# Patient Record
Sex: MALE | Race: WHITE
[De-identification: names, ages, dates, MRNs, and addresses within clinical notes are randomized per-mention and may not be internally consistent; named-entity substitution may affect disease eponyms.]

---

## 2017-01-18 LAB
INR PPP: 2.5 (ref 0.8–1.4)
PROTHROMBIN TIME: 27 SEC (ref 12.2–14.7)

## 2017-02-10 LAB
INR PPP: 1.8 (ref 0.8–1.4)
PROTHROMBIN TIME: 20.2 SEC (ref 12.2–14.7)

## 2017-04-05 ENCOUNTER — HOSPITAL ENCOUNTER (OUTPATIENT)
Dept: HOSPITAL 75 - LAB | Age: 47
LOS: 13 days | Discharge: HOME | End: 2017-04-18
Attending: INTERNAL MEDICINE
Payer: COMMERCIAL

## 2017-04-05 DIAGNOSIS — I82.403: Primary | ICD-10-CM

## 2017-04-05 DIAGNOSIS — Z79.01: ICD-10-CM

## 2017-04-05 LAB
INR PPP: 2.2 (ref 0.8–1.4)
PROTHROMBIN TIME: 23.9 SEC (ref 12.2–14.7)

## 2017-04-05 PROCEDURE — 36415 COLL VENOUS BLD VENIPUNCTURE: CPT

## 2017-04-05 PROCEDURE — 85610 PROTHROMBIN TIME: CPT

## 2017-05-10 LAB
INR PPP: 1.9 (ref 0.8–1.4)
PROTHROMBIN TIME: 21.3 SEC (ref 12.2–14.7)

## 2017-05-17 ENCOUNTER — HOSPITAL ENCOUNTER (EMERGENCY)
Dept: HOSPITAL 75 - ER | Age: 47
Discharge: HOME | End: 2017-05-17
Payer: COMMERCIAL

## 2017-05-17 VITALS — BODY MASS INDEX: 33.66 KG/M2 | WEIGHT: 190 LBS | HEIGHT: 63 IN

## 2017-05-17 VITALS — DIASTOLIC BLOOD PRESSURE: 82 MMHG | SYSTOLIC BLOOD PRESSURE: 135 MMHG

## 2017-05-17 DIAGNOSIS — Z79.01: ICD-10-CM

## 2017-05-17 DIAGNOSIS — F17.210: ICD-10-CM

## 2017-05-17 DIAGNOSIS — Z95.5: ICD-10-CM

## 2017-05-17 DIAGNOSIS — R10.10: Primary | ICD-10-CM

## 2017-05-17 DIAGNOSIS — Z79.82: ICD-10-CM

## 2017-05-17 DIAGNOSIS — Z79.899: ICD-10-CM

## 2017-05-17 LAB
ALBUMIN SERPL-MCNC: 3.9 G/DL (ref 3.2–4.5)
ALT SERPL-CCNC: 18 U/L (ref 0–55)
ANION GAP SERPL CALC-SCNC: 8 MMOL/L (ref 5–14)
AST SERPL-CCNC: 24 U/L (ref 5–34)
BASOPHILS # BLD AUTO: 0.1 10^3/UL (ref 0–0.1)
BASOPHILS NFR BLD AUTO: 1 % (ref 0–10)
BILIRUB SERPL-MCNC: 0.3 MG/DL (ref 0.1–1)
BUN SERPL-MCNC: 8 MG/DL (ref 7–18)
BUN/CREAT SERPL: 11
CALCIUM SERPL-MCNC: 8.9 MG/DL (ref 8.5–10.1)
CHLORIDE SERPL-SCNC: 105 MMOL/L (ref 98–107)
CO2 SERPL-SCNC: 27 MMOL/L (ref 21–32)
CREAT SERPL-MCNC: 0.76 MG/DL (ref 0.6–1.3)
EOSINOPHIL # BLD AUTO: 0.4 10^3/UL (ref 0–0.3)
EOSINOPHIL NFR BLD AUTO: 4 % (ref 0–10)
ERYTHROCYTE [DISTWIDTH] IN BLOOD BY AUTOMATED COUNT: 13.7 % (ref 10–14.5)
GFR SERPLBLD BASED ON 1.73 SQ M-ARVRAT: > 60 ML/MIN
GLUCOSE SERPL-MCNC: 91 MG/DL (ref 70–105)
LIPASE SERPL-CCNC: 14 U/L (ref 8–78)
LYMPHOCYTES # BLD AUTO: 3.5 X 10^3 (ref 1–4)
LYMPHOCYTES NFR BLD AUTO: 40 % (ref 12–44)
MCH RBC QN AUTO: 29 PG (ref 25–34)
MCHC RBC AUTO-ENTMCNC: 34 G/DL (ref 32–36)
MCV RBC AUTO: 86 FL (ref 80–99)
MONOCYTES # BLD AUTO: 1 X 10^3 (ref 0–1)
MONOCYTES NFR BLD AUTO: 11 % (ref 0–12)
NEUTROPHILS # BLD AUTO: 3.9 X 10^3 (ref 1.8–7.8)
NEUTROPHILS NFR BLD AUTO: 44 % (ref 42–75)
PLATELET # BLD: 285 10^3/UL (ref 130–400)
PMV BLD AUTO: 9.7 FL (ref 7.4–10.4)
POTASSIUM SERPL-SCNC: 4.5 MMOL/L (ref 3.6–5)
PROT SERPL-MCNC: 6.4 G/DL (ref 6.4–8.2)
RBC # BLD AUTO: 4.97 10^6/UL (ref 4.35–5.85)
SODIUM SERPL-SCNC: 140 MMOL/L (ref 135–145)
WBC # BLD AUTO: 8.9 10^3/UL (ref 4.3–11)

## 2017-05-17 PROCEDURE — 36415 COLL VENOUS BLD VENIPUNCTURE: CPT

## 2017-05-17 PROCEDURE — 83690 ASSAY OF LIPASE: CPT

## 2017-05-17 PROCEDURE — 96360 HYDRATION IV INFUSION INIT: CPT

## 2017-05-17 PROCEDURE — 80053 COMPREHEN METABOLIC PANEL: CPT

## 2017-05-17 PROCEDURE — 85025 COMPLETE CBC W/AUTO DIFF WBC: CPT

## 2017-05-17 PROCEDURE — 74177 CT ABD & PELVIS W/CONTRAST: CPT

## 2017-05-17 NOTE — ED ABDOMINAL PAIN
General


Chief Complaint:  Abdominal/GI Problems


Stated Complaint:  LOWER CHEST PAIN


Nursing Triage Note:  


AMB TO ED REPORTS HAS  HAD EPIGASTRIC PAIN FOR 1 WEEK. WORSE WHEN MOVING.


Sepsis Screen:  No Definite Risk


Source of Information:  Patient, Spouse


Exam Limitations:  No Limitations





History of Present Illness


Time Seen By Provider:  17:55


Initial Comments


47 yo male patient presents to the ED with c/o epigastric and RUQ pain for 1 

wk.  pain worse with spicy foods and movement.  denies N/V/D.  NPO since 0800.


Timing/Duration:  1 Week


Severity/Quality:  Aching, Cramping


Location:  Epigastric


Radiation:  RUQ


Activities at Onset:  None


Modifying Factors:  Worsens With Eating, Worsens With Movement





Allergies and Home Medications


Allergies


Coded Allergies:  


     Penicillins (Unverified  Allergy, Mild, 6/4/09)





Home Medications


Aspirin 81 Mg Tabec, 81 MG PO DAILY, (Reported)


Atorvastatin 20 Mg Tablet, 20 MG PO DAILY, (Reported)


Hydrocodone/Acetaminophen 1 Each Tablet, #90 (Reported)


Lisinopril 2.5 Mg Tablet, 2.5 MG PO DAILY, (Reported)


Metoprolol Tartrate 25 Mg Tablet, 25 MG PO HS, (Reported)


Omega-3/Dha/Epa/Fish Oil 1 Each Capsule, 1,000 MG PO DAILY, (Reported)


Omega-3/Dha/Epa/Fish Oil 1 Each Capsule, 2,000 MG PO HS, (Reported)


   TAKES 2 (1,000 MG) CAPSULES 


Omeprazole 20 Mg Capsule.dr, 20 MG PO DAILY, (Reported)


Ondansetron 8 Mg Tab.rapdis, 8 MG PO Q6H PRN for NAUSEA/VOMITING-1ST LINE, #10 

Ref 0


   Prescribed by: DAMARIS ANG on 5/17/17 1900


Warfarin Sod 5 Mg Tab, 7.5 MG PO Mo@1900, (Reported)


   TAKES 1 & 1/2 OF A (5 MG) TABLET 


Warfarin Sod 5 Mg Tab, 5 MG PO SuTuWeThFrSa@1900, (Reported)





Review of Systems


Constitutional:  No chills, No fever, No malaise


Respiratory:  Denies Cough, Denies Shortness of Air, Denies Wheezing


Cardiovascular:  Denies Chest Pain, Denies Lightheadedness, Denies Syncope


Gastrointestinal:  See HPI, Denies Abdomen Distended, Abdominal Pain, Denies 

Blood Streaked Stools, Denies Constipated, Denies Diarrhea, Denies Nausea, 

Denies Poor Appetite, Denies Poor Fluid Intake, Denies Rectal Bleeding, Denies 

Vomiting


Genitourinary:  Denies Burning, Denies Frequency, Denies Flank Pain, Denies 

Hematuria, Denies Pain


Musculoskeletal:  no symptoms reported


Skin:  no symptoms reported


Psychiatric/Neurological:  No Symptoms Reported





All Other Systems Reviewed


Negative Unless Noted:  Yes (Negative excepted noted.)





Past Medical-Social-Family Hx


Patient Social History


Alcohol Use:  Occasionally Uses


Recreational Drug Use:  No


Smoking Status:  Current Everyday Smoker


Recent Foreign Travel:  No


Contact w/Someone Who Travel:  No


Recent Infectious Disease Expo:  No





Immunizations Up To Date


Date of Pneumonia Vaccine:  Dec 3, 2012


Date of Influenza Vaccine:  Oct 14, 2016





Seasonal Allergies


Seasonal Allergies:  No





Surgeries


HX Surgeries:  Yes (STENTS)





Respiratory


Hx Respiratory Disorders:  No





Cardiovascular


Hx Cardiac Disorders:  Yes (STENT)


Cardiac Disorders:  Heart Attack





Neurological


Hx Neurological Disorders:  No





Reproductive System


Hx Reproductive Disorders:  No





Genitourinary


Hx Genitourinary Disorders:  No





Gastrointestinal


Hx Gastrointestinal Disorders:  Yes


Gastrointestinal Disorders:  Gastroesophageal Reflux





Musculoskeletal


Hx Musculoskeletal Disorders:  Yes


Musculoskeletal Disorders:  Chronic Back Pain





Endocrine


Hx Endocrine Disorders:  No





HEENT


HX ENT Disorders:  No





Cancer


Hx Cancer:  No





Psychosocial


Hx Psychiatric Problems:  No





Integumentary


HX Skin/Integumentary Disorder:  No





Blood Transfusions


Hx Blood Disorders:  Yes (PROTEIN S DEF)





Reviewed Nursing Assessment


Reviewed/Agree w Nursing PMH:  Yes





Family Medical History


Significant Family History:  Heart Disease, Vascular Disease





Physical Exam


Vital Signs





 VS - Last 72 Hours, by Label








 5/17/17 5/17/17





 17:22 19:19


 


Temp 98.7 97.7


 


Pulse 82 75


 


Resp 18 18


 


B/P (MAP) 147/82 


 


Pulse Ox 95 95


 


O2 Delivery Room Air 





Capillary Refill : Less Than 3 Seconds


General Appearance:  WD/WN, no apparent distress


HEENT:  PERRL/EOMI, pharynx normal


Neck:  supple, normal inspection


Respiratory:  lungs clear, normal breath sounds, no respiratory distress


Cardiovascular:  normal peripheral pulses, regular rate, rhythm, no edema, no 

murmur


Peripheral Pulses:  2+ Dorsalis Pedis (R), 2+ Left Dors-Pedis (L)


Gastrointestinal:  normal bowel sounds, soft, no organomegaly, No distended, 

guarding (epigastric and RUQ), No rebound, tenderness (RUQ and epigastric), 

other ((+) garza's sign)


Back:  normal inspection


Neurologic/Psychiatric:  alert, normal mood/affect, oriented x 3


Skin:  normal color, warm/dry





Progress/Results/Core Measures


Results/Orders


Lab Results





Laboratory Tests








Test


  5/17/17


11:03 Range/Units


 


 


White Blood Count


  8.9 


  4.3-11.0


10^3/uL


 


Red Blood Count


  4.97 


  4.35-5.85


10^6/uL


 


Hemoglobin 14.6  13.3-17.7  G/DL


 


Hematocrit 43  40-54  %


 


Mean Corpuscular Volume 86  80-99  FL


 


Mean Corpuscular Hemoglobin 29  25-34  PG


 


Mean Corpuscular Hemoglobin


Concent 34 


  32-36  G/DL


 


 


Red Cell Distribution Width 13.7  10.0-14.5  %


 


Platelet Count


  285 


  130-400


10^3/uL


 


Mean Platelet Volume 9.7  7.4-10.4  FL


 


Neutrophils (%) (Auto) 44  42-75  %


 


Lymphocytes (%) (Auto) 40  12-44  %


 


Monocytes (%) (Auto) 11  0-12  %


 


Eosinophils (%) (Auto) 4  0-10  %


 


Basophils (%) (Auto) 1  0-10  %


 


Neutrophils # (Auto) 3.9  1.8-7.8  X 10^3


 


Lymphocytes # (Auto) 3.5  1.0-4.0  X 10^3


 


Monocytes # (Auto) 1.0  0.0-1.0  X 10^3


 


Eosinophils # (Auto)


  0.4 H


  0.0-0.3


10^3/uL


 


Basophils # (Auto)


  0.1 


  0.0-0.1


10^3/uL


 


Sodium Level 140  135-145  MMOL/L


 


Potassium Level 4.5  3.6-5.0  MMOL/L


 


Chloride Level 105    MMOL/L


 


Carbon Dioxide Level 27  21-32  MMOL/L


 


Anion Gap 8  5-14  MMOL/L


 


Blood Urea Nitrogen 8  7-18  MG/DL


 


Creatinine


  0.76 


  0.60-1.30


MG/DL


 


Estimat Glomerular Filtration


Rate > 60 


   


 


 


BUN/Creatinine Ratio 11   


 


Glucose Level 91    MG/DL


 


Calcium Level 8.9  8.5-10.1  MG/DL


 


Total Bilirubin 0.3  0.1-1.0  MG/DL


 


Aspartate Amino Transf


(AST/SGOT) 24 


  5-34  U/L


 


 


Alanine Aminotransferase


(ALT/SGPT) 18 


  0-55  U/L


 


 


Alkaline Phosphatase 61    U/L


 


Total Protein 6.4  6.4-8.2  G/DL


 


Albumin 3.9  3.2-4.5  G/DL


 


Lipase 14  8-78  U/L








My Orders





Orders - DAMARIS ANG


Cbc With Automated Diff (5/17/17 17:53)


Comprehensive Metabolic Panel (5/17/17 17:53)


Lipase (5/17/17 17:53)


Saline Lock/Iv-Start (5/17/17 17:53)


Ns Iv 1000 Ml (Sodium Chloride 0.9%) (5/17/17 17:53)


Ct Abdomen/Pelvis W (5/17/17 17:53)


Iohexol Injection (Omnipaque 350 Mg/Ml 1 (5/17/17 18:00)


Ns (Ivpb) (Sodium Chloride 0.9% Ivpb Bag (5/17/17 18:00)





Medications Given in ED





Current Medications








 Medications  Dose


 Ordered  Sig/Joey


 Route  Start Time


 Stop Time Status Last Admin


Dose Admin


 


 Iohexol  100 ml  ONCE  ONCE


 IV  5/17/17 18:00


 5/17/17 18:01 DC 5/17/17 18:29


100 ML


 


 Sodium Chloride  100 ml  ONCE  ONCE


 IV  5/17/17 18:00


 5/17/17 18:01 DC 5/17/17 18:29


80 ML


 


 Sodium Chloride  1,000 ml @ 


 0 mls/hr  Q0M ONCE


 IV  5/17/17 17:53


 5/17/17 17:54 DC 5/17/17 18:10


1,000 MLS/HR








Vital Signs/I&O





Vital Sign - Last 12Hours








 5/17/17 5/17/17





 17:22 19:19


 


Temp 98.7 97.7


 


Pulse 82 75


 


Resp 18 18


 


B/P (MAP) 147/82 


 


Pulse Ox 95 95


 


O2 Delivery Room Air 














Blood Pressure Mean:  103











Diagnostic Imaging





   Diagonstic Imaging:  CT


   Plain Films/CT/US/NM/MRI:  abdomen, pelvis


Comments


Findings: Visualized lung bases: Unremarkable. Liver: Unremarkable. Gallbladder

: Unremarkable. Pancreas: Unremarkable. Spleen: Unremarkable. Adrenal glands: 

Unremarkable. Kidneys/ ureters: Unremarkable. Aorta: Unremarkable. 

Intraabdominal/ retroperitoneal contents: Unremarkable. Intestines: 

Unremarkable. Appendix: Unremarkable. Bladder: Unremarkable. Pelvic organs: 

Unremarkable. Extra abdominal/ pelvis regions: Unremarkable. Abdominal wall: 

Unremarkable. Bones: Unremarkable. Impression: Unremarkable CT scan of the 

abdomen and pelvis. Dictated on workstation # [id number]


   Reviewed:  Reviewed by Me (radiology report reviewed by me. )





Departure


Communication


Progress Notes


Laboratory and diagnostic findings discussed with the patient.  Patient reports 

improvement in symptoms with medications and IV fluids given.  Plan for 

discharge to home with follow-up as an outpatient with Dr. Champagne.





Impression


Impression:  


 Primary Impression:  


 Upper abdominal pain


Disposition:  01 HOME, SELF-CARE


Condition:  Improved





Departure-Patient Inst.


Decision time for Depature:  18:58


Referrals:  


RAIZA CHAMPAGNE MD (PCP/Family)


Primary Care Physician


Patient Instructions:  Acute Abdomen (Belly Pain), Adult (DC)





Add. Discharge Instructions:  


All discharge instructions reviewed with patient and/or family. Voiced 

understanding.  Tylenol extra strength over-the-counter as directed for pain.  

Ibuprofen 800 mg by mouth every 8 hours as needed for pain.  Drink plenty of 

fluids.  Strict low-fat diet.  Follow-up with Dr. Champagne as an outpatient in 

the next 1-2 days for recheck and possible need for outpatient hepatobiliary 

scan.  Call for appointment time.  Return to the emergency department for 

worsened pain, fever, vomiting, vomiting blood, rectal bleeding, black stools, 

chest pain, shortness of air, or any other concerns.


Scripts


Ondansetron (Ondansetron Odt) 8 Mg Tab.rapdis


8 MG PO Q6H Y for NAUSEA/VOMITING-1ST LINE, #10 TAB 0 Refills


   Prov: DAMARIS ANG         5/17/17


Work/School Note:  Work Release Form   Date Seen in the Emergency Department:  

May 17, 2017


   Return to Work:  May 18, 2017


   Restrictions:  No Restrictions











DAMARIS ANG May 17, 2017 18:03

## 2017-05-17 NOTE — DIAGNOSTIC IMAGING REPORT
CLINICAL INDICATION: Patient with epigastric pain x1.5 weeks.

Last week patient was short of air and had nausea, vomiting, and

diarrhea.



Exam: CT exam of the abdomen and pelvis is performed with 100 cc

of Omnipaque 350 IV contrast. Coronal reformatted images were

created.



Comparisons: None.



Findings:



Visualized lung bases: Unremarkable.



Liver: Unremarkable.

Gallbladder: Unremarkable.

Pancreas: Unremarkable.

Spleen: Unremarkable.



Adrenal glands: Unremarkable.

Kidneys/ ureters: Unremarkable.



Aorta: Unremarkable. 



Intraabdominal/ retroperitoneal contents: Unremarkable.

Intestines: Unremarkable.

Appendix: Unremarkable.



Bladder: Unremarkable.

Pelvic organs: Unremarkable.

Extra abdominal/ pelvis regions: Unremarkable.



Abdominal wall: Unremarkable.



Bones: Unremarkable.



Impression: 

Unremarkable CT scan of the abdomen and pelvis.



Dictated by: 



  Dictated on workstation # [id number]

## 2017-06-09 LAB
INR PPP: 2.1 (ref 0.8–1.4)
PROTHROMBIN TIME: 23.4 SEC (ref 12.2–14.7)

## 2017-06-28 ENCOUNTER — HOSPITAL ENCOUNTER (OUTPATIENT)
Dept: HOSPITAL 75 - LAB | Age: 47
LOS: 41 days | Discharge: HOME | End: 2017-08-08
Attending: INTERNAL MEDICINE
Payer: COMMERCIAL

## 2017-06-28 DIAGNOSIS — Z79.01: ICD-10-CM

## 2017-06-28 DIAGNOSIS — I82.403: Primary | ICD-10-CM

## 2017-06-28 LAB
INR PPP: 2 (ref 0.8–1.4)
PROTHROMBIN TIME: 22.4 SEC (ref 12.2–14.7)

## 2017-06-28 PROCEDURE — 85610 PROTHROMBIN TIME: CPT

## 2017-06-28 PROCEDURE — 36415 COLL VENOUS BLD VENIPUNCTURE: CPT

## 2017-08-15 ENCOUNTER — HOSPITAL ENCOUNTER (OUTPATIENT)
Dept: HOSPITAL 75 - LAB | Age: 47
End: 2017-08-15
Attending: INTERNAL MEDICINE
Payer: COMMERCIAL

## 2017-08-15 DIAGNOSIS — Z51.81: ICD-10-CM

## 2017-08-15 DIAGNOSIS — I26.99: Primary | ICD-10-CM

## 2017-08-15 LAB
INR PPP: 2.3 (ref 0.8–1.4)
PROTHROMBIN TIME: 25 SEC (ref 12.2–14.7)

## 2017-08-15 PROCEDURE — 36415 COLL VENOUS BLD VENIPUNCTURE: CPT

## 2017-08-15 PROCEDURE — 85610 PROTHROMBIN TIME: CPT

## 2017-08-19 ENCOUNTER — HOSPITAL ENCOUNTER (EMERGENCY)
Dept: HOSPITAL 75 - ER | Age: 47
Discharge: HOME | End: 2017-08-19
Payer: COMMERCIAL

## 2017-08-19 VITALS — BODY MASS INDEX: 31.89 KG/M2 | HEIGHT: 63 IN | WEIGHT: 180 LBS

## 2017-08-19 VITALS — DIASTOLIC BLOOD PRESSURE: 68 MMHG | SYSTOLIC BLOOD PRESSURE: 114 MMHG

## 2017-08-19 DIAGNOSIS — F17.200: ICD-10-CM

## 2017-08-19 DIAGNOSIS — Z82.49: ICD-10-CM

## 2017-08-19 DIAGNOSIS — Z79.82: ICD-10-CM

## 2017-08-19 DIAGNOSIS — K21.9: ICD-10-CM

## 2017-08-19 DIAGNOSIS — Z95.5: ICD-10-CM

## 2017-08-19 DIAGNOSIS — Z79.01: ICD-10-CM

## 2017-08-19 DIAGNOSIS — L50.9: Primary | ICD-10-CM

## 2017-08-19 DIAGNOSIS — I25.2: ICD-10-CM

## 2017-08-19 PROCEDURE — 99283 EMERGENCY DEPT VISIT LOW MDM: CPT

## 2017-08-19 NOTE — ED INTEGUMENTARY GENERAL
General


Chief Complaint:  Allergic Reaction


Stated Complaint:  SWELLING IN R ARM AFTER FLU SHOT


Nursing Triage Note:  


PT CO OF R DELTOID SWELLING FROM FLU/PNEM SHOT


Source:  patient, spouse


Exam Limitations:  no limitations





History of Present Illness


Time seen by provider:  16:13


Initial Comments


46-year-old male patient presents to the emergency department complaints of 

right upper arm pain after receiving a pneumonia and influenza shot yesterday.  

States he had the tetanus shot in the left arm and denies any symptoms.  Now 

has swelling, redness, pain, and warmth of the right proximal arm.


Timing/Duration:  yesterday, getting worse


Location:  extremities (rt shoulder)


Possible Cause:  other (influenza and pneumonia injections)


Modifying Factors:  worse with other (worse with palpation and scratching)





Allergies and Home Medications


Allergies


Coded Allergies:  


     Penicillins (Unverified  Allergy, Mild, 6/4/09)





Home Medications


Aspirin 81 Mg Tabec, 81 MG PO DAILY, (Reported)


Atorvastatin 20 Mg Tablet, 20 MG PO DAILY, (Reported)


Clindamycin HCl 300 Mg Capsule, 300 MG PO QID, #40 Ref 0


   Prescribed by: DAMARIS ANG on 8/19/17 1726


Famotidine 20 Mg Tablet, 20 MG PO BID, #20 Ref 0


   Prescribed by: DAMARIS ANG on 8/19/17 1726


Hydrocodone/Acetaminophen 1 Each Tablet, #90 (Reported)


Lisinopril 2.5 Mg Tablet, 2.5 MG PO DAILY, (Reported)


Metoprolol Tartrate 25 Mg Tablet, 25 MG PO HS, (Reported)


Omega-3/Dha/Epa/Fish Oil 1 Each Capsule, 1,000 MG PO DAILY, (Reported)


Omega-3/Dha/Epa/Fish Oil 1 Each Capsule, 2,000 MG PO HS, (Reported)


   TAKES 2 (1,000 MG) CAPSULES 


Omeprazole 20 Mg Capsule.dr, 20 MG PO DAILY, (Reported)


Prednisone 20 Mg Tab, 40 MG PO DAILY, #10 Ref 0


   Prescribed by: DAMARIS ANG on 8/19/17 1726


Warfarin Sod 5 Mg Tab, 7.5 MG PO Mo@1900, (Reported)


   TAKES 1 & 1/2 OF A (5 MG) TABLET 


Warfarin Sod 5 Mg Tab, 5 MG PO SuTuWeThFrSa@1900, (Reported)





Constitutional:  No dizziness, No fever, No malaise, No weakness


EENTM:  No mouth swelling, No nose congestion, No throat swelling


Respiratory:  No dyspnea on exertion, No short of breath, No stridor, No 

wheezing


Cardiovascular:  no symptoms reported


Gastrointestinal:  No abdominal pain, No diarrhea, nausea, No vomiting


Musculoskeletal:  see HPI


Skin:  see HPI


Psychiatric/Neurological:  No Symptoms Reported


All Other Systems Reviewed


Negative Unless Noted:  Yes (Negative excepted noted.)





Past Medical-Social-Family Hx


Patient Social History


Alcohol Use:  Denies Use


Recreational Drug Use:  No


Smoking Status:  Current Everyday Smoker


Recent Foreign Travel:  No


Contact w/Someone Who Travel:  No


Recent Infectious Disease Expo:  No


Recent Hopitalizations:  No





Immunizations Up To Date


Date of Pneumonia Vaccine:  Dec 3, 2012


Date of Influenza Vaccine:  Oct 14, 2016





Seasonal Allergies


Seasonal Allergies:  No





Surgeries


HX Surgeries:  Yes (STENTS)





Respiratory


Hx Respiratory Disorders:  No





Cardiovascular


Hx Cardiac Disorders:  Yes (STENT)


Cardiac Disorders:  Heart Attack





Neurological


Hx Neurological Disorders:  No





Reproductive System


Hx Reproductive Disorders:  No





Genitourinary


Hx Genitourinary Disorders:  No





Gastrointestinal


Hx Gastrointestinal Disorders:  Yes


Gastrointestinal Disorders:  Gastroesophageal Reflux





Musculoskeletal


Hx Musculoskeletal Disorders:  Yes


Musculoskeletal Disorders:  Chronic Back Pain





Endocrine


Hx Endocrine Disorders:  No





HEENT


HX ENT Disorders:  No





Cancer


Hx Cancer:  No





Psychosocial


Hx Psychiatric Problems:  No





Integumentary


HX Skin/Integumentary Disorder:  No





Blood Transfusions


Hx Blood Disorders:  Yes (PROTEIN S DEF)





Reviewed Nursing Assessment


Reviewed/Agree w Nursing PMH:  Yes





Family Medical History


Significant Family History:  Heart Disease, Vascular Disease





Physical Exam


Vital Signs





Vital Sign - Last 12Hours








 8/19/17





 15:15


 


Temp 98.2


 


Pulse 78


 


Resp 18


 


B/P (MAP) 114/68


 


Pulse Ox 95





Capillary Refill : Less Than 3 Seconds


General Appearance:  WD/WN, no apparent distress


HEENT:  PERRL/EOMI, pharynx normal, No other (normocephalic, atraumatic.  no 

evidence of swelling to the face or lips.)


Neck:  supple, normal inspection


Cardiovascular:  regular rate, rhythm, no murmur


Respiratory:  lungs clear, normal breath sounds, no respiratory distress


Extremities:  normal capillary refill, other (erythema, swelling and tenderness 

of the right deltoid consistent with urticaria.  central puncture consistent 

with h/o injections. no drainage.)


Neurologic/Psychiatric:  no motor/sensory deficits, alert, normal mood/affect, 

oriented x 3


Skin:  normal color, warm/dry, other (erythema, swelling and tenderness of the 

right deltoid consistent with urticaria.  central puncture consistent with h/o 

injections. no drainage.)


Skin Problem Location:  upper extremities (right deltoid)


Skin Problem Character:  other (erythema, swelling and tenderness of the right 

deltoid consistent with urticaria.  central puncture consistent with h/o 

injections. no drainage.)





Progress/Results/Core Measures


Results/Orders


My Orders





Orders - DAMARIS ANG


Hydrocodone/Apap 7.5/325 Tab (Lortab 7. (8/19/17 16:21)


Famotidine Tablet (Pepcid Tablet) (8/19/17 16:30)


Diphenhydramine Tablet (Benadryl Tablet) (8/19/17 16:30)


Prednisone Tablet (Deltasone Tablet) (8/19/17 16:30)


Ondansetron  Oral Dissolve Tab (Zofran (8/19/17 16:30)





Medications Given in ED





Vital Signs/I&O





Vital Sign - Last 12Hours








 8/19/17 8/19/17





 15:15 17:28


 


Temp 98.2 98.2


 


Pulse 78 78


 


Resp 18 18


 


B/P (MAP) 114/68 


 


Pulse Ox 95 95














Blood Pressure Mean:  83











Departure


Communication


Progress Notes


Patient seen and evaluated.  Patient was given Zofran, Pepcid, Benadryl, and 

prednisone with improvement in symptoms.  Patient was given a prescription for 

prophylactic clindamycin due to the warmth, pain, and erythema associated with 

injection site.  patient to f/u with pcp if needed.





Impression


Impression:  


 Primary Impression:  


 Urticaria at injection site


Disposition:  01 HOME, SELF-CARE


Condition:  Improved





Departure-Patient Inst.


Decision time for Depature:  17:07


Referrals:  


RAIZA CHAMPAGNE MD (PCP/Family)


Primary Care Physician


Patient Instructions:  Drug Allergy





Add. Discharge Instructions:  


All discharge instructions reviewed with patient and/or family. Voiced 

understanding.  Medications as instructed.  Shower with antibacterial soap.  

Elevate the right arm on pillows as much as possible above the level of the 

heart for the next 2 days.  Ice pack or heating pads as needed for pain and 

swelling.  Follow-up with Dr. Champagne in the next 2-3 days for recheck, call 

first thing Monday morning for appointment time.  Return to the emergency 

department for worsened pain, swelling, redness, fever, or any other concerns.


Scripts


Famotidine (Pepcid) 20 Mg Tablet


20 MG PO BID, #20 TAB 0 Refills


   Prov: DAMARIS ANG         8/19/17 


Prednisone (Prednisone) 20 Mg Tab


40 MG PO DAILY, #10 TAB 0 Refills


   Prov: DAMARIS ANG         8/19/17 


Clindamycin HCl (Cleocin HCl) 300 Mg Capsule


300 MG PO QID, #40 CAP 0 Refills


   Prov: DAMARIS ANG         8/19/17


Work/School Note:  Work Release Form   Date Seen in the Emergency Department:  

Aug 19, 2017


   Return to Work:  Aug 21, 2017





Images


Extremities-Upper











1 - Other-See Progress Note














DAMARIS ANG Aug 19, 2017 4:13 pm

## 2017-09-14 ENCOUNTER — HOSPITAL ENCOUNTER (OUTPATIENT)
Dept: HOSPITAL 75 - LAB | Age: 47
LOS: 16 days | Discharge: HOME | End: 2017-09-30
Attending: INTERNAL MEDICINE
Payer: COMMERCIAL

## 2017-09-14 DIAGNOSIS — I26.99: ICD-10-CM

## 2017-09-14 DIAGNOSIS — Z51.81: Primary | ICD-10-CM

## 2017-09-14 DIAGNOSIS — Z79.899: ICD-10-CM

## 2017-09-14 LAB
INR PPP: 1.7 (ref 0.8–1.4)
PROTHROMBIN TIME: 20.4 SEC (ref 12.2–14.7)

## 2017-09-14 PROCEDURE — 85610 PROTHROMBIN TIME: CPT

## 2017-09-14 PROCEDURE — 36415 COLL VENOUS BLD VENIPUNCTURE: CPT

## 2017-10-01 ENCOUNTER — HOSPITAL ENCOUNTER (OUTPATIENT)
Dept: HOSPITAL 75 - LAB | Age: 47
LOS: 90 days | Discharge: HOME | End: 2017-12-30
Attending: INTERNAL MEDICINE
Payer: COMMERCIAL

## 2017-10-01 DIAGNOSIS — I26.99: Primary | ICD-10-CM

## 2017-10-01 DIAGNOSIS — Z79.01: ICD-10-CM

## 2017-10-01 PROCEDURE — 36415 COLL VENOUS BLD VENIPUNCTURE: CPT

## 2017-10-01 PROCEDURE — 85610 PROTHROMBIN TIME: CPT

## 2017-11-08 LAB
INR PPP: 2.7 (ref 0.8–1.4)
PROTHROMBIN TIME: 28.3 SEC (ref 12.2–14.7)

## 2017-11-16 ENCOUNTER — HOSPITAL ENCOUNTER (INPATIENT)
Dept: HOSPITAL 75 - ER | Age: 47
LOS: 4 days | Discharge: HOME | DRG: 247 | End: 2017-11-20
Attending: INTERNAL MEDICINE | Admitting: INTERNAL MEDICINE
Payer: COMMERCIAL

## 2017-11-16 VITALS — DIASTOLIC BLOOD PRESSURE: 91 MMHG | SYSTOLIC BLOOD PRESSURE: 154 MMHG

## 2017-11-16 VITALS — DIASTOLIC BLOOD PRESSURE: 91 MMHG | SYSTOLIC BLOOD PRESSURE: 152 MMHG

## 2017-11-16 VITALS — DIASTOLIC BLOOD PRESSURE: 97 MMHG | SYSTOLIC BLOOD PRESSURE: 151 MMHG

## 2017-11-16 VITALS — DIASTOLIC BLOOD PRESSURE: 97 MMHG | SYSTOLIC BLOOD PRESSURE: 154 MMHG

## 2017-11-16 VITALS — SYSTOLIC BLOOD PRESSURE: 154 MMHG | DIASTOLIC BLOOD PRESSURE: 93 MMHG

## 2017-11-16 VITALS — BODY MASS INDEX: 33.86 KG/M2 | HEIGHT: 62 IN | WEIGHT: 184 LBS

## 2017-11-16 VITALS — SYSTOLIC BLOOD PRESSURE: 160 MMHG | DIASTOLIC BLOOD PRESSURE: 96 MMHG

## 2017-11-16 VITALS — SYSTOLIC BLOOD PRESSURE: 148 MMHG | DIASTOLIC BLOOD PRESSURE: 84 MMHG

## 2017-11-16 VITALS — SYSTOLIC BLOOD PRESSURE: 165 MMHG | DIASTOLIC BLOOD PRESSURE: 93 MMHG

## 2017-11-16 VITALS — SYSTOLIC BLOOD PRESSURE: 155 MMHG | DIASTOLIC BLOOD PRESSURE: 106 MMHG

## 2017-11-16 VITALS — DIASTOLIC BLOOD PRESSURE: 89 MMHG | SYSTOLIC BLOOD PRESSURE: 151 MMHG

## 2017-11-16 DIAGNOSIS — Z95.5: ICD-10-CM

## 2017-11-16 DIAGNOSIS — F17.210: ICD-10-CM

## 2017-11-16 DIAGNOSIS — D68.59: ICD-10-CM

## 2017-11-16 DIAGNOSIS — Z82.49: ICD-10-CM

## 2017-11-16 DIAGNOSIS — E78.1: ICD-10-CM

## 2017-11-16 DIAGNOSIS — Z79.01: ICD-10-CM

## 2017-11-16 DIAGNOSIS — I10: ICD-10-CM

## 2017-11-16 DIAGNOSIS — I21.4: Primary | ICD-10-CM

## 2017-11-16 DIAGNOSIS — K21.9: ICD-10-CM

## 2017-11-16 DIAGNOSIS — I25.10: ICD-10-CM

## 2017-11-16 DIAGNOSIS — E78.5: ICD-10-CM

## 2017-11-16 DIAGNOSIS — I50.20: ICD-10-CM

## 2017-11-16 DIAGNOSIS — R06.2: ICD-10-CM

## 2017-11-16 DIAGNOSIS — I25.82: ICD-10-CM

## 2017-11-16 LAB
ALBUMIN SERPL-MCNC: 4.3 GM/DL (ref 3.2–4.5)
ALT SERPL-CCNC: 17 U/L (ref 0–55)
ANION GAP SERPL CALC-SCNC: 11 MMOL/L (ref 5–14)
APTT BLD: 28 SEC (ref 24–35)
AST SERPL-CCNC: 17 U/L (ref 5–34)
BASOPHILS # BLD AUTO: 0.1 10^3/UL (ref 0–0.1)
BASOPHILS NFR BLD AUTO: 1 % (ref 0–10)
BILIRUB SERPL-MCNC: 0.4 MG/DL (ref 0.1–1)
BUN SERPL-MCNC: 9 MG/DL (ref 7–18)
BUN/CREAT SERPL: 11
CALCIUM SERPL-MCNC: 9.4 MG/DL (ref 8.5–10.1)
CHLORIDE SERPL-SCNC: 102 MMOL/L (ref 98–107)
CO2 SERPL-SCNC: 27 MMOL/L (ref 21–32)
CREAT SERPL-MCNC: 0.82 MG/DL (ref 0.6–1.3)
EOSINOPHIL # BLD AUTO: 0.4 10^3/UL (ref 0–0.3)
EOSINOPHIL NFR BLD AUTO: 3 % (ref 0–10)
ERYTHROCYTE [DISTWIDTH] IN BLOOD BY AUTOMATED COUNT: 13.7 % (ref 10–14.5)
GFR SERPLBLD BASED ON 1.73 SQ M-ARVRAT: > 60 ML/MIN
GLUCOSE SERPL-MCNC: 102 MG/DL (ref 70–105)
INR PPP: 1.2 (ref 0.8–1.4)
LYMPHOCYTES # BLD AUTO: 5.2 X 10^3 (ref 1–4)
LYMPHOCYTES NFR BLD AUTO: 39 % (ref 12–44)
MAGNESIUM SERPL-MCNC: 2.1 MG/DL (ref 1.8–2.4)
MCH RBC QN AUTO: 30 PG (ref 25–34)
MCHC RBC AUTO-ENTMCNC: 35 G/DL (ref 32–36)
MCV RBC AUTO: 86 FL (ref 80–99)
MONOCYTES # BLD AUTO: 1.5 X 10^3 (ref 0–1)
MONOCYTES NFR BLD AUTO: 11 % (ref 0–12)
MYOGLOBIN SERPL-MCNC: 31.5 NG/ML (ref 10–92)
NEUTROPHILS # BLD AUTO: 6.1 X 10^3 (ref 1.8–7.8)
NEUTROPHILS NFR BLD AUTO: 46 % (ref 42–75)
PLATELET # BLD: 336 10^3/UL (ref 130–400)
PMV BLD AUTO: 9 FL (ref 7.4–10.4)
POTASSIUM SERPL-SCNC: 3.5 MMOL/L (ref 3.6–5)
PROT SERPL-MCNC: 7.6 GM/DL (ref 6.4–8.2)
PROTHROMBIN TIME: 15.7 SEC (ref 12.2–14.7)
RBC # BLD AUTO: 5.18 10^6/UL (ref 4.35–5.85)
SODIUM SERPL-SCNC: 140 MMOL/L (ref 135–145)
WBC # BLD AUTO: 13.4 10^3/UL (ref 4.3–11)

## 2017-11-16 PROCEDURE — 93306 TTE W/DOPPLER COMPLETE: CPT

## 2017-11-16 PROCEDURE — 93458 L HRT ARTERY/VENTRICLE ANGIO: CPT

## 2017-11-16 PROCEDURE — 80048 BASIC METABOLIC PNL TOTAL CA: CPT

## 2017-11-16 PROCEDURE — 80061 LIPID PANEL: CPT

## 2017-11-16 PROCEDURE — 96374 THER/PROPH/DIAG INJ IV PUSH: CPT

## 2017-11-16 PROCEDURE — 85347 COAGULATION TIME ACTIVATED: CPT

## 2017-11-16 PROCEDURE — 83735 ASSAY OF MAGNESIUM: CPT

## 2017-11-16 PROCEDURE — 84484 ASSAY OF TROPONIN QUANT: CPT

## 2017-11-16 PROCEDURE — 85025 COMPLETE CBC W/AUTO DIFF WBC: CPT

## 2017-11-16 PROCEDURE — 96375 TX/PRO/DX INJ NEW DRUG ADDON: CPT

## 2017-11-16 PROCEDURE — 93005 ELECTROCARDIOGRAM TRACING: CPT

## 2017-11-16 PROCEDURE — 80053 COMPREHEN METABOLIC PANEL: CPT

## 2017-11-16 PROCEDURE — 85027 COMPLETE CBC AUTOMATED: CPT

## 2017-11-16 PROCEDURE — 94760 N-INVAS EAR/PLS OXIMETRY 1: CPT

## 2017-11-16 PROCEDURE — 71010: CPT

## 2017-11-16 PROCEDURE — 85379 FIBRIN DEGRADATION QUANT: CPT

## 2017-11-16 PROCEDURE — 83874 ASSAY OF MYOGLOBIN: CPT

## 2017-11-16 PROCEDURE — 85610 PROTHROMBIN TIME: CPT

## 2017-11-16 PROCEDURE — 36415 COLL VENOUS BLD VENIPUNCTURE: CPT

## 2017-11-16 PROCEDURE — 85730 THROMBOPLASTIN TIME PARTIAL: CPT

## 2017-11-16 PROCEDURE — 94640 AIRWAY INHALATION TREATMENT: CPT

## 2017-11-16 PROCEDURE — 93041 RHYTHM ECG TRACING: CPT

## 2017-11-16 PROCEDURE — 85007 BL SMEAR W/DIFF WBC COUNT: CPT

## 2017-11-16 PROCEDURE — 92928 PRQ TCAT PLMT NTRAC ST 1 LES: CPT

## 2017-11-16 RX ADMIN — MORPHINE SULFATE PRN MG: 4 INJECTION, SOLUTION INTRAMUSCULAR; INTRAVENOUS at 21:03

## 2017-11-16 RX ADMIN — MORPHINE SULFATE PRN MG: 4 INJECTION, SOLUTION INTRAMUSCULAR; INTRAVENOUS at 18:18

## 2017-11-16 RX ADMIN — SODIUM CHLORIDE SCH MLS/HR: 900 INJECTION, SOLUTION INTRAVENOUS at 18:11

## 2017-11-16 RX ADMIN — NITROGLYCERIN PRN MG: 0.4 TABLET SUBLINGUAL at 15:29

## 2017-11-16 RX ADMIN — NITROGLYCERIN PRN MG: 0.4 TABLET SUBLINGUAL at 15:34

## 2017-11-16 RX ADMIN — NITROGLYCERIN PRN MG: 0.4 TABLET SUBLINGUAL at 15:23

## 2017-11-16 NOTE — ED CHEST PAIN
General


Chief Complaint:  Chest Pain


Stated Complaint:  CHEST PAIN


Nursing Triage Note:  


patient reports CP with SOA, n/v 1 hour pta


Nursing Sepsis Screen:  No Definite Risk


Exam Limitations:  no limitations





History of Present Illness


Time seen by provider:  15:00


Initial Comments


Here with report of onset of chest pain approximately one hour prior to arrival 

that is associated with sweating, shortness of breath and nausea.  Vomiting 

after arrival to the ER.  Pain is left-sided and not radiating than stated as a 

10 out of 10 tightness.  States it does not feel like his previous heart 

attack.  Patient is on Coumadin.


Timing/Duration:  1 hour


Severity/Quality:  moderate, severe


Location:  central


Radiation:  no radiation


Activities at Onset:  emotional stress (daughter's wedding)


Prior CP/Workup:  cardiac cath, heart attack


ASA po PTA:  No


NTG SL PTA:  No


Associated Symptoms:  No back pain, diaphoresis, No fever/chills, nausea/

vomiting, shortness of breath, No weakness





Allergies and Home Medications


Allergies


Coded Allergies:  


     Penicillins (Unverified  Allergy, Mild, 09)





Home Medications


Aspirin 81 Mg Tabec, 81 MG PO DAILY, (Reported)


Atorvastatin 20 Mg Tablet, 20 MG PO DAILY, (Reported)


Clindamycin HCl 300 Mg Capsule, 300 MG PO QID, #40 Ref 0


   Prescribed by: DAMARIS ANG on 17


Famotidine 20 Mg Tablet, 20 MG PO BID, #20 Ref 0


   Prescribed by: DAMARIS ANG on 17


Hydrocodone/Acetaminophen 1 Each Tablet, #90 (Reported)


Lisinopril 2.5 Mg Tablet, 2.5 MG PO DAILY, (Reported)


Metoprolol Tartrate 25 Mg Tablet, 25 MG PO HS, (Reported)


Omega-3/Dha/Epa/Fish Oil 1 Each Capsule, 1,000 MG PO DAILY, (Reported)


Omega-3/Dha/Epa/Fish Oil 1 Each Capsule, 2,000 MG PO HS, (Reported)


   TAKES 2 (1,000 MG) CAPSULES 


Omeprazole 20 Mg Capsule.dr, 20 MG PO DAILY, (Reported)


Prednisone 20 Mg Tab, 40 MG PO DAILY, #10 Ref 0


   Prescribed by: DAMARIS ANG on 17


Warfarin Sod 5 Mg Tab, 7.5 MG PO Mo@1900, (Reported)


   TAKES 1 & 1/2 OF A (5 MG) TABLET 


Warfarin Sod 5 Mg Tab, 5 MG PO Fady@1900, (Reported)





Review of Systems


Constitutional:  see HPI, No chills, diaphoresis, No fever


EENTM:  No Symptoms Reported


Respiratory:  See HPI


Cardiovascular:  See HPI, Chest Pain, Denies Edema


Gastrointestinal:  See HPI, Nausea, Vomiting


Genitourinary:  No Symptoms Reported


Musculoskeletal:  no symptoms reported





All Other Systems Reviewed


Negative Unless Noted:  Yes





Past Medical-Social-Family Hx


Patient Social History


Alcohol Use:  Denies Use


Recreational Drug Use:  No


Smoking Status:  Current Everyday Smoker


Recent Foreign Travel:  No


Contact w/Someone Who Travel:  No


Recent Infectious Disease Expo:  No


Recent Hopitalizations:  No





Immunizations Up To Date


Date of Pneumonia Vaccine:  Dec 3, 2012


Date of Influenza Vaccine:  Oct 14, 2016





Seasonal Allergies


Seasonal Allergies:  No





Surgeries


History of Surgeries:  Yes (STENTS)





Respiratory


History of Respiratory Disorde:  No





Cardiovascular


History of Cardiac Disorders:  Yes (STENT)


Cardiac Disorders:  Heart Attack





Neurological


History of Neurological Disord:  No





Reproductive System


Hx Reproductive Disorders:  No





Gastrointestinal


History of Gastrointestinal Di:  Yes


Gastrointestinal Disorders:  Gastroesophageal Reflux





Musculoskeletal


History of Musculoskeletal Dis:  Yes


Musculoskeletal Disorders:  Chronic Back Pain





Endocrine


History of Endocrine Disorders:  No





Cancer


History of Cancer:  No





Psychosocial


History of Psychiatric Problem:  No





Integumentary


History of Skin or Integumenta:  No





Blood Transfusions


History of Blood Disorders:  Yes (PROTEIN S DEF)





Reviewed Nursing Assessment


Reviewed/Agree w Nursing PMH:  Yes





Family Medical History


Significant Family History:  Heart Disease, Vascular Disease





Physical Exam


Vital Signs





Vital Sign - Last 12Hours








 17





 15:15


 


Temp 96.8


 


Pulse 68


 


Resp 15


 


B/P (MAP) 137/98


 


Pulse Ox 98


 


O2 Delivery Room Air


 


O2 Flow Rate 2.00





Capillary Refill : Less Than 3 Seconds


General Appearance:  No Apparent Distress, WD/WN


HEENT:  PERRL/EOMI, Pharynx Normal


Neck:  Non Tender, Supple


Respiratory:  Lungs Clear, Normal Breath Sounds


Cardiovascular:  Regular Rate, Rhythm, No Murmur


Gastrointestinal:  Non Tender, Soft


Extremity:  Normal Range of Motion, Non Tender


Neurologic/Psychiatric:  Alert, Oriented x3


Skin:  Cool, Diaphoresis





Progress/Results/Core Measures


Results/Orders


Lab Results





Laboratory Tests








Test


  17


15:15 Range/Units


 


 


White Blood Count


  13.4 H


  4.3-11.0


10^3/uL


 


Red Blood Count


  5.18 


  4.35-5.85


10^6/uL


 


Hemoglobin 15.5  13.3-17.7  G/DL


 


Hematocrit 44  40-54  %


 


Mean Corpuscular Volume 86  80-99  FL


 


Mean Corpuscular Hemoglobin 30  25-34  PG


 


Mean Corpuscular Hemoglobin


Concent 35 


  32-36  G/DL


 


 


Red Cell Distribution Width 13.7  10.0-14.5  %


 


Platelet Count


  336 


  130-400


10^3/uL


 


Mean Platelet Volume 9.0  7.4-10.4  FL


 


Neutrophils (%) (Auto) 46  42-75  %


 


Lymphocytes (%) (Auto) 39  12-44  %


 


Monocytes (%) (Auto) 11  0-12  %


 


Eosinophils (%) (Auto) 3  0-10  %


 


Basophils (%) (Auto) 1  0-10  %


 


Neutrophils # (Auto) 6.1  1.8-7.8  X 10^3


 


Lymphocytes # (Auto) 5.2 H 1.0-4.0  X 10^3


 


Monocytes # (Auto) 1.5 H 0.0-1.0  X 10^3


 


Eosinophils # (Auto)


  0.4 H


  0.0-0.3


10^3/uL


 


Basophils # (Auto)


  0.1 


  0.0-0.1


10^3/uL


 


Prothrombin Time 15.7 H 12.2-14.7  SEC


 


INR Comment 1.2  0.8-1.4  


 


Activated Partial


Thromboplast Time 28 


  24-35  SEC


 


 


D-Dimer


  0.28 


  0.00-0.49


UG/ML


 


Sodium Level 140  135-145  MMOL/L


 


Potassium Level 3.5 L 3.6-5.0  MMOL/L


 


Chloride Level 102    MMOL/L


 


Carbon Dioxide Level 27  21-32  MMOL/L


 


Anion Gap 11  5-14  MMOL/L


 


Blood Urea Nitrogen 9  7-18  MG/DL


 


Creatinine


  0.82 


  0.60-1.30


MG/DL


 


Estimat Glomerular Filtration


Rate > 60 


   


 


 


BUN/Creatinine Ratio 11   


 


Glucose Level 102    MG/DL


 


Calcium Level 9.4  8.5-10.1  MG/DL


 


Magnesium Level 2.1  1.8-2.4  MG/DL


 


Total Bilirubin 0.4  0.1-1.0  MG/DL


 


Aspartate Amino Transf


(AST/SGOT) 17 


  5-34  U/L


 


 


Alanine Aminotransferase


(ALT/SGPT) 17 


  0-55  U/L


 


 


Alkaline Phosphatase 78    U/L


 


Myoglobin


  31.5 


  10.0-92.0


NG/ML


 


Troponin I < 0.30  <0.30  NG/ML


 


Total Protein 7.6  6.4-8.2  GM/DL


 


Albumin 4.3  3.2-4.5  GM/DL








My Orders





Orders - BRODY CONTRERAS MD


Ekg Tracing (17 15:07)


Cbc With Automated Diff (17 15:08)


Magnesium (17 15:08)


Chest 1 View, Ap/Pa Only (17 15:08)


Cardiac Profile 1 (17 15:08)


Comprehensive Metabolic Panel (17 15:08)


Myoglobin Serum (17 15:08)


Protime With Inr (17 15:08)


Partial Thromboplastin Time (17 15:08)


O2 (17 15:08)


Monitor-Rhythm Ecg Trace Only (17 15:08)


Lipid Panel (17 06:00)


Aspirin Chewable Tablet (Baby Aspirin Ch (17 15:15)


Nitroglycerin 0.4 Mg Btl 25's (Nitrostat (17 15:15)


Saline Lock/Iv-Start (17 15:08)


Ondansetron Injection (Zofran Injectio (17 15:15)


Ondansetron Injection (Zofran Injectio (17 15:01)


Ondansetron Injection (Zofran Injectio (17 15:30)


Fibrin Degradation Products (17 15:15)


Promethazine Injection (Phenergan Injec (17 16:13)


Morphine  Injection (Morphine  Injection (17 16:29)


Clopidogrel Tablet (Plavix Tablet) (17 16:45)





Medications Given in ED





Current Medications








 Medications  Dose


 Ordered  Sig/Joey


 Route  Start Time


 Stop Time Status Last Admin


Dose Admin


 


 Aspirin  324 mg  ONCE  ONCE


 PO  17 15:15


 17 15:16 DC 17 16:05


324 MG


 


 Nitroglycerin  0.4 mg  UD  PRN


 SL  17 15:15


 17 15:35 DC 17 15:34


0.4 MG


 


 Ondansetron HCl  4 mg  ONCE  ONCE


 IVP  17 15:15


 17 15:16 DC 17 15:16


4 MG


 


 Ondansetron HCl  4 mg  ONCE  ONCE


 IVP  17 15:30


 17 15:31 DC 17 15:34


4 MG








Vital Signs/I&O





Vital Sign - Last 12Hours








 17





 15:15 15:15 15:15 16:05


 


Temp  96.8  


 


Pulse  68  56


 


Resp  15  24


 


B/P (MAP)  137/98  148/84


 


Pulse Ox  98 98 100


 


O2 Delivery Room Air Room Air Nasal Cannula Nasal Cannula


 


O2 Flow Rate   2.00 2.00














Blood Pressure Mean:  111








Progress Note :  


Progress Note


Seen and evaluated.  IV, labs, EKG and chest x-ray ordered.   mg by 

mouth.  Zofran 4 mg IV, nitroglycerin sublingual ordered.  Repeat Zofran 4 mg 

IV for persistent vomiting.  Monitor patient.  1630: Patient with persistent 

vomiting.  Phenergan 25 mg IV has been initiated.  Morphine 4 mg IV for 

persistent chest pain.  Patient is under the care of Dr. Miguel.  I did talk 

with him and he is going out of town so he will go to the on-call cardiologist.

  I did discuss the case with Dr. Slater who accepts for admission, observation 

status.  I did speak with Dr. Bacon.  We will initiate Plavix 300 mg by mouth 

per his request and keep nothing by mouth with echocardiogram in the morning.  

This was ordered.  Admit, observation status.  Patient and family agree with 

plan.





ECG


Initial ECG Impression Date:  2017


Initial ECG Impression Time:  15:07


Initial ECG Rate:  77


Initial ECG Rhythm:  Normal Sinus


Comment


Sinus rhythm with normal axis.  No evidence of ST elevation MI.  Change from 

previous in which incomplete right bundle-branch block was noted from 2013.   interpreted by me.





Diagnostic Imaging





   Diagonstic Imaging:  Xray


   Plain Films/CT/US/NM/MRI:  chest


Comments


 VIA [facility].


 [place], [place]





NAME:   NIKO MARTE


[facility] REC#:   [id number]


ACCOUNT#:   [id number]


PT STATUS:   REG ER


:   1970


PHYSICIAN:   BRODY CONTRERAS MD


ADMIT DATE:   17/ER


 ***Draft***


Date of Exam:17





CHEST 1 VIEW, AP/PA ONLY








INDICATION: Chest pain.





FINDINGS:  


The heart and lungs appeared normal. 





IMPRESSION:





Negative.





  Dictated on workstation # [id number]








Dict:   17 1625


Trans:   17 1630


[facility] [id number]





Interpreted by:     YAAOKV CISSE


Electronically signed by:





Departure


Communication (Admissions)


Time/Spoke to Admitting Phy:  16:31


Time/Spoke to Consulting Phy:  16:33





Impression


Impression:  


 Primary Impression:  


 Chest pain


 Qualified Codes:  R07.9 - Chest pain, unspecified


Disposition:  09 ADMITTED AS INPATIENT


Condition:  Stable





Admissions


Decision to Admit Reason:  Admit from ER (General)


Decision to Admit/Date:  2017


Time/Decision to Admit Time:  16:31





Departure-Patient Inst.


Referrals:  


RAIZA FARRELL MD (PCP/Family)


Primary Care Physician











BRODY CONTRERAS MD 2017 15:41

## 2017-11-16 NOTE — DIAGNOSTIC IMAGING REPORT
INDICATION: Chest pain.



FINDINGS:  

The heart and lungs appeared normal. 



IMPRESSION:



Negative.



Dictated by: 



  Dictated on workstation # [id number]

## 2017-11-17 VITALS — DIASTOLIC BLOOD PRESSURE: 86 MMHG | SYSTOLIC BLOOD PRESSURE: 165 MMHG

## 2017-11-17 VITALS — SYSTOLIC BLOOD PRESSURE: 141 MMHG | DIASTOLIC BLOOD PRESSURE: 70 MMHG

## 2017-11-17 VITALS — DIASTOLIC BLOOD PRESSURE: 64 MMHG | SYSTOLIC BLOOD PRESSURE: 111 MMHG

## 2017-11-17 VITALS — DIASTOLIC BLOOD PRESSURE: 74 MMHG | SYSTOLIC BLOOD PRESSURE: 149 MMHG

## 2017-11-17 VITALS — DIASTOLIC BLOOD PRESSURE: 75 MMHG | SYSTOLIC BLOOD PRESSURE: 131 MMHG

## 2017-11-17 VITALS — DIASTOLIC BLOOD PRESSURE: 74 MMHG | SYSTOLIC BLOOD PRESSURE: 137 MMHG

## 2017-11-17 VITALS — DIASTOLIC BLOOD PRESSURE: 78 MMHG | SYSTOLIC BLOOD PRESSURE: 138 MMHG

## 2017-11-17 VITALS — SYSTOLIC BLOOD PRESSURE: 149 MMHG | DIASTOLIC BLOOD PRESSURE: 101 MMHG

## 2017-11-17 VITALS — SYSTOLIC BLOOD PRESSURE: 124 MMHG | DIASTOLIC BLOOD PRESSURE: 74 MMHG

## 2017-11-17 VITALS — DIASTOLIC BLOOD PRESSURE: 81 MMHG | SYSTOLIC BLOOD PRESSURE: 130 MMHG

## 2017-11-17 VITALS — DIASTOLIC BLOOD PRESSURE: 83 MMHG | SYSTOLIC BLOOD PRESSURE: 140 MMHG

## 2017-11-17 VITALS — SYSTOLIC BLOOD PRESSURE: 120 MMHG | DIASTOLIC BLOOD PRESSURE: 70 MMHG

## 2017-11-17 VITALS — SYSTOLIC BLOOD PRESSURE: 138 MMHG | DIASTOLIC BLOOD PRESSURE: 68 MMHG

## 2017-11-17 VITALS — SYSTOLIC BLOOD PRESSURE: 131 MMHG | DIASTOLIC BLOOD PRESSURE: 72 MMHG

## 2017-11-17 VITALS — SYSTOLIC BLOOD PRESSURE: 137 MMHG | DIASTOLIC BLOOD PRESSURE: 66 MMHG

## 2017-11-17 LAB
ALBUMIN SERPL-MCNC: 3.7 GM/DL (ref 3.2–4.5)
ALT SERPL-CCNC: 26 U/L (ref 0–55)
ANION GAP SERPL CALC-SCNC: 9 MMOL/L (ref 5–14)
AST SERPL-CCNC: 95 U/L (ref 5–34)
BASOPHILS # BLD AUTO: 0 10^3/UL (ref 0–0.1)
BASOPHILS NFR BLD AUTO: 0 % (ref 0–10)
BILIRUB SERPL-MCNC: 0.7 MG/DL (ref 0.1–1)
BUN SERPL-MCNC: 8 MG/DL (ref 7–18)
BUN/CREAT SERPL: 12
CALCIUM SERPL-MCNC: 8.7 MG/DL (ref 8.5–10.1)
CHLORIDE SERPL-SCNC: 103 MMOL/L (ref 98–107)
CHOLEST SERPL-MCNC: 192 MG/DL (ref ?–200)
CO2 SERPL-SCNC: 25 MMOL/L (ref 21–32)
CREAT SERPL-MCNC: 0.67 MG/DL (ref 0.6–1.3)
EOSINOPHIL # BLD AUTO: 0 10^3/UL (ref 0–0.3)
EOSINOPHIL NFR BLD AUTO: 0 % (ref 0–10)
ERYTHROCYTE [DISTWIDTH] IN BLOOD BY AUTOMATED COUNT: 13.8 % (ref 10–14.5)
GFR SERPLBLD BASED ON 1.73 SQ M-ARVRAT: > 60 ML/MIN
GLUCOSE SERPL-MCNC: 123 MG/DL (ref 70–105)
LDLC SERPL DIRECT ASSAY-MCNC: 143 MG/DL (ref 1–129)
LYMPHOCYTES # BLD AUTO: 2.1 X 10^3 (ref 1–4)
LYMPHOCYTES NFR BLD AUTO: 14 % (ref 12–44)
MCH RBC QN AUTO: 29 PG (ref 25–34)
MCHC RBC AUTO-ENTMCNC: 33 G/DL (ref 32–36)
MCV RBC AUTO: 87 FL (ref 80–99)
MONOCYTES # BLD AUTO: 1.2 X 10^3 (ref 0–1)
MONOCYTES NFR BLD AUTO: 8 % (ref 0–12)
NEUTROPHILS # BLD AUTO: 11.7 X 10^3 (ref 1.8–7.8)
NEUTROPHILS NFR BLD AUTO: 77 % (ref 42–75)
NEUTS BAND NFR BLD MANUAL: 73 %
NEUTS BAND NFR BLD: 0 %
PLATELET # BLD: 278 10^3/UL (ref 130–400)
PMV BLD AUTO: 9.5 FL (ref 7.4–10.4)
POTASSIUM SERPL-SCNC: 3.8 MMOL/L (ref 3.6–5)
PROT SERPL-MCNC: 6.3 GM/DL (ref 6.4–8.2)
RBC # BLD AUTO: 4.61 10^6/UL (ref 4.35–5.85)
SODIUM SERPL-SCNC: 137 MMOL/L (ref 135–145)
TRIGL SERPL-MCNC: 185 MG/DL (ref ?–150)
VARIANT LYMPHS NFR BLD MANUAL: 13 %
VLDLC SERPL CALC-MCNC: 37 MG/DL (ref 5–40)
WBC # BLD AUTO: 15.1 10^3/UL (ref 4.3–11)

## 2017-11-17 PROCEDURE — B2111ZZ FLUOROSCOPY OF MULTIPLE CORONARY ARTERIES USING LOW OSMOLAR CONTRAST: ICD-10-PCS | Performed by: INTERNAL MEDICINE

## 2017-11-17 PROCEDURE — 027034Z DILATION OF CORONARY ARTERY, ONE ARTERY WITH DRUG-ELUTING INTRALUMINAL DEVICE, PERCUTANEOUS APPROACH: ICD-10-PCS | Performed by: INTERNAL MEDICINE

## 2017-11-17 PROCEDURE — B2151ZZ FLUOROSCOPY OF LEFT HEART USING LOW OSMOLAR CONTRAST: ICD-10-PCS | Performed by: INTERNAL MEDICINE

## 2017-11-17 PROCEDURE — 4A023N7 MEASUREMENT OF CARDIAC SAMPLING AND PRESSURE, LEFT HEART, PERCUTANEOUS APPROACH: ICD-10-PCS | Performed by: INTERNAL MEDICINE

## 2017-11-17 RX ADMIN — GABAPENTIN SCH MG: 600 TABLET, FILM COATED ORAL at 17:35

## 2017-11-17 RX ADMIN — OMEGA-3 FATTY ACIDS CAP 1000 MG SCH MG: 1000 CAP at 18:59

## 2017-11-17 RX ADMIN — GEMFIBROZIL SCH MG: 600 TABLET ORAL at 22:05

## 2017-11-17 RX ADMIN — SODIUM CHLORIDE SCH MLS/HR: 900 INJECTION, SOLUTION INTRAVENOUS at 09:53

## 2017-11-17 RX ADMIN — MORPHINE SULFATE PRN MG: 4 INJECTION, SOLUTION INTRAMUSCULAR; INTRAVENOUS at 10:32

## 2017-11-17 RX ADMIN — MORPHINE SULFATE PRN MG: 4 INJECTION, SOLUTION INTRAMUSCULAR; INTRAVENOUS at 09:13

## 2017-11-17 RX ADMIN — MORPHINE SULFATE PRN MG: 4 INJECTION, SOLUTION INTRAMUSCULAR; INTRAVENOUS at 07:19

## 2017-11-17 RX ADMIN — NITROGLYCERIN PRN MG: 0.4 TABLET SUBLINGUAL at 06:23

## 2017-11-17 RX ADMIN — CARISOPRODOL SCH MG: 350 TABLET ORAL at 22:08

## 2017-11-17 RX ADMIN — ATORVASTATIN CALCIUM SCH MG: 40 TABLET, FILM COATED ORAL at 22:06

## 2017-11-17 RX ADMIN — SODIUM CHLORIDE SCH MLS/HR: 900 INJECTION, SOLUTION INTRAVENOUS at 01:34

## 2017-11-17 RX ADMIN — GABAPENTIN SCH MG: 600 TABLET, FILM COATED ORAL at 22:06

## 2017-11-17 RX ADMIN — OMEGA-3 FATTY ACIDS CAP 1000 MG SCH MG: 1000 CAP at 17:35

## 2017-11-17 RX ADMIN — NITROGLYCERIN PRN MG: 0.4 TABLET SUBLINGUAL at 05:58

## 2017-11-17 RX ADMIN — SODIUM CHLORIDE SCH MLS/HR: 900 INJECTION, SOLUTION INTRAVENOUS at 17:36

## 2017-11-17 RX ADMIN — WARFARIN SODIUM SCH MG: 5 TABLET ORAL at 12:51

## 2017-11-17 RX ADMIN — NITROGLYCERIN PRN MG: 0.4 TABLET SUBLINGUAL at 06:43

## 2017-11-17 NOTE — CARDIOLOGY POST PROCEDURE NOTE
Post-Procedure Note


Physician (s)/Assistant (s)


Physician


LORENZO METZ MD





Pre-Procedure Diagnosis


Pre-Procedure Diagnosis:  NSTEMI





Post-Procedure Note


Procedure Start Date:  Nov 17, 2017


Procedure Start Time:  13:00


Name of Procedure:  


Coronary angiogram, LHC, PCI to OM, Attempted PCI to RCA.


Findings/Procedure Note


Occluded OM artery just at the distal edge of previous stent. Significant clot 

burden - treated successfully with TESFAYE x1. Resolute Integrity 2.5x26mm.


Unsuccessful PCI to  of RCA since could not cross the lesion with wire.


EF 20%.


LVEDP 14mmhg


Anesthesia Type:  Conscious Sedation


Estimated blood loss (mL):  40


Contrast Amount:  240





Post-Procedure Diagnosis


Post-operative diagnosis:  


Successful PCI to OM with LORENZO ROLON MD Nov 17, 2017 3:23 pm

## 2017-11-17 NOTE — CONSULTATION-CARDIOLOGY
HPI-Cardiology


Cardiology Consultation:


Date of Consultation


11/17/17


Date of Admission





Attending Physician


Angelica Slater DO


Admitting Physician


Mahesh Champagne MD


Consulting Physician


LORENZO BACON MD





HPI:


Time Seen by Provider:  09:30


Chief Complaint:


Chest pain


This is a 46-year-old male with history of previous CAD.  History is not very 

clear.  Apparently he also has protein S deficiency and is on Coumadin with 

subtherapeutic INR.  He presented to the ER yesterday with complain of one hour 

of chest pain associated with nausea and vomiting.  No radiation.  No 

exacerbating or relieving factors.  He described the pain as tightness.  10 out 

of 10 intensity.  Significantly improved with medications in the ER.





Review of Systems-Cardiology


Review of Systems


Constitutional:  No As described under HPI, No no symptoms reported, No chills, 

No fever, No lightheadedness, No malaise, No tiredness, No weight loss, No 

weight gain, No other


Eyes:  No As described under HPI, No no symptoms reported, No blindness, No 

blurred vision, No contact lenses, No drainage, No decreased acuity, No foreign 

body sensation, No glasses, No inflammation, No pain, No photophobia, No 

previous injury, No shadows, No tunnel vision, No other, No vision change


Ears/Nose/Throat:  No As described under HPI, No no symptoms reported, No 

chronic hearing loss, No epistaxis, No ear discharge, No ear pain, No loose 

teeth, No mouth pain, No mouth swelling, No nasal drainage, No nose pain, No 

recent hearing loss, No throat pain, No throat swelling, No ulcerations, No 

other


Respiratory:  No no symptoms reported, No As described under HPI, No cough, No 

orthopnea, No shortness of breath, No SOB with excertion, No SOB at rest, No 

stridor, No wheezing, No other


Cardiovascular:  chest pain


Gastrointestinal:  No no symptoms reported, No As described under HPI, No 

abdomen distended, No abdominal pain, No blood streaked bowels, No constipation

, No diarrhea, No difficulty swallowing, No nausea, No poor appetite, No poor 

fluid intake, No rectal bleeding, No vomiting, No other, No nausea/vomiting/

diarrhea, No stool coloration changes


Genitourinary:  No no symptoms reported, No As described under HPI, No burning, 

No dysuria, No discharge, No frequency, No flank pain, No hematuria, No 

incontinence, No pain, No urgency, No other, No urine frequency changes, No 

urine coloration changes


Musculoskeletal:  No no symptoms reported, No As describe under HPI, No back 

pain, No gout, No joint pain, No joint swelling, No muscle pain, No muscle 

stiffness, No neck pain, No other


Skin:  No no symptoms reported, No As described under HPI, No change in color, 

No change in hair/nails, No dryness, No lesions, No lumps, No rash, No other, 

No skin related problems, No ulcerations, No rash on exposed areas, No 

ulcerations on exposed areas


Psychiatric/Neurological:  No no symptoms reported, No As described under HPI, 

No anxiety, No depression, No emotional problems, No headache, No numbness, No 

pre-existing deficit, No seizure, No tingling, No tremors, No weakness, No other

, No focal weakness, No syncope


Hematologic:  No no symptoms reported, No As described under HPI, No anemia, No 

blood clots, No easy bleeding, No easy bruising, No swollen glands, No other, 

No bleeding abnormalities





All Other Systems Reviewed


Negative Unless Noted:  Yes





PMH-Social-Family Hx


Patient Social History


Alcohol Use:  Denies Use


Recreational Drug Use:  No


Smoking Status:  Current Everyday Smoker


Recent Foreign Travel:  No


Recent Infectious Disease Expo:  No


Hospitalization with Isolation:  Denies


Physical Abuse Screen:  No


Sexual Abuse:  No





Immunizations Up To Date


Date of Pneumonia Vaccine:  Dec 3, 2012


Date of Influenza Vaccine:  Oct 14, 2017





Past Medical History


PMH


As described under Assessment.





Allergies and Home Medications


Allergies


Coded Allergies:  


     Penicillins (Unverified  Allergy, Mild, 6/4/09)





Home Medications


Aspirin 81 Mg Tablet.dr, 81 MG PO DAILY, (Reported)


Atorvastatin Calcium 40 Mg Tablet, 40 MG PO DAILY, (Reported)


Carisoprodol 350 Mg Tablet, 350 MG PO HS, (Reported)


Gabapentin 600 Mg Tablet, 600 MG PO TID, (Reported)


Gemfibrozil 600 Mg Tablet, 600 MG PO BID, (Reported)


Hydrocodone/Acetaminophen 1 Each Tablet, 1 TAB PO TID PRN for PAIN-MODERATE, (

Reported)


Lisinopril 2.5 Mg Tablet, 2.5 MG PO DAILY, (Reported)


Metoprolol Succinate 25 Mg Tab.er.24h, 25 MG PO DAILY, (Reported)


Omega 3 Polyunsat Fatty Acids 1,000 Mg Cap, 1,000 MG PO TID, (Reported)


Tizanidine HCl 4 Mg Tablet, 8 MG PO TID PRN for MUSCLE SPASMS, (Reported)


   TAKES 2 (4MG) TABLETS 


Warfarin Sodium 5 Mg Tablet, 7.5 MG PO MoWe, (Reported)


   TAKES 1 & 1/2 (5MG) TABLETS 


Warfarin Sodium 5 Mg Tablet, 5 MG PO SuTuThFrSa, (Reported)





Physical Exam-Cardiology


Physical Exam


Vital Signs/I&O





Vital Sign - Last 12Hours








 11/17/17 11/17/17 11/17/17 11/17/17





 04:00 06:04 07:00 07:23


 


Temp 98.7 98.5  99.3


 


Pulse 66 56 49 50


 


Resp 18 16  


 


B/P (MAP) 120/70 130/81  124/74


 


Pulse Ox 96 99  100


 


O2 Delivery Nasal Cannula Nasal Cannula  Nasal Cannula


 


O2 Flow Rate 2.00 2.00  2.00


 


    





 11/17/17 11/17/17  





 08:00 12:00  


 


Temp 98.4 99.5  


 


Pulse 51 56  


 


Resp 24 24  


 


B/P (MAP) 138/78 165/86  


 


Pulse Ox 100 100  


 


O2 Delivery Nasal Cannula Nasal Cannula  


 


O2 Flow Rate 2.00 2.00  





Capillary Refill : Less Than 3 Seconds


Constitutional:  No appears stated age, No AAO x 3, No apparent distress, No 

PERRL, No well-developed, No well-nourished, No other


HEENT:  No PERRL, No normal ENT inspection, No TMs normal, No pharynx normal, 

No scleral icterus (R), No scleral icterus (L), No pale conjunctivae (R), No 

pale conjunctivae (L), No photophobia, No TM abnormal (R), No TM abnormal (L), 

No pharyngeal erythema, No tonsillar exudate, No other, No discharge, No EOMI, 

No hearing is well preserved, No hard of hearing, No oral hygience is good, No 

ulceration, No xanthelasmas are seen


Neck:  No non-tender, No full range of motion, No supple, No normal inspection, 

No carotid bruit, No limited range of motion, No lymphadenopathy (R), No 

lymphadenopathy (L), No tender lateral, No tender midline, No thyromegaly, No 

other, No carotid pulses are 2 + bilaterally, No with good upstrokes


Respiratory:  No accessory muscle use, No respiratory distress, No chest tender

, No chest expansion is symmetric, No chest is bilaterally symmetric, No lungs 

clear to percussion, No lungs clear to auscultation, No crackles, No rhonchi, 

No rales, No stridor, No wheezing, No pleural rub, No other


Cardiovascular:  No regular rate-rhythm, No irregularly irregular, No extra 

beats, No parasternal heave is noted, No JVD, No edema, No bradycardia, No 

tachycardia, No point of maximal impulse, No cardiac thrills are palpable, No 

S1 and S2, No gallop/S3, No gallop/S4, No diastolic murmur, No systolic murmur, 

No friction rub, No click, No other


Gastrointestinal:  No tender, No soft, No round, No distended, No pulsatile mass

, No organomegaly, No guarding, No rebound, No tenderness, No hernia, No mass, 

No audible bowel sounds, No abnormal bowel sounds, No abdominal bruits, No 

spleenomegaly, No other


Rectal:  deferred


Extremities:  No normal range of motion, No non-tender, No normal inspection, 

No pedal edema, No calf tenderness, No normal capillary refill, No pelvis stable

, No calf tenderness, No inflammation, No pedal edema, No slow capillary refill

, No swelling, No other, No abrasion, No clubbing, No cyanosis, No ecchymosis, 

No laceration, No no lower extremity edema bilateral, No significant edema, No 

tenderness, No wound


Neurologic/Psychiatric:  No CNs II-XII nml as tested, No no motor/sensory 

deficits, No alert, No normal mood/affect, No oriented x 3, No abnormal 

cerebellar tests, No abnormal CNs II-XII, No abnormal gait, No aphasia, No EOM 

palsy, No facial droop, No motor weakness, No sensory deficit, No depressed 

affect, No disoriented x 3, No other, No grossly intact, No power is 5/5 both 

on sides


Skin:  No normal color, No warm/dry, No cyanosis, No cool, No diaphoresis, No 

damp, No ecchymosis, No jaundice, No mottled, No pallor, No rash, No tattoos/

piercings, No ulcerations, No rash on exposed areas, No ulcerations on exposed 

areas, No other





Data Review


Labs


Laboratory Tests


11/16/17 15:15: 


White Blood Count 13.4H, Red Blood Count 5.18, Hemoglobin 15.5, Hematocrit 44, 

Mean Corpuscular Volume 86, Mean Corpuscular Hemoglobin 30, Mean Corpuscular 

Hemoglobin Concent 35, Red Cell Distribution Width 13.7, Platelet Count 336, 

Mean Platelet Volume 9.0, Neutrophils (%) (Auto) 46, Lymphocytes (%) (Auto) 39, 

Monocytes (%) (Auto) 11, Eosinophils (%) (Auto) 3, Basophils (%) (Auto) 1, 

Neutrophils # (Auto) 6.1, Lymphocytes # (Auto) 5.2H, Monocytes # (Auto) 1.5H, 

Eosinophils # (Auto) 0.4H, Basophils # (Auto) 0.1, Prothrombin Time 15.7H, INR 

Comment 1.2, Activated Partial Thromboplast Time 28, D-Dimer 0.28, Sodium Level 

140, Potassium Level 3.5L, Chloride Level 102, Carbon Dioxide Level 27, Anion 

Gap 11, Blood Urea Nitrogen 9, Creatinine 0.82, Estimat Glomerular Filtration 

Rate > 60, BUN/Creatinine Ratio 11, Glucose Level 102, Calcium Level 9.4, 

Magnesium Level 2.1, Total Bilirubin 0.4, Aspartate Amino Transf (AST/SGOT) 17, 

Alanine Aminotransferase (ALT/SGPT) 17, Alkaline Phosphatase 78, Myoglobin 31.5

, Troponin I < 0.30, Total Protein 7.6, Albumin 4.3


11/16/17 21:45: Troponin I 0.57*H


11/17/17 05:24: 


White Blood Count 15.1H, Red Blood Count 4.61, Hemoglobin 13.3, Hematocrit 40, 

Mean Corpuscular Volume 87, Mean Corpuscular Hemoglobin 29, Mean Corpuscular 

Hemoglobin Concent 33, Red Cell Distribution Width 13.8, Platelet Count 278, 

Mean Platelet Volume 9.5, Neutrophils (%) (Auto) 77H, Lymphocytes (%) (Auto) 14

, Monocytes (%) (Auto) 8, Eosinophils (%) (Auto) 0, Basophils (%) (Auto) 0, 

Neutrophils # (Auto) 11.7H, Lymphocytes # (Auto) 2.1, Monocytes # (Auto) 1.2H, 

Eosinophils # (Auto) 0.0, Basophils # (Auto) 0.0, Sodium Level 137, Potassium 

Level 3.8, Chloride Level 103, Carbon Dioxide Level 25, Anion Gap 9, Blood Urea 

Nitrogen 8, Creatinine 0.67, Estimat Glomerular Filtration Rate > 60, BUN/

Creatinine Ratio 12, Glucose Level 123H, Calcium Level 8.7, Total Bilirubin 0.7

, Aspartate Amino Transf (AST/SGOT) 95H, Alanine Aminotransferase (ALT/SGPT) 26

, Alkaline Phosphatase 64, Total Protein 6.3L, Albumin 3.7, Neutrophils % (

Manual) 73, Lymphocytes % (Manual) 13, Monocytes % (Manual) 14, Band 

Neutrophils 0, Blood Morphology Comment NORMAL, Triglycerides Level 185H, 

Cholesterol Level 192, LDL Cholesterol Direct 143H, VLDL Cholesterol 37, HDL 

Cholesterol 34L








A/P-Cardiology


Assessment/Admission Diagnosis


Non-STEMI





Plan


Non-STEMI: Aspirin, Plavix.  Lovenox given last night.  Urgent coronary 

angiography today.  Risks and complication discussed at length with the patient 

including bleeding, vascular damage, MI, stroke, even death.  Once the patient 

and family accepted all the risks and complication, informed consent was taken 

and documented.


Hyperlipidemia: Will require statin therapy.








Thank you for your consultation. Please call me if you have any questions.








INDERJIT Bacon MD, FACP, FACC, FSCAI, FHRS, CCDS


Interventional Cardiology


Cardiac Electrophysiology


Vascular Medicine and Endovascular Interventions





Clinical Quality Measures


AMI/AHF:


ASA po Prior to arrival:  No





DVT/VTE Risk/Contraindication:


Risk Factor Score Per Nursing:  3


RFS Level Per Nursing on Admit:  3=High











LORENZO BACON MD Nov 17, 2017 10:37 am

## 2017-11-17 NOTE — CARDIAC CATHETERIZATION
DATE OF SERVICE:  11/17/2017



TITLE REPORT:  Coronary Angiography, Left Heart Catheterization, And PCI Report.



INDICATION:

Non-ST elevation MI.



PREOPERATIVE DIAGNOSIS:

Non-ST elevation myocardial infarction.



POSTOPERATIVE DIAGNOSES:

1.  Non-ST elevation myocardial infarction.

2.  Total occlusion of obtuse marginal artery, treated successfully with one

drug-eluting stent.

3.  Unsuccessful percutaneous coronary intervention to a chronic total occlusion

of the right coronary artery.



HISTORY:

The patient is a 46-year-old gentleman, who is an active smoker.  He has

previous history of coronary artery disease with Promus stent placed in the OM

artery previously.  He presented with prolonged episode of chest pain with

positive cardiac enzymes.  His working diagnosis was non-ST elevation MI.  He

was scheduled for angiography urgently.  Smoking cessation was strongly

recommended.



PROCEDURES PERFORMED:

1.  Coronary angiography.

2.  Left heart catheterization.

3.  Successful percutaneous coronary intervention to the obtuse marginal artery

with a drug-eluting stent.

4.  Unsuccessful percutaneous coronary intervention of the proximal chronic

total occlusion of right coronary artery, which was a chronic total occlusion.



COMPLICATIONS:

None.



SPECIMEN:

None.



ANTICOAGULATION:

IV heparin, Integrilin bolus.



ANESTHESIA:

Conscious sedation.



ESTIMATED BLOOD LOSS:

40 cc.



CONTRAST:

240 mL of omnipaque.



FLUOROSCOPY DOSE:

1808.



FLUOROSCOPY TIME:

26.4 minutes.



DESCRIPTION OF PROCEDURE:

The patient was brought to the cath lab after informed consent was taken.  All

the risks and complication were explained in detail.  He was draped and prepped

in the usual sterile fashion.  Access was gained in the right radial artery with

a 6-Icelandic sheath.  Coronary angiography of the left system and left heart

catheterization was performed with a Alfredo catheter.  Right coronary artery was

engaged with a JR4 catheter.



FINDINGS:

1.  Left heart catheterization, aortic pressure 110/59 mmHg.  LV pressure is

95/44 mmHg.  LVEDP was 14 mmHg.  There was no gradient across the aortic valve. 

Severe LV systolic dysfunction with an EF of 20%.  Severe global hypokinesis.

2.  Left main:  Mild mid disease 10 to 20%.

3.  LAD:  The LAD is a transapical vessel with no disease.  It supplies

collateral to the RCA.

4.  Left circumflex artery:  Occluded obtuse marginal artery at the distal

portion of a previous stent.

5.  RCA totally occluded at the proximal segment.  No distal filling noted. 

Supplied by collaterals from the LAD.



RECOMMENDATIONS:

1.  PCI to the obtuse marginal artery is recommended.

2.  PCI to the RCA  is recommended.



PERCUTANEOUS CORONARY INTERVENTION DETAILS:

We took a JL3.5 guide catheter.  Whisper extra support guidewire and IV heparin

for anticoagulation.  A double bolus of Integrilin was given during the

procedure.  Act was over 200 seconds.  The lesion was crossed with a Whisper

wire and a 2.0 x 15 balloon.  Balloon inflation was performed with 8 atmospheres

for 30 seconds and another one with 6 atmospheres for 11 seconds, a faint

feeling was noted; therefore we took another enlarged 2.5 x 20 balloon and did

more aggressive ballooning in the obtuse marginal artery starting from the

distal edge of the previously placed stent.  These inflations were done from 6

and 9 atmospheres for around a minute.  Significantly improved blood flow was

noted.  Significant clot burden was noted.  Therefore, double bolus of

Integrilin was given at this point in time.  We then took a Resolute Integrity

2.5 x 26 mm drug-eluting stent and did an overlap with the previous Promus 2.5 x

12 stent and covered the lesion in the mid portion of the OM.  The stent was

deployed at atmospheres for 60 seconds.  We then used the same balloon to

post-dilate the overlap area at 14 atmospheres for 31 seconds.  The stent

balloon was taken out and post-angiogram revealed excellent results with no

residual stenosis and MARIA LUISA 3 flow.



We then took a JR4 guide catheter and engaged the RCA.  We then took a Whisper

extra support wire and a 2.0 x 15 balloon and tried to cross it.  However, we

were not able to cross.  There is a small branch at the level of the occlusion

and we kept on going into that particular branch.  There is also a lack of

support with this particular guide catheter.  We therefore took the balloon and

the wire out of the system.  Post-angiogram did not show any significant

vascular complication.



IMPRESSION AND CONCLUSION:

1.  Non-ST elevation myocardial infarction, successful percutaneous coronary

intervention with drug-eluting stent, Resolute Integrity 2.5 x 26 mm in the

obtuse marginal artery overlapping with the previously placed stent.

2.  Unsuccessful percutaneous coronary intervention of chronic total occlusion

of the right coronary artery due to the fact that we could not cross the lesion

with wire and a balloon.

3.  Severe left ventricular systolic dysfunction with an ejection fraction of

20%.

4.  LifeVest will be recommended for primary prevention of sudden cardiac death.

5.  The patient will continue dual antiplatelet therapy for at least a year.

6.  The patient will also continue statin, beta blocker and ACE inhibitor for

life.

7.  Smoking cessation was strongly recommended.





Job ID: [id number]

DocumentID: [id number]

Dictated Date:  11/17/2017 15:38:08

Transcription Date: 11/17/2017 18:05:14

Dictated By: JOSE METZ MD

## 2017-11-17 NOTE — CARDIAC PROCEDURE NOTE-CS/ASA
Pre-Procedure Note


Pre-Op Procedure Note


H&P Reviewed


The H&P was reviewed, patient examined and no changes noted.


Date H&P Reviewed:  Nov 17, 2017


Time H&P Reviewed:  13:00





Conscious Sedation Pre-Proced


Time Reviewed:  13:00


ASA Class:  3











Airway Mallampati Classification: ([place] appropriate class) I.  II.  III,  IV


 


Lungs 


 


Heart 


 


 ASA score


 


 ASA 1: a normal healthy patient


 


 ASA 2:  a patient with a mild systemic disease (mid diabetes, controlled 

hypertension, obesity 


 


 ASA 3:  a patient with a severe systemic disease that limits activity  (angina

, COPD, prior Myocardial infarction)


 


 ASA 4:  a patient with an incapacitating disease that is a constant threat to 

life (CHF, renal failure)


 


 ASA 5:  a moribund patient not expected to survive 24 hrs.  (ruptured aneurysm)


 


 ASA 6:  a declared brain dead patient whose organs are being harvested.


 


 For emergent operations, add the letter E after the classification








Grade 1


Sedation Plan:  Analgesia, Amnesia, Plan communicated to team members, 

Discussed options with patient/fam, Discussed risks with patient/fam


Note


The patient is an appropriate candidate to undergo the planned procedure, 

sedation, and anesthesia.





The patient immediately re-assessed prior to indication.











LORENZO METZ MD Nov 17, 2017 3:20 pm

## 2017-11-17 NOTE — SHORT STAY SUMMARY-HOSPITALIST
HPI


History of Present Illness:


HPI/Chief Complaint


CC: Chest pain





HPI: This is a 46 yoWM pt who presented to the ER with SOB and CP. Pt has a 

previous hx of heart attack requiring stent placement in the past at 43yo.





RN Review:


Heart cath scheduled at 1230





Patient Interview:


Pt confirms having the procedure scheduled around noon


Pt confirms PCP at [facility] in [place]


Pt confirms previous heart attack and stent placement.


Physical exam stable.


Pt confirms having chest pain still


Pt confirms smoking and denies ETOH usage


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





Scribed by Shania Luna under the direct supervision of Dr. Slater.


Source:  patient, family


Exam Limitations:  no limitations


Date Seen


11/17/17


Time Seen by Provider:  09:30


Attending Physician


Angelica Slater DO


PCP


Mahesh Champagne MD


Referring Physician





Date of Admission


Nov 16, 2017 at 16:38





Home Medications & Allergies


Home Medications


Reviewed patient Home Medication Reconciliation Form





Allergies





Allergies


Coded Allergies


  Penicillins (Unverified Allergy, Mild, 6/4/09)








Past Medical-Social-Family Hx


Patient Social History


Marrital Status:  


Employed/Student:  employed


Alcohol Use:  Denies Use


Recreational Drug Use:  No


Smoking Status:  Current Everyday Smoker


Physical Abuse Screen:  No


Sexual Abuse:  No


Recent Foreign Travel:  No


Contact w/other who traveled:  No


Recent Hopitalizations:  No


Recent Infectious Disease Expo:  No





Immunizations Up To Date


Date of Pneumonia Vaccine:  Dec 3, 2012


Date of Influenza Vaccine:  Oct 14, 2017





Seasonal Allergies


Seasonal Allergies:  No





Surgeries


Yes (STENTS)





Respiratory


Yes


Pulmonary Embolism





Cardiovascular


Yes (STENT)


Heart Attack, High Cholesterol, Hypertension





Neurological


No





Reproductive System


Hx Reproductive Disorders:  No





Gastrointestinal


Yes


Gastroesophageal Reflux





Musculoskeletal


Yes


Chronic Back Pain





Endocrine


History of Endocrine Disorders:  No





Cancer


No





Psychosocial


History of Psychiatric Problem:  No





Integumentary


History of Skin or Integumenta:  No





Blood Transfusions


History of Blood Disorders:  Yes (PROTEIN S DEF)





Reviewed Nursing Assessment


Reviewed/Agree w Nursing PMH:  Yes





Family Medical History


Significant Family History:  Heart Disease, Vascular Disease





Review of Systems


Constitutional:  see HPI


EENTM:  no symptoms reported


Respiratory:  no symptoms reported


Cardiovascular:  chest pain


Gastrointestinal:  no symptoms reported


Genitourinary:  no symptoms reported


Musculoskeletal:  no symptoms reported


Skin:  no symptoms reported


Psychiatric/Neurological:  No Symptoms Reported


All Other Systems Reviewed


Negative Unless Noted:  Yes





Physical Exam


Physical Exam


Vital Signs





Vital Sign - Last 12Hours








 11/16/17





 15:15


 


Temp 96.8


 


Pulse 68


 


Resp 15


 


B/P (MAP) 137/98


 


Pulse Ox 98


 


O2 Delivery Room Air


 


O2 Flow Rate 2.00





Capillary Refill : Less Than 3 Seconds


General Appearance:  No Apparent Distress, WD/WN, Chronically ill


Eyes:  Bilateral Eye Normal Inspection, Bilateral Eye PERRL


HEENT:  PERRL/EOMI, Normal ENT Inspection, Pharynx Normal


Neck:  Full Range of Motion, Normal Inspection, Non Tender, Supple, Carotid 

Bruit


Respiratory:  Chest Non Tender, Lungs Clear, Normal Breath Sounds, No Accessory 

Muscle Use, No Respiratory Distress


Cardiovascular:  Regular Rate, Rhythm, No Edema, No Gallop, No JVD, No Murmur, 

Normal Peripheral Pulses


Gastrointestinal:  Normal Bowel Sounds, No Organomegaly, No Pulsatile Mass, Non 

Tender, Soft


Back:  Normal Inspection, No CVA Tenderness, No Vertebral Tenderness


Extremity:  Normal Capillary Refill, Normal Inspection, Normal Range of Motion, 

Non Tender, No Calf Tenderness, No Pedal Edema


Neurologic/Psychiatric:  Alert, Oriented x3, No Motor/Sensory Deficits, Normal 

Mood/Affect


Skin:  Normal Color, Warm/Dry


Lymphatic:  No Adenopathy





Results


Results/Procedures


Lab


Laboratory Tests


11/16/17 15:15








11/17/17 05:24














Short Stay Diagnosis


Discharge Diagnosis-Short Stay


Admission Diagnosis


Chest pain in known CAD pt with previous MI and stent placement at 43yo


Smoker


Final Discharge Diagnosis


Chest pain in known CAD pt with previous MI and stent placement at 43yo


Smoker





Conclusion


Plan


Plan:


Cardiac cath 1230


Monitor pt


Dispo per Cardiology





Clinical Quality Measures


AMI/AHF:


ASA po Prior to arrival:  No





DVT/VTE Risk/Contraindication:


Risk Factor Score Per Nursing:  3


RFS Level Per Nursing on Admit:  3=High











ANGELICA SLATER DO Nov 17, 2017 11:17

## 2017-11-18 VITALS — DIASTOLIC BLOOD PRESSURE: 60 MMHG | SYSTOLIC BLOOD PRESSURE: 86 MMHG

## 2017-11-18 VITALS — SYSTOLIC BLOOD PRESSURE: 126 MMHG | DIASTOLIC BLOOD PRESSURE: 68 MMHG

## 2017-11-18 VITALS — SYSTOLIC BLOOD PRESSURE: 102 MMHG | DIASTOLIC BLOOD PRESSURE: 59 MMHG

## 2017-11-18 VITALS — DIASTOLIC BLOOD PRESSURE: 68 MMHG | SYSTOLIC BLOOD PRESSURE: 81 MMHG

## 2017-11-18 VITALS — DIASTOLIC BLOOD PRESSURE: 61 MMHG | SYSTOLIC BLOOD PRESSURE: 111 MMHG

## 2017-11-18 VITALS — SYSTOLIC BLOOD PRESSURE: 109 MMHG | DIASTOLIC BLOOD PRESSURE: 65 MMHG

## 2017-11-18 LAB
ANION GAP SERPL CALC-SCNC: 8 MMOL/L (ref 5–14)
BUN SERPL-MCNC: 9 MG/DL (ref 7–18)
BUN/CREAT SERPL: 12
CALCIUM SERPL-MCNC: 8.8 MG/DL (ref 8.5–10.1)
CHLORIDE SERPL-SCNC: 104 MMOL/L (ref 98–107)
CO2 SERPL-SCNC: 27 MMOL/L (ref 21–32)
CREAT SERPL-MCNC: 0.74 MG/DL (ref 0.6–1.3)
ERYTHROCYTE [DISTWIDTH] IN BLOOD BY AUTOMATED COUNT: 14 % (ref 10–14.5)
GFR SERPLBLD BASED ON 1.73 SQ M-ARVRAT: > 60 ML/MIN
GLUCOSE SERPL-MCNC: 101 MG/DL (ref 70–105)
MCH RBC QN AUTO: 29 PG (ref 25–34)
MCHC RBC AUTO-ENTMCNC: 33 G/DL (ref 32–36)
MCV RBC AUTO: 88 FL (ref 80–99)
PLATELET # BLD: 252 10^3/UL (ref 130–400)
PMV BLD AUTO: 9.6 FL (ref 7.4–10.4)
POTASSIUM SERPL-SCNC: 4 MMOL/L (ref 3.6–5)
RBC # BLD AUTO: 4.39 10^6/UL (ref 4.35–5.85)
SODIUM SERPL-SCNC: 139 MMOL/L (ref 135–145)
WBC # BLD AUTO: 11.1 10^3/UL (ref 4.3–11)

## 2017-11-18 RX ADMIN — GEMFIBROZIL SCH MG: 600 TABLET ORAL at 11:22

## 2017-11-18 RX ADMIN — OMEGA-3 FATTY ACIDS CAP 1000 MG SCH MG: 1000 CAP at 06:48

## 2017-11-18 RX ADMIN — ATORVASTATIN CALCIUM SCH MG: 40 TABLET, FILM COATED ORAL at 20:16

## 2017-11-18 RX ADMIN — CARISOPRODOL SCH MG: 350 TABLET ORAL at 20:17

## 2017-11-18 RX ADMIN — GABAPENTIN SCH MG: 600 TABLET, FILM COATED ORAL at 14:36

## 2017-11-18 RX ADMIN — GABAPENTIN SCH MG: 600 TABLET, FILM COATED ORAL at 20:17

## 2017-11-18 RX ADMIN — GEMFIBROZIL SCH MG: 600 TABLET ORAL at 20:17

## 2017-11-18 RX ADMIN — OMEGA-3 FATTY ACIDS CAP 1000 MG SCH MG: 1000 CAP at 17:46

## 2017-11-18 RX ADMIN — GABAPENTIN SCH MG: 600 TABLET, FILM COATED ORAL at 08:04

## 2017-11-18 RX ADMIN — ASPIRIN SCH MG: 81 TABLET ORAL at 08:04

## 2017-11-18 RX ADMIN — OMEGA-3 FATTY ACIDS CAP 1000 MG SCH MG: 1000 CAP at 14:36

## 2017-11-18 RX ADMIN — HYDROCODONE BITARTRATE AND ACETAMINOPHEN PRN EA: 7.5; 325 TABLET ORAL at 18:00

## 2017-11-18 RX ADMIN — LISINOPRIL SCH MG: 5 TABLET ORAL at 08:03

## 2017-11-18 RX ADMIN — SODIUM CHLORIDE SCH MLS/HR: 900 INJECTION, SOLUTION INTRAVENOUS at 11:23

## 2017-11-18 RX ADMIN — SODIUM CHLORIDE SCH MLS/HR: 900 INJECTION, SOLUTION INTRAVENOUS at 01:56

## 2017-11-18 RX ADMIN — WARFARIN SODIUM SCH MG: 5 TABLET ORAL at 14:36

## 2017-11-18 RX ADMIN — SODIUM CHLORIDE SCH MLS/HR: 900 INJECTION, SOLUTION INTRAVENOUS at 21:16

## 2017-11-18 RX ADMIN — CLOPIDOGREL BISULFATE SCH MG: 75 TABLET, FILM COATED ORAL at 08:04

## 2017-11-18 NOTE — PROGRESS NOTE-HOSPITALIST
Progress Note


HPI/CC on Admission


CC: Chest pain





HPI: This is a 46 yoWM pt who presented to the ER with SOB and CP. Pt has a 

previous hx of heart attack requiring stent placement in the past at 43yo.





RN Review:


Heart cath scheduled at 1230





Patient Interview:


Pt confirms having the procedure scheduled around noon


Pt confirms PCP at [facility] in [place]


Pt confirms previous heart attack and stent placement.


Physical exam stable.


Pt confirms having chest pain still


Pt confirms smoking and denies ETOH usage


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





Scribed by Shania Luna under the direct supervision of Dr. Slater.





Progress Notes/Assess & Plan


Date Seen


11/18/17


Time Seen by Provider:  11:00


Admission Dx/Process


Chest pain in known CAD pt with previous MI and stent placement at 43yo


Smoker


Diagonsis/Assessment & Plan


Pt doing well.


Reviewed cath report awaiting Life Vest.


BM+


Eating and drinking well


Checked meds and labs





AFVSS, Pleasant, O x 3


RRR, CTAB diminished in bases


No edema





Assessment:


Chest pain in known CAD pt with previous MI and stent placement at 43yo now 

with NSTEMI s/p cath but could not cross lesion in RCA so needs referral to 

[facility] after DC but significantly depressed systolic function at 20% needs Life 

Vest prior to DC


Smoker








Plan:


Life Vest 


Monitor pt closely for cardiac arrhythmia 


Dispo per Cardiology after Life Vest











FELISHA SLATER DO Nov 18, 2017 11:52

## 2017-11-18 NOTE — CARDIOLOGY PROGRESS NOTE
Cardiology SOAP Progress Note


Subjective:


no further chest pain





Objective:


I&O/Vital Signs





Vital Sign - Last 12Hours








 11/18/17 11/18/17 11/18/17 11/18/17





 01:00 01:27 04:08 07:00


 


Temp  96.9  


 


Pulse 68 64 66 73


 


Resp  18 20 


 


B/P (MAP)  111/61 109/65 


 


Pulse Ox  94 93 


 


O2 Delivery  Nasal Cannula Nasal Cannula 


 


O2 Flow Rate  2.00 2.00 


 


    





 11/18/17 11/18/17 11/18/17 





 07:30 08:00 09:00 


 


Temp  99.7  


 


Pulse  67  


 


Resp  20  


 


B/P (MAP)  126/68  


 


Pulse Ox 97 96 95 


 


O2 Delivery Nasal Cannula Room Air Room Air 


 


O2 Flow Rate 2.00   








Weight (Pounds):  184


Weight (Ounces):  0.0


Weight (Calculated Kilograms):  83.050022


Constitutional:  No appears stated age, No AAO x 3, No apparent distress, No 

PERRL, No well-developed, No well-nourished, No other


Respiratory:  No accessory muscle use, No respiratory distress, No chest tender

, No chest expansion is symmetric, No chest is bilaterally symmetric, No lungs 

clear to percussion, No lungs clear to auscultation, No crackles, No rhonchi, 

No rales, No stridor, No wheezing, No pleural rub, No other


Cardiovascular:  No regular rate-rhythm, No irregularly irregular, No extra 

beats, No parasternal heave is noted, No JVD, No edema, No bradycardia, No 

tachycardia, No point of maximal impulse, No cardiac thrills are palpable, No 

S1 and S2, No gallop/S3, No gallop/S4, No diastolic murmur, No systolic murmur, 

No friction rub, No click, No other


Gastrointestional:  No tender, No soft, No round, No distended, No pulsatile 

mass, No organomegaly, No guarding, No rebound, No tenderness, No hernia, No 

mass, No audible bowel sounds, No abnormal bowel sounds, No abdominal bruits, 

No spleenomegaly, No other


Extremities:  No normal range of motion, No non-tender, No normal inspection, 

No pedal edema, No calf tenderness, No normal capillary refill, No pelvis stable

, No calf tenderness, No inflammation, No pedal edema, No slow capillary refill

, No swelling, No other, No abrasion, No clubbing, No cyanosis, No ecchymosis, 

No laceration, No no lower extremity edema bilateral, No significant edema, No 

tenderness, No wound


Neurologic/Psychiatric:  No CNs II-XII nml as tested, No no motor/sensory 

deficits, No alert, No normal mood/affect, No oriented x 3, No abnormal 

cerebellar tests, No abnormal CNs II-XII, No abnormal gait, No aphasia, No EOM 

palsy, No facial droop, No motor weakness, No sensory deficit, No depressed 

affect, No disoriented x 3, No other, No grossly intact, No power is 5/5 both 

on sides


Skin:  No normal color, No warm/dry, No cyanosis, No cool, No diaphoresis, No 

damp, No ecchymosis, No jaundice, No mottled, No pallor, No rash, No tattoos/

piercings, No ulcerations, No rash on exposed areas, No ulcerations on exposed 

areas, No other





Results/Procedures:


Labs


Laboratory Tests


11/18/17 03:08: 


White Blood Count 11.1H, Red Blood Count 4.39, Hemoglobin 12.8L, Hematocrit 39L

, Mean Corpuscular Volume 88, Mean Corpuscular Hemoglobin 29, Mean Corpuscular 

Hemoglobin Concent 33, Red Cell Distribution Width 14.0, Platelet Count 252, 

Mean Platelet Volume 9.6, Sodium Level 139, Potassium Level 4.0, Chloride Level 

104, Carbon Dioxide Level 27, Anion Gap 8, Blood Urea Nitrogen 9, Creatinine 

0.74, Estimat Glomerular Filtration Rate > 60, BUN/Creatinine Ratio 12, Glucose 

Level 101, Calcium Level 8.8








A/P:


Assessment/Dx:


Non-STEMI


Plan:


Non-STEMI: coronary angiography showed occluded distal portion of a previous OM 

artery stent.  This was successfully treated with another drug-eluting stent 

with good flow distally.  Patient to continue dual antiplatelet therapy for at 

least a year.  We'll also requires beta blocker, ACE inhibitor and statin 

therapy.


Hypertriglyceridemia: Previous severe hypertriglyceridemia and is on 

gemfibrozil.


Hyperlipidemia: significantly elevated LDL as well.  I have given moderate dose 

statin therapy due to the concern off drug drug interaction with gemfibrozil. 


Active smoking: Strongly recommended to quit.


LVEF is 30-35 percent.  We are waiting for a LifeVest approval.  Patient will 

be discharged once life vest is placed.  Patient will follow-up with Dr. Miguel.


the patient also has occluded RCA and I have spoken to  at  for 

possible intervention to  RCA.








Thank you for your consultation. Please call me if you have any questions.








INDERJIT Bacon MD, FACP, FACC, FSCAI, FHRS, CCDS


Interventional Cardiology


Cardiac Electrophysiology


Vascular Medicine and Endovascular Interventions





Clinical Quality Measures


AMI/AHF:


ASA po Prior to arrival:  LORENZO Lawler MD Nov 18, 2017 12:22 pm

## 2017-11-19 VITALS — SYSTOLIC BLOOD PRESSURE: 98 MMHG | DIASTOLIC BLOOD PRESSURE: 61 MMHG

## 2017-11-19 VITALS — SYSTOLIC BLOOD PRESSURE: 88 MMHG | DIASTOLIC BLOOD PRESSURE: 54 MMHG

## 2017-11-19 VITALS — SYSTOLIC BLOOD PRESSURE: 99 MMHG | DIASTOLIC BLOOD PRESSURE: 61 MMHG

## 2017-11-19 VITALS — SYSTOLIC BLOOD PRESSURE: 105 MMHG | DIASTOLIC BLOOD PRESSURE: 63 MMHG

## 2017-11-19 VITALS — SYSTOLIC BLOOD PRESSURE: 97 MMHG | DIASTOLIC BLOOD PRESSURE: 64 MMHG

## 2017-11-19 VITALS — DIASTOLIC BLOOD PRESSURE: 67 MMHG | SYSTOLIC BLOOD PRESSURE: 98 MMHG

## 2017-11-19 VITALS — DIASTOLIC BLOOD PRESSURE: 71 MMHG | SYSTOLIC BLOOD PRESSURE: 114 MMHG

## 2017-11-19 VITALS — SYSTOLIC BLOOD PRESSURE: 103 MMHG | DIASTOLIC BLOOD PRESSURE: 68 MMHG

## 2017-11-19 VITALS — DIASTOLIC BLOOD PRESSURE: 63 MMHG | SYSTOLIC BLOOD PRESSURE: 90 MMHG

## 2017-11-19 VITALS — DIASTOLIC BLOOD PRESSURE: 60 MMHG | SYSTOLIC BLOOD PRESSURE: 106 MMHG

## 2017-11-19 LAB
INR PPP: 1.1 (ref 0.8–1.4)
PROTHROMBIN TIME: 14.2 SEC (ref 12.2–14.7)

## 2017-11-19 RX ADMIN — GEMFIBROZIL SCH MG: 600 TABLET ORAL at 09:21

## 2017-11-19 RX ADMIN — OMEGA-3 FATTY ACIDS CAP 1000 MG SCH MG: 1000 CAP at 07:40

## 2017-11-19 RX ADMIN — CARISOPRODOL SCH MG: 350 TABLET ORAL at 20:55

## 2017-11-19 RX ADMIN — GABAPENTIN SCH MG: 600 TABLET, FILM COATED ORAL at 09:21

## 2017-11-19 RX ADMIN — WARFARIN SODIUM SCH MG: 5 TABLET ORAL at 12:05

## 2017-11-19 RX ADMIN — ALBUTEROL SULFATE SCH MG: 2.5 SOLUTION RESPIRATORY (INHALATION) at 13:14

## 2017-11-19 RX ADMIN — HYDROCODONE BITARTRATE AND ACETAMINOPHEN PRN EA: 7.5; 325 TABLET ORAL at 12:05

## 2017-11-19 RX ADMIN — GEMFIBROZIL SCH MG: 600 TABLET ORAL at 20:55

## 2017-11-19 RX ADMIN — SODIUM CHLORIDE SCH MLS/HR: 900 INJECTION, SOLUTION INTRAVENOUS at 07:17

## 2017-11-19 RX ADMIN — GABAPENTIN SCH MG: 600 TABLET, FILM COATED ORAL at 20:55

## 2017-11-19 RX ADMIN — OMEGA-3 FATTY ACIDS CAP 1000 MG SCH MG: 1000 CAP at 17:22

## 2017-11-19 RX ADMIN — ALBUTEROL SULFATE SCH MG: 2.5 SOLUTION RESPIRATORY (INHALATION) at 18:16

## 2017-11-19 RX ADMIN — ASPIRIN SCH MG: 81 TABLET ORAL at 09:21

## 2017-11-19 RX ADMIN — LISINOPRIL SCH MG: 5 TABLET ORAL at 09:21

## 2017-11-19 RX ADMIN — HYDROCODONE BITARTRATE AND ACETAMINOPHEN PRN EA: 7.5; 325 TABLET ORAL at 20:55

## 2017-11-19 RX ADMIN — CLOPIDOGREL BISULFATE SCH MG: 75 TABLET, FILM COATED ORAL at 09:21

## 2017-11-19 RX ADMIN — SODIUM CHLORIDE SCH MLS/HR: 900 INJECTION, SOLUTION INTRAVENOUS at 17:17

## 2017-11-19 RX ADMIN — GABAPENTIN SCH MG: 600 TABLET, FILM COATED ORAL at 12:05

## 2017-11-19 RX ADMIN — HYDROCODONE BITARTRATE AND ACETAMINOPHEN PRN EA: 7.5; 325 TABLET ORAL at 03:21

## 2017-11-19 RX ADMIN — OMEGA-3 FATTY ACIDS CAP 1000 MG SCH MG: 1000 CAP at 12:05

## 2017-11-19 RX ADMIN — ATORVASTATIN CALCIUM SCH MG: 40 TABLET, FILM COATED ORAL at 20:55

## 2017-11-19 NOTE — PROGRESS NOTE-HOSPITALIST
Progress Note


HPI/CC on Admission


CC: Chest pain





HPI: This is a 46 yoWM pt who presented to the ER with SOB and CP. Pt has a 

previous hx of heart attack requiring stent placement in the past at 41yo.





RN Review:


Heart cath scheduled at 1230





Patient Interview:


Pt confirms having the procedure scheduled around noon


Pt confirms PCP at [facility] in [place]


Pt confirms previous heart attack and stent placement.


Physical exam stable.


Pt confirms having chest pain still


Pt confirms smoking and denies ETOH usage


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





Scribed by Shania Luna under the direct supervision of Dr. Slater.





Progress Notes/Assess & Plan


Date Seen


11/19/17


Time Seen by Provider:  12:00


Admission Dx/Process


Chest pain in known CAD pt with previous MI and stent placement at 41yo


Smoker


Diagonsis/Assessment & Plan


Pt doing well.


Awaiting Life Vest


BM+


Eating and drinking well


Checked meds and labs


Wheezing noted on exam so ordered Nebs





AFVSS, Pleasant, O x 3


RRR, mild wheezing noted


No edema





Assessment:


Chest pain in known CAD pt with previous MI and stent placement at 41yo now 

with NSTEMI s/p cath but could not cross lesion in RCA so needs referral to 

[facility] after DC but significantly depressed systolic function at 20% needs Life 

Vest prior to DC


Smoker


Wheezing on exam








Plan:


Life Vest 


Monitor pt closely for cardiac arrhythmia 


Dispo per Cardiology after Life Vest


FELISHA Katz DO Nov 19, 2017 13:09

## 2017-11-19 NOTE — CARDIOLOGY PROGRESS NOTE
Cardiology SOAP Progress Note


Subjective:


No further chest pain.





Objective:


I&O/Vital Signs





Vital Sign - Last 12Hours








 11/19/17 11/19/17 11/19/17 11/19/17





 00:00 01:00 04:00 07:00


 


Temp 98.7  98.8 


 


Pulse 72 71 69 


 


B/P (MAP) 90/63  99/61 105/63


 


Pulse Ox 94  95 95


 


O2 Delivery Nasal Cannula  Nasal Cannula Nasal Cannula


 


O2 Flow Rate 2.00  2.00 2.00


 


    





 11/19/17 11/19/17 11/19/17 11/19/17





 07:00 08:36 09:00 10:00


 


Temp  99.8  


 


Pulse 63 69  


 


Resp  20  


 


B/P (MAP)  106/60 103/68 114/71


 


Pulse Ox  96 96 97


 


O2 Delivery  Nasal Cannula Nasal Cannula Nasal Cannula


 


O2 Flow Rate  2.00 2.00 2.00


 


    





 11/19/17   





 11:00   


 


B/P (MAP) 88/54   


 


Pulse Ox 97   


 


O2 Delivery Nasal Cannula   


 


O2 Flow Rate 2.00   








Weight (Pounds):  184


Weight (Ounces):  0.0


Weight (Calculated Kilograms):  83.299236


Constitutional:  No appears stated age, No AAO x 3, No apparent distress, No 

PERRL, No well-developed, No well-nourished, No other


Respiratory:  No accessory muscle use, No respiratory distress, No chest tender

, No chest expansion is symmetric, No chest is bilaterally symmetric, No lungs 

clear to percussion, No lungs clear to auscultation, No crackles, No rhonchi, 

No rales, No stridor, No wheezing, No pleural rub, No other


Cardiovascular:  No regular rate-rhythm, No irregularly irregular, No extra 

beats, No parasternal heave is noted, No JVD, No edema, No bradycardia, No 

tachycardia, No point of maximal impulse, No cardiac thrills are palpable, No 

S1 and S2, No gallop/S3, No gallop/S4, No diastolic murmur, No systolic murmur, 

No friction rub, No click, No other


Gastrointestional:  No tender, No soft, No round, No distended, No pulsatile 

mass, No organomegaly, No guarding, No rebound, No tenderness, No hernia, No 

mass, No audible bowel sounds, No abnormal bowel sounds, No abdominal bruits, 

No spleenomegaly, No other


Extremities:  No normal range of motion, No non-tender, No normal inspection, 

No pedal edema, No calf tenderness, No normal capillary refill, No pelvis stable

, No calf tenderness, No inflammation, No pedal edema, No slow capillary refill

, No swelling, No other, No abrasion, No clubbing, No cyanosis, No ecchymosis, 

No laceration, No no lower extremity edema bilateral, No significant edema, No 

tenderness, No wound


Neurologic/Psychiatric:  No CNs II-XII nml as tested, No no motor/sensory 

deficits, No alert, No normal mood/affect, No oriented x 3, No abnormal 

cerebellar tests, No abnormal CNs II-XII, No abnormal gait, No aphasia, No EOM 

palsy, No facial droop, No motor weakness, No sensory deficit, No depressed 

affect, No disoriented x 3, No other, No grossly intact, No power is 5/5 both 

on sides


Skin:  No normal color, No warm/dry, No cyanosis, No cool, No diaphoresis, No 

damp, No ecchymosis, No jaundice, No mottled, No pallor, No rash, No tattoos/

piercings, No ulcerations, No rash on exposed areas, No ulcerations on exposed 

areas, No other





Results/Procedures:


Labs


Laboratory Tests


11/19/17 10:30: 


Prothrombin Time 14.2, INR Comment 1.1








A/P:


Assessment/Dx:


Non-STEMI


Plan:


Non-STEMI: coronary angiography showed occluded distal portion of a previous OM 

artery stent.  This was successfully treated with another drug-eluting stent 

with good flow distally.  Patient to continue dual antiplatelet therapy for at 

least a year.  We'll also requires beta blocker, ACE inhibitor and statin 

therapy.


Hypertriglyceridemia: Previous severe hypertriglyceridemia and is on 

gemfibrozil.


Hyperlipidemia: significantly elevated LDL as well.  I have given moderate dose 

statin therapy due to the concern off drug drug interaction with gemfibrozil. 


Active smoking: Strongly recommended to quit.


LVEF is 30-35 percent.  We are waiting for a LifeVest approval.  Patient will 

be discharged once life vest is placed.  Patient will follow-up with Dr. Miguel.


the patient also has occluded RCA and I have spoken to  at  for 

possible intervention to  RCA.








Thank you for your consultation. Please call me if you have any questions.








INDERJIT Bacon MD, FACP, FACC, FSCAI, FHRS, CCDS


Interventional Cardiology


Cardiac Electrophysiology


Vascular Medicine and Endovascular Interventions





Clinical Quality Measures


AMI/AHF:


ASA po Prior to arrival:  LORENZO Lawler MD Nov 19, 2017 11:37 am

## 2017-11-20 VITALS — DIASTOLIC BLOOD PRESSURE: 63 MMHG | SYSTOLIC BLOOD PRESSURE: 101 MMHG

## 2017-11-20 VITALS — SYSTOLIC BLOOD PRESSURE: 103 MMHG | DIASTOLIC BLOOD PRESSURE: 63 MMHG

## 2017-11-20 VITALS — SYSTOLIC BLOOD PRESSURE: 102 MMHG | DIASTOLIC BLOOD PRESSURE: 64 MMHG

## 2017-11-20 LAB
INR PPP: 1.2 (ref 0.8–1.4)
PROTHROMBIN TIME: 15.5 SEC (ref 12.2–14.7)

## 2017-11-20 RX ADMIN — GABAPENTIN SCH MG: 600 TABLET, FILM COATED ORAL at 08:50

## 2017-11-20 RX ADMIN — ASPIRIN SCH MG: 81 TABLET ORAL at 08:50

## 2017-11-20 RX ADMIN — OMEGA-3 FATTY ACIDS CAP 1000 MG SCH MG: 1000 CAP at 12:08

## 2017-11-20 RX ADMIN — GABAPENTIN SCH MG: 600 TABLET, FILM COATED ORAL at 12:11

## 2017-11-20 RX ADMIN — HYDROCODONE BITARTRATE AND ACETAMINOPHEN PRN EA: 7.5; 325 TABLET ORAL at 06:35

## 2017-11-20 RX ADMIN — LISINOPRIL SCH MG: 5 TABLET ORAL at 08:50

## 2017-11-20 RX ADMIN — OMEGA-3 FATTY ACIDS CAP 1000 MG SCH MG: 1000 CAP at 06:35

## 2017-11-20 RX ADMIN — GEMFIBROZIL SCH MG: 600 TABLET ORAL at 08:50

## 2017-11-20 RX ADMIN — SODIUM CHLORIDE SCH MLS/HR: 900 INJECTION, SOLUTION INTRAVENOUS at 03:25

## 2017-11-20 RX ADMIN — CLOPIDOGREL BISULFATE SCH MG: 75 TABLET, FILM COATED ORAL at 08:50

## 2017-11-20 NOTE — CARDIOLOGY PROGRESS NOTE
Cardiology SOAP Progress Note


Subjective:


No cardiac complaints





Objective:


I&O/Vital Signs





Vital Sign - Last 12Hours








 11/20/17 11/20/17 11/20/17 11/20/17





 01:00 06:36 07:00 08:54


 


Temp  97.0  97.5


 


Pulse 79 75 71 83


 


Resp  18  16


 


B/P (MAP)  103/63  102/64


 


Pulse Ox  96  97


 


O2 Delivery  Room Air  Room Air


 


    





 11/20/17   





 10:05   


 


O2 Delivery Room Air   








Weight (Pounds):  184


Weight (Ounces):  0.0


Weight (Calculated Kilograms):  83.089886


Constitutional:  No appears stated age, No AAO x 3, No apparent distress, No 

PERRL, No well-developed, No well-nourished, No other


Respiratory:  No accessory muscle use, No respiratory distress, No chest tender

, No chest expansion is symmetric, No chest is bilaterally symmetric, No lungs 

clear to percussion, No lungs clear to auscultation, No crackles, No rhonchi, 

No rales, No stridor, No wheezing, No pleural rub, No other


Cardiovascular:  No regular rate-rhythm, No irregularly irregular, No extra 

beats, No parasternal heave is noted, No JVD, No edema, No bradycardia, No 

tachycardia, No point of maximal impulse, No cardiac thrills are palpable, No 

S1 and S2, No gallop/S3, No gallop/S4, No diastolic murmur, No systolic murmur, 

No friction rub, No click, No other


Gastrointestional:  No tender, No soft, No round, No distended, No pulsatile 

mass, No organomegaly, No guarding, No rebound, No tenderness, No hernia, No 

mass, No audible bowel sounds, No abnormal bowel sounds, No abdominal bruits, 

No spleenomegaly, No other


Extremities:  No normal range of motion, No non-tender, No normal inspection, 

No pedal edema, No calf tenderness, No normal capillary refill, No pelvis stable

, No calf tenderness, No inflammation, No pedal edema, No slow capillary refill

, No swelling, No other, No abrasion, No clubbing, No cyanosis, No ecchymosis, 

No laceration, No no lower extremity edema bilateral, No significant edema, No 

tenderness, No wound


Neurologic/Psychiatric:  No CNs II-XII nml as tested, No no motor/sensory 

deficits, No alert, No normal mood/affect, No oriented x 3, No abnormal 

cerebellar tests, No abnormal CNs II-XII, No abnormal gait, No aphasia, No EOM 

palsy, No facial droop, No motor weakness, No sensory deficit, No depressed 

affect, No disoriented x 3, No other, No grossly intact, No power is 5/5 both 

on sides


Skin:  No normal color, No warm/dry, No cyanosis, No cool, No diaphoresis, No 

damp, No ecchymosis, No jaundice, No mottled, No pallor, No rash, No tattoos/

piercings, No ulcerations, No rash on exposed areas, No ulcerations on exposed 

areas, No other





Results/Procedures:


Labs


Laboratory Tests


11/19/17 10:30: 


Prothrombin Time 14.2, INR Comment 1.1


11/20/17 04:45: 


Prothrombin Time 15.5H, INR Comment 1.2








A/P:


Assessment/Dx:


Non-STEMI


Plan:


Non-STEMI: coronary angiography showed occluded distal portion of a previous OM 

artery stent.  This was successfully treated with another drug-eluting stent 

with good flow distally.  Patient to continue dual antiplatelet therapy for at 

least a year.  Continue beta blocker, ACE inhibitor and statin therapy.


Hypertriglyceridemia: Previous severe hypertriglyceridemia and is on 

gemfibrozil.


Hyperlipidemia: significantly elevated LDL as well.  I have given moderate dose 

statin therapy due to the concern off drug drug interaction with gemfibrozil. 


Active smoking: Strongly recommended to quit.


LVEF is 30-35 percent.  We are waiting for a LifeVest approval.  Patient will 

be discharged once life vest is placed, hopefully today.





Patient will follow-up with Dr. Miguel.


the patient also has occluded RCA and I have spoken to  at  for 

possible intervention to  RCA.








Thank you for your consultation. Please call me if you have any questions.








INDERJIT Bacon MD, FACP, FACC, FSCAI, FHRS, CCDS


Interventional Cardiology


Cardiac Electrophysiology


Vascular Medicine and Endovascular Interventions





Clinical Quality Measures


AMI/AHF:


ASA po Prior to arrival:  LORENZO Lawler MD Nov 20, 2017 10:26

## 2017-11-20 NOTE — DISCHARGE SUMMARY-HOSPITALIST
Diagnosis/Chief Complaint


Date of Admission


Nov 16, 2017 at 4:38 pm


Date of Discharge





Discharge Date:  Nov 20, 2017


Admission Diagnosis


Chest pain in known CAD pt with previous MI and stent placement at 43yo


Smoker





Discharge Diagnosis


NSTEMI








Discharge Summary


Procedures


Cardiac Cath


Consultations


Dr Bacon- Cardiology


Discharge Physical Examination


Allergies:  


Coded Allergies:  


     Penicillins (Unverified  Allergy, Mild, 6/4/09)


Vitals & I&Os





Vital Signs








  Date Time  Temp Pulse Resp B/P (MAP) Pulse Ox O2 Delivery O2 Flow Rate FiO2


 


11/20/17 07:00  71      


 


11/20/17 06:36 97.0  18 103/63 96 Room Air  


 


11/19/17 21:00       2.00 











Hospital Course


Pt is a 46yoCM with PMH of CAD, MI at 43yo, and CHF who presented to the ER 

with SOB and chest pain. He was found to have an NSTEMI and was taken to cath 

lab which showed occlusion of his previously placed stent. This was treated 

with a TESFAYE. He was started on DAPT to be continued for the next year. He was 

found to have an EF of 30% and was fitted for a LifeVest prior to discharge.


Labs (last 24 hrs)


Laboratory Tests


11/19/17 10:30: 


Prothrombin Time 14.2, INR Comment 1.1


11/20/17 04:45: 


Prothrombin Time 15.5H, INR Comment 1.2





Pending Labs


Laboratory Tests


11/20/17 04:45: 


Prothrombin Time 15.5, INR Comment 1.2





Radiology Reviewed


Echo shows EF of 30% with akinesis of the inferior and lateral myocardium





Discussion & Recommendations


Greater than 30m of time spent coordinating discharge.





Discharge


Home Medications:





Active Scripts


Active


Clopidogrel (Clopidogrel Bisulfate) 75 Mg Tablet 75 Mg PO DAILY


Reported


Tizanidine HCl 4 Mg Tablet 8 Mg PO TID PRN


     TAKES 2 (4MG) TABLETS


Fish Oil 1,000 mg Capsule (Omega 3 Polyunsat Fatty Acids) 1,000 Mg Cap 1,000 Mg 

PO TID


Warfarin Sodium 5 Mg Tablet 5 Mg PO SUTUTHFRSA


Warfarin Sodium 5 Mg Tablet 7.5 Mg PO MOWE


     TAKES 1 & 1/2 (5MG) TABLETS


Metoprolol Succinate 25 Mg Tab.er.24h 25 Mg PO DAILY


Atorvastatin Calcium 40 Mg Tablet 40 Mg PO DAILY


Lisinopril 2.5 Mg Tablet 2.5 Mg PO DAILY


Gemfibrozil 600 Mg Tablet 600 Mg PO BID


Hydrocodon-Acetaminoph 7.5-325 (Hydrocodone/Acetaminophen) 1 Each Tablet 1 Tab 

PO TID PRN


Gabapentin 600 Mg Tablet 600 Mg PO TID


Carisoprodol 350 Mg Tablet 350 Mg PO HS


Aspirin EC (Aspirin) 81 Mg Tablet.dr 81 Mg PO DAILY





Instructions to patient/family


Please see electronic discharge instructions given to patient.





Clinical Quality Measures


AMI/AHF:


Ejection Fraction:  <40 (ACE/ARB Indicated)


D/C Medications Addressed:  Ace inhibitors, Beta blocker


D/C Inst. for HF given:  Yes


ASA po Prior to arrival:  No


Previously on statin, BB, ASA, and lisinopril. Plavix added.





DVT/VTE Risk/Contraindication:


Risk Factor Score Per Nursing:  3


RFS Level Per Nursing on Admit:  3=High





Copy


Copies To 1:   RAIZA FARRELL MD,RONAN VENTURA MD Nov 20, 2017 8:06 am

## 2017-12-08 ENCOUNTER — HOSPITAL ENCOUNTER (EMERGENCY)
Dept: HOSPITAL 75 - ER | Age: 47
Discharge: HOME | End: 2017-12-08
Payer: COMMERCIAL

## 2017-12-08 VITALS — WEIGHT: 193.5 LBS | BODY MASS INDEX: 34.29 KG/M2 | HEIGHT: 63 IN

## 2017-12-08 VITALS — SYSTOLIC BLOOD PRESSURE: 120 MMHG | DIASTOLIC BLOOD PRESSURE: 81 MMHG

## 2017-12-08 DIAGNOSIS — I10: ICD-10-CM

## 2017-12-08 DIAGNOSIS — S20.212A: Primary | ICD-10-CM

## 2017-12-08 DIAGNOSIS — Z82.49: ICD-10-CM

## 2017-12-08 DIAGNOSIS — I25.2: ICD-10-CM

## 2017-12-08 DIAGNOSIS — E78.00: ICD-10-CM

## 2017-12-08 DIAGNOSIS — Z79.01: ICD-10-CM

## 2017-12-08 DIAGNOSIS — Z95.5: ICD-10-CM

## 2017-12-08 DIAGNOSIS — Z79.82: ICD-10-CM

## 2017-12-08 DIAGNOSIS — K21.9: ICD-10-CM
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
## 2017-12-08 PROCEDURE — 99281 EMR DPT VST MAYX REQ PHY/QHP: CPT

## 2017-12-08 NOTE — ED GENERAL
General


Chief Complaint:  General Problems/Pain


Stated Complaint:  LUMP ON CHEST


Nursing Triage Note:  


PATIENT WAS IN CARDIAC REHAB THIS MORNING AND WHEN THEY WERE REMOVING 

ELECTRODES 


HE NOTICED A PAINFUL LUMP ON THE LEFT SIDE OF HIS ANTERIOR CHEST. IT DOES NOT 


HURT UNLESS PALPATED. NO DECREASE IN ROM. NO OTHER SYMPTOMS.


Nursing Sepsis Screen:  No Definite Risk


Source of Information:  Patient, Spouse


Exam Limitations:  No Limitations





History of Present Illness


Time Seen by Provider:  11:35


Initial Comments


47-year-old male patient presents to the emergency department with complaints 

of a painful lump of the left chest/breast when cardiac rehabilitation removed 

to the electrode patches today.  Patient denies any known injury.  Denies pain 

unless areas palpated.  Patient is currently on blood thinners.  Patient does 

have 3 small dogs at home which his wife reports "jump on his chest and belly 

frequently."  Denies chest pain, SOA, dizziness, fever, chills, or cough.


Timing/Duration:  1-3 Hours


Modifying Factors:  worse with Other (worse with palpation)





Allergies and Home Medications


Allergies


Coded Allergies:  


     Penicillins (Unverified  Allergy, Mild, 6/4/09)





Home Medications


Aspirin 81 Mg Tablet.dr, 81 MG PO DAILY, (Reported)


Atorvastatin Calcium 40 Mg Tablet, 40 MG PO DAILY, (Reported)


Carisoprodol 350 Mg Tablet, 350 MG PO HS, (Reported)


Clopidogrel Bisulfate 75 Mg Tablet, 75 MG PO DAILY, #30


   Prescribed by: RONAN MILLS on 11/20/17 0752


Gabapentin 600 Mg Tablet, 600 MG PO TID, (Reported)


Gemfibrozil 600 Mg Tablet, 600 MG PO BID, (Reported)


Hydrocodone/Acetaminophen 1 Each Tablet, 1 TAB PO TID PRN for PAIN-MODERATE, (

Reported)


Lisinopril 2.5 Mg Tablet, 2.5 MG PO DAILY, (Reported)


Metoprolol Succinate 25 Mg Tab.er.24h, 25 MG PO DAILY, (Reported)


Omega 3 Polyunsat Fatty Acids 1,000 Mg Cap, 1,000 MG PO TID, (Reported)


Tizanidine HCl 4 Mg Tablet, 8 MG PO TID PRN for MUSCLE SPASMS, (Reported)


   TAKES 2 (4MG) TABLETS 


Warfarin Sodium 5 Mg Tablet, 7.5 MG PO MoWe, (Reported)


   TAKES 1 & 1/2 (5MG) TABLETS 


Warfarin Sodium 5 Mg Tablet, 5 MG PO SuTuThFrSa, (Reported)





Constitutional:  No chills, No diaphoresis, No dizziness, No fever, No malaise


Respiratory:  No cough, No dyspnea on exertion, No short of breath


Cardiovascular:  No chest pain, No palpitations, No syncope


Gastrointestinal:  no symptoms reported


Musculoskeletal:  see HPI


Skin:  no symptoms reported


Psychiatric/Neurological:  No Symptoms Reported


Hematologic/Lymphatic:  Denies Anemia, Easy Bruising ((since starting blood 

thinners))


All Other Systems Reviewed


Negative Unless Noted:  Yes (Negative excepted noted.)





Past Medical-Social-Family Hx


Patient Social History


Recent Foreign Travel:  No


Contact w/Someone Who Travel:  No


Recent Infectious Disease Expo:  No


Recent Hopitalizations:  No





Immunizations Up To Date


Date of Pneumonia Vaccine:  Dec 3, 2012


Date of Influenza Vaccine:  Oct 14, 2017





Seasonal Allergies


Seasonal Allergies:  No





Surgeries


History of Surgeries:  Yes (STENTS)





Respiratory


History of Respiratory Disorde:  Yes





Cardiovascular


History of Cardiac Disorders:  Yes (STENT)


Cardiac Disorders:  Heart Attack, High Cholesterol, Hypertension





Neurological


History of Neurological Disord:  No





Reproductive System


Hx Reproductive Disorders:  No





Gastrointestinal


History of Gastrointestinal Di:  Yes


Gastrointestinal Disorders:  Gastroesophageal Reflux





Musculoskeletal


History of Musculoskeletal Dis:  Yes


Musculoskeletal Disorders:  Chronic Back Pain





Endocrine


History of Endocrine Disorders:  No





Cancer


History of Cancer:  No





Psychosocial


History of Psychiatric Problem:  No





Integumentary


History of Skin or Integumenta:  No





Blood Transfusions


History of Blood Disorders:  Yes (PROTEIN S DEF)





Reviewed Nursing Assessment


Reviewed/Agree w Nursing PMH:  Yes





Family Medical History


Significant Family History:  Heart Disease, Vascular Disease





Physical Exam


Vital Signs





Vital Sign - Last 12Hours








 12/8/17





 11:35


 


Temp 98.9


 


Pulse 69


 


Resp 20


 


B/P (MAP) 120/81 (94)


 


Pulse Ox 95


 


O2 Delivery Room Air





Capillary Refill : Less Than 3 Seconds


General Appearance:  No Apparent Distress, WD/WN


Neck:  Normal Inspection, Supple


Respiratory:  Lungs Clear, Normal Breath Sounds, No Accessory Muscle Use, No 

Respiratory Distress, Other (left chest wall shows 2 areas of ecchymosis, 

nodules, and soft tissue tenderness consistent with small hematoma's/contusions.

)


Cardiovascular:  Regular Rate, Rhythm, No Edema, No Murmur, Normal Peripheral 

Pulses


Gastrointestinal:  Non Tender, Soft, No Distended


Extremity:  Normal Capillary Refill, No Pedal Edema


Neurologic/Psychiatric:  Alert, Oriented x3, Normal Mood/Affect


Skin:  Normal Color, Warm/Dry, Ecchymosis (left chest wall shows 2 areas of 

ecchymosis, nodules, and soft tissue tenderness consistent with small hematoma's

/contusions.)





Progress/Results/Core Measures


Suspected Sepsis


Recent Fever Within 48 Hours:  No


Infection Criteria Present:  None


New/Unexplained  Altered Menta:  No


Sepsis Screen:  No Definite Risk


Sepsis Diagnosis:  


SIRS


Temperature:98.9 


Pulse: 69 


Respiratory Rate: 20


 


Blood Pressure 120 /81 


Mean: 94





Results/Orders


Vital Signs/I&O





Vital Sign - Last 12Hours








 12/8/17 12/8/17





 11:35 11:52


 


Temp 98.9 98.9


 


Pulse 69 69


 


Resp 20 20


 


B/P (MAP) 120/81 (94) 


 


Pulse Ox 95 95


 


O2 Delivery Room Air 





Capillary Refill : Less Than 3 Seconds








Blood Pressure Mean:  94











Departure


Communication (Admissions)


Progress Notes


patient seen and evaluated.  Findings on exam consistent with probable history 

of one of the patient's dogs jumping on his chest.  Wife says that patient lays 

down in the dogs do pounce on his chest and abdomen frequently.  Patient he is 

ice packs as needed for pain and bruising.  Patient to follow-up with his 

primary care provider for recheck if needed.





Impression


Impression:  


 Primary Impression:  


 Contusion of left chest wall


 Qualified Codes:  S20.212A - Contusion of left front wall of thorax, initial 

encounter


Disposition:  01 HOME, SELF-CARE


Condition:  Improved





Departure-Patient Inst.


Decision time for Depature:  11:47


Referrals:  


RAIZA FARRELL MD (PCP/Family)


Primary Care Physician


Patient Instructions:  Contusion (DC)





Add. Discharge Instructions:  


All discharge instructions reviewed with patient and/or family. Voiced 

understanding.  Continue usual home medications.  Ice pack for 20 minute 

intervals as needed for the next 2-3 days.  Then using a heating pad or pack if 

needed.  Follow-up with your primary care provider as an outpatient for recheck 

if needed.  Return in the emergency department for worsened symptoms or any 

other concerns.





Images


Torso/Trunk











1 - Contusion (hematoma), Ecchymosis, Tenderness


2 - Contusion (hematoma), Ecchymosis, Tenderness














DAMARIS ANG Dec 8, 2017 11:48

## 2017-12-08 NOTE — XMS REPORT
Encounter Summary

 Created on: 2017



Francisco Huber

External Reference #: [id number]

: 1970

Sex: Male



Demographics







 Address  [address]

[place]  [address]

 

 Home Phone  [phone]

 

 Preferred Language  Unknown

 

 Marital Status  Unknown

 

 Voodoo Affiliation  Unknown

 

 Race  Unknown

 

 Ethnic Group  Unknown





Author







 Author  [facility]

 

 Organization  [facility]

 

 Address  Unknown

 

 Phone  Unavailable







Care Team Providers







 Care Team Member Name  Role  Phone

 

  PCP  Unavailable







Reason for Visit

* 





 



  Reason   Comments

 

 



  Referral    - Dr. Delcid









Encounter Details







    



  Date   Type   Department   Care Team   Description

 

    



  2017   Telephone   [facility]   Shanell Hendrickson RN   
Referral ( - 



    [address])



    [facility] [address]  



    [place] [address]  



    [phone]  







Social History







    



  Tobacco Use   Types   Packs/Day   Years Used   Date

 

    



  Never Assessed    









 



  Sex Assigned at Birth   Date Recorded

 

 



  Not on file 



as of this encounter



Miscellaneous Notes

* Telephone Encounter - Shanell Hendrickson RN - 2017  6:51 PM CST



Dr. Delcid reviewed cath images this afternoon. He is agreeable to bringing pt 
in for high risk PCI (retrograde approach - CIRC/RCA). Dr. Delcid will need a 
couple hours blocked for the case. Dr. Delcid inquiring if pt has had a recent 
nuclear thallium or viability study. LM for Ariana to please call and let us know. 

* Telephone Encounter - Shanell Hendrickson RN - 2017 11:32 AM CST



CD not yet received. Connected with Ariana at Dr. Bacon's office over the phone 
this morning to check in. She states she will call the hospital to ensure they 
have been mailed. If they haven't, asked that they please be sent via [employer] 
overnight. She will keep us posted.



Ariana phone: [phone], fax: [phone]

* Telephone Encounter - Shanell Hendrickson RN - 2017  3:41 PM CST



Per Duane, no precert is needed for the cath procedure. Will await the cath 
images on CD. Called Ariana to update her that we will plan to schedule OV and 
cath on the same day once Dr. Delcid has reviewed the images. 

* Telephone Encounter - Shanell Hendrickson RN - 2017 12:14 PM CST



Received a referral for possible  procedure with Dr. Delcid from Dr. Bacon 
in [place], [place]. Checking with Duane about insurance precert then will 
schedule the patient accordingly. Prefer to schedule H&P and cath on the same 
day due to the patient drive and Dr. Delcid's limited clinic availability and to 
expedite getting pt to procedure. Will await the feedback. 



Paper records received from ARMIDA Lane. She is sending the cath images on CD via 
[employer]. We will need Dr. Delcid to review the images before patient is scheduled 
- to assess whether pt is a candidate for high risk PCI. 

in this encounter



Plan of Treatment





Not on fileas of this encounter



Visit Diagnoses

Not on filein this encounter

## 2017-12-08 NOTE — XMS REPORT
Continuity of Care Document

 Created on: 2017



NIKO MARTE

External Reference #: [id number]

: 1970

Sex: Male



Demographics







 Address  [address] [place]  [address]

 

 Home Phone  [phone]

 

 Preferred Language  Unknown

 

 Marital Status  Unknown

 

 Latter-day Affiliation  Unknown

 

 Race  Unknown

 

 Ethnic Group  Unknown





Author







 Author  [employer]

 

 Organization  [facility]

 

 Address  Unknown

 

 Phone  Unavailable







Care Team Providers







 Care Team Member Name  Role  Phone

 

 [employer]  Unavailable  Unavailable



                                    



Problems

                                                                



Medications

                                                                



Allergies, Adverse Reactions, Alerts

                                                        



Immunizations

                                                                



Results

                                                                



Vital Signs

                                                                                



Encounters

                                                        



Procedures

                                                                



Plan of Care

                                                                



Social History

                                                                        



Assessment and Plan

                                                                



Family History

                    





 Value                          Date                          Source           
         



                                                        



Advance Directives

                    





 Order Name                          Results                          Value    
                      Date                          Source

## 2017-12-08 NOTE — XMS REPORT
Clinical Summary

 Created on: 2017



Cecile Francisco MORALES

External Reference #: [id number]

: 1970

Sex: Male



Demographics







 Address  [address] [place]  [address]

 

 Home Phone  [phone]

 

 Preferred Language  Unknown

 

 Marital Status  Unknown

 

 Buddhist Affiliation  Unknown

 

 Race  Unknown

 

 Ethnic Group  Unknown





Author







 Author  [facility] [place]

 

 Organization  [facility] [place]

 

 Address  Unknown

 

 Phone  Unavailable







Care Team Providers







 Care Team Member Name  Role  Phone

 

  PCP  Unavailable







Source Comments

Some departments are not documenting in the electronic medical record.  If you 
do not see the information that you expected, contact Release of Information in 
the Health Information Management department at [phone] for further 
assistance in locating additional records.[facility]



Allergies

Not on File



Current Medications

Not on file



Active Problems





Not on file



Encounters







    



  Date   Type   Specialty   Care Team   Description

 

    



  2017   Telephone   Cardiology   Shanell Hendrickson RN   Referral ( - Dr. Delcid)



from Last 3 Months



Social History







    



  Tobacco Use   Types   Packs/Day   Years Used   Date

 

    



  Never Assessed    









 



  Sex Assigned at Birth   Date Recorded

 

 



  Not on file 







Last Filed Vital Signs

Not on file



Plan of Treatment







   



  Health Maintenance   Due Date   Last Done   Comments

 

   



  PHYSICAL (COMPREHENSIVE)   1979  



  EXAM   

 

   



  PERTUSSIS VACCINE   1983  

 

   



  TETANUS VACCINE   1989  

 

   



  INFLUENZA VACCINE   2017  







Results

Not on filefrom Last 3 Months

## 2017-12-15 ENCOUNTER — HOSPITAL ENCOUNTER (OUTPATIENT)
Dept: HOSPITAL 75 - LAB | Age: 47
End: 2017-12-15
Attending: INTERNAL MEDICINE
Payer: COMMERCIAL

## 2017-12-15 DIAGNOSIS — I25.119: ICD-10-CM

## 2017-12-15 DIAGNOSIS — Z01.812: Primary | ICD-10-CM

## 2017-12-15 LAB
ANION GAP SERPL CALC-SCNC: 10 MMOL/L (ref 5–14)
BUN SERPL-MCNC: 16 MG/DL (ref 7–18)
BUN/CREAT SERPL: 22
CALCIUM SERPL-MCNC: 10 MG/DL (ref 8.5–10.1)
CHLORIDE SERPL-SCNC: 105 MMOL/L (ref 98–107)
CO2 SERPL-SCNC: 27 MMOL/L (ref 21–32)
CREAT SERPL-MCNC: 0.74 MG/DL (ref 0.6–1.3)
ERYTHROCYTE [DISTWIDTH] IN BLOOD BY AUTOMATED COUNT: 13.9 % (ref 10–14.5)
GFR SERPLBLD BASED ON 1.73 SQ M-ARVRAT: > 60 ML/MIN
GLUCOSE SERPL-MCNC: 100 MG/DL (ref 70–105)
MCH RBC QN AUTO: 29 PG (ref 25–34)
MCHC RBC AUTO-ENTMCNC: 33 G/DL (ref 32–36)
MCV RBC AUTO: 87 FL (ref 80–99)
PLATELET # BLD: 291 10^3/UL (ref 130–400)
PMV BLD AUTO: 9 FL (ref 7.4–10.4)
POTASSIUM SERPL-SCNC: 4.5 MMOL/L (ref 3.6–5)
RBC # BLD AUTO: 4.8 10^6/UL (ref 4.35–5.85)
SODIUM SERPL-SCNC: 142 MMOL/L (ref 135–145)
WBC # BLD AUTO: 7.4 10^3/UL (ref 4.3–11)

## 2017-12-15 PROCEDURE — 80048 BASIC METABOLIC PNL TOTAL CA: CPT

## 2017-12-15 PROCEDURE — 36415 COLL VENOUS BLD VENIPUNCTURE: CPT

## 2017-12-15 PROCEDURE — 85027 COMPLETE CBC AUTOMATED: CPT

## 2018-01-05 LAB
INR PPP: 2.4 (ref 0.8–1.4)
PROTHROMBIN TIME: 26.4 SEC (ref 12.2–14.7)

## 2018-01-10 ENCOUNTER — HOSPITAL ENCOUNTER (OUTPATIENT)
Dept: HOSPITAL 75 - CR | Age: 48
LOS: 57 days | Discharge: HOME | End: 2018-03-08
Attending: INTERNAL MEDICINE
Payer: COMMERCIAL

## 2018-01-10 DIAGNOSIS — I50.9: Primary | ICD-10-CM

## 2018-01-10 PROCEDURE — 93798 PHYS/QHP OP CAR RHAB W/ECG: CPT

## 2018-01-11 ENCOUNTER — HOSPITAL ENCOUNTER (OUTPATIENT)
Dept: HOSPITAL 75 - CARD | Age: 48
End: 2018-01-11
Attending: INTERNAL MEDICINE
Payer: COMMERCIAL

## 2018-01-11 DIAGNOSIS — I25.10: Primary | ICD-10-CM

## 2018-01-11 DIAGNOSIS — R06.00: ICD-10-CM

## 2018-01-11 DIAGNOSIS — I10: ICD-10-CM

## 2018-01-11 DIAGNOSIS — R07.89: ICD-10-CM

## 2018-01-11 PROCEDURE — 93306 TTE W/DOPPLER COMPLETE: CPT

## 2018-01-21 ENCOUNTER — HOSPITAL ENCOUNTER (EMERGENCY)
Dept: HOSPITAL 75 - ER | Age: 48
Discharge: HOME | End: 2018-01-21
Payer: COMMERCIAL

## 2018-01-21 VITALS — HEIGHT: 63 IN | WEIGHT: 200 LBS | BODY MASS INDEX: 35.44 KG/M2

## 2018-01-21 VITALS — DIASTOLIC BLOOD PRESSURE: 87 MMHG | SYSTOLIC BLOOD PRESSURE: 166 MMHG

## 2018-01-21 DIAGNOSIS — I10: ICD-10-CM

## 2018-01-21 DIAGNOSIS — I25.2: ICD-10-CM

## 2018-01-21 DIAGNOSIS — M10.9: Primary | ICD-10-CM

## 2018-01-21 DIAGNOSIS — Z82.49: ICD-10-CM

## 2018-01-21 DIAGNOSIS — I20.9: ICD-10-CM

## 2018-01-21 DIAGNOSIS — E78.00: ICD-10-CM

## 2018-01-21 DIAGNOSIS — Z86.718: ICD-10-CM

## 2018-01-21 DIAGNOSIS — Z79.01: ICD-10-CM

## 2018-01-21 DIAGNOSIS — K21.9: ICD-10-CM

## 2018-01-21 DIAGNOSIS — Z79.02: ICD-10-CM

## 2018-01-21 DIAGNOSIS — Z79.82: ICD-10-CM

## 2018-01-21 DIAGNOSIS — Z95.5: ICD-10-CM

## 2018-01-21 LAB
BASOPHILS # BLD AUTO: 0 10^3/UL (ref 0–0.1)
BASOPHILS NFR BLD AUTO: 0 % (ref 0–10)
BASOPHILS NFR BLD MANUAL: 0 %
BUN/CREAT SERPL: 11
CALCIUM SERPL-MCNC: 9.6 MG/DL (ref 8.5–10.1)
CHLORIDE SERPL-SCNC: 99 MMOL/L (ref 98–107)
CO2 SERPL-SCNC: 28 MMOL/L (ref 21–32)
CREAT SERPL-MCNC: 0.89 MG/DL (ref 0.6–1.3)
EOSINOPHIL # BLD AUTO: 0.2 10^3/UL (ref 0–0.3)
EOSINOPHIL NFR BLD AUTO: 1 % (ref 0–10)
EOSINOPHIL NFR BLD MANUAL: 1 %
ERYTHROCYTE [DISTWIDTH] IN BLOOD BY AUTOMATED COUNT: 14.5 % (ref 10–14.5)
GFR SERPLBLD BASED ON 1.73 SQ M-ARVRAT: > 60 ML/MIN
GLUCOSE SERPL-MCNC: 150 MG/DL (ref 70–105)
HCT VFR BLD CALC: 40 % (ref 40–54)
HGB BLD-MCNC: 13.5 G/DL (ref 13.3–17.7)
INR PPP: 3.2 (ref 0.8–1.4)
LYMPHOCYTES # BLD AUTO: 1.4 X 10^3 (ref 1–4)
LYMPHOCYTES NFR BLD AUTO: 8 % (ref 12–44)
MANUAL DIFFERENTIAL PERFORMED BLD QL: YES
MCH RBC QN AUTO: 30 PG (ref 25–34)
MCHC RBC AUTO-ENTMCNC: 34 G/DL (ref 32–36)
MCV RBC AUTO: 88 FL (ref 80–99)
MONOCYTES # BLD AUTO: 1.1 X 10^3 (ref 0–1)
MONOCYTES NFR BLD AUTO: 6 % (ref 0–12)
MONOCYTES NFR BLD: 3 %
NEUTROPHILS # BLD AUTO: 15.7 X 10^3 (ref 1.8–7.8)
NEUTROPHILS NFR BLD AUTO: 86 % (ref 42–75)
NEUTS BAND NFR BLD MANUAL: 88 %
NEUTS BAND NFR BLD: 4 %
PLATELET # BLD: 357 10^3/UL (ref 130–400)
PMV BLD AUTO: 8.8 FL (ref 7.4–10.4)
POTASSIUM SERPL-SCNC: 3.6 MMOL/L (ref 3.6–5)
PROTHROMBIN TIME: 32.8 SEC (ref 12.2–14.7)
RBC # BLD AUTO: 4.57 10^6/UL (ref 4.35–5.85)
RBC MORPH BLD: NORMAL
SODIUM SERPL-SCNC: 142 MMOL/L (ref 135–145)
URATE SERPL-MCNC: 5.3 MG/DL (ref 2.6–7.2)
VARIANT LYMPHS NFR BLD MANUAL: 4 %
WBC # BLD AUTO: 18.3 10^3/UL (ref 4.3–11)

## 2018-01-21 PROCEDURE — 86141 C-REACTIVE PROTEIN HS: CPT

## 2018-01-21 PROCEDURE — 85652 RBC SED RATE AUTOMATED: CPT

## 2018-01-21 PROCEDURE — 36415 COLL VENOUS BLD VENIPUNCTURE: CPT

## 2018-01-21 PROCEDURE — 96375 TX/PRO/DX INJ NEW DRUG ADDON: CPT

## 2018-01-21 PROCEDURE — 84550 ASSAY OF BLOOD/URIC ACID: CPT

## 2018-01-21 PROCEDURE — 84484 ASSAY OF TROPONIN QUANT: CPT

## 2018-01-21 PROCEDURE — 85007 BL SMEAR W/DIFF WBC COUNT: CPT

## 2018-01-21 PROCEDURE — 73610 X-RAY EXAM OF ANKLE: CPT

## 2018-01-21 PROCEDURE — 85027 COMPLETE CBC AUTOMATED: CPT

## 2018-01-21 PROCEDURE — 96374 THER/PROPH/DIAG INJ IV PUSH: CPT

## 2018-01-21 PROCEDURE — 85610 PROTHROMBIN TIME: CPT

## 2018-01-21 PROCEDURE — 80048 BASIC METABOLIC PNL TOTAL CA: CPT

## 2018-01-21 PROCEDURE — 93005 ELECTROCARDIOGRAM TRACING: CPT

## 2018-01-21 NOTE — XMS REPORT
Encounter Summary

 Created on: 2018



Francisco Huber

External Reference #: [id number]

: 1970

Sex: Male



Demographics







 Address  [address] [place]  [address]

 

 Home Phone  [phone]

 

 Preferred Language  English

 

 Marital Status  Unknown

 

 Mosque Affiliation  NON

 

 Race  White

 

 Ethnic Group  Not  or 





Author







 Author  [facility]

 

 Organization  [facility]

 

 Address  Unknown

 

 Phone  Unavailable







Support







 Name  Relationship  Address  Phone

 

 , Steffanie Huber  ECON  Unknown  [phone]







Care Team Providers







 Care Team Member Name  Role  Phone

 

  PCP  Unavailable







Reason for Visit

* Auth/Cert (Routine)





     



  Status   Reason   Specialty   Diagnoses /   Referred By   Referred To



     Procedures   Contact   Contact

 

     











Encounter Details







    



  Date   Type   Department   Care Team   Description

 

    



  2017   [facility]   Tomasz Delcid MD 



   Encounter   [address]   [address] 



    [place] [address]   [address] 



    [phone]   [place], [place] [address] [phone] (Fax) 







Social History







    



  Tobacco Use   Types   Packs/Day   Years Used   Date

 

    



  Current Every Day Smoker    









   



  Alcohol Use   Drinks/Week   oz/Week   Comments

 

   



  No   









 



  Sex Assigned at Birth   Date Recorded

 

 



  Not on file 



as of this encounter



Medications at Time of Discharge







     



  Medication   Sig.   Disp.   Refills   Start Date   End Date

 

     



  atorvastatin (LIPITOR) 40   Take 40 mg by mouth    



  mg tablet   daily.    

 

     



  carisoprodol(+) (SOMA)   Take 350 mg by mouth at    



  350 mg tablet   bedtime daily.    

 

     



  clopiDOGrel (PLAVIX) 75   Take 75 mg by mouth    



  mg tablet   daily.    

 

     



  gabapentin (NEURONTIN)   Take 600 mg by mouth four    



  600 mg tablet   times daily.    

 

     



  gemfibrozil (LOPID) 600   Take 600 mg by mouth    



  mg tablet   twice daily.    

 

     



  HYDROcodone/acetaminophen   Take 1 tablet by mouth    



  (NORCO) 7.5/325 mg tablet   every 6 hours as needed    



   for Pain    

 

     



  metoprolol XL (TOPROL XL)   Take 25 mg by mouth    



  25 mg extended release   daily.    



  tablet     

 

     



  nitroglycerin (NITROSTAT)   Place 0.4 mg under tongue    



  0.4 mg tablet   every 5 minutes as needed    



   for Chest Pain. Max of 3    



   tablets, call 911.    

 

     



  Omega-3 Acid Ethyl Esters   Take 1 g by mouth three    



  1 gram cap   times daily.    

 

     



  omeprazole DR(+)   Take 20 mg by mouth daily    



  (PRILOSEC) 20 mg capsule   before breakfast.    

 

     



  sacubitril/valsartan   Take 1 tablet by mouth    



  (ENTRESTO) 24/26 mg   twice daily.    



  tablet     

 

     



  tiZANidine (ZANAFLEX) 4   Take 8 mg by mouth every    



  mg tabletIndications:   8 hours as needed.    



  MUSCLE SPASM   Indications: MUSCLE SPASM    

 

     



  warfarin (COUMADIN) 5 mg   Take 5 mg by mouth as    



  tabletIndications:   directed. Take 7.5 mg by    



  THROMBOTIC DISORDER   mouth on Mon and Wed.    



   Take 5 mg by mouth on    



   , Tues, Thurs, Fri,    



   and Sat. Take at bedtime.    



   Indications: THROMBOTIC    



   DISORDER    

 

     



  aspirin EC 81 mg   Take 1 tablet by mouth   90 tablet   3   2017



  tabletIndications:   daily for 7 days. Take    



  Coronary artery disease   with food.    



  involving native coronary     



  artery of native heart     



  with angina pectoris     



  (HCC), Ischemic     



  cardiomyopathy, NSTEMI     



  (non-ST elevated     



  myocardial infarction)     



  (HCC), Tobacco abuse     

 

     



  aspirin EC 81 mg tablet   Take 81 mg by mouth      2017



   daily. Take with food.    



as of this encounter



Plan of Treatment





Not on fileas of this encounter



Visit Diagnoses

Not on filein this encounter

## 2018-01-21 NOTE — XMS REPORT
Encounter Summary

 Created on: 2018



Francisco Huber

External Reference #: [id number]

: 1970

Sex: Male



Demographics







 Address  [address] [place]  [address]

 

 Home Phone  [phone]

 

 Preferred Language  English

 

 Marital Status  Unknown

 

 Mandaen Affiliation  NON

 

 Race  White

 

 Ethnic Group  Not  or 





Author







 Author  [facility]

 

 Organization  [facility]

 

 Address  Unknown

 

 Phone  Unavailable







Support







 Name  Relationship  Address  Phone

 

 , Steffanie Huber  ECON  Unknown  [phone]







Care Team Providers







 Care Team Member Name  Role  Phone

 

  PCP  Unavailable







Reason for Visit

* 





 



  Reason   Comments

 

 



  Referral    - Dr. Delcid









Encounter Details







    



  Date   Type   Department   Care Team   Description

 

    



  2017   Telephone   [facility]   Shanell Hendrickson RN   
Referral ( - 



    [address])



    [address]  



    [place] [address]  



    [phone]  







Social History







    



  Tobacco Use   Types   Packs/Day   Years Used   Date

 

    



  Never Assessed    









 



  Sex Assigned at Birth   Date Recorded

 

 



  Not on file 



as of this encounter



Miscellaneous Notes

* Telephone Encounter - Kate Higginbotham RN - 2017  3:26 PM CST



Rec'd vm from Dr. Miguel's nurse, Mary re: referral for pt  PCI. Dr. Delcid 
also advised he spoke with Dr. Miguel and wants to proceed with OV/PCI same day. 
Contacted Dr. Miguel's nurse back with this information. Nurse gave a better 
contact number for pt of [phone]. Msg sent to Dr. Delcid's admin to work in 
pt for OV before PCI. Will contact pt to schedule OV and PCI. 

* Telephone Encounter - Shanell Hendrickson RN - 2017  2:57 PM CST



Voicemail received from Ariana - pt's last NUC was 2016. Dr. Delcid is okay with 
this and okay to proceed with cath scheduling as long as NUC shows viability. 
LM for Ariana to please call back. Dr. Delcid also sent a message to Dr. Bacon 
regarding this information. Need NUC results faxed to us. They were not 
included in the original records fax we received. 

* Telephone Encounter - Shanell Hendrickson RN - 2017  6:51 PM CST



Dr. Delcid reviewed cath images this afternoon. He is agreeable to bringing pt 
in for high risk PCI (retrograde approach - CIRC/RCA). Dr. Delcid will need a 
couple hours blocked for the case. Dr. Delcid inquiring if pt has had a recent 
nuclear thallium or viability study. LM for Ariana to please call and let us know. 

* Telephone Encounter - Shanell Hendrickson RN - 2017 11:32 AM CST



CD not yet received. Connected with Ariana at Dr. Bacon's office over the phone 
this morning to check in. She states she will call the hospital to ensure they 
have been mailed. If they haven't, asked that they please be sent via [employer] 
overnight. She will keep us posted.



Ariana phone: [phone], fax: [phone]

* Telephone Encounter - Shanell Hendrickson RN - 2017  3:41 PM CST



Per Duane, no precert is needed for the cath procedure. Will await the cath 
images on CD. Called Ariana to update her that we will plan to schedule OV and 
cath on the same day once Dr. Delcid has reviewed the images. 

* Telephone Encounter - Shanell Hendrickson RN - 2017 12:14 PM CST



Received a referral for possible  procedure with Dr. Delcid from Dr. Bacon 
in [place], [place]. Checking with Duane about insurance precert then will 
schedule the patient accordingly. Prefer to schedule H&P and cath on the same 
day due to the patient drive and Dr. Delcid's limited clinic availability and to 
expedite getting pt to procedure. Will await the feedback. 



Paper records received from ARMIDA Lane. She is sending the cath images on CD via 
[employer]. We will need Dr. Delcid to review the images before patient is scheduled 
- to assess whether pt is a candidate for high risk PCI. 

in this encounter



Plan of Treatment





Not on fileas of this encounter



Visit Diagnoses

Not on filein this encounter

## 2018-01-21 NOTE — XMS REPORT
Encounter Summary

 Created on: 2018



Francisco Huber

External Reference #: [id number]

: 1970

Sex: Male



Demographics







 Address  [address] [place]  [address]

 

 Home Phone  [phone]

 

 Preferred Language  English

 

 Marital Status  Unknown

 

 Sikh Affiliation  NON

 

 Race  White

 

 Ethnic Group  Not  or 





Author







 Author  [facility]

 

 Organization  [facility]

 

 Address  Unknown

 

 Phone  Unavailable







Support







 Name  Relationship  Address  Phone

 

 , Steffanie Huber  ECON  Unknown  [phone]







Care Team Providers







 Care Team Member Name  Role  Phone

 

  PCP  Unavailable







Reason for Visit

* 





 



  Reason   Comments

 

 



  Cardiac Eval 





* Auth/Cert (Routine)





     



  Status   Reason   Specialty   Diagnoses /   Referred By   Referred To



     Procedures   Contact   Contact

 

     











Encounter Details







    



  Date   Type   Department   Care Team   Description

 

    



  2017   Office Visit   [facility]   Tomasz Delcid MD   
Cardiac Eval



    [address]   [address] 



    [address]   [address] 



    [place] [address]   [place] [address] 



    [phone] (Fax) 







Social History







    



  Tobacco Use   Types   Packs/Day   Years Used   Date

 

    



  Current Every Day Smoker    









   



  Alcohol Use   Drinks/Week   oz/Week   Comments

 

   



  No   









 



  Sex Assigned at Birth   Date Recorded

 

 



  Not on file 



as of this encounter



Last Filed Vital Signs







  



  Vital Sign   Reading   Time Taken

 

  



  Blood Pressure   116/68   2017  7:18 AM CST

 

  



  Pulse   79   2017  7:18 AM CST

 

  



  Temperature   -   -

 

  



  Respiratory Rate   -   -

 

  



  Oxygen Saturation   -   -

 

  



  Inhaled Oxygen   -   -



  Concentration  

 

  



  Weight   87.1 kg (192 lb)   2017  7:18 AM CST

 

  



  Height   162.6 cm (5' 4")   2017  7:18 AM CST

 

  



  Body Mass Index   32.96   2017  7:18 AM CST



in this encounter



Progress Notes

* Tomasz Delcid MD - 2017  7:15 AM CST



Formatting of this note may be different from the original.

Date of Service: 2017



Francisco Huber is a 47 y.o. male.   



HPI

 

Mr. Huber is a 47-year-old male with a history of coronary artery disease who 
had a non-ST elevation myocardial infarction back in November at that time he 
successfully underwent stenting with a drug-eluting stent to the obtuse 
marginal.  They utilized a 2.5 x 26 mm resolute integrity stent.  In addition, 
he has a chronic total occlusion of the right coronary artery which is occluded 
proximally.  He has good left-to-right collateralization to the posterior 
descending and posterior lateral branches.  Of concern, his ejection fraction 
is severely impaired estimated at around 30% and he currently has a LifeVest.  
He has a previous myocardial perfusion study suggesting viability in the 
inferior wall and given his age and LV impairment, it was discussed and elected 
to go ahead and proceed with percutaneous revascularization to give him every 
opportunity to recover.  Currently, he has been noting intermittent chest 
discomfort which occurs with activity and at rest.  He is on aspirin, Plavix 
and warfarin and is tolerated this without any active bleeding issues.  He is 
taking warfarin due to a history of a pulmonary embolus per his report with the 
addition of aspirin and Plavix given his recent coronary event.



 



Vitals: 

 17 0718 

BP: 116/68 

Pulse: 79 

Weight: 87.1 kg (192 lb) 

Height: 1.626 m (5' 4") 



Body mass index is 32.96 kg/(m^2). 



Past Medical History

Patient Active Problem List 

 Diagnosis Date Noted 

 Coronary artery disease involving native coronary artery of native heart 
with angina pectoris ([facility]) 2017 - NSTEMI at [place], [place]. Successful PCI 
with a Resolute Integrity 2.5 x 26 mm stent to the OM with Dr. Miguel. 
Unsuccessful PCI to the RCA . Pt referred to Dr. Delcid for possible  
procedure. 



16 - Nuclear stress test ([facility]): No ischemia or 
arrhythmia on EKG. Diaphragmatic attenuation with reversible ischemia involving 
the mid to apical inferior wall and inferolateral wall. Normal left ventricular 
size with mild hypokinesis at the lateral wall. EF 50%. 



Previous history of Promus stent placement to  - date unknown. 

 

 NSTEMI (non-ST elevated myocardial infarction) ([facility]) 2017 at [place], [place]. 

 

 Tobacco abuse 2017 

 Ischemic cardiomyopathy 2017 - Echo ([facility]): EF 30-35%. Left ventricular cavity 
size increased. Wall thickness normal. Regional wall motion abnormalities. 
Akinesis of the inferior myocardium. Akinesis of the lateral myocardium. 



17 - NSTEMI - EF 20%, severe left ventricular systolic function (per cath 
report). Life Vest applied for primary prevention of sudden cardiac death. 

 

 Mild mitral regurgitation 2017 

 Mild tricuspid regurgitation 2017 

 Protein S deficiency (HCC) 2017 

  On warfarin. 

 



Review of Systems 

Constitution: Negative. 

HENT: Negative.  

Eyes: Negative.  

Cardiovascular: Positive for chest pain. 

Respiratory: Negative.  

Endocrine: Negative.  

Hematologic/Lymphatic: Negative.  

Skin: Negative.  

Musculoskeletal: Negative.  

Gastrointestinal: Negative.  

Genitourinary: Negative.  

Neurological: Negative.  

Psychiatric/Behavioral: Negative.  

Allergic/Immunologic: Negative.  



Physical Exam

Physical Exam 

General Appearance: alert and oriented, no acute distress

Skin: warm, moist, no ulcers

Head: normocephalic, symmetric

Eyes: EOMI, PERRL, sclera are clear and without icterus

ENT: unremarkable, nares patent

Neck Veins: neck veins are flat, neck veins are not distended

Carotid Arteries: normal carotid upstroke bilaterally, no bruits

Chest Inspection: chest is normal in appearance

Auscultation/Percussion: lungs clear to auscultation, no rales, rhonchi, 
wheezes or friction rub appreciated

Cardiac Rhythm: regular rhythm and normal rate

Cardiac Auscultation: Normal S1 & S2, no S3 or S4, no rub - normal pmi

Murmurs: no cardiac murmurs 

Extremities: no lower extremity edema bilaterally; 2+ symmetric distal pulses

Muskuloskeletal: no obvious deformity

Abdominal Exam: soft, non-tender, no masses, bowel sounds normal

Neurologic Exam: neurological assessment grossly intact

Mood and Affect: Appropriate



Cardiovascular Studies

ECG - NSR, PVC - no Q waves



Problems Addressed Today

Encounter Diagnoses 

Name Primary? 

 Coronary artery disease involving native coronary artery of native heart 
with angina pectoris (HCC) Yes 

 Ischemic cardiomyopathy  

 Mild mitral regurgitation  



Assessment and Plan

 

1. Chronic total occlusion of the proximal right coronary artery with prominent 
left to right collateralization to the posterior descending and posterior 
lateral branches -he is referred for complex percutaneous intervention.  I 
discussed the risks and benefits and will plan to go ahead and proceed.  We 
will obtain bilateral groin access and will likely require a retrograde 
approach given the anatomy noted on his angiograms.  We will plan to obtain an 
INR this morning to ensure that his INR is less than 1.9.  We will plan to keep 
you informed this results of his upcoming procedure. 



 Thank you for allowing us to participate in his care.

 



Current Medications (including today's revisions)

 aspirin EC 81 mg tablet Take 81 mg by mouth daily. Take with food. 

 atorvastatin (LIPITOR) 40 mg tablet Take 40 mg by mouth daily. 

 carisoprodol(+) (SOMA) 350 mg tablet Take 350 mg by mouth at bedtime daily. 

 clopiDOGrel (PLAVIX) 75 mg tablet Take 75 mg by mouth daily. 

 gabapentin (NEURONTIN) 600 mg tablet Take 600 mg by mouth four times daily. 

 gemfibrozil (LOPID) 600 mg tablet Take 600 mg by mouth twice daily. 

 metoprolol XL (TOPROL XL) 25 mg extended release tablet Take 25 mg by mouth 
daily. 

 nitroglycerin (NITROSTAT) 0.4 mg tablet Place 0.4 mg under tongue every 5 
minutes as needed for Chest Pain. Max of 3 tablets, call 911. 

 Omega-3 Acid Ethyl Esters 1 gram cap Take 1 g by mouth three times daily. 

 omeprazole DR(+) (PRILOSEC) 20 mg capsule Take 20 mg by mouth daily before 
breakfast. 

 tiZANidine (ZANAFLEX) 4 mg tablet Take 8 mg by mouth every 8 hours as 
needed. Indications: MUSCLE SPASM 

 warfarin (COUMADIN) 5 mg tablet Take 5 mg by mouth as directed. Take 7.5 mg 
by mouth on Mon and Wed. Take 5 mg by mouth on , Tues, Thurs, Fri, and 
Sat. Take at bedtime.  Indications: THROMBOTIC DISORDER 



 

in this encounter



Miscellaneous Notes

* Addendum Note - Steffanie Lin - 2017 11:56 AM CST



 Addended by: STEFFANIE LIN on: 2017 11:56 AM



  Modules accepted: Orders



  

in this encounter



Plan of Treatment







   



  Name   Priority   Associated Diagnoses   Order Schedule

 

   



  ECG 12-LEAD   Routine   Coronary artery disease   Ordered: 2017



    involving native coronary 



    artery of native heart 



    with angina pectoris 



    (HCC) 



    Ischemic cardiomyopathy 



as of this encounter



Visit Diagnoses











  Diagnosis

 





  Coronary artery disease involving native coronary artery of native heart with 
angina pectoris (HCC)



  - Primary

 





  Ischemic cardiomyopathy

 





  Other specified forms of chronic ischemic heart disease

 





  Mild mitral regurgitation

 





  Mitral valve disorders



in this encounter

## 2018-01-21 NOTE — XMS REPORT
Encounter Summary

 Created on: 2018



Francisco Huber

External Reference #: [id number]

: 1970

Sex: Male



Demographics







 Address  [address] [place]  [address]

 

 Home Phone  [phone]

 

 Preferred Language  English

 

 Marital Status  Unknown

 

 Roman Catholic Affiliation  NON

 

 Race  White

 

 Ethnic Group  Not  or 





Author







 Author  [facility]

 

 Organization  [facility]

 

 Address  Unknown

 

 Phone  Unavailable







Support







 Name  Relationship  Address  Phone

 

 , Steffanie Huber  ECON  Unknown  [phone]







Care Team Providers







 Care Team Member Name  Role  Phone

 

  PCP  Unavailable







Reason for Visit

* 





 



  Reason   Comments

 

 



  Precertification   Approval for L[place]ORS through [employer] [place]









Encounter Details







    



  Date   Type   Department   Care Team   Description

 

    



  2017   Documentation   [facility]   Gooch, Duane, RN   
Precertification



    [address]    (Approval for LVCORS



    [address]    through [employer] [place])



    [place] [address]  



    [phone]  







Social History







    



  Tobacco Use   Types   Packs/Day   Years Used   Date

 

    



  Current Every Day Smoker    









   



  Alcohol Use   Drinks/Week   oz/Week   Comments

 

   



  No   









 



  Sex Assigned at Birth   Date Recorded

 

 



  Not on file 



as of this encounter



Progress Notes

* Gooch, Duane, RN - 2017  1:57 PM CST



[place]ure with [employer] [place] for [employer], [phone], confirmed benefits and 
eligibility:  Current and active since 2016, $2750 deductible with required 
co-insurance of 25% to max OOP $6850, then plan will pay 100% of allowable 
charges.  No pre-certification is required for LVCORS with [place] of  [id number] 
[id number].  Reference #[id number]



 





in this encounter



Plan of Treatment





Not on fileas of this encounter



Visit Diagnoses

Not on filein this encounter

## 2018-01-21 NOTE — XMS REPORT
Encounter Summary

 Created on: 2018



Francisco Huber

External Reference #: [id number]

: 1970

Sex: Male



Demographics







 Address  [address] [place]  [address]

 

 Home Phone  [phone]

 

 Preferred Language  English

 

 Marital Status  Unknown

 

 Taoism Affiliation  NON

 

 Race  White

 

 Ethnic Group  Not  or 





Author







 Author  [facility]

 

 Organization  [facility]

 

 Address  Unknown

 

 Phone  Unavailable







Support







 Name  Relationship  Address  Phone

 

 , Steffanie Huber  ECON  Unknown  [phone]







Care Team Providers







 Care Team Member Name  Role  Phone

 

  PCP  Unavailable







Reason for Visit

* 





 



  Reason   Comments

 

 



  Appointment   cath & OV same day









Encounter Details







    



  Date   Type   Department   Care Team   Description

 

    



  2017   Telephone   [facility]   Shanell Hendrickson RN   
Appointment (cath & OV



    [address]    same day)



    [address]  



    [place] [address]  



    [phone]  







Social History







    



  Tobacco Use   Types   Packs/Day   Years Used   Date

 

    



  Current Every Day Smoker    









   



  Alcohol Use   Drinks/Week   oz/Week   Comments

 

   



  No   









 



  Sex Assigned at Birth   Date Recorded

 

 



  Not on file 



as of this encounter



Miscellaneous Notes

* Telephone Encounter - Shanell Hendrickson RN - 2017  1:35 PM CST



 cath date confirmed for this  - confirmed with Dr. Delcid and 
with spouse. 

* Telephone Encounter - Shanell Hendrickson RN - 2017  2:37 PM CST



Connected with patient's spouse, Steffanie, over the phone this afternoon. Let 
her know I am checking with Dr. Delcid about a possible cath and OV same day 
either Monday,  or . She states either date works for them. Home 
number listed on pt's chart is spouse's cell number. She can be reached there 
anytime. 

* Telephone Encounter - Shanell Hendrickson RN - 2017  2:36 PM CST



----- Message from Bessy Bell sent at 2017  1:23 PM CST -----

Regarding: apt request

Call pt wife back at [phone] Steffanie if it's before 3

in this encounter



Plan of Treatment





Not on fileas of this encounter



Visit Diagnoses

Not on filein this encounter

## 2018-01-21 NOTE — ED LOWER EXTREMITY
General


Chief Complaint:  Lower Extremity


Stated Complaint:  L LEG PAIN/SWELLING/HX BLOOD CLOTS


Source:  patient


Exam Limitations:  no limitations





History of Present Illness


Date Seen by Provider:  Jan 21, 2018


Time Seen by Provider:  12:30


Initial Comments


To ER with sudden onset left foot and ankle pain. This began about one hour ago 

at the dinner table. He has a history of gout. No injury to the ankle. Unable 

to bear weight due to the pain. 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 

He is on warfarin and Plavix. Additionally, he had some chest pain at about 10 

AM this morning for which he took a nitroglycerin sublingual and resolved his 

pain. Denies chest pain or shortness of breath at this time. He did have 

myocardial infarction and stenting a few months ago.


Onset:  this evening


Severity:  moderate


Pain/Injury Location:  left foot, left ankle


Modifying Factors:  Worse With Movement





Allergies and Home Medications


Allergies


Coded Allergies:  


     Penicillins (Unverified  Allergy, Mild, 6/4/09)





Home Medications


Aspirin 81 Mg Tablet.dr, 81 MG PO DAILY, (Reported)


Atorvastatin Calcium 40 Mg Tablet, 40 MG PO DAILY, (Reported)


Carisoprodol 350 Mg Tablet, 350 MG PO HS, (Reported)


Clopidogrel Bisulfate 75 Mg Tablet, 75 MG PO DAILY, #30


   Prescribed by: RONAN MILLS on 11/20/17 0752


Gabapentin 600 Mg Tablet, 600 MG PO TID, (Reported)


Gemfibrozil 600 Mg Tablet, 600 MG PO BID, (Reported)


Hydrocodone/Acetaminophen 1 Each Tablet, 1 TAB PO TID PRN for PAIN-MODERATE, (

Reported)


Lisinopril 2.5 Mg Tablet, 2.5 MG PO DAILY, (Reported)


Metoprolol Succinate 25 Mg Tab.er.24h, 25 MG PO DAILY, (Reported)


Omega 3 Polyunsat Fatty Acids 1,000 Mg Cap, 1,000 MG PO TID, (Reported)


Tizanidine HCl 4 Mg Tablet, 8 MG PO TID PRN for MUSCLE SPASMS, (Reported)


   TAKES 2 (4MG) TABLETS 


Warfarin Sodium 5 Mg Tablet, 7.5 MG PO MoWe, (Reported)


   TAKES 1 & 1/2 (5MG) TABLETS 


Warfarin Sodium 5 Mg Tablet, 5 MG PO SuTuThFrSa, (Reported)





Constitutional:  see HPI


EENTM:  see HPI


Respiratory:  no symptoms reported


Cardiovascular:  no symptoms reported


Genitourinary:  no symptoms reported


Musculoskeletal:  see HPI


Skin:  no symptoms reported


Psychiatric/Neurological:  No Symptoms Reported





Past Medical-Social-Family Hx


Patient Social History


Recent Foreign Travel:  No


Contact w/Someone Who Travel:  No


Recent Hopitalizations:  No





Immunizations Up To Date


Date of Pneumonia Vaccine:  Dec 3, 2012


Date of Influenza Vaccine:  Oct 14, 2017





Seasonal Allergies


Seasonal Allergies:  No





Surgeries


History of Surgeries:  Yes (STENTS)





Respiratory


History of Respiratory Disorde:  Yes





Cardiovascular


History of Cardiac Disorders:  Yes (STENT)


Cardiac Disorders:  Heart Attack, High Cholesterol, Hypertension





Neurological


History of Neurological Disord:  No





Reproductive System


Hx Reproductive Disorders:  No





Gastrointestinal


History of Gastrointestinal Di:  Yes


Gastrointestinal Disorders:  Gastroesophageal Reflux





Musculoskeletal


History of Musculoskeletal Dis:  Yes


Musculoskeletal Disorders:  Chronic Back Pain





Endocrine


History of Endocrine Disorders:  No





Cancer


History of Cancer:  No





Psychosocial


History of Psychiatric Problem:  No





Integumentary


History of Skin or Integumenta:  No





Blood Transfusions


History of Blood Disorders:  Yes (PROTEIN S DEF)





Family Medical History


Significant Family History:  Heart Disease, Vascular Disease





Physical Exam


Vital Signs





Vital Sign - Last 12Hours








 1/21/18





 12:10


 


Temp 99.5


 


Pulse 60


 


Resp 18


 


B/P (MAP) 138/85 (102)


 


O2 Delivery Room Air





Capillary Refill :


General Appearance:  WD/WN, no apparent distress, moderate distress (jumps with 

even light touch of the skin to any part of his body)


HEENT:  PERRL/EOMI, normal ENT inspection


Neck:  non-tender, full range of motion


Respiratory:  normal breath sounds, no respiratory distress, no accessory 

muscle use


Gastrointestinal:  normal bowel sounds, non tender, soft


Hips:  bilateral hip non-tender, bilateral hip normal inspection, bilateral hip 

normal range of motion


Legs:  bilateral leg non-tender, bilateral leg normal inspection, bilateral leg 

normal range of motion


Knees:  bilateral knee non-tender, bilateral knee normal inspection, bilateral 

knee normal range of motion


Ankles:  left ankle pain, left ankle soft tissue tenderness, left ankle other (

erythema over the medial and lateral malleolus without lymphangitis)


Feet:  bilateral foot non-tender, bilateral foot normal inspection, bilateral 

foot normal range of motion


Neurologic/Psychiatric:  alert, normal mood/affect, oriented x 3


Skin:  normal color, warm/dry





Progress/Results/Core Measures


Results/Orders


Lab Results





Laboratory Tests








Test


  1/21/18


12:45 Range/Units


 


 


White Blood Count


  18.3 H


  4.3-11.0


10^3/uL


 


Red Blood Count


  4.57 


  4.35-5.85


10^6/uL


 


Hemoglobin 13.5  13.3-17.7  G/DL


 


Hematocrit 40  40-54  %


 


Mean Corpuscular Volume 88  80-99  FL


 


Mean Corpuscular Hemoglobin 30  25-34  PG


 


Mean Corpuscular Hemoglobin


Concent 34 


  32-36  G/DL


 


 


Red Cell Distribution Width 14.5  10.0-14.5  %


 


Platelet Count


  357 


  130-400


10^3/uL


 


Mean Platelet Volume 8.8  7.4-10.4  FL


 


Neutrophils (%) (Auto) 86 H 42-75  %


 


Lymphocytes (%) (Auto) 8 L 12-44  %


 


Monocytes (%) (Auto) 6  0-12  %


 


Eosinophils (%) (Auto) 1  0-10  %


 


Basophils (%) (Auto) 0  0-10  %


 


Neutrophils # (Auto) 15.7 H 1.8-7.8  X 10^3


 


Lymphocytes # (Auto) 1.4  1.0-4.0  X 10^3


 


Monocytes # (Auto) 1.1 H 0.0-1.0  X 10^3


 


Eosinophils # (Auto)


  0.2 


  0.0-0.3


10^3/uL


 


Basophils # (Auto)


  0.0 


  0.0-0.1


10^3/uL


 


Neutrophils % (Manual) 88   %


 


Lymphocytes % (Manual) 4   %


 


Monocytes % (Manual) 3   %


 


Eosinophils % (Manual) 1   %


 


Basophils % (Manual) 0   %


 


Band Neutrophils 4   %


 


Blood Morphology Comment NORMAL   


 


Erythrocyte Sedimentation Rate 13  0-15  MM/HR


 


Prothrombin Time 32.8 H 12.2-14.7  SEC


 


INR Comment 3.2 H 0.8-1.4  


 


Sodium Level 142  135-145  MMOL/L


 


Potassium Level 3.6  3.6-5.0  MMOL/L


 


Chloride Level 99    MMOL/L


 


Carbon Dioxide Level 28  21-32  MMOL/L


 


Anion Gap 15 H 5-14  MMOL/L


 


Blood Urea Nitrogen 10  7-18  MG/DL


 


Creatinine


  0.89 


  0.60-1.30


MG/DL


 


Estimat Glomerular Filtration


Rate > 60 


   


 


 


BUN/Creatinine Ratio 11   


 


Glucose Level 150 H   MG/DL


 


Uric Acid 5.3  2.6-7.2  MG/DL


 


Calcium Level 9.6  8.5-10.1  MG/DL


 


Troponin I < 0.30  <0.30  NG/ML


 


C-Reactive Protein High


Sensitivity 0.80 H


  0.00-0.50


MG/DL








My Orders





Orders - RADHA ARELLANO


Cbc With Automated Diff (1/21/18 12:26)


Basic Metabolic Panel (1/21/18 12:26)


Protime With Inr (1/21/18 12:26)


Saline Lock/Iv-Start (1/21/18 12:26)


Ketorolac Injection (Toradol Injection) (1/21/18 12:30)


Fentanyl  Injection (Sublimaze Injection (1/21/18 12:30)


Ekg Tracing (1/21/18 12:26)


Uric Acid (1/21/18 12:26)


Troponin I (1/21/18 12:26)


Manual Differential (1/21/18 12:45)


Erythrocyte Sedimentation Rate (1/21/18 13:02)


Hs C Reactive Protein (1/21/18 13:02)


Ankle, Left, 3 Views (1/21/18 13:02)


Colchicine  Tablet (Colcrys Tablet) (1/21/18 13:45)





Medications Given in ED





Current Medications








 Medications  Dose


 Ordered  Sig/Joey


 Route  Start Time


 Stop Time Status Last Admin


Dose Admin


 


 Colchicine  1.2 mg  ONCE  ONCE


 PO  1/21/18 13:45


 1/21/18 13:46 DC 1/21/18 14:22


1.2 MG


 


 Fentanyl Citrate  50 mcg  ONCE  ONCE


 IVP  1/21/18 12:30


 1/21/18 12:31 DC 1/21/18 12:39


50 MCG


 


 Ketorolac


 Tromethamine  30 mg  ONCE  ONCE


 IVP  1/21/18 12:30


 1/21/18 12:31 DC 1/21/18 12:37


30 MG








Vital Signs/I&O





Vital Sign - Last 12Hours








 1/21/18





 12:10


 


Temp 99.5


 


Pulse 60


 


Resp 18


 


B/P (MAP) 138/85 (102)


 


O2 Delivery Room Air











Departure


Impression


Impression:  


 Primary Impression:  


 Gouty arthropathy


 Additional Impression:  


 angina resolved


Disposition:  01 HOME, SELF-CARE


Condition:  Stable





Departure-Patient Inst.


Decision time for Depature:  14:13


Referrals:  


RAIZA FARRELL MD (PCP/Family)


Primary Care Physician


Patient Instructions:  Gout, Lifestyle Changes to Manage Gout





Add. Discharge Instructions:  


1. Return to ER for any concerns


2. Follow-up with your doctor next week. Please follow-up with her cardiologist 

within 48 hours. Call tomorrow to make an appointment to be seen


3. All discharge instructions reviewed with patient and/or family. Voiced 

understanding.


Scripts


Prednisone (Prednisone) 20 Mg Tab


40 MG PO DAILY for 2 Days, TAB


   Prov: RADHA ARELLANO         1/21/18


Work/School Note:  Work Release Form   Date Seen in the Emergency Department:  

Jan 21, 2018


   Return to Work:  Jan 25, 2018











RADHA ARELLANO Jan 21, 2018 12:31

## 2018-01-21 NOTE — XMS REPORT
Encounter Summary

 Created on: 2018



Niko Huber

External Reference #: [id number]

: 1970

Sex: Male



Demographics







 Address  [address] [place]  [address]

 

 Home Phone  [phone]

 

 Preferred Language  English

 

 Marital Status  Unknown

 

 Nondenominational Affiliation  NON

 

 Race  White

 

 Ethnic Group  Not  or 





Author







 Author  [facility]

 

 Organization  [facility]

 

 Address  Unknown

 

 Phone  Unavailable







Support







 Name  Relationship  Address  Phone

 

 , Steffanie Huber  ECON  Unknown  [phone]







Care Team Providers







 Care Team Member Name  Role  Phone

 

  PCP  Unavailable







Reason for Visit

* Auth/Cert (Routine)





     



  Status   Reason   Specialty   Diagnoses /   Referred By   Referred To



     Procedures   Contact   Contact

 

     











Encounter Details







    



  Date   Type   Department   Care Team   Description

 

    



  2017   Surgery   Cardiac Catheterization   Cande Delcid MD   
Percutaneous Coronary



    Laboratory   [address]   Intervention of Chronic



    [address] Occlusion Right



    [place] [address]   [place] [address]   Coronary Artery



    [phone]   (Retrograde Approach)



     [phone] (Fax) 







Social History







    



  Tobacco Use   Types   Packs/Day   Years Used   Date

 

    



  Current Every Day Smoker    









   



  Alcohol Use   Drinks/Week   oz/Week   Comments

 

   



  No   









 



  Sex Assigned at Birth   Date Recorded

 

 



  Not on file 



as of this encounter



Last Filed Vital Signs







  



  Vital Sign   Reading   Time Taken

 

  



  Blood Pressure   99/58   2017  7:35 AM CST

 

  



  Pulse   76   2017  7:35 AM CST

 

  



  Temperature   37.1   C (98.7   F)   2017  6:15 AM CST

 

  



  Respiratory Rate   -   -

 

  



  Oxygen Saturation   97%   2017  7:35 AM CST

 

  



  Inhaled Oxygen   -   -



  Concentration  

 

  



  Weight   87.5 kg (192 lb 14.4 oz)   2017  8:13 AM CST

 

  



  Height   162.6 cm (5' 4.02")   2017  8:13 AM CST

 

  



  Body Mass Index   33.1   2017  8:13 AM CST



in this encounter



Discharge Summaries

* Kimberlyn Tiwari PA-C - 2017  8:00 AM CST



Formatting of this note may be different from the original.



Physician Discharge Summary



Name: Niko Huber

Medical Record Number: [id number]        Account Number:  [id number]

YOB: 1970                         Age:  47 years 

Admit date:  2017                     Discharge date:  2017



Attending Physician:  Dr. Delcid               Service: Cardiology-Interventional



Physician Summary completed by: Kimberlyn Tiwari PA-C



Reason for hospitalization: Coronary artery disease



Significant PMH: 

Past Medical History: 

Diagnosis Date 

 Coronary artery disease involving native coronary artery of native heart 
with angina pectoris ([facility]) 2017 

 Coronary artery disease involving native coronary artery of native heart 
with angina pectoris ([facility]) 2017 - NSTEMI at [facility] in [place], [place]. Successful PCI 
with a Resolute Integrity 2.5 x 26 mm stent to the OM with Dr. Miguel. 
Unsuccessful PCI to the RCA . Pt referred to Dr. Delcid for possible  
procedure.   16 - Nuclear stress test ([facility]): No 
ischemia or arrhythmia on EKG. Diaphragmatic attenuation with reversible 
ischemia involving the mid to apic 

 Ischemic cardiomyopathy  

 Mild mitral regurgitation 2017 

 Mild tricuspid regurgitation 2017 

 NSTEMI (non-ST elevated myocardial infarction) ([facility]) 2017 

 Protein S deficiency ([facility]) 2017 

 Tobacco abuse 2017 

  

Allergies: Pcn [penicillins]



Physical Exam notable for:  

b/l Groin no hematoma, no bruit, soft, non-tender.

CV: RRR

Lungs: CTA B

Ext: no edema, + 2 b/l pedal pulses. 

ABD: Soft, non tender, + BS X 4 quads. 



Lab/Radiology studies notable for: 

Hematology:  

Lab Results 

Component Value Date 

 HGB 12.3 2017 

 HCT 36.3 2017 

 PLTCT 279 2017 

 WBC 9.6 2017 

 MCV 85.8 2017 

 MCHC 34.0 2017 

 MPV 7.5 2017 

 RDW 13.9 2017 

, General Chemistry:  

Lab Results 

Component Value Date 

  2017 

 K 3.9 2017 

  2017 

 GAP 8 2017 

 BUN 12 2017 

 CR 0.72 2017 

  2017 

 CA 9.2 2017 



Brief Hospital Course:  Mr. Huber is a 47 y.o male with a history of NSTMI in 
November. He underwent PCI of OM at [facility] in [place], [place]. He was also 
found to have  of RCA. PCI was attempted which was unsuccessful. He was 
referred to Dr. Delcid for Stage PCI. Echo done at OSH on 17 revealed LVEF 
30-35%. He was discharged home with LifeVest. He also has a history of mild MR 
and TR and protein S deficiency treated with warfarin. 



Patient was taken to the cardiac catheterization lab on 17 where 
successful revascularization of the proximal RCA was done, extending into the 
right PLV with 3 drug-eluting stents in overlapping fashion with excellent 
angiographic results. Patient tolerated the procedure well. He had vasovagal 
response to sheath pull with ~ 6 second pause. He required atropine and 
recovered promptly. No recurrent pause since then or overnight. Dr. Delcid 
recommends to continue ASA 81 mg daily for one week. He was instructed on the 
importance of Plavix 75 mg daily at least 6 months to 1 year w/o interruption 
to prevent stent thrombosis and possible myocardial infarction. PTA warfarin 
was resumed 17. No bridging is recommended per staff to minimize bleeding 
complications. PT/INR on Wednesday. PT/INR managed by Primary cardiologist. PT/
INR goal 2.0-3.0. Lipid profile as above.  Patient is currently tolerating 
Atorvastatin 40 mg po daily. Weight loss, Cardiac Healthy diet, and exercise 
when patient can tolerate.  Patient to continue current medical therapy with 
aggressive risk factors modification. Patient to weigh daily and report any wt. 
Gain of 2-3 # in 1-3 days.  BP usually marginal at home. He is asymptomatics. 
Maximize treatment for LVD with GDMT as pt. Can tolerate, renal function allows 
and BP permits. F/u w/  Primary cardiologist as already scheduled or sooner 
if needed. He will continue to wear LifeVest. Currently his primary 
cardiologist is planning to repeat Echo in January. EKG NSR 72 bpm, PVC's. Non 
specific ST-T changes unchanged from prior. 



Condition at Discharge: Stable



Discharge Diagnoses:  



Hospital Problems  

 

 Active Problems 

 * (Principal)Coronary artery disease involving native coronary artery of 
native heart with angina pectoris (HCC) 

 NSTEMI (non-ST elevated myocardial infarction) ([facility]) 

 Tobacco abuse 

 Ischemic cardiomyopathy 

 Protein S deficiency (HCC) 

 CAD (coronary artery disease) 

 



Surgical Procedures: 



Significant Diagnostic Studies and Procedures: 

1. Selective right and left coronary angiograms with dual arterial injections.

2. Bilateral groin accesses, both 7-French common femoral arterial.

3. Dual coronary injections.

4.  PCI of the proximal right RCA extending into the right PLV with 3 drug-
eluting stents in overlapping fashion with antegrade wire escalation technique.

5. Right common femoral angiogram, limited.



Consults:  None



Patient Disposition: Home   



Patient instructions/medications: 



Procedure Specific Activity 

*You may drive after 2 days.

*You may shower after discharge.

*NO tub baths, hot tubs, or swimming for 5 days.

*NO lifting greater than 15 pounds for 1 week.

*NO sexual or strenuous activity for 1 week. 



Report These Signs and Symptoms 

Please contact your doctor if you have any of the following symptoms: Chest pain
, shortness of breath, lightheadedness, dizziness, near fainting, palpitations, 
abd pain, back pain, or bleeding.

*Continue medicines as direct on your discharge medication list. Get an up to 
date list with every visit and take as instructed. Do NOT stop taking any 
medications without speaking with your doctor or nurse who knows you.



*Remember to weigh yourself first thing in the morning after using the restroom 
and write it down. Take this record to your doctor appointment.



*Chart symptoms such as fatigue, trouble breathing, or swelling. Call your 
doctor right away if these symptoms get worse.



*Remember, do not eat more than 2000 milligrams of sodium a day. Watch out for 
packaged, processed, canned and restaurant foods.



Call your doctor (your cardiologist, if you have one) if:

-you gain more than 2 pounds in 24 hours.

-you gain more than 5 pounds in 1 week.

-any of your symptoms get worse

Do NOT wait to let your doctor know about these changes. 



Questions About Your Stay 

For questions or concerns regarding your hospital stay:



- DURING BUSINESS HOURS (8:00 AM - 4:30 PM):  

Call [phone] and asked to be transferred to your discharge attending 
physician.



- AFTER BUSINESS HOURS (4:30 PM - 8:00 AM, on weekends, or holidays):

Call [phone] and ask the  to page the on-call doctor for the 
discharge attending physician. 

Discharging attending physician: CANDE DELCID [[id number]]  



Cardiac Diet 

Limiting unhealthy fats and cholesterol is the most important step you can take 
in reducing your risk for cardiovascular disease.  Unhealthy fats include 
saturated and trans fats.  Monitor your sodium and cholesterol intake.  
Restrict your sodium to 2g (grams) or 2000mg (milligrams) daily, and your 
cholesterol to 200mg daily.



If you have questions regarding your diet at home, you may contact a dietitian 
at [phone].

 



Incision Care 

*Call if there is an increase in pain, swelling, or redness.

*DO NOT soak incision in water.

*NO tub baths, hot tubs, or swimming.

*You may shower after discharge. 



Return Appointment 

F/u with your primary cardiologist as already scheduled with echo in January. 

[facility] Provider NORTH MIGUEL [[id number]]  



Heart Failure Information 

You are at risk for readmission to the hospital because of fluid overload. 
There are steps you can take to lower your risk:

*Continue your current heart medications. Changes made have been made during 
your hospital stay.

*Remember to weigh yourself every morning, first thing after you urinate, and 
write down your weigh. Take this record to your doctor's appointments.

*Chart your symptoms - fatigue, shortness of breath, swelling, etc. Immediately 
report any worsening.

*Remember to consume no more than 2,000mg (milligrams) of sodium daily. Watch 
out for packaged, processed, canned, and restaurant foods especially.

*If any of your symptoms worsen, or if you gain more than 2 pounds in 24 hours, 
or 5 pounds in a week, call your cardiologist immediately. EARLY REPORTING OF 
THESE CHANGES IS VERY IMPORTANT. 



[facility] [place] Information 

[facility] [place] is a gathering place for individuals, families, and friends 
living with serious or chronic physical illness.  They offer education and 
support programs that help you live your life to the fullest.  Unless otherwise 
noted, programs are offered at NO CHARGE.  However, REGISTRATION IS REQUIRED 48 
hours in advance.



To arrange for a tour, register for a class, or ask a questions please call [phone]
[phone].  You can also visit [email or web address] for more information. 



[facility] Education about Stroke 

It is important for you to recognize the signs of stroke and call 159 
immediately.



F.A.S.T. is an easy way to remember the sudden signs of a stroke.



F - face drooping

A - arm weakness

S - speech difficulty

T - TIME TO CALL 911



If you or anyone you know shows any of these signs, even if the signs go away, 
call -1 IMMEDIATELY. Check the time so you will know when the first sign 
started. 



 

Current Discharge Medication List 

 

 CONTINUE these medications which have been CHANGED or REFILLED 

 Details 

aspirin EC 81 mg tablet Take 1 tablet by mouth daily for 7 days. Take with food.

Qty: 90 tablet, Refills: 3 

 PRESCRIPTION TYPE:  No Print

Associated Diagnoses: Coronary artery disease involving native coronary artery 
of native heart with angina pectoris (HCC); Ischemic cardiomyopathy; NSTEMI (non
-ST elevated myocardial infarction) ([facility]); Tobacco abuse 

 

 

 CONTINUE these medications which have NOT CHANGED 

 Details 

atorvastatin (LIPITOR) 40 mg tablet Take 40 mg by mouth daily. 

 PRESCRIPTION TYPE:  Historical Med 

 

carisoprodol(+) (SOMA) 350 mg tablet Take 350 mg by mouth at bedtime daily. 

 PRESCRIPTION TYPE:  Historical Med 

 

clopiDOGrel (PLAVIX) 75 mg tablet Take 75 mg by mouth daily. 

 PRESCRIPTION TYPE:  Historical Med 

 

gabapentin (NEURONTIN) 600 mg tablet Take 600 mg by mouth four times daily. 

 PRESCRIPTION TYPE:  Historical Med 

 

gemfibrozil (LOPID) 600 mg tablet Take 600 mg by mouth twice daily. 

 PRESCRIPTION TYPE:  Historical Med 

 

HYDROcodone/acetaminophen (NORCO) 7.5/325 mg tablet Take 1 tablet by mouth 
every 6 hours as needed for Pain 

 PRESCRIPTION TYPE:  Historical Med 

 

metoprolol XL (TOPROL XL) 25 mg extended release tablet Take 25 mg by mouth 
daily. 

 PRESCRIPTION TYPE:  Historical Med 

 

nitroglycerin (NITROSTAT) 0.4 mg tablet Place 0.4 mg under tongue every 5 
minutes as needed for Chest Pain. Max of 3 tablets, call 911. 

 PRESCRIPTION TYPE:  Historical Med 

 

Omega-3 Acid Ethyl Esters 1 gram cap Take 1 g by mouth three times daily. 

 PRESCRIPTION TYPE:  Historical Med 

 

omeprazole DR(+) (PRILOSEC) 20 mg capsule Take 20 mg by mouth daily before 
breakfast. 

 PRESCRIPTION TYPE:  Historical Med 

 

sacubitril/valsartan (ENTRESTO) 24/26 mg tablet Take 1 tablet by mouth twice 
daily. 

 PRESCRIPTION TYPE:  Historical Med 

 

tiZANidine (ZANAFLEX) 4 mg tablet Take 8 mg by mouth every 8 hours as needed. 
Indications: MUSCLE SPASM 

 PRESCRIPTION TYPE:  Historical Med 

 

warfarin (COUMADIN) 5 mg tablet Take 5 mg by mouth as directed. Take 7.5 mg by 
mouth on Mon and Wed. Take 5 mg by mouth on , Tues, Thurs, Fri, and Sat. 
Take at bedtime.  Indications: THROMBOTIC DISORDER 

 PRESCRIPTION TYPE:  Historical Med 

 

 

 



Pending items needing follow up: as above 



Signed:

Kimberlyn Tiwari PA-C

2017  



cc:

Primary Care Physician:  Raiza Champagne   Verified

Referring physicians:   Dr. North Miguel/Dr. Neftali Bacon ([place], [place])

Additional provider(s): 

 

in this encounter



Discharge Instructions

* Patient Instructions - Mary Fong RN - 2017  8:32 AM CST





Manual Sheath Removal From A Large Vein Or Artery-DARVIN

When you go home:

 You may shower 24 hours after your procedure.

 Do not sit in water for one week. (No bath tub, swimming pool/hot tub, etc.)

 Keep the area clean and dry for one week (except for daily showers).

 Be sure your hands are clean when touching near the site.

 If a band-aid or dressing is still in place remove it before showering.

 Wash and dry thoroughly but gently.

 If needed, for your comfort, you may place a clean band-aid over the 
puncture site after you are clean and dry. It is best to leave it open to air 
as soon as it is comfortable to do so.

 Do not use ointments, creams, or powders on puncture site.

 Inspect site daily.

Activity: (Unless otherwise instructed or unable to perform)

 Avoid any exertion for one week. Exertion is lifting over 15 lbs or pushing, 
pulling or straining.

 Avoid excessive bending, stooping, or stair climbing for 2 days. It is ok to 
go up stairs or bend over but take it slowly and keep it to a minimum.

 You may be up and about while relaxing at home as you recover.

 You may resume sexual activity in one week.

 You may begin driving 2 days after your procedure if you are otherwise able 
to drive.

---It is common to have mild soreness and/or a small, soft bruise around the 
site that can take up to two weeks to go away. A small (dime to quarter sized) 
lump is also normal. A small amount of blood (not more than a teaspoon) from 
the site is also common.

WHEN TO CALL THE DOCTOR: Complications are rare but can happen.

 If you have significant bleeding (more than a teaspoon) or a lump underneath 
the skin (bigger than a golf ball) at the site lie down, apply firm pressure at 
the site and call 911. Bleeding from a large vessel needs professional help.

 If you have signs of infection at the site such as: redness, warm to touch, 
drainage, increasing soreness, a fever (100 degrees or more) and/or chills.

 Soreness that continues more than a week or unusual pain at the puncture 
site.

 Numbness, tingling, weakness in the affected leg.

 If your leg becomes cold and pale.

 If you have changes of vision, slurred speech or one-sided weakness.

Who do I contact if I need to speak with someone?

During Business Hours:

 [facility] Office at the [facility]: [phone]
[phone] (Monday-Friday)

 [place]: [phone] (Monday-Friday)

 [place]: [phone] (Monday-Friday)

 [place]: [phone] (Tuesday and Thursday)

 [place]: [phone] (Monday-Friday)

 [place]: [phone] (Monday-Friday)

 [place]: [phone] (Monday-Thursday)

 [facility]: [phone] (Tuesday, Wednesday and Friday)

 [place]: [phone] (Tuesday, Wednesday and Friday)

 [facility]): [phone] (Monday, Wednesday and Friday)

Nights and Weekends

 [facility] Office at the [facility]: [phone]
[phone]

This education is meant to serve as a resource to you and your family. It is 
not meant to be all inclusive. The members of the [facility] at [facility], [phone], will be glad to 
answer any questions you may have about this booklet or your procedure.





in this encounter



Medications at Time of Discharge







     



  Medication   Sig.   Disp.   Refills   Start Date   End Date

 

     



  atorvastatin (LIPITOR) 40   Take 40 mg by mouth    



  mg tablet   daily.    

 

     



  carisoprodol(+) (SOMA)   Take 350 mg by mouth at    



  350 mg tablet   bedtime daily.    

 

     



  clopiDOGrel (PLAVIX) 75   Take 75 mg by mouth    



  mg tablet   daily.    

 

     



  gabapentin (NEURONTIN)   Take 600 mg by mouth four    



  600 mg tablet   times daily.    

 

     



  gemfibrozil (LOPID) 600   Take 600 mg by mouth    



  mg tablet   twice daily.    

 

     



  HYDROcodone/acetaminophen   Take 1 tablet by mouth    



  (NORCO) 7.5/325 mg tablet   every 6 hours as needed    



   for Pain    

 

     



  metoprolol XL (TOPROL XL)   Take 25 mg by mouth    



  25 mg extended release   daily.    



  tablet     

 

     



  nitroglycerin (NITROSTAT)   Place 0.4 mg under tongue    



  0.4 mg tablet   every 5 minutes as needed    



   for Chest Pain. Max of 3    



   tablets, call 911.    

 

     



  Omega-3 Acid Ethyl Esters   Take 1 g by mouth three    



  1 gram cap   times daily.    

 

     



  omeprazole DR(+)   Take 20 mg by mouth daily    



  (PRILOSEC) 20 mg capsule   before breakfast.    

 

     



  sacubitril/valsartan   Take 1 tablet by mouth    



  (ENTRESTO) 24/26 mg   twice daily.    



  tablet     

 

     



  tiZANidine (ZANAFLEX) 4   Take 8 mg by mouth every    



  mg tabletIndications:   8 hours as needed.    



  MUSCLE SPASM   Indications: MUSCLE SPASM    

 

     



  warfarin (COUMADIN) 5 mg   Take 5 mg by mouth as    



  tabletIndications:   directed. Take 7.5 mg by    



  THROMBOTIC DISORDER   mouth on Mon and Wed.    



   Take 5 mg by mouth on    



   , Tues, Thurs, Fri,    



   and Sat. Take at bedtime.    



   Indications: THROMBOTIC    



   DISORDER    

 

     



  aspirin EC 81 mg   Take 1 tablet by mouth   90 tablet   3   2017



  tabletIndications:   daily for 7 days. Take    



  Coronary artery disease   with food.    



  involving native coronary     



  artery of native heart     



  with angina pectoris     



  (HCC), Ischemic     



  cardiomyopathy, NSTEMI     



  (non-ST elevated     



  myocardial infarction)     



  (HCC), Tobacco abuse     



as of this encounter



Progress Notes

* Higinio Valenzuela RN - 2017  8:52 AM CST



Cardiac Rehab Call Back Note:  Spoke with patient.  He will start [facility] in 
[place] this Monday.



Are you tolerating activity?Yes

Is pain controlled?Yes

Is appetite normal?Yes

Are you having symptoms of heart discomfort?No

Are you having signs of infection at your incision sites or groin site?No

Do you want outpt cardiac rehab?Yes





* Lachelle Russ RN - 2017  8:52 AM CST



I have reviewed the notes, assessment, and/or procedures performed by Mary Fong RN and concur with her/his documentation unless otherwise noted.

* Mary Fong RN - 2017  8:51 AM CST



Patient discharged to home with all belongings.  Discharge instructions, med 
reconciliation and home wound care instructions given and explained to patient 
and family both verbally and written.  Accompanied by family.  No complaints of 
pain or discomfort.   Bilateral groins  remains clean, dry, and intact with no 
evidence of a hematoma after ambulation.  Patient escorted to [facility] via 
Transport.  Patient to follow up with [facility] ([facility]) or on-call 
physician with any additional questions or concerns.  All contact numbers 
provided.  Patient and family acceptant of DC instuctions and report 
understanding to all information.

* Higinio Valenzuela RN - 2017  6:46 AM CST



Formatting of this note may be different from the original.

CARDIOPULMONARY REHABILITATION

INPATIENT ASSESSMENT



Cardiac Rehabilitation Staff: Fermin Valenzuela RN Discharge Date: 



Demographics

Pre-admit Dx:   Date of Admission: 2017   

Room: [address] :  1970 

Insurance: Primary: [employer]  Secondary: . 

Address: [address] [place] [id number]   Patient Phone:  [phone] (home)  

Marital Status:   Occupation: Construction/Labor 

ED Contact: Steffanie Huber  ED Phone #: [phone] 

CTS: GONZÁLEZ  Cardiologist: Bhavin 



Cardiac Procedures and Events

 

  

PCI: 17

 

 

 

 

 



Risk Factors

 

BP: 97/54

Height: 162.6 cm (64.02")

Weight: 87.5 kg (192 lb 14.4 oz)

BMI (Calculated): 33.09 

 

Medical History

 has a past medical history of Coronary artery disease involving native 
coronary artery of native heart with angina pectoris (HCC) (2017); 
Coronary artery disease involving native coronary artery of native heart with 
angina pectoris (HCC) (2017); Ischemic cardiomyopathy; Mild mitral 
regurgitation (2017); Mild tricuspid regurgitation (2017); NSTEMI (
non-ST elevated myocardial infarction) ([facility]) (2017); Protein S deficiency 
([facility]) (2017); and Tobacco abuse (2017).



Labs

No results found for: CHOL, TRIG, HDL, LDL, HGBA1C, A1C, TNI



Heart Resource Manual Given: 17 



Teaching Completed: 17

 

Outpatient Cardiopulmonary Rehabilitation



OPCR: Yes



Referral Faxed to:   [place]   Date Faxed: 17 (Currently enrolled.  
Update faxed to [facility])



Location: [place], [place]



If [facility], Sent to Staff:   



 



Higinio Valenzuela RN

2017





* Higinio Valenzuela RN - 2017  6:44 AM CST



Introduced self to patient and gave copy of the Heart Resource Manual 
pertaining to coronary interventions.  He is currently enrolled in the program 
at [facility] in [place], [place] and will continue when cleared by 
cardiologist.  I have faxed an update to [facility].

* Mary Fong, ARMIDA - 2017  5:57 PM CST



Formatting of this note may be different from the original.



 17 1720 17 1723 17 1724 

Vital Signs 

Pulse 67 (!) 30 (!) 0 

PVC / Minute 0 /min. 0 /min. 0 /min. 

Respirations 10 PER MINUTE 13 PER MINUTE 15 PER MINUTE 

SpO2 Pulse 67 (!) 42 (!) 43 

SpO2 96 % 96 % 98 % 

BP 98/56 (!) 69/32 --  

Mean NBP (Calculated) 67 MM HG 38 MM HG --  

 

 17 1725 17 1727 

Vital Signs 

Pulse 52 59 

PVC / Minute 1 /min. 0 /min. 

Respirations 14 PER MINUTE 13 PER MINUTE 

SpO2 Pulse (!) 52 60 

SpO2 98 % 96 % 

BP (!) 82/36 (!) 83/50 

Mean NBP (Calculated) 44 MM HG 58 MM HG 



During sheath pull patient vasovagaled. Dr. Levine assessed patient. Orders 
given for 250 bolus of NS. 0.5 mg atropine given by Anatoly HUFFMAN. Patient 
stabilized and vital signs returned to normal. Will continue to monitor and 
keep NS running at 75mL/hr per Dr. Levine. 

* Abner Trevizo MD - 2017  5:53 PM CST



Mr. Huber was seen and examined in [facility] after the  PCI with a full metal 
jacket stenting to the RCA extending into the right PLV.  During sheath pull, 
he was noted to have significant sinus pauses lasting 6 seconds.  He recovered 
promptly.  Hemodynamic stability is noted.  There was a transient episode of 
hypotension with a mean arterial pressure of 60 mmHg.  We are presently 
infusing normal saline at 75 cc/h for the next 6 hours to a total of 
approximately 500 cc.  His blood pressure has already improved to a mean 
arterial pressure of 70 mmHg.  He remains asymptomatic at this juncture.  
Bilateral sheaths 7 French have been removed with excellent hemostasis.  This 
appears to be a significant vagal response associated with sheath pull.  I do 
not suspect any bleeding at this point in time.  Please call me if his clinical 
status changes.



Abner Trevizo M.D.

Fellow in Interventional Cardiology

Beeper # [id number]



* Lachelle Russ, RN - 2017  4:45 PM CST



I have reviewed the notes, assessment, and/or procedures performed by Mary Fong RN and concur with her/his documentation unless otherwise noted.

* Kimberlyn Tiwari PA-C - 2017  3:04 PM CST



S/p 3 TESFAYE to RCA. Recent NSTEMI in Nov s/p TESFAYE to OM. 

ASA 81 mg daily for 1 week. Plavix 75 mg daily for 1 year w/o interruption to 
prevent stent thrombosis and possible myocardial infarction. 

Resume warfarin tonight. No bridging is recommended per Dr. Delcid. 

He recently filled all his medications and does not wish refills at this time. 

He will continue to wear his LifeVest till his follow up appointment. Iam Miguel and Jordi are planning repeat Echo in January. 

DC home in am. 

F/u with Primary cardiologist as already scheduled or sooner if needed. 

Maximize treatment for LVD with GDMT as pt. Can tolerate, renal function allows 
and BP permits.  



Kimberlyn Tiwari PA-C (pgr [phone])





* Jo Rogers,  - 2017 11:12 AM CST



Formatting of this note may be different from the original.

RESPIRATORY THERAPY

ADULT PROTOCOL EVALUATION



RESPIRATORY PROTOCOL PLAN



Medications

 



Note: If indicated by protocol, medication orders will be placed by therapist.



Procedures

IPPB: Place a nursing order for "IS Q1h While Awake" for any of Lung Expansion 
indicators

Oxygen/Humidity: O2 to keep SpO2 > 95%

Monitoring: Pulse oximetry BID & PRN



 

_____________________________________________________________



PATIENT EVALUATION RESULTS



Chart Review

* Pulmonary Hx: Smoker in home OR smoking cessation > 8 weeks (former smoker)



* Surgical Hx: General surgery (cough & sigh not affected)



* Chest X-Ray: Clear OR not available



* PFT/Oxygenation: FEV1, PEFR < 70% OR Pa02 < 70 RA OR Sp02 <92% RA OR Fi02 > 
0.21 to keep Sp02 > 92% OR < 24 hours post-op (02 & oxim) OR chronic C02 
retention (C02) (will be < 24 hours post op)



Patient Assessment

* Respiratory Pattern: Regular pattern and rate OR good chest excursion with 
deep breathing



* Breath Sounds: Clear apically, but diminished in bases (LE) OR CHF related 
crackles (02) (oximetry)



* Cough / Sputum: Strong, effective cough OR nonproductive



* Mental Status: Alert, oriented, cooperative



* Activity Level: Ambulatory with assistance



Priority Index

Total Points: 6 Points

* Priority Index: 1



PRIORITY INDEX GUIDELINES*

Priority Points 

1 0-9 points 

2 9-18 points 

3 > 18 points 

+ Pulm Dx or Home Rx 

*Higher points indicate higher acuity.



Therapist: Jo Rogers, RT

Date: 2017



Key

AC=Airway clearance

AM=Aerosolized medication

BA=Helvetia aerosol

DB&C=Deep breathe & cough

FEV1=Forced expiratory volume in first second)

IC=Inspiratory capacity

LE=Lung expansion

MDI=Metered dose inhaler

Neb=Nebulizer

O2=Oxygen

Oxim=Oximetry

PEFR=Peak expiratory flow rate

RRT=Rapid Response Team





* Mary Fong RN - 2017  8:01 AM CST



Patient arrived on unit via ambulation accompanied by RN. Patient transferred 
to the bed without assistance.  Assessment completed, refer to flowsheet for 
details. Orders released, reviewed, and implemented as appropriate. Oriented to 
surroundings, call light within reach. Plan of care reviewed.  Will continue to 
monitor and assess.

in this encounter



H&P Notes

* Kimberlyn Tiwari PA-C - 2017  9:37 AM CST



Formatting of this note may be different from the original.

The original H and P below was performed by Dr. Delcid.



Kimberlyn Tiwari PA-C (pgr [phone])

Cande Delcid MD 

Cardiology 

 

Hide copied text

Hover for attribution information

Date of Service: 2017



Niko Huber is a 47 y.o. male.   



HPI

 

Mr. Huber is a 47-year-old male with a history of coronary artery disease who 
had a non-ST elevation myocardial infarction back in November at that time he 
successfully underwent stenting with a drug-eluting stent to the obtuse 
marginal.  They utilized a 2.5 x 26 mm resolute integrity stent.  In addition, 
he has a chronic total occlusion of the right coronary artery which is occluded 
proximally.  He has good left-to-right collateralization to the posterior 
descending and posterior lateral branches.  Of concern, his ejection fraction 
is severely impaired estimated at around 30% and he currently has a LifeVest.  
He has a previous myocardial perfusion study suggesting viability in the 
inferior wall and given his age and LV impairment, it was discussed and elected 
to go ahead and proceed with percutaneous revascularization to give him every 
opportunity to recover.  Currently, he has been noting intermittent chest 
discomfort which occurs with activity and at rest.  He is on aspirin, Plavix 
and warfarin and is tolerated this without any active bleeding issues.  He is 
taking warfarin due to a history of a pulmonary embolus per his report with the 
addition of aspirin and Plavix given his recent coronary event.



 



  

Vitals: 

 17 0718 

BP: 116/68 

Pulse: 79 

Weight: 87.1 kg (192 lb) 

Height: 1.626 m (5' 4") 



Body mass index is 32.96 kg/(m^2). 



Past Medical History

    

Patient Active Problem List 

 Diagnosis Date Noted 

 Coronary artery disease involving native coronary artery of native heart 
with angina pectoris (HCC) 2017 - NSTEMI at [facility] in [place], [place]. Successful 
PCI with a Resolute Integrity 2.5 x 26 mm stent to the OM with Dr. Miguel. 
Unsuccessful PCI to the RCA . Pt referred to Dr. Delcid for possible  
procedure. 



16 - Nuclear stress test ([facility]): No ischemia or 
arrhythmia on EKG. Diaphragmatic attenuation with reversible ischemia involving 
the mid to apical inferior wall and inferolateral wall. Normal left ventricular 
size with mild hypokinesis at the lateral wall. EF 50%. 



Previous history of Promus stent placement to OM - date unknown.  

 NSTEMI (non-ST elevated myocardial infarction) ([facility]) 2017 at [facility] in [place], [place].  

 Tobacco abuse 2017 

 Ischemic cardiomyopathy 2017 - Echo ([facility]): EF 30-35%. Left ventricular 
cavity size increased. Wall thickness normal. Regional wall motion 
abnormalities. Akinesis of the inferior myocardium. Akinesis of the lateral 
myocardium. 



17 - NSTEMI - EF 20%, severe left ventricular systolic function (per cath 
report). Life Vest applied for primary prevention of sudden cardiac death.  

 Mild mitral regurgitation 2017 

 Mild tricuspid regurgitation 2017 

 Protein S deficiency ([facility]) 2017 

  On warfarin.  







Review of Systems 

Constitution: Negative. 

HENT: Negative.  

Eyes: Negative.  

Cardiovascular: Positive for chest pain. 

Respiratory: Negative.  

Endocrine: Negative.  

Hematologic/Lymphatic: Negative.  

Skin: Negative.  

Musculoskeletal: Negative.  

Gastrointestinal: Negative.  

Genitourinary: Negative.  

Neurological: Negative.  

Psychiatric/Behavioral: Negative.  

Allergic/Immunologic: Negative.  



Social History 



Social History 

 Marital status:  

  Spouse name: N/A 

 Number of children: N/A 

 Years of education: N/A 



Social History Main Topics 

 Smoking status: Current Every Day Smoker 

 Smokeless tobacco: None 

 Alcohol use No 

 Drug use: No 

 Sexual activity: Not Asked 



Other Topics Concern 

 None 



Social History Narrative 



History reviewed. No pertinent surgical history.



Physical Exam

Physical Exam 

General Appearance: alert and oriented, no acute distress

Skin: warm, moist, no ulcers

Head: normocephalic, symmetric

Eyes: EOMI, PERRL, sclera are clear and without icterus

ENT: unremarkable, nares patent

Neck Veins: neck veins are flat, neck veins are not distended

Carotid Arteries: normal carotid upstroke bilaterally, no bruits

Chest Inspection: chest is normal in appearance

Auscultation/Percussion: lungs clear to auscultation, no rales, rhonchi, 
wheezes or friction rub appreciated

Cardiac Rhythm: regular rhythm and normal rate

Cardiac Auscultation: Normal S1 & S2, no S3 or S4, no rub - normal pmi

Murmurs: no cardiac murmurs 

Extremities: no lower extremity edema bilaterally; 2+ symmetric distal pulses

Muskuloskeletal: no obvious deformity

Abdominal Exam: soft, non-tender, no masses, bowel sounds normal

Neurologic Exam: neurological assessment grossly intact

Mood and Affect: Appropriate





Cardiovascular Studies

ECG - NSR, PVC - no Q waves



Problems Addressed Today

   

Encounter Diagnoses 

Name Primary? 

 Coronary artery disease involving native coronary artery of native heart 
with angina pectoris (HCC) Yes 

 Ischemic cardiomyopathy  

 Mild mitral regurgitation  





Assessment and Plan

 

1. Chronic total occlusion of the proximal right coronary artery with prominent 
left to right collateralization to the posterior descending and posterior 
lateral branches -he is referred for complex percutaneous intervention.  I 
discussed the risks and benefits and will plan to go ahead and proceed.  We 
will obtain bilateral groin access and will likely require a retrograde 
approach given the anatomy noted on his angiograms.  We will plan to obtain an 
INR this morning to ensure that his INR is less than 1.9.  We will plan to keep 
you informed this results of his upcoming procedure. 



 Thank you for allowing us to participate in his care.

 





Current Medications (including today's revisions)

 aspirin EC 81 mg tablet Take 81 mg by mouth daily. Take with food. 

 atorvastatin (LIPITOR) 40 mg tablet Take 40 mg by mouth daily. 

 carisoprodol(+) (SOMA) 350 mg tablet Take 350 mg by mouth at bedtime daily. 

 clopiDOGrel (PLAVIX) 75 mg tablet Take 75 mg by mouth daily. 

 gabapentin (NEURONTIN) 600 mg tablet Take 600 mg by mouth four times daily. 

 gemfibrozil (LOPID) 600 mg tablet Take 600 mg by mouth twice daily. 

 metoprolol XL (TOPROL XL) 25 mg extended release tablet Take 25 mg by mouth 
daily. 

 nitroglycerin (NITROSTAT) 0.4 mg tablet Place 0.4 mg under tongue every 5 
minutes as needed for Chest Pain. Max of 3 tablets, call 911. 

 Omega-3 Acid Ethyl Esters 1 gram cap Take 1 g by mouth three times daily. 

 omeprazole DR(+) (PRILOSEC) 20 mg capsule Take 20 mg by mouth daily before 
breakfast. 

 tiZANidine (ZANAFLEX) 4 mg tablet Take 8 mg by mouth every 8 hours as 
needed. Indications: MUSCLE SPASM 

 warfarin (COUMADIN) 5 mg tablet Take 5 mg by mouth as directed. Take 7.5 mg 
by mouth on Mon and Wed. Take 5 mg by mouth on , Tues, Thurs, Fri, and 
Sat. Take at bedtime.  Indications: THROMBOTIC DISORDER 



 

 





in this encounter



Procedure Notes

* Abner Trevizo MD - 2017  3:12 PM CST



Associated Order(s): CARDIAC CATH REPORT



[facility]



CARDIAC CATHETERIZATION REPORT

Page 3

NIKO VENTURA :  1970

KU#: [id number]



 

 MR #/Billing ID #:  [id number] / [id number]

DATE:  2017

CARDIOLOGIST:  Cande Delcid MD

DICTATING PROVIDER: Abner Trevizo MD

REFERRING PHYSICIAN:  RAIZA CHAMPAGNE



INTERVENTIONAL CARDIOLOGY FELLOW:  Abner Trevizo MD.



PROCEDURES PERFORMED:  

1. Selective right and left coronary angiograms with dual arterial injections.

2. Bilateral groin accesses, both 7-French common femoral arterial.

3. Dual coronary injections.

4. Chronic Total Occlusion () PCI of the proximal right RCA extending into 
the right PLV with 3 drug-eluting stents in overlapping fashion with antegrade 
wire escalation technique.

5. Right common femoral angiogram, limited.



INDICATION FOR THE PROCEDURE:  Niko Huber is a pleasant, 47-year-old 
gentleman with a history of recent non-ST-elevation MI, status post PCI to his 
left circumflex extending into his obtuse marginal with a Resolute Integrity 
stent from an outside institution.  There was an attempted  antegrade PCI on 
his RCA , which was unsuccessful. We would like to perform an invasive 
evaluation of his coronary anatomy with an intent to revascularize with the 
retrograde approach, given the fact that the antegrade cap was very ambiguous, 
based on outside hospital films.



CONSENT:  Risks, benefits, and alternatives of the procedure were explained to 
the patient by both Dr. Delcid and myself.  Risks of access site complications, 
bleeding, arterial dissection, death, contrast nephropathy, and radiation 
hazards were explained to the patient, who verbalized understanding of these 
conditions and consented to the procedure.  A written, informed consent has 
been placed in the chart as well.



DETAILS OF THE PROCEDURE:  The patient was brought in the cath lab in the 
fasting, nonsedated state.  After giving the patient a total of 225 mcg of 
fentanyl and 5 mg of Versed, we achieved moderate conscious sedation, which was 
monitored and maintained throughout the procedure for a total of 126 minutes.  
Respiratory, hemodynamic, and neurological parameters were monitored throughout 
the procedure by the operators and by the cath lab staff. 



The patient was prepped and draped in sterile fashion.  We exposed bilateral 
groins and prepped with 1% chlorhexidine and instilled a total of 20 mL of 1% 
lidocaine for good local anesthesia in both groins. 



We then gained access into the right common femoral artery using a 
micropuncture needle and modified Seldinger technique to insert a 7-French 10 
cm arterial sheath with good blood flow return.  Similar technique was adopted 
to gain access to the left common femoral artery with the same 7-French 10 cm 
arterial sheath.  We went in with the intent to perform a retrograde  PCI, 
and hence, we obtained dual coronary injections.



CORONARY ANGIOGRAM:  

1. The left main arises normally from the left coronary cusp and is free from 
angiographic evidence of disease.  It bifurcates into a left anterior 
descending artery, as well as a left circumflex artery.

2. The left anterior descending artery arises normally from the left main.  Its 
proximal, mid, and distal portions are free from disease, except for 30% 
stenosis in the mid segment.  The left main is also free from disease.  It 
gives rise to 1 diagonal branch, the LAD, and it is also free from disease.

3. The left circumflex artery arises normally from the left main.  Its proximal
, mid, and distal portions are free from disease.  It has a previously placed 
stent extending from the proximal circumflex, extending into the OM, is widely 
patent without any thrombosis or in-stent restenosis.

4. The right coronary artery arises normally from the right coronary cusp, and 
its proximal portion has 40% to 50% diffuse disease, followed by a long chronic 
total occlusion segment extending from the mid RCA to the PLV.  There was 
collateral flow from the LAD to the RPLV territory, and also from the 
circumflex artery as well.  We noted that on outside hospital films there was 
an abrupt cutoff of the proximal RCA with a very ambiguous cap; however, on our 
films, we noted an extra RV marginal branch which was filling in, though we 
could not localize the true nature of the proximal cap.  Hence, we decided to 
adopt a retrograde approach initially.



DETAILS OF THE PERCUTANEOUS CORONARY INTERVENTION to the RCA (Full metal Jacket
) (CHRONIC TOTAL OCCLUSION):  

1. We used an EBU 3.75, 7-French guide catheter to engage the left main, while 
we used an AL1, 7-French guide catheter to engage the RCA for dual arterial 
injections.

2. We then introduced an 0.014 x 300 cm Fielder FC wire with a 150 cm Corsair 
microcatheter and tried to get across a couple of septals.  We were able to 
track through the septals pretty well; however, the anatomy for reentry into 
the PLV/PDA was not favorable.  After multiple attempts through different 
septals, we were not able to gain access into the right PLV or PDA segments, 
even despite tracking through the septals pretty well through both the Fielder 
FC, as well as the Corsair; hence, we switched to an antegrade approach.

3. We then switched the AL1, 7-French guide for a Hockey-Stick 1, 6-French guide
, given the fact that an AL1 was a little too big to engage the RCA, given the 
size of the aortic root.  The Hockey-Stick 1 gave us moderate seating and 
support throughout the entire procedure.  We then introduced an 0.014 x 300 cm 
Fielder FC wire over a 150 cm Corsair into the right coronary artery to switch 
to an antegrade wire escalation approach.  We were able to make very little 
progress across the proximal cap, which was very ambiguous with a Fielder FC 
wire.  We then switched for an 0.009 tapering into an 0.014 x 300 cm Fielder XT 
wire and tried to make some progress across the mid RCA, and we tracked the 
Corsair across it.  Though we were able to get into the distal RCA, we were 
unsure whether we were in the true lumen or not.  We then switched out for a 
 200, 0.014 x 300 cm wire and got across the mid to distal RCA with 
moderate amount of difficulty.  We were unsure again whether we were at the 
true lumen or not.  Hence, we took a dual injection on the LAD, which 
demonstrated that our wire was indeed in the right PLV.  After this, we tracked 
the Corsair down into the right PLV and switched out the  200 wire for an 
0.014 x 300 cm Luge wire.  We then tracked the Corsair out and introduced a 2.0 
x 30 mm Emerge RX balloon and performed balloon angioplasty for a total of 6 
different inflations, starting from the right PLV, extending into the proximal 
or ostial segment of the RCA, all of which were 8 atmospheres, lasting 20-30 
seconds.  We got moderate amount of expansion with this.  We were then easily 
able to deliver a 2.25 x 38 mm Synergy drug-eluting stent  extending into the 
distal RPLV and the proximal edge of the stent into the distal portion of the 
right coronary artery.  The stent was deployed at 8 atmospheres for a total of 
26 seconds.  We then overlapped this proximally with a 2.25 x 38 mm Syndergy 
TESFAYE (10 carloz for 8 seconds).  We then deployed a 2.5 x 32 mm Synergy TESFAYE at 10 
atmospheres, lasting 25 seconds in overlapping fashion in the ostium to the 
midportion of the RCA.  We got excellent angiographic results.  After this, we 
noticed that the distal edge of the distal stent in the right PLV was oversized
, compared to the rest of the RCA.  This could have been an element of spasm.  
Hence, we gave the patient a total of 200 mcg of Cardene and 300 mcg of 
nitroglycerin.  Even after vasodilatation, we noticed that the distal edge was 
a little pinched.  Hence, we decided to dilate this using a 2.0 x 15 mm MINI 
TREK RX balloon for 10 atmospheres for 24 seconds.  Excellent angiographic 
results were achieved at the distal edge of the stent with restoration of MARIA LUISA-
3 flow to the PLV.  We then removed the stent and postdilated both the stents 
using a 2.75 x 20 mm TREK NC balloon for a total of 6 different inflations, 
lasting 16 atmospheres for at least 16 seconds.  Excellent stent expansion and 
apposition were achieved.  There was no evidence of edge dissection or distal 
embolization.  MARIA LUISA-3 flow was restored to the PDA and the PLV territories.  
Excellent flow was noted across the stent.  Good stent expansion and apposition 
were achieved.  Wire-out frames confirmed the above findings as well. 

The patient tolerated the procedure very well without any evidence of 
hemodynamic complications, and was transferred out of the cath lab in stable 
condition without any chest pain. 



Dr. Delcid, my attending, was scrubbed and performed key portions of the 
procedure and supervised the rest of it as well.



TOTAL CONTRAST USED:  340 mL.



TOTAL AIR KERMA:  3528 mGy. 



Right common femoral angiogram demonstrated a high bifurcation of the right side
, and hence, we opted for manual compression method.



LESION CHARACTERISTICS:  

1. RCA  ACC/AHA type C lesion.

2. MARIA LUISA 0 flow was noted preprocedure, and MARIA LUISA-3 flow was restored after the 
procedure.



IMPRESSION:  

1. Successful chronic total occlusion and revascularization of the proximal RCA 
with antegrade wire escalation technique, extending into the right PLV with 3 
Celestine (all Synergy - distal 2.25 x 38 mm, mid 2.25 x 38 mm and proximal 2.5 x 32 
mm) in overlapping fashion with excellent angiographic results.

2. Aspirin, Plavix, and Coumadin therapy, given the fact that the patient had a 
recent pulmonary embolism and needs Coumadin therapy.  Once the INR reaches 
therapeutic level, we recommend continuing Plavix and Coumadin x 12 months.



Cande Delcid MD



PCG/MedQ

DD:  2017 15:12:35  DT:  2017 16:09:41

Job #:  [id number]



cc: 

  - RAIZA CHAMPAGNE 



in this encounter



Plan of Treatment





Not on fileas of this encounter



Procedures







    



  Procedure Name   Priority   Date/Time   Associated Diagnosis   Comments

 

    



  TELEMETRY STRIPS-SCAN    2017    Results for this



    2:42 PM CST    procedure are in the



      results section.

 

    



  PROCEDURE RECORD-SCAN    2017    Results for this



    1:47 PM CST    procedure are in the



      results section.

 

    



  ECG-SCAN    2017    Results for this



    10:46 AM CST    procedure are in the



      results section.

 

    



  ECG-SCAN    2017    Results for this



    7:30 AM CST    procedure are in the



      results section.

 

    



  ECG-SCAN    2017    Results for this



    9:40 PM CST    procedure are in the



      results section.



in this encounter



Results

* TELEMETRY STRIPS-SCAN (2017  2:42 PM)





 Narrative

 

 



Ordered by an unspecified provider.





* PROCEDURE RECORD-SCAN (2017  1:47 PM)





 Narrative

 

 



Ordered by an unspecified provider.





* ECG-SCAN (2017 10:46 AM)





 Narrative

 

 



Ordered by an unspecified provider.





* CARDIAC CATH REPORT (2017 10:36 AM)





 



  Specimen   Performing Laboratory

 

 



   OTHER OUTSIDE LAB









 Procedure Note

 

 



Abner Trevizo MD - 2017  3:12 PM CST



[facility]



CARDIAC CATHETERIZATION REPORT

Page 3

NIKO VENTURA :  1970

#: [id number]







 

 MR #/Billing ID #:  [id number] / [id number]

DATE:  2017

CARDIOLOGIST:  Cande Delcid MD

DICTATING PROVIDER: Abner Trevizo MD

REFERRING PHYSICIAN:  RAIZA CHAMPAGNE



INTERVENTIONAL CARDIOLOGY FELLOW:  Abner Trevizo MD.



PROCEDURES PERFORMED:  

 1. Selective right and left coronary angiograms with dual arterial injections.

 2. Bilateral groin accesses, both 7-French common femoral arterial.

 3. Dual coronary injections.

 4. Chronic Total Occlusion () PCI of the proximal right RCA extending into 
the right PLV with 3 drug-eluting stents in overlapping fashion with antegrade 
wire escalation technique.

 5. Right common femoral angiogram, limited.



INDICATION FOR THE PROCEDURE:  Niko Huber is a pleasant, 47-year-old 
gentleman with a history of recent non-ST-elevation MI, status post PCI to his 
left circumflex extending into his obtuse marginal with a Resolute Integrity 
stent from an outside institution.  There was an attempted  antegrade PCI on 
his RCA , which was unsuccessful. We would like to perform an invasive 
evaluation of his coronary anatomy with an intent to revascularize with the 
retrograde approach, given the fact that the antegrade cap was very ambiguous, 
based on outside hospital films.



CONSENT:  Risks, benefits, and alternatives of the procedure were explained to 
the patient by both Dr. Delcid and myself.  Risks of access site complications, 
bleeding, arterial dissection, death, contrast nephropathy, and radiation 
hazards were explained to the patient, who verbalized understanding of these 
conditions and consented to the procedure.  A written, informed consent has 
been placed in the chart as well.



DETAILS OF THE PROCEDURE:  The patient was brought in the cath lab in the 
fasting, nonsedated state.  After giving the patient a total of 225 mcg of 
fentanyl and 5 mg of Versed, we achieved moderate conscious sedation, which was 
monitored and maintained throughout the procedure for a total of 126 minutes.  
Respiratory, hemodynamic, and neurological parameters were monitored throughout 
the procedure by the operators and by the cath lab staff. 



The patient was prepped and draped in sterile fashion.  We exposed bilateral 
groins and prepped with 1% chlorhexidine and instilled a total of 20 mL of 1% 
lidocaine for good local anesthesia in both groins. 



We then gained access into the right common femoral artery using a 
micropuncture needle and modified Seldinger technique to insert a 7-French 10 
cm arterial sheath with good blood flow return.  Similar technique was adopted 
to gain access to the left common femoral artery with the same 7-French 10 cm 
arterial sheath.  We went in with the intent to perform a retrograde  PCI, 
and hence, we obtained dual coronary injections.



CORONARY ANGIOGRAM:  

 1. The left main arises normally from the left coronary cusp and is free from 
angiographic evidence of disease.  It bifurcates into a left anterior 
descending artery, as well as a left circumflex artery.

 2. The left anterior descending artery arises normally from the left main.  
Its proximal, mid, and distal portions are free from disease, except for 30% 
stenosis in the mid segment.  The left main is also free from disease.  It 
gives rise to 1 diagonal branch, the LAD, and it is also free from disease.

 3. The left circumflex artery arises normally from the left main.  Its proximal
, mid, and distal portions are free from disease.  It has a previously placed 
stent extending from the proximal circumflex, extending into the OM, is widely 
patent without any thrombosis or in-stent restenosis.

 4. The right coronary artery arises normally from the right coronary cusp, and 
its proximal portion has 40% to 50% diffuse disease, followed by a long chronic 
total occlusion segment extending from the mid RCA to the PLV.  There was 
collateral flow from the LAD to the RPLV territory, and also from the 
circumflex artery as well.  We noted that on outside hospital films there was 
an abrupt cutoff of the proximal RCA with a very ambiguous cap; however, on our 
films, we noted an extra RV marginal branch which was filling in, though we 
could not localize the true nature of the proximal cap.  Hence, we decided to 
adopt a retrograde approach initially.



DETAILS OF THE PERCUTANEOUS CORONARY INTERVENTION to the RCA (Full metal Jacket
) (CHRONIC TOTAL OCCLUSION):  

 1. We used an EBU 3.75, 7-French guide catheter to engage the left main, while 
we used an AL1, 7-French guide catheter to engage the RCA for dual arterial 
injections.

 2. We then introduced an 0.014 x 300 cm Fielder FC wire with a 150 cm Corsair 
microcatheter and tried to get across a couple of septals.  We were able to 
track through the septals pretty well; however, the anatomy for reentry into 
the PLV/PDA was not favorable.  After multiple attempts through different 
septals, we were not able to gain access into the right PLV or PDA segments, 
even despite tracking through the septals pretty well through both the Fielder 
FC, as well as the Corsair; hence, we switched to an antegrade approach.

 3. We then switched the AL1, 7-French guide for a Hockey-Stick 1, 6-French 
guide, given the fact that an AL1 was a little too big to engage the RCA, given 
the size of the aortic root.  The Hockey-Stick 1 gave us moderate seating and 
support throughout the entire procedure.  We then introduced an 0.014 x 300 cm 
Fielder FC wire over a 150 cm Corsair into the right coronary artery to switch 
to an antegrade wire escalation approach.  We were able to make very little 
progress across the proximal cap, which was very ambiguous with a Fielder FC 
wire.  We then switched for an 0.009 tapering into an 0.014 x 300 cm Fielder XT 
wire and tried to make some progress across the mid RCA, and we tracked the 
Corsair across it.  Though we were able to get into the distal RCA, we were 
unsure whether we were in the true lumen or not.  We then switched out for a 
 200, 0.014 x 300 cm wire and got across the mid to distal RCA with 
moderate amount of difficulty.  We were unsure again whether we were at the 
true lumen or not.  Hence, we took a dual injection on the LAD, which 
demonstrated that our wire was indeed in the right PLV.  After this, we tracked 
the Corsair down into the right PLV and switched out the  200 wire for an 
0.014 x 300 cm Luge wire.  We then tracked the Corsair out and introduced a 2.0 
x 30 mm Emerge RX balloon and performed balloon angioplasty for a total of 6 
different inflations, starting from the right PLV, extending into the proximal 
or ostial segment of the RCA, all of which were 8 atmospheres, lasting 20-30 
seconds.  We got moderate amount of expansion with this.  We were then easily 
able to deliver a 2.25 x 38 mm Synergy drug-eluting stent  extending into the 
distal RPLV and the proximal edge of the stent into the distal portion of the 
right coronary artery.  The stent was deployed at 8 atmospheres for a total of 
26 seconds.  We then overlapped this proximally with a 2.25 x 38 mm Syndergy 
TESFAYE (10 carloz for 8 seconds).  We then deployed a 2.5 x 32 mm Synergy TESFAYE at 10 
atmospheres, lasting 25 seconds in overlapping fashion in the ostium to the 
midportion of the RCA.  We got excellent angiographic results.  After this, we 
noticed that the distal edge of the distal stent in the right PLV was oversized
, compared to the rest of the RCA.  This could have been an element of spasm.  
Hence, we gave the patient a total of 200 mcg of Cardene and 300 mcg of 
nitroglycerin.  Even after vasodilatation, we noticed that the distal edge was 
a little pinched.  Hence, we decided to dilate this using a 2.0 x 15 mm MINI 
TREK RX balloon for 10 atmospheres for 24 seconds.  Excellent angiographic 
results were achieved at the distal edge of the stent with restoration of MARIA LUISA-
3 flow to the PLV.  We then removed the stent and postdilated both the stents 
using a 2.75 x 20 mm TREK NC balloon for a total of 6 different inflations, 
lasting 16 atmospheres for at least 16 seconds.  Excellent stent expansion and 
apposition were achieved.  There was no evidence of edge dissection or distal 
embolization.  MARIA LUISA-3 flow was restored to the PDA and the PLV territories.  
Excellent flow was noted across the stent.  Good stent expansion and apposition 
were achieved.  Wire-out frames confirmed the above findings as well. 

The patient tolerated the procedure very well without any evidence of 
hemodynamic complications, and was transferred out of the cath lab in stable 
condition without any chest pain. 



Dr. Delcid, my attending, was scrubbed and performed key portions of the 
procedure and supervised the rest of it as well.



TOTAL CONTRAST USED:  340 mL.



TOTAL AIR KERMA:  3528 mGy. 



Right common femoral angiogram demonstrated a high bifurcation of the right side
, and hence, we opted for manual compression method.



LESION CHARACTERISTICS:  

 1. RCA  ACC/AHA type C lesion.

 2. MARIA LUISA 0 flow was noted preprocedure, and MARIA LUISA-3 flow was restored after the 
procedure.



IMPRESSION:  

 1. Successful chronic total occlusion and revascularization of the proximal 
RCA with antegrade wire escalation technique, extending into the right PLV with 
3 Celestine (all Synergy - distal 2.25 x 38 mm, mid 2.25 x 38 mm and proximal 2.5 x 
32 mm) in overlapping fashion with excellent angiographic results.

 2. Aspirin, Plavix, and Coumadin therapy, given the fact that the patient had 
a recent pulmonary embolism and needs Coumadin therapy.  Once the INR reaches 
therapeutic level, we recommend continuing Plavix and Coumadin x 12 months.



Cande Delcid MD





[facility]

DD:  2017 15:12:35  DT:  2017 16:09:41

Job #:  [id number]



cc: 

  - RAIZA CHAMPAGNE 







* ECG-SCAN (2017  7:30 AM)





 Narrative

 

 



Ordered by an unspecified provider.





* CBC (2017  3:45 AM)





  



  Component   Value   Ref Range

 

  



  White Blood Cells   9.6   4.5 - 11.0 K/UL

 

  



  RBC   4.23 (L)   4.4 - 5.5 M/UL

 

  



  Hemoglobin   12.3 (L)   13.5 - 16.5 GM/DL

 

  



  Hematocrit   36.3 (L)   40 - 50 %

 

  



  MCV   85.8   80 - 100 FL

 

  



  MCH   29.1   26 - 34 PG

 

  



  MCHC   34.0   32.0 - 36.0 G/DL

 

  



  RDW   13.9   11 - 15 %

 

  



  Platelet Count   279   150 - 400 K/UL

 

  



  MPV   7.5   7 - 11 FL









 



  Specimen   Performing Laboratory

 

 



  Blood   [facility]



   [address] [place] [address]





* BASIC METABOLIC PANEL (2017  3:45 AM)





  



  Component   Value   Ref Range

 

  



  Sodium   138   137 - 147 MMOL/L

 

  



  Potassium   3.9   3.5 - 5.1 MMOL/L

 

  



  Chloride   105   98 - 110 MMOL/L

 

  



  CO2   25   21 - 30 MMOL/L

 

  



  Anion Gap   8   3 - 12

 

  



  Glucose   107 (H)   70 - 100 MG/DL

 

  



  Blood Urea Nitrogen   12   7 - 25 MG/DL

 

  



  Creatinine   0.72   0.4 - 1.24 MG/DL

 

  



  Calcium   9.2   8.5 - 10.6 MG/DL

 

  



  eGFR Non African American   >60   >60 mL/min



   Comment: 



   The eGFR is not validated for use in drug dosing 



   adjustments.    Continue to use 



   estimated creatinine clearance per dosing 



   reference text.    Please contact the 



   Clinical Pharmacist for questions. 

 

  



  eGFR    >60   >60 mL/min



   Comment: 



   The eGFR is not validated for use in drug dosing 



   adjustments.    Continue to use 



   estimated creatinine clearance per dosing 



   reference text.    Please contact the 



   Clinical Pharmacist for questions. 









 



  Specimen   Performing Laboratory

 

 



  Blood    MAIN LAB



   [address] [place] [address]





* PROTIME INR (PT) (2017  3:45 AM)





  



  Component   Value   Ref Range

 

  



  INR   1.1   0.8 - 1.2









 



  Specimen   Performing Laboratory

 

 



  Blood    MAIN LAB



   [address] [place] [address]





* ECG-SCAN (2017  9:40 PM)





 Narrative

 

 



Ordered by an unspecified provider.





* POC ACTIVATED CLOTTING TIME (2017  4:49 PM)





  



  Component   Value   Ref Range

 

  



  Activated Clotting Time   162   s









 



  Specimen   Performing Laboratory

 

 



    MAIN LAB



   [address] [place] [address]





* POC ACTIVATED CLOTTING TIME (2017  4:18 PM)





  



  Component   Value   Ref Range

 

  



  Activated Clotting Time   186   s









 



  Specimen   Performing Laboratory

 

 



    MAIN LAB



   [address] [place] [address]





* POC ACTIVATED CLOTTING TIME (2017  4:00 PM)





  



  Component   Value   Ref Range

 

  



  Activated Clotting Time   183   s









 



  Specimen   Performing Laboratory

 

 



    MAIN LAB



   [address] [place] [address]





* POC ACTIVATED CLOTTING TIME (2017  2:55 PM)





  



  Component   Value   Ref Range

 

  



  Activated Clotting Time   400   s









 



  Specimen   Performing Laboratory

 

 



    MAIN LAB



   [address] [place] [address]





* POC ACTIVATED CLOTTING TIME (2017  2:34 PM)





  



  Component   Value   Ref Range

 

  



  Activated Clotting Time   251   s









 



  Specimen   Performing Laboratory

 

 



    MAIN LAB



   [address] [place] [address]





* POC ACTIVATED CLOTTING TIME (2017  2:08 PM)





  



  Component   Value   Ref Range

 

  



  Activated Clotting Time   349   s









 



  Specimen   Performing Laboratory

 

 



    MAIN LAB



   [address] [place] [address]





* POC ACTIVATED CLOTTING TIME (2017  1:35 PM)





  



  Component   Value   Ref Range

 

  



  Activated Clotting Time   337   s









 



  Specimen   Performing Laboratory

 

 



    MAIN LAB



   [address] [place] [address]





* POC ACTIVATED CLOTTING TIME (2017  1:05 PM)





  



  Component   Value   Ref Range

 

  



  Activated Clotting Time   370   s









 



  Specimen   Performing Laboratory

 

 



    MAIN LAB



   [address] [place], [place] [address] [address]





in this encounter



Visit Diagnoses

Not on filein this encounter



Admitting Diagnoses











  Diagnosis

 





  Chronic Total Occlusion

 





  CAD (coronary artery disease)



in this encounter



Administered Medications







     



  Medication Order   MAR Action   Action Date   Dose   Rate   Site

 

     



  aspirin chewable tablet 81 mg   Given   2017   81 mg  



  81 mg, Oral, DAILY, First dose on Mon    08:20 CST   



  17 at 1600, Until Discontinued     

 

  

 

     



  atorvastatin (LIPITOR) tablet 40 mg   Given   2017   40 mg  



  40 mg, Oral, DAILY, First dose on Mon    22:24 CST   



  17 at 1300, Until Discontinued,     



  Admission/Obs/Extended Recovery     









    



  Given   2017   40 mg  



   08:21 CST   









  

 

     



  carisoprodol(+) (SOMA) tablet 350 mg   Given   2017   350 mg  



  350 mg, Oral, AT BEDTIME DAILY, First    22:24 CST   



  dose on 17 at 2100, Until     



  Discontinued, Admission/Obs/Extended     



  Recovery     

 

  

 

     



  clopiDOGrel (PLAVIX) tablet 75 mg   Given   2017   75 mg  



  75 mg, Oral, DAILY, First dose on Tue    08:21 CST   



  17 at 0900, Until Discontinued,     



  Clopidogrel (PLAVIX) load given in cath     



  lab. This Medication can increase the     



  risk of bleeding and may need to be held     



  prior to surgery or invasive procedures.     



  Consult physician in advance.     

 

  

 

     



  gabapentin (NEURONTIN) capsule 600 mg   Given   2017   600 mg  



  600 mg, Oral, FOUR TIMES DAILY, First    15:47 CST   



  dose on 17 at 1300, Until     



  Discontinued, Admission/Obs/Extended     



  Recovery     









    



  Given   2017   600 mg  



   22:24 CST   

 

    



  Given   2017   600 mg  



   08:20 CST   









  

 

     



  HYDROcodone/acetaminophen (NORCO) 5/325   Given   2017   1 tablet  



  mg tablet 1 tablet    15:47 CST   



  1 tablet, Oral, EVERY  6 HOURS PRN,     



  Starting 17 at 1156, Until 17 at 1052, Pain PO, TOTAL     



  ACETAMINOPHEN DOSE NOT TO EXCEED 4GM     



  DAILY NOTE: This is a HIGH ALERT     



  Medication.     

 

  

 

     



  pantoprazole DR (PROTONIX) tablet 40 mg   Given   2017   40 mg  



  40 mg, Oral, DAILY, First dose on Mon    22:24 CST   



  17 at 2100, Until Discontinued, Do     



  not crush or chew tablet.     

 

  

 

     



  sacubitril/valsartan (ENTRESTO) 24/26 mg   Given   2017   1 tablet  



  tablet 1 tablet    08:21 CST   



  1 tablet, Oral, TWICE DAILY, First dose     



  on 17 at 0900, Until     



  Discontinued, Admission/Obs/Extended     



  Recovery     

 

  

 

     



  sodium chloride 0.9 %   infusion   Given - New   2017   1,000 mL   50 mL
/hr 



  1,000 mL, 1,000 mL, Intravenous, at 50   Bag   08:38 CST   



  mL/hr, CONTINUOUS, Starting 17     



  at 0815, Until 17 at 1458, If     



  EF is <40%, contact CCL Charge Nurse     



  [phone] before initiating fluid.     

 

  

 

     



  sodium chloride 0.9 %   infusion   Dose/Rate   2017    75 mL/hr 



  1,000 mL, Intravenous, at 75 mL/hr,   Change   15:41 CST   



  CONTINUOUS, Starting 17 at     



  1500, Until 17 at 0059, Once     



  patient out of bed, then saline lock and     



  DC IV fluid.     









    



  Bolus from Infusion   2017   250 mL   999 mL/hr 



   17:30 CST   

 

    



  Dose/Rate Verify   2017    75 mL/hr 



   17:45 CST   









  

 

     



  warfarin (COUMADIN) tablet 7.5 mg   Given   2017   7.5 mg  



  7.5 mg, Oral, TWO TIMES WEEKLY (Once per    22:25 CST   



  day on ), First dose on 17 at 2100, Until Discontinued,     



  NURSING: Provide patient with warfarin     



  education leaflet and video. Do not give     



  with cranberry juice. NOTE: This is a     



  HIGH ALERT Medication.     

 

  



in this encounter

## 2018-01-21 NOTE — DIAGNOSTIC IMAGING REPORT
INDICATION: Ankle pain and swelling.



3 views were obtained.



FINDINGS: There are old post traumatic changes to the medial

malleolus. Plafonds and talar dome are intact. There is no acute

fracture or dislocation.



IMPRESSION: Presumed old post traumatic changes in the medial

malleolus otherwise unremarkable.



Dictated by: 



  Dictated on workstation # [id number]

## 2018-01-21 NOTE — XMS REPORT
Encounter Summary

 Created on: 2018



Francisco Huber

External Reference #: [id number]

: 1970

Sex: Male



Demographics







 Address  [address] [place]  [address]

 

 Home Phone  [phone]

 

 Preferred Language  English

 

 Marital Status  Unknown

 

 Latter-day Affiliation  NON

 

 Race  White

 

 Ethnic Group  Not  or 





Author







 Author  [facility]

 

 Organization  [facility]

 

 Address  Unknown

 

 Phone  Unavailable







Support







 Name  Relationship  Address  Phone

 

 , Steffanie Huber  [employer]  Unknown  [phone]







Care Team Providers







 Care Team Member Name  Role  Phone

 

  PCP  Unavailable







Reason for Visit

* 





 



  Reason   Comments

 

 



  Records Request   cath report faxed to Dr. Bacon's office









Encounter Details







    



  Date   Type   Department   Care Team   Description

 

    



  2018   Telephone   [facility] Cardiology   Shanell Hendrickson RN   
Records Request (cath



    [address]    report faxed to Dr. Prather [id number]    Jordi's office)



    [place] [address] [phone]  







Social History







    



  Tobacco Use   Types   Packs/Day   Years Used   Date

 

    



  Current Every Day Smoker    









   



  Alcohol Use   Drinks/Week   oz/Week   Comments

 

   



  No   









 



  Sex Assigned at Birth   Date Recorded

 

 



  Not on file 



as of this encounter



Miscellaneous Notes

* Telephone Encounter - Shanell Hendrickson RN - 2018  5:16 PM CST



Voicemail received from Ariana with Dr. Bacon's office in [place]. She is 
requesting pt's cath report. Faxed to Dr. Bacon as requested. 

in this encounter



Plan of Treatment





Not on fileas of this encounter



Visit Diagnoses

Not on filein this encounter

## 2018-01-21 NOTE — XMS REPORT
Encounter Summary

 Created on: 2018



Francisco Huber

External Reference #: [id number]

: 1970

Sex: Male



Demographics







 Address  [address] [place]  [address]

 

 Home Phone  [phone]

 

 Preferred Language  English

 

 Marital Status  Unknown

 

 Jainism Affiliation  NON

 

 Race  White

 

 Ethnic Group  Not  or 





Author







 Author  [facility]

 

 Organization  [facility]

 

 Address  Unknown

 

 Phone  Unavailable







Support







 Name  Relationship  Address  Phone

 

 , Steffanie Huber  [employer]  Unknown  [phone]







Care Team Providers







 Care Team Member Name  Role  Phone

 

  PCP  Unavailable







Encounter Details







    



  Date   Type   Department   Care Team   Description

 

    



  2017   Procedure Pass   Cardiac Catheterization  



    Laboratory  



    [address] [place] [address]  



    [phone]  







Social History







    



  Tobacco Use   Types   Packs/Day   Years Used   Date

 

    



  Current Every Day Smoker    









   



  Alcohol Use   Drinks/Week   oz/Week   Comments

 

   



  No   









 



  Sex Assigned at Birth   Date Recorded

 

 



  Not on file 



as of this encounter



Plan of Treatment





Not on fileas of this encounter



Visit Diagnoses

Not on filein this encounter

## 2018-01-21 NOTE — XMS REPORT
Encounter Summary

 Created on: 2018



Francisco Huber

External Reference #: [id number]

: 1970

Sex: Male



Demographics







 Address  [address] [place]  [address]

 

 Home Phone  [phone]

 

 Preferred Language  English

 

 Marital Status  Unknown

 

 Oriental orthodox Affiliation  NON

 

 Race  White

 

 Ethnic Group  Not  or 





Author







 Author  [facility]

 

 Organization  [facility]

 

 Address  Unknown

 

 Phone  Unavailable







Support







 Name  Relationship  Address  Phone

 

 , Steffanie Huber  EC[employer]  Unknown  [phone]







Care Team Providers







 Care Team Member Name  Role  Phone

 

  PCP  Unavailable







Reason for Visit

* Auth/Cert (Routine)





     



  Status   Reason   Specialty   Diagnoses /   Referred By   Referred To



     Procedures   Contact   Contact

 

     











Encounter Details







    



  Date   Type   Department   Care Team   Description

 

    



  2017   [facility]   Yue Zhu APRN   Atherosclerotic 
heart



   Encounter   [address]   [address]   disease [place] [address]   [address]   coronary artery [place]



     [place] [address]   unspecified angina



     [phone]   pectoris (HCC)



     [phone] (Fax) 







Social History







    



  Tobacco Use   Types   Packs/Day   Years Used   Date

 

    



  Current Every Day Smoker    









   



  Alcohol Use   Drinks/Week   oz/Week   Comments

 

   



  No   









 



  Sex Assigned at Birth   Date Recorded

 

 



  Not on file 



as of this encounter



Medications at Time of Discharge







     



  Medication   Sig.   Disp.   Refills   Start Date   End Date

 

     



  atorvastatin (LIPITOR) 40   Take 40 mg by mouth    



  mg tablet   daily.    

 

     



  carisoprodol(+) (SOMA)   Take 350 mg by mouth at    



  350 mg tablet   bedtime daily.    

 

     



  clopiDOGrel (PLAVIX) 75   Take 75 mg by mouth    



  mg tablet   daily.    

 

     



  gabapentin (NEURONTIN)   Take 600 mg by mouth four    



  600 mg tablet   times daily.    

 

     



  gemfibrozil (LOPID) 600   Take 600 mg by mouth    



  mg tablet   twice daily.    

 

     



  HYDROcodone/acetaminophen   Take 1 tablet by mouth    



  (NORCO) 7.5/325 mg tablet   every 6 hours as needed    



   for Pain    

 

     



  metoprolol XL (TOPROL XL)   Take 25 mg by mouth    



  25 mg extended release   daily.    



  tablet     

 

     



  nitroglycerin (NITROSTAT)   Place 0.4 mg under tongue    



  0.4 mg tablet   every 5 minutes as needed    



   for Chest Pain. Max of 3    



   tablets, call 911.    

 

     



  Omega-3 Acid Ethyl Esters   Take 1 g by mouth three    



  1 gram cap   times daily.    

 

     



  omeprazole DR(+)   Take 20 mg by mouth daily    



  (PRILOSEC) 20 mg capsule   before breakfast.    

 

     



  sacubitril/valsartan   Take 1 tablet by mouth    



  (ENTRESTO) 24/26 mg   twice daily.    



  tablet     

 

     



  tiZANidine (ZANAFLEX) 4   Take 8 mg by mouth every    



  mg tabletIndications:   8 hours as needed.    



  MUSCLE SPASM   Indications: MUSCLE SPASM    

 

     



  warfarin (COUMADIN) 5 mg   Take 5 mg by mouth as    



  tabletIndications:   directed. Take 7.5 mg by    



  THROMBOTIC DISORDER   mouth on Mon and Wed.    



   Take 5 mg by mouth on    



   , Tues, Thurs, Fri,    



   and Sat. Take at bedtime.    



   Indications: THROMBOTIC    



   DISORDER    

 

     



  aspirin EC 81 mg   Take 1 tablet by mouth   90 tablet   3   2017



  tabletIndications:   daily for 7 days. Take    



  Coronary artery disease   with food.    



  involving native coronary     



  artery of native heart     



  with angina pectoris     



  (HCC), Ischemic     



  cardiomyopathy, NSTEMI     



  (non-ST elevated     



  myocardial infarction)     



  (HCC), Tobacco abuse     

 

     



  aspirin EC 81 mg tablet   Take 81 mg by mouth      2017



   daily. Take with food.    



as of this encounter



Plan of Treatment





Not on fileas of this encounter



Results

* PROTIME INR (PT) (2017  7:43 AM)





  



  Component   Value   Ref Range

 

  



  INR   1.0   0.8 - 1.2









 



  Specimen   Performing Laboratory

 

 



    MAIN LAB



   [address] [place] [address]





in this encounter



Visit Diagnoses

Not on filein this encounter



Admitting Diagnoses











  Diagnosis

 





  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 
angina pectoris (HCC)

 





  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 
angina pectoris



in this encounter

## 2018-01-21 NOTE — XMS REPORT
Encounter Summary

 Created on: 2018



Niko Huber

External Reference #: [id number]

: 1970

Sex: Male



Demographics







 Address  [address] [place]  [address]

 

 Home Phone  [phone]

 

 Preferred Language  English

 

 Marital Status  Unknown

 

 Gnosticism Affiliation  NON

 

 Race  White

 

 Ethnic Group  Not  or 





Author







 Author  [facility]

 

 Organization  [facility]

 

 Address  Unknown

 

 Phone  Unavailable







Support







 Name  Relationship  Address  Phone

 

 , Steffanie Huber  ECON  Unknown  [phone]







Care Team Providers







 Care Team Member Name  Role  Phone

 

  PCP  Unavailable







Reason for Visit

* Auth/Cert (Routine)





     



  Status   Reason   Specialty   Diagnoses /   Referred By   Referred To



     Procedures   Contact   Contact

 

     











Encounter Details







    



  Date   Type   Department   Care Team   Description

 

    



  2017   Hospital   Cardiac Catheterization   Cande Delcid MD   
Coronary artery disease



  -   Encounter   Laboratory   [address]   involving native coronary



  2017    [address]   [address]   artery of native heart



    [place] [address]   [place] [address]   with angina pectoris



    [phone]   ([facility])



     [phone] (Fax) 







Social History







    



  Tobacco Use   Types   Packs/Day   Years Used   Date

 

    



  Current Every Day Smoker    









   



  Alcohol Use   Drinks/Week   oz/Week   Comments

 

   



  No   









 



  Sex Assigned at Birth   Date Recorded

 

 



  Not on file 



as of this encounter



Last Filed Vital Signs







  



  Vital Sign   Reading   Time Taken

 

  



  Blood Pressure   99/58   2017  7:35 AM CST

 

  



  Pulse   76   2017  7:35 AM CST

 

  



  Temperature   37.1   C (98.7   F)   2017  6:15 AM CST

 

  



  Respiratory Rate   -   -

 

  



  Oxygen Saturation   97%   2017  7:35 AM CST

 

  



  Inhaled Oxygen   -   -



  Concentration  

 

  



  Weight   87.5 kg (192 lb 14.4 oz)   2017  8:13 AM CST

 

  



  Height   162.6 cm (5' 4.02")   2017  8:13 AM CST

 

  



  Body Mass Index   33.1   2017  8:13 AM CST



in this encounter



Discharge Summaries

* Kimberlyn Tiwari PA-C - 2017  8:00 AM CST



Formatting of this note may be different from the original.



Physician Discharge Summary



Name: Niko Huber

Medical Record Number: [id number]        Account Number:  [id number]

YOB: 1970                         Age:  47 years 

Admit date:  2017                     Discharge date:  2017



Attending Physician:  Dr. Delcid               Service: Cardiology-Interventional



Physician Summary completed by: Kimberlyn Tiwari PA-C



Reason for hospitalization: Coronary artery disease



Significant PMH: 

Past Medical History: 

Diagnosis Date 

 Coronary artery disease involving native coronary artery of native heart 
with angina pectoris ([facility]) 2017 

 Coronary artery disease involving native coronary artery of native heart 
with angina pectoris (HCC) 2017 - NSTEMI at [facility] in [place], [place]. Successful PCI 
with a Resolute Integrity 2.5 x 26 mm stent to the OM with Dr. Miguel. 
Unsuccessful PCI to the RCA . Pt referred to Dr. Delcid for possible  
procedure.   16 - Nuclear stress test ([facility]): No 
ischemia or arrhythmia on EKG. Diaphragmatic attenuation with reversible 
ischemia involving the mid to apic 

 Ischemic cardiomyopathy  

 Mild mitral regurgitation 2017 

 Mild tricuspid regurgitation 2017 

 NSTEMI (non-ST elevated myocardial infarction) ([facility]) 2017 

 Protein S deficiency ([facility]) 2017 

 Tobacco abuse 2017 

  

Allergies: Pcn [penicillins]



Physical Exam notable for:  

b/l Groin no hematoma, no bruit, soft, non-tender.

CV: RRR

Lungs: CTA B

Ext: no edema, + 2 b/l pedal pulses. 

ABD: Soft, non tender, + BS X 4 quads. 



Lab/Radiology studies notable for: 

Hematology:  

Lab Results 

Component Value Date 

 HGB 12.3 2017 

 HCT 36.3 2017 

 PLTCT 279 2017 

 WBC 9.6 2017 

 MCV 85.8 2017 

 MCHC 34.0 2017 

 MPV 7.5 2017 

 RDW 13.9 2017 

, General Chemistry:  

Lab Results 

Component Value Date 

  2017 

 K 3.9 2017 

  2017 

 GAP 8 2017 

 BUN 12 2017 

 CR 0.72 2017 

  2017 

 CA 9.2 2017 



Brief Hospital Course:  Mr. Huber is a 47 y.o male with a history of NSTMI in 
November. He underwent PCI of OM at [facility] in [place], [place]. He was also 
found to have  of RCA. PCI was attempted which was unsuccessful. He was 
referred to Dr. Delcid for Stage PCI. Echo done at OSH on 17 revealed LVEF 
30-35%. He was discharged home with LifeVest. He also has a history of mild MR 
and TR and protein S deficiency treated with warfarin. 



Patient was taken to the cardiac catheterization lab on 17 where 
successful revascularization of the proximal RCA was done, extending into the 
right PLV with 3 drug-eluting stents in overlapping fashion with excellent 
angiographic results. Patient tolerated the procedure well. He had vasovagal 
response to sheath pull with ~ 6 second pause. He required atropine and 
recovered promptly. No recurrent pause since then or overnight. Dr. Delcid 
recommends to continue ASA 81 mg daily for one week. He was instructed on the 
importance of Plavix 75 mg daily at least 6 months to 1 year w/o interruption 
to prevent stent thrombosis and possible myocardial infarction. PTA warfarin 
was resumed 17. No bridging is recommended per staff to minimize bleeding 
complications. PT/INR on Wednesday. PT/INR managed by Primary cardiologist. PT/
INR goal 2.0-3.0. Lipid profile as above.  Patient is currently tolerating 
Atorvastatin 40 mg po daily. Weight loss, Cardiac Healthy diet, and exercise 
when patient can tolerate.  Patient to continue current medical therapy with 
aggressive risk factors modification. Patient to weigh daily and report any wt. 
Gain of 2-3 # in 1-3 days.  BP usually marginal at home. He is asymptomatics. 
Maximize treatment for LVD with GDMT as pt. Can tolerate, renal function allows 
and BP permits. F/u w/  Primary cardiologist as already scheduled or sooner 
if needed. He will continue to wear LifeVest. Currently his primary 
cardiologist is planning to repeat Echo in January. EKG NSR 72 bpm, PVC's. Non 
specific ST-T changes unchanged from prior. 



Condition at Discharge: Stable



Discharge Diagnoses:  



Hospital Problems  

 

 Active Problems 

 * (Principal)Coronary artery disease involving native coronary artery of 
native heart with angina pectoris (HCC) 

 NSTEMI (non-ST elevated myocardial infarction) ([facility]) 

 Tobacco abuse 

 Ischemic cardiomyopathy 

 Protein S deficiency (HCC) 

 CAD (coronary artery disease) 

 



Surgical Procedures: 



Significant Diagnostic Studies and Procedures: 

1. Selective right and left coronary angiograms with dual arterial injections.

2. Bilateral groin accesses, both 7-French common femoral arterial.

3. Dual coronary injections.

4.  PCI of the proximal right RCA extending into the right PLV with 3 drug-
eluting stents in overlapping fashion with antegrade wire escalation technique.

5. Right common femoral angiogram, limited.



Consults:  None



Patient Disposition: Home   



Patient instructions/medications: 



Procedure Specific Activity 

*You may drive after 2 days.

*You may shower after discharge.

*NO tub baths, hot tubs, or swimming for 5 days.

*NO lifting greater than 15 pounds for 1 week.

*NO sexual or strenuous activity for 1 week. 



Report These Signs and Symptoms 

Please contact your doctor if you have any of the following symptoms: Chest pain
, shortness of breath, lightheadedness, dizziness, near fainting, palpitations, 
abd pain, back pain, or bleeding.

*Continue medicines as direct on your discharge medication list. Get an up to 
date list with every visit and take as instructed. Do NOT stop taking any 
medications without speaking with your doctor or nurse who knows you.



*Remember to weigh yourself first thing in the morning after using the restroom 
and write it down. Take this record to your doctor appointment.



*Chart symptoms such as fatigue, trouble breathing, or swelling. Call your 
doctor right away if these symptoms get worse.



*Remember, do not eat more than 2000 milligrams of sodium a day. Watch out for 
packaged, processed, canned and restaurant foods.



Call your doctor (your cardiologist, if you have one) if:

-you gain more than 2 pounds in 24 hours.

-you gain more than 5 pounds in 1 week.

-any of your symptoms get worse

Do NOT wait to let your doctor know about these changes. 



Questions About Your Stay 

For questions or concerns regarding your hospital stay:



- DURING BUSINESS HOURS (8:00 AM - 4:30 PM):  

Call [phone] and asked to be transferred to your discharge attending 
physician.



- AFTER BUSINESS HOURS (4:30 PM - 8:00 AM, on weekends, or holidays):

Call [phone] and ask the  to page the on-call doctor for the 
discharge attending physician. 

Discharging attending physician: CANDE DELCID [[id number]]  



Cardiac Diet 

Limiting unhealthy fats and cholesterol is the most important step you can take 
in reducing your risk for cardiovascular disease.  Unhealthy fats include 
saturated and trans fats.  Monitor your sodium and cholesterol intake.  
Restrict your sodium to 2g (grams) or 2000mg (milligrams) daily, and your 
cholesterol to 200mg daily.



If you have questions regarding your diet at home, you may contact a dietitian 
at [phone].

 



Incision Care 

*Call if there is an increase in pain, swelling, or redness.

*DO NOT soak incision in water.

*NO tub baths, hot tubs, or swimming.

*You may shower after discharge. 



Return Appointment 

F/u with your primary cardiologist as already scheduled with echo in January. 

[facility] Provider NORTH MIGUEL [[id number]]  



Heart Failure Information 

You are at risk for readmission to the hospital because of fluid overload. 
There are steps you can take to lower your risk:

*Continue your current heart medications. Changes made have been made during 
your hospital stay.

*Remember to weigh yourself every morning, first thing after you urinate, and 
write down your weigh. Take this record to your doctor's appointments.

*Chart your symptoms - fatigue, shortness of breath, swelling, etc. Immediately 
report any worsening.

*Remember to consume no more than 2,000mg (milligrams) of sodium daily. Watch 
out for packaged, processed, canned, and restaurant foods especially.

*If any of your symptoms worsen, or if you gain more than 2 pounds in 24 hours, 
or 5 pounds in a week, call your cardiologist immediately. EARLY REPORTING OF 
THESE CHANGES IS VERY IMPORTANT. 



[facility] Information 

[facility] is a gathering place for individuals, families, and friends 
living with serious or chronic physical illness.  They offer education and 
support programs that help you live your life to the fullest.  Unless otherwise 
noted, programs are offered at NO CHARGE.  However, REGISTRATION IS REQUIRED 48 
hours in advance.



To arrange for a tour, register for a class, or ask a questions please call [phone]
[phone].  You can also visit [email or web address] for more information. 



[facility] Education about Stroke 

It is important for you to recognize the signs of stroke and call 911 
immediately.



F.A.S.T. is an easy way to remember the sudden signs of a stroke.



F - face drooping

A - arm weakness

S - speech difficulty

T - TIME TO CALL 911



If you or anyone you know shows any of these signs, even if the signs go away, 
call  IMMEDIATELY. Check the time so you will know when the first sign 
started. 



 

Current Discharge Medication List 

 

 CONTINUE these medications which have been CHANGED or REFILLED 

 Details 

aspirin EC 81 mg tablet Take 1 tablet by mouth daily for 7 days. Take with food.

Qty: 90 tablet, Refills: 3 

 PRESCRIPTION TYPE:  No Print

Associated Diagnoses: Coronary artery disease involving native coronary artery 
of native heart with angina pectoris (HCC); Ischemic cardiomyopathy; NSTEMI (non
-ST elevated myocardial infarction) ([facility]); Tobacco abuse 

 

 

 CONTINUE these medications which have NOT CHANGED 

 Details 

atorvastatin (LIPITOR) 40 mg tablet Take 40 mg by mouth daily. 

 PRESCRIPTION TYPE:  Historical Med 

 

carisoprodol(+) (SOMA) 350 mg tablet Take 350 mg by mouth at bedtime daily. 

 PRESCRIPTION TYPE:  Historical Med 

 

clopiDOGrel (PLAVIX) 75 mg tablet Take 75 mg by mouth daily. 

 PRESCRIPTION TYPE:  Historical Med 

 

gabapentin (NEURONTIN) 600 mg tablet Take 600 mg by mouth four times daily. 

 PRESCRIPTION TYPE:  Historical Med 

 

gemfibrozil (LOPID) 600 mg tablet Take 600 mg by mouth twice daily. 

 PRESCRIPTION TYPE:  Historical Med 

 

HYDROcodone/acetaminophen (NORCO) 7.5/325 mg tablet Take 1 tablet by mouth 
every 6 hours as needed for Pain 

 PRESCRIPTION TYPE:  Historical Med 

 

metoprolol XL (TOPROL XL) 25 mg extended release tablet Take 25 mg by mouth 
daily. 

 PRESCRIPTION TYPE:  Historical Med 

 

nitroglycerin (NITROSTAT) 0.4 mg tablet Place 0.4 mg under tongue every 5 
minutes as needed for Chest Pain. Max of 3 tablets, call 911. 

 PRESCRIPTION TYPE:  Historical Med 

 

Omega-3 Acid Ethyl Esters 1 gram cap Take 1 g by mouth three times daily. 

 PRESCRIPTION TYPE:  Historical Med 

 

omeprazole DR(+) (PRILOSEC) 20 mg capsule Take 20 mg by mouth daily before 
breakfast. 

 PRESCRIPTION TYPE:  Historical Med 

 

sacubitril/valsartan (ENTRESTO) 24/26 mg tablet Take 1 tablet by mouth twice 
daily. 

 PRESCRIPTION TYPE:  Historical Med 

 

tiZANidine (ZANAFLEX) 4 mg tablet Take 8 mg by mouth every 8 hours as needed. 
Indications: MUSCLE SPASM 

 PRESCRIPTION TYPE:  Historical Med 

 

warfarin (COUMADIN) 5 mg tablet Take 5 mg by mouth as directed. Take 7.5 mg by 
mouth on Mon and Wed. Take 5 mg by mouth on , Tues, Thurs, Fri, and Sat. 
Take at bedtime.  Indications: THROMBOTIC DISORDER 

 PRESCRIPTION TYPE:  Historical Med 

 

 

 



Pending items needing follow up: as above 



Signed:

Kimberlyn Tiwari PA-C

2017  



cc:

Primary Care Physician:  Raiza Champagne   Verified

Referring physicians:   Dr. North Miguel/Dr. Neftali Bacon ([place], [place])

Additional provider(s): 

 

in this encounter



Discharge Instructions

* Patient Instructions - Mary Fong RN - 2017  8:32 AM CST





Manual Sheath Removal From A Large Vein Or Artery-DARVIN

When you go home:

 You may shower 24 hours after your procedure.

 Do not sit in water for one week. (No bath tub, swimming pool/hot tub, etc.)

 Keep the area clean and dry for one week (except for daily showers).

 Be sure your hands are clean when touching near the site.

 If a band-aid or dressing is still in place remove it before showering.

 Wash and dry thoroughly but gently.

 If needed, for your comfort, you may place a clean band-aid over the 
puncture site after you are clean and dry. It is best to leave it open to air 
as soon as it is comfortable to do so.

 Do not use ointments, creams, or powders on puncture site.

 Inspect site daily.

Activity: (Unless otherwise instructed or unable to perform)

 Avoid any exertion for one week. Exertion is lifting over 15 lbs or pushing, 
pulling or straining.

 Avoid excessive bending, stooping, or stair climbing for 2 days. It is ok to 
go up stairs or bend over but take it slowly and keep it to a minimum.

 You may be up and about while relaxing at home as you recover.

 You may resume sexual activity in one week.

 You may begin driving 2 days after your procedure if you are otherwise able 
to drive.

---It is common to have mild soreness and/or a small, soft bruise around the 
site that can take up to two weeks to go away. A small (dime to quarter sized) 
lump is also normal. A small amount of blood (not more than a teaspoon) from 
the site is also common.

WHEN TO CALL THE DOCTOR: Complications are rare but can happen.

 If you have significant bleeding (more than a teaspoon) or a lump underneath 
the skin (bigger than a golf ball) at the site lie down, apply firm pressure at 
the site and call 911. Bleeding from a large vessel needs professional help.

 If you have signs of infection at the site such as: redness, warm to touch, 
drainage, increasing soreness, a fever (100 degrees or more) and/or chills.

 Soreness that continues more than a week or unusual pain at the puncture 
site.

 Numbness, tingling, weakness in the affected leg.

 If your leg becomes cold and pale.

 If you have changes of vision, slurred speech or one-sided weakness.

Who do I contact if I need to speak with someone?

During Business Hours:

 [facility] Cardiology Office at the [facility]: [phone]
[phone] (Monday-Friday)

 [place]: [phone] (Monday-Friday)

 [place]: [phone] (Monday-Friday)

 [place]: [phone] (Tuesday and Thursday)

 [place]: [phone] (Monday-Friday)

 [place]: [phone] (Monday-Friday)

 [place]: [phone] (Monday-Thursday)

 [facility]: [phone] (Tuesday, Wednesday and Friday)

 [place]: [phone] (Tuesday, Wednesday and Friday)

 [facility]): [phone] (Monday, Wednesday and Friday)

Nights and Weekends

 [facility] Cardiology Office at the [facility]: [phone]
[phone]

This education is meant to serve as a resource to you and your family. It is 
not meant to be all inclusive. The members of the [facility] at [facility] Cardiology, [phone], will be glad to 
answer any questions you may have about this booklet or your procedure.





in this encounter



Medications at Time of Discharge







     



  Medication   Sig.   Disp.   Refills   Start Date   End Date

 

     



  atorvastatin (LIPITOR) 40   Take 40 mg by mouth    



  mg tablet   daily.    

 

     



  carisoprodol(+) (SOMA)   Take 350 mg by mouth at    



  350 mg tablet   bedtime daily.    

 

     



  clopiDOGrel (PLAVIX) 75   Take 75 mg by mouth    



  mg tablet   daily.    

 

     



  gabapentin (NEURONTIN)   Take 600 mg by mouth four    



  600 mg tablet   times daily.    

 

     



  gemfibrozil (LOPID) 600   Take 600 mg by mouth    



  mg tablet   twice daily.    

 

     



  HYDROcodone/acetaminophen   Take 1 tablet by mouth    



  (NORCO) 7.5/325 mg tablet   every 6 hours as needed    



   for Pain    

 

     



  metoprolol XL (TOPROL XL)   Take 25 mg by mouth    



  25 mg extended release   daily.    



  tablet     

 

     



  nitroglycerin (NITROSTAT)   Place 0.4 mg under tongue    



  0.4 mg tablet   every 5 minutes as needed    



   for Chest Pain. Max of 3    



   tablets, call 911.    

 

     



  Omega-3 Acid Ethyl Esters   Take 1 g by mouth three    



  1 gram cap   times daily.    

 

     



  omeprazole DR(+)   Take 20 mg by mouth daily    



  (PRILOSEC) 20 mg capsule   before breakfast.    

 

     



  sacubitril/valsartan   Take 1 tablet by mouth    



  (ENTRESTO) 24/26 mg   twice daily.    



  tablet     

 

     



  tiZANidine (ZANAFLEX) 4   Take 8 mg by mouth every    



  mg tabletIndications:   8 hours as needed.    



  MUSCLE SPASM   Indications: MUSCLE SPASM    

 

     



  warfarin (COUMADIN) 5 mg   Take 5 mg by mouth as    



  tabletIndications:   directed. Take 7.5 mg by    



  THROMBOTIC DISORDER   mouth on Mon and Wed.    



   Take 5 mg by mouth on    



   , Tues, Thurs, Fri,    



   and Sat. Take at bedtime.    



   Indications: THROMBOTIC    



   DISORDER    

 

     



  aspirin EC 81 mg   Take 1 tablet by mouth   90 tablet   3   2017



  tabletIndications:   daily for 7 days. Take    



  Coronary artery disease   with food.    



  involving native coronary     



  artery of native heart     



  with angina pectoris     



  (HCC), Ischemic     



  cardiomyopathy, NSTEMI     



  (non-ST elevated     



  myocardial infarction)     



  (HCC), Tobacco abuse     



as of this encounter



Progress Notes

* Higinio Valenzuela RN - 2017  8:52 AM CST



Cardiac Rehab Call Back Note:  Spoke with patient.  He will start [facility] in 
[place] this Monday.



Are you tolerating activity?Yes

Is pain controlled?Yes

Is appetite normal?Yes

Are you having symptoms of heart discomfort?No

Are you having signs of infection at your incision sites or groin site?No

Do you want outpt cardiac rehab?Yes





* Lachelle Russ RN - 2017  8:52 AM CST



I have reviewed the notes, assessment, and/or procedures performed by Mary Fong RN and concur with her/his documentation unless otherwise noted.

* Mary Fong RN - 2017  8:51 AM CST



Patient discharged to home with all belongings.  Discharge instructions, med 
reconciliation and home wound care instructions given and explained to patient 
and family both verbally and written.  Accompanied by family.  No complaints of 
pain or discomfort.   Bilateral groins  remains clean, dry, and intact with no 
evidence of a hematoma after ambulation.  Patient escorted to lobby via 
Transport.  Patient to follow up with [facility] Cardiology ([facility]) or on-call 
physician with any additional questions or concerns.  All contact numbers 
provided.  Patient and family acceptant of DC instuctions and report 
understanding to all information.

* Higinio Valenzuela RN - 2017  6:46 AM CST



Formatting of this note may be different from the original.

CARDIOPULMONARY REHABILITATION

INPATIENT ASSESSMENT



Cardiac Rehabilitation Staff: Fermin Valenzuela RN Discharge Date: 



Demographics

Pre-admit Dx:   Date of Admission: 2017   

Room: [address] :  1970 

Insurance: Primary: [facility]  Secondary: . 

Address: [address] [address]   Patient Phone:  [phone] (home)  

Marital Status:   Occupation: Construction/Labor 

ED Contact: Steffanie Huber  ED Phone #: [phone] 

CTS: NA  Cardiologist: Bhavin 



Cardiac Procedures and Events

 

  

PCI: 17

 

 

 

 

 



Risk Factors

 

BP: 97/54

Height: 162.6 cm (64.02")

Weight: 87.5 kg (192 lb 14.4 oz)

BMI (Calculated): 33.09 

 

Medical History

 has a past medical history of Coronary artery disease involving native 
coronary artery of native heart with angina pectoris (HCC) (2017); 
Coronary artery disease involving native coronary artery of native heart with 
angina pectoris (HCC) (2017); Ischemic cardiomyopathy; Mild mitral 
regurgitation (2017); Mild tricuspid regurgitation (2017); NSTEMI (
non-ST elevated myocardial infarction) ([facility]) (2017); Protein S deficiency 
([facility]) (2017); and Tobacco abuse (2017).



Labs

No results found for: CHOL, TRIG, HDL, LDL, HGBA1C, A1C, TNI



Heart Resource Manual Given: 17 



Teaching Completed: 17

 

Outpatient Cardiopulmonary Rehabilitation



OPCR: Yes



Referral Faxed to:   [place], [place]   Date Faxed: 17 (Currently enrolled.  
Update faxed to [facility])



Location: [place], [place]



If [facility], Sent to Staff:   



 



Higinio Valenzuela RN

2017





* Higinio Valenzuela RN - 2017  6:44 AM CST



Introduced self to patient and gave copy of the Heart Resource Manual 
pertaining to coronary interventions.  He is currently enrolled in the program 
at [facility] in [place], [place] and will continue when cleared by 
cardiologist.  I have faxed an update to [facility].

* Mary Fong, ARMIDA - 2017  5:57 PM CST



Formatting of this note may be different from the original.



 17 1720 17 1723 17 1724 

Vital Signs 

Pulse 67 (!) 30 (!) 0 

PVC / Minute 0 /min. 0 /min. 0 /min. 

Respirations 10 PER MINUTE 13 PER MINUTE 15 PER MINUTE 

SpO2 Pulse 67 (!) 42 (!) 43 

SpO2 96 % 96 % 98 % 

BP 98/56 (!) 69/32 --  

Mean NBP (Calculated) 67 MM HG 38 MM HG --  

 

 17 1725 17 1727 

Vital Signs 

Pulse 52 59 

PVC / Minute 1 /min. 0 /min. 

Respirations 14 PER MINUTE 13 PER MINUTE 

SpO2 Pulse (!) 52 60 

SpO2 98 % 96 % 

BP (!) 82/36 (!) 83/50 

Mean NBP (Calculated) 44 MM HG 58 MM HG 



During sheath pull patient vasovagaled. Dr. Levine assessed patient. Orders 
given for 250 bolus of NS. 0.5 mg atropine given by Anatoly HUFFMAN. Patient 
stabilized and vital signs returned to normal. Will continue to monitor and 
keep NS running at 75mL/hr per Dr. Levine. 

* Abner Trevizo MD - 2017  5:53 PM CST



Mr. Huber was seen and examined in [facility] after the  PCI with a full metal 
jacket stenting to the RCA extending into the right PLV.  During sheath pull, 
he was noted to have significant sinus pauses lasting 6 seconds.  He recovered 
promptly.  Hemodynamic stability is noted.  There was a transient episode of 
hypotension with a mean arterial pressure of 60 mmHg.  We are presently 
infusing normal saline at 75 cc/h for the next 6 hours to a total of 
approximately 500 cc.  His blood pressure has already improved to a mean 
arterial pressure of 70 mmHg.  He remains asymptomatic at this juncture.  
Bilateral sheaths 7 French have been removed with excellent hemostasis.  This 
appears to be a significant vagal response associated with sheath pull.  I do 
not suspect any bleeding at this point in time.  Please call me if his clinical 
status changes.



Abner Trevizo M.D.

Fellow in Interventional Cardiology

Beeper # [id number]



* Lachelle Russ RN - 2017  4:45 PM CST



I have reviewed the notes, assessment, and/or procedures performed by Mary Fong RN and concur with her/his documentation unless otherwise noted.

* Kimberlyn Tiwari PA-C - 2017  3:04 PM CST



S/p 3 TESFAYE to RCA. Recent NSTEMI in Nov s/p TESFAYE to OM. 

ASA 81 mg daily for 1 week. Plavix 75 mg daily for 1 year w/o interruption to 
prevent stent thrombosis and possible myocardial infarction. 

Resume warfarin tonight. No bridging is recommended per Dr. Delcid. 

He recently filled all his medications and does not wish refills at this time. 

He will continue to wear his LifeVest till his follow up appointment. Iam Miguel and Jordi are planning repeat Echo in January. 

DC home in am. 

F/u with Primary cardiologist as already scheduled or sooner if needed. 

Maximize treatment for LVD with GDMT as pt. Can tolerate, renal function allows 
and BP permits.  



Kimberlyn Tiwari PA-C (pgr [phone])





* Jo Rogers RT - 2017 11:12 AM CST



Formatting of this note may be different from the original.

RESPIRATORY THERAPY

ADULT PROTOCOL EVALUATION



RESPIRATORY PROTOCOL PLAN



Medications

 



Note: If indicated by protocol, medication orders will be placed by therapist.



Procedures

IPPB: Place a nursing order for "IS Q1h While Awake" for any of Lung Expansion 
indicators

Oxygen/Humidity: O2 to keep SpO2 > 95%

Monitoring: Pulse oximetry BID & PRN



 

_____________________________________________________________



PATIENT EVALUATION RESULTS



Chart Review

* Pulmonary Hx: Smoker in home OR smoking cessation > 8 weeks (former smoker)



* Surgical Hx: General surgery (cough & sigh not affected)



* Chest X-Ray: Clear OR not available



* PFT/Oxygenation: FEV1, PEFR < 70% OR Pa02 < 70 RA OR Sp02 <92% RA OR Fi02 > 
0.21 to keep Sp02 > 92% OR < 24 hours post-op (02 & oxim) OR chronic C02 
retention (C02) (will be < 24 hours post op)



Patient Assessment

* Respiratory Pattern: Regular pattern and rate OR good chest excursion with 
deep breathing



* Breath Sounds: Clear apically, but diminished in bases (LE) OR CHF related 
crackles (02) (oximetry)



* Cough / Sputum: Strong, effective cough OR nonproductive



* Mental Status: Alert, oriented, cooperative



* Activity Level: Ambulatory with assistance



Priority Index

Total Points: 6 Points

* Priority Index: 1



PRIORITY INDEX GUIDELINES*

Priority Points 

1 0-9 points 

2 9-18 points 

3 > 18 points 

+ Pulm Dx or Home Rx 

*Higher points indicate higher acuity.



Therapist: Jo Rogers, RT

Date: 2017



Key

AC=Airway clearance

AM=Aerosolized medication

BA=Kaufman aerosol

DB&C=Deep breathe & cough

FEV1=Forced expiratory volume in first second)

IC=Inspiratory capacity

LE=Lung expansion

MDI=Metered dose inhaler

Neb=Nebulizer

O2=Oxygen

Oxim=Oximetry

PEFR=Peak expiratory flow rate

RRT=Rapid Response Team





* Mary Fong RN - 2017  8:01 AM CST



Patient arrived on unit via ambulation accompanied by RN. Patient transferred 
to the bed without assistance.  Assessment completed, refer to flowsheet for 
details. Orders released, reviewed, and implemented as appropriate. Oriented to 
surroundings, call light within reach. Plan of care reviewed.  Will continue to 
monitor and assess.

in this encounter



H&P Notes

* Kimberlyn Tiwari PA-C - 2017  9:37 AM CST



Formatting of this note may be different from the original.

The original H and P below was performed by Dr. Delcid.



Kimberlyn Tiwari PA-C (pgr [phone])

Cande Delcid MD 

Cardiology 

 

Hide copied text

Hover for attribution information

Date of Service: 2017



Niko Huber is a 47 y.o. male.   



HPI

 

Mr. Huber is a 47-year-old male with a history of coronary artery disease who 
had a non-ST elevation myocardial infarction back in November at that time he 
successfully underwent stenting with a drug-eluting stent to the obtuse 
marginal.  They utilized a 2.5 x 26 mm resolute integrity stent.  In addition, 
he has a chronic total occlusion of the right coronary artery which is occluded 
proximally.  He has good left-to-right collateralization to the posterior 
descending and posterior lateral branches.  Of concern, his ejection fraction 
is severely impaired estimated at around 30% and he currently has a LifeVest.  
He has a previous myocardial perfusion study suggesting viability in the 
inferior wall and given his age and LV impairment, it was discussed and elected 
to go ahead and proceed with percutaneous revascularization to give him every 
opportunity to recover.  Currently, he has been noting intermittent chest 
discomfort which occurs with activity and at rest.  He is on aspirin, Plavix 
and warfarin and is tolerated this without any active bleeding issues.  He is 
taking warfarin due to a history of a pulmonary embolus per his report with the 
addition of aspirin and Plavix given his recent coronary event.



 



  

Vitals: 

 17 0718 

BP: 116/68 

Pulse: 79 

Weight: 87.1 kg (192 lb) 

Height: 1.626 m (5' 4") 



Body mass index is 32.96 kg/(m^2). 



Past Medical History

    

Patient Active Problem List 

 Diagnosis Date Noted 

 Coronary artery disease involving native coronary artery of native heart 
with angina pectoris (HCC) 2017 - NSTEMI at [address], [place]. Successful 
PCI with a Resolute Integrity 2.5 x 26 mm stent to the OM with Dr. Miguel. 
Unsuccessful PCI to the RCA . Pt referred to Dr. Delcid for possible  
procedure. 



16 - Nuclear stress test ([facility]): No ischemia or 
arrhythmia on EKG. Diaphragmatic attenuation with reversible ischemia involving 
the mid to apical inferior wall and inferolateral wall. Normal left ventricular 
size with mild hypokinesis at the lateral wall. EF 50%. 



Previous history of Promus stent placement to  - date unknown.  

 NSTEMI (non-ST elevated myocardial infarction) ([facility]) 2017 at [facility] in [place], [place].  

 Tobacco abuse 2017 

 Ischemic cardiomyopathy 2017 - Echo ([facility]): EF 30-35%. Left ventricular 
cavity size increased. Wall thickness normal. Regional wall motion 
abnormalities. Akinesis of the inferior myocardium. Akinesis of the lateral 
myocardium. 



17 - NSTEMI - EF 20%, severe left ventricular systolic function (per cath 
report). Life Vest applied for primary prevention of sudden cardiac death.  

 Mild mitral regurgitation 2017 

 Mild tricuspid regurgitation 2017 

 Protein S deficiency ([facility]) 2017 

  On warfarin.  







Review of Systems 

Constitution: Negative. 

HENT: Negative.  

Eyes: Negative.  

Cardiovascular: Positive for chest pain. 

Respiratory: Negative.  

Endocrine: Negative.  

Hematologic/Lymphatic: Negative.  

Skin: Negative.  

Musculoskeletal: Negative.  

Gastrointestinal: Negative.  

Genitourinary: Negative.  

Neurological: Negative.  

Psychiatric/Behavioral: Negative.  

Allergic/Immunologic: Negative.  



Social History 



Social History 

 Marital status:  

  Spouse name: N/A 

 Number of children: N/A 

 Years of education: N/A 



Social History Main Topics 

 Smoking status: Current Every Day Smoker 

 Smokeless tobacco: None 

 Alcohol use No 

 Drug use: No 

 Sexual activity: Not Asked 



Other Topics Concern 

 None 



Social History Narrative 



History reviewed. No pertinent surgical history.



Physical Exam

Physical Exam 

General Appearance: alert and oriented, no acute distress

Skin: warm, moist, no ulcers

Head: normocephalic, symmetric

Eyes: EOMI, PERRL, sclera are clear and without icterus

ENT: unremarkable, nares patent

Neck Veins: neck veins are flat, neck veins are not distended

Carotid Arteries: normal carotid upstroke bilaterally, no bruits

Chest Inspection: chest is normal in appearance

Auscultation/Percussion: lungs clear to auscultation, no rales, rhonchi, 
wheezes or friction rub appreciated

Cardiac Rhythm: regular rhythm and normal rate

Cardiac Auscultation: Normal S1 & S2, no S3 or S4, no rub - normal pmi

Murmurs: no cardiac murmurs 

Extremities: no lower extremity edema bilaterally; 2+ symmetric distal pulses

Muskuloskeletal: no obvious deformity

Abdominal Exam: soft, non-tender, no masses, bowel sounds normal

Neurologic Exam: neurological assessment grossly intact

Mood and Affect: Appropriate





Cardiovascular Studies

ECG - NSR, PVC - no Q waves



Problems Addressed Today

   

Encounter Diagnoses 

Name Primary? 

 Coronary artery disease involving native coronary artery of native heart 
with angina pectoris (HCC) Yes 

 Ischemic cardiomyopathy  

 Mild mitral regurgitation  





Assessment and Plan

 

1. Chronic total occlusion of the proximal right coronary artery with prominent 
left to right collateralization to the posterior descending and posterior 
lateral branches -he is referred for complex percutaneous intervention.  I 
discussed the risks and benefits and will plan to go ahead and proceed.  We 
will obtain bilateral groin access and will likely require a retrograde 
approach given the anatomy noted on his angiograms.  We will plan to obtain an 
INR this morning to ensure that his INR is less than 1.9.  We will plan to keep 
you informed this results of his upcoming procedure. 



 Thank you for allowing us to participate in his care.

 





Current Medications (including today's revisions)

 aspirin EC 81 mg tablet Take 81 mg by mouth daily. Take with food. 

 atorvastatin (LIPITOR) 40 mg tablet Take 40 mg by mouth daily. 

 carisoprodol(+) (SOMA) 350 mg tablet Take 350 mg by mouth at bedtime daily. 

 clopiDOGrel (PLAVIX) 75 mg tablet Take 75 mg by mouth daily. 

 gabapentin (NEURONTIN) 600 mg tablet Take 600 mg by mouth four times daily. 

 gemfibrozil (LOPID) 600 mg tablet Take 600 mg by mouth twice daily. 

 metoprolol XL (TOPROL XL) 25 mg extended release tablet Take 25 mg by mouth 
daily. 

 nitroglycerin (NITROSTAT) 0.4 mg tablet Place 0.4 mg under tongue every 5 
minutes as needed for Chest Pain. Max of 3 tablets, call 911. 

 Omega-3 Acid Ethyl Esters 1 gram cap Take 1 g by mouth three times daily. 

 omeprazole DR(+) (PRILOSEC) 20 mg capsule Take 20 mg by mouth daily before 
breakfast. 

 tiZANidine (ZANAFLEX) 4 mg tablet Take 8 mg by mouth every 8 hours as 
needed. Indications: MUSCLE SPASM 

 warfarin (COUMADIN) 5 mg tablet Take 5 mg by mouth as directed. Take 7.5 mg 
by mouth on Mon and Wed. Take 5 mg by mouth on , Tues, Thurs, Fri, and 
Sat. Take at bedtime.  Indications: THROMBOTIC DISORDER 



 

 





in this encounter



Procedure Notes

* Abner Trevizo MD - 2017  3:12 PM CST



Associated Order(s): CARDIAC CATH REPORT



[facility]



CARDIAC CATHETERIZATION REPORT

Page 3

NIKO VENTURA :  1970

KU#: [id number]



 

NEVAE[facility] MR #/Billing ID #:  [id number] / [id number]

DATE:  2017

CARDIOLOGIST:  Cande Delcid MD

DICTATING PROVIDER: Abner Trevizo MD

REFERRING PHYSICIAN:  RAIZA CHAMPAGNE



INTERVENTIONAL CARDIOLOGY FELLOW:  Abner Trevizo MD.



PROCEDURES PERFORMED:  

1. Selective right and left coronary angiograms with dual arterial injections.

2. Bilateral groin accesses, both 7-French common femoral arterial.

3. Dual coronary injections.

4. Chronic Total Occlusion () PCI of the proximal right RCA extending into 
the right PLV with 3 drug-eluting stents in overlapping fashion with antegrade 
wire escalation technique.

5. Right common femoral angiogram, limited.



INDICATION FOR THE PROCEDURE:  Niko Huber is a pleasant, 47-year-old 
gentleman with a history of recent non-ST-elevation MI, status post PCI to his 
left circumflex extending into his obtuse marginal with a Resolute Integrity 
stent from an outside institution.  There was an attempted  antegrade PCI on 
his RCA , which was unsuccessful. We would like to perform an invasive 
evaluation of his coronary anatomy with an intent to revascularize with the 
retrograde approach, given the fact that the antegrade cap was very ambiguous, 
based on outside hospital films.



CONSENT:  Risks, benefits, and alternatives of the procedure were explained to 
the patient by both Dr. Delcid and myself.  Risks of access site complications, 
bleeding, arterial dissection, death, contrast nephropathy, and radiation 
hazards were explained to the patient, who verbalized understanding of these 
conditions and consented to the procedure.  A written, informed consent has 
been placed in the chart as well.



DETAILS OF THE PROCEDURE:  The patient was brought in the cath lab in the 
fasting, nonsedated state.  After giving the patient a total of 225 mcg of 
fentanyl and 5 mg of Versed, we achieved moderate conscious sedation, which was 
monitored and maintained throughout the procedure for a total of 126 minutes.  
Respiratory, hemodynamic, and neurological parameters were monitored throughout 
the procedure by the operators and by the cath lab staff. 



The patient was prepped and draped in sterile fashion.  We exposed bilateral 
groins and prepped with 1% chlorhexidine and instilled a total of 20 mL of 1% 
lidocaine for good local anesthesia in both groins. 



We then gained access into the right common femoral artery using a 
micropuncture needle and modified Seldinger technique to insert a 7-French 10 
cm arterial sheath with good blood flow return.  Similar technique was adopted 
to gain access to the left common femoral artery with the same 7-French 10 cm 
arterial sheath.  We went in with the intent to perform a retrograde  PCI, 
and hence, we obtained dual coronary injections.



CORONARY ANGIOGRAM:  

1. The left main arises normally from the left coronary cusp and is free from 
angiographic evidence of disease.  It bifurcates into a left anterior 
descending artery, as well as a left circumflex artery.

2. The left anterior descending artery arises normally from the left main.  Its 
proximal, mid, and distal portions are free from disease, except for 30% 
stenosis in the mid segment.  The left main is also free from disease.  It 
gives rise to 1 diagonal branch, the LAD, and it is also free from disease.

3. The left circumflex artery arises normally from the left main.  Its proximal
, mid, and distal portions are free from disease.  It has a previously placed 
stent extending from the proximal circumflex, extending into the OM, is widely 
patent without any thrombosis or in-stent restenosis.

4. The right coronary artery arises normally from the right coronary cusp, and 
its proximal portion has 40% to 50% diffuse disease, followed by a long chronic 
total occlusion segment extending from the mid RCA to the PLV.  There was 
collateral flow from the LAD to the RPLV territory, and also from the 
circumflex artery as well.  We noted that on outside hospital films there was 
an abrupt cutoff of the proximal RCA with a very ambiguous cap; however, on our 
films, we noted an extra RV marginal branch which was filling in, though we 
could not localize the true nature of the proximal cap.  Hence, we decided to 
adopt a retrograde approach initially.



DETAILS OF THE PERCUTANEOUS CORONARY INTERVENTION to the RCA (Full metal Jacket
) (CHRONIC TOTAL OCCLUSION):  

1. We used an EBU 3.75, 7-French guide catheter to engage the left main, while 
we used an AL1, 7-French guide catheter to engage the RCA for dual arterial 
injections.

2. We then introduced an 0.014 x 300 cm Fielder FC wire with a 150 cm Corsair 
microcatheter and tried to get across a couple of septals.  We were able to 
track through the septals pretty well; however, the anatomy for reentry into 
the PLV/PDA was not favorable.  After multiple attempts through different 
septals, we were not able to gain access into the right PLV or PDA segments, 
even despite tracking through the septals pretty well through both the Fielder 
FC, as well as the Corsair; hence, we switched to an antegrade approach.

3. We then switched the AL1, 7-French guide for a Hockey-Stick 1, 6-French guide
, given the fact that an AL1 was a little too big to engage the RCA, given the 
size of the aortic root.  The Hockey-Stick 1 gave us moderate seating and 
support throughout the entire procedure.  We then introduced an 0.014 x 300 cm 
Fielder FC wire over a 150 cm Corsair into the right coronary artery to switch 
to an antegrade wire escalation approach.  We were able to make very little 
progress across the proximal cap, which was very ambiguous with a Fielder FC 
wire.  We then switched for an 0.009 tapering into an 0.014 x 300 cm Fielder XT 
wire and tried to make some progress across the mid RCA, and we tracked the 
Corsair across it.  Though we were able to get into the distal RCA, we were 
unsure whether we were in the true lumen or not.  We then switched out for a 
 200, 0.014 x 300 cm wire and got across the mid to distal RCA with 
moderate amount of difficulty.  We were unsure again whether we were at the 
true lumen or not.  Hence, we took a dual injection on the LAD, which 
demonstrated that our wire was indeed in the right PLV.  After this, we tracked 
the Corsair down into the right PLV and switched out the  200 wire for an 
0.014 x 300 cm Luge wire.  We then tracked the Corsair out and introduced a 2.0 
x 30 mm Emerge RX balloon and performed balloon angioplasty for a total of 6 
different inflations, starting from the right PLV, extending into the proximal 
or ostial segment of the RCA, all of which were 8 atmospheres, lasting 20-30 
seconds.  We got moderate amount of expansion with this.  We were then easily 
able to deliver a 2.25 x 38 mm Synergy drug-eluting stent  extending into the 
distal RPLV and the proximal edge of the stent into the distal portion of the 
right coronary artery.  The stent was deployed at 8 atmospheres for a total of 
26 seconds.  We then overlapped this proximally with a 2.25 x 38 mm Syndergy 
TESFAYE (10 carloz for 8 seconds).  We then deployed a 2.5 x 32 mm Synergy TESFAYE at 10 
atmospheres, lasting 25 seconds in overlapping fashion in the ostium to the 
midportion of the RCA.  We got excellent angiographic results.  After this, we 
noticed that the distal edge of the distal stent in the right PLV was oversized
, compared to the rest of the RCA.  This could have been an element of spasm.  
Hence, we gave the patient a total of 200 mcg of Cardene and 300 mcg of 
nitroglycerin.  Even after vasodilatation, we noticed that the distal edge was 
a little pinched.  Hence, we decided to dilate this using a 2.0 x 15 mm MINI 
TREK RX balloon for 10 atmospheres for 24 seconds.  Excellent angiographic 
results were achieved at the distal edge of the stent with restoration of MARIA LUISA-
3 flow to the PLV.  We then removed the stent and postdilated both the stents 
using a 2.75 x 20 mm TREK NC balloon for a total of 6 different inflations, 
lasting 16 atmospheres for at least 16 seconds.  Excellent stent expansion and 
apposition were achieved.  There was no evidence of edge dissection or distal 
embolization.  MARIA LUISA-3 flow was restored to the PDA and the PLV territories.  
Excellent flow was noted across the stent.  Good stent expansion and apposition 
were achieved.  Wire-out frames confirmed the above findings as well. 

The patient tolerated the procedure very well without any evidence of 
hemodynamic complications, and was transferred out of the cath lab in stable 
condition without any chest pain. 



Dr. Delcid, my attending, was scrubbed and performed key portions of the 
procedure and supervised the rest of it as well.



TOTAL CONTRAST USED:  340 mL.



TOTAL AIR KERMA:  3528 mGy. 



Right common femoral angiogram demonstrated a high bifurcation of the right side
, and hence, we opted for manual compression method.



LESION CHARACTERISTICS:  

1. RCA  ACC/AHA type C lesion.

2. MARIA LUISA 0 flow was noted preprocedure, and MARIA LUISA-3 flow was restored after the 
procedure.



IMPRESSION:  

1. Successful chronic total occlusion and revascularization of the proximal RCA 
with antegrade wire escalation technique, extending into the right PLV with 3 
Celestine (all Synergy - distal 2.25 x 38 mm, mid 2.25 x 38 mm and proximal 2.5 x 32 
mm) in overlapping fashion with excellent angiographic results.

2. Aspirin, Plavix, and Coumadin therapy, given the fact that the patient had a 
recent pulmonary embolism and needs Coumadin therapy.  Once the INR reaches 
therapeutic level, we recommend continuing Plavix and Coumadin x 12 months.



Cande Delcid MD



PCG/MedQ

DD:  2017 15:12:35  DT:  2017 16:09:41

Job #:  [id number]



cc: 

  - RAIZA CHAMPAGNE 



in this encounter



Plan of Treatment





Not on fileas of this encounter



Procedures







    



  Procedure Name   Priority   Date/Time   Associated Diagnosis   Comments

 

    



  TELEMETRY STRIPS-SCAN    2017    Results for this



    2:42 PM CST    procedure are in the



      results section.

 

    



  PROCEDURE RECORD-SCAN    2017    Results for this



    1:47 PM CST    procedure are in the



      results section.

 

    



  ECG-SCAN    2017    Results for this



    10:46 AM CST    procedure are in the



      results section.

 

    



  ECG-SCAN    2017    Results for this



    7:30 AM CST    procedure are in the



      results section.

 

    



  ECG-SCAN    2017    Results for this



    9:40 PM CST    procedure are in the



      results section.



in this encounter



Results

* TELEMETRY STRIPS-SCAN (2017  2:42 PM)





 Narrative

 

 



Ordered by an unspecified provider.





* PROCEDURE RECORD-SCAN (2017  1:47 PM)





 Narrative

 

 



Ordered by an unspecified provider.





* ECG-SCAN (2017 10:46 AM)





 Narrative

 

 



Ordered by an unspecified provider.





* CARDIAC CATH REPORT (2017 10:36 AM)





 



  Specimen   Performing Laboratory

 

 



   OTHER OUTSIDE LAB









 Procedure Note

 

 



Abner Trevizo MD - 2017  3:12 PM CST



[facility]



CARDIAC CATHETERIZATION REPORT

Page 3

NIKO VENTURA :  1970

KU#: [id number]







 

[facility] MR #/Billing ID #:  [id number] / [id number]

DATE:  2017

CARDIOLOGIST:  Cande Delcid MD

DICTATING PROVIDER: Abner Trevizo MD

REFERRING PHYSICIAN:  RAIZA CHAMPAGNE



INTERVENTIONAL CARDIOLOGY FELLOW:  Abner Trevizo MD.



PROCEDURES PERFORMED:  

 1. Selective right and left coronary angiograms with dual arterial injections.

 2. Bilateral groin accesses, both 7-French common femoral arterial.

 3. Dual coronary injections.

 4. Chronic Total Occlusion () PCI of the proximal right RCA extending into 
the right PLV with 3 drug-eluting stents in overlapping fashion with antegrade 
wire escalation technique.

 5. Right common femoral angiogram, limited.



INDICATION FOR THE PROCEDURE:  Niko Huber is a pleasant, 47-year-old 
gentleman with a history of recent non-ST-elevation MI, status post PCI to his 
left circumflex extending into his obtuse marginal with a Resolute Integrity 
stent from an outside institution.  There was an attempted  antegrade PCI on 
his RCA , which was unsuccessful. We would like to perform an invasive 
evaluation of his coronary anatomy with an intent to revascularize with the 
retrograde approach, given the fact that the antegrade cap was very ambiguous, 
based on outside hospital films.



CONSENT:  Risks, benefits, and alternatives of the procedure were explained to 
the patient by both Dr. Delcid and myself.  Risks of access site complications, 
bleeding, arterial dissection, death, contrast nephropathy, and radiation 
hazards were explained to the patient, who verbalized understanding of these 
conditions and consented to the procedure.  A written, informed consent has 
been placed in the chart as well.



DETAILS OF THE PROCEDURE:  The patient was brought in the cath lab in the 
fasting, nonsedated state.  After giving the patient a total of 225 mcg of 
fentanyl and 5 mg of Versed, we achieved moderate conscious sedation, which was 
monitored and maintained throughout the procedure for a total of 126 minutes.  
Respiratory, hemodynamic, and neurological parameters were monitored throughout 
the procedure by the operators and by the cath lab staff. 



The patient was prepped and draped in sterile fashion.  We exposed bilateral 
groins and prepped with 1% chlorhexidine and instilled a total of 20 mL of 1% 
lidocaine for good local anesthesia in both groins. 



We then gained access into the right common femoral artery using a 
micropuncture needle and modified Seldinger technique to insert a 7-French 10 
cm arterial sheath with good blood flow return.  Similar technique was adopted 
to gain access to the left common femoral artery with the same 7-French 10 cm 
arterial sheath.  We went in with the intent to perform a retrograde  PCI, 
and hence, we obtained dual coronary injections.



CORONARY ANGIOGRAM:  

 1. The left main arises normally from the left coronary cusp and is free from 
angiographic evidence of disease.  It bifurcates into a left anterior 
descending artery, as well as a left circumflex artery.

 2. The left anterior descending artery arises normally from the left main.  
Its proximal, mid, and distal portions are free from disease, except for 30% 
stenosis in the mid segment.  The left main is also free from disease.  It 
gives rise to 1 diagonal branch, the LAD, and it is also free from disease.

 3. The left circumflex artery arises normally from the left main.  Its proximal
, mid, and distal portions are free from disease.  It has a previously placed 
stent extending from the proximal circumflex, extending into the OM, is widely 
patent without any thrombosis or in-stent restenosis.

 4. The right coronary artery arises normally from the right coronary cusp, and 
its proximal portion has 40% to 50% diffuse disease, followed by a long chronic 
total occlusion segment extending from the mid RCA to the PLV.  There was 
collateral flow from the LAD to the RPLV territory, and also from the 
circumflex artery as well.  We noted that on outside hospital films there was 
an abrupt cutoff of the proximal RCA with a very ambiguous cap; however, on our 
films, we noted an extra RV marginal branch which was filling in, though we 
could not localize the true nature of the proximal cap.  Hence, we decided to 
adopt a retrograde approach initially.



DETAILS OF THE PERCUTANEOUS CORONARY INTERVENTION to the RCA (Full metal Jacket
) (CHRONIC TOTAL OCCLUSION):  

 1. We used an EBU 3.75, 7-French guide catheter to engage the left main, while 
we used an AL1, 7-French guide catheter to engage the RCA for dual arterial 
injections.

 2. We then introduced an 0.014 x 300 cm Fielder FC wire with a 150 cm Corsair 
microcatheter and tried to get across a couple of septals.  We were able to 
track through the septals pretty well; however, the anatomy for reentry into 
the PLV/PDA was not favorable.  After multiple attempts through different 
septals, we were not able to gain access into the right PLV or PDA segments, 
even despite tracking through the septals pretty well through both the Fielder 
FC, as well as the Corsair; hence, we switched to an antegrade approach.

 3. We then switched the AL1, 7-French guide for a Hockey-Stick 1, 6-French 
guide, given the fact that an AL1 was a little too big to engage the RCA, given 
the size of the aortic root.  The Hockey-Stick 1 gave us moderate seating and 
support throughout the entire procedure.  We then introduced an 0.014 x 300 cm 
Fielder FC wire over a 150 cm Corsair into the right coronary artery to switch 
to an antegrade wire escalation approach.  We were able to make very little 
progress across the proximal cap, which was very ambiguous with a Fielder FC 
wire.  We then switched for an 0.009 tapering into an 0.014 x 300 cm Fielder XT 
wire and tried to make some progress across the mid RCA, and we tracked the 
Corsair across it.  Though we were able to get into the distal RCA, we were 
unsure whether we were in the true lumen or not.  We then switched out for a 
 200, 0.014 x 300 cm wire and got across the mid to distal RCA with 
moderate amount of difficulty.  We were unsure again whether we were at the 
true lumen or not.  Hence, we took a dual injection on the LAD, which 
demonstrated that our wire was indeed in the right PLV.  After this, we tracked 
the Corsair down into the right PLV and switched out the  200 wire for an 
0.014 x 300 cm Luge wire.  We then tracked the Corsair out and introduced a 2.0 
x 30 mm Emerge RX balloon and performed balloon angioplasty for a total of 6 
different inflations, starting from the right PLV, extending into the proximal 
or ostial segment of the RCA, all of which were 8 atmospheres, lasting 20-30 
seconds.  We got moderate amount of expansion with this.  We were then easily 
able to deliver a 2.25 x 38 mm Synergy drug-eluting stent  extending into the 
distal RPLV and the proximal edge of the stent into the distal portion of the 
right coronary artery.  The stent was deployed at 8 atmospheres for a total of 
26 seconds.  We then overlapped this proximally with a 2.25 x 38 mm Syndergy 
TESFAYE (10 carloz for 8 seconds).  We then deployed a 2.5 x 32 mm Synergy TESFAYE at 10 
atmospheres, lasting 25 seconds in overlapping fashion in the ostium to the 
midportion of the RCA.  We got excellent angiographic results.  After this, we 
noticed that the distal edge of the distal stent in the right PLV was oversized
, compared to the rest of the RCA.  This could have been an element of spasm.  
Hence, we gave the patient a total of 200 mcg of Cardene and 300 mcg of 
nitroglycerin.  Even after vasodilatation, we noticed that the distal edge was 
a little pinched.  Hence, we decided to dilate this using a 2.0 x 15 mm MINI 
TREK RX balloon for 10 atmospheres for 24 seconds.  Excellent angiographic 
results were achieved at the distal edge of the stent with restoration of MARIA LUISA-
3 flow to the PLV.  We then removed the stent and postdilated both the stents 
using a 2.75 x 20 mm TREK NC balloon for a total of 6 different inflations, 
lasting 16 atmospheres for at least 16 seconds.  Excellent stent expansion and 
apposition were achieved.  There was no evidence of edge dissection or distal 
embolization.  MARIA LUISA-3 flow was restored to the PDA and the PLV territories.  
Excellent flow was noted across the stent.  Good stent expansion and apposition 
were achieved.  Wire-out frames confirmed the above findings as well. 

The patient tolerated the procedure very well without any evidence of 
hemodynamic complications, and was transferred out of the cath lab in stable 
condition without any chest pain. 



Dr. Delcid, my attending, was scrubbed and performed key portions of the 
procedure and supervised the rest of it as well.



TOTAL CONTRAST USED:  340 mL.



TOTAL AIR KERMA:  3528 mGy. 



Right common femoral angiogram demonstrated a high bifurcation of the right side
, and hence, we opted for manual compression method.



LESION CHARACTERISTICS:  

 1. RCA  ACC/AHA type C lesion.

 2. MARIA LUISA 0 flow was noted preprocedure, and MARIA LUISA-3 flow was restored after the 
procedure.



IMPRESSION:  

 1. Successful chronic total occlusion and revascularization of the proximal 
RCA with antegrade wire escalation technique, extending into the right PLV with 
3 Celestine (all Synergy - distal 2.25 x 38 mm, mid 2.25 x 38 mm and proximal 2.5 x 
32 mm) in overlapping fashion with excellent angiographic results.

 2. Aspirin, Plavix, and Coumadin therapy, given the fact that the patient had 
a recent pulmonary embolism and needs Coumadin therapy.  Once the INR reaches 
therapeutic level, we recommend continuing Plavix and Coumadin x 12 months.



Cande Delcid MD





PCG/MedQ

DD:  2017 15:12:35  DT:  2017 16:09:41

Job #:  [id number]



cc: 

  - RAIZA CHAMPAGNE 







* ECG-SCAN (2017  7:30 AM)





 Narrative

 

 



Ordered by an unspecified provider.





* CBC (2017  3:45 AM)





  



  Component   Value   Ref Range

 

  



  White Blood Cells   9.6   4.5 - 11.0 K/UL

 

  



  RBC   4.23 (L)   4.4 - 5.5 M/UL

 

  



  Hemoglobin   12.3 (L)   13.5 - 16.5 GM/DL

 

  



  Hematocrit   36.3 (L)   40 - 50 %

 

  



  MCV   85.8   80 - 100 FL

 

  



  MCH   29.1   26 - 34 PG

 

  



  MCHC   34.0   32.0 - 36.0 G/DL

 

  



  RDW   13.9   11 - 15 %

 

  



  Platelet Count   279   150 - 400 K/UL

 

  



  MPV   7.5   7 - 11 FL









 



  Specimen   Performing Laboratory

 

 



  Blood    MAIN LAB



   [address] [place] [address]





* BASIC METABOLIC PANEL (2017  3:45 AM)





  



  Component   Value   Ref Range

 

  



  Sodium   138   137 - 147 MMOL/L

 

  



  Potassium   3.9   3.5 - 5.1 MMOL/L

 

  



  Chloride   105   98 - 110 MMOL/L

 

  



  CO2   25   21 - 30 MMOL/L

 

  



  Anion Gap   8   3 - 12

 

  



  Glucose   107 (H)   70 - 100 MG/DL

 

  



  Blood Urea Nitrogen   12   7 - 25 MG/DL

 

  



  Creatinine   0.72   0.4 - 1.24 MG/DL

 

  



  Calcium   9.2   8.5 - 10.6 MG/DL

 

  



  eGFR Non African American   >60   >60 mL/min



   Comment: 



   The eGFR is not validated for use in drug dosing 



   adjustments.    Continue to use 



   estimated creatinine clearance per dosing 



   reference text.    Please contact the 



   Clinical Pharmacist for questions. 

 

  



  eGFR    >60   >60 mL/min



   Comment: 



   The eGFR is not validated for use in drug dosing 



   adjustments.    Continue to use 



   estimated creatinine clearance per dosing 



   reference text.    Please contact the 



   Clinical Pharmacist for questions. 









 



  Specimen   Performing Laboratory

 

 



  Blood    MAIN LAB



   [address] [place] [address]





* PROTIME INR (PT) (2017  3:45 AM)





  



  Component   Value   Ref Range

 

  



  INR   1.1   0.8 - 1.2









 



  Specimen   Performing Laboratory

 

 



  Blood    MAIN LAB



   [address] [place] [address]





* ECG-SCAN (2017  9:40 PM)





 Narrative

 

 



Ordered by an unspecified provider.





* POC ACTIVATED CLOTTING TIME (2017  4:49 PM)





  



  Component   Value   Ref Range

 

  



  Activated Clotting Time   162   s









 



  Specimen   Performing Laboratory

 

 



    MAIN LAB



   [address] [place] [address]





* POC ACTIVATED CLOTTING TIME (2017  4:18 PM)





  



  Component   Value   Ref Range

 

  



  Activated Clotting Time   186   s









 



  Specimen   Performing Laboratory

 

 



    MAIN LAB



   [address] [place] [address]





* POC ACTIVATED CLOTTING TIME (2017  4:00 PM)





  



  Component   Value   Ref Range

 

  



  Activated Clotting Time   183   s









 



  Specimen   Performing Laboratory

 

 



    MAIN LAB



   [address] [place] [address]





* POC ACTIVATED CLOTTING TIME (2017  2:55 PM)





  



  Component   Value   Ref Range

 

  



  Activated Clotting Time   400   s









 



  Specimen   Performing Laboratory

 

 



    MAIN LAB



   [address] [place] [address]





* POC ACTIVATED CLOTTING TIME (2017  2:34 PM)





  



  Component   Value   Ref Range

 

  



  Activated Clotting Time   251   s









 



  Specimen   Performing Laboratory

 

 



    MAIN LAB



   [address] [place] [address]





* POC ACTIVATED CLOTTING TIME (2017  2:08 PM)





  



  Component   Value   Ref Range

 

  



  Activated Clotting Time   349   s









 



  Specimen   Performing Laboratory

 

 



    MAIN LAB



   [address] [place] [address]





* POC ACTIVATED CLOTTING TIME (2017  1:35 PM)





  



  Component   Value   Ref Range

 

  



  Activated Clotting Time   337   s









 



  Specimen   Performing Laboratory

 

 



    MAIN LAB



   [address] [place] [address]





* POC ACTIVATED CLOTTING TIME (2017  1:05 PM)





  



  Component   Value   Ref Range

 

  



  Activated Clotting Time   370   s









 



  Specimen   Performing Laboratory

 

 



   [facility]



   [address]



   [place] [address]





in this encounter



Visit Diagnoses











  Diagnosis

 





  Coronary artery disease involving native coronary artery of native heart with 
angina pectoris (HCC)



  - Primary

 





  Ischemic cardiomyopathy

 





  Other specified forms of chronic ischemic heart disease

 





  NSTEMI (non-ST elevated myocardial infarction) (HCC)

 





  Acute myocardial infarction, subendocardial infarction, episode of care 
unspecified

 





  Tobacco abuse

 





  Tobacco use disorder



in this encounter



Admitting Diagnoses











  Diagnosis

 





  Chronic Total Occlusion

 





  CAD (coronary artery disease)



in this encounter



Administered Medications







     



  Medication Order   MAR Action   Action Date   Dose   Rate   Site

 

     



  aspirin chewable tablet 81 mg   Given   2017   81 mg  



  81 mg, Oral, DAILY, First dose on Mon    08:20 CST   



  17 at 1600, Until Discontinued     

 

  

 

     



  atorvastatin (LIPITOR) tablet 40 mg   Given   2017   40 mg  



  40 mg, Oral, DAILY, First dose on Mon    22:24 CST   



  17 at 1300, Until Discontinued,     



  Admission/Obs/Extended Recovery     









    



  Given   2017   40 mg  



   08:21 CST   









  

 

     



  carisoprodol(+) (SOMA) tablet 350 mg   Given   2017   350 mg  



  350 mg, Oral, AT BEDTIME DAILY, First    22:24 CST   



  dose on 17 at 2100, Until     



  Discontinued, Admission/Obs/Extended     



  Recovery     

 

  

 

     



  clopiDOGrel (PLAVIX) tablet 75 mg   Given   2017   75 mg  



  75 mg, Oral, DAILY, First dose on Tue    08:21 CST   



  17 at 0900, Until Discontinued,     



  Clopidogrel (PLAVIX) load given in cath     



  lab. This Medication can increase the     



  risk of bleeding and may need to be held     



  prior to surgery or invasive procedures.     



  Consult physician in advance.     

 

  

 

     



  gabapentin (NEURONTIN) capsule 600 mg   Given   2017   600 mg  



  600 mg, Oral, FOUR TIMES DAILY, First    15:47 CST   



  dose on 17 at 1300, Until     



  Discontinued, Admission/Obs/Extended     



  Recovery     









    



  Given   2017   600 mg  



   22:24 CST   

 

    



  Given   2017   600 mg  



   08:20 CST   









  

 

     



  HYDROcodone/acetaminophen (NORCO) 5/325   Given   2017   1 tablet  



  mg tablet 1 tablet    15:47 CST   



  1 tablet, Oral, EVERY  6 HOURS PRN,     



  Starting 17 at 1156, Until 17 at 1052, Pain PO, TOTAL     



  ACETAMINOPHEN DOSE NOT TO EXCEED 4GM     



  DAILY NOTE: This is a HIGH ALERT     



  Medication.     

 

  

 

     



  pantoprazole DR (PROTONIX) tablet 40 mg   Given   2017   40 mg  



  40 mg, Oral, DAILY, First dose on Mon    22:24 CST   



  17 at 2100, Until Discontinued, Do     



  not crush or chew tablet.     

 

  

 

     



  sacubitril/valsartan (ENTRESTO) 24/26 mg   Given   2017   1 tablet  



  tablet 1 tablet    08:21 CST   



  1 tablet, Oral, TWICE DAILY, First dose     



  on 17 at 0900, Until     



  Discontinued, Admission/Obs/Extended     



  Recovery     

 

  

 

     



  sodium chloride 0.9 %   infusion   Given - New   2017   1,000 mL   50 mL
/hr 



  1,000 mL, 1,000 mL, Intravenous, at 50   Bag   08:38 CST   



  mL/hr, CONTINUOUS, Starting 17     



  at 0815, Until 17 at 1458, If     



  EF is <40%, contact CCL Charge Nurse     



  [phone] before initiating fluid.     

 

  

 

     



  sodium chloride 0.9 %   infusion   Dose/Rate   2017    75 mL/hr 



  1,000 mL, Intravenous, at 75 mL/hr,   Change   15:41 CST   



  CONTINUOUS, Starting 17 at     



  1500, Until 17 at 0059, Once     



  patient out of bed, then saline lock and     



  DC IV fluid.     









    



  Bolus from Infusion   2017   250 mL   999 mL/hr 



   17:30 CST   

 

    



  Dose/Rate Verify   2017    75 mL/hr 



   17:45 CST   









  

 

     



  warfarin (COUMADIN) tablet 7.5 mg   Given   2017   7.5 mg  



  7.5 mg, Oral, TWO TIMES WEEKLY (Once per    22:25 CST   



  day on ), First dose on 17 at 2100, Until Discontinued,     



  NURSING: Provide patient with warfarin     



  education leaflet and video. Do not give     



  with cranberry juice. NOTE: This is a     



  HIGH ALERT Medication.     

 

  



in this encounter

## 2018-01-21 NOTE — XMS REPORT
Encounter Summary

 Created on: 2018



Cecile Francisco CARMEN

External Reference #: [id number]

: 1970

Sex: Male



Demographics







 Address  [address] [place]  [address]

 

 Home Phone  [phone]

 

 Preferred Language  English

 

 Marital Status  Unknown

 

 [employer] Affiliation  NON

 

 Race  White

 

 Ethnic Group  Not  or 





Author







 Author  [facility]

 

 Organization  [facility]

 

 Address  Unknown

 

 Phone  Unavailable







Support







 Name  Relationship  Address  Phone

 

 , Steffanie Huber  ECON  Unknown  [phone]







Care Team Providers







 Care Team Member Name  Role  Phone

 

  PCP  Unavailable







Encounter Details







    



  Date   Type   Department   Care Team   Description

 

    



  2017   Orders Only   [facility]   Shanell Hendrickson RN   
Chronic anticoagulation



    [address]    (Primary Dx)



    [address]  



    [place] [address]  



    [phone]  







Social History







    



  Tobacco Use   Types   Packs/Day   Years Used   Date

 

    



  Current Every Day Smoker    









   



  Alcohol Use   Drinks/Week   oz/Week   Comments

 

   



  No   









 



  Sex Assigned at Birth   Date Recorded

 

 



  Not on file 



as of this encounter



Plan of Treatment





Not on fileas of this encounter



Visit Diagnoses











  Diagnosis

 





  Chronic anticoagulation - Primary

 





  Long-term (current) use of anticoagulants



in this encounter

## 2018-01-21 NOTE — XMS REPORT
Clinical Summary

 Created on: 2018



Niko Huber

External Reference #: [id number]

: 1970

Sex: Male



Demographics







 Address  [address] [place]  [address]

 

 Home Phone  [phone]

 

 Preferred Language  English

 

 Marital Status  Unknown

 

 Latter day Affiliation  NON

 

 Race  White

 

 Ethnic Group  Not  or 





Author







 Author  [facility]

 

 Organization  [facility]

 

 Address  Unknown

 

 Phone  Unavailable







Support







 Name  Relationship  Address  Phone

 

 , Steffanie Huber  ECON  Unknown  [phone]







Care Team Providers







 Care Team Member Name  Role  Phone

 

  PCP  Unavailable







Source Comments

Some departments are not documenting in the electronic medical record.  If you 
do not see the information that you expected, contact Release of Information in 
the Health Information Management department at [phone] for further 
assistance in locating additional records.[facility]



Allergies







    



  Active Allergy   Reactions   Severity   Noted Date   Comments

 

    



  Penicillins   UNKNOWN   Low   2017 







Current Medications







      



  Prescription   Sig.   Disp.   Refills   Start   End Date   Status



      Date  

 

      



  atorvastatin (LIPITOR) 40   Take 40 mg by mouth       Active



  mg tablet   daily.     

 

      



  carisoprodol(+) (SOMA)   Take 350 mg by mouth at       Active



  350 mg tablet   bedtime daily.     

 

      



  gabapentin (NEURONTIN)   Take 600 mg by mouth four       Active



  600 mg tablet   times daily.     

 

      



  gemfibrozil (LOPID) 600   Take 600 mg by mouth       Active



  mg tablet   twice daily.     

 

      



  metoprolol XL (TOPROL XL)   Take 25 mg by mouth       Active



  25 mg extended release   daily.     



  tablet      

 

      



  Omega-3 Acid Ethyl Esters   Take 1 g by mouth three       Active



  1 gram cap   times daily.     

 

      



  tiZANidine (ZANAFLEX) 4   Take 8 mg by mouth every       Active



  mg tabletIndications:   8 hours as needed.     



  MUSCLE SPASM   Indications: MUSCLE SPASM     

 

      



  warfarin (COUMADIN) 5 mg   Take 5 mg by mouth as       Active



  tabletIndications:   directed. Take 7.5 mg by     



  THROMBOTIC DISORDER   mouth on Mon and Wed.     



   Take 5 mg by mouth on     



   , Tues, Thurs, Fri,     



   and Sat. Take at bedtime.     



   Indications: THROMBOTIC     



   DISORDER     

 

      



  omeprazole DR(+)   Take 20 mg by mouth daily       Active



  (PRILOSEC) 20 mg capsule   before breakfast.     

 

      



  nitroglycerin (NITROSTAT)   Place 0.4 mg under tongue       Active



  0.4 mg tablet   every 5 minutes as needed     



   for Chest Pain. Max of 3     



   tablets, call 911.     

 

      



  clopiDOGrel (PLAVIX) 75   Take 75 mg by mouth       Active



  mg tablet   daily.     

 

      



  sacubitril/valsartan   Take 1 tablet by mouth       Active



  (ENTRESTO) 24/26 mg   twice daily.     



  tablet      

 

      



  HYDROcodone/acetaminophen   Take 1 tablet by mouth       Active



  (NORCO) 7.5/325 mg tablet   every 6 hours as needed     



   for Pain     

 

      



  aspirin EC 81 mg   Take 1 tablet by mouth   90 tablet   3   20   



  tabletIndications:   daily for 7 days. Take     17   17 



  Coronary artery disease   with food.     



  involving native coronary      



  artery of native heart      



  with angina pectoris      



  ([facility]), Ischemic      



  cardiomyopathy, NSTEMI      



  (non-ST elevated      



  myocardial infarction)      



  ([facility]), Tobacco abuse      







Active Problems







 



  Problem   Noted Date

 

 



  CAD (coronary artery disease)   2017

 

 



  Coronary artery disease involving native coronary artery of native heart   



  with angina pectoris ([facility]) 

 

 



  Overview:



  17 - NSTEMI at [place], [place]. Successful PCI



  with a Resolute Integrity 2.5 x 26 mm stent to the OM with Dr. Miguel.



  Unsuccessful PCI to the RCA . Pt referred to Dr. Delcid for possible 



  procedure.





  16 - Nuclear stress test ([facility]): No ischemia or



  arrhythmia on EKG. Diaphragmatic attenuation with reversible ischemia



  involving the mid to apical inferior wall and inferolateral wall. Normal



  left ventricular size with mild hypokinesis at the lateral wall. EF 50%.





  Previous history of Promus stent placement to  - date unknown.

 

 



  NSTEMI (non-ST elevated myocardial infarction) ([facility])   2017

 

 



  Overview:



  17 at [place], [place].

 

 



  Tobacco abuse   2017

 

 



  Ischemic cardiomyopathy   2017

 

 



  Overview:



  17 - Echo ([facility]): EF 30-35%. Left ventricular cavity



  size increased. Wall thickness normal. Regional wall motion abnormalities.



  Akinesis of the inferior myocardium. Akinesis of the lateral myocardium.





  17 - NSTEMI - EF 20%, severe left ventricular systolic function (per



  cath report). Life Vest applied for primary prevention of sudden cardiac



  death.

 

 



  Mild mitral regurgitation   2017

 

 



  Mild tricuspid regurgitation   2017

 

 



  Protein S deficiency ([facility])   2017

 

 



  Overview:



  On warfarin.







Encounters







    



  Date   Type   Specialty   Care Team   Description

 

    



  2018   Telephone   Cardiology   Shanell Hendrickson RN   Records Request (
cath



      report faxed to Dr. Bacon's office)

 

    



  2017   Hospital   Cardiology   Tomasz Delcid MD   Coronary artery 
disease



  -   Encounter     involving native coronary



  2017      artery of native heart



      with angina pectoris



      (HCC)

 

    



  2017   Hospital   Lab   Audra Yue PAULIE PACKER   Atherosclerotic heart



   Encounter     disease of native



      coronary artery with



      unspecified angina



      pectoris (HCC)

 

    



  2017   Hospital   Cardiology   Tomasz Delcid MD 



   Encounter   

 

    



  2017   Office Visit   Cardiology   Tomasz Delcid MD   Cardiac Eval

 

    



  2017   Orders Only   Cardiology   Shanell Hendrickson RN   Chronic 
anticoagulation



      (Primary Dx)

 

    



  2017   Procedure Pass   Cardiology  

 

    



  2017   Surgery   Cardiology   Tomasz Delcid MD   Percutaneous Coronary



      Intervention of Chronic



      Total Occlusion Right



      Coronary Artery



      (Retrograde Approach)

 

    



  12/15/2017   Documentation   Cardiology   Shanell Hendrickson RN   Labs Only (CBC/
BMP (creat



      clearance 149.06) )

 

    



  2017   Documentation   Cardiology   Gooch, Duane, RN   Precertification



      (Approval for [facility]



      through [employer] [place])

 

    



  2017   Telephone   Cardiology   Shanell Hendrickson RN   Appointment (cath 
& OV



      same day)

 

    



  2017   Patient Profile   Cardiology   Shanell Hendrickson RN   New Patient (
patient



      profile)

 

    



  2017   Telephone   Cardiology   Shanell Hendrickson RN   Referral ( - Dr. Delcid)



from Last 3 Months



Social History







    



  Tobacco Use   Types   Packs/Day   Years Used   Date

 

    



  Current Every Day Smoker    









   



  Alcohol Use   Drinks/Week   oz/Week   Comments

 

   



  No   









 



  Sex Assigned at Birth   Date Recorded

 

 



  Not on file 







Last Filed Vital Signs







  



  Vital Sign   Reading   Time Taken

 

  



  Blood Pressure   99/58   2017  7:35 AM CST

 

  



  Pulse   76   2017  7:35 AM CST

 

  



  Temperature   37.1   C (98.7   F)   2017  6:15 AM CST

 

  



  Respiratory Rate   -   -

 

  



  Oxygen Saturation   97%   2017  7:35 AM CST

 

  



  Inhaled Oxygen   -   -



  Concentration  

 

  



  Weight   87.5 kg (192 lb 14.4 oz)   2017  8:13 AM CST

 

  



  Height   162.6 cm (5' 4.02")   2017  8:13 AM CST

 

  



  Body Mass Index   33.1   2017  8:13 AM CST







Plan of Treatment







   



  Health Maintenance   Due Date   Last Done   Comments

 

   



  PHYSICAL (COMPREHENSIVE)   1979  



  EXAM   

 

   



  PERTUSSIS VACCINE   1983  

 

   



  TETANUS VACCINE   1989  

 

   



  INFLUENZA VACCINE   2017  







Procedures







    



  Procedure Name   Priority   Date/Time   Associated Diagnosis   Comments

 

    



  TELEMETRY STRIPS-SCAN    2017    Results for this



    2:42 PM CST    procedure are in the



      results section.

 

    



  PROCEDURE RECORD-SCAN    2017    Results for this



    1:47 PM CST    procedure are in the



      results section.

 

    



  ECG-SCAN    2017    Results for this



    10:46 AM CST    procedure are in the



      results section.

 

    



  ECG-SCAN    2017    Results for this



    7:30 AM CST    procedure are in the



      results section.

 

    



  ECG-SCAN    2017    Results for this



    9:40 PM CST    procedure are in the



      results section.



from Last 3 Months



Results

* TELEMETRY STRIPS-SCAN (2017  2:42 PM)





 Narrative

 

 



Ordered by an unspecified provider.





* PROCEDURE RECORD-SCAN (2017  1:47 PM)





 Narrative

 

 



Ordered by an unspecified provider.





* ECG-SCAN (2017 10:46 AM)





 Narrative

 

 



Ordered by an unspecified provider.





* CARDIAC CATH REPORT (2017 10:36 AM)





 



  Specimen   Performing Laboratory

 

 



   OTHER OUTSIDE LAB









 Procedure Note

 

 



Abner Trevizo MD - 2017  3:12 PM CST



[facility]



CARDIAC CATHETERIZATION REPORT

Page 3

NIKO VENTURA :  1970

#: [id number]







 

 MR #/Billing ID #:  [id number] / [id number]

DATE:  2017

CARDIOLOGIST:  Tomasz Delcid MD

DICTATING PROVIDER: Abner Trevizo MD

REFERRING PHYSICIAN:  RAIZA FARRELL



INTERVENTIONAL CARDIOLOGY FELLOW:  Abner Trevizo MD.



PROCEDURES PERFORMED:  

 1. Selective right and left coronary angiograms with dual arterial injections.

 2. Bilateral groin accesses, both 7-French common femoral arterial.

 3. Dual coronary injections.

 4. Chronic Total Occlusion () PCI of the proximal right RCA extending into 
the right PLV with 3 drug-eluting stents in overlapping fashion with antegrade 
wire escalation technique.

 5. Right common femoral angiogram, limited.



INDICATION FOR THE PROCEDURE:  Niko Huber is a pleasant, 47-year-old 
gentleman with a history of recent non-ST-elevation MI, status post PCI to his 
left circumflex extending into his obtuse marginal with a Resolute Integrity 
stent from an outside institution.  There was an attempted  antegrade PCI on 
his RCA , which was unsuccessful. We would like to perform an invasive 
evaluation of his coronary anatomy with an intent to revascularize with the 
retrograde approach, given the fact that the antegrade cap was very ambiguous, 
based on outside hospital films.



CONSENT:  Risks, benefits, and alternatives of the procedure were explained to 
the patient by both Dr. Delcid and myself.  Risks of access site complications, 
bleeding, arterial dissection, death, contrast nephropathy, and radiation 
hazards were explained to the patient, who verbalized understanding of these 
conditions and consented to the procedure.  A written, informed consent has 
been placed in the chart as well.



DETAILS OF THE PROCEDURE:  The patient was brought in the cath lab in the 
fasting, nonsedated state.  After giving the patient a total of 225 mcg of 
fentanyl and 5 mg of Versed, we achieved moderate conscious sedation, which was 
monitored and maintained throughout the procedure for a total of 126 minutes.  
Respiratory, hemodynamic, and neurological parameters were monitored throughout 
the procedure by the operators and by the cath lab staff. 



The patient was prepped and draped in sterile fashion.  We exposed bilateral 
groins and prepped with 1% chlorhexidine and instilled a total of 20 mL of 1% 
lidocaine for good local anesthesia in both groins. 



We then gained access into the right common femoral artery using a 
micropuncture needle and modified Seldinger technique to insert a 7-French 10 
cm arterial sheath with good blood flow return.  Similar technique was adopted 
to gain access to the left common femoral artery with the same 7-French 10 cm 
arterial sheath.  We went in with the intent to perform a retrograde  PCI, 
and hence, we obtained dual coronary injections.



CORONARY ANGIOGRAM:  

 1. The left main arises normally from the left coronary cusp and is free from 
angiographic evidence of disease.  It bifurcates into a left anterior 
descending artery, as well as a left circumflex artery.

 2. The left anterior descending artery arises normally from the left main.  
Its proximal, mid, and distal portions are free from disease, except for 30% 
stenosis in the mid segment.  The left main is also free from disease.  It 
gives rise to 1 diagonal branch, the LAD, and it is also free from disease.

 3. The left circumflex artery arises normally from the left main.  Its proximal
, mid, and distal portions are free from disease.  It has a previously placed 
stent extending from the proximal circumflex, extending into the OM, is widely 
patent without any thrombosis or in-stent restenosis.

 4. The right coronary artery arises normally from the right coronary cusp, and 
its proximal portion has 40% to 50% diffuse disease, followed by a long chronic 
total occlusion segment extending from the mid RCA to the PLV.  There was 
collateral flow from the LAD to the RPLV territory, and also from the 
circumflex artery as well.  We noted that on outside hospital films there was 
an abrupt cutoff of the proximal RCA with a very ambiguous cap; however, on our 
films, we noted an extra RV marginal branch which was filling in, though we 
could not localize the true nature of the proximal cap.  Hence, we decided to 
adopt a retrograde approach initially.



DETAILS OF THE PERCUTANEOUS CORONARY INTERVENTION to the RCA (Full metal Jacket
) (CHRONIC TOTAL OCCLUSION):  

 1. We used an EBU 3.75, 7-French guide catheter to engage the left main, while 
we used an AL1, 7-French guide catheter to engage the RCA for dual arterial 
injections.

 2. We then introduced an 0.014 x 300 cm Fielder FC wire with a 150 cm Corsair 
microcatheter and tried to get across a couple of septals.  We were able to 
track through the septals pretty well; however, the anatomy for reentry into 
the PLV/PDA was not favorable.  After multiple attempts through different 
septals, we were not able to gain access into the right PLV or PDA segments, 
even despite tracking through the septals pretty well through both the Fielder 
FC, as well as the Corsair; hence, we switched to an antegrade approach.

 3. We then switched the AL1, 7-French guide for a Hockey-Stick 1, 6-French 
guide, given the fact that an AL1 was a little too big to engage the RCA, given 
the size of the aortic root.  The Hockey-Stick 1 gave us moderate seating and 
support throughout the entire procedure.  We then introduced an 0.014 x 300 cm 
Fielder FC wire over a 150 cm Corsair into the right coronary artery to switch 
to an antegrade wire escalation approach.  We were able to make very little 
progress across the proximal cap, which was very ambiguous with a Fielder FC 
wire.  We then switched for an 0.009 tapering into an 0.014 x 300 cm Fielder XT 
wire and tried to make some progress across the mid RCA, and we tracked the 
Corsair across it.  Though we were able to get into the distal RCA, we were 
unsure whether we were in the true lumen or not.  We then switched out for a 
 200, 0.014 x 300 cm wire and got across the mid to distal RCA with 
moderate amount of difficulty.  We were unsure again whether we were at the 
true lumen or not.  Hence, we took a dual injection on the LAD, which 
demonstrated that our wire was indeed in the right PLV.  After this, we tracked 
the Corsair down into the right PLV and switched out the  200 wire for an 
0.014 x 300 cm Luge wire.  We then tracked the Corsair out and introduced a 2.0 
x 30 mm Emerge RX balloon and performed balloon angioplasty for a total of 6 
different inflations, starting from the right PLV, extending into the proximal 
or ostial segment of the RCA, all of which were 8 atmospheres, lasting 20-30 
seconds.  We got moderate amount of expansion with this.  We were then easily 
able to deliver a 2.25 x 38 mm Synergy drug-eluting stent  extending into the 
distal RPLV and the proximal edge of the stent into the distal portion of the 
right coronary artery.  The stent was deployed at 8 atmospheres for a total of 
26 seconds.  We then overlapped this proximally with a 2.25 x 38 mm Syndergy 
TESFAYE (10 carloz for 8 seconds).  We then deployed a 2.5 x 32 mm Synergy TESFAYE at 10 
atmospheres, lasting 25 seconds in overlapping fashion in the ostium to the 
midportion of the RCA.  We got excellent angiographic results.  After this, we 
noticed that the distal edge of the distal stent in the right PLV was oversized
, compared to the rest of the RCA.  This could have been an element of spasm.  
Hence, we gave the patient a total of 200 mcg of Cardene and 300 mcg of 
nitroglycerin.  Even after vasodilatation, we noticed that the distal edge was 
a little pinched.  Hence, we decided to dilate this using a 2.0 x 15 mm MINI 
TREK RX balloon for 10 atmospheres for 24 seconds.  Excellent angiographic 
results were achieved at the distal edge of the stent with restoration of MARIA LUISA-
3 flow to the PLV.  We then removed the stent and postdilated both the stents 
using a 2.75 x 20 mm TREK NC balloon for a total of 6 different inflations, 
lasting 16 atmospheres for at least 16 seconds.  Excellent stent expansion and 
apposition were achieved.  There was no evidence of edge dissection or distal 
embolization.  MARIA LUISA-3 flow was restored to the PDA and the PLV territories.  
Excellent flow was noted across the stent.  Good stent expansion and apposition 
were achieved.  Wire-out frames confirmed the above findings as well. 

The patient tolerated the procedure very well without any evidence of 
hemodynamic complications, and was transferred out of the cath lab in stable 
condition without any chest pain. 



Dr. Delcid, my attending, was scrubbed and performed key portions of the 
procedure and supervised the rest of it as well.



TOTAL CONTRAST USED:  340 mL.



TOTAL AIR KERMA:  3528 mGy. 



Right common femoral angiogram demonstrated a high bifurcation of the right side
, and hence, we opted for manual compression method.



LESION CHARACTERISTICS:  

 1. RCA  ACC/AHA type C lesion.

 2. MARIA LUISA 0 flow was noted preprocedure, and MARIA LUISA-3 flow was restored after the 
procedure.



IMPRESSION:  

 1. Successful chronic total occlusion and revascularization of the proximal 
RCA with antegrade wire escalation technique, extending into the right PLV with 
3 Celestine (all Synergy - distal 2.25 x 38 mm, mid 2.25 x 38 mm and proximal 2.5 x 
32 mm) in overlapping fashion with excellent angiographic results.

 2. Aspirin, Plavix, and Coumadin therapy, given the fact that the patient had 
a recent pulmonary embolism and needs Coumadin therapy.  Once the INR reaches 
therapeutic level, we recommend continuing Plavix and Coumadin x 12 months.



Tomasz Delcid MD





PCG/MedQ

DD:  2017 15:12:35  DT:  2017 16:09:41

Job #:  [id number]



cc: 

  - RAIZA FARRELL 







* ECG-SCAN (2017  7:30 AM)





 Narrative

 

 



Ordered by an unspecified provider.





* PROTIME INR (PT) (2017  3:45 AM)



Only the most recent of 2 results within the time period is included.





  



  Component   Value   Ref Range

 

  



  INR   1.1   0.8 - 1.2









 



  Specimen   Performing Laboratory

 

 



  Blood    MAIN LAB



   [address] [place] [address]





* CBC (2017  3:45 AM)



Only the most recent of 2 results within the time period is included.





  



  Component   Value   Ref Range

 

  



  White Blood Cells   9.6   4.5 - 11.0 K/UL

 

  



  RBC   4.23 (L)   4.4 - 5.5 M/UL

 

  



  Hemoglobin   12.3 (L)   13.5 - 16.5 GM/DL

 

  



  Hematocrit   36.3 (L)   40 - 50 %

 

  



  MCV   85.8   80 - 100 FL

 

  



  MCH   29.1   26 - 34 PG

 

  



  MCHC   34.0   32.0 - 36.0 G/DL

 

  



  RDW   13.9   11 - 15 %

 

  



  Platelet Count   279   150 - 400 K/UL

 

  



  MPV   7.5   7 - 11 FL









 



  Specimen   Performing Laboratory

 

 



  Blood    MAIN LAB



   [address] [place] [address]





* BASIC METABOLIC PANEL (2017  3:45 AM)



Only the most recent of 2 results within the time period is included.





  



  Component   Value   Ref Range

 

  



  Sodium   138   137 - 147 MMOL/L

 

  



  Potassium   3.9   3.5 - 5.1 MMOL/L

 

  



  Chloride   105   98 - 110 MMOL/L

 

  



  CO2   25   21 - 30 MMOL/L

 

  



  Anion Gap   8   3 - 12

 

  



  Glucose   107 (H)   70 - 100 MG/DL

 

  



  Blood Urea Nitrogen   12   7 - 25 MG/DL

 

  



  Creatinine   0.72   0.4 - 1.24 MG/DL

 

  



  Calcium   9.2   8.5 - 10.6 MG/DL

 

  



  eGFR Non African American   >60   >60 mL/min



   Comment: 



   The eGFR is not validated for use in drug dosing 



   adjustments.    Continue to use 



   estimated creatinine clearance per dosing 



   reference text.    Please contact the 



   Clinical Pharmacist for questions. 

 

  



  eGFR    >60   >60 mL/min



   Comment: 



   The eGFR is not validated for use in drug dosing 



   adjustments.    Continue to use 



   estimated creatinine clearance per dosing 



   reference text.    Please contact the 



   Clinical Pharmacist for questions. 









 



  Specimen   Performing Laboratory

 

 



  Blood    MAIN LAB



   [address] [place] [address]





* ECG-SCAN (2017  9:40 PM)





 Narrative

 

 



Ordered by an unspecified provider.





* POC ACTIVATED CLOTTING TIME (2017  4:49 PM)



Only the most recent of 8 results within the time period is included.





  



  Component   Value   Ref Range

 

  



  Activated Clotting Time   162   s









 



  Specimen   Performing Laboratory

 

 



    MAIN LAB



   [address] [place] [address]





from Last 3 Months

## 2018-01-21 NOTE — XMS REPORT
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                       
           

 

 2015                         Ot            272.4                        
          

 

 2015                         Ot            401.9                        
          

 

 2015                         Ot            414.01                       
           

 

 2015                         Ot            401.9                        
          

 

 2015                         Ot            414.01                       
           

 

 2015                         Ot            428.0                        
          

 

 2015                         Ot            719.40                       
           

 

 2015                         Ot            782.3                        
          

 

 2015                         Ot            V58.61                       
           

 

 2015                         Ot            272.1                        
          

 

 2015                         Ot            414.00                       
           

 

 2015                         Ot            786.50                       
           

 

 2015                         Ot            397.0                        
          

 

 2015                         Ot            414.00                       
           

 

 2015                         Ot            424.0                        
          

 

 2015                         Ot            786.50                       
           

 

 2015                         Ot            272.4                        
          

 

 2015                         Ot            401.9                        
          

 

 2015                         Ot            414.01                       
           

 

 2015                         Ot            401.9                        
          

 

 2015                         Ot            414.01                       
           

 

 2015                         Ot            428.0                        
          

 

 2015                         Ot            719.40                       
           

 

 2015                         Ot            782.3                        
          

 

 2015                         Ot            V58.61                       
           

 

 2015            NORTH HERNANDEZ MD            Ot            V12.51      
                            

 

 2015            NORTH HERNANDEZ MD            Ot            V58.61      
                            

 

 2015            NORTH HERNANDEZ MD            Ot            V58.83      
                            

 

 2015            COLE CONTRERAS MD            Ot            274.9      
      GOUT NOS                     

 

 2015            COLE CONTRERAS MD            Ot            729.5      
      PAIN IN LIMB                     

 

 2015                         Ot            272.1                        
          

 

 2015                         Ot            414.00                       
           

 

 2015                         Ot            786.50                       
           

 

 2015                         Ot            397.0                        
          

 

 2015                         Ot            414.00                       
           

 

 2015                         Ot            424.0                        
          

 

 2015                         Ot            786.50                       
           

 

 2015                         Ot            272.4                        
          

 

 2015                         Ot            401.9                        
          

 

 2015                         Ot            414.01                       
           

 

 2015                         Ot            401.9                        
          

 

 2015                         Ot            414.01                       
           

 

 2015                         Ot            428.0                        
          

 

 2015                         Ot            719.40                       
           

 

 2015                         Ot            782.3                        
          

 

 2015                         Ot            V58.61                       
           

 

 2015            NORTH HERNANDEZ MD            Ot            V12.51      
                            

 

 2015            NORTH HERNANDEZ MD            Ot            V58.61      
                            

 

 2015            NORTH HERNANDEZ MD            Ot            V58.83      
                            

 

 08/10/2015            NORTH HERNANDEZ MD            Ot            V12.51      
                            

 

 08/10/2015            NORTH HERNANDEZ MD            Ot            V58.61      
                            

 

 08/10/2015            NORTH HERNANDEZ MD            Ot            V58.83      
                            

 

 2015            NORTH HERNANDEZ MD            Ot            V12.51      
                            

 

 2015            NORTH HERNANDEZ MD            Ot            V58.61      
                            

 

 2015            NORTH HERNANDEZ MD            Ot            V58.83      
                            

 

 2015            NORTH HERNANDEZ MD            Ot            V12.51      
      HX-VENOUS THROMBOSIS EMBOLISM                     

 

 2015            NORTH HERNANDEZ MD            Ot            V58.61      
      ANTICOAGULANTS,LT,CURRENT USE                     

 

 2015            NORTH HERNANDEZ MD            Ot            V58.83      
      ENCOUNTER FOR THERAPEUTIC DRUG MONITORIN                     

 

 2015                         Ot            270.4                        
          

 

 2015                         Ot            305.1                        
          

 

 2015                         Ot            536.8                        
          

 

 2015                         Ot            729.81                       
           

 

 2015                         Ot            V12.51                       
           

 

 2015                         Ot            V58.61                       
           

 

 2015                         Ot            V58.69                       
           

 

 2015                         Ot            270.4                        
          

 

 2015                         Ot            305.1                        
          

 

 2015                         Ot            536.8                        
          

 

 2015                         Ot            V12.51                       
           

 

 2015                         Ot            V58.61                       
           

 

 2015                         Ot            V58.69                       
           

 

 2015                         Ot            272.4                        
          

 

 2015                         Ot            729.5                        
          

 

 2015                         Ot            780.79                       
           

 

 2015                         Ot            270.4                        
          

 

 2015                         Ot            305.1                        
          

 

 2015                         Ot            536.8                        
          

 

 2015                         Ot            729.5                        
          

 

 2015                         Ot            V12.51                       
           

 

 2015                         Ot            V58.69                       
           

 

 2015                         Ot            270.4                        
          

 

 2015                         Ot            305.1                        
          

 

 2015                         Ot            536.8                        
          

 

 2015                         Ot            V12.51                       
           

 

 2015                         Ot            V58.69                       
           

 

 2015                         Ot            786.09                       
           

 

 2015                         Ot            786.50                       
           

 

 2015                         Ot            786.09                       
           

 

 2015                         Ot            786.50                       
           

 

 2015                         Ot            270.4                        
          

 

 2015                         Ot            305.1                        
          

 

 2015                         Ot            533.90                       
           

 

 2015                         Ot            V12.51                       
           

 

 2015                         Ot            272.4                        
          

 

 2015                         Ot            401.9                        
          

 

 2015                         Ot            414.00                       
           

 

 2015                         Ot            272.4                        
          

 

 2015                         Ot            356.9                        
          

 

 2015                         Ot            272.4                        
          

 

 2015                         Ot            729.5                        
          

 

 2015                         Ot            V58.61                       
           

 

 2015                         Ot            272.1                        
          

 

 2015                         Ot            414.00                       
           

 

 2015                         Ot            786.50                       
           

 

 2015                         Ot            397.0                        
          

 

 2015                         Ot            414.00                       
           

 

 2015                         Ot            424.0                        
          

 

 2015                         Ot            786.50                       
           

 

 2015                         Ot            272.4                        
          

 

 2015                         Ot            401.9                        
          

 

 2015                         Ot            414.01                       
           

 

 2015                         Ot            401.9                        
          

 

 2015                         Ot            414.01                       
           

 

 2015                         Ot            428.0                        
          

 

 2015                         Ot            719.40                       
           

 

 2015                         Ot            782.3                        
          

 

 2015                         Ot            V58.61                       
           

 

 2015                         Ot            272.1                        
          

 

 2015                         Ot            414.00                       
           

 

 2015                         Ot            786.50                       
           

 

 2015                         Ot            397.0                        
          

 

 2015                         Ot            414.00                       
           

 

 2015                         Ot            424.0                        
          

 

 2015                         Ot            786.50                       
           

 

 2015                         Ot            272.4                        
          

 

 2015                         Ot            401.9                        
          

 

 2015                         Ot            414.01                       
           

 

 2015                         Ot            401.9                        
          

 

 2015                         Ot            414.01                       
           

 

 2015                         Ot            428.0                        
          

 

 2015                         Ot            719.40                       
           

 

 2015                         Ot            782.3                        
          

 

 2015                         Ot            V58.61                       
           

 

 2016                         Ot            414.00            CORON 
ATHEROSCLER NOS TYPE VESSEL, NATIV                     

 

 2016                         Ot            786.50            CHEST PAIN 
NOS                     

 

 2016                         Ot            397.0            TRICUSPID 
VALVE DISEASE                     

 

 2016                         Ot            414.00            CORON 
ATHEROSCLER NOS TYPE VESSEL, NATIV                     

 

 2016                         Ot            424.0            MITRAL VALVE 
DISORDER                     

 

 2016                         Ot            786.50            CHEST PAIN 
NOS                     

 

 2016                         Ot            272.4            
HYPERLIPIDEMIA NEC/NOS                     

 

 2016                         Ot            401.9            HYPERTENSION 
NOS                     

 

 2016                         Ot            414.01            CORONARY 
ATHEROSCLEROSIS OF NATIVE CORON                     

 

 2016                         Ot            401.9            HYPERTENSION 
NOS                     

 

 2016                         Ot            414.01            CORONARY 
ATHEROSCLEROSIS OF NATIVE CORON                     

 

 2016                         Ot            428.0            CONGESTIVE 
HEART FAILURE NOS                     

 

 2016                         Ot            719.40            JOINT PAIN-
UNSPEC                     

 

 2016                         Ot            782.3            EDEMA       
              

 

 2016                         Ot            V58.61            
ANTICOAGULANTS,LT,CURRENT USE                     

 

 2016            RADHA ARELLANO APRN            Ot            F17.210    
        NICOTINE DEPENDENCE, CIGARETTES, UNCOMPL                     

 

 2016            RADHA ARELLANO APRN            Ot            M25.511    
        PAIN IN RIGHT SHOULDER                     

 

 2016            NORTH HERNANDEZ MD            Ot            I82.403     
       ACUTE EMBOLISM AND THOMBOS UNSP DEEP VEI                     

 

 2016            NORTH HERNANDEZ MD            Ot            Z79.01      
      LONG TERM (CURRENT) USE OF ANTICOAGULANT                     

 

 08/15/2016            NORTH HERNANDEZ MD            Ot            I82.403     
       ACUTE EMBOLISM AND THOMBOS UNSP DEEP VEI                     

 

 08/15/2016            NORTH HERNANDEZ MD            Ot            Z79.01      
      LONG TERM (CURRENT) USE OF ANTICOAGULANT                     

 

 2016            NORTH HERNANDEZ MD            Ot            I82.403     
       ACUTE EMBOLISM AND THOMBOS UNSP DEEP VEI                     

 

 2016            NORTH HERNANDEZ MD            Ot            Z79.01      
      LONG TERM (CURRENT) USE OF ANTICOAGULANT                     

 

 10/07/2016                         Ot            414.00            CORON 
ATHEROSCLER NOS TYPE VESSEL, NATIV                     

 

 10/07/2016                         Ot            786.50            CHEST PAIN 
NOS                     

 

 10/07/2016                         Ot            397.0            TRICUSPID 
VALVE DISEASE                     

 

 10/07/2016                         Ot            414.00            CORON 
ATHEROSCLER NOS TYPE VESSEL, NATIV                     

 

 10/07/2016                         Ot            424.0            MITRAL VALVE 
DISORDER                     

 

 10/07/2016                         Ot            786.50            CHEST PAIN 
NOS                     

 

 10/07/2016                         Ot            272.4            
HYPERLIPIDEMIA NEC/NOS                     

 

 10/07/2016                         Ot            401.9            HYPERTENSION 
NOS                     

 

 10/07/2016                         Ot            414.01            CORONARY 
ATHEROSCLEROSIS OF NATIVE CORON                     

 

 10/07/2016                         Ot            401.9            HYPERTENSION 
NOS                     

 

 10/07/2016                         Ot            414.01            CORONARY 
ATHEROSCLEROSIS OF NATIVE CORON                     

 

 10/07/2016                         Ot            428.0            CONGESTIVE 
HEART FAILURE NOS                     

 

 10/07/2016                         Ot            719.40            JOINT PAIN-
UNSPEC                     

 

 10/07/2016                         Ot            782.3            EDEMA       
              

 

 10/07/2016                         Ot            V58.61            
ANTICOAGULANTS,LT,CURRENT USE                     

 

 10/10/2016            NORTH HERNANDEZ MD            Ot            I82.403     
       ACUTE EMBOLISM AND THOMBOS UNSP DEEP VEI                     

 

 10/10/2016            NORTH HERNANDEZ MD            Ot            Z79.01      
      LONG TERM (CURRENT) USE OF ANTICOAGULANT                     

 

 2016            NORTH HERNANDEZ MD            Ot            I82.403     
       ACUTE EMBOLISM AND THOMBOS UNSP DEEP VEI                     

 

 2016            NORTH HERNANDEZ MD            Ot            Z79.01      
      LONG TERM (CURRENT) USE OF ANTICOAGULANT                     

 

 2016                         Ot            414.00            CORON 
ATHEROSCLER NOS TYPE VESSEL, NATIV                     

 

 2016                         Ot            786.50            CHEST PAIN 
NOS                     

 

 2016                         Ot            397.0            TRICUSPID 
VALVE DISEASE                     

 

 2016                         Ot            414.00            CORON 
ATHEROSCLER NOS TYPE VESSEL, NATIV                     

 

 2016                         Ot            424.0            MITRAL VALVE 
DISORDER                     

 

 2016                         Ot            786.50            CHEST PAIN 
NOS                     

 

 2016                         Ot            272.4            
HYPERLIPIDEMIA NEC/NOS                     

 

 2016                         Ot            401.9            HYPERTENSION 
NOS                     

 

 2016                         Ot            414.01            CORONARY 
ATHEROSCLEROSIS OF NATIVE CORON                     

 

 2016                         Ot            401.9            HYPERTENSION 
NOS                     

 

 2016                         Ot            414.01            CORONARY 
ATHEROSCLEROSIS OF NATIVE CORON                     

 

 2016                         Ot            428.0            CONGESTIVE 
HEART FAILURE NOS                     

 

 2016                         Ot            719.40            JOINT PAIN-
UNSPEC                     

 

 2016                         Ot            782.3            EDEMA       
              

 

 2016                         Ot            V58.61            
ANTICOAGULANTS,LT,CURRENT USE                     

 

 2016            NORTH HERNANDEZ MD            Ot            I82.403     
       ACUTE EMBOLISM AND THOMBOS UNSP DEEP VEI                     

 

 2016            NORTH HERNANDEZ MD            Ot            Z79.01      
      LONG TERM (CURRENT) USE OF ANTICOAGULANT                     

 

 2016            NORTH HERNANDEZ MD            Ot            I11.0       
     HYPERTENSIVE HEART DISEASE WITH HEART FA                     

 

 2016            NORTH HERNANDEZ MD            Ot            I25.10      
      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                      

 

 2016            NORTH HERNANDEZ MD            Ot            I26.99      
      OTHER PULMONARY EMBOLISM WITHOUT ACUTE C                     

 

 2016            NORTH HERNANDEZ MD            Ot            I50.9       
     HEART FAILURE, UNSPECIFIED                     

 

 2016            NORTH HERNANDEZ MD            Ot            I82.409     
       ACUTE EMBOLISM AND THOMBOS UNSP DEEP VN                      

 

 2016            NORTH HERNANDEZ MD            Ot            R07.9       
     CHEST PAIN, UNSPECIFIED                     

 

 2016                         Ot            414.00            CORON 
ATHEROSCLER NOS TYPE VESSEL, NATIV                     

 

 2016                         Ot            786.50            CHEST PAIN 
NOS                     

 

 2016                         Ot            397.0            TRICUSPID 
VALVE DISEASE                     

 

 2016                         Ot            414.00            CORON 
ATHEROSCLER NOS TYPE VESSEL, NATIV                     

 

 2016                         Ot            424.0            MITRAL VALVE 
DISORDER                     

 

 2016                         Ot            786.50            CHEST PAIN 
NOS                     

 

 2016                         Ot            272.4            
HYPERLIPIDEMIA NEC/NOS                     

 

 2016                         Ot            401.9            HYPERTENSION 
NOS                     

 

 2016                         Ot            414.01            CORONARY 
ATHEROSCLEROSIS OF NATIVE CORON                     

 

 2016                         Ot            401.9            HYPERTENSION 
NOS                     

 

 2016                         Ot            414.01            CORONARY 
ATHEROSCLEROSIS OF NATIVE CORON                     

 

 2016                         Ot            428.0            CONGESTIVE 
HEART FAILURE NOS                     

 

 2016                         Ot            719.40            JOINT PAIN-
UNSPEC                     

 

 2016                         Ot            782.3            EDEMA       
              

 

 2016                         Ot            V58.61            
ANTICOAGULANTS,LT,CURRENT USE                     

 

 2016            NORTH HERNANDEZ MD            Ot            I82.403     
       ACUTE EMBOLISM AND THOMBOS UNSP DEEP VEI                     

 

 2016            NORTH HERNANDEZ MD            Ot            Z79.01      
      LONG TERM (CURRENT) USE OF ANTICOAGULANT                     

 

 2016            NORTH HERNANDEZ MD            Ot            I11.0       
     HYPERTENSIVE HEART DISEASE WITH HEART FA                     

 

 2016            NORTH HERNANDEZ MD            Ot            I25.10      
      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                      

 

 2016            NORTH HERNANDEZ MD            Ot            I26.99      
      OTHER PULMONARY EMBOLISM WITHOUT ACUTE C                     

 

 2016            NORTH HERNANDEZ MD            Ot            I50.9       
     HEART FAILURE, UNSPECIFIED                     

 

 2016            NORTH HERNANDEZ MD            Ot            I82.409     
       ACUTE EMBOLISM AND THOMBOS UNSP DEEP VN                      

 

 2016            NORTH HERNANDEZ MD            Ot            R07.9       
     CHEST PAIN, UNSPECIFIED                     

 

 2016            NORTH HERNANDEZ MD            Ot            I25.10      
      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                      

 

 2016            NORTH HERNANDEZ MD            Ot            I50.9       
     HEART FAILURE, UNSPECIFIED                     

 

 2016            NORTH HERNANDEZ MD            Ot            R07.9       
     CHEST PAIN, UNSPECIFIED                     

 

 2016            NORTH HERNANDEZ MD            Ot            I25.10      
      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                      

 

 2016            NORTH HERNANDEZ MD            Ot            I50.9       
     HEART FAILURE, UNSPECIFIED                     

 

 2016            NORTH HERNANDEZ MD            Ot            R07.9       
     CHEST PAIN, UNSPECIFIED                     

 

 2016                         Ot            414.00            CORON 
ATHEROSCLER NOS TYPE VESSEL, NATIV                     

 

 2016                         Ot            786.50            CHEST PAIN 
NOS                     

 

 2016                         Ot            397.0            TRICUSPID 
VALVE DISEASE                     

 

 2016                         Ot            414.00            CORON 
ATHEROSCLER NOS TYPE VESSEL, NATIV                     

 

 2016                         Ot            424.0            MITRAL VALVE 
DISORDER                     

 

 2016                         Ot            786.50            CHEST PAIN 
NOS                     

 

 2016                         Ot            272.4            
HYPERLIPIDEMIA NEC/NOS                     

 

 2016                         Ot            401.9            HYPERTENSION 
NOS                     

 

 2016                         Ot            414.01            CORONARY 
ATHEROSCLEROSIS OF NATIVE CORON                     

 

 2016                         Ot            401.9            HYPERTENSION 
NOS                     

 

 2016                         Ot            414.01            CORONARY 
ATHEROSCLEROSIS OF NATIVE CORON                     

 

 2016                         Ot            428.0            CONGESTIVE 
HEART FAILURE NOS                     

 

 2016                         Ot            719.40            JOINT PAIN-
UNSPEC                     

 

 2016                         Ot            782.3            EDEMA       
              

 

 2016                         Ot            V58.61            
ANTICOAGULANTS,LT,CURRENT USE                     

 

 2016            NOTRH HERNANDEZ MD            Ot            I82.403     
       ACUTE EMBOLISM AND THOMBOS UNSP DEEP VEI                     

 

 2016            NORTH HERNANDEZ MD            Ot            Z79.01      
      LONG TERM (CURRENT) USE OF ANTICOAGULANT                     

 

 2016            NORTH HERNANDEZ MD            Ot            I11.0       
     HYPERTENSIVE HEART DISEASE WITH HEART FA                     

 

 2016            NORTH HERNANDEZ MD            Ot            I25.10      
      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                      

 

 2016            NORTH HERNANDEZ MD            Ot            I26.99      
      OTHER PULMONARY EMBOLISM WITHOUT ACUTE C                     

 

 2016            NORTH HERNANDEZ MD            Ot            I50.9       
     HEART FAILURE, UNSPECIFIED                     

 

 2016            NORTH HERNANDEZ MD            Ot            I82.409     
       ACUTE EMBOLISM AND THOMBOS UNSP DEEP VN                      

 

 2016            NORTH HERNANDEZ MD            Ot            R07.9       
     CHEST PAIN, UNSPECIFIED                     

 

 2016            NORTH HERNANDEZ MD            Ot            I25.10      
      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                      

 

 2016            NORTH HERNANDEZ MD            Ot            I50.9       
     HEART FAILURE, UNSPECIFIED                     

 

 2016            NORTH HERNANDEZ MD            Ot            R07.9       
     CHEST PAIN, UNSPECIFIED                     

 

 2016            NORTH HERNANDEZ MD            Ot            I11.0       
     HYPERTENSIVE HEART DISEASE WITH HEART FA                     

 

 2016            NORTH HERNANDEZ MD            Ot            I25.10      
      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                      

 

 2016            NORTH HERNANDEZ MD            Ot            I26.99      
      OTHER PULMONARY EMBOLISM WITHOUT ACUTE C                     

 

 2016            NORTH HERNANDEZ MD            Ot            I50.9       
     HEART FAILURE, UNSPECIFIED                     

 

 2016            NORTH HERNANDEZ MD            Ot            I82.409     
       ACUTE EMBOLISM AND THOMBOS UNSP DEEP VN                      

 

 2016            NORTH HERNANDEZ MD            Ot            R07.9       
     CHEST PAIN, UNSPECIFIED                     

 

 2016            NORTH HERNANDEZ MD            Ot            E78.5       
     HYPERLIPIDEMIA, UNSPECIFIED                     

 

 2016            NORTH HERNANDEZ MD            Ot            F17.210     
       NICOTINE DEPENDENCE, CIGARETTES, UNCOMPL                     

 

 2016            NORTH HERNANDEZ MD            Ot            I10         
   ESSENTIAL (PRIMARY) HYPERTENSION                     

 

 2016            NORTH HERNANDEZ MD            Ot            I25.10      
      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                      

 

 2016            NORTH HERNANDEZ MD            Ot            R07.89      
      OTHER CHEST PAIN                     

 

 2016            NORTH HERNANDEZ MD            Ot            R94.39      
      ABNORMAL RESULT OF OTHER CARDIOVASCULAR                      

 

 2016            NORTH HERNANDEZ MD            Ot            Z79.01      
      LONG TERM (CURRENT) USE OF ANTICOAGULANT                     

 

 2016            NORTH HERNANDEZ MD            Ot            Z79.899     
       OTHER LONG TERM (CURRENT) DRUG THERAPY                     

 

 2016            NORTH HERNANDEZ MD            Ot            Z86.718     
       PERSONAL HISTORY OF OTHER VENOUS THROMBO                     

 

 2016            NORTH HERNANDEZ MD            Ot            Z95.5       
     PRESENCE OF CORONARY ANGIOPLASTY IMPLANT                     

 

 2016            NORTH HERNANDEZ MD            Ot            I25.10      
      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                      

 

 2016            NORTH HERNANDEZ MD            Ot            I50.9       
     HEART FAILURE, UNSPECIFIED                     

 

 2016            NORTH HERNANDEZ MD            Ot            R07.9       
     CHEST PAIN, UNSPECIFIED                     

 

 2016            NORTH HERNANDEZ MD            Ot            I25.10      
      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                      

 

 2016            NORTH HERNANDEZ MD            Ot            I50.9       
     HEART FAILURE, UNSPECIFIED                     

 

 2016            NORTH HERNANDEZ MD            Ot            R07.9       
     CHEST PAIN, UNSPECIFIED                     

 

 2017            NORTH HERNANDEZ MD            Ot            I82.403     
       ACUTE EMBOLISM AND THOMBOS UNSP DEEP VEI                     

 

 2017            NORTH HERNANDEZ MD            Ot            Z79.01      
      LONG TERM (CURRENT) USE OF ANTICOAGULANT                     

 

 2017            NORTH HERNANDEZ MD            Ot            I82.403     
       ACUTE EMBOLISM AND THOMBOS UNSP DEEP VEI                     

 

 2017            NORTH HERNANDEZ MD            Ot            Z79.01      
      LONG TERM (CURRENT) USE OF ANTICOAGULANT                     

 

 2017            NORTH HERNANDEZ MD            Ot            I82.403     
       ACUTE EMBOLISM AND THOMBOS UNSP DEEP VEI                     

 

 2017            NORTH HERNANDEZ MD            Ot            Z79.01      
      LONG TERM (CURRENT) USE OF ANTICOAGULANT                     

 

 2017            NORTH HERNANDEZ MD            Ot            I82.403     
       ACUTE EMBOLISM AND THOMBOS UNSP DEEP VEI                     

 

 2017            NORTH HERNANDEZ MD            Ot            Z79.01      
      LONG TERM (CURRENT) USE OF ANTICOAGULANT                     

 

 2017            NORTH HERNANDEZ MD            Ot            I82.403     
       ACUTE EMBOLISM AND THOMBOS UNSP DEEP VEI                     

 

 2017            NORTH HERNANDEZ MD            Ot            Z79.01      
      LONG TERM (CURRENT) USE OF ANTICOAGULANT                     

 

 02/15/2017            NORTH HERNANDEZ MD            Ot            I82.403     
       ACUTE EMBOLISM AND THOMBOS UNSP DEEP VEI                     

 

 02/15/2017            NORTH HERNANDEZ MD            Ot            Z79.01      
      LONG TERM (CURRENT) USE OF ANTICOAGULANT                     

 

 2017            NORTH HERNANDEZ MD            Ot            I82.403     
       ACUTE EMBOLISM AND THOMBOS UNSP DEEP VEI                     

 

 2017            NORTH HERNANDEZ MD            Ot            Z79.01      
      LONG TERM (CURRENT) USE OF ANTICOAGULANT                     

 

 2017            NORTH HERNANDEZ MD            Ot            I82.403     
       ACUTE EMBOLISM AND THOMBOS UNSP DEEP VEI                     

 

 2017            NORTH HERNANDEZ MD            Ot            Z79.01      
      LONG TERM (CURRENT) USE OF ANTICOAGULANT                     

 

 2017            NORTH HERNANDEZ MD            Ot            I82.403     
       ACUTE EMBOLISM AND THOMBOS UNSP DEEP VEI                     

 

 2017            NORTH HERNANDEZ MD            Ot            Z79.01      
      LONG TERM (CURRENT) USE OF ANTICOAGULANT                     

 

 2017                         Ot            414.00            CORON 
ATHEROSCLER NOS TYPE VESSEL, NATIV                     

 

 2017                         Ot            786.50            CHEST PAIN 
NOS                     

 

 2017                         Ot            397.0            TRICUSPID 
VALVE DISEASE                     

 

 2017                         Ot            414.00            CORON 
ATHEROSCLER NOS TYPE VESSEL, NATIV                     

 

 2017                         Ot            424.0            MITRAL VALVE 
DISORDER                     

 

 2017                         Ot            786.50            CHEST PAIN 
NOS                     

 

 2017                         Ot            272.4            
HYPERLIPIDEMIA NEC/NOS                     

 

 2017                         Ot            401.9            HYPERTENSION 
NOS                     

 

 2017                         Ot            414.01            CORONARY 
ATHEROSCLEROSIS OF NATIVE CORON                     

 

 2017                         Ot            401.9            HYPERTENSION 
NOS                     

 

 2017                         Ot            414.01            CORONARY 
ATHEROSCLEROSIS OF NATIVE CORON                     

 

 2017                         Ot            428.0            CONGESTIVE 
HEART FAILURE NOS                     

 

 2017                         Ot            719.40            JOINT PAIN-
UNSPEC                     

 

 2017                         Ot            782.3            EDEMA       
              

 

 2017                         Ot            V58.61            
ANTICOAGULANTS,LT,CURRENT USE                     

 

 2017            NORTH HERNANDEZ MD            Ot            I11.0       
     HYPERTENSIVE HEART DISEASE WITH HEART FA                     

 

 2017            NORTH HERNANDEZ MD            Ot            I25.10      
      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                      

 

 2017            NORTH HERNANDEZ MD            Ot            I26.99      
      OTHER PULMONARY EMBOLISM WITHOUT ACUTE C                     

 

 2017            NORTH HERNANDEZ MD            Ot            I50.9       
     HEART FAILURE, UNSPECIFIED                     

 

 2017            NORTH HERNANDEZ MD            Ot            I82.409     
       ACUTE EMBOLISM AND THOMBOS UNSP DEEP VN                      

 

 2017            NORTH HERNANDEZ MD            Ot            R07.9       
     CHEST PAIN, UNSPECIFIED                     

 

 2017            NORTH HERNANDEZ MD            Ot            I25.10      
      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                      

 

 2017            NORTH HERNANDEZ MD            Ot            I50.9       
     HEART FAILURE, UNSPECIFIED                     

 

 2017            NORTH HERNANDEZ MD            Ot            R07.9       
     CHEST PAIN, UNSPECIFIED                     

 

 2017            DAMARIS WYNN            Ot            F17.210  
          NICOTINE DEPENDENCE, CIGARETTES, UNCOMPL                     

 

 2017            ASHIA VILLA DAMARIS L            Ot            R10.10   
         UPPER ABDOMINAL PAIN, UNSPECIFIED                     

 

 2017            DAMARIS WYNN            Ot            R10.13   
         EPIGASTRIC PAIN                     

 

 2017            DAMARIS WYNN            Ot            Z79.01   
         LONG TERM (CURRENT) USE OF ANTICOAGULANT                     

 

 2017            DAMARIS WYNN            Ot            Z79.82   
         LONG TERM (CURRENT) USE OF ASPIRIN                     

 

 2017            DAMARIS WYNN            Ot            Z79.899  
          OTHER LONG TERM (CURRENT) DRUG THERAPY                     

 

 2017            DAMARIS WYNN            Ot            Z95.5    
        PRESENCE OF CORONARY ANGIOPLASTY IMPLANT                     

 

 2017            DAMARIS WYNN            Ot            F17.210  
          NICOTINE DEPENDENCE, CIGARETTES, UNCOMPL                     

 

 2017            DAMARIS WYNN            Ot            R10.10   
         UPPER ABDOMINAL PAIN, UNSPECIFIED                     

 

 2017            DAMARIS WYNN            Ot            R10.13   
         EPIGASTRIC PAIN                     

 

 2017            DAMARIS WYNN            Ot            Z79.01   
         LONG TERM (CURRENT) USE OF ANTICOAGULANT                     

 

 2017            DAMARIS WYNN            Ot            Z79.82   
         LONG TERM (CURRENT) USE OF ASPIRIN                     

 

 2017            DAMARIS WYNN            Ot            Z79.899  
          OTHER LONG TERM (CURRENT) DRUG THERAPY                     

 

 2017            DAMARIS WYNN            Ot            Z95.5    
        PRESENCE OF CORONARY ANGIOPLASTY IMPLANT                     

 

 2017            MARY GREEN, NORTH WHITTAKER            Ot            I82.403     
       ACUTE EMBOLISM AND THOMBOS UNSP DEEP VEI                     

 

 2017            NORTH HERNANDEZ MD            Ot            Z79.01      
      LONG TERM (CURRENT) USE OF ANTICOAGULANT                     

 

 2017                         Ot            414.00            CORON 
ATHEROSCLER NOS TYPE VESSEL, NATIV                     

 

 2017                         Ot            786.50            CHEST PAIN 
NOS                     

 

 2017                         Ot            397.0            TRICUSPID 
VALVE DISEASE                     

 

 2017                         Ot            414.00            CORON 
ATHEROSCLER NOS TYPE VESSEL, NATIV                     

 

 2017                         Ot            424.0            MITRAL VALVE 
DISORDER                     

 

 2017                         Ot            786.50            CHEST PAIN 
NOS                     

 

 2017                         Ot            272.4            
HYPERLIPIDEMIA NEC/NOS                     

 

 2017                         Ot            401.9            HYPERTENSION 
NOS                     

 

 2017                         Ot            414.01            CORONARY 
ATHEROSCLEROSIS OF NATIVE CORON                     

 

 2017                         Ot            401.9            HYPERTENSION 
NOS                     

 

 2017                         Ot            414.01            CORONARY 
ATHEROSCLEROSIS OF NATIVE CORON                     

 

 2017                         Ot            428.0            CONGESTIVE 
HEART FAILURE NOS                     

 

 2017                         Ot            719.40            JOINT PAIN-
UNSPEC                     

 

 2017                         Ot            782.3            EDEMA       
              

 

 2017                         Ot            V58.61            
ANTICOAGULANTS,LT,CURRENT USE                     

 

 2017            NORTH HERNANDEZ MD            Ot            I11.0       
     HYPERTENSIVE HEART DISEASE WITH HEART FA                     

 

 2017            NORTH HERNANDEZ MD            Ot            I25.10      
      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                      

 

 2017            NORTH HERNANDEZ MD            Ot            I26.99      
      OTHER PULMONARY EMBOLISM WITHOUT ACUTE C                     

 

 2017            NORTH HERNANDEZ MD            Ot            I50.9       
     HEART FAILURE, UNSPECIFIED                     

 

 2017            NORTH HERNANDEZ MD            Ot            R07.9       
     CHEST PAIN, UNSPECIFIED                     

 

 2017            NORTH HERNANDEZ MD            Ot            I25.10      
      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                      

 

 2017            NORTH HERNANDEZ MD            Ot            I50.9       
     HEART FAILURE, UNSPECIFIED                     

 

 2017            NORTH HERNANDEZ MD            Ot            R07.9       
     CHEST PAIN, UNSPECIFIED                     

 

 2017            NORTH HERNANDEZ MD            Ot            I82.403     
       ACUTE EMBOLISM AND THOMBOS UNSP DEEP VEI                     

 

 2017            NORTH HERNANDEZ MD            Ot            Z79.01      
      LONG TERM (CURRENT) USE OF ANTICOAGULANT                     

 

 2017            NORTH HERNANDEZ MD            Ot            I82.403     
       ACUTE EMBOLISM AND THOMBOS UNSP DEEP VEI                     

 

 2017            NORTH HERNANDEZ MD            Ot            Z79.01      
      LONG TERM (CURRENT) USE OF ANTICOAGULANT                     

 

 2017            NORTH HERNANDEZ MD            Ot            I82.403     
       ACUTE EMBOLISM AND THOMBOS UNSP DEEP VEI                     

 

 2017            NORTH HERNANDEZ MD            Ot            Z79.01      
      LONG TERM (CURRENT) USE OF ANTICOAGULANT                     

 

 2017            NORTH HERNANDEZ MD            Ot            I26.99      
      OTHER PULMONARY EMBOLISM WITHOUT ACUTE C                     

 

 2017            NORTH HERNANDEZ MD            Ot            Z51.81      
      ENCOUNTER FOR THERAPEUTIC DRUG LEVEL MON                     

 

 2017            DAMARIS WYNN            Ot            F17.200  
          NICOTINE DEPENDENCE, UNSPECIFIED, UNCOMP                     

 

 2017            DAMARIS WYNN            Ot            I25.2    
        OLD MYOCARDIAL INFARCTION                     

 

 2017            DAMARIS WYNN            Ot            K21.9    
        GASTRO-ESOPHAGEAL REFLUX DISEASE WITHOUT                     

 

 2017            DAMARIS WYNN            Ot            L50.9    
        URTICARIA, UNSPECIFIED                     

 

 2017            DAMARIS WYNN            Ot            M79.601  
          PAIN IN RIGHT ARM                     

 

 2017            DAMARIS WYNN            Ot            Z79.01   
         LONG TERM (CURRENT) USE OF ANTICOAGULANT                     

 

 2017            DAMARIS WYNN            Ot            Z79.82   
         LONG TERM (CURRENT) USE OF ASPIRIN                     

 

 2017            DAMARIS WYNN            Ot            Z82.49   
         FAMILY HX OF ISCHEM HEART DIS AND OTH DI                     

 

 2017            DAMARIS WYNN            Ot            Z95.5    
        PRESENCE OF CORONARY ANGIOPLASTY IMPLANT                     

 

 2017            NORTH HERNANDEZ MD            Ot            I26.99      
      OTHER PULMONARY EMBOLISM WITHOUT ACUTE C                     

 

 2017            NORTH HERNANDEZ MD            Ot            Z51.81      
      ENCOUNTER FOR THERAPEUTIC DRUG LEVEL 2017            NORTH HERNANDEZ MD            Ot            I26.99      
      OTHER PULMONARY EMBOLISM WITHOUT ACUTE C                     

 

 2017            NORTH HERNANDEZ MD            Ot            Z51.81      
      ENCOUNTER FOR THERAPEUTIC DRUG LEVEL MON                     

 

 09/15/2017            NORTH HERNANDEZ MD            Ot            I26.99      
      OTHER PULMONARY EMBOLISM WITHOUT ACUTE C                     

 

 09/15/2017            NORTH HERNANDEZ MD            Ot            Z51.81      
      ENCOUNTER FOR THERAPEUTIC DRUG LEVEL MON                     

 

 09/15/2017            NORTH HERNANDEZ MD            Ot            Z79.899     
       OTHER LONG TERM (CURRENT) DRUG THERAPY                     

 

 2017            NORTH HERNANDEZ MD            Ot            I26.99      
      OTHER PULMONARY EMBOLISM WITHOUT ACUTE C                     

 

 2017            NORTH HERNANDEZ MD            Ot            Z51.81      
      ENCOUNTER FOR THERAPEUTIC DRUG LEVEL 2017            NORTH HERNANDEZ MD            Ot            Z79.899     
       OTHER LONG TERM (CURRENT) DRUG THERAPY                     

 

 10/06/2017            NORTH HERNANDEZ MD            Ot            I26.99      
      OTHER PULMONARY EMBOLISM WITHOUT ACUTE C                     

 

 10/06/2017            NORTH HERNANDEZ MD            Ot            Z51.81      
      ENCOUNTER FOR THERAPEUTIC DRUG LEVEL MON                     

 

 10/06/2017            NORTH HERNANDEZ MD            Ot            Z79.899     
       OTHER LONG TERM (CURRENT) DRUG THERAPY                     

 

 2017            FELISHA MORENO DO            Ot            D68.59        
    OTHER PRIMARY THROMBOPHILIA                     

 

 2017            MORENO DO, FELISHA            Ot            E78.1         
   PURE HYPERGLYCERIDEMIA                     

 

 2017            MICHAEL MORENO DOI            Ot            E78.5         
   HYPERLIPIDEMIA, UNSPECIFIED                     

 

 2017            MICHAEL MORENO DOI            Ot            F17.210       
     NICOTINE DEPENDENCE, CIGARETTES, UNCOMPL                     

 

 2017            TIFFANIE ALFARO FELISHA            Ot            I10            
ESSENTIAL (PRIMARY) HYPERTENSION                     

 

 2017            TIFFANIE ALFARO FELISHA            Ot            I21.4         
   NON-ST ELEVATION (NSTEMI) MYOCARDIAL INF                     

 

 2017            MICHAEL MORENO DOI            Ot            I25.10        
    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                      

 

 2017            TIFFANIE ALFARO FELISHA            Ot            I25.82        
    CHRONIC TOTAL OCCLUSION OF CORONARY LORI                     

 

 2017            MICHAEL MORENO DOI            Ot            I50.20        
    UNSPECIFIED SYSTOLIC (CONGESTIVE) HEART                      

 

 2017            TIFFANIE ALFARO FELISHA            Ot            K21.9         
   GASTRO-ESOPHAGEAL REFLUX DISEASE WITHOUT                     

 

 2017            MICHAEL MORENO DOI            Ot            R06.2         
   WHEEZING                     

 

 2017            MICHAEL MORENO DOI            Ot            Z79.01        
    LONG TERM (CURRENT) USE OF ANTICOAGULANT                     

 

 2017            MICHAEL MORENO DOI            Ot            Z82.49        
    FAMILY HX OF ISCHEM HEART DIS AND OTH DI                     

 

 2017            FELISHA MORENO DO            Ot            Z95.5         
   PRESENCE OF CORONARY ANGIOPLASTY IMPLANT                     

 

 2017            FELISHA MORENO DO            Ot            D68.59        
    OTHER PRIMARY THROMBOPHILIA                     

 

 2017            MICHAEL MORENO DOI            Ot            E78.1         
   PURE HYPERGLYCERIDEMIA                     

 

 2017            MICHAEL MORENO DOI            Ot            E78.5         
   HYPERLIPIDEMIA, UNSPECIFIED                     

 

 2017            MICHAEL MORENO DOI            Ot            F17.210       
     NICOTINE DEPENDENCE, CIGARETTES, UNCOMPL                     

 

 2017            MICHAEL MORENO DOI            Ot            I10            
ESSENTIAL (PRIMARY) HYPERTENSION                     

 

 2017            TIFFANIE ALFARO FELISHA            Ot            I21.4         
   NON-ST ELEVATION (NSTEMI) [facility]                     

 

 2017            MICHAEL MORENO DOI            Ot            I25.10        
    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                      

 

 2017            TIFFANIE ALFARO FELISHA            Ot            I25.82        
    CHRONIC TOTAL OCCLUSION OF CORONARY LORI                     

 

 2017            TIFFANIE ALFARO FELISHA            Ot            I50.20        
    UNSPECIFIED SYSTOLIC (CONGESTIVE) HEART                      

 

 2017            MICHAEL MORENO DOI            Ot            K21.9         
   GASTRO-ESOPHAGEAL REFLUX DISEASE WITHOUT                     

 

 2017            FELISHA MORENO DO            Ot            R06.2         
   WHEEZING                     

 

 2017            FELISHA MORENO DO            Ot            Z79.01        
    LONG TERM (CURRENT) USE OF ANTICOAGULANT                     

 

 2017            FELISHA MORENO DO            Ot            Z82.49        
    FAMILY HX OF ISCHEM HEART DIS AND OTH DI                     

 

 2017            FELISHA MORENO DO            Ot            Z95.5         
   PRESENCE OF CORONARY ANGIOPLASTY IMPLANT                     

 

 2017            FELISHA MORENO DO            Ot            D68.59        
    OTHER PRIMARY THROMBOPHILIA                     

 

 2017            FELISHA MORENO DO            Ot            E78.1         
   PURE HYPERGLYCERIDEMIA                     

 

 2017            FELISHA MORENO DO            Ot            E78.5         
   HYPERLIPIDEMIA, UNSPECIFIED                     

 

 2017            FELISHA MORENO DO            Ot            F17.210       
     NICOTINE DEPENDENCE, CIGARETTES, UNCOMPL                     

 

 2017            FELISHA MORENO DO            Ot            I10            
ESSENTIAL (PRIMARY) HYPERTENSION                     

 

 2017            FELISHA MORENO DO            Ot            I21.4         
   NON-ST ELEVATION (NSTEMI) MYOCARDIAL INF                     

 

 2017            FELISHA MORENO DO            Ot            I25.10        
    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                      

 

 2017            FELISHA MORENO DO            Ot            I25.82        
    CHRONIC TOTAL OCCLUSION OF CORONARY LORI                     

 

 2017            FELISHA MORENO DO            Ot            I50.20        
    UNSPECIFIED SYSTOLIC (CONGESTIVE) HEART                      

 

 2017            FELISHA MORENO DO            Ot            K21.9         
   GASTRO-ESOPHAGEAL REFLUX DISEASE WITHOUT                     

 

 2017            FELISHA MORENO DO            Ot            R06.2         
   WHEEZING                     

 

 2017            FELISHA MORENO DO            Ot            Z79.01        
    LONG TERM (CURRENT) USE OF ANTICOAGULANT                     

 

 2017            FELISHA MORENO DO            Ot            Z82.49        
    FAMILY HX OF ISCHEM HEART DIS AND OTH DI                     

 

 2017            FELISHA MORENO DO            Ot            Z95.5         
   PRESENCE OF CORONARY ANGIOPLASTY IMPLANT                     

 

 2017                         Ot            414.00            CORON 
ATHEROSCLER NOS TYPE VESSEL, NATIV                     

 

 2017                         Ot            786.50            CHEST PAIN 
NOS                     

 

 2017                         Ot            397.0            TRICUSPID 
VALVE DISEASE                     

 

 2017                         Ot            414.00            CORON 
ATHEROSCLER NOS TYPE VESSEL, NATIV                     

 

 2017                         Ot            424.0            MITRAL VALVE 
DISORDER                     

 

 2017                         Ot            786.50            CHEST PAIN 
NOS                     

 

 2017                         Ot            272.4            
HYPERLIPIDEMIA NEC/NOS                     

 

 2017                         Ot            401.9            HYPERTENSION 
NOS                     

 

 2017                         Ot            414.01            CORONARY 
ATHEROSCLEROSIS OF NATIVE CORON                     

 

 2017                         Ot            401.9            HYPERTENSION 
NOS                     

 

 2017                         Ot            414.01            CORONARY 
ATHEROSCLEROSIS OF NATIVE CORON                     

 

 2017                         Ot            428.0            CONGESTIVE 
HEART FAILURE NOS                     

 

 2017                         Ot            719.40            JOINT PAIN-
UNSPEC                     

 

 2017                         Ot            782.3            EDEMA       
              

 

 2017                         Ot            V58.61            
ANTICOAGULANTS,LT,CURRENT USE                     

 

 2017            NORTH HERNANDEZ MD            Ot            I11.0       
     HYPERTENSIVE HEART DISEASE WITH HEART FA                     

 

 2017            NORTH HERNANDEZ MD            Ot            I25.10      
      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                      

 

 2017            NORTH HERNANDEZ MD            Ot            I26.99      
      OTHER PULMONARY EMBOLISM WITHOUT ACUTE C                     

 

 2017            NORTH HERNANDEZ MD            Ot            I50.9       
     HEART FAILURE, UNSPECIFIED                     

 

 2017            NORTH HERNANDEZ MD            Ot            R07.9       
     CHEST PAIN, UNSPECIFIED                     

 

 2017            NORTH HERNANDEZ MD            Ot            I25.10      
      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                      

 

 2017            NORTH HERNANDEZ MD            Ot            I50.9       
     HEART FAILURE, UNSPECIFIED                     

 

 2017            NORTH HERNANDEZ MD            Ot            R07.9       
     CHEST PAIN, UNSPECIFIED                     

 

 2017            NORTH HERNANDEZ MD            Ot            I82.403     
       ACUTE EMBOLISM AND THOMBOS UNSP DEEP VEI                     

 

 2017            NORTH HERNANDEZ MD            Ot            Z79.01      
      LONG TERM (CURRENT) USE OF ANTICOAGULANT                     

 

 2017            NORTH HERNANDEZ MD            Ot            I26.99      
      OTHER PULMONARY EMBOLISM WITHOUT ACUTE C                     

 

 2017            NORTH HERNANDEZ MD            Ot            Z51.81      
      ENCOUNTER FOR THERAPEUTIC DRUG LEVEL MON                     

 

 2017            NORTH HERNANDEZ MD            Ot            I26.99      
      OTHER PULMONARY EMBOLISM WITHOUT ACUTE C                     

 

 2017            NORTH HERNANDEZ MD            Ot            Z51.81      
      ENCOUNTER FOR THERAPEUTIC DRUG LEVEL MON                     

 

 2017            NORTH HERNANDEZ MD            Ot            Z79.899     
       OTHER LONG TERM (CURRENT) DRUG THERAPY                     

 

 2017            DAMARIS WYNN            Ot            E78.00   
         PURE HYPERCHOLESTEROLEMIA, UNSPECIFIED                     

 

 2017            DAMARIS WYNN            Ot            I10      
      ESSENTIAL (PRIMARY) HYPERTENSION                     

 

 2017            DAMARIS WYNN            Ot            I25.2    
        OLD MYOCARDIAL INFARCTION                     

 

 2017            DAMARIS WYNN            Ot            K21.9    
        GASTRO-ESOPHAGEAL REFLUX DISEASE WITHOUT                     

 

 2017            DAMARIS WYNN            Ot            R22.2    
        LOCALIZED SWELLING, MASS AND LUMP, TRUNK                     

 

 2017            DAMARIS WYNN            Ot            S20.212A 
           CONTUSION OF LEFT FRONT WALL OF THORAX,                      

 

 2017            DAMARIS WYNN            Ot            X58.XXXA 
           EXPOSURE TO OTHER SPECIFIED FACTORS, INI                     

 

 2017            DAMARIS WYNN            Ot            Z79.01   
         LONG TERM (CURRENT) USE OF ANTICOAGULANT                     

 

 2017            DAMARIS WYNN            Ot            Z79.82   
         LONG TERM (CURRENT) USE OF ASPIRIN                     

 

 2017            DAMARIS WYNN            Ot            Z82.49   
         FAMILY HX OF ISCHEM HEART DIS AND OTH DI                     

 

 2017            DAMARIS WYNN            Ot            Z95.5    
        PRESENCE OF CORONARY ANGIOPLASTY IMPLANT                     

 

 2017            NORTH HERNANDEZ MD            Ot            I26.99      
      OTHER PULMONARY EMBOLISM WITHOUT ACUTE C                     

 

 2017            NORTH HERNANDEZ MD            Ot            Z79.01      
      LONG TERM (CURRENT) USE OF ANTICOAGULANT                     

 

 2017            NORTH HERNANDEZ MD            Ot            I50.9       
     HEART FAILURE, UNSPECIFIED                     

 

 12/15/2017                         Ot            414.00            CORON 
ATHEROSCLER NOS TYPE VESSEL, NATIV                     

 

 12/15/2017                         Ot            786.50            CHEST PAIN 
NOS                     

 

 12/15/2017                         Ot            397.0            TRICUSPID 
VALVE DISEASE                     

 

 12/15/2017                         Ot            414.00            CORON 
ATHEROSCLER NOS TYPE VESSEL, NATIV                     

 

 12/15/2017                         Ot            424.0            MITRAL VALVE 
DISORDER                     

 

 12/15/2017                         Ot            786.50            CHEST PAIN 
NOS                     

 

 12/15/2017                         Ot            272.4            
HYPERLIPIDEMIA NEC/NOS                     

 

 12/15/2017                         Ot            401.9            HYPERTENSION 
NOS                     

 

 12/15/2017                         Ot            414.01            CORONARY 
ATHEROSCLEROSIS OF NATIVE CORON                     

 

 12/15/2017                         Ot            401.9            HYPERTENSION 
NOS                     

 

 12/15/2017                         Ot            414.01            CORONARY 
ATHEROSCLEROSIS OF NATIVE CORON                     

 

 12/15/2017                         Ot            428.0            CONGESTIVE 
HEART FAILURE NOS                     

 

 12/15/2017                         Ot            719.40            JOINT PAIN-
UNSPEC                     

 

 12/15/2017                         Ot            782.3            EDEMA       
              

 

 12/15/2017                         Ot            V58.61            
ANTICOAGULANTS,LT,CURRENT USE                     

 

 12/15/2017            NORTH HERNANDEZ MD            Ot            I11.0       
     HYPERTENSIVE HEART DISEASE WITH HEART FA                     

 

 12/15/2017            NORTH HERNANDEZ MD            Ot            I25.10      
      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                      

 

 12/15/2017            NORTH HERNANDEZ MD            Ot            I26.99      
      OTHER PULMONARY EMBOLISM WITHOUT ACUTE C                     

 

 12/15/2017            NORTH HERNANDEZ MD            Ot            I50.9       
     HEART FAILURE, UNSPECIFIED                     

 

 12/15/2017            NORTH HERNANDEZ MD            Ot            R07.9       
     CHEST PAIN, UNSPECIFIED                     

 

 12/15/2017            NORTH HERNANDEZ MD            Ot            I25.10      
      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                      

 

 12/15/2017            NORTH HERNANDEZ MD            Ot            I50.9       
     HEART FAILURE, UNSPECIFIED                     

 

 12/15/2017            NORTH HERNANDEZ MD            Ot            R07.9       
     CHEST PAIN, UNSPECIFIED                     

 

 12/15/2017            NORTH HERNANDEZ MD            Ot            I82.403     
       ACUTE EMBOLISM AND THOMBOS UNSP DEEP VEI                     

 

 12/15/2017            NORTH HERNANDEZ MD            Ot            Z79.01      
      LONG TERM (CURRENT) USE OF ANTICOAGULANT                     

 

 12/15/2017            NORTH HERNANDEZ MD            Ot            I26.99      
      OTHER PULMONARY EMBOLISM WITHOUT ACUTE C                     

 

 12/15/2017            NORTH HERNANDEZ MD            Ot            Z51.81      
      ENCOUNTER FOR THERAPEUTIC DRUG LEVEL MON                     

 

 12/15/2017            NORTH HERNANDEZ MD            Ot            I26.99      
      OTHER PULMONARY EMBOLISM WITHOUT ACUTE C                     

 

 12/15/2017            NORTH HERNANDEZ MD            Ot            Z79.01      
      LONG TERM (CURRENT) USE OF ANTICOAGULANT                     

 

 12/15/2017            NORTH HERNANDEZ MD            Ot            I50.9       
     HEART FAILURE, UNSPECIFIED                     

 

 2017            NORTH HERNANDEZ MD            Ot            I26.99      
      OTHER PULMONARY EMBOLISM WITHOUT ACUTE C                     

 

 2017            NORTH HERNANDEZ MD            Ot            Z79.01      
      LONG TERM (CURRENT) USE OF ANTICOAGULANT                     

 

 2017            CANDE PIKE MD            Ot            I25.119       
     ATHSCL HEART DISEASE OF NATIVE COR ART W                     

 

 2017            CANDE PIKE MD            Ot            Z01.812       
     ENCOUNTER FOR PREPROCEDURAL LABORATORY E                     

 

 2017            NORTH HERNANDEZ MD            Ot            I26.99      
      OTHER PULMONARY EMBOLISM WITHOUT ACUTE C                     

 

 2017            NORTH HERNANDEZ MD            Ot            Z79.01      
      LONG TERM (CURRENT) USE OF ANTICOAGULANT                     

 

 2018            [facility] Ot            I26.99      
      OTHER PULMONARY EMBOLISM WITHOUT ACUTE C                     

 

 2018            NORTH HERNANDEZ MD            Ot            Z79.01      
      LONG TERM (CURRENT) USE OF ANTICOAGULANT                     

 

 2018            NORTH HERNANDEZ MD, Ot            I26.99      
      OTHER PULMONARY EMBOLISM WITHOUT ACUTE C                     

 

 2018            NORTH HERNANDEZ MD, Ot            Z79.01      
      LONG TERM (CURRENT) USE OF ANTICOAGULANT                     

 

 2018            NORTH HERNANDEZ MD            Ot            I10         
   ESSENTIAL (PRIMARY) HYPERTENSION                     

 

 2018            NORTH HERNANDEZ MD            Ot            I25.10      
      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                      

 

 2018            NORTH HERNANDEZ MD            Ot            R06.00      
      DYSPNEA, UNSPECIFIED                     

 

 2018            NORTH HERNANDEZ MD            Ot            R07.89      
      OTHER CHEST PAIN                     

 

 2018            NORTH HERNANDEZ MD, Ot            I50.9       
     HEART FAILURE, UNSPECIFIED                     



                                                                               
                                                                               
                                                                               
                                                                               
                                                                               
                                                                               
                                                                               
                                                                               
                                                                               
                                                                               
                                                                               
                                                                               
                                                                               
                                                                               
                                                                               
                                                                               
                                                                               
                                                                               
                                                



Procedures

      





 Code            Description            Performed By            Performed On   
     

 

             CARDIOLOG                                  NOR[facility] HERNANDEZ          
                         2014        

 

             [id number]                                  DILATION OF 1 COR ART 
WITH DRUG-ELUT INT                                   2017        

 

             [id number]                                  MEASURE OF CARDIAC SAMPL 
  PRESSURE, L H                                   2017        

 

             [id number]                                  FLUOROSCOPY OF MULT COR 
ART USING L OSM                                    2017        

 

             [id number]                                  FLUOROSCOPY OF LEFT HEART 
USING LOW OSMO                                   2017        



                          



Results

      





 Test            Result            Range        









 Complete urinalysis with reflex to culture - 16 10:52         









 Urine color determination            YELLOW             NRG        

 

 Urine clarity determination            CLEAR             NRG        

 

 Urine pH measurement by test strip            8             5-9        

 

 Specific gravity of urine by test strip            1.010             1.016-
1.022        

 

 Urine protein assay by test strip, semi-quantitative            NEGATIVE      
       NEGATIVE        

 

 Urine glucose detection by automated test strip            NEGATIVE           
  NEGATIVE        

 

 Erythrocytes detection in urine sediment by light microscopy            
NEGATIVE             NEGATIVE        

 

 Urine ketones detection by automated test strip            NEGATIVE           
  NEGATIVE        

 

 Urine nitrite detection by test strip            NEGATIVE             NEGATIVE
        

 

 Urine total bilirubin detection by test strip            NEGATIVE             
NEGATIVE        

 

 Urine urobilinogen measurement by automated test strip (mass/volume)          
  NORMAL             NORMAL        

 

 Urine leukocyte esterase detection by dipstick            NEGATIVE             
NEGATIVE        

 

 Automated urine sediment erythrocyte count by microscopy (number/high power 
field)            NONE             NRG        

 

 Automated urine sediment leukocyte count by microscopy (number/high power field
)            NONE             NRG        

 

 Bacteria detection in urine sediment by light microscopy            NEGATIVE  
           NRG        

 

 Crystals detection in urine sediment by light microscopy            NONE      
       NRG        

 

 Casts detection in urine sediment by light microscopy            NONE         
    NRG        

 

 Mucus detection in urine sediment by light microscopy            NEGATIVE     
        NRG        

 

 Complete urinalysis with reflex to culture            NO             NRG      
  









 Automated blood complete blood count (hemogram) panel - 16 11:00         









 Blood leukocytes automated count (number/volume)            9.9 10*3/uL       
     4.3-11.0        

 

 Blood erythrocytes automated count (number/volume)            5.46 10*6/uL    
        4.35-5.85        

 

 Venous blood hemoglobin measurement (mass/volume)            15.7 g/dL        
    13.3-17.7        

 

 Blood hematocrit (volume fraction)            47 %            40-54        

 

 Automated erythrocyte mean corpuscular volume            86 [foz_us]          
  80-99        

 

 Automated erythrocyte mean corpuscular hemoglobin (mass per erythrocyte)      
      29 pg            25-34        

 

 Automated erythrocyte mean corpuscular hemoglobin concentration measurement (
mass/volume)            33 g/dL            32-36        

 

 Automated erythrocyte distribution width ratio            13.9 %            
10.0-14.5        

 

 Automated blood platelet count (count/volume)            282 10*3/uL          
  130-400        

 

 Automated blood platelet mean volume measurement            9.0 [foz_us]      
      7.4-10.4        









 PT panel in platelet poor plasma by coagulation assay - 16 11:00         









 Prothrombin time (PT) in platelet poor plasma by coagulation assay            
22.5 s            12.2-14.7        

 

 INR in platelet poor plasma or blood by coagulation assay            2.0      
       0.8-1.4        









 Activated partial thromboplastin time (aPTT) in platelet poor plasma 
bycoagulation assay - 16 11:00         









 Activated partial thromboplastin time (aPTT) in platelet poor plasma 
bycoagulation assay            45 s            24-35        









 Comprehensive metabolic panel - 16 11:00         









 Serum or plasma sodium measurement (moles/volume)            140 mmol/L       
     135-145        

 

 Serum or plasma potassium measurement (moles/volume)            4.1 mmol/L    
        3.6-5.0        

 

 Serum or plasma chloride measurement (moles/volume)            103 mmol/L     
               

 

 Carbon dioxide            27 mmol/L            21-32        

 

 Serum or plasma anion gap determination (moles/volume)            10 mmol/L   
         5-14        

 

 Serum or plasma urea nitrogen measurement (mass/volume)            9 mg/dL    
        7-18        

 

 Serum or plasma creatinine measurement (mass/volume)            0.79 mg/dL    
        0.60-1.30        

 

 Serum or plasma urea nitrogen/creatinine mass ratio            11             
NRG        

 

 Serum or plasma creatinine measurement with calculation of estimated 
glomerular filtration rate            >             NRG        

 

 Serum or plasma glucose measurement (mass/volume)            96 mg/dL         
           

 

 Serum or plasma calcium measurement (mass/volume)            9.5 mg/dL        
    8.5-10.1        

 

 Serum or plasma total bilirubin measurement (mass/volume)            0.8 mg/dL
            0.1-1.0        

 

 Serum or plasma alkaline phosphatase measurement (enzymatic activity/volume)  
          79 U/L                    

 

 Serum or plasma aspartate aminotransferase measurement (enzymatic activity/
volume)            26 U/L            5-34        

 

 Serum or plasma alanine aminotransferase measurement (enzymatic activity/volume
)            27 U/L            0-55        

 

 Serum or plasma protein measurement (mass/volume)            7.3 g/dL         
   6.4-8.2        

 

 Serum or plasma albumin measurement (mass/volume)            4.4 g/dL         
   3.2-4.5        









 Lipid 1996 panel - 16 11:00         









 Serum or plasma triglyceride measurement (mass/volume)            278 mg/dL   
         <150        

 

 Serum or plasma cholesterol measurement (mass/volume)            241 mg/dL    
        < 200        

 

 Serum or plasma cholesterol in HDL measurement (mass/volume)            34 mg/
dL            40-60        

 

 Cholesterol in LDL [mass/volume] in serum or plasma by direct assay            
165 mg/dL            1-129        

 

 Serum or plasma cholesterol in VLDL measurement (mass/volume)            56 mg/
dL            5-40        









 Methicillin resistant Staphylococcus aureus (MRSA) screening culture -  11:00         









 Methicillin resistant Staphylococcus aureus (MRSA) screening culture          
  NEG             NRG        









 PT panel in platelet poor plasma by coagulation assay - 17 14:21         









 Prothrombin time (PT) in platelet poor plasma by coagulation assay            
27.0 s            12.2-14.7        

 

 INR in platelet poor plasma or blood by coagulation assay            2.5      
       0.8-1.4        









 Complete blood count (CBC) with automated white blood cell (WBC) differential 
- 17 11:03         









 Blood leukocytes automated count (number/volume)            8.9 10*3/uL       
     4.3-11.0        

 

 Blood erythrocytes automated count (number/volume)            4.97 10*6/uL    
        4.35-5.85        

 

 Venous blood hemoglobin measurement (mass/volume)            14.6 g/dL        
    13.3-17.7        

 

 Blood hematocrit (volume fraction)            43 %            40-54        

 

 Automated erythrocyte mean corpuscular volume            86 [foz_us]          
  80-99        

 

 Automated erythrocyte mean corpuscular hemoglobin (mass per erythrocyte)      
      29 pg            25-34        

 

 Automated erythrocyte mean corpuscular hemoglobin concentration measurement (
mass/volume)            34 g/dL            32-36        

 

 Automated erythrocyte distribution width ratio            13.7 %            
10.0-14.5        

 

 Automated blood platelet count (count/volume)            285 10*3/uL          
  130-400        

 

 Automated blood platelet mean volume measurement            9.7 [foz_us]      
      7.4-10.4        

 

 Automated blood neutrophils/100 leukocytes            44 %            42-75   
     

 

 Automated blood lymphocytes/100 leukocytes            40 %            12-44   
     

 

 Blood monocytes/100 leukocytes            11 %            0-12        

 

 Automated blood eosinophils/100 leukocytes            4 %            0-10     
   

 

 Automated blood basophils/100 leukocytes            1 %            0-10        

 

 Blood neutrophils automated count (number/volume)            3.9 10*3         
   1.8-7.8        

 

 Blood lymphocytes automated count (number/volume)            3.5 10*3         
   1.0-4.0        

 

 Blood monocytes automated count (number/volume)            1.0 10*3            
0.0-1.0        

 

 Automated eosinophil count            0.4 10*3/uL            0.0-0.3        

 

 Automated blood basophil count (count/volume)            0.1 10*3/uL          
  0.0-0.1        









 Comprehensive metabolic panel - 17 11:03         









 Serum or plasma sodium measurement (moles/volume)            140 mmol/L       
     135-145        

 

 Serum or plasma potassium measurement (moles/volume)            4.5 mmol/L    
        3.6-5.0        

 

 Serum or plasma chloride measurement (moles/volume)            105 mmol/L     
               

 

 Carbon dioxide            27 mmol/L            21-32        

 

 Serum or plasma anion gap determination (moles/volume)            8 mmol/L    
        5-14        

 

 Serum or plasma urea nitrogen measurement (mass/volume)            8 mg/dL    
        7-18        

 

 Serum or plasma creatinine measurement (mass/volume)            0.76 mg/dL    
        0.60-1.30        

 

 Serum or plasma urea nitrogen/creatinine mass ratio            11             
NRG        

 

 Serum or plasma creatinine measurement with calculation of estimated 
glomerular filtration rate            >             NRG        

 

 Serum or plasma glucose measurement (mass/volume)            91 mg/dL         
           

 

 Serum or plasma calcium measurement (mass/volume)            8.9 mg/dL        
    8.5-10.1        

 

 Serum or plasma total bilirubin measurement (mass/volume)            0.3 mg/dL
            0.1-1.0        

 

 Serum or plasma alkaline phosphatase measurement (enzymatic activity/volume)  
          61 U/L                    

 

 Serum or plasma aspartate aminotransferase measurement (enzymatic activity/
volume)            24 U/L            5-34        

 

 Serum or plasma alanine aminotransferase measurement (enzymatic activity/volume
)            18 U/L            0-55        

 

 Serum or plasma protein measurement (mass/volume)            6.4 g/dL         
   6.4-8.2        

 

 Serum or plasma albumin measurement (mass/volume)            3.9 g/dL         
   3.2-4.5        









 Lipase - 17 11:03         









 Lipase            14 U/L            8-78        









 PT panel in platelet poor plasma by coagulation assay - 08/15/17 11:03         









 Prothrombin time (PT) in platelet poor plasma by coagulation assay            
25.0 s            12.2-14.7        

 

 INR in platelet poor plasma or blood by coagulation assay            2.3      
       0.8-1.4        









 PT panel in platelet poor plasma by coagulation assay - 17 11:00         









 Prothrombin time (PT) in platelet poor plasma by coagulation assay            
20.4 s            12.2-14.7        

 

 INR in platelet poor plasma or blood by coagulation assay            1.7      
       0.8-1.4        









 PT panel in platelet poor plasma by coagulation assay - 17 08:15         









 Prothrombin time (PT) in platelet poor plasma by coagulation assay            
28.3 s            12.2-14.7        

 

 INR in platelet poor plasma or blood by coagulation assay            2.7      
       0.8-1.4        









 Complete blood count (CBC) with automated white blood cell (WBC) differential 
- 17 15:15         









 Blood leukocytes automated count (number/volume)            13.4 10*3/uL      
      4.3-11.0        

 

 Blood erythrocytes automated count (number/volume)            5.18 10*6/uL    
        4.35-5.85        

 

 Venous blood hemoglobin measurement (mass/volume)            15.5 g/dL        
    13.3-17.7        

 

 Blood hematocrit (volume fraction)            44 %            40-54        

 

 Automated erythrocyte mean corpuscular volume            86 [foz_us]          
  80-99        

 

 Automated erythrocyte mean corpuscular hemoglobin (mass per erythrocyte)      
      30 pg            25-34        

 

 Automated erythrocyte mean corpuscular hemoglobin concentration measurement (
mass/volume)            35 g/dL            32-36        

 

 Automated erythrocyte distribution width ratio            13.7 %            
10.0-14.5        

 

 Automated blood platelet count (count/volume)            336 10*3/uL          
  130-400        

 

 Automated blood platelet mean volume measurement            9.0 [foz_us]      
      7.4-10.4        

 

 Automated blood neutrophils/100 leukocytes            46 %            42-75   
     

 

 Automated blood lymphocytes/100 leukocytes            39 %            12-44   
     

 

 Blood monocytes/100 leukocytes            11 %            0-12        

 

 Automated blood eosinophils/100 leukocytes            3 %            0-10     
   

 

 Automated blood basophils/100 leukocytes            1 %            0-10        

 

 Blood neutrophils automated count (number/volume)            6.1 10*3         
   1.8-7.8        

 

 Blood lymphocytes automated count (number/volume)            5.2 10*3         
   1.0-4.0        

 

 Blood monocytes automated count (number/volume)            1.5 10*3            
0.0-1.0        

 

 Automated eosinophil count            0.4 10*3/uL            0.0-0.3        

 

 Automated blood basophil count (count/volume)            0.1 10*3/uL          
  0.0-0.1        









 Comprehensive metabolic panel - 17 15:15         









 Serum or plasma sodium measurement (moles/volume)            140 mmol/L       
     135-145        

 

 Serum or plasma potassium measurement (moles/volume)            3.5 mmol/L    
        3.6-5.0        

 

 Serum or plasma chloride measurement (moles/volume)            102 mmol/L     
               

 

 Carbon dioxide            27 mmol/L            21-32        

 

 Serum or plasma anion gap determination (moles/volume)            11 mmol/L   
         5-14        

 

 Serum or plasma urea nitrogen measurement (mass/volume)            9 mg/dL    
        7-18        

 

 Serum or plasma creatinine measurement (mass/volume)            0.82 mg/dL    
        0.60-1.30        

 

 Serum or plasma urea nitrogen/creatinine mass ratio            11             
NRG        

 

 Serum or plasma creatinine measurement with calculation of estimated 
glomerular filtration rate            >             NRG        

 

 Serum or plasma glucose measurement (mass/volume)            102 mg/dL        
            

 

 Serum or plasma calcium measurement (mass/volume)            9.4 mg/dL        
    8.5-10.1        

 

 Serum or plasma total bilirubin measurement (mass/volume)            0.4 mg/dL
            0.1-1.0        

 

 Serum or plasma alkaline phosphatase measurement (enzymatic activity/volume)  
          78 U/L                    

 

 Serum or plasma aspartate aminotransferase measurement (enzymatic activity/
volume)            17 U/L            5-34        

 

 Serum or plasma alanine aminotransferase measurement (enzymatic activity/volume
)            17 U/L            0-55        

 

 Serum or plasma protein measurement (mass/volume)            7.6 g/dL         
   6.4-8.2        

 

 Serum or plasma albumin measurement (mass/volume)            4.3 g/dL         
   3.2-4.5        









 Magnesium - 17 15:15         









 Magnesium            2.1 mg/dL            1.8-2.4        









 PT panel in platelet poor plasma by coagulation assay - 17 15:15         









 Prothrombin time (PT) in platelet poor plasma by coagulation assay            
15.7 s            12.2-14.7        

 

 INR in platelet poor plasma or blood by coagulation assay            1.2      
       0.8-1.4        









 Activated partial thromboplastin time (aPTT) in platelet poor plasma 
bycoagulation assay - 17 15:15         









 Activated partial thromboplastin time (aPTT) in platelet poor plasma 
bycoagulation assay            28 s            24-35        









 Serum or plasma troponin i.cardiac measurement (mass/volume) - 17 15:15 
        









 Serum or plasma troponin i.cardiac measurement (mass/volume)            < ng/
mL            <0.30        









 Fibrin D-dimer FEU measurement in platelet poor plasma (mass/volume) -  15:15         









 Fibrin D-dimer FEU measurement in platelet poor plasma (mass/volume)          
  0.28 ug/mL            0.00-0.49        









 Myoglobin, serum - 17 15:15         









 Myoglobin, serum            31.5 ng/mL            10.0-92.0        









 Serum or plasma troponin i.cardiac measurement (mass/volume) - 17 21:45 
        









 Serum or plasma troponin i.cardiac measurement (mass/volume)            0.57 ng
/mL            <0.30        









 Complete blood count (CBC) with automated white blood cell (WBC) differential 
- 17 05:24         









 Blood leukocytes automated count (number/volume)            15.1 10*3/uL      
      4.3-11.0        

 

 Blood erythrocytes automated count (number/volume)            4.61 10*6/uL    
        4.35-5.85        

 

 Venous blood hemoglobin measurement (mass/volume)            13.3 g/dL        
    13.3-17.7        

 

 Blood hematocrit (volume fraction)            40 %            40-54        

 

 Automated erythrocyte mean corpuscular volume            87 [foz_us]          
  80-99        

 

 Automated erythrocyte mean corpuscular hemoglobin (mass per erythrocyte)      
      29 pg            25-34        

 

 Automated erythrocyte mean corpuscular hemoglobin concentration measurement (
mass/volume)            33 g/dL            32-36        

 

 Automated erythrocyte distribution width ratio            13.8 %            
10.0-14.5        

 

 Automated blood platelet count (count/volume)            278 10*3/uL          
  130-400        

 

 Automated blood platelet mean volume measurement            9.5 [foz_us]      
      7.4-10.4        

 

 Automated blood neutrophils/100 leukocytes            77 %            42-75   
     

 

 Automated blood lymphocytes/100 leukocytes            14 %            12-44   
     

 

 Blood monocytes/100 leukocytes            8 %            0-12        

 

 Automated blood eosinophils/100 leukocytes            0 %            0-10     
   

 

 Automated blood basophils/100 leukocytes            0 %            0-10        

 

 Blood neutrophils automated count (number/volume)            11.7 10*3        
    1.8-7.8        

 

 Blood lymphocytes automated count (number/volume)            2.1 10*3         
   1.0-4.0        

 

 Blood monocytes automated count (number/volume)            1.2 10*3            
0.0-1.0        

 

 Automated eosinophil count            0.0 10*3/uL            0.0-0.3        

 

 Automated blood basophil count (count/volume)            0.0 10*3/uL          
  0.0-0.1        









 Comprehensive metabolic panel - 17 05:24         









 Serum or plasma sodium measurement (moles/volume)            137 mmol/L       
     135-145        

 

 Serum or plasma potassium measurement (moles/volume)            3.8 mmol/L    
        3.6-5.0        

 

 Serum or plasma chloride measurement (moles/volume)            103 mmol/L     
               

 

 Carbon dioxide            25 mmol/L            21-32        

 

 Serum or plasma anion gap determination (moles/volume)            9 mmol/L    
        5-14        

 

 Serum or plasma urea nitrogen measurement (mass/volume)            8 mg/dL    
        7-18        

 

 Serum or plasma creatinine measurement (mass/volume)            0.67 mg/dL    
        0.60-1.30        

 

 Serum or plasma urea nitrogen/creatinine mass ratio            12             
NRG        

 

 Serum or plasma creatinine measurement with calculation of estimated 
glomerular filtration rate            >             NRG        

 

 Serum or plasma glucose measurement (mass/volume)            123 mg/dL        
            

 

 Serum or plasma calcium measurement (mass/volume)            8.7 mg/dL        
    8.5-10.1        

 

 Serum or plasma total bilirubin measurement (mass/volume)            0.7 mg/dL
            0.1-1.0        

 

 Serum or plasma alkaline phosphatase measurement (enzymatic activity/volume)  
          64 U/L                    

 

 Serum or plasma aspartate aminotransferase measurement (enzymatic activity/
volume)            95 U/L            5-34        

 

 Serum or plasma alanine aminotransferase measurement (enzymatic activity/volume
)            26 U/L            0-55        

 

 Serum or plasma protein measurement (mass/volume)            6.3 g/dL         
   6.4-8.2        

 

 Serum or plasma albumin measurement (mass/volume)            3.7 g/dL         
   3.2-4.5        









 Lipid 1996 panel - 17 05:24         









 Serum or plasma triglyceride measurement (mass/volume)            185 mg/dL   
         <150        

 

 Serum or plasma cholesterol measurement (mass/volume)            192 mg/dL    
        < 200        

 

 Serum or plasma cholesterol in HDL measurement (mass/volume)            34 mg/
dL            40-60        

 

 Cholesterol in LDL [mass/volume] in serum or plasma by direct assay            
143 mg/dL            1-129        

 

 Serum or plasma cholesterol in VLDL measurement (mass/volume)            37 mg/
dL            5-40        









 Blood manual differential performed detection - 17 05:24         









 Blood monocytes/100 leukocytes            14 %            NRG        

 

 Manual blood segmented neutrophils/100 leukocytes            73 %            
NRG        

 

 Blood band neutrophils/100 leukocytes            0 %            NRG        

 

 Manual blood lymphocytes/100 leukocytes            13 %            NRG        

 

 Blood erythrocyte morphology finding identification            NORMAL         
    NRG        









 Automated blood complete blood count (hemogram) panel - 17 03:08         









 Blood leukocytes automated count (number/volume)            11.1 10*3/uL      
      4.3-11.0        

 

 Blood erythrocytes automated count (number/volume)            4.39 10*6/uL    
        4.35-5.85        

 

 Venous blood hemoglobin measurement (mass/volume)            12.8 g/dL        
    13.3-17.7        

 

 Blood hematocrit (volume fraction)            39 %            40-54        

 

 Automated erythrocyte mean corpuscular volume            88 [foz_us]          
  80-99        

 

 Automated erythrocyte mean corpuscular hemoglobin (mass per erythrocyte)      
      29 pg            25-34        

 

 Automated erythrocyte mean corpuscular hemoglobin concentration measurement (
mass/volume)            33 g/dL            32-36        

 

 Automated erythrocyte distribution width ratio            14.0 %            
10.0-14.5        

 

 Automated blood platelet count (count/volume)            252 10*3/uL          
  130-400        

 

 Automated blood platelet mean volume measurement            9.6 [foz_us]      
      7.4-10.4        









 Whole blood basic metabolic panel - 17 03:08         









 Serum or plasma sodium measurement (moles/volume)            139 mmol/L       
     135-145        

 

 Serum or plasma potassium measurement (moles/volume)            4.0 mmol/L    
        3.6-5.0        

 

 Serum or plasma chloride measurement (moles/volume)            104 mmol/L     
               

 

 Carbon dioxide            27 mmol/L            21-32        

 

 Serum or plasma anion gap determination (moles/volume)            8 mmol/L    
        5-14        

 

 Serum or plasma urea nitrogen measurement (mass/volume)            9 mg/dL    
        7-18        

 

 Serum or plasma creatinine measurement (mass/volume)            0.74 mg/dL    
        0.60-1.30        

 

 Serum or plasma urea nitrogen/creatinine mass ratio            12             
NRG        

 

 Serum or plasma creatinine measurement with calculation of estimated 
glomerular filtration rate            >             NRG        

 

 Serum or plasma glucose measurement (mass/volume)            101 mg/dL        
            

 

 Serum or plasma calcium measurement (mass/volume)            8.8 mg/dL        
    8.5-10.1        









 PT panel in platelet poor plasma by coagulation assay - 17 10:30         









 Prothrombin time (PT) in platelet poor plasma by coagulation assay            
14.2 s            12.2-14.7        

 

 INR in platelet poor plasma or blood by coagulation assay            1.1      
       0.8-1.4        









 PT panel in platelet poor plasma by coagulation assay - 17 04:45         









 Prothrombin time (PT) in platelet poor plasma by coagulation assay            
15.5 s            12.2-14.7        

 

 INR in platelet poor plasma or blood by coagulation assay            1.2      
       0.8-1.4        









 Automated blood complete blood count (hemogram) panel - 12/15/17 09:29         









 Blood leukocytes automated count (number/volume)            7.4 10*3/uL       
     4.3-11.0        

 

 Blood erythrocytes automated count (number/volume)            4.80 10*6/uL    
        4.35-5.85        

 

 Venous blood hemoglobin measurement (mass/volume)            13.7 g/dL        
    13.3-17.7        

 

 Blood hematocrit (volume fraction)            42 %            40-54        

 

 Automated erythrocyte mean corpuscular volume            87 [foz_us]          
  80-99        

 

 Automated erythrocyte mean corpuscular hemoglobin (mass per erythrocyte)      
      29 pg            25-34        

 

 Automated erythrocyte mean corpuscular hemoglobin concentration measurement (
mass/volume)            33 g/dL            32-36        

 

 Automated erythrocyte distribution width ratio            13.9 %            
10.0-14.5        

 

 Automated blood platelet count (count/volume)            291 10*3/uL          
  130-400        

 

 Automated blood platelet mean volume measurement            9.0 [foz_us]      
      7.4-10.4        









 Whole blood basic metabolic panel - 12/15/17 09:29         









 Serum or plasma sodium measurement (moles/volume)            142 mmol/L       
     135-145        

 

 Serum or plasma potassium measurement (moles/volume)            4.5 mmol/L    
        3.6-5.0        

 

 Serum or plasma chloride measurement (moles/volume)            105 mmol/L     
               

 

 Carbon dioxide            27 mmol/L            21-32        

 

 Serum or plasma anion gap determination (moles/volume)            10 mmol/L   
         5-14        

 

 Serum or plasma urea nitrogen measurement (mass/volume)            16 mg/dL   
         7-18        

 

 Serum or plasma creatinine measurement (mass/volume)            0.74 mg/dL    
        0.60-1.30        

 

 Serum or plasma urea nitrogen/creatinine mass ratio            22             
NRG        

 

 Serum or plasma creatinine measurement with calculation of estimated 
glomerular filtration rate            >             NRG        

 

 Serum or plasma glucose measurement (mass/volume)            100 mg/dL        
            

 

 Serum or plasma calcium measurement (mass/volume)            10.0 mg/dL       
     8.5-10.1        



                                                                                



Encounters

      





 ACCT No.            Visit Date/Time            Discharge            Status    
        Pt. Type            Provider            Facility            Loc./Unit  
          Complaint        

 

 [id number]            2015 09:47:00            2015 23:59:59          
  CLS            Outpatient            CHARLY ESCALONA                    
                           

 

 [id number]            2014 12:54:00            2014 23:59:59          
  CLS            Outpatient            NEENA VELAZCO                   
                            

 

 [id number]            2013 09:24:00            2013 23:59:59          
  CLS            Outpatient            FRANTZ ALFARO LILLIAN MEGAN                        
                       

 

 [id number]            2018 10:30:00            2018 23:59:59    
        CLS            Outpatient            NORTH HERNANDEZ MD            [facility]            CARD            CAD, CHEST PAIN 
SYNDROME        

 

 [id number]            01/10/2018 11:37:00            01/10/2018 23:59:59    
        CLS            Outpatient            NORTH HERNANDEZ MD            [facility]            CR            CHF        

 

 [id number]            2018 11:00:00            2018 23:59:59    
        CLS            Outpatient            NORTH HERNANDEZ MD            [facility]            LAB            I26.99        

 

 [id number]            10/01/2017 07:00:00            2017 00:01:00    
        DIS            Outpatient            NORTH HERNANDEZ MD            [facility]            LAB            I26.99        

 

 [id number]            12/15/2017 09:16:00            12/15/2017 23:59:59    
        CLS            Outpatient            CANDE PIKE MD            [facility]            LAB            Z01.812 I25.119        

 

 [id number]            2017 11:24:00            2017 11:53:00    
        DIS            Emergency            DAMARIS WYNN            
[facility]            ER            LUMP ON CHEST        

 

 [id number]            2017 17:12:00            2017 14:45:00    
        DIS            Inpatient            FELISHA MORENO DO            [facility]            ICU            CHEST PAIN,N/V        

 

 [id number]            2017 10:49:00            2017 00:01:00    
        DIS            Outpatient            NORTH HERNANDEZ MD            [facility]            LAB            I26.99        

 

 [id number]            2017 15:07:00            2017 17:28:00    
        DIS            Emergency            DAMARIS WYNN            
[facility]            ER            SWELLING IN R ARM 
AFTER FLU SHOT        

 

 [id number]            08/15/2017 10:55:00            08/15/2017 23:59:59    
        CLS            Outpatient            NORTH HERNANDEZ MD            [facility]            LAB            I26.99,Z51.81        

 

 [id number]            2017 00:08:00            2017 23:59:59    
        CLS            Preadmit            NORTH HERNANDEZ MD            [facility]            LAB            DVT        

 

 [id number]            2017 11:24:00            2017 00:01:00    
        DIS            Outpatient            NORTH HERNANDEZ MD            [facility] 
[facility]            LAB            DVT        

 

 [id number]            2017 17:21:00            2017 19:18:00    
        DIS            Emergency            DAMARIS WYNN            
[facility]            ER            LOWER CHEST PAIN     
   

 

 [id number]            2017 08:17:00            2017 00:01:00    
        DIS            Outpatient            NORTH HERNANDEZ MD            [facility] 
[facility]            LAB            DVT        

 

 [id number]            2016 13:09:00            2017 00:01:00    
        DIS            Outpatient            NORTH HERNANDEZ MD            [facility] 
[facility]            LAB            DVT        

 

 [id number]            2016 10:25:00            2016 19:55:00    
        DIS            Outpatient            NORTH HERNANDEZ MD            [facility] 
[facility]            CATH            ABN STRESS,CP,CAD      
  

 

 [id number]            2016 07:31:00            2016 23:59:59    
        CLS            Outpatient            NORTH HERNANDEZ MD            [facility] 
[facility]            CARD            CAD, CHF, CHEST PAIN 
SYNDROME, DVT, HTN, PE        

 

 [id number]            2016 11:12:00            2016 23:59:59    
        CLS            Outpatient            NORTH HERNANDEZ MD            [facility] 
[facility]            CARD            CAD, CHF, CHEST PAIN 
SYNDROME, DVT, HTN, PE        

 

 [id number]            2016 13:12:00            2016 00:01:00    
        DIS            Outpatient            NORTH HERNANDEZ MD            [facility] 
[facility]            LAB            DVT        

 

 [id number]            2016 14:55:00            2016 17:15:00    
        DIS            Emergency            RADHA ARELLANO            [facility] 
[facility]            ER            R ARM PAIN        

 

 [id number]            2015 09:29:00            2015 00:01:00    
        DIS            Outpatient            NORTH HERNANDEZ MD            [facility] 
[facility]            LAB            ANTICOAG THERAPY,HX PE  
      

 

 [id number]            2015 08:34:00            2015 10:54:00    
        DIS            Emergency            COLE CONTRERAS MD            [facility] 
[facility]            ER            RIGHT FOOT PAIN/COUGH    
    

 

 [id number]            2015 11:05:00            2015 00:01:00    
        DIS            Outpatient            NORTH HERNANDEZ MD            [facility] 
[facility]            LAB            ANTICOAG THERAPY,HX PE  
      

 

 [id number]            10/07/2014 14:51:00            2014 00:01:00    
        DIS            Outpatient            NORTH HERNANDEZ MD            [facility] 
[facility]            LAB            ANTICOAG THERAPY,HX PE  
      

 

 [id number]            2014 18:10:00            2014 00:01:00    
        DIS            Outpatient            NORTH HERNANDEZ MD            [facility] 
[facility]            LAB            ANTICOAG THERAPY,HX PE  
      

 

 [id number]            2013 15:59:00            2013 00:01:00    
        DIS            Outpatient            MARY GREEN NORTH WHITTAKER            [facility] 
[facility]            LAB            ANTICOAG THERAPY,HX PE  
      

 

 [id number]            2013 18:23:00            2013 19:22:00    
        DIS            Emergency            HANNA GREEN, CAROL PORTILLO            
[facility]            ER            LEFT ANKLE PAIN      
  

 

 [id number]            2015 09:36:00                                   
   Document Registration                                                       
     

 

 [id number]            2015 09:36:00                                   
   Document Registration                                                       
     

 

 [id number]            2015 09:36:00                                   
   Document Registration                                                       
     

 

 [id number]            2015 09:36:00                                   
   Document Registration                                                       
     

 

 [id number]            2015 09:36:00                                   
   Document Registration                                                       
     

 

 [id number]            2015 09:36:00                                   
   Document Registration                                                       
     

 

 [id number]            2015 09:36:00                                   
   Document Registration                                                       
     

 

 [id number]            2015 09:36:00                                   
   Document Registration                                                       
     

 

 [id number]            2015 09:36:00                                   
   Document Registration                                                       
     

 

 [id number]            2015 09:36:00                                   
   Document Registration                                                       
     

 

 [id number]            2015 09:36:00                                   
   Document Registration                                                       
     

 

 [id number]            2015 09:36:00                                   
   Document Registration                                                       
     

 

 [id number]            2015 10:38:00                                   
   Document Registration                                                       
     

 

 [id number]            2015 10:38:00                                   
   Document Registration                                                       
     

 

 [id number]            2015 10:37:00                                   
   Document Registration                                                       
     

 

 [id number]            2015 10:37:00                                   
   Document Registration                                                       
     

 

 [id number]            2015 21:35:00                                   
   Document Registration                                                       
     

 

 [id number]            2013 16:55:00                                   
   Document Registration                                                       
     

 

 [id number]            2013 08:29:00                                   
   Document Registration                                                       
     

 

 [id number]            2013 10:36:00                                   
   Document Registration                                                       
     

 

 [id number]            2013 07:36:00                                   
   Document Registration                                                       
     

 

 [id number]            2013 10:00:00                                   
   Document Registration                                                       
     

 

 [id number]            2013 13:04:00                                   
   Document Registration                                                       
     

 

 [id number]            2012 16:16:00                                   
   Document Registration                                                       
     

 

 [id number]            2012 21:41:00                                   
   Document Registration                                                       
     

 

 [id number]            2012 12:15:00                                   
   Document Registration                                                       
     

 

 [id number]            2012 13:43:00                                   
   Document Registration                                                       
     

 

 [id number]            10/10/2011 10:15:00                                   
   Document Registration                                                       
     

 

 [id number]            2011 06:51:00                                   
   Document Registration                                                       
     

 

 [id number]            2011 08:46:00                                   
   Document Registration                                                       
     

 

 [id number]            2011 10:21:00                                   
   Document Registration                                                       
     

 

 [id number]            2011 14:37:00                                   
   Document Registration                                                       
     

 

 [id number]            2010 09:40:00                                   
   Document Registration                                                       
     

 

 [id number]            2010 17:02:00                                   
   Document Registration                                                       
     

 

 [id number]            2010 09:19:00                                   
   Document Registration                                                       
     

 

 [id number]            12/10/2009 08:28:00                                   
   Document Registration                                                       
     

 

 [id number]            2009 13:16:00                                   
   Document Registration                                                       
     

 

 [id number]            10/15/2008 08:46:00                                   
   Document Registration                                                       
     

 

 [id number]            09/15/2008 12:11:00                                   
   Document Registration                                                       
     

 

 [id number]            2008 14:03:00                                   
   Document Registration                                                       
     

 

 [id number]            05/10/2008 08:26:00                                   
   Document Registration                                                       
     

 

 [id number]            2008 09:23:00                                   
   Document Registration                                                       
     

 

 [id number]            2008 11:12:00                                   
   Document Registration                                                       
     

 

 [id number]            2008 12:14:00                                   
   Document Registration                                                       
     

 

 [id number]            2007 10:33:00                                   
   Document Registration                                                       
     

 

 [id number]            05/15/2007 14:25:00                                   
   Document Registration                                                       
     

 

 [id number]            2007 08:46:00                                   
   Document Registration                                                       
     

 

 [id number]            2007 14:49:00                                   
   Document Registration                                                       
     

 

 [id number]            2007 07:21:00                                   
   Document Registration                                                       
     

 

 [id number]            2006 08:00:00                                   
   Document Registration                                                       
     

 

 [id number]            2006 08:52:00                                   
   Document Registration                                                       
     

 

 [id number]            10/25/2006 16:48:00                                   
   Document Registration                                                       
     

 

 [id number]            2006 11:44:00                                   
   Document Registration                                                       
     

 

 [id number]            07/10/2006 09:32:00                                   
   Document Registration                                                       
     

 

 [id number]            2006 08:15:00                                   
   Document Registration                                                       
     

 

 [id number]            2006 11:33:00                                   
   Document Registration                                                       
     

 

 [id number]            2006 11:41:00                                   
   Document Registration

## 2018-01-21 NOTE — XMS REPORT
Encounter Summary

 Created on: 2018



Cecile Francisco CARMEN

External Reference #: [id number]

: 1970

Sex: Male



Demographics







 Address  [address] [place]  [address]

 

 Home Phone  [phone]

 

 Preferred Language  English

 

 Marital Status  Unknown

 

 Denominational Affiliation  NON

 

 Race  White

 

 Ethnic Group  Not  or 





Author







 Author  [facility]

 

 Organization  [facility]

 

 Address  Unknown

 

 Phone  Unavailable







Support







 Name  Relationship  Address  Phone

 

 , Steffanie Huber  [place]  Unknown  [phone]







Care Team Providers







 Care Team Member Name  Role  Phone

 

  PCP  Unavailable







Reason for Visit

* 





 



  Reason   Comments

 

 



  New Patient   patient profile









Encounter Details







    



  Date   Type   Department   Care Team   Description

 

    



  2017   Patient Profile   [facility]   Shanell Hendrickson RN   
New Patient (patient



    [address]    profile)



    [address]  



    [place] [address]  



    [phone]  







Social History







    



  Tobacco Use   Types   Packs/Day   Years Used   Date

 

    



  Current Every Day Smoker    









   



  Alcohol Use   Drinks/Week   oz/Week   Comments

 

   



  No   









 



  Sex Assigned at Birth   Date Recorded

 

 



  Not on file 



as of this encounter



Plan of Treatment





Not on fileas of this encounter



Visit Diagnoses











  Diagnosis

 





  Protein S deficiency (HCC)

 





  Primary hypercoagulable state



in this encounter

## 2018-01-21 NOTE — XMS REPORT
Encounter Summary

 Created on: 2018



Francisco Huber

External Reference #: [id number]

: 1970

Sex: Male



Demographics







 Address  [address] [place]  [address]

 

 Home Phone  [phone]

 

 Preferred Language  English

 

 Marital Status  Unknown

 

 Faith Affiliation  NON

 

 Race  White

 

 Ethnic Group  Not  or 





Author







 Author  [facility]

 

 Organization  [facility]

 

 Address  Unknown

 

 Phone  Unavailable







Support







 Name  Relationship  Address  Phone

 

 , Steffanie Huber  [place]  Unknown  [phone]







Care Team Providers







 Care Team Member Name  Role  Phone

 

  PCP  Unavailable







Reason for Visit

* 





 



  Reason   Comments

 

 



  Labs Only   CBC/BMP (creat clearance 149.06)









Encounter Details







    



  Date   Type   Department   Care Team   Description

 

    



  12/15/2017   Documentation   [facility]   Shanell Hendrickson RN   
Labs Only (CBC/BMP (creat



    [address] [place]    clearance 149.06) )



    [address]  



    [place] [address]  



    [phone]  







Social History







    



  Tobacco Use   Types   Packs/Day   Years Used   Date

 

    



  Current Every Day Smoker    









   



  Alcohol Use   Drinks/Week   oz/Week   Comments

 

   



  No   









 



  Sex Assigned at Birth   Date Recorded

 

 



  Not on file 



as of this encounter



Plan of Treatment





Not on fileas of this encounter



Results

* CBC (12/15/2017)





  



  Component   Value   Ref Range

 

  



  White Blood Cells   7.4 

 

  



  RBC   4.80 

 

  



  Hemoglobin   13.7 

 

  



  Hematocrit   42 

 

  



  MCV   87 

 

  



  MCH   29 

 

  



  MCHC   33 

 

  



  Platelet Count   291 

 

  



  MPV   9.0 

 

  



  RDW   13.9 









 



  Specimen   Performing Laboratory

 

 



  Blood   [facility]



   [address] [place] [address]





* BASIC METABOLIC PANEL (12/15/2017)





  



  Component   Value   Ref Range

 

  



  Sodium   142 

 

  



  Potassium   4.5 

 

  



  Chloride   105 

 

  



  CO2   27 

 

  



  Blood Urea Nitrogen   16 

 

  



  Creatinine   0.74 

 

  



  Glucose   100 

 

  



  Calcium   10.0 

 

  



  eGFR Non    >60 

 

  



  eGFR   

 

  



  Anion Gap   10 









 



  Specimen   Performing Laboratory

 

 



  Blood   [facility]



   [address] [place] [address]





in this encounter



Visit Diagnoses











  Diagnosis

 





  Pre-procedure lab exam

 





  Pre-procedural laboratory examination

 





  Coronary artery disease involving native coronary artery of native heart with 
angina pectoris (HCC)



in this encounter

## 2018-01-21 NOTE — XMS REPORT
Continuity of Care Document

 Created on: 2018



NIKO MARTE

External Reference #: [id number]

: 1970

Sex: Male



Demographics







 Address  [address] [place]  [address]

 

 Home Phone  [phone]

 

 Preferred Language  Unknown

 

 Marital Status  Unknown

 

 Shinto Affiliation  Unknown

 

 Race  Unknown

 

 Ethnic Group  Unknown





Author







 Author  [facility]

 

 Organization  [facility]

 

 Address  Unknown

 

 Phone  Unavailable







Care Team Providers







 Care Team Member Name  Role  Phone

 

 [employer]  Unavailable  Unavailable



                                    



Problems

                                                                



Medications

                                                                



Allergies, Adverse Reactions, Alerts

                                                        



Immunizations

                                                                



Results

                                                                



Vital Signs

                                                                                



Encounters

                    





 Location                          Location Details                          
Encounter Type                          Encounter Number                        
  Reason For Visit                          Attending Provider                 
         ADM Date                          DC Date                          
Status                          Source                    

 

                                                     SPECIMEN                  
        [id number]                                                              
                  2017               
           Active                          [facility]                    

 

                                                     SPECIMEN                  
        [id number]                                                              
                  2017               
           Active                          [facility]                    

 

                                                     O                         
                                                                               
                                                      Active                   
       [facility]                    



                                                                               
                 



Procedures

                                                                



Plan of Care

                                                                



Social History

                                                                        



Assessment and Plan

                                                                



Family History

                                                                



Advance Directives

                                                                



Functional Status

## 2018-01-23 ENCOUNTER — HOSPITAL ENCOUNTER (OUTPATIENT)
Dept: HOSPITAL 75 - RAD | Age: 48
End: 2018-01-23
Attending: INTERNAL MEDICINE
Payer: COMMERCIAL

## 2018-01-23 NOTE — DIAGNOSTIC IMAGING REPORT
PROCEDURE: US left lower extremity venous.



TECHNIQUE: Multiple real-time grayscale images were obtained over

the left lower extremity in various projections. Additional

duplex Doppler and color Doppler images were also obtained.



INDICATION: Left leg pain and swelling.



FINDINGS: There is no evidence of a left lower extremity DVT. The

left lower extremity venous system demonstrates normal

compressibility with normal response to augmentation and

Valsalva. No soft tissue fluid collections are identified.  



IMPRESSION: No evidence of left lower extremity DVT.  



Dictated by: 



  Dictated on workstation # [id number]

## 2018-01-28 ENCOUNTER — HOSPITAL ENCOUNTER (INPATIENT)
Dept: HOSPITAL 75 - ER | Age: 48
LOS: 3 days | Discharge: HOME | DRG: 603 | End: 2018-01-31
Attending: INTERNAL MEDICINE | Admitting: INTERNAL MEDICINE
Payer: COMMERCIAL

## 2018-01-28 VITALS — HEIGHT: 71 IN | BODY MASS INDEX: 27.72 KG/M2 | WEIGHT: 198 LBS

## 2018-01-28 DIAGNOSIS — I11.0: ICD-10-CM

## 2018-01-28 DIAGNOSIS — K21.9: ICD-10-CM

## 2018-01-28 DIAGNOSIS — M10.9: ICD-10-CM

## 2018-01-28 DIAGNOSIS — Z87.891: ICD-10-CM

## 2018-01-28 DIAGNOSIS — I25.10: ICD-10-CM

## 2018-01-28 DIAGNOSIS — I25.2: ICD-10-CM

## 2018-01-28 DIAGNOSIS — E78.00: ICD-10-CM

## 2018-01-28 DIAGNOSIS — L03.116: Primary | ICD-10-CM

## 2018-01-28 DIAGNOSIS — Z95.5: ICD-10-CM

## 2018-01-28 DIAGNOSIS — I50.9: ICD-10-CM

## 2018-01-28 DIAGNOSIS — E78.5: ICD-10-CM

## 2018-01-28 DIAGNOSIS — Z86.718: ICD-10-CM

## 2018-01-28 PROCEDURE — 85730 THROMBOPLASTIN TIME PARTIAL: CPT

## 2018-01-28 PROCEDURE — 85610 PROTHROMBIN TIME: CPT

## 2018-01-28 PROCEDURE — 96374 THER/PROPH/DIAG INJ IV PUSH: CPT

## 2018-01-28 PROCEDURE — 85652 RBC SED RATE AUTOMATED: CPT

## 2018-01-28 PROCEDURE — 80202 ASSAY OF VANCOMYCIN: CPT

## 2018-01-28 PROCEDURE — 86141 C-REACTIVE PROTEIN HS: CPT

## 2018-01-28 PROCEDURE — 83605 ASSAY OF LACTIC ACID: CPT

## 2018-01-28 PROCEDURE — 96375 TX/PRO/DX INJ NEW DRUG ADDON: CPT

## 2018-01-28 PROCEDURE — 80053 COMPREHEN METABOLIC PANEL: CPT

## 2018-01-28 PROCEDURE — 85025 COMPLETE CBC W/AUTO DIFF WBC: CPT

## 2018-01-28 PROCEDURE — 36415 COLL VENOUS BLD VENIPUNCTURE: CPT

## 2018-01-28 PROCEDURE — 87040 BLOOD CULTURE FOR BACTERIA: CPT

## 2018-01-28 NOTE — XMS REPORT
Encounter Summary

 Created on: 2018



Francisco Huber

External Reference #: [id number]

: 1970

Sex: Male



Demographics







 Address  [address] [place]  [address]

 

 Home Phone  [phone]

 

 Preferred Language  English

 

 Marital Status  Unknown

 

 Buddhism Affiliation  NON

 

 Race  White

 

 Ethnic Group  Not  or 





Author







 Author  [facility]

 

 Organization  [facility]

 

 Address  Unknown

 

 Phone  Unavailable







Support







 Name  Relationship  Address  Phone

 

 Steffanie Huber  Unknown  [phone]







Care Team Providers







 Care Team Member Name  Role  Phone

 

 Neftali Bacon MD  23  [phone]

 

 Mahesh Champagne MD  PCP  [phone]







Reason for Visit

* 





 



  Reason   Comments

 

 



  Labs Only   CBC/BMP (creat clearance 149.06)









Encounter Details







    



  Date   Type   Department   Care Team   Description

 

    



  12/15/2017   Documentation   [facility]   Shanell Hendrickson RN   
Labs Only (CBC/BMP (creat



    [address]    clearance 149.06) )



    [address]  



    [place] [address]  



    [phone]  







Social History







    



  Tobacco Use   Types   Packs/Day   Years Used   Date

 

    



  Current Every Day Smoker    









   



  Alcohol Use   Drinks/Week   oz/Week   Comments

 

   



  No   









 



  Sex Assigned at Birth   Date Recorded

 

 



  Not on file 



as of this encounter



Plan of Treatment





Not on fileas of this encounter



Results

* CBC (12/15/2017)





  



  Component   Value   Ref Range

 

  



  White Blood Cells   7.4 

 

  



  RBC   4.80 

 

  



  Hemoglobin   13.7 

 

  



  Hematocrit   42 

 

  



  MCV   87 

 

  



  MCH   29 

 

  



  MCHC   33 

 

  



  Platelet Count   291 

 

  



  MPV   9.0 

 

  



  RDW   13.9 









 



  Specimen   Performing Laboratory

 

 



  [place] [address]





* BASIC METABOLIC PANEL (12/15/2017)





  



  Component   Value   Ref Range

 

  



  Sodium   142 

 

  



  Potassium   4.5 

 

  



  Chloride   105 

 

  



  CO2   27 

 

  



  Blood Urea Nitrogen   16 

 

  



  Creatinine   0.74 

 

  



  Glucose   100 

 

  



  Calcium   10.0 

 

  



  eGFR Non    >60 

 

  



  eGFR   

 

  



  Anion Gap   10 









 



  Specimen   Performing Laboratory

 

 



  [place] [address]





in this encounter



Visit Diagnoses











  Diagnosis

 





  Pre-procedure lab exam

 





  Pre-procedural laboratory examination

 





  Coronary artery disease involving native coronary artery of native heart with 
angina pectoris (HCC)

## 2018-01-28 NOTE — XMS REPORT
Continuity of Care Document

 Created on: 2018



NIKO MARTE

External Reference #: [id number]

: 1970

Sex: Male



Demographics







 Address  [address] [place]  [address]

 

 Home Phone  [phone]

 

 Preferred Language  Unknown

 

 Marital Status  Unknown

 

 [employer] Affiliation  Unknown

 

 Race  Unknown

 

 Ethnic Group  Unknown





Author







 Author  [employer]

 

 Organization  [facility]

 

 Address  Unknown

 

 Phone  Unavailable







Care Team Providers







 Care Team Member Name  Role  Phone

 

 [employer]  Unavailable  Unavailable



                                    



Problems

                                                                



Medications

                                                                



Allergies, Adverse Reactions, Alerts

                                                        



Immunizations

                                                                



Results

                                                                



Vital Signs

                                                                                



Encounters

                    





 Location                          Location Details                          
Encounter Type                          Encounter Number                        
  Reason For Visit                          Attending Provider                 
         ADM Date                          DC Date                          
Status                          Source                    

 

                                                     SPECIMEN                  
        [id number]                                                              
                  2017               
           Active                          [facility]                    

 

                                                     SPECIMEN                  
        [id number]                                                              
                  2017               
           Active                          [facility]                    

 

                                                     O                         
                                                                               
                                                      Active                   
       [facility]                    



                                                                               
                 



Procedures

                                                                



Plan of Care

                                                                



Social History

                                                                        



Assessment and Plan

                                                                



Family History

                                                                



Advance Directives

                                                                



Functional Status

## 2018-01-28 NOTE — XMS REPORT
Clinical Summary

 Created on: 2018



Niko Huber

External Reference #: [id number]

: 1970

Sex: Male



Demographics







 Address  [address] [place]  [address]

 

 Home Phone  [phone]

 

 Preferred Language  English

 

 Marital Status  Unknown

 

 Latter-day Affiliation  NON

 

 Race  White

 

 Ethnic Group  Not  or 





Author







 Author  [facility]

 

 Organization  [facility]

 

 Address  Unknown

 

 Phone  Unavailable







Support







 Name  Relationship  Address  Phone

 

 Steffanie Huber  ECON  Unknown  [phone]







Care Team Providers







 Care Team Member Name  Role  Phone

 

 Neftali Bacon MD  21  [phone]

 

 Raiza Champagne MD  PCP  [phone]







Source Comments

Some departments are not documenting in the electronic medical record.  If you 
do not see the information that you expected, contact Release of Information in 
the Health Information Management department at [phone] for further 
assistance in locating additional records.[facility]



Allergies







    



  Active Allergy   Reactions   Severity   Noted Date   Comments

 

    



  Penicillins   UNKNOWN   Low   2017 







Current Medications







      



  Prescription   Sig.   Disp.   Refills   Start   End Date   Status



      Date  

 

      



  atorvastatin (LIPITOR) 40   Take 40 mg by mouth       Active



  mg tablet   daily.     

 

      



  carisoprodol(+) (SOMA)   Take 350 mg by mouth at       Active



  350 mg tablet   bedtime daily.     

 

      



  gabapentin (NEURONTIN)   Take 600 mg by mouth four       Active



  600 mg tablet   times daily.     

 

      



  gemfibrozil (LOPID) 600   Take 600 mg by mouth       Active



  mg tablet   twice daily.     

 

      



  metoprolol XL (TOPROL XL)   Take 25 mg by mouth       Active



  25 mg extended release   daily.     



  tablet      

 

      



  Omega-3 Acid Ethyl Esters   Take 1 g by mouth three       Active



  1 gram cap   times daily.     

 

      



  tiZANidine (ZANAFLEX) 4   Take 8 mg by mouth every       Active



  mg tabletIndications:   8 hours as needed.     



  MUSCLE SPASM   Indications: MUSCLE SPASM     

 

      



  warfarin (COUMADIN) 5 mg   Take 5 mg by mouth as       Active



  tabletIndications:   directed. Take 7.5 mg by     



  THROMBOTIC DISORDER   mouth on Mon and Wed.     



   Take 5 mg by mouth on     



   , Tues, Thurs, Fri,     



   and Sat. Take at bedtime.     



   Indications: THROMBOTIC     



   DISORDER     

 

      



  omeprazole DR(+)   Take 20 mg by mouth daily       Active



  (PRILOSEC) 20 mg capsule   before breakfast.     

 

      



  nitroglycerin (NITROSTAT)   Place 0.4 mg under tongue       Active



  0.4 mg tablet   every 5 minutes as needed     



   for Chest Pain. Max of 3     



   tablets, call 911.     

 

      



  clopiDOGrel (PLAVIX) 75   Take 75 mg by mouth       Active



  mg tablet   daily.     

 

      



  sacubitril/valsartan   Take 1 tablet by mouth       Active



  (ENTRESTO) 24/26 mg   twice daily.     



  tablet      

 

      



  HYDROcodone/acetaminophen   Take 1 tablet by mouth       Active



  (NORCO) 7.5/325 mg tablet   every 6 hours as needed     



   for Pain     







Active Problems







 



  Problem   Noted Date

 

 



  CAD (coronary artery disease)   2017

 

 



  Coronary artery disease involving native coronary artery of native heart   



  with angina pectoris (HCC) 

 

 



  Overview:



  17 - NSTEMI at [facility] in [place], [place]. Successful PCI



  with a Resolute Integrity 2.5 x 26 mm stent to the OM with Dr. Miguel.



  Unsuccessful PCI to the RCA . Pt referred to Dr. Delcid for possible 



  procedure.





  16 - Nuclear stress test ([facility]): No ischemia or



  arrhythmia on EKG. Diaphragmatic attenuation with reversible ischemia



  involving the mid to apical inferior wall and inferolateral wall. Normal



  left ventricular size with mild hypokinesis at the lateral wall. EF 50%.





  Previous history of Promus stent placement to  - date unknown.

 

 



  NSTEMI (non-ST elevated myocardial infarction) ([facility])   2017

 

 



  Overview:



  17 at [address] [place].

 

 



  Tobacco abuse   2017

 

 



  Ischemic cardiomyopathy   2017

 

 



  Overview:



  17 - Echo ([facility]): EF 30-35%. Left ventricular cavity



  size increased. Wall thickness normal. Regional wall motion abnormalities.



  Akinesis of the inferior myocardium. Akinesis of the lateral myocardium.





  17 - NSTEMI - EF 20%, severe left ventricular systolic function (per



  cath report). Life Vest applied for primary prevention of sudden cardiac



  death.

 

 



  Mild mitral regurgitation   2017

 

 



  Mild tricuspid regurgitation   2017

 

 



  Protein S deficiency (HCC)   2017

 

 



  Overview:



  On warfarin.







Encounters







    



  Date   Type   Specialty   Care Team   Description

 

    



  2018   Telephone   Cardiology   Shanell Hendrickson RN   Records Request (
cath



      report faxed to Dr. Bacon's office)

 

    



  2017   Hospital   Cardiology   Tomasz Delcid MD   Coronary artery 
disease



  -   Encounter     involving native coronary



  2017      artery of native heart



      with angina pectoris



      (HCC)

 

    



  2017   Hospital   Lab   Yue Zhu APRN   Atherosclerotic heart



   Encounter     disease of native



      coronary artery with



      unspecified angina



      pectoris (HCC)

 

    



  2017   Hospital   Cardiology   Tomasz Delcid MD 



   Encounter   

 

    



  2017   Office Visit   Cardiology   Tomasz Delcid MD   Cardiac Eval

 

    



  2017   Orders Only   Cardiology   Shanell Hendrickson RN   Chronic 
anticoagulation



      (Primary Dx)

 

    



  2017   Procedure Pass   Cardiology  

 

    



  2017   Surgery   Cardiology   Tomasz Delcid MD   Percutaneous Coronary



      Intervention of Chronic



      Total Occlusion Right



      Coronary Artery



      (Retrograde Approach)

 

    



  12/15/2017   Documentation   Cardiology   Shanell Hendrickson RN   Labs Only (CBC/
BMP (creat



      clearance 149.06) )

 

    



  2017   Documentation   Cardiology   Gooch, Duane, RN   Precertification



      (Approval for LVCORS



      through [employer] [place])

 

    



  2017   Telephone   Cardiology   Shanell Hendrickson RN   Appointment (cath 
& OV



      same day)

 

    



  2017   Patient Profile   Cardiology   Shanell Hendrickson RN   New Patient (
patient



      profile)

 

    



  2017   Telephone   Cardiology   Sahnell Hendrickson RN   Referral ( - Dr. Delcid)



from Last 3 Months



Social History







    



  Tobacco Use   Types   Packs/Day   Years Used   Date

 

    



  Current Every Day Smoker    









   



  Alcohol Use   Drinks/Week   oz/Week   Comments

 

   



  No   









 



  Sex Assigned at Birth   Date Recorded

 

 



  Not on file 







Last Filed Vital Signs







  



  Vital Sign   Reading   Time Taken

 

  



  Blood Pressure   99/58   2017  7:35 AM CST

 

  



  Pulse   76   2017  7:35 AM CST

 

  



  Temperature   37.1   C (98.7   F)   2017  6:15 AM CST

 

  



  Respiratory Rate   -   -

 

  



  Oxygen Saturation   97%   2017  7:35 AM CST

 

  



  Inhaled Oxygen   -   -



  Concentration  

 

  



  Weight   87.5 kg (192 lb 14.4 oz)   2017  8:13 AM CST

 

  



  Height   162.6 cm (5' 4.02")   2017  8:13 AM CST

 

  



  Body Mass Index   33.1   2017  8:13 AM CST







Plan of Treatment







   



  Health Maintenance   Due Date   Last Done   Comments

 

   



  PHYSICAL (COMPREHENSIVE)   1979  



  EXAM   

 

   



  PERTUSSIS VACCINE   1983  

 

   



  TETANUS VACCINE   1989  

 

   



  INFLUENZA VACCINE   2017  







Procedures







    



  Procedure Name   Priority   Date/Time   Associated Diagnosis   Comments

 

    



  TELEMETRY STRIPS-SCAN    2017    Results for this



    2:42 PM CST    procedure are in the



      results section.

 

    



  PROCEDURE RECORD-SCAN    2017    Results for this



    1:47 PM CST    procedure are in the



      results section.

 

    



  ECG-SCAN    2017    Results for this



    10:46 AM CST    procedure are in the



      results section.

 

    



  ECG-SCAN    2017    Results for this



    7:30 AM CST    procedure are in the



      results section.

 

    



  ECG-SCAN    2017    Results for this



    9:40 PM CST    procedure are in the



      results section.



from Last 3 Months



Results

* TELEMETRY STRIPS-SCAN (2017  2:42 PM)





 Narrative

 

 



Ordered by an unspecified provider.





* PROCEDURE RECORD-SCAN (2017  1:47 PM)





 Narrative

 

 



Ordered by an unspecified provider.





* ECG-SCAN (2017 10:46 AM)





 Narrative

 

 



Ordered by an unspecified provider.





* CARDIAC CATH REPORT (2017 10:36 AM)





 



  Specimen   Performing Laboratory

 

 



   OTHER OUTSIDE LAB









 Procedure Note

 

 



Abner Trevizo MD - 2017  3:12 PM CST



[facility]



CARDIAC CATHETERIZATION REPORT

Page 3

NIKO VENTURA :  1970

[facility]#: [id number]







 

 MR #/Billing ID #:  [id number] / [id number]

DATE:  2017

CARDIOLOGIST:  Tomasz Delcid MD

DICTATING PROVIDER: Abner Trevizo MD

REFERRING PHYSICIAN:  RAIZA CHAMPAGNE



INTERVENTIONAL CARDIOLOGY FELLOW:  Abner Trevizo MD.



PROCEDURES PERFORMED:  

 1. Selective right and left coronary angiograms with dual arterial injections.

 2. Bilateral groin accesses, both 7-French common femoral arterial.

 3. Dual coronary injections.

 4. Chronic Total Occlusion () PCI of the proximal right RCA extending into 
the right PLV with 3 drug-eluting stents in overlapping fashion with antegrade 
wire escalation technique.

 5. Right common femoral angiogram, limited.



INDICATION FOR THE PROCEDURE:  Niko Huber is a pleasant, 47-year-old 
gentleman with a history of recent non-ST-elevation MI, status post PCI to his 
left circumflex extending into his obtuse marginal with a Resolute Integrity 
stent from an outside institution.  There was an attempted  antegrade PCI on 
his RCA , which was unsuccessful. We would like to perform an invasive 
evaluation of his coronary anatomy with an intent to revascularize with the 
retrograde approach, given the fact that the antegrade cap was very ambiguous, 
based on outside hospital films.



CONSENT:  Risks, benefits, and alternatives of the procedure were explained to 
the patient by both Dr. Delcid and myself.  Risks of access site complications, 
bleeding, arterial dissection, death, contrast nephropathy, and radiation 
hazards were explained to the patient, who verbalized understanding of these 
conditions and consented to the procedure.  A written, informed consent has 
been placed in the chart as well.



DETAILS OF THE PROCEDURE:  The patient was brought in the cath lab in the 
fasting, nonsedated state.  After giving the patient a total of 225 mcg of 
fentanyl and 5 mg of Versed, we achieved moderate conscious sedation, which was 
monitored and maintained throughout the procedure for a total of 126 minutes.  
Respiratory, hemodynamic, and neurological parameters were monitored throughout 
the procedure by the operators and by the cath lab staff. 



The patient was prepped and draped in sterile fashion.  We exposed bilateral 
groins and prepped with 1% chlorhexidine and instilled a total of 20 mL of 1% 
lidocaine for good local anesthesia in both groins. 



We then gained access into the right common femoral artery using a 
micropuncture needle and modified Seldinger technique to insert a 7-French 10 
cm arterial sheath with good blood flow return.  Similar technique was adopted 
to gain access to the left common femoral artery with the same 7-French 10 cm 
arterial sheath.  We went in with the intent to perform a retrograde  PCI, 
and hence, we obtained dual coronary injections.



CORONARY ANGIOGRAM:  

 1. The left main arises normally from the left coronary cusp and is free from 
angiographic evidence of disease.  It bifurcates into a left anterior 
descending artery, as well as a left circumflex artery.

 2. The left anterior descending artery arises normally from the left main.  
Its proximal, mid, and distal portions are free from disease, except for 30% 
stenosis in the mid segment.  The left main is also free from disease.  It 
gives rise to 1 diagonal branch, the LAD, and it is also free from disease.

 3. The left circumflex artery arises normally from the left main.  Its proximal
, mid, and distal portions are free from disease.  It has a previously placed 
stent extending from the proximal circumflex, extending into the OM, is widely 
patent without any thrombosis or in-stent restenosis.

 4. The right coronary artery arises normally from the right coronary cusp, and 
its proximal portion has 40% to 50% diffuse disease, followed by a long chronic 
total occlusion segment extending from the mid RCA to the PLV.  There was 
collateral flow from the LAD to the RPLV territory, and also from the 
circumflex artery as well.  We noted that on outside hospital films there was 
an abrupt cutoff of the proximal RCA with a very ambiguous cap; however, on our 
films, we noted an extra RV marginal branch which was filling in, though we 
could not localize the true nature of the proximal cap.  Hence, we decided to 
adopt a retrograde approach initially.



DETAILS OF THE PERCUTANEOUS CORONARY INTERVENTION to the RCA (Full metal Jacket
) (CHRONIC TOTAL OCCLUSION):  

 1. We used an EBU 3.75, 7-French guide catheter to engage the left main, while 
we used an AL1, 7-French guide catheter to engage the RCA for dual arterial 
injections.

 2. We then introduced an 0.014 x 300 cm Fielder FC wire with a 150 cm Corsair 
microcatheter and tried to get across a couple of septals.  We were able to 
track through the septals pretty well; however, the anatomy for reentry into 
the PLV/PDA was not favorable.  After multiple attempts through different 
septals, we were not able to gain access into the right PLV or PDA segments, 
even despite tracking through the septals pretty well through both the Fielder 
FC, as well as the Corsair; hence, we switched to an antegrade approach.

 3. We then switched the AL1, 7-French guide for a Hockey-Stick 1, 6-French 
guide, given the fact that an AL1 was a little too big to engage the RCA, given 
the size of the aortic root.  The Hockey-Stick 1 gave us moderate seating and 
support throughout the entire procedure.  We then introduced an 0.014 x 300 cm 
Fielder FC wire over a 150 cm Corsair into the right coronary artery to switch 
to an antegrade wire escalation approach.  We were able to make very little 
progress across the proximal cap, which was very ambiguous with a Fielder FC 
wire.  We then switched for an 0.009 tapering into an 0.014 x 300 cm Fielder XT 
wire and tried to make some progress across the mid RCA, and we tracked the 
Corsair across it.  Though we were able to get into the distal RCA, we were 
unsure whether we were in the true lumen or not.  We then switched out for a 
 200, 0.014 x 300 cm wire and got across the mid to distal RCA with 
moderate amount of difficulty.  We were unsure again whether we were at the 
true lumen or not.  Hence, we took a dual injection on the LAD, which 
demonstrated that our wire was indeed in the right PLV.  After this, we tracked 
the Corsair down into the right PLV and switched out the  200 wire for an 
0.014 x 300 cm Luge wire.  We then tracked the Corsair out and introduced a 2.0 
x 30 mm Emerge RX balloon and performed balloon angioplasty for a total of 6 
different inflations, starting from the right PLV, extending into the proximal 
or ostial segment of the RCA, all of which were 8 atmospheres, lasting 20-30 
seconds.  We got moderate amount of expansion with this.  We were then easily 
able to deliver a 2.25 x 38 mm Synergy drug-eluting stent  extending into the 
distal RPLV and the proximal edge of the stent into the distal portion of the 
right coronary artery.  The stent was deployed at 8 atmospheres for a total of 
26 seconds.  We then overlapped this proximally with a 2.25 x 38 mm Syndergy 
TESFAYE (10 carloz for 8 seconds).  We then deployed a 2.5 x 32 mm Synergy TESFAYE at 10 
atmospheres, lasting 25 seconds in overlapping fashion in the ostium to the 
midportion of the RCA.  We got excellent angiographic results.  After this, we 
noticed that the distal edge of the distal stent in the right PLV was oversized
, compared to the rest of the RCA.  This could have been an element of spasm.  
Hence, we gave the patient a total of 200 mcg of Cardene and 300 mcg of 
nitroglycerin.  Even after vasodilatation, we noticed that the distal edge was 
a little pinched.  Hence, we decided to dilate this using a 2.0 x 15 mm MINI 
TREK RX balloon for 10 atmospheres for 24 seconds.  Excellent angiographic 
results were achieved at the distal edge of the stent with restoration of MARIA LUISA-
3 flow to the PLV.  We then removed the stent and postdilated both the stents 
using a 2.75 x 20 mm TREK NC balloon for a total of 6 different inflations, 
lasting 16 atmospheres for at least 16 seconds.  Excellent stent expansion and 
apposition were achieved.  There was no evidence of edge dissection or distal 
embolization.  MARIA LUISA-3 flow was restored to the PDA and the PLV territories.  
Excellent flow was noted across the stent.  Good stent expansion and apposition 
were achieved.  Wire-out frames confirmed the above findings as well. 

The patient tolerated the procedure very well without any evidence of 
hemodynamic complications, and was transferred out of the cath lab in stable 
condition without any chest pain. 



Dr. Delcid, my attending, was scrubbed and performed key portions of the 
procedure and supervised the rest of it as well.



TOTAL CONTRAST USED:  340 mL.



TOTAL AIR KERMA:  3528 mGy. 



Right common femoral angiogram demonstrated a high bifurcation of the right side
, and hence, we opted for manual compression method.



LESION CHARACTERISTICS:  

 1. RCA  ACC/AHA type C lesion.

 2. MARIA LUISA 0 flow was noted preprocedure, and MARIA LUISA-3 flow was restored after the 
procedure.



IMPRESSION:  

 1. Successful chronic total occlusion and revascularization of the proximal 
RCA with antegrade wire escalation technique, extending into the right PLV with 
3 Celestine (all Synergy - distal 2.25 x 38 mm, mid 2.25 x 38 mm and proximal 2.5 x 
32 mm) in overlapping fashion with excellent angiographic results.

 2. Aspirin, Plavix, and Coumadin therapy, given the fact that the patient had 
a recent pulmonary embolism and needs Coumadin therapy.  Once the INR reaches 
therapeutic level, we recommend continuing Plavix and Coumadin x 12 months.



Tomasz Delcid MD





PCG/Corina

DD:  2017 15:12:35  DT:  2017 16:09:41

Job #:  [id number]



cc: 

  - RAIZA CHAMPAGNE 







* ECG-SCAN (2017  7:30 AM)





 Narrative

 

 



Ordered by an unspecified provider.





* PROTIME INR (PT) (2017  3:45 AM)



Only the most recent of 2 results within the time period is included.





  



  Component   Value   Ref Range

 

  



  INR   1.1   0.8 - 1.2









 



  Specimen   Performing Laboratory

 

 



  Blood   [facility]



   [address] [place] [address]





* CBC (2017  3:45 AM)



Only the most recent of 2 results within the time period is included.





  



  Component   Value   Ref Range

 

  



  White Blood Cells   9.6   4.5 - 11.0 K/UL

 

  



  RBC   4.23 (L)   4.4 - 5.5 M/UL

 

  



  Hemoglobin   12.3 (L)   13.5 - 16.5 GM/DL

 

  



  Hematocrit   36.3 (L)   40 - 50 %

 

  



  MCV   85.8   80 - 100 FL

 

  



  MCH   29.1   26 - 34 PG

 

  



  MCHC   34.0   32.0 - 36.0 G/DL

 

  



  RDW   13.9   11 - 15 %

 

  



  Platelet Count   279   150 - 400 K/UL

 

  



  MPV   7.5   7 - 11 FL









 



  Specimen   Performing Laboratory

 

 



  Blood   [facility]



   [address] [place] [address]





* BASIC METABOLIC PANEL (2017  3:45 AM)



Only the most recent of 2 results within the time period is included.





  



  Component   Value   Ref Range

 

  



  Sodium   138   137 - 147 MMOL/L

 

  



  Potassium   3.9   3.5 - 5.1 MMOL/L

 

  



  Chloride   105   98 - 110 MMOL/L

 

  



  CO2   25   21 - 30 MMOL/L

 

  



  Anion Gap   8   3 - 12

 

  



  Glucose   107 (H)   70 - 100 MG/DL

 

  



  Blood Urea Nitrogen   12   7 - 25 MG/DL

 

  



  Creatinine   0.72   0.4 - 1.24 MG/DL

 

  



  Calcium   9.2   8.5 - 10.6 MG/DL

 

  



  eGFR Non African American   >60   >60 mL/min



   Comment: 



   The eGFR is not validated for use in drug dosing 



   adjustments.    Continue to use 



   estimated creatinine clearance per dosing 



   reference text.    Please contact the 



   Clinical Pharmacist for questions. 

 

  



  eGFR    >60   >60 mL/min



   Comment: 



   The eGFR is not validated for use in drug dosing 



   adjustments.    Continue to use 



   estimated creatinine clearance per dosing 



   reference text.    Please contact the 



   Clinical Pharmacist for questions. 









 



  Specimen   Performing Laboratory

 

 



  Blood   [facility]



   [address] [place] [address]





* ECG-SCAN (2017  9:40 PM)





 Narrative

 

 



Ordered by an unspecified provider.





* POC ACTIVATED CLOTTING TIME (2017  4:49 PM)



Only the most recent of 8 results within the time period is included.





  



  Component   Value   Ref Range

 

  



  Activated Clotting Time   162   s









 



  Specimen   Performing Laboratory

 

 



   [facility]



   [address] [place] [address]





from Last 3 Months

## 2018-01-28 NOTE — XMS REPORT
Encounter Summary

 Created on: 2018



Cecile Francisco CARMEN

External Reference #: [id number]

: 1970

Sex: Male



Demographics







 Address  [address] [place]  [address]

 

 Home Phone  [phone]

 

 Preferred Language  English

 

 Marital Status  Unknown

 

 Church Affiliation  NON

 

 Race  White

 

 Ethnic Group  Not  or 





Author







 Author  [facility]

 

 Organization  [facility]

 

 Address  Unknown

 

 Phone  Unavailable







Support







 Name  Relationship  Address  Phone

 

 Steffanie Huber  RAVINDER  Unknown  [phone]







Care Team Providers







 Care Team Member Name  Role  Phone

 

 Neftali Bacon MD  21  [phone]

 

 Mahesh Champagne MD  PCP  [phone]







Encounter Details







    



  Date   Type   Department   Care Team   Description

 

    



  2017   Procedure Pass   Cardiac Catheterization  



    Laboratory  



    [address] [place] [address]  



    [phone]  







Social History







    



  Tobacco Use   Types   Packs/Day   Years Used   Date

 

    



  Current Every Day Smoker    









   



  Alcohol Use   Drinks/Week   oz/Week   Comments

 

   



  No   









 



  Sex Assigned at Birth   Date Recorded

 

 



  Not on file 



as of this encounter



Plan of Treatment





Not on fileas of this encounter



Visit Diagnoses

Not on filein this encounter

## 2018-01-28 NOTE — XMS REPORT
Encounter Summary

 Created on: 2018



Francisco Huber

External Reference #: [id number]

: 1970

Sex: Male



Demographics







 Address  [address] [place]  [address]

 

 Home Phone  [phone]

 

 Preferred Language  English

 

 Marital Status  Unknown

 

 Confucianist Affiliation  NON

 

 Race  White

 

 Ethnic Group  Not  or 





Author







 Author  [facility]

 

 Organization  [facility]

 

 Address  Unknown

 

 Phone  Unavailable







Support







 Name  Relationship  Address  Phone

 

 Steffanie Huber  ECON  Unknown  [phone]







Care Team Providers







 Care Team Member Name  Role  Phone

 

 Neftali Bacon MD  21  [phone]







Reason for Visit

* 





 



  Reason   Comments

 

 



  Appointment   cath & OV same day









Encounter Details







    



  Date   Type   Department   Care Team   Description

 

    



  2017   Telephone   [facility]   Shanell Hendrickson RN   
Appointment (cath & OV



    [address]    same day)



    [address]  



    [place] [address]  



    [phone]  







Social History







    



  Tobacco Use   Types   Packs/Day   Years Used   Date

 

    



  Current Every Day Smoker    









   



  Alcohol Use   Drinks/Week   oz/Week   Comments

 

   



  No   









 



  Sex Assigned at Birth   Date Recorded

 

 



  Not on file 



as of this encounter



Miscellaneous Notes

* Telephone Encounter - Shanell Hendrickson RN - 2017  1:35 PM CST



 cath date confirmed for this  - confirmed with Dr. Delcid and 
with spouse. 

* Telephone Encounter - Shanell Hendrickson RN - 2017  2:37 PM CST



Connected with patient's spouse, Steffanie, over the phone this afternoon. Let 
her know I am checking with Dr. Delcid about a possible cath and OV same day 
either Monday,  or . She states either date works for them. Home 
number listed on pt's chart is spouse's cell number. She can be reached there 
anytime. 

* Telephone Encounter - Shanell Hendrickson RN - 2017  2:36 PM CST



----- Message from Bessy Bell sent at 2017  1:23 PM CST -----

Regarding: apt request

Call pt wife back at [phone] Steffanie if it's before 3

in this encounter



Plan of Treatment





Not on fileas of this encounter



Visit Diagnoses

Not on filein this encounter

## 2018-01-28 NOTE — XMS REPORT
Encounter Summary

 Created on: 2018



Francisco Huber

External Reference #: [id number]

: 1970

Sex: Male



Demographics







 Address  [address] [place]  [address]

 

 Home Phone  [phone]

 

 Preferred Language  English

 

 Marital Status  Unknown

 

 Tenriism Affiliation  NON

 

 Race  White

 

 Ethnic Group  Not  or 





Author







 Author  [facility]

 

 Organization  [facility]

 

 Address  Unknown

 

 Phone  Unavailable







Support







 Name  Relationship  Address  Phone

 

 Steffanie Huber  Unknown  [phone]







Care Team Providers







 Care Team Member Name  Role  Phone

 

 Neftali Bacon MD  21  [phone]

 

 Mahesh Champagne MD  PCP  [phone]







Reason for Visit

* 





 



  Reason   Comments

 

 



  Cardiac Eval 





* Auth/Cert (Routine)





     



  Status   Reason   Specialty   Diagnoses /   Referred By   Referred To



     Procedures   Contact   Contact

 

     











Encounter Details







    



  Date   Type   Department   Care Team   Description

 

    



  2017   Office Visit   [facility]   Tomasz Delcid MD   
Cardiac Eval



    [address]   [address] 



    [address]   [address] 



    [place] [address]   [place] [address] 



    [phone] (Fax) 







Social History







    



  Tobacco Use   Types   Packs/Day   Years Used   Date

 

    



  Current Every Day Smoker    









   



  Alcohol Use   Drinks/Week   oz/Week   Comments

 

   



  No   









 



  Sex Assigned at Birth   Date Recorded

 

 



  Not on file 



as of this encounter



Last Filed Vital Signs







  



  Vital Sign   Reading   Time Taken

 

  



  Blood Pressure   116/68   2017  7:18 AM CST

 

  



  Pulse   79   2017  7:18 AM CST

 

  



  Temperature   -   -

 

  



  Respiratory Rate   -   -

 

  



  Oxygen Saturation   -   -

 

  



  Inhaled Oxygen   -   -



  Concentration  

 

  



  Weight   87.1 kg (192 lb)   2017  7:18 AM CST

 

  



  Height   162.6 cm (5' 4")   2017  7:18 AM CST

 

  



  Body Mass Index   32.96   2017  7:18 AM CST



in this encounter



Progress Notes

* Tomasz Delcid MD - 2017  7:15 AM CST



Formatting of this note may be different from the original.

Date of Service: 2017



Fracnisco Huber is a 47 y.o. male.   



HPI

 

Mr. Huber is a 47-year-old male with a history of coronary artery disease who 
had a non-ST elevation myocardial infarction back in November at that time he 
successfully underwent stenting with a drug-eluting stent to the obtuse 
marginal.  They utilized a 2.5 x 26 mm resolute integrity stent.  In addition, 
he has a chronic total occlusion of the right coronary artery which is occluded 
proximally.  He has good left-to-right collateralization to the posterior 
descending and posterior lateral branches.  Of concern, his ejection fraction 
is severely impaired estimated at around 30% and he currently has a LifeVest.  
He has a previous myocardial perfusion study suggesting viability in the 
inferior wall and given his age and LV impairment, it was discussed and elected 
to go ahead and proceed with percutaneous revascularization to give him every 
opportunity to recover.  Currently, he has been noting intermittent chest 
discomfort which occurs with activity and at rest.  He is on aspirin, Plavix 
and warfarin and is tolerated this without any active bleeding issues.  He is 
taking warfarin due to a history of a pulmonary embolus per his report with the 
addition of aspirin and Plavix given his recent coronary event.



 



Vitals: 

 17 0718 

BP: 116/68 

Pulse: 79 

Weight: 87.1 kg (192 lb) 

Height: 1.626 m (5' 4") 



Body mass index is 32.96 kg/(m^2). 



Past Medical History

Patient Active Problem List 

 Diagnosis Date Noted 

 Coronary artery disease involving native coronary artery of native heart 
with angina pectoris ([facility]) 2017 - NSTEMI at [facility] in [place], [place]. Successful PCI 
with a Resolute Integrity 2.5 x 26 mm stent to the OM with Dr. Miguel. 
Unsuccessful PCI to the RCA . Pt referred to Dr. Delcid for possible  
procedure. 



16 - Nuclear stress test ([facility]): No ischemia or 
arrhythmia on EKG. Diaphragmatic attenuation with reversible ischemia involving 
the mid to apical inferior wall and inferolateral wall. Normal left ventricular 
size with mild hypokinesis at the lateral wall. EF 50%. 



Previous history of Promus stent placement to  - date unknown. 

 

 NSTEMI (non-ST elevated myocardial infarction) ([facility]) 2017 at [facility] in [place], [place]. 

 

 Tobacco abuse 2017 

 Ischemic cardiomyopathy 2017 - Echo ([facility]): EF 30-35%. Left ventricular cavity 
size increased. Wall thickness normal. Regional wall motion abnormalities. 
Akinesis of the inferior myocardium. Akinesis of the lateral myocardium. 



17 - NSTEMI - EF 20%, severe left ventricular systolic function (per cath 
report). Life Vest applied for primary prevention of sudden cardiac death. 

 

 Mild mitral regurgitation 2017 

 Mild tricuspid regurgitation 2017 

 Protein S deficiency (HCC) 2017 

  On warfarin. 

 



Review of Systems 

Constitution: Negative. 

HENT: Negative.  

Eyes: Negative.  

Cardiovascular: Positive for chest pain. 

Respiratory: Negative.  

Endocrine: Negative.  

Hematologic/Lymphatic: Negative.  

Skin: Negative.  

Musculoskeletal: Negative.  

Gastrointestinal: Negative.  

Genitourinary: Negative.  

Neurological: Negative.  

Psychiatric/Behavioral: Negative.  

Allergic/Immunologic: Negative.  



Physical Exam

Physical Exam 

General Appearance: alert and oriented, no acute distress

Skin: warm, moist, no ulcers

Head: normocephalic, symmetric

Eyes: EOMI, PERRL, sclera are clear and without icterus

ENT: unremarkable, nares patent

Neck Veins: neck veins are flat, neck veins are not distended

Carotid Arteries: normal carotid upstroke bilaterally, no bruits

Chest Inspection: chest is normal in appearance

Auscultation/Percussion: lungs clear to auscultation, no rales, rhonchi, 
wheezes or friction rub appreciated

Cardiac Rhythm: regular rhythm and normal rate

Cardiac Auscultation: Normal S1 & S2, no S3 or S4, no rub - normal pmi

Murmurs: no cardiac murmurs 

Extremities: no lower extremity edema bilaterally; 2+ symmetric distal pulses

Muskuloskeletal: no obvious deformity

Abdominal Exam: soft, non-tender, no masses, bowel sounds normal

Neurologic Exam: neurological assessment grossly intact

Mood and Affect: Appropriate



Cardiovascular Studies

ECG - NSR, PVC - no Q waves



Problems Addressed Today

Encounter Diagnoses 

Name Primary? 

 Coronary artery disease involving native coronary artery of native heart 
with angina pectoris (HCC) Yes 

 Ischemic cardiomyopathy  

 Mild mitral regurgitation  



Assessment and Plan

 

1. Chronic total occlusion of the proximal right coronary artery with prominent 
left to right collateralization to the posterior descending and posterior 
lateral branches -he is referred for complex percutaneous intervention.  I 
discussed the risks and benefits and will plan to go ahead and proceed.  We 
will obtain bilateral groin access and will likely require a retrograde 
approach given the anatomy noted on his angiograms.  We will plan to obtain an 
INR this morning to ensure that his INR is less than 1.9.  We will plan to keep 
you informed this results of his upcoming procedure. 



 Thank you for allowing us to participate in his care.

 



Current Medications (including today's revisions)

 aspirin EC 81 mg tablet Take 81 mg by mouth daily. Take with food. 

 atorvastatin (LIPITOR) 40 mg tablet Take 40 mg by mouth daily. 

 carisoprodol(+) (SOMA) 350 mg tablet Take 350 mg by mouth at bedtime daily. 

 clopiDOGrel (PLAVIX) 75 mg tablet Take 75 mg by mouth daily. 

 gabapentin (NEURONTIN) 600 mg tablet Take 600 mg by mouth four times daily. 

 gemfibrozil (LOPID) 600 mg tablet Take 600 mg by mouth twice daily. 

 metoprolol XL (TOPROL XL) 25 mg extended release tablet Take 25 mg by mouth 
daily. 

 nitroglycerin (NITROSTAT) 0.4 mg tablet Place 0.4 mg under tongue every 5 
minutes as needed for Chest Pain. Max of 3 tablets, call 911. 

 Omega-3 Acid Ethyl Esters 1 gram cap Take 1 g by mouth three times daily. 

 omeprazole DR(+) (PRILOSEC) 20 mg capsule Take 20 mg by mouth daily before 
breakfast. 

 tiZANidine (ZANAFLEX) 4 mg tablet Take 8 mg by mouth every 8 hours as 
needed. Indications: MUSCLE SPASM 

 warfarin (COUMADIN) 5 mg tablet Take 5 mg by mouth as directed. Take 7.5 mg 
by mouth on Mon and Wed. Take 5 mg by mouth on , Tues, Thurs, Fri, and 
Sat. Take at bedtime.  Indications: THROMBOTIC DISORDER 



 

in this encounter



Miscellaneous Notes

* Addendum Note - Steffanie Lin - 2017 11:56 AM CST



 Addended by: STEFFANIE LIN on: 2017 11:56 AM



  Modules accepted: Orders



  

in this encounter



Plan of Treatment







   



  Name   Priority   Associated Diagnoses   Order Schedule

 

   



  ECG 12-LEAD   Routine   Coronary artery disease   Ordered: 2017



    involving native coronary 



    artery of native heart 



    with angina pectoris 



    (HCC) 



    Ischemic cardiomyopathy 



as of this encounter



Visit Diagnoses











  Diagnosis

 





  Coronary artery disease involving native coronary artery of native heart with 
angina pectoris (HCC)



  - Primary

 





  Ischemic cardiomyopathy

 





  Other specified forms of chronic ischemic heart disease

 





  Mild mitral regurgitation

 





  Mitral valve disorders

## 2018-01-28 NOTE — XMS REPORT
Encounter Summary

 Created on: 2018



Francisco Huber

External Reference #: [id number]

: 1970

Sex: Male



Demographics







 Address  [address] [place]  [address]

 

 Home Phone  [phone]

 

 Preferred Language  English

 

 Marital Status  Unknown

 

 Bahai Affiliation  NON

 

 Race  White

 

 Ethnic Group  Not  or 





Author







 Author  [facility]

 

 Organization  [facility]

 

 Address  Unknown

 

 Phone  Unavailable







Support







 Name  Relationship  Address  Phone

 

 Steffanie Huber  ECON  Unknown  [phone]







Care Team Providers







 Care Team Member Name  Role  Phone

 

 Neftali Bacon MD  21  [phone]

 

 Mahesh Champagne MD  PCP  [phone]







Reason for Visit

* 





 



  Reason   Comments

 

 



  Records Request   cath report faxed to Dr. Bacon's office









Encounter Details







    



  Date   Type   Department   Care Team   Description

 

    



  2018   Telephone   [facility]   Shanell Hendrickson RN   
Records Request (cath



    [address]    report faxed to Dr. Prather [facility]    Jordi's office)



    [place] [address]  



    [phone]  







Social History







    



  Tobacco Use   Types   Packs/Day   Years Used   Date

 

    



  Current Every Day Smoker    









   



  Alcohol Use   Drinks/Week   oz/Week   Comments

 

   



  No   









 



  Sex Assigned at Birth   Date Recorded

 

 



  Not on file 



as of this encounter



Miscellaneous Notes

* Telephone Encounter - Shanell Hendrickson RN - 2018  5:16 PM CST



Voicemail received from Ariana with Dr. Bacon's office in [place]. She is 
requesting pt's cath report. Faxed to Dr. Bacon as requested. 

in this encounter



Plan of Treatment





Not on fileas of this encounter



Visit Diagnoses

Not on filein this encounter

## 2018-01-28 NOTE — XMS REPORT
Encounter Summary

 Created on: 2018



Francisco Huber

External Reference #: [id number]

: 1970

Sex: Male



Demographics







 Address  [address] [place]  [address]

 

 Home Phone  [phone]

 

 Preferred Language  English

 

 Marital Status  Unknown

 

 Lutheran Affiliation  NON

 

 Race  White

 

 Ethnic Group  Not  or 





Author







 Author  [facility]

 

 Organization  [facility]

 

 Address  Unknown

 

 Phone  Unavailable







Support







 Name  Relationship  Address  Phone

 

 Steffanie Huber  ECON  Unknown  [phone]







Care Team Providers







 Care Team Member Name  Role  Phone

 

 Neftali Bacon MD  21  [phone]







Reason for Visit

* 





 



  Reason   Comments

 

 



  Referral    - Dr. Delcid









Encounter Details







    



  Date   Type   Department   Care Team   Description

 

    



  2017   Telephone   [facility]   Shanell Hendrickson RN   
Referral ( - 



    [address])



    [facility] [address]  



    [place] [address]  



    [phone]  







Social History







    



  Tobacco Use   Types   Packs/Day   Years Used   Date

 

    



  Never Assessed    









 



  Sex Assigned at Birth   Date Recorded

 

 



  Not on file 



as of this encounter



Miscellaneous Notes

* Telephone Encounter - Kate Higginbotham RN - 2017  3:26 PM CST



Rec'd vm from Dr. Miguel's nurse, Mary dunlap: referral for pt  PCI. Dr. Delcid 
also advised he spoke with Dr. Miguel and wants to proceed with OV/PCI same day. 
Contacted Dr. Miguel's nurse back with this information. Nurse gave a better 
contact number for pt of [phone]. Msg sent to Dr. Delcid's admin to work in 
pt for OV before PCI. Will contact pt to schedule OV and PCI. 

* Telephone Encounter - Shanell Hendrickson RN - 2017  2:57 PM CST



Voicemail received from Ariana - pt's last NUC was 2016. Dr. Delcid is okay with 
this and okay to proceed with cath scheduling as long as NUC shows viability. 
LM for Ariana to please call back. Dr. Delcid also sent a message to Dr. Bacon 
regarding this information. Need NUC results faxed to us. They were not 
included in the original records fax we received. 

* Telephone Encounter - Shanell Hendrickson RN - 2017  6:51 PM CST



Dr. Delcid reviewed cath images this afternoon. He is agreeable to bringing pt 
in for high risk PCI (retrograde approach - CIRC/RCA). Dr. Delcid will need a 
couple hours blocked for the case. Dr. Delcid inquiring if pt has had a recent 
nuclear thallium or viability study. LM for Ariana to please call and let us know. 

* Telephone Encounter - Shanell Hendrickson RN - 2017 11:32 AM CST



CD not yet received. Connected with Ariana at Dr. Bacon's office over the phone 
this morning to check in. She states she will call the hospital to ensure they 
have been mailed. If they haven't, asked that they please be sent via [employer] 
overnight. She will keep us posted.



Ariana phone: [phone], fax: [phone]

* Telephone Encounter - Shanell Hendrickson RN - 2017  3:41 PM CST



Per Duane, no precert is needed for the cath procedure. Will await the cath 
images on CD. Called Ariana to update her that we will plan to schedule OV and 
cath on the same day once Dr. Delcid has reviewed the images. 

* Telephone Encounter - Shanell Hendrickson RN - 2017 12:14 PM CST



Received a referral for possible  procedure with Dr. Delcid from Dr. Bacon 
in [place], [place]. Checking with Duane about insurance precert then will 
schedule the patient accordingly. Prefer to schedule H&P and cath on the same 
day due to the patient drive and Dr. Delcid's limited clinic availability and to 
expedite getting pt to procedure. Will await the feedback. 



Paper records received from ARMIDA Lane. She is sending the cath images on CD via 
[employer]. We will need Dr. Delcid to review the images before patient is scheduled 
- to assess whether pt is a candidate for high risk PCI. 

in this encounter



Plan of Treatment





Not on fileas of this encounter



Visit Diagnoses

Not on filein this encounter

## 2018-01-28 NOTE — XMS REPORT
Encounter Summary

 Created on: 2018



Francisco Huber

External Reference #: [id number]

: 1970

Sex: Male



Demographics







 Address  [address] [place]  [address]

 

 Home Phone  [phone]

 

 Preferred Language  English

 

 Marital Status  Unknown

 

 Uatsdin Affiliation  NON

 

 Race  White

 

 Ethnic Group  Not  or 





Author







 Author  [facility]

 

 Organization  [facility]

 

 Address  Unknown

 

 Phone  Unavailable







Support







 Name  Relationship  Address  Phone

 

 Steffanie Huber  RAVINDER  Unknown  [phone]







Care Team Providers







 Care Team Member Name  Role  Phone

 

 Neftali Bacon MD  21  [phone]







Reason for Visit

* 





 



  Reason   Comments

 

 



  New Patient   patient profile









Encounter Details







    



  Date   Type   Department   Care Team   Description

 

    



  2017   Patient Profile   [facility]   Shanell Hendrickson, RN   
New Patient (patient



    [address]    profile)



    [address]  



    [place] [address]  



    [phone]  







Social History







    



  Tobacco Use   Types   Packs/Day   Years Used   Date

 

    



  Current Every Day Smoker    









   



  Alcohol Use   Drinks/Week   oz/Week   Comments

 

   



  No   









 



  Sex Assigned at Birth   Date Recorded

 

 



  Not on file 



as of this encounter



Plan of Treatment





Not on fileas of this encounter



Visit Diagnoses











  Diagnosis

 





  Protein S deficiency (HCC)

 





  Primary hypercoagulable state

## 2018-01-28 NOTE — XMS REPORT
Encounter Summary

 Created on: 2018



Niko Huber

External Reference #: [id number]

: 1970

Sex: Male



Demographics







 Address  [address] [place]  [address]

 

 Home Phone  [phone]

 

 Preferred Language  English

 

 Marital Status  Unknown

 

 Advent Affiliation  NON

 

 Race  White

 

 Ethnic Group  Not  or 





Author







 Author  [facility]

 

 Organization  [facility]

 

 Address  Unknown

 

 Phone  Unavailable







Support







 Name  Relationship  Address  Phone

 

 Steffanie Huber  Unknown  [phone]







Care Team Providers







 Care Team Member Name  Role  Phone

 

 Neftali Bacon MD  21  [phone]

 

 Raiza Champagne MD  PCP  [phone]







Reason for Visit

* Auth/Cert (Routine)





     



  Status   Reason   Specialty   Diagnoses /   Referred By   Referred To



     Procedures   Contact   Contact

 

     











Encounter Details







    



  Date   Type   Department   Care Team   Description

 

    



  2017   Surgery   Cardiac Catheterization   Cande Delcid MD   
Percutaneous Coronary



    Laboratory   [address]   Intervention of Chronic



    [address]   Total Occlusion Right



    [place] [address]   [place] [address]   Coronary Artery



    [phone]   (Retrograde Approach)



     [phone] (Fax) 







Social History







    



  Tobacco Use   Types   Packs/Day   Years Used   Date

 

    



  Current Every Day Smoker    









   



  Alcohol Use   Drinks/Week   oz/Week   Comments

 

   



  No   









 



  Sex Assigned at Birth   Date Recorded

 

 



  Not on file 



as of this encounter



Last Filed Vital Signs







  



  Vital Sign   Reading   Time Taken

 

  



  Blood Pressure   99/58   2017  7:35 AM CST

 

  



  Pulse   76   2017  7:35 AM CST

 

  



  Temperature   37.1   C (98.7   F)   2017  6:15 AM CST

 

  



  Respiratory Rate   -   -

 

  



  Oxygen Saturation   97%   2017  7:35 AM CST

 

  



  Inhaled Oxygen   -   -



  Concentration  

 

  



  Weight   87.5 kg (192 lb 14.4 oz)   2017  8:13 AM CST

 

  



  Height   162.6 cm (5' 4.02")   2017  8:13 AM CST

 

  



  Body Mass Index   33.1   2017  8:13 AM CST



in this encounter



Discharge Summaries

* Kimberlyn Tiwari PA-C - 2017  8:00 AM CST



Formatting of this note may be different from the original.



Physician Discharge Summary



Name: Niko Huber

Medical Record Number: [id number]        Account Number:  [id number]

YOB: 1970                         Age:  47 years 

Admit date:  2017                     Discharge date:  2017



Attending Physician:  Dr. Delcid               Service: Cardiology-Interventional



Physician Summary completed by: Kimberlyn Tiwari PA-C



Reason for hospitalization: Coronary artery disease



Significant PMH: 

Past Medical History: 

Diagnosis Date 

 Coronary artery disease involving native coronary artery of native heart 
with angina pectoris ([facility]) 2017 

 Coronary artery disease involving native coronary artery of native heart 
with angina pectoris (HCC) 2017 - NSTEMI at [facility] in [place], [place]. Successful PCI 
with a Resolute Integrity 2.5 x 26 mm stent to the OM with Dr. Miguel. 
Unsuccessful PCI to the RCA . Pt referred to Dr. Delcid for possible  
procedure.   16 - Nuclear stress test ([facility]): No 
ischemia or arrhythmia on EKG. Diaphragmatic attenuation with reversible 
ischemia involving the mid to apic 

 Ischemic cardiomyopathy  

 Mild mitral regurgitation 2017 

 Mild tricuspid regurgitation 2017 

 NSTEMI (non-ST elevated myocardial infarction) ([facility]) 2017 

 Protein S deficiency ([facility]) 2017 

 Tobacco abuse 2017 

  

Allergies: Pcn [penicillins]



Physical Exam notable for:  

b/l Groin no hematoma, no bruit, soft, non-tender.

CV: RRR

Lungs: CTA B

Ext: no edema, + 2 b/l pedal pulses. 

ABD: Soft, non tender, + BS X 4 quads. 



Lab/Radiology studies notable for: 

Hematology:  

Lab Results 

Component Value Date 

 HGB 12.3 2017 

 HCT 36.3 2017 

 PLTCT 279 2017 

 WBC 9.6 2017 

 MCV 85.8 2017 

 MCHC 34.0 2017 

 MPV 7.5 2017 

 RDW 13.9 2017 

, General Chemistry:  

Lab Results 

Component Value Date 

  2017 

 K 3.9 2017 

  2017 

 GAP 8 2017 

 BUN 12 2017 

 CR 0.72 2017 

  2017 

 CA 9.2 2017 



Brief Hospital Course:  Mr. Huber is a 47 y.o male with a history of NSTMI in 
November. He underwent PCI of OM at [facility] in [place], [place]. He was also 
found to have  of RCA. PCI was attempted which was unsuccessful. He was 
referred to Dr. Delcid for Stage PCI. Echo done at OSH on 17 revealed LVEF 
30-35%. He was discharged home with LifeVest. He also has a history of mild MR 
and TR and protein S deficiency treated with warfarin. 



Patient was taken to the cardiac catheterization lab on 17 where 
successful revascularization of the proximal RCA was done, extending into the 
right PLV with 3 drug-eluting stents in overlapping fashion with excellent 
angiographic results. Patient tolerated the procedure well. He had vasovagal 
response to sheath pull with ~ 6 second pause. He required atropine and 
recovered promptly. No recurrent pause since then or overnight. Dr. Delcid 
recommends to continue ASA 81 mg daily for one week. He was instructed on the 
importance of Plavix 75 mg daily at least 6 months to 1 year w/o interruption 
to prevent stent thrombosis and possible myocardial infarction. PTA warfarin 
was resumed 17. No bridging is recommended per staff to minimize bleeding 
complications. PT/INR on Wednesday. PT/INR managed by Primary cardiologist. PT/
INR goal 2.0-3.0. Lipid profile as above.  Patient is currently tolerating 
Atorvastatin 40 mg po daily. Weight loss, Cardiac Healthy diet, and exercise 
when patient can tolerate.  Patient to continue current medical therapy with 
aggressive risk factors modification. Patient to weigh daily and report any wt. 
Gain of 2-3 # in 1-3 days.  BP usually marginal at home. He is asymptomatics. 
Maximize treatment for LVD with GDMT as pt. Can tolerate, renal function allows 
and BP permits. F/u w/ . Primary cardiologist as already scheduled or sooner 
if needed. He will continue to wear LifeVest. Currently his primary 
cardiologist is planning to repeat Echo in January. EKG NSR 72 bpm, PVC's. Non 
specific ST-T changes unchanged from prior. 



Condition at Discharge: Stable



Discharge Diagnoses:  



Hospital Problems  

 

 Active Problems 

 * (Principal)Coronary artery disease involving native coronary artery of 
native heart with angina pectoris (HCC) 

 NSTEMI (non-ST elevated myocardial infarction) (HCC) 

 Tobacco abuse 

 Ischemic cardiomyopathy 

 Protein S deficiency (HCC) 

 CAD (coronary artery disease) 

 



Surgical Procedures: 



Significant Diagnostic Studies and Procedures: 

1. Selective right and left coronary angiograms with dual arterial injections.

2. Bilateral groin accesses, both 7-French common femoral arterial.

3. Dual coronary injections.

4.  PCI of the proximal right RCA extending into the right PLV with 3 drug-
eluting stents in overlapping fashion with antegrade wire escalation technique.

5. Right common femoral angiogram, limited.



Consults:  None



Patient Disposition: Home   



Patient instructions/medications: 



Procedure Specific Activity 

*You may drive after 2 days.

*You may shower after discharge.

*NO tub baths, hot tubs, or swimming for 5 days.

*NO lifting greater than 15 pounds for 1 week.

*NO sexual or strenuous activity for 1 week. 



Report These Signs and Symptoms 

Please contact your doctor if you have any of the following symptoms: Chest pain
, shortness of breath, lightheadedness, dizziness, near fainting, palpitations, 
abd pain, back pain, or bleeding.

*Continue medicines as direct on your discharge medication list. Get an up to 
date list with every visit and take as instructed. Do NOT stop taking any 
medications without speaking with your doctor or nurse who knows you.



*Remember to weigh yourself first thing in the morning after using the restroom 
and write it down. Take this record to your doctor appointment.



*Chart symptoms such as fatigue, trouble breathing, or swelling. Call your 
doctor right away if these symptoms get worse.



*Remember, do not eat more than 2000 milligrams of sodium a day. Watch out for 
packaged, processed, canned and restaurant foods.



Call your doctor (your cardiologist, if you have one) if:

-you gain more than 2 pounds in 24 hours.

-you gain more than 5 pounds in 1 week.

-any of your symptoms get worse

Do NOT wait to let your doctor know about these changes. 



Questions About Your Stay 

For questions or concerns regarding your hospital stay:



- DURING BUSINESS HOURS (8:00 AM - 4:30 PM):  

Call [phone] and asked to be transferred to your discharge attending 
physician.



- AFTER BUSINESS HOURS (4:30 PM - 8:00 AM, on weekends, or holidays):

Call [phone] and ask the  to page the on-call doctor for the 
discharge attending physician. 

Discharging attending physician: CANDE DELCID [[id number]]  



Cardiac Diet 

Limiting unhealthy fats and cholesterol is the most important step you can take 
in reducing your risk for cardiovascular disease.  Unhealthy fats include 
saturated and trans fats.  Monitor your sodium and cholesterol intake.  
Restrict your sodium to 2g (grams) or 2000mg (milligrams) daily, and your 
cholesterol to 200mg daily.



If you have questions regarding your diet at home, you may contact a dietitian 
at [phone].

 



Incision Care 

*Call if there is an increase in pain, swelling, or redness.

*DO NOT soak incision in water.

*NO tub baths, hot tubs, or swimming.

*You may shower after discharge. 



Return Appointment 

F/u with your primary cardiologist as already scheduled with echo in January. 

[facility] Provider NORTH MIGUEL [[id number]]  



Heart Failure Information 

You are at risk for readmission to the hospital because of fluid overload. 
There are steps you can take to lower your risk:

*Continue your current heart medications. Changes made have been made during 
your hospital stay.

*Remember to weigh yourself every morning, first thing after you urinate, and 
write down your weigh. Take this record to your doctor's appointments.

*Chart your symptoms - fatigue, shortness of breath, swelling, etc. Immediately 
report any worsening.

*Remember to consume no more than 2,000mg (milligrams) of sodium daily. Watch 
out for packaged, processed, canned, and restaurant foods especially.

*If any of your symptoms worsen, or if you gain more than 2 pounds in 24 hours, 
or 5 pounds in a week, call your cardiologist immediately. EARLY REPORTING OF 
THESE CHANGES IS VERY IMPORTANT. 



[facility] Information 

[facility] is a gathering place for individuals, families, and friends 
living with serious or chronic physical illness.  They offer education and 
support programs that help you live your life to the fullest.  Unless otherwise 
noted, programs are offered at NO CHARGE.  However, REGISTRATION IS REQUIRED 48 
hours in advance.



To arrange for a tour, register for a class, or ask a questions please call [phone]
[phone].  You can also visit [email or web address] for more information. 



[facility] Education about Stroke 

It is important for you to recognize the signs of stroke and call 911 
immediately.



F.A.S.T. is an easy way to remember the sudden signs of a stroke.



F - face drooping

A - arm weakness

S - speech difficulty

T - TIME TO CALL 911



If you or anyone you know shows any of these signs, even if the signs go away, 
call - IMMEDIATELY. Check the time so you will know when the first sign 
started. 



 

Current Discharge Medication List 

 

 CONTINUE these medications which have been CHANGED or REFILLED 

 Details 

aspirin EC 81 mg tablet Take 1 tablet by mouth daily for 7 days. Take with food.

Qty: 90 tablet, Refills: 3 

 PRESCRIPTION TYPE:  No Print

Associated Diagnoses: Coronary artery disease involving native coronary artery 
of native heart with angina pectoris (HCC); Ischemic cardiomyopathy; NSTEMI (non
-ST elevated myocardial infarction) (HCC); Tobacco abuse 

 

 

 CONTINUE these medications which have NOT CHANGED 

 Details 

atorvastatin (LIPITOR) 40 mg tablet Take 40 mg by mouth daily. 

 PRESCRIPTION TYPE:  Historical Med 

 

carisoprodol(+) (SOMA) 350 mg tablet Take 350 mg by mouth at bedtime daily. 

 PRESCRIPTION TYPE:  Historical Med 

 

clopiDOGrel (PLAVIX) 75 mg tablet Take 75 mg by mouth daily. 

 PRESCRIPTION TYPE:  Historical Med 

 

gabapentin (NEURONTIN) 600 mg tablet Take 600 mg by mouth four times daily. 

 PRESCRIPTION TYPE:  Historical Med 

 

gemfibrozil (LOPID) 600 mg tablet Take 600 mg by mouth twice daily. 

 PRESCRIPTION TYPE:  Historical Med 

 

HYDROcodone/acetaminophen (NORCO) 7.5/325 mg tablet Take 1 tablet by mouth 
every 6 hours as needed for Pain 

 PRESCRIPTION TYPE:  Historical Med 

 

metoprolol XL (TOPROL XL) 25 mg extended release tablet Take 25 mg by mouth 
daily. 

 PRESCRIPTION TYPE:  Historical Med 

 

nitroglycerin (NITROSTAT) 0.4 mg tablet Place 0.4 mg under tongue every 5 
minutes as needed for Chest Pain. Max of 3 tablets, call 911. 

 PRESCRIPTION TYPE:  Historical Med 

 

Omega-3 Acid Ethyl Esters 1 gram cap Take 1 g by mouth three times daily. 

 PRESCRIPTION TYPE:  Historical Med 

 

omeprazole DR(+) (PRILOSEC) 20 mg capsule Take 20 mg by mouth daily before 
breakfast. 

 PRESCRIPTION TYPE:  Historical Med 

 

sacubitril/valsartan (ENTRESTO) 24/26 mg tablet Take 1 tablet by mouth twice 
daily. 

 PRESCRIPTION TYPE:  Historical Med 

 

tiZANidine (ZANAFLEX) 4 mg tablet Take 8 mg by mouth every 8 hours as needed. 
Indications: MUSCLE SPASM 

 PRESCRIPTION TYPE:  Historical Med 

 

warfarin (COUMADIN) 5 mg tablet Take 5 mg by mouth as directed. Take 7.5 mg by 
mouth on Mon and Wed. Take 5 mg by mouth on , Tues, Thurs, Fri, and Sat. 
Take at bedtime.  Indications: THROMBOTIC DISORDER 

 PRESCRIPTION TYPE:  Historical Med 

 

 

 



Pending items needing follow up: as above 



Signed:

Kimberlyn Tiwari PA-C

2017  



cc:

Primary Care Physician:  Raiza Champgane   Verified

Referring physicians:   Dr. North Miguel/Dr. Neftali Bacon ([place], [place])

Additional provider(s): 

 

in this encounter



Discharge Instructions

* Patient Instructions - Mary Fong RN - 2017  8:32 AM CST





Manual Sheath Removal From A Large Vein Or Artery-DARVIN

When you go home:

 You may shower 24 hours after your procedure.

 Do not sit in water for one week. (No bath tub, swimming pool/hot tub, etc.)

 Keep the area clean and dry for one week (except for daily showers).

 Be sure your hands are clean when touching near the site.

 If a band-aid or dressing is still in place remove it before showering.

 Wash and dry thoroughly but gently.

 If needed, for your comfort, you may place a clean band-aid over the 
puncture site after you are clean and dry. It is best to leave it open to air 
as soon as it is comfortable to do so.

 Do not use ointments, creams, or powders on puncture site.

 Inspect site daily.

Activity: (Unless otherwise instructed or unable to perform)

 Avoid any exertion for one week. Exertion is lifting over 15 lbs or pushing, 
pulling or straining.

 Avoid excessive bending, stooping, or stair climbing for 2 days. It is ok to 
go up stairs or bend over but take it slowly and keep it to a minimum.

 You may be up and about while relaxing at home as you recover.

 You may resume sexual activity in one week.

 You may begin driving 2 days after your procedure if you are otherwise able 
to drive.

---It is common to have mild soreness and/or a small, soft bruise around the 
site that can take up to two weeks to go away. A small (dime to quarter sized) 
lump is also normal. A small amount of blood (not more than a teaspoon) from 
the site is also common.

WHEN TO CALL THE DOCTOR: Complications are rare but can happen.

 If you have significant bleeding (more than a teaspoon) or a lump underneath 
the skin (bigger than a golf ball) at the site lie down, apply firm pressure at 
the site and call 911. Bleeding from a large vessel needs professional help.

 If you have signs of infection at the site such as: redness, warm to touch, 
drainage, increasing soreness, a fever (100 degrees or more) and/or chills.

 Soreness that continues more than a week or unusual pain at the puncture 
site.

 Numbness, tingling, weakness in the affected leg.

 If your leg becomes cold and pale.

 If you have changes of vision, slurred speech or one-sided weakness.

Who do I contact if I need to speak with someone?

During Business Hours:

 [facility] Office at the [facility]: [phone]
[phone] (Monday-Friday)

 [place]: [phone] (Monday-Friday)

 [place]: [phone] (Monday-Friday)

 [place]: [phone] (Tuesday and Thursday)

 [place]: [phone] (Monday-Friday)

 [place]: [phone] (Monday-Friday)

 [place]: [phone] (Monday-Thursday)

 [facility]: [phone] (Tuesday, Wednesday and Friday)

 [place]: [phone] (Tuesday, Wednesday and Friday)

 [facility]): [phone] (Monday, Wednesday and Friday)

Nights and Weekends

 [facility] Office at the [facility]: [phone]
[phone]

This education is meant to serve as a resource to you and your family. It is 
not meant to be all inclusive. The members of the [facility] at [facility], [phone], will be glad to 
answer any questions you may have about this booklet or your procedure.





in this encounter



Medications at Time of Discharge







     



  Medication   Sig.   Disp.   Refills   Start Date   End Date

 

     



  atorvastatin (LIPITOR) 40   Take 40 mg by mouth    



  mg tablet   daily.    

 

     



  carisoprodol(+) (SOMA)   Take 350 mg by mouth at    



  350 mg tablet   bedtime daily.    

 

     



  clopiDOGrel (PLAVIX) 75   Take 75 mg by mouth    



  mg tablet   daily.    

 

     



  gabapentin (NEURONTIN)   Take 600 mg by mouth four    



  600 mg tablet   times daily.    

 

     



  gemfibrozil (LOPID) 600   Take 600 mg by mouth    



  mg tablet   twice daily.    

 

     



  HYDROcodone/acetaminophen   Take 1 tablet by mouth    



  (NORCO) 7.5/325 mg tablet   every 6 hours as needed    



   for Pain    

 

     



  metoprolol XL (TOPROL XL)   Take 25 mg by mouth    



  25 mg extended release   daily.    



  tablet     

 

     



  nitroglycerin (NITROSTAT)   Place 0.4 mg under tongue    



  0.4 mg tablet   every 5 minutes as needed    



   for Chest Pain. Max of 3    



   tablets, call 911.    

 

     



  Omega-3 Acid Ethyl Esters   Take 1 g by mouth three    



  1 gram cap   times daily.    

 

     



  omeprazole DR(+)   Take 20 mg by mouth daily    



  (PRILOSEC) 20 mg capsule   before breakfast.    

 

     



  sacubitril/valsartan   Take 1 tablet by mouth    



  (ENTRESTO) 24/26 mg   twice daily.    



  tablet     

 

     



  tiZANidine (ZANAFLEX) 4   Take 8 mg by mouth every    



  mg tabletIndications:   8 hours as needed.    



  MUSCLE SPASM   Indications: MUSCLE SPASM    

 

     



  warfarin (COUMADIN) 5 mg   Take 5 mg by mouth as    



  tabletIndications:   directed. Take 7.5 mg by    



  THROMBOTIC DISORDER   mouth on Mon and Wed.    



   Take 5 mg by mouth on    



   , Tues, Thurs, Fri,    



   and Sat. Take at bedtime.    



   Indications: THROMBOTIC    



   DISORDER    

 

     



  aspirin EC 81 mg   Take 1 tablet by mouth   90 tablet   3   2017



  tabletIndications:   daily for 7 days. Take    



  Coronary artery disease   with food.    



  involving native coronary     



  artery of native heart     



  with angina pectoris     



  (HCC), Ischemic     



  cardiomyopathy, NSTEMI     



  (non-ST elevated     



  myocardial infarction)     



  (HCC), Tobacco abuse     



as of this encounter



Progress Notes

* Higinio Valenzuela RN - 2017  8:52 AM CST



Cardiac Rehab Call Back Note:  Spoke with patient.  He will start [facility] in 
[place] this Monday.



Are you tolerating activity?Yes

Is pain controlled?Yes

Is appetite normal?Yes

Are you having symptoms of heart discomfort?No

Are you having signs of infection at your incision sites or groin site?No

Do you want outpt cardiac rehab?Yes





* Lachelle Russ RN - 2017  8:52 AM CST



I have reviewed the notes, assessment, and/or procedures performed by Mary Fong RN and concur with her/his documentation unless otherwise noted.

* Mary Fong RN - 2017  8:51 AM CST



Patient discharged to home with all belongings.  Discharge instructions, med 
reconciliation and home wound care instructions given and explained to patient 
and family both verbally and written.  Accompanied by family.  No complaints of 
pain or discomfort.   Bilateral groins  remains clean, dry, and intact with no 
evidence of a hematoma after ambulation.  Patient escorted to lobby via 
Transport.  Patient to follow up with [facility] ([facility]) or on-call 
physician with any additional questions or concerns.  All contact numbers 
provided.  Patient and family acceptant of DC instuctions and report 
understanding to all information.

* Higinio Valenzuela RN - 2017  6:46 AM CST



Formatting of this note may be different from the original.

CARDIOPULMONARY REHABILITATION

INPATIENT ASSESSMENT



Cardiac Rehabilitation Staff: Fermin Valenzuela RN Discharge Date: 



Demographics

Pre-admit Dx:   Date of Admission: 2017   

Room: [address] :  1970 

Insurance: Primary: BC/BS of   Secondary: . 

Address: [address] [facility] [place] [address]   Patient Phone:  [phone] (home)  

Marital Status:   Occupation: Construction/Labor 

ED Contact: Steffanie Cecile  ED Phone #: [phone] 

CTS: GONZÁLEZ  Cardiologist: Bhavin 



Cardiac Procedures and Events

 

  

PCI: 17

 

 

 

 

 



Risk Factors

 

BP: 97/54

Height: 162.6 cm (64.02")

Weight: 87.5 kg (192 lb 14.4 oz)

BMI (Calculated): 33.09 

 

Medical History

 has a past medical history of Coronary artery disease involving native 
coronary artery of native heart with angina pectoris (HCC) (2017); 
Coronary artery disease involving native coronary artery of native heart with 
angina pectoris (HCC) (2017); Ischemic cardiomyopathy; Mild mitral 
regurgitation (2017); Mild tricuspid regurgitation (2017); NSTEMI (
non-ST elevated myocardial infarction) ([facility]) (2017); Protein S deficiency 
([facility]) (2017); and Tobacco abuse (2017).



Labs

No results found for: CHOL, TRIG, HDL, LDL, HGBA1C, A1C, TNI



Heart Resource Manual Given: 17 



Teaching Completed: 17

 

Outpatient Cardiopulmonary Rehabilitation



OPCR: Yes



Referral Faxed to:   [place]   Date Faxed: 17 (Currently enrolled.  
Update faxed to [facility])



Location: [place], [place]



If [facility], Sent to Staff:   



 



Higinio Valenzuela RN

2017





* Higinio Valenzuela, RN - 2017  6:44 AM CST



Introduced self to patient and gave copy of the Heart Resource Manual 
pertaining to coronary interventions.  He is currently enrolled in the program 
at [facility] in [place], [place] and will continue when cleared by 
cardiologist.  I have faxed an update to [facility].

* Mary Fong, RN - 2017  5:57 PM CST



Formatting of this note may be different from the original.



 17 1720 17 1723 17 1724 

Vital Signs 

Pulse 67 (!) 30 (!) 0 

PVC / Minute 0 /min. 0 /min. 0 /min. 

Respirations 10 PER MINUTE 13 PER MINUTE 15 PER MINUTE 

SpO2 Pulse 67 (!) 42 (!) 43 

SpO2 96 % 96 % 98 % 

BP 98/56 (!) 69/32 --  

Mean NBP (Calculated) 67 MM HG 38 MM HG --  

 

 17 1725 17 1727 

Vital Signs 

Pulse 52 59 

PVC / Minute 1 /min. 0 /min. 

Respirations 14 PER MINUTE 13 PER MINUTE 

SpO2 Pulse (!) 52 60 

SpO2 98 % 96 % 

BP (!) 82/36 (!) 83/50 

Mean NBP (Calculated) 44 MM HG 58 MM HG 



During sheath pull patient vasovagaled. Dr. Levine assessed patient. Orders 
given for 250 bolus of NS. 0.5 mg atropine given by Anatoly HUFFMAN. Patient 
stabilized and vital signs returned to normal. Will continue to monitor and 
keep NS running at 75mL/hr per Dr. Levine. 

* Abner Trevizo MD - 2017  5:53 PM CST



Mr. Huber was seen and examined in [facility] after the  PCI with a full metal 
jacket stenting to the RCA extending into the right PLV.  During sheath pull, 
he was noted to have significant sinus pauses lasting 6 seconds.  He recovered 
promptly.  Hemodynamic stability is noted.  There was a transient episode of 
hypotension with a mean arterial pressure of 60 mmHg.  We are presently 
infusing normal saline at 75 cc/h for the next 6 hours to a total of 
approximately 500 cc.  His blood pressure has already improved to a mean 
arterial pressure of 70 mmHg.  He remains asymptomatic at this juncture.  
Bilateral sheaths 7 French have been removed with excellent hemostasis.  This 
appears to be a significant vagal response associated with sheath pull.  I do 
not suspect any bleeding at this point in time.  Please call me if his clinical 
status changes.



Abner Trevizo M.D.

Fellow in Interventional Cardiology

Beeper # [id number]



* Lachelle Russ RN - 2017  4:45 PM CST



I have reviewed the notes, assessment, and/or procedures performed by Mary Fong RN and concur with her/his documentation unless otherwise noted.

* Kimberlyn Tiwari PA-C - 2017  3:04 PM CST



S/p 3 TESFAYE to RCA. Recent NSTEMI in Nov s/p TESFAYE to OM. 

ASA 81 mg daily for 1 week. Plavix 75 mg daily for 1 year w/o interruption to 
prevent stent thrombosis and possible myocardial infarction. 

Resume warfarin tonight. No bridging is recommended per Dr. Delcid. 

He recently filled all his medications and does not wish refills at this time. 

He will continue to wear his LifeVest till his follow up appointment. Iam Miguel and Jordi are planning repeat Echo in January. 

DC home in am. 

F/u with Primary cardiologist as already scheduled or sooner if needed. 

Maximize treatment for LVD with GDMT as pt. Can tolerate, renal function allows 
and BP permits.  



Kimberlyn Tiwari PA-C (pgr [phone])





* Jo Rogers, RT - 2017 11:12 AM CST



Formatting of this note may be different from the original.

RESPIRATORY THERAPY

ADULT PROTOCOL EVALUATION



RESPIRATORY PROTOCOL PLAN



Medications

 



Note: If indicated by protocol, medication orders will be placed by therapist.



Procedures

IPPB: Place a nursing order for "IS Q1h While Awake" for any of Lung Expansion 
indicators

Oxygen/Humidity: O2 to keep SpO2 > 95%

Monitoring: Pulse oximetry BID & PRN



 

_____________________________________________________________



PATIENT EVALUATION RESULTS



Chart Review

* Pulmonary Hx: Smoker in home OR smoking cessation > 8 weeks (former smoker)



* Surgical Hx: General surgery (cough & sigh not affected)



* Chest X-Ray: Clear OR not available



* PFT/Oxygenation: FEV1, PEFR < 70% OR Pa02 < 70 RA OR Sp02 <92% RA OR Fi02 > 
0.21 to keep Sp02 > 92% OR < 24 hours post-op (02 & oxim) OR chronic C02 
retention (C02) (will be < 24 hours post op)



Patient Assessment

* Respiratory Pattern: Regular pattern and rate OR good chest excursion with 
deep breathing



* Breath Sounds: Clear apically, but diminished in bases (LE) OR CHF related 
crackles (02) (oximetry)



* Cough / Sputum: Strong, effective cough OR nonproductive



* Mental Status: Alert, oriented, cooperative



* Activity Level: Ambulatory with assistance



Priority Index

Total Points: 6 Points

* Priority Index: 1



PRIORITY INDEX GUIDELINES*

Priority Points 

1 0-9 points 

2 9-18 points 

3 > 18 points 

+ Pulm Dx or Home Rx 

*Higher points indicate higher acuity.



Therapist: Jo Rogers, RT

Date: 2017



Key

AC=Airway clearance

AM=Aerosolized medication

BA=[place] aerosol

DB&C=Deep breathe & cough

FEV1=Forced expiratory volume in first second)

IC=Inspiratory capacity

LE=Lung expansion

MDI=Metered dose inhaler

Neb=Nebulizer

O2=Oxygen

Oxim=Oximetry

PEFR=Peak expiratory flow rate

RRT=Rapid Response Team





* Mary Fong RN - 2017  8:01 AM CST



Patient arrived on unit via ambulation accompanied by RN. Patient transferred 
to the bed without assistance.  Assessment completed, refer to flowsheet for 
details. Orders released, reviewed, and implemented as appropriate. Oriented to 
surroundings, call light within reach. Plan of care reviewed.  Will continue to 
monitor and assess.

in this encounter



H&P Notes

* Kimberlyn Tiwari PA-C - 2017  9:37 AM CST



Formatting of this note may be different from the original.

The original H and P below was performed by Dr. Delcid.



Kimberlyn Tiwari PA-C (pgr [phone])

Cande Delcid MD 

Cardiology 

 

Hide copied text

Hover for attribution information

Date of Service: 2017



Niko Huber is a 47 y.o. male.   



HPI

 

Mr. Huber is a 47-year-old male with a history of coronary artery disease who 
had a non-ST elevation myocardial infarction back in November at that time he 
successfully underwent stenting with a drug-eluting stent to the obtuse 
marginal.  They utilized a 2.5 x 26 mm resolute integrity stent.  In addition, 
he has a chronic total occlusion of the right coronary artery which is occluded 
proximally.  He has good left-to-right collateralization to the posterior 
descending and posterior lateral branches.  Of concern, his ejection fraction 
is severely impaired estimated at around 30% and he currently has a LifeVest.  
He has a previous myocardial perfusion study suggesting viability in the 
inferior wall and given his age and LV impairment, it was discussed and elected 
to go ahead and proceed with percutaneous revascularization to give him every 
opportunity to recover.  Currently, he has been noting intermittent chest 
discomfort which occurs with activity and at rest.  He is on aspirin, Plavix 
and warfarin and is tolerated this without any active bleeding issues.  He is 
taking warfarin due to a history of a pulmonary embolus per his report with the 
addition of aspirin and Plavix given his recent coronary event.



 



  

Vitals: 

 17 0718 

BP: 116/68 

Pulse: 79 

Weight: 87.1 kg (192 lb) 

Height: 1.626 m (5' 4") 



Body mass index is 32.96 kg/(m^2). 



Past Medical History

    

Patient Active Problem List 

 Diagnosis Date Noted 

 Coronary artery disease involving native coronary artery of native heart 
with angina pectoris (HCC) 2017 - NSTEMI at [place], [place]. Successful 
PCI with a Resolute Integrity 2.5 x 26 mm stent to the OM with Dr. Miguel. 
Unsuccessful PCI to the RCA . Pt referred to Dr. Delcid for possible  
procedure. 



16 - Nuclear stress test ([facility]): No ischemia or 
arrhythmia on EKG. Diaphragmatic attenuation with reversible ischemia involving 
the mid to apical inferior wall and inferolateral wall. Normal left ventricular 
size with mild hypokinesis at the lateral wall. EF 50%. 



Previous history of Promus stent placement to OM - date unknown.  

 NSTEMI (non-ST elevated myocardial infarction) ([facility]) 2017 at [place], [place].  

 Tobacco abuse 2017 

 Ischemic cardiomyopathy 2017 - Echo ([facility]): EF 30-35%. Left ventricular 
cavity size increased. Wall thickness normal. Regional wall motion 
abnormalities. Akinesis of the inferior myocardium. Akinesis of the lateral 
myocardium. 



17 - NSTEMI - EF 20%, severe left ventricular systolic function (per cath 
report). Life Vest applied for primary prevention of sudden cardiac death.  

 Mild mitral regurgitation 2017 

 Mild tricuspid regurgitation 2017 

 Protein S deficiency ([facility]) 2017 

  On warfarin.  







Review of Systems 

Constitution: Negative. 

HENT: Negative.  

Eyes: Negative.  

Cardiovascular: Positive for chest pain. 

Respiratory: Negative.  

Endocrine: Negative.  

Hematologic/Lymphatic: Negative.  

Skin: Negative.  

Musculoskeletal: Negative.  

Gastrointestinal: Negative.  

Genitourinary: Negative.  

Neurological: Negative.  

Psychiatric/Behavioral: Negative.  

Allergic/Immunologic: Negative.  



Social History 



Social History 

 Marital status:  

  Spouse name: N/A 

 Number of children: N/A 

 Years of education: N/A 



Social History Main Topics 

 Smoking status: Current Every Day Smoker 

 Smokeless tobacco: None 

 Alcohol use No 

 Drug use: No 

 Sexual activity: Not Asked 



Other Topics Concern 

 None 



Social History Narrative 



History reviewed. No pertinent surgical history.



Physical Exam

Physical Exam 

General Appearance: alert and oriented, no acute distress

Skin: warm, moist, no ulcers

Head: normocephalic, symmetric

Eyes: EOMI, PERRL, sclera are clear and without icterus

ENT: unremarkable, nares patent

Neck Veins: neck veins are flat, neck veins are not distended

Carotid Arteries: normal carotid upstroke bilaterally, no bruits

Chest Inspection: chest is normal in appearance

Auscultation/Percussion: lungs clear to auscultation, no rales, rhonchi, 
wheezes or friction rub appreciated

Cardiac Rhythm: regular rhythm and normal rate

Cardiac Auscultation: Normal S1 & S2, no S3 or S4, no rub - normal pmi

Murmurs: no cardiac murmurs 

Extremities: no lower extremity edema bilaterally; 2+ symmetric distal pulses

Muskuloskeletal: no obvious deformity

Abdominal Exam: soft, non-tender, no masses, bowel sounds normal

Neurologic Exam: neurological assessment grossly intact

Mood and Affect: Appropriate





Cardiovascular Studies

ECG - NSR, PVC - no Q waves



Problems Addressed Today

   

Encounter Diagnoses 

Name Primary? 

 Coronary artery disease involving native coronary artery of native heart 
with angina pectoris (HCC) Yes 

 Ischemic cardiomyopathy  

 Mild mitral regurgitation  





Assessment and Plan

 

1. Chronic total occlusion of the proximal right coronary artery with prominent 
left to right collateralization to the posterior descending and posterior 
lateral branches -he is referred for complex percutaneous intervention.  I 
discussed the risks and benefits and will plan to go ahead and proceed.  We 
will obtain bilateral groin access and will likely require a retrograde 
approach given the anatomy noted on his angiograms.  We will plan to obtain an 
INR this morning to ensure that his INR is less than 1.9.  We will plan to keep 
you informed this results of his upcoming procedure. 



 Thank you for allowing us to participate in his care.

 





Current Medications (including today's revisions)

 aspirin EC 81 mg tablet Take 81 mg by mouth daily. Take with food. 

 atorvastatin (LIPITOR) 40 mg tablet Take 40 mg by mouth daily. 

 carisoprodol(+) (SOMA) 350 mg tablet Take 350 mg by mouth at bedtime daily. 

 clopiDOGrel (PLAVIX) 75 mg tablet Take 75 mg by mouth daily. 

 gabapentin (NEURONTIN) 600 mg tablet Take 600 mg by mouth four times daily. 

 gemfibrozil (LOPID) 600 mg tablet Take 600 mg by mouth twice daily. 

 metoprolol XL (TOPROL XL) 25 mg extended release tablet Take 25 mg by mouth 
daily. 

 nitroglycerin (NITROSTAT) 0.4 mg tablet Place 0.4 mg under tongue every 5 
minutes as needed for Chest Pain. Max of 3 tablets, call 911. 

 Omega-3 Acid Ethyl Esters 1 gram cap Take 1 g by mouth three times daily. 

 omeprazole DR(+) (PRILOSEC) 20 mg capsule Take 20 mg by mouth daily before 
breakfast. 

 tiZANidine (ZANAFLEX) 4 mg tablet Take 8 mg by mouth every 8 hours as 
needed. Indications: MUSCLE SPASM 

 warfarin (COUMADIN) 5 mg tablet Take 5 mg by mouth as directed. Take 7.5 mg 
by mouth on Mon and Wed. Take 5 mg by mouth on , Tues, Thurs, Fri, and 
Sat. Take at bedtime.  Indications: THROMBOTIC DISORDER 



 

 





in this encounter



Procedure Notes

* Abner Trevizo MD - 2017  3:12 PM CST



Associated Order(s): CARDIAC CATH REPORT



[facility]



CARDIAC CATHETERIZATION REPORT

Page 3

NIKO VENTURA :  1970

KU#: [id number]



 

 MR #/Billing ID #:  [id number] / [id number]

DATE:  2017

CARDIOLOGIST:  Cande Delcid MD

DICTATING PROVIDER: Abner Trevizo MD

REFERRING PHYSICIAN:  RAIZA CHAMPAGNE



INTERVENTIONAL CARDIOLOGY FELLOW:  Abner Trevizo MD.



PROCEDURES PERFORMED:  

1. Selective right and left coronary angiograms with dual arterial injections.

2. Bilateral groin accesses, both 7-French common femoral arterial.

3. Dual coronary injections.

4. Chronic Total Occlusion () PCI of the proximal right RCA extending into 
the right PLV with 3 drug-eluting stents in overlapping fashion with antegrade 
wire escalation technique.

5. Right common femoral angiogram, limited.



INDICATION FOR THE PROCEDURE:  Niko Huber is a pleasant, 47-year-old 
gentleman with a history of recent non-ST-elevation MI, status post PCI to his 
left circumflex extending into his obtuse marginal with a Resolute Integrity 
stent from an outside institution.  There was an attempted  antegrade PCI on 
his RCA , which was unsuccessful. We would like to perform an invasive 
evaluation of his coronary anatomy with an intent to revascularize with the 
retrograde approach, given the fact that the antegrade cap was very ambiguous, 
based on outside hospital films.



CONSENT:  Risks, benefits, and alternatives of the procedure were explained to 
the patient by both Dr. Delcid and myself.  Risks of access site complications, 
bleeding, arterial dissection, death, contrast nephropathy, and radiation 
hazards were explained to the patient, who verbalized understanding of these 
conditions and consented to the procedure.  A written, informed consent has 
been placed in the chart as well.



DETAILS OF THE PROCEDURE:  The patient was brought in the cath lab in the 
fasting, nonsedated state.  After giving the patient a total of 225 mcg of 
fentanyl and 5 mg of Versed, we achieved moderate conscious sedation, which was 
monitored and maintained throughout the procedure for a total of 126 minutes.  
Respiratory, hemodynamic, and neurological parameters were monitored throughout 
the procedure by the operators and by the cath lab staff. 



The patient was prepped and draped in sterile fashion.  We exposed bilateral 
groins and prepped with 1% chlorhexidine and instilled a total of 20 mL of 1% 
lidocaine for good local anesthesia in both groins. 



We then gained access into the right common femoral artery using a 
micropuncture needle and modified Seldinger technique to insert a 7-French 10 
cm arterial sheath with good blood flow return.  Similar technique was adopted 
to gain access to the left common femoral artery with the same 7-French 10 cm 
arterial sheath.  We went in with the intent to perform a retrograde  PCI, 
and hence, we obtained dual coronary injections.



CORONARY ANGIOGRAM:  

1. The left main arises normally from the left coronary cusp and is free from 
angiographic evidence of disease.  It bifurcates into a left anterior 
descending artery, as well as a left circumflex artery.

2. The left anterior descending artery arises normally from the left main.  Its 
proximal, mid, and distal portions are free from disease, except for 30% 
stenosis in the mid segment.  The left main is also free from disease.  It 
gives rise to 1 diagonal branch, the LAD, and it is also free from disease.

3. The left circumflex artery arises normally from the left main.  Its proximal
, mid, and distal portions are free from disease.  It has a previously placed 
stent extending from the proximal circumflex, extending into the OM, is widely 
patent without any thrombosis or in-stent restenosis.

4. The right coronary artery arises normally from the right coronary cusp, and 
its proximal portion has 40% to 50% diffuse disease, followed by a long chronic 
total occlusion segment extending from the mid RCA to the PLV.  There was 
collateral flow from the LAD to the RPLV territory, and also from the 
circumflex artery as well.  We noted that on outside hospital films there was 
an abrupt cutoff of the proximal RCA with a very ambiguous cap; however, on our 
films, we noted an extra RV marginal branch which was filling in, though we 
could not localize the true nature of the proximal cap.  Hence, we decided to 
adopt a retrograde approach initially.



DETAILS OF THE PERCUTANEOUS CORONARY INTERVENTION to the RCA (Full metal Jacket
) (CHRONIC TOTAL OCCLUSION):  

1. We used an EBU 3.75, 7-French guide catheter to engage the left main, while 
we used an AL1, 7-French guide catheter to engage the RCA for dual arterial 
injections.

2. We then introduced an 0.014 x 300 cm Fielder FC wire with a 150 cm Corsair 
microcatheter and tried to get across a couple of septals.  We were able to 
track through the septals pretty well; however, the anatomy for reentry into 
the PLV/PDA was not favorable.  After multiple attempts through different 
septals, we were not able to gain access into the right PLV or PDA segments, 
even despite tracking through the septals pretty well through both the Fielder 
FC, as well as the Corsair; hence, we switched to an antegrade approach.

3. We then switched the AL1, 7-French guide for a Hockey-Stick 1, 6-French guide
, given the fact that an AL1 was a little too big to engage the RCA, given the 
size of the aortic root.  The Hockey-Stick 1 gave us moderate seating and 
support throughout the entire procedure.  We then introduced an 0.014 x 300 cm 
Fielder FC wire over a 150 cm Corsair into the right coronary artery to switch 
to an antegrade wire escalation approach.  We were able to make very little 
progress across the proximal cap, which was very ambiguous with a Fielder FC 
wire.  We then switched for an 0.009 tapering into an 0.014 x 300 cm Fielder XT 
wire and tried to make some progress across the mid RCA, and we tracked the 
Corsair across it.  Though we were able to get into the distal RCA, we were 
unsure whether we were in the true lumen or not.  We then switched out for a 
 200, 0.014 x 300 cm wire and got across the mid to distal RCA with 
moderate amount of difficulty.  We were unsure again whether we were at the 
true lumen or not.  Hence, we took a dual injection on the LAD, which 
demonstrated that our wire was indeed in the right PLV.  After this, we tracked 
the Corsair down into the right PLV and switched out the  200 wire for an 
0.014 x 300 cm Luge wire.  We then tracked the Corsair out and introduced a 2.0 
x 30 mm Emerge RX balloon and performed balloon angioplasty for a total of 6 
different inflations, starting from the right PLV, extending into the proximal 
or ostial segment of the RCA, all of which were 8 atmospheres, lasting 20-30 
seconds.  We got moderate amount of expansion with this.  We were then easily 
able to deliver a 2.25 x 38 mm Synergy drug-eluting stent  extending into the 
distal RPLV and the proximal edge of the stent into the distal portion of the 
right coronary artery.  The stent was deployed at 8 atmospheres for a total of 
26 seconds.  We then overlapped this proximally with a 2.25 x 38 mm Syndergy 
TESFAYE (10 carloz for 8 seconds).  We then deployed a 2.5 x 32 mm Synergy TESFAYE at 10 
atmospheres, lasting 25 seconds in overlapping fashion in the ostium to the 
midportion of the RCA.  We got excellent angiographic results.  After this, we 
noticed that the distal edge of the distal stent in the right PLV was oversized
, compared to the rest of the RCA.  This could have been an element of spasm.  
Hence, we gave the patient a total of 200 mcg of Cardene and 300 mcg of 
nitroglycerin.  Even after vasodilatation, we noticed that the distal edge was 
a little pinched.  Hence, we decided to dilate this using a 2.0 x 15 mm MINI 
TREK RX balloon for 10 atmospheres for 24 seconds.  Excellent angiographic 
results were achieved at the distal edge of the stent with restoration of MARIA LUISA-
3 flow to the PLV.  We then removed the stent and postdilated both the stents 
using a 2.75 x 20 mm TREK NC balloon for a total of 6 different inflations, 
lasting 16 atmospheres for at least 16 seconds.  Excellent stent expansion and 
apposition were achieved.  There was no evidence of edge dissection or distal 
embolization.  MARIA LUISA-3 flow was restored to the PDA and the PLV territories.  
Excellent flow was noted across the stent.  Good stent expansion and apposition 
were achieved.  Wire-out frames confirmed the above findings as well. 

The patient tolerated the procedure very well without any evidence of 
hemodynamic complications, and was transferred out of the cath lab in stable 
condition without any chest pain. 



Dr. Delcid, my attending, was scrubbed and performed key portions of the 
procedure and supervised the rest of it as well.



TOTAL CONTRAST USED:  340 mL.



TOTAL AIR KERMA:  3528 mGy. 



Right common femoral angiogram demonstrated a high bifurcation of the right side
, and hence, we opted for manual compression method.



LESION CHARACTERISTICS:  

1. RCA  ACC/AHA type C lesion.

2. MARIA LUISA 0 flow was noted preprocedure, and MARIA LUISA-3 flow was restored after the 
procedure.



IMPRESSION:  

1. Successful chronic total occlusion and revascularization of the proximal RCA 
with antegrade wire escalation technique, extending into the right PLV with 3 
Celestine (all Synergy - distal 2.25 x 38 mm, mid 2.25 x 38 mm and proximal 2.5 x 32 
mm) in overlapping fashion with excellent angiographic results.

2. Aspirin, Plavix, and Coumadin therapy, given the fact that the patient had a 
recent pulmonary embolism and needs Coumadin therapy.  Once the INR reaches 
therapeutic level, we recommend continuing Plavix and Coumadin x 12 months.



Cande Delcid MD



PCG/MedQ

DD:  2017 15:12:35  DT:  2017 16:09:41

Job #:  [id number]



cc: 

  - RAIZA CHAMPAGNE 



in this encounter



Plan of Treatment





Not on fileas of this encounter



Procedures







    



  Procedure Name   Priority   Date/Time   Associated Diagnosis   Comments

 

    



  TELEMETRY STRIPS-SCAN    2017    Results for this



    2:42 PM CST    procedure are in the



      results section.

 

    



  PROCEDURE RECORD-SCAN    2017    Results for this



    1:47 PM CST    procedure are in the



      results section.

 

    



  ECG-SCAN    2017    Results for this



    10:46 AM CST    procedure are in the



      results section.

 

    



  ECG-SCAN    2017    Results for this



    7:30 AM CST    procedure are in the



      results section.

 

    



  ECG-SCAN    2017    Results for this



    9:40 PM CST    procedure are in the



      results section.



in this encounter



Results

* TELEMETRY STRIPS-SCAN (2017  2:42 PM)





 Narrative

 

 



Ordered by an unspecified provider.





* PROCEDURE RECORD-SCAN (2017  1:47 PM)





 Narrative

 

 



Ordered by an unspecified provider.





* ECG-SCAN (2017 10:46 AM)





 Narrative

 

 



Ordered by an unspecified provider.





* CARDIAC CATH REPORT (2017 10:36 AM)





 



  Specimen   Performing Laboratory

 

 



   OTHER OUTSIDE LAB









 Procedure Note

 

 



Abner Trevizo MD - 2017  3:12 PM CST



[facility]



CARDIAC CATHETERIZATION REPORT

Page 3

NIKO VENTURA :  1970

KU#: [id number]







 

[facility]U MR #/Billing ID #:  [id number] / [id number]

DATE:  2017

CARDIOLOGIST:  Cande Delcid MD

DICTATING PROVIDER: Abner Trevizo MD

REFERRING PHYSICIAN:  RAIZA CHAMPAGNE



INTERVENTIONAL CARDIOLOGY FELLOW:  Abner Trevizo MD.



PROCEDURES PERFORMED:  

 1. Selective right and left coronary angiograms with dual arterial injections.

 2. Bilateral groin accesses, both 7-French common femoral arterial.

 3. Dual coronary injections.

 4. Chronic Total Occlusion () PCI of the proximal right RCA extending into 
the right PLV with 3 drug-eluting stents in overlapping fashion with antegrade 
wire escalation technique.

 5. Right common femoral angiogram, limited.



INDICATION FOR THE PROCEDURE:  Niko Huber is a pleasant, 47-year-old 
gentleman with a history of recent non-ST-elevation MI, status post PCI to his 
left circumflex extending into his obtuse marginal with a Resolute Integrity 
stent from an outside institution.  There was an attempted  antegrade PCI on 
his RCA , which was unsuccessful. We would like to perform an invasive 
evaluation of his coronary anatomy with an intent to revascularize with the 
retrograde approach, given the fact that the antegrade cap was very ambiguous, 
based on outside hospital films.



CONSENT:  Risks, benefits, and alternatives of the procedure were explained to 
the patient by both Dr. Delcid and myself.  Risks of access site complications, 
bleeding, arterial dissection, death, contrast nephropathy, and radiation 
hazards were explained to the patient, who verbalized understanding of these 
conditions and consented to the procedure.  A written, informed consent has 
been placed in the chart as well.



DETAILS OF THE PROCEDURE:  The patient was brought in the cath lab in the 
fasting, nonsedated state.  After giving the patient a total of 225 mcg of 
fentanyl and 5 mg of Versed, we achieved moderate conscious sedation, which was 
monitored and maintained throughout the procedure for a total of 126 minutes.  
Respiratory, hemodynamic, and neurological parameters were monitored throughout 
the procedure by the operators and by the cath lab staff. 



The patient was prepped and draped in sterile fashion.  We exposed bilateral 
groins and prepped with 1% chlorhexidine and instilled a total of 20 mL of 1% 
lidocaine for good local anesthesia in both groins. 



We then gained access into the right common femoral artery using a 
micropuncture needle and modified Seldinger technique to insert a 7-French 10 
cm arterial sheath with good blood flow return.  Similar technique was adopted 
to gain access to the left common femoral artery with the same 7-French 10 cm 
arterial sheath.  We went in with the intent to perform a retrograde  PCI, 
and hence, we obtained dual coronary injections.



CORONARY ANGIOGRAM:  

 1. The left main arises normally from the left coronary cusp and is free from 
angiographic evidence of disease.  It bifurcates into a left anterior 
descending artery, as well as a left circumflex artery.

 2. The left anterior descending artery arises normally from the left main.  
Its proximal, mid, and distal portions are free from disease, except for 30% 
stenosis in the mid segment.  The left main is also free from disease.  It 
gives rise to 1 diagonal branch, the LAD, and it is also free from disease.

 3. The left circumflex artery arises normally from the left main.  Its proximal
, mid, and distal portions are free from disease.  It has a previously placed 
stent extending from the proximal circumflex, extending into the OM, is widely 
patent without any thrombosis or in-stent restenosis.

 4. The right coronary artery arises normally from the right coronary cusp, and 
its proximal portion has 40% to 50% diffuse disease, followed by a long chronic 
total occlusion segment extending from the mid RCA to the PLV.  There was 
collateral flow from the LAD to the RPLV territory, and also from the 
circumflex artery as well.  We noted that on outside hospital films there was 
an abrupt cutoff of the proximal RCA with a very ambiguous cap; however, on our 
films, we noted an extra RV marginal branch which was filling in, though we 
could not localize the true nature of the proximal cap.  Hence, we decided to 
adopt a retrograde approach initially.



DETAILS OF THE PERCUTANEOUS CORONARY INTERVENTION to the RCA (Full metal Jacket
) (CHRONIC TOTAL OCCLUSION):  

 1. We used an EBU 3.75, 7-French guide catheter to engage the left main, while 
we used an AL1, 7-French guide catheter to engage the RCA for dual arterial 
injections.

 2. We then introduced an 0.014 x 300 cm Fielder FC wire with a 150 cm Corsair 
microcatheter and tried to get across a couple of septals.  We were able to 
track through the septals pretty well; however, the anatomy for reentry into 
the PLV/PDA was not favorable.  After multiple attempts through different 
septals, we were not able to gain access into the right PLV or PDA segments, 
even despite tracking through the septals pretty well through both the Fielder 
FC, as well as the Corsair; hence, we switched to an antegrade approach.

 3. We then switched the AL1, 7-French guide for a Hockey-Stick 1, 6-French 
guide, given the fact that an AL1 was a little too big to engage the RCA, given 
the size of the aortic root.  The Hockey-Stick 1 gave us moderate seating and 
support throughout the entire procedure.  We then introduced an 0.014 x 300 cm 
Fielder FC wire over a 150 cm Corsair into the right coronary artery to switch 
to an antegrade wire escalation approach.  We were able to make very little 
progress across the proximal cap, which was very ambiguous with a Fielder FC 
wire.  We then switched for an 0.009 tapering into an 0.014 x 300 cm Fielder XT 
wire and tried to make some progress across the mid RCA, and we tracked the 
Corsair across it.  Though we were able to get into the distal RCA, we were 
unsure whether we were in the true lumen or not.  We then switched out for a 
 200, 0.014 x 300 cm wire and got across the mid to distal RCA with 
moderate amount of difficulty.  We were unsure again whether we were at the 
true lumen or not.  Hence, we took a dual injection on the LAD, which 
demonstrated that our wire was indeed in the right PLV.  After this, we tracked 
the Corsair down into the right PLV and switched out the  200 wire for an 
0.014 x 300 cm Luge wire.  We then tracked the Corsair out and introduced a 2.0 
x 30 mm Emerge RX balloon and performed balloon angioplasty for a total of 6 
different inflations, starting from the right PLV, extending into the proximal 
or ostial segment of the RCA, all of which were 8 atmospheres, lasting 20-30 
seconds.  We got moderate amount of expansion with this.  We were then easily 
able to deliver a 2.25 x 38 mm Synergy drug-eluting stent  extending into the 
distal RPLV and the proximal edge of the stent into the distal portion of the 
right coronary artery.  The stent was deployed at 8 atmospheres for a total of 
26 seconds.  We then overlapped this proximally with a 2.25 x 38 mm Syndergy 
TESFAYE (10 carloz for 8 seconds).  We then deployed a 2.5 x 32 mm Synergy TESFAYE at 10 
atmospheres, lasting 25 seconds in overlapping fashion in the ostium to the 
midportion of the RCA.  We got excellent angiographic results.  After this, we 
noticed that the distal edge of the distal stent in the right PLV was oversized
, compared to the rest of the RCA.  This could have been an element of spasm.  
Hence, we gave the patient a total of 200 mcg of Cardene and 300 mcg of 
nitroglycerin.  Even after vasodilatation, we noticed that the distal edge was 
a little pinched.  Hence, we decided to dilate this using a 2.0 x 15 mm MINI 
TREK RX balloon for 10 atmospheres for 24 seconds.  Excellent angiographic 
results were achieved at the distal edge of the stent with restoration of MARIA LUISA-
3 flow to the PLV.  We then removed the stent and postdilated both the stents 
using a 2.75 x 20 mm TREK NC balloon for a total of 6 different inflations, 
lasting 16 atmospheres for at least 16 seconds.  Excellent stent expansion and 
apposition were achieved.  There was no evidence of edge dissection or distal 
embolization.  MARIA LUISA-3 flow was restored to the PDA and the PLV territories.  
Excellent flow was noted across the stent.  Good stent expansion and apposition 
were achieved.  Wire-out frames confirmed the above findings as well. 

The patient tolerated the procedure very well without any evidence of 
hemodynamic complications, and was transferred out of the cath lab in stable 
condition without any chest pain. 



Dr. Delcid, my attending, was scrubbed and performed key portions of the 
procedure and supervised the rest of it as well.



TOTAL CONTRAST USED:  340 mL.



TOTAL AIR KERMA:  3528 mGy. 



Right common femoral angiogram demonstrated a high bifurcation of the right side
, and hence, we opted for manual compression method.



LESION CHARACTERISTICS:  

 1. RCA  ACC/AHA type C lesion.

 2. MARIA LUISA 0 flow was noted preprocedure, and MARIA LUISA-3 flow was restored after the 
procedure.



IMPRESSION:  

 1. Successful chronic total occlusion and revascularization of the proximal 
RCA with antegrade wire escalation technique, extending into the right PLV with 
3 Celestine (all Synergy - distal 2.25 x 38 mm, mid 2.25 x 38 mm and proximal 2.5 x 
32 mm) in overlapping fashion with excellent angiographic results.

 2. Aspirin, Plavix, and Coumadin therapy, given the fact that the patient had 
a recent pulmonary embolism and needs Coumadin therapy.  Once the INR reaches 
therapeutic level, we recommend continuing Plavix and Coumadin x 12 months.



Cande Delcid MD





PCG/MedQ

DD:  2017 15:12:35  DT:  2017 16:09:41

Job #:  [id number]



cc: 

  - RAIZA CHAMPAGNE 







* ECG-SCAN (2017  7:30 AM)





 Narrative

 

 



Ordered by an unspecified provider.





* CBC (2017  3:45 AM)





  



  Component   Value   Ref Range

 

  



  White Blood Cells   9.6   4.5 - 11.0 K/UL

 

  



  RBC   4.23 (L)   4.4 - 5.5 M/UL

 

  



  Hemoglobin   12.3 (L)   13.5 - 16.5 GM/DL

 

  



  Hematocrit   36.3 (L)   40 - 50 %

 

  



  MCV   85.8   80 - 100 FL

 

  



  MCH   29.1   26 - 34 PG

 

  



  MCHC   34.0   32.0 - 36.0 G/DL

 

  



  RDW   13.9   11 - 15 %

 

  



  Platelet Count   279   150 - 400 K/UL

 

  



  MPV   7.5   7 - 11 FL









 



  Specimen   Performing Laboratory

 

 



  Blood    MAIN LAB



   [address] [place] [address]





* BASIC METABOLIC PANEL (2017  3:45 AM)





  



  Component   Value   Ref Range

 

  



  Sodium   138   137 - 147 MMOL/L

 

  



  Potassium   3.9   3.5 - 5.1 MMOL/L

 

  



  Chloride   105   98 - 110 MMOL/L

 

  



  CO2   25   21 - 30 MMOL/L

 

  



  Anion Gap   8   3 - 12

 

  



  Glucose   107 (H)   70 - 100 MG/DL

 

  



  Blood Urea Nitrogen   12   7 - 25 MG/DL

 

  



  Creatinine   0.72   0.4 - 1.24 MG/DL

 

  



  Calcium   9.2   8.5 - 10.6 MG/DL

 

  



  eGFR Non African American   >60   >60 mL/min



   Comment: 



   The eGFR is not validated for use in drug dosing 



   adjustments.    Continue to use 



   estimated creatinine clearance per dosing 



   reference text.    Please contact the 



   Clinical Pharmacist for questions. 

 

  



  eGFR    >60   >60 mL/min



   Comment: 



   The eGFR is not validated for use in drug dosing 



   adjustments.    Continue to use 



   estimated creatinine clearance per dosing 



   reference text.    Please contact the 



   Clinical Pharmacist for questions. 









 



  Specimen   Performing Laboratory

 

 



  Blood    MAIN LAB



   [address] [place] [address]





* PROTIME INR (PT) (2017  3:45 AM)





  



  Component   Value   Ref Range

 

  



  INR   1.1   0.8 - 1.2









 



  Specimen   Performing Laboratory

 

 



  Blood    MAIN LAB



   [address] [place] [address]





* ECG-SCAN (2017  9:40 PM)





 Narrative

 

 



Ordered by an unspecified provider.





* POC ACTIVATED CLOTTING TIME (2017  4:49 PM)





  



  Component   Value   Ref Range

 

  



  Activated Clotting Time   162   s









 



  Specimen   Performing Laboratory

 

 



    MAIN LAB



   [address] [place] [address]





* POC ACTIVATED CLOTTING TIME (2017  4:18 PM)





  



  Component   Value   Ref Range

 

  



  Activated Clotting Time   186   s









 



  Specimen   Performing Laboratory

 

 



    MAIN LAB



   [address] [place] [address]





* POC ACTIVATED CLOTTING TIME (2017  4:00 PM)





  



  Component   Value   Ref Range

 

  



  Activated Clotting Time   183   s









 



  Specimen   Performing Laboratory

 

 



    MAIN LAB



   [address] [place] [address]





* POC ACTIVATED CLOTTING TIME (2017  2:55 PM)





  



  Component   Value   Ref Range

 

  



  Activated Clotting Time   400   s









 



  Specimen   Performing Laboratory

 

 



    MAIN LAB



   [address] [place] [address]





* POC ACTIVATED CLOTTING TIME (2017  2:34 PM)





  



  Component   Value   Ref Range

 

  



  Activated Clotting Time   251   s









 



  Specimen   Performing Laboratory

 

 



    MAIN LAB



   [address] [place] [address]





* POC ACTIVATED CLOTTING TIME (2017  2:08 PM)





  



  Component   Value   Ref Range

 

  



  Activated Clotting Time   349   s









 



  Specimen   Performing Laboratory

 

 



    MAIN LAB



   [address] [place] [address]





* POC ACTIVATED CLOTTING TIME (2017  1:35 PM)





  



  Component   Value   Ref Range

 

  



  Activated Clotting Time   337   s









 



  Specimen   Performing Laboratory

 

 



    MAIN LAB



   [address] [place] [address]





* POC ACTIVATED CLOTTING TIME (2017  1:05 PM)





  



  Component   Value   Ref Range

 

  



  Activated Clotting Time   370   s









 



  Specimen   Performing Laboratory

 

 



   [facility]



   [address]



   [place] [address]





in this encounter



Visit Diagnoses

Not on filein this encounter



Admitting Diagnoses











  Diagnosis

 





  Chronic Total Occlusion

 





  CAD (coronary artery disease)







Administered Medications







     



  Medication Order   MAR Action   Action Date   Dose   Rate   Site

 

     



  aspirin chewable tablet 81 mg   Given   2017   81 mg  



  81 mg, Oral, DAILY, First dose on Mon    08:20 CST   



  17 at 1600, Until Discontinued     

 

  

 

     



  atorvastatin (LIPITOR) tablet 40 mg   Given   2017   40 mg  



  40 mg, Oral, DAILY, First dose on Mon    22:24 CST   



  17 at 1300, Until Discontinued,     



  Admission/Obs/Extended Recovery     









    



  Given   2017   40 mg  



   08:21 CST   









  

 

     



  carisoprodol(+) (SOMA) tablet 350 mg   Given   2017   350 mg  



  350 mg, Oral, AT BEDTIME DAILY, First    22:24 CST   



  dose on 17 at 2100, Until     



  Discontinued, Admission/Obs/Extended     



  Recovery     

 

  

 

     



  clopiDOGrel (PLAVIX) tablet 75 mg   Given   2017   75 mg  



  75 mg, Oral, DAILY, First dose on Tue    08:21 CST   



  17 at 0900, Until Discontinued,     



  Clopidogrel (PLAVIX) load given in cath     



  lab. This Medication can increase the     



  risk of bleeding and may need to be held     



  prior to surgery or invasive procedures.     



  Consult physician in advance.     

 

  

 

     



  gabapentin (NEURONTIN) capsule 600 mg   Given   2017   600 mg  



  600 mg, Oral, FOUR TIMES DAILY, First    15:47 CST   



  dose on 17 at 1300, Until     



  Discontinued, Admission/Obs/Extended     



  Recovery     









    



  Given   2017   600 mg  



   22:24 CST   

 

    



  Given   2017   600 mg  



   08:20 CST   









  

 

     



  HYDROcodone/acetaminophen (NORCO) 5/325   Given   2017   1 tablet  



  mg tablet 1 tablet    15:47 CST   



  1 tablet, Oral, EVERY  6 HOURS PRN,     



  Starting 17 at 1156, Until 17 at 1052, Pain PO, TOTAL     



  ACETAMINOPHEN DOSE NOT TO EXCEED 4GM     



  DAILY NOTE: This is a HIGH ALERT     



  Medication.     

 

  

 

     



  pantoprazole DR (PROTONIX) tablet 40 mg   Given   2017   40 mg  



  40 mg, Oral, DAILY, First dose on Mon    22:24 CST   



  17 at 2100, Until Discontinued, Do     



  not crush or chew tablet.     

 

  

 

     



  sacubitril/valsartan (ENTRESTO) 24/26 mg   Given   2017   1 tablet  



  tablet 1 tablet    08:21 CST   



  1 tablet, Oral, TWICE DAILY, First dose     



  on 17 at 0900, Until     



  Discontinued, Admission/Obs/Extended     



  Recovery     

 

  

 

     



  sodium chloride 0.9 %   infusion   Given - New   2017   1,000 mL   50 mL
/hr 



  1,000 mL, 1,000 mL, Intravenous, at 50   Bag   08:38 CST   



  mL/hr, CONTINUOUS, Starting 17     



  at 0815, Until 17 at 1458, If     



  EF is <40%, contact CCL Charge Nurse     



  [phone] before initiating fluid.     

 

  

 

     



  sodium chloride 0.9 %   infusion   Dose/Rate   2017    75 mL/hr 



  1,000 mL, Intravenous, at 75 mL/hr,   Change   15:41 CST   



  CONTINUOUS, Starting 17 at     



  1500, Until 17 at 0059, Once     



  patient out of bed, then saline lock and     



  DC IV fluid.     









    



  Bolus from Infusion   2017   250 mL   999 mL/hr 



   17:30 CST   

 

    



  Dose/Rate Verify   2017    75 mL/hr 



   17:45 CST   









  

 

     



  warfarin (COUMADIN) tablet 7.5 mg   Given   2017   7.5 mg  



  7.5 mg, Oral, TWO TIMES WEEKLY (Once per    22:25 CST   



  day on ), First dose on 17 at 2100, Until Discontinued,     



  NURSING: Provide patient with warfarin     



  education leaflet and video. Do not give     



  with cranberry juice. NOTE: This is a     



  HIGH ALERT Medication.     

 

  



in this encounter

## 2018-01-28 NOTE — XMS REPORT
Encounter Summary

 Created on: 2018



Francisco Huber

External Reference #: [id number]

: 1970

Sex: Male



Demographics







 Address  [address] [place]  [address]

 

 Home Phone  [phone]

 

 Preferred Language  English

 

 Marital Status  Unknown

 

 Tenriism Affiliation  NON

 

 Race  White

 

 Ethnic Group  Not  or 





Author







 Author  [facility]

 

 Organization  [facility]

 

 Address  Unknown

 

 Phone  Unavailable







Support







 Name  Relationship  Address  Phone

 

 Steffanie Huber  ECON  Unknown  [phone]







Care Team Providers







 Care Team Member Name  Role  Phone

 

 Neftali Bacon MD  21  [phone]







Reason for Visit

* 





 



  Reason   Comments

 

 



  Precertification   Approval for L[employer]ORS through [employer] [place]









Encounter Details







    



  Date   Type   Department   Care Team   Description

 

    



  2017   Documentation   [facility]   Gooch, Duane, RN   
Precertification



    [address]    (Approval for LVCORS



    [address]    through [employer] [place])



    [place] [address]  



    [phone]  







Social History







    



  Tobacco Use   Types   Packs/Day   Years Used   Date

 

    



  Current Every Day Smoker    









   



  Alcohol Use   Drinks/Week   oz/Week   Comments

 

   



  No   









 



  Sex Assigned at Birth   Date Recorded

 

 



  Not on file 



as of this encounter



Progress Notes

* Gooch, Duane, RN - 2017  1:57 PM CST



[place]ure with [employer] [place] for [employer], [phone], confirmed benefits and 
eligibility:  Current and active since 2016, $2750 deductible with required 
co-insurance of 25% to max OOP $6850, then plan will pay 100% of allowable 
charges.  No pre-certification is required for LVCORS [place]  [address] 
[address].  Reference #[id number]



 





in this encounter



Plan of Treatment





Not on fileas of this encounter



Visit Diagnoses

Not on filein this encounter

## 2018-01-28 NOTE — XMS REPORT
Encounter Summary

 Created on: 2018



Francisco Huber

External Reference #: [id number]

: 1970

Sex: Male



Demographics







 Address  [address] [place]  [address]

 

 Home Phone  [phone]

 

 Preferred Language  English

 

 Marital Status  Unknown

 

 Mu-ism Affiliation  NON

 

 Race  White

 

 Ethnic Group  Not  or 





Author







 Author  [facility]

 

 Organization  [facility]

 

 Address  Unknown

 

 Phone  Unavailable







Support







 Name  Relationship  Address  Phone

 

 Steffanie Huber  Unknown  [phone]







Care Team Providers







 Care Team Member Name  Role  Phone

 

 Neftali Bacon MD  21  [phone]

 

 Mahesh Champagne MD  PCP  [phone]







Reason for Visit

* Auth/Cert (Routine)





     



  Status   Reason   Specialty   Diagnoses /   Referred By   Referred To



     Procedures   Contact   Contact

 

     











Encounter Details







    



  Date   Type   Department   Care Team   Description

 

    



  2017   [facility]   Tomasz Delcid MD 



   Encounter   [address]   [address] [place] [address]   [address] 



    [phone]   [place] [address] 



     [phone] (Fax) 







Social History







    



  Tobacco Use   Types   Packs/Day   Years Used   Date

 

    



  Current Every Day Smoker    









   



  Alcohol Use   Drinks/Week   oz/Week   Comments

 

   



  No   









 



  Sex Assigned at Birth   Date Recorded

 

 



  Not on file 



as of this encounter



Medications at Time of Discharge







     



  Medication   Sig.   Disp.   Refills   Start Date   End Date

 

     



  atorvastatin (LIPITOR) 40   Take 40 mg by mouth    



  mg tablet   daily.    

 

     



  carisoprodol(+) (SOMA)   Take 350 mg by mouth at    



  350 mg tablet   bedtime daily.    

 

     



  clopiDOGrel (PLAVIX) 75   Take 75 mg by mouth    



  mg tablet   daily.    

 

     



  gabapentin (NEURONTIN)   Take 600 mg by mouth four    



  600 mg tablet   times daily.    

 

     



  gemfibrozil (LOPID) 600   Take 600 mg by mouth    



  mg tablet   twice daily.    

 

     



  HYDROcodone/acetaminophen   Take 1 tablet by mouth    



  (NORCO) 7.5/325 mg tablet   every 6 hours as needed    



   for Pain    

 

     



  metoprolol XL (TOPROL XL)   Take 25 mg by mouth    



  25 mg extended release   daily.    



  tablet     

 

     



  nitroglycerin (NITROSTAT)   Place 0.4 mg under tongue    



  0.4 mg tablet   every 5 minutes as needed    



   for Chest Pain. Max of 3    



   tablets, call 911.    

 

     



  Omega-3 Acid Ethyl Esters   Take 1 g by mouth three    



  1 gram cap   times daily.    

 

     



  omeprazole DR(+)   Take 20 mg by mouth daily    



  (PRILOSEC) 20 mg capsule   before breakfast.    

 

     



  sacubitril/valsartan   Take 1 tablet by mouth    



  (ENTRESTO) 24/26 mg   twice daily.    



  tablet     

 

     



  tiZANidine (ZANAFLEX) 4   Take 8 mg by mouth every    



  mg tabletIndications:   8 hours as needed.    



  MUSCLE SPASM   Indications: MUSCLE SPASM    

 

     



  warfarin (COUMADIN) 5 mg   Take 5 mg by mouth as    



  tabletIndications:   directed. Take 7.5 mg by    



  THROMBOTIC DISORDER   mouth on Mon and Wed.    



   Take 5 mg by mouth on    



   , Tues, Thurs, Fri,    



   and Sat. Take at bedtime.    



   Indications: THROMBOTIC    



   DISORDER    

 

     



  aspirin EC 81 mg   Take 1 tablet by mouth   90 tablet   3   2017



  tabletIndications:   daily for 7 days. Take    



  Coronary artery disease   with food.    



  involving native coronary     



  artery of native heart     



  with angina pectoris     



  (HCC), Ischemic     



  cardiomyopathy, NSTEMI     



  (non-ST elevated     



  myocardial infarction)     



  (HCC), Tobacco abuse     

 

     



  aspirin EC 81 mg tablet   Take 81 mg by mouth      2017



   daily. Take with food.    



as of this encounter



Plan of Treatment





Not on fileas of this encounter



Visit Diagnoses

Not on filein this encounter

## 2018-01-28 NOTE — XMS REPORT
Encounter Summary

 Created on: 2018



Francisco Huber

External Reference #: [id number]

: 1970

Sex: Male



Demographics







 Address  [address] [place]  [address]

 

 Home Phone  [phone]

 

 Preferred Language  English

 

 Marital Status  Unknown

 

 Tenriism Affiliation  NON

 

 Race  White

 

 Ethnic Group  Not  or 





Author







 Author  [facility]

 

 Organization  [facility]

 

 Address  Unknown

 

 Phone  Unavailable







Support







 Name  Relationship  Address  Phone

 

 Steffanie Huber  Unknown  [phone]







Care Team Providers







 Care Team Member Name  Role  Phone

 

 Neftali Bacon MD  21  [phone]

 

 Mahesh Champagne MD  PCP  [phone]







Reason for Visit

* Auth/Cert (Routine)





     



  Status   Reason   Specialty   Diagnoses /   Referred By   Referred To



     Procedures   Contact   Contact

 

     











Encounter Details







    



  Date   Type   Department   Care Team   Description

 

    



  2017   [facility]   Yue Zhu APRN   Atherosclerotic 
heart



   Encounter   [address]   [address]   disease [place]



    [place] [address]   [address]   coronary artery [place]



     [place] [address]   unspecified angina



     [phone]   pectoris ([facility])



     [phone] (Fax) 







Social History







    



  Tobacco Use   Types   Packs/Day   Years Used   Date

 

    



  Current Every Day Smoker    









   



  Alcohol Use   Drinks/Week   oz/Week   Comments

 

   



  No   









 



  Sex Assigned at Birth   Date Recorded

 

 



  Not on file 



as of this encounter



Medications at Time of Discharge







     



  Medication   Sig.   Disp.   Refills   Start Date   End Date

 

     



  atorvastatin (LIPITOR) 40   Take 40 mg by mouth    



  mg tablet   daily.    

 

     



  carisoprodol(+) (SOMA)   Take 350 mg by mouth at    



  350 mg tablet   bedtime daily.    

 

     



  clopiDOGrel (PLAVIX) 75   Take 75 mg by mouth    



  mg tablet   daily.    

 

     



  gabapentin (NEURONTIN)   Take 600 mg by mouth four    



  600 mg tablet   times daily.    

 

     



  gemfibrozil (LOPID) 600   Take 600 mg by mouth    



  mg tablet   twice daily.    

 

     



  HYDROcodone/acetaminophen   Take 1 tablet by mouth    



  (NORCO) 7.5/325 mg tablet   every 6 hours as needed    



   for Pain    

 

     



  metoprolol XL (TOPROL XL)   Take 25 mg by mouth    



  25 mg extended release   daily.    



  tablet     

 

     



  nitroglycerin (NITROSTAT)   Place 0.4 mg under tongue    



  0.4 mg tablet   every 5 minutes as needed    



   for Chest Pain. Max of 3    



   tablets, call 911.    

 

     



  Omega-3 Acid Ethyl Esters   Take 1 g by mouth three    



  1 gram cap   times daily.    

 

     



  omeprazole DR(+)   Take 20 mg by mouth daily    



  (PRILOSEC) 20 mg capsule   before breakfast.    

 

     



  sacubitril/valsartan   Take 1 tablet by mouth    



  (ENTRESTO) 24/26 mg   twice daily.    



  tablet     

 

     



  tiZANidine (ZANAFLEX) 4   Take 8 mg by mouth every    



  mg tabletIndications:   8 hours as needed.    



  MUSCLE SPASM   Indications: MUSCLE SPASM    

 

     



  warfarin (COUMADIN) 5 mg   Take 5 mg by mouth as    



  tabletIndications:   directed. Take 7.5 mg by    



  THROMBOTIC DISORDER   mouth on Mon and Wed.    



   Take 5 mg by mouth on    



   , Tues, Thurs, Fri,    



   and Sat. Take at bedtime.    



   Indications: THROMBOTIC    



   DISORDER    

 

     



  aspirin EC 81 mg   Take 1 tablet by mouth   90 tablet   3   2017



  tabletIndications:   daily for 7 days. Take    



  Coronary artery disease   with food.    



  involving native coronary     



  artery of native heart     



  with angina pectoris     



  (HCC), Ischemic     



  cardiomyopathy, NSTEMI     



  (non-ST elevated     



  myocardial infarction)     



  (HCC), Tobacco abuse     

 

     



  aspirin EC 81 mg tablet   Take 81 mg by mouth      2017



   daily. Take with food.    



as of this encounter



Plan of Treatment





Not on fileas of this encounter



Results

* PROTIME INR (PT) (2017  7:43 AM)





  



  Component   Value   Ref Range

 

  



  INR   1.0   0.8 - 1.2









 



  Specimen   Performing Laboratory

 

 



    MAIN LAB



   [address] [place] [address]





in this encounter



Visit Diagnoses

Not on filein this encounter



Admitting Diagnoses











  Diagnosis

 





  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 
angina pectoris (HCC)

 





  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 
angina pectoris

## 2018-01-29 VITALS — SYSTOLIC BLOOD PRESSURE: 126 MMHG | DIASTOLIC BLOOD PRESSURE: 64 MMHG

## 2018-01-29 VITALS — DIASTOLIC BLOOD PRESSURE: 67 MMHG | SYSTOLIC BLOOD PRESSURE: 131 MMHG

## 2018-01-29 VITALS — SYSTOLIC BLOOD PRESSURE: 135 MMHG | DIASTOLIC BLOOD PRESSURE: 73 MMHG

## 2018-01-29 VITALS — DIASTOLIC BLOOD PRESSURE: 71 MMHG | SYSTOLIC BLOOD PRESSURE: 130 MMHG

## 2018-01-29 VITALS — SYSTOLIC BLOOD PRESSURE: 129 MMHG | DIASTOLIC BLOOD PRESSURE: 68 MMHG

## 2018-01-29 VITALS — DIASTOLIC BLOOD PRESSURE: 82 MMHG | SYSTOLIC BLOOD PRESSURE: 137 MMHG

## 2018-01-29 VITALS — DIASTOLIC BLOOD PRESSURE: 68 MMHG | SYSTOLIC BLOOD PRESSURE: 130 MMHG

## 2018-01-29 LAB
ALBUMIN SERPL-MCNC: 3.2 GM/DL (ref 3.2–4.5)
ALBUMIN SERPL-MCNC: 3.5 GM/DL (ref 3.2–4.5)
ALP SERPL-CCNC: 78 U/L (ref 40–136)
ALP SERPL-CCNC: 80 U/L (ref 40–136)
ALT SERPL-CCNC: 17 U/L (ref 0–55)
ALT SERPL-CCNC: 19 U/L (ref 0–55)
APTT BLD: 116 SEC (ref 24–35)
APTT BLD: 124 SEC (ref 24–35)
BASOPHILS # BLD AUTO: 0 10^3/UL (ref 0–0.1)
BASOPHILS # BLD AUTO: 0 10^3/UL (ref 0–0.1)
BASOPHILS NFR BLD AUTO: 0 % (ref 0–10)
BASOPHILS NFR BLD AUTO: 0 % (ref 0–10)
BILIRUB SERPL-MCNC: 0.4 MG/DL (ref 0.1–1)
BILIRUB SERPL-MCNC: 0.4 MG/DL (ref 0.1–1)
BUN/CREAT SERPL: 12
BUN/CREAT SERPL: 12
CALCIUM SERPL-MCNC: 9.3 MG/DL (ref 8.5–10.1)
CALCIUM SERPL-MCNC: 9.6 MG/DL (ref 8.5–10.1)
CHLORIDE SERPL-SCNC: 95 MMOL/L (ref 98–107)
CHLORIDE SERPL-SCNC: 98 MMOL/L (ref 98–107)
CO2 SERPL-SCNC: 29 MMOL/L (ref 21–32)
CO2 SERPL-SCNC: 29 MMOL/L (ref 21–32)
CREAT SERPL-MCNC: 0.68 MG/DL (ref 0.6–1.3)
CREAT SERPL-MCNC: 0.76 MG/DL (ref 0.6–1.3)
EOSINOPHIL # BLD AUTO: 0.2 10^3/UL (ref 0–0.3)
EOSINOPHIL # BLD AUTO: 0.3 10^3/UL (ref 0–0.3)
EOSINOPHIL NFR BLD AUTO: 2 % (ref 0–10)
EOSINOPHIL NFR BLD AUTO: 3 % (ref 0–10)
ERYTHROCYTE [DISTWIDTH] IN BLOOD BY AUTOMATED COUNT: 13.7 % (ref 10–14.5)
ERYTHROCYTE [DISTWIDTH] IN BLOOD BY AUTOMATED COUNT: 13.8 % (ref 10–14.5)
ERYTHROCYTE [SEDIMENTATION RATE] IN BLOOD: > 140 MM/HR (ref 0–15)
GFR SERPLBLD BASED ON 1.73 SQ M-ARVRAT: > 60 ML/MIN
GFR SERPLBLD BASED ON 1.73 SQ M-ARVRAT: > 60 ML/MIN
GLUCOSE SERPL-MCNC: 115 MG/DL (ref 70–105)
GLUCOSE SERPL-MCNC: 92 MG/DL (ref 70–105)
HCT VFR BLD CALC: 29 % (ref 40–54)
HCT VFR BLD CALC: 32 % (ref 40–54)
HGB BLD-MCNC: 10.6 G/DL (ref 13.3–17.7)
HGB BLD-MCNC: 9.7 G/DL (ref 13.3–17.7)
INR PPP: 3.4 (ref 0.8–1.4)
INR PPP: 3.8 (ref 0.8–1.4)
LYMPHOCYTES # BLD AUTO: 1.2 X 10^3 (ref 1–4)
LYMPHOCYTES # BLD AUTO: 1.2 X 10^3 (ref 1–4)
LYMPHOCYTES NFR BLD AUTO: 10 % (ref 12–44)
LYMPHOCYTES NFR BLD AUTO: 10 % (ref 12–44)
MANUAL DIFFERENTIAL PERFORMED BLD QL: NO
MANUAL DIFFERENTIAL PERFORMED BLD QL: NO
MCH RBC QN AUTO: 29 PG (ref 25–34)
MCH RBC QN AUTO: 29 PG (ref 25–34)
MCHC RBC AUTO-ENTMCNC: 33 G/DL (ref 32–36)
MCHC RBC AUTO-ENTMCNC: 33 G/DL (ref 32–36)
MCV RBC AUTO: 87 FL (ref 80–99)
MCV RBC AUTO: 88 FL (ref 80–99)
MONOCYTES # BLD AUTO: 1.2 X 10^3 (ref 0–1)
MONOCYTES # BLD AUTO: 1.4 X 10^3 (ref 0–1)
MONOCYTES NFR BLD AUTO: 10 % (ref 0–12)
MONOCYTES NFR BLD AUTO: 12 % (ref 0–12)
NEUTROPHILS # BLD AUTO: 8.5 X 10^3 (ref 1.8–7.8)
NEUTROPHILS # BLD AUTO: 9.9 X 10^3 (ref 1.8–7.8)
NEUTROPHILS NFR BLD AUTO: 75 % (ref 42–75)
NEUTROPHILS NFR BLD AUTO: 79 % (ref 42–75)
PLATELET # BLD: 487 10^3/UL (ref 130–400)
PLATELET # BLD: 514 10^3/UL (ref 130–400)
PMV BLD AUTO: 9.1 FL (ref 7.4–10.4)
PMV BLD AUTO: 9.6 FL (ref 7.4–10.4)
POTASSIUM SERPL-SCNC: 3.4 MMOL/L (ref 3.6–5)
POTASSIUM SERPL-SCNC: 3.8 MMOL/L (ref 3.6–5)
PROT SERPL-MCNC: 7 GM/DL (ref 6.4–8.2)
PROT SERPL-MCNC: 7.8 GM/DL (ref 6.4–8.2)
PROTHROMBIN TIME: 34.2 SEC (ref 12.2–14.7)
PROTHROMBIN TIME: 37.3 SEC (ref 12.2–14.7)
RBC # BLD AUTO: 3.34 10^6/UL (ref 4.35–5.85)
RBC # BLD AUTO: 3.67 10^6/UL (ref 4.35–5.85)
SODIUM SERPL-SCNC: 137 MMOL/L (ref 135–145)
SODIUM SERPL-SCNC: 141 MMOL/L (ref 135–145)
WBC # BLD AUTO: 11.4 10^3/UL (ref 4.3–11)
WBC # BLD AUTO: 12.6 10^3/UL (ref 4.3–11)

## 2018-01-29 RX ADMIN — OMEGA-3 FATTY ACIDS CAP 1000 MG SCH MG: 1000 CAP at 12:48

## 2018-01-29 RX ADMIN — ACETAMINOPHEN PRN MG: 500 TABLET ORAL at 23:28

## 2018-01-29 RX ADMIN — GABAPENTIN SCH MG: 600 TABLET, FILM COATED ORAL at 20:18

## 2018-01-29 RX ADMIN — PANTOPRAZOLE SCH MG: 20 TABLET, DELAYED RELEASE ORAL at 10:31

## 2018-01-29 RX ADMIN — MORPHINE SULFATE PRN MG: 4 INJECTION, SOLUTION INTRAMUSCULAR; INTRAVENOUS at 08:31

## 2018-01-29 RX ADMIN — GABAPENTIN SCH MG: 600 TABLET, FILM COATED ORAL at 12:47

## 2018-01-29 RX ADMIN — FENTANYL CITRATE PRN MCG: 50 INJECTION, SOLUTION INTRAMUSCULAR; INTRAVENOUS at 12:47

## 2018-01-29 RX ADMIN — VANCOMYCIN HYDROCHLORIDE SCH MLS/HR: 500 INJECTION, POWDER, LYOPHILIZED, FOR SOLUTION INTRAVENOUS at 08:30

## 2018-01-29 RX ADMIN — VANCOMYCIN HYDROCHLORIDE SCH MLS/HR: 500 INJECTION, POWDER, LYOPHILIZED, FOR SOLUTION INTRAVENOUS at 23:28

## 2018-01-29 RX ADMIN — MORPHINE SULFATE PRN MG: 4 INJECTION, SOLUTION INTRAMUSCULAR; INTRAVENOUS at 06:05

## 2018-01-29 RX ADMIN — VANCOMYCIN HYDROCHLORIDE SCH MLS/HR: 500 INJECTION, POWDER, LYOPHILIZED, FOR SOLUTION INTRAVENOUS at 16:14

## 2018-01-29 RX ADMIN — OMEGA-3 FATTY ACIDS CAP 1000 MG SCH MG: 1000 CAP at 20:18

## 2018-01-29 RX ADMIN — Medication PRN ML: at 08:31

## 2018-01-29 RX ADMIN — Medication SCH ML: at 21:01

## 2018-01-29 RX ADMIN — MORPHINE SULFATE PRN MG: 4 INJECTION, SOLUTION INTRAMUSCULAR; INTRAVENOUS at 20:18

## 2018-01-29 RX ADMIN — Medication SCH ML: at 12:47

## 2018-01-29 RX ADMIN — CARISOPRODOL SCH MG: 350 TABLET ORAL at 20:18

## 2018-01-29 RX ADMIN — HYDROCODONE BITARTRATE AND ACETAMINOPHEN PRN EA: 7.5; 325 TABLET ORAL at 10:31

## 2018-01-29 RX ADMIN — GABAPENTIN SCH MG: 600 TABLET, FILM COATED ORAL at 16:14

## 2018-01-29 RX ADMIN — FENTANYL CITRATE PRN MCG: 50 INJECTION, SOLUTION INTRAMUSCULAR; INTRAVENOUS at 22:33

## 2018-01-29 RX ADMIN — Medication PRN ML: at 16:14

## 2018-01-29 RX ADMIN — HYDROCODONE BITARTRATE AND ACETAMINOPHEN PRN EA: 7.5; 325 TABLET ORAL at 20:19

## 2018-01-29 RX ADMIN — ACETAMINOPHEN PRN MG: 500 TABLET ORAL at 15:46

## 2018-01-29 RX ADMIN — MORPHINE SULFATE PRN MG: 4 INJECTION, SOLUTION INTRAMUSCULAR; INTRAVENOUS at 16:33

## 2018-01-29 NOTE — XMS REPORT
Encounter Summary

 Created on: 2018



Francisco Huber

External Reference #: [id number]

: 1970

Sex: Male



Demographics







 Address  [address] [place]  [address]

 

 Home Phone  [phone]

 

 Preferred Language  English

 

 Marital Status  Unknown

 

 Scientology Affiliation  NON

 

 Race  White

 

 Ethnic Group  Not  or 





Author







 Author  [facility]

 

 Organization  [facility]

 

 Address  Unknown

 

 Phone  Unavailable







Support







 Name  Relationship  Address  Phone

 

 Steffanie Huber  Unknown  [phone]







Care Team Providers







 Care Team Member Name  Role  Phone

 

 Neftali Bacon MD  21  [phone]

 

 Mahesh Champagne MD  PCP  [phone]







Reason for Visit

* Auth/Cert (Routine)





     



  Status   Reason   Specialty   Diagnoses /   Referred By   Referred To



     Procedures   Contact   Contact

 

     











Encounter Details







    



  Date   Type   Department   Care Team   Description

 

    



  2017   [facility]   Tomasz Delcid MD 



   Encounter   [address]   [address] [place] [address]   [address] 



    [phone]   [place] [address] 



     [phone] (Fax) 







Social History







    



  Tobacco Use   Types   Packs/Day   Years Used   Date

 

    



  Current Every Day Smoker    









   



  Alcohol Use   Drinks/Week   oz/Week   Comments

 

   



  No   









 



  Sex Assigned at Birth   Date Recorded

 

 



  Not on file 



as of this encounter



Medications at Time of Discharge







     



  Medication   Sig.   Disp.   Refills   Start Date   End Date

 

     



  atorvastatin (LIPITOR) 40   Take 40 mg by mouth    



  mg tablet   daily.    

 

     



  carisoprodol(+) (SOMA)   Take 350 mg by mouth at    



  350 mg tablet   bedtime daily.    

 

     



  clopiDOGrel (PLAVIX) 75   Take 75 mg by mouth    



  mg tablet   daily.    

 

     



  gabapentin (NEURONTIN)   Take 600 mg by mouth four    



  600 mg tablet   times daily.    

 

     



  gemfibrozil (LOPID) 600   Take 600 mg by mouth    



  mg tablet   twice daily.    

 

     



  HYDROcodone/acetaminophen   Take 1 tablet by mouth    



  (NORCO) 7.5/325 mg tablet   every 6 hours as needed    



   for Pain    

 

     



  metoprolol XL (TOPROL XL)   Take 25 mg by mouth    



  25 mg extended release   daily.    



  tablet     

 

     



  nitroglycerin (NITROSTAT)   Place 0.4 mg under tongue    



  0.4 mg tablet   every 5 minutes as needed    



   for Chest Pain. Max of 3    



   tablets, call 911.    

 

     



  Omega-3 Acid Ethyl Esters   Take 1 g by mouth three    



  1 gram cap   times daily.    

 

     



  omeprazole DR(+)   Take 20 mg by mouth daily    



  (PRILOSEC) 20 mg capsule   before breakfast.    

 

     



  sacubitril/valsartan   Take 1 tablet by mouth    



  (ENTRESTO) 24/26 mg   twice daily.    



  tablet     

 

     



  tiZANidine (ZANAFLEX) 4   Take 8 mg by mouth every    



  mg tabletIndications:   8 hours as needed.    



  MUSCLE SPASM   Indications: MUSCLE SPASM    

 

     



  warfarin (COUMADIN) 5 mg   Take 5 mg by mouth as    



  tabletIndications:   directed. Take 7.5 mg by    



  THROMBOTIC DISORDER   mouth on Mon and Wed.    



   Take 5 mg by mouth on    



   , Tues, Thurs, Fri,    



   and Sat. Take at bedtime.    



   Indications: THROMBOTIC    



   DISORDER    

 

     



  aspirin EC 81 mg   Take 1 tablet by mouth   90 tablet   3   2017



  tabletIndications:   daily for 7 days. Take    



  Coronary artery disease   with food.    



  involving native coronary     



  artery of native heart     



  with angina pectoris     



  (HCC), Ischemic     



  cardiomyopathy, NSTEMI     



  (non-ST elevated     



  myocardial infarction)     



  (HCC), Tobacco abuse     

 

     



  aspirin EC 81 mg tablet   Take 81 mg by mouth      2017



   daily. Take with food.    



as of this encounter



Plan of Treatment





Not on fileas of this encounter



Visit Diagnoses

Not on filein this encounter

## 2018-01-29 NOTE — XMS REPORT
Encounter Summary

 Created on: 2018



Francisco Huber

External Reference #: [id number]

: 1970

Sex: Male



Demographics







 Address  [address] [place]  [address]

 

 Home Phone  [phone]

 

 Preferred Language  English

 

 Marital Status  Unknown

 

 Pentecostal Affiliation  NON

 

 Race  White

 

 Ethnic Group  Not  or 





Author







 Author  [facility]

 

 Organization  [facility]

 

 Address  Unknown

 

 Phone  Unavailable







Support







 Name  Relationship  Address  Phone

 

 Steffanie Huber  Unknown  [phone]







Care Team Providers







 Care Team Member Name  Role  Phone

 

 Neftali Bacon MD  21  [phone]

 

 Mahesh Champagne MD  PCP  [phone]







Reason for Visit

* 





 



  Reason   Comments

 

 



  Labs Only   CBC/BMP (creat clearance 149.06)









Encounter Details







    



  Date   Type   Department   Care Team   Description

 

    



  12/15/2017   Documentation   [facility]   Shanell Hendrickson RN   
Labs Only (CBC/BMP (creat



    [address]    clearance 149.06) )



    [address]  



    [place] [address]  



    [phone]  







Social History







    



  Tobacco Use   Types   Packs/Day   Years Used   Date

 

    



  Current Every Day Smoker    









   



  Alcohol Use   Drinks/Week   oz/Week   Comments

 

   



  No   









 



  Sex Assigned at Birth   Date Recorded

 

 



  Not on file 



as of this encounter



Plan of Treatment





Not on fileas of this encounter



Results

* CBC (12/15/2017)





  



  Component   Value   Ref Range

 

  



  White Blood Cells   7.4 

 

  



  RBC   4.80 

 

  



  Hemoglobin   13.7 

 

  



  Hematocrit   42 

 

  



  MCV   87 

 

  



  MCH   29 

 

  



  MCHC   33 

 

  



  Platelet Count   291 

 

  



  MPV   9.0 

 

  



  RDW   13.9 









 



  Specimen   Performing Laboratory

 

 



  [place] [address]





* BASIC METABOLIC PANEL (12/15/2017)





  



  Component   Value   Ref Range

 

  



  Sodium   142 

 

  



  Potassium   4.5 

 

  



  Chloride   105 

 

  



  CO2   27 

 

  



  Blood Urea Nitrogen   16 

 

  



  Creatinine   0.74 

 

  



  Glucose   100 

 

  



  Calcium   10.0 

 

  



  eGFR Non    >60 

 

  



  eGFR   

 

  



  Anion Gap   10 









 



  Specimen   Performing Laboratory

 

 



  [place] [address]





in this encounter



Visit Diagnoses











  Diagnosis

 





  Pre-procedure lab exam

 





  Pre-procedural laboratory examination

 





  Coronary artery disease involving native coronary artery of native heart with 
angina pectoris (HCC)

## 2018-01-29 NOTE — XMS REPORT
Encounter Summary

 Created on: 2018



Francisco Huber

External Reference #: [id number]

: 1970

Sex: Male



Demographics







 Address  [address] [place]  [address]

 

 Home Phone  [phone]

 

 Preferred Language  English

 

 Marital Status  Unknown

 

 Presybeterian Affiliation  NON

 

 Race  White

 

 Ethnic Group  Not  or 





Author







 Author  [facility]

 

 Organization  [facility]

 

 Address  Unknown

 

 Phone  Unavailable







Support







 Name  Relationship  Address  Phone

 

 Steffanie Huber  RAVINDER  Unknown  [phone]







Care Team Providers







 Care Team Member Name  Role  Phone

 

 Neftali Bacon MD  21  [phone]







Reason for Visit

* 





 



  Reason   Comments

 

 



  New Patient   patient profile









Encounter Details







    



  Date   Type   Department   Care Team   Description

 

    



  2017   Patient Profile   [facility]   Shanell Hendrickson, RN   
New Patient (patient



    [address]    profile)



    [address]  



    [place] [address]  



    [phone]  







Social History







    



  Tobacco Use   Types   Packs/Day   Years Used   Date

 

    



  Current Every Day Smoker    









   



  Alcohol Use   Drinks/Week   oz/Week   Comments

 

   



  No   









 



  Sex Assigned at Birth   Date Recorded

 

 



  Not on file 



as of this encounter



Plan of Treatment





Not on fileas of this encounter



Visit Diagnoses











  Diagnosis

 





  Protein S deficiency (HCC)

 





  Primary hypercoagulable state

## 2018-01-29 NOTE — XMS REPORT
Encounter Summary

 Created on: 2018



Niko Huber

External Reference #: [id number]

: 1970

Sex: Male



Demographics







 Address  [address] [place]  [address]

 

 Home Phone  [phone]

 

 Preferred Language  English

 

 Marital Status  Unknown

 

 Hindu Affiliation  NON

 

 Race  White

 

 Ethnic Group  Not  or 





Author







 Author  [facility]

 

 Organization  [facility]

 

 Address  Unknown

 

 Phone  Unavailable







Support







 Name  Relationship  Address  Phone

 

 Steffanie Huber  Unknown  [phone]







Care Team Providers







 Care Team Member Name  Role  Phone

 

 Neftali Bacon MD  21  [phone]

 

 Raiza Champagne MD  PCP  [phone]







Reason for Visit

* Auth/Cert (Routine)





     



  Status   Reason   Specialty   Diagnoses /   Referred By   Referred To



     Procedures   Contact   Contact

 

     











Encounter Details







    



  Date   Type   Department   Care Team   Description

 

    



  2017   Hospital   Cardiac Catheterization   Cande Delcid MD   
Coronary artery disease



  -   Encounter   Laboratory   [address]   involving native coronary



  2017    [address]   [address]   artery of native [place] [address]   [place] [address]   with angina pectoris



    [phone]   (HCC)



     [phone] (Fax) 







Social History







    



  Tobacco Use   Types   Packs/Day   Years Used   Date

 

    



  Current Every Day Smoker    









   



  Alcohol Use   Drinks/Week   oz/Week   Comments

 

   



  No   









 



  Sex Assigned at Birth   Date Recorded

 

 



  Not on file 



as of this encounter



Last Filed Vital Signs







  



  Vital Sign   Reading   Time Taken

 

  



  Blood Pressure   99/58   2017  7:35 AM CST

 

  



  Pulse   76   2017  7:35 AM CST

 

  



  Temperature   37.1   C (98.7   F)   2017  6:15 AM CST

 

  



  Respiratory Rate   -   -

 

  



  Oxygen Saturation   97%   2017  7:35 AM CST

 

  



  Inhaled Oxygen   -   -



  Concentration  

 

  



  Weight   87.5 kg (192 lb 14.4 oz)   2017  8:13 AM CST

 

  



  Height   162.6 cm (5' 4.02")   2017  8:13 AM CST

 

  



  Body Mass Index   33.1   2017  8:13 AM CST



in this encounter



Discharge Summaries

* Kimberlyn Tiwari PA-C - 2017  8:00 AM CST



Formatting of this note may be different from the original.



Physician Discharge Summary



Name: Niko Huber

Medical Record Number: [id number]        Account Number:  [id number]

YOB: 1970                         Age:  47 years 

Admit date:  2017                     Discharge date:  2017



Attending Physician:  Dr. Delcid               Service: Cardiology-Interventional



Physician Summary completed by: Kimberlyn Tiwari PA-C



Reason for hospitalization: Coronary artery disease



Significant PMH: 

Past Medical History: 

Diagnosis Date 

 Coronary artery disease involving native coronary artery of native heart 
with angina pectoris ([facility]) 2017 

 Coronary artery disease involving native coronary artery of native heart 
with angina pectoris ([facility]) 2017 - NSTEMI at [facility] in [place], [place]. Successful PCI 
with a Resolute Integrity 2.5 x 26 mm stent to the OM with Dr. Miguel. 
Unsuccessful PCI to the RCA . Pt referred to Dr. Delcid for possible  
procedure.   16 - Nuclear stress test ([facility]): No 
ischemia or arrhythmia on EKG. Diaphragmatic attenuation with reversible 
ischemia involving the mid to apic 

 Ischemic cardiomyopathy  

 Mild mitral regurgitation 2017 

 Mild tricuspid regurgitation 2017 

 NSTEMI (non-ST elevated myocardial infarction) ([facility]) 2017 

 Protein S deficiency ([facility]) 2017 

 Tobacco abuse 2017 

  

Allergies: Pcn [penicillins]



Physical Exam notable for:  

b/l Groin no hematoma, no bruit, soft, non-tender.

CV: RRR

Lungs: CTA B

Ext: no edema, + 2 b/l pedal pulses. 

ABD: Soft, non tender, + BS X 4 quads. 



Lab/Radiology studies notable for: 

Hematology:  

Lab Results 

Component Value Date 

 HGB 12.3 2017 

 HCT 36.3 2017 

 PLTCT 279 2017 

 WBC 9.6 2017 

 MCV 85.8 2017 

 MCHC 34.0 2017 

 MPV 7.5 2017 

 RDW 13.9 2017 

, General Chemistry:  

Lab Results 

Component Value Date 

  2017 

 K 3.9 2017 

  2017 

 GAP 8 2017 

 BUN 12 2017 

 CR 0.72 2017 

  2017 

 CA 9.2 2017 



Brief Hospital Course:  Mr. Huber is a 47 y.o male with a history of NSTMI in 
November. He underwent PCI of OM at [facility] in [place], [place]. He was also 
found to have  of RCA. PCI was attempted which was unsuccessful. He was 
referred to Dr. Delcid for Stage PCI. Echo done at OSH on 17 revealed LVEF 
30-35%. He was discharged home with LifeVest. He also has a history of mild MR 
and TR and protein S deficiency treated with warfarin. 



Patient was taken to the cardiac catheterization lab on 17 where 
successful revascularization of the proximal RCA was done, extending into the 
right PLV with 3 drug-eluting stents in overlapping fashion with excellent 
angiographic results. Patient tolerated the procedure well. He had vasovagal 
response to sheath pull with ~ 6 second pause. He required atropine and 
recovered promptly. No recurrent pause since then or overnight. Dr. Delcid 
recommends to continue ASA 81 mg daily for one week. He was instructed on the 
importance of Plavix 75 mg daily at least 6 months to 1 year w/o interruption 
to prevent stent thrombosis and possible myocardial infarction. PTA warfarin 
was resumed 17. No bridging is recommended per staff to minimize bleeding 
complications. PT/INR on Wednesday. PT/INR managed by Primary cardiologist. PT/
INR goal 2.0-3.0. Lipid profile as above.  Patient is currently tolerating 
Atorvastatin 40 mg po daily. Weight loss, Cardiac Healthy diet, and exercise 
when patient can tolerate.  Patient to continue current medical therapy with 
aggressive risk factors modification. Patient to weigh daily and report any wt. 
Gain of 2-3 # in 1-3 days.  BP usually marginal at home. He is asymptomatics. 
Maximize treatment for LVD with GDMT as pt. Can tolerate, renal function allows 
and BP permits. F/u w/  Primary cardiologist as already scheduled or sooner 
if needed. He will continue to wear LifeVest. Currently his primary 
cardiologist is planning to repeat Echo in January. EKG NSR 72 bpm, PVC's. Non 
specific ST-T changes unchanged from prior. 



Condition at Discharge: Stable



Discharge Diagnoses:  



Hospital Problems  

 

 Active Problems 

 * (Principal)Coronary artery disease involving native coronary artery of 
native heart with angina pectoris (HCC) 

 NSTEMI (non-ST elevated myocardial infarction) (HCC) 

 Tobacco abuse 

 Ischemic cardiomyopathy 

 Protein S deficiency (HCC) 

 CAD (coronary artery disease) 

 



Surgical Procedures: 



Significant Diagnostic Studies and Procedures: 

1. Selective right and left coronary angiograms with dual arterial injections.

2. Bilateral groin accesses, both 7-French common femoral arterial.

3. Dual coronary injections.

4.  PCI of the proximal right RCA extending into the right PLV with 3 drug-
eluting stents in overlapping fashion with antegrade wire escalation technique.

5. Right common femoral angiogram, limited.



Consults:  None



Patient Disposition: Home   



Patient instructions/medications: 



Procedure Specific Activity 

*You may drive after 2 days.

*You may shower after discharge.

*NO tub baths, hot tubs, or swimming for 5 days.

*NO lifting greater than 15 pounds for 1 week.

*NO sexual or strenuous activity for 1 week. 



Report These Signs and Symptoms 

Please contact your doctor if you have any of the following symptoms: Chest pain
, shortness of breath, lightheadedness, dizziness, near fainting, palpitations, 
abd pain, back pain, or bleeding.

*Continue medicines as direct on your discharge medication list. Get an up to 
date list with every visit and take as instructed. Do NOT stop taking any 
medications without speaking with your doctor or nurse who knows you.



*Remember to weigh yourself first thing in the morning after using the restroom 
and write it down. Take this record to your doctor appointment.



*Chart symptoms such as fatigue, trouble breathing, or swelling. Call your 
doctor right away if these symptoms get worse.



*Remember, do not eat more than 2000 milligrams of sodium a day. Watch out for 
packaged, processed, canned and restaurant foods.



Call your doctor (your cardiologist, if you have one) if:

-you gain more than 2 pounds in 24 hours.

-you gain more than 5 pounds in 1 week.

-any of your symptoms get worse

Do NOT wait to let your doctor know about these changes. 



Questions About Your Stay 

For questions or concerns regarding your hospital stay:



- DURING BUSINESS HOURS (8:00 AM - 4:30 PM):  

Call [phone] and asked to be transferred to your discharge attending 
physician.



- AFTER BUSINESS HOURS (4:30 PM - 8:00 AM, on weekends, or holidays):

Call [phone] and ask the  to page the on-call doctor for the 
discharge attending physician. 

Discharging attending physician: CANDE DELCID [[id number]]  



Cardiac Diet 

Limiting unhealthy fats and cholesterol is the most important step you can take 
in reducing your risk for cardiovascular disease.  Unhealthy fats include 
saturated and trans fats.  Monitor your sodium and cholesterol intake.  
Restrict your sodium to 2g (grams) or 2000mg (milligrams) daily, and your 
cholesterol to 200mg daily.



If you have questions regarding your diet at home, you may contact a dietitian 
at [phone].

 



Incision Care 

*Call if there is an increase in pain, swelling, or redness.

*DO NOT soak incision in water.

*NO tub baths, hot tubs, or swimming.

*You may shower after discharge. 



Return Appointment 

F/u with your primary cardiologist as already scheduled with echo in January. 

[facility] Provider NORTH MIGUEL [[id number]]  



Heart Failure Information 

You are at risk for readmission to the hospital because of fluid overload. 
There are steps you can take to lower your risk:

*Continue your current heart medications. Changes made have been made during 
your hospital stay.

*Remember to weigh yourself every morning, first thing after you urinate, and 
write down your weigh. Take this record to your doctor's appointments.

*Chart your symptoms - fatigue, shortness of breath, swelling, etc. Immediately 
report any worsening.

*Remember to consume no more than 2,000mg (milligrams) of sodium daily. Watch 
out for packaged, processed, canned, and restaurant foods especially.

*If any of your symptoms worsen, or if you gain more than 2 pounds in 24 hours, 
or 5 pounds in a week, call your cardiologist immediately. EARLY REPORTING OF 
THESE CHANGES IS VERY IMPORTANT. 



[facility] Information 

[facility] is a gathering place for individuals, families, and friends 
living with serious or chronic physical illness.  They offer education and 
support programs that help you live your life to the fullest.  Unless otherwise 
noted, programs are offered at NO CHARGE.  However, REGISTRATION IS REQUIRED 48 
hours in advance.



To arrange for a tour, register for a class, or ask a questions please call [phone]
[phone].  You can also visit [email or web address] for more information. 



[facility] Education about Stroke 

It is important for you to recognize the signs of stroke and call 911 
immediately.



F.A.S.T. is an easy way to remember the sudden signs of a stroke.



F - face drooping

A - arm weakness

S - speech difficulty

T - TIME TO CALL 911



If you or anyone you know shows any of these signs, even if the signs go away, 
call  IMMEDIATELY. Check the time so you will know when the first sign 
started. 



 

Current Discharge Medication List 

 

 CONTINUE these medications which have been CHANGED or REFILLED 

 Details 

aspirin EC 81 mg tablet Take 1 tablet by mouth daily for 7 days. Take with food.

Qty: 90 tablet, Refills: 3 

 PRESCRIPTION TYPE:  No Print

Associated Diagnoses: Coronary artery disease involving native coronary artery 
of native heart with angina pectoris (HCC); Ischemic cardiomyopathy; NSTEMI (non
-ST elevated myocardial infarction) (HCC); Tobacco abuse 

 

 

 CONTINUE these medications which have NOT CHANGED 

 Details 

atorvastatin (LIPITOR) 40 mg tablet Take 40 mg by mouth daily. 

 PRESCRIPTION TYPE:  Historical Med 

 

carisoprodol(+) (SOMA) 350 mg tablet Take 350 mg by mouth at bedtime daily. 

 PRESCRIPTION TYPE:  Historical Med 

 

clopiDOGrel (PLAVIX) 75 mg tablet Take 75 mg by mouth daily. 

 PRESCRIPTION TYPE:  Historical Med 

 

gabapentin (NEURONTIN) 600 mg tablet Take 600 mg by mouth four times daily. 

 PRESCRIPTION TYPE:  Historical Med 

 

gemfibrozil (LOPID) 600 mg tablet Take 600 mg by mouth twice daily. 

 PRESCRIPTION TYPE:  Historical Med 

 

HYDROcodone/acetaminophen (NORCO) 7.5/325 mg tablet Take 1 tablet by mouth 
every 6 hours as needed for Pain 

 PRESCRIPTION TYPE:  Historical Med 

 

metoprolol XL (TOPROL XL) 25 mg extended release tablet Take 25 mg by mouth 
daily. 

 PRESCRIPTION TYPE:  Historical Med 

 

nitroglycerin (NITROSTAT) 0.4 mg tablet Place 0.4 mg under tongue every 5 
minutes as needed for Chest Pain. Max of 3 tablets, call 911. 

 PRESCRIPTION TYPE:  Historical Med 

 

Omega-3 Acid Ethyl Esters 1 gram cap Take 1 g by mouth three times daily. 

 PRESCRIPTION TYPE:  Historical Med 

 

omeprazole DR(+) (PRILOSEC) 20 mg capsule Take 20 mg by mouth daily before 
breakfast. 

 PRESCRIPTION TYPE:  Historical Med 

 

sacubitril/valsartan (ENTRESTO) 24/26 mg tablet Take 1 tablet by mouth twice 
daily. 

 PRESCRIPTION TYPE:  Historical Med 

 

tiZANidine (ZANAFLEX) 4 mg tablet Take 8 mg by mouth every 8 hours as needed. 
Indications: MUSCLE SPASM 

 PRESCRIPTION TYPE:  Historical Med 

 

warfarin (COUMADIN) 5 mg tablet Take 5 mg by mouth as directed. Take 7.5 mg by 
mouth on Mon and Wed. Take 5 mg by mouth on , Tues, Thurs, Fri, and Sat. 
Take at bedtime.  Indications: THROMBOTIC DISORDER 

 PRESCRIPTION TYPE:  Historical Med 

 

 

 



Pending items needing follow up: as above 



Signed:

Kimberlyn Tiwari PA-C

2017  



cc:

Primary Care Physician:  Raiza Champagne   Verified

Referring physicians:   Dr. North Miguel/Dr. Neftali Bacon ([place], [place])

Additional provider(s): 

 

in this encounter



Discharge Instructions

* Patient Instructions - Mary Fong RN - 2017  8:32 AM CST





Manual Sheath Removal From A Large Vein Or Artery-DARVIN

When you go home:

 You may shower 24 hours after your procedure.

 Do not sit in water for one week. (No bath tub, swimming pool/hot tub, etc.)

 Keep the area clean and dry for one week (except for daily showers).

 Be sure your hands are clean when touching near the site.

 If a band-aid or dressing is still in place remove it before showering.

 Wash and dry thoroughly but gently.

 If needed, for your comfort, you may place a clean band-aid over the 
puncture site after you are clean and dry. It is best to leave it open to air 
as soon as it is comfortable to do so.

 Do not use ointments, creams, or powders on puncture site.

 Inspect site daily.

Activity: (Unless otherwise instructed or unable to perform)

 Avoid any exertion for one week. Exertion is lifting over 15 lbs or pushing, 
pulling or straining.

 Avoid excessive bending, stooping, or stair climbing for 2 days. It is ok to 
go up stairs or bend over but take it slowly and keep it to a minimum.

 You may be up and about while relaxing at home as you recover.

 You may resume sexual activity in one week.

 You may begin driving 2 days after your procedure if you are otherwise able 
to drive.

---It is common to have mild soreness and/or a small, soft bruise around the 
site that can take up to two weeks to go away. A small (dime to quarter sized) 
lump is also normal. A small amount of blood (not more than a teaspoon) from 
the site is also common.

WHEN TO CALL THE DOCTOR: Complications are rare but can happen.

 If you have significant bleeding (more than a teaspoon) or a lump underneath 
the skin (bigger than a golf ball) at the site lie down, apply firm pressure at 
the site and call 911. Bleeding from a large vessel needs professional help.

 If you have signs of infection at the site such as: redness, warm to touch, 
drainage, increasing soreness, a fever (100 degrees or more) and/or chills.

 Soreness that continues more than a week or unusual pain at the puncture 
site.

 Numbness, tingling, weakness in the affected leg.

 If your leg becomes cold and pale.

 If you have changes of vision, slurred speech or one-sided weakness.

Who do I contact if I need to speak with someone?

During Business Hours:

 [facility] Office at the [facility]: [phone]
[phone] (Monday-Friday)

 [place]: [phone] (Monday-Friday)

 [place]: [phone] (Monday-Friday)

 [place]: [phone] (Tuesday and Thursday)

 [place]: [phone] (Monday-Friday)

 [place]: [phone] (Monday-Friday)

 [place]: [phone] (Monday-Thursday)

 [facility]: [phone] (Tuesday, Wednesday and Friday)

 [place]: [phone] (Tuesday, Wednesday and Friday)

 [facility]): [phone] (Monday, Wednesday and Friday)

Nights and Weekends

 [facility] Office at the [facility]: [phone]
[phone]

This education is meant to serve as a resource to you and your family. It is 
not meant to be all inclusive. The members of the [facility] at [facility], [phone], will be glad to 
answer any questions you may have about this booklet or your procedure.





in this encounter



Medications at Time of Discharge







     



  Medication   Sig.   Disp.   Refills   Start Date   End Date

 

     



  atorvastatin (LIPITOR) 40   Take 40 mg by mouth    



  mg tablet   daily.    

 

     



  carisoprodol(+) (SOMA)   Take 350 mg by mouth at    



  350 mg tablet   bedtime daily.    

 

     



  clopiDOGrel (PLAVIX) 75   Take 75 mg by mouth    



  mg tablet   daily.    

 

     



  gabapentin (NEURONTIN)   Take 600 mg by mouth four    



  600 mg tablet   times daily.    

 

     



  gemfibrozil (LOPID) 600   Take 600 mg by mouth    



  mg tablet   twice daily.    

 

     



  HYDROcodone/acetaminophen   Take 1 tablet by mouth    



  (NORCO) 7.5/325 mg tablet   every 6 hours as needed    



   for Pain    

 

     



  metoprolol XL (TOPROL XL)   Take 25 mg by mouth    



  25 mg extended release   daily.    



  tablet     

 

     



  nitroglycerin (NITROSTAT)   Place 0.4 mg under tongue    



  0.4 mg tablet   every 5 minutes as needed    



   for Chest Pain. Max of 3    



   tablets, call 911.    

 

     



  Omega-3 Acid Ethyl Esters   Take 1 g by mouth three    



  1 gram cap   times daily.    

 

     



  omeprazole DR(+)   Take 20 mg by mouth daily    



  (PRILOSEC) 20 mg capsule   before breakfast.    

 

     



  sacubitril/valsartan   Take 1 tablet by mouth    



  (ENTRESTO) 24/26 mg   twice daily.    



  tablet     

 

     



  tiZANidine (ZANAFLEX) 4   Take 8 mg by mouth every    



  mg tabletIndications:   8 hours as needed.    



  MUSCLE SPASM   Indications: MUSCLE SPASM    

 

     



  warfarin (COUMADIN) 5 mg   Take 5 mg by mouth as    



  tabletIndications:   directed. Take 7.5 mg by    



  THROMBOTIC DISORDER   mouth on Mon and Wed.    



   Take 5 mg by mouth on    



   , Tues, Thurs, Fri,    



   and Sat. Take at bedtime.    



   Indications: THROMBOTIC    



   DISORDER    

 

     



  aspirin EC 81 mg   Take 1 tablet by mouth   90 tablet   3   2017



  tabletIndications:   daily for 7 days. Take    



  Coronary artery disease   with food.    



  involving native coronary     



  artery of native heart     



  with angina pectoris     



  (HCC), Ischemic     



  cardiomyopathy, NSTEMI     



  (non-ST elevated     



  myocardial infarction)     



  ([facility]), Tobacco abuse     



as of this encounter



Progress Notes

* Higinio Valenzuela RN - 2017  8:52 AM CST



Cardiac Rehab Call Back Note:  Spoke with patient.  He will start [facility] in 
[place] this Monday.



Are you tolerating activity?Yes

Is pain controlled?Yes

Is appetite normal?Yes

Are you having symptoms of heart discomfort?No

Are you having signs of infection at your incision sites or groin site?No

Do you want outpt cardiac rehab?Yes





* Lachelle Russ RN - 2017  8:52 AM CST



I have reviewed the notes, assessment, and/or procedures performed by Mary Fong RN and concur with her/his documentation unless otherwise noted.

* Mary Fong RN - 2017  8:51 AM CST



Patient discharged to home with all belongings.  Discharge instructions, med 
reconciliation and home wound care instructions given and explained to patient 
and family both verbally and written.  Accompanied by family.  No complaints of 
pain or discomfort.   Bilateral groins  remains clean, dry, and intact with no 
evidence of a hematoma after ambulation.  Patient escorted to [facility] via 
Transport.  Patient to follow up with [facility] ([facility]) or on-call 
physician with any additional questions or concerns.  All contact numbers 
provided.  Patient and family acceptant of DC instuctions and report 
understanding to all information.

* Higinio Valenzuela RN - 2017  6:46 AM CST



Formatting of this note may be different from the original.

CARDIOPULMONARY REHABILITATION

INPATIENT ASSESSMENT



Cardiac Rehabilitation Staff: Fermin Valenzuela RN Discharge Date: 



Demographics

Pre-admit Dx:   Date of Admission: 2017   

Room: [address] :  1970 

Insurance: Primary: BC/BS of   Secondary: . 

Address: [address] [facility] [address]   Patient Phone:  [phone] (home)  

Marital Status:   Occupation: Construction/Labor 

ED Contact: Steffanie Cecile  ED Phone #: [phone] 

CTS: GONZÁLEZ  Cardiologist: Bhavin 



Cardiac Procedures and Events

 

  

PCI: 17

 

 

 

 

 



Risk Factors

 

BP: 97/54

Height: 162.6 cm (64.02")

Weight: 87.5 kg (192 lb 14.4 oz)

BMI (Calculated): 33.09 

 

Medical History

 has a past medical history of Coronary artery disease involving native 
coronary artery of native heart with angina pectoris (HCC) (2017); 
Coronary artery disease involving native coronary artery of native heart with 
angina pectoris (HCC) (2017); Ischemic cardiomyopathy; Mild mitral 
regurgitation (2017); Mild tricuspid regurgitation (2017); NSTEMI (
non-ST elevated myocardial infarction) ([facility]) (2017); Protein S deficiency 
([facility]) (2017); and Tobacco abuse (2017).



Labs

No results found for: CHOL, TRIG, HDL, LDL, HGBA1C, A1C, TNI



Heart Resource Manual Given: 17 



Teaching Completed: 17

 

Outpatient Cardiopulmonary Rehabilitation



OPCR: Yes



Referral Faxed to:   [place], [place]   Date Faxed: 17 (Currently enrolled.  
Update faxed to [facility])



Location: [place], [place]



If [facility], Sent to Staff:   



 



Higinio Valenzuela RN

2017





* Higinio Valenzuela, RN - 2017  6:44 AM CST



Introduced self to patient and gave copy of the Heart Resource Manual 
pertaining to coronary interventions.  He is currently enrolled in the program 
at [facility] in [place], [place] and will continue when cleared by 
cardiologist.  I have faxed an update to [facility].

* Mary Fong, RN - 2017  5:57 PM CST



Formatting of this note may be different from the original.



 17 1720 17 1723 17 1724 

Vital Signs 

Pulse 67 (!) 30 (!) 0 

PVC / Minute 0 /min. 0 /min. 0 /min. 

Respirations 10 PER MINUTE 13 PER MINUTE 15 PER MINUTE 

SpO2 Pulse 67 (!) 42 (!) 43 

SpO2 96 % 96 % 98 % 

BP 98/56 (!) 69/32 --  

Mean NBP (Calculated) 67 MM HG 38 MM HG --  

 

 17 1725 17 1727 

Vital Signs 

Pulse 52 59 

PVC / Minute 1 /min. 0 /min. 

Respirations 14 PER MINUTE 13 PER MINUTE 

SpO2 Pulse (!) 52 60 

SpO2 98 % 96 % 

BP (!) 82/36 (!) 83/50 

Mean NBP (Calculated) 44 MM HG 58 MM HG 



During sheath pull patient vasovagaled. Dr. Levine assessed patient. Orders 
given for 250 bolus of NS. 0.5 mg atropine given by Anatoly HUFFMAN. Patient 
stabilized and vital signs returned to normal. Will continue to monitor and 
keep NS running at 75mL/hr per Dr. Levine. 

* Abner Trevizo MD - 2017  5:53 PM CST



Mr. Huber was seen and examined in [facility] after the  PCI with a full metal 
jacket stenting to the RCA extending into the right PLV.  During sheath pull, 
he was noted to have significant sinus pauses lasting 6 seconds.  He recovered 
promptly.  Hemodynamic stability is noted.  There was a transient episode of 
hypotension with a mean arterial pressure of 60 mmHg.  We are presently 
infusing normal saline at 75 cc/h for the next 6 hours to a total of 
approximately 500 cc.  His blood pressure has already improved to a mean 
arterial pressure of 70 mmHg.  He remains asymptomatic at this juncture.  
Bilateral sheaths 7 French have been removed with excellent hemostasis.  This 
appears to be a significant vagal response associated with sheath pull.  I do 
not suspect any bleeding at this point in time.  Please call me if his clinical 
status changes.



Abner Trevizo M.D.

Fellow in Interventional Cardiology

Beeper # [id number]



* Lachelle Russ RN - 2017  4:45 PM CST



I have reviewed the notes, assessment, and/or procedures performed by Mary Fong RN and concur with her/his documentation unless otherwise noted.

* Kimberlyn Tiwari PA-C - 2017  3:04 PM CST



S/p 3 TESFAYE to RCA. Recent NSTEMI in Nov s/p TESFAYE to OM. 

ASA 81 mg daily for 1 week. Plavix 75 mg daily for 1 year w/o interruption to 
prevent stent thrombosis and possible myocardial infarction. 

Resume warfarin tonight. No bridging is recommended per Dr. Delcid. 

He recently filled all his medications and does not wish refills at this time. 

He will continue to wear his LifeVest till his follow up appointment. Iam Miguel and Jordi are planning repeat Echo in January. 

DC home in am. 

F/u with Primary cardiologist as already scheduled or sooner if needed. 

Maximize treatment for LVD with GDMT as pt. Can tolerate, renal function allows 
and BP permits.  



Kimberlyn Tiwari PA-C (pgr [id number])





* Jo Rgoers, RT - 2017 11:12 AM CST



Formatting of this note may be different from the original.

RESPIRATORY THERAPY

ADULT PROTOCOL EVALUATION



RESPIRATORY PROTOCOL PLAN



Medications

 



Note: If indicated by protocol, medication orders will be placed by therapist.



Procedures

IPPB: Place a nursing order for "IS Q1h While Awake" for any of Lung Expansion 
indicators

Oxygen/Humidity: O2 to keep SpO2 > 95%

Monitoring: Pulse oximetry BID & PRN



 

_____________________________________________________________



PATIENT EVALUATION RESULTS



Chart Review

* Pulmonary Hx: Smoker in home OR smoking cessation > 8 weeks (former smoker)



* Surgical Hx: General surgery (cough & sigh not affected)



* Chest X-Ray: Clear OR not available



* PFT/Oxygenation: FEV1, PEFR < 70% OR Pa02 < 70 RA OR Sp02 <92% RA OR Fi02 > 
0.21 to keep Sp02 > 92% OR < 24 hours post-op (02 & oxim) OR chronic C02 
retention (C02) (will be < 24 hours post op)



Patient Assessment

* Respiratory Pattern: Regular pattern and rate OR good chest excursion with 
deep breathing



* Breath Sounds: Clear apically, but diminished in bases (LE) OR CHF related 
crackles (02) (oximetry)



* Cough / Sputum: Strong, effective cough OR nonproductive



* Mental Status: Alert, oriented, cooperative



* Activity Level: Ambulatory with assistance



Priority Index

Total Points: 6 Points

* Priority Index: 1



PRIORITY INDEX GUIDELINES*

Priority Points 

1 0-9 points 

2 9-18 points 

3 > 18 points 

+ Pulm Dx or Home Rx 

*Higher points indicate higher acuity.



Therapist: Jo Rogers, RT

Date: 2017



Key

AC=Airway clearance

AM=Aerosolized medication

BA=[place] aerosol

DB&C=Deep breathe & cough

FEV1=Forced expiratory volume in first second)

IC=Inspiratory capacity

LE=Lung expansion

MDI=Metered dose inhaler

Neb=Nebulizer

O2=Oxygen

Oxim=Oximetry

PEFR=Peak expiratory flow rate

RRT=Rapid Response Team





* Mary Fong, RN - 2017  8:01 AM CST



Patient arrived on unit via ambulation accompanied by RN. Patient transferred 
to the bed without assistance.  Assessment completed, refer to flowsheet for 
details. Orders released, reviewed, and implemented as appropriate. Oriented to 
surroundings, call light within reach. Plan of care reviewed.  Will continue to 
monitor and assess.

in this encounter



H&P Notes

* Kimbrelyn Tiwari PA-C - 2017  9:37 AM CST



Formatting of this note may be different from the original.

The original H and P below was performed by Dr. Delcid.



Kimberlyn Tiwari PA-C (pgr [phone])

Cande Delcid MD 

Cardiology 

 

Hide copied text

Hover for attribution information

Date of Service: 2017



Niko Huber is a 47 y.o. male.   



HPI

 

Mr. Huber is a 47-year-old male with a history of coronary artery disease who 
had a non-ST elevation myocardial infarction back in November at that time he 
successfully underwent stenting with a drug-eluting stent to the obtuse 
marginal.  They utilized a 2.5 x 26 mm resolute integrity stent.  In addition, 
he has a chronic total occlusion of the right coronary artery which is occluded 
proximally.  He has good left-to-right collateralization to the posterior 
descending and posterior lateral branches.  Of concern, his ejection fraction 
is severely impaired estimated at around 30% and he currently has a LifeVest.  
He has a previous myocardial perfusion study suggesting viability in the 
inferior wall and given his age and LV impairment, it was discussed and elected 
to go ahead and proceed with percutaneous revascularization to give him every 
opportunity to recover.  Currently, he has been noting intermittent chest 
discomfort which occurs with activity and at rest.  He is on aspirin, Plavix 
and warfarin and is tolerated this without any active bleeding issues.  He is 
taking warfarin due to a history of a pulmonary embolus per his report with the 
addition of aspirin and Plavix given his recent coronary event.



 



  

Vitals: 

 17 0718 

BP: 116/68 

Pulse: 79 

Weight: 87.1 kg (192 lb) 

Height: 1.626 m (5' 4") 



Body mass index is 32.96 kg/(m^2). 



Past Medical History

    

Patient Active Problem List 

 Diagnosis Date Noted 

 Coronary artery disease involving native coronary artery of native heart 
with angina pectoris ([facility]) 2017 - NSTEMI at [facility] in [place], [place]. Successful 
PCI with a Resolute Integrity 2.5 x 26 mm stent to the OM with Dr. Miguel. 
Unsuccessful PCI to the RCA . Pt referred to Dr. Delcid for possible  
procedure. 



16 - Nuclear stress test ([facility]): No ischemia or 
arrhythmia on EKG. Diaphragmatic attenuation with reversible ischemia involving 
the mid to apical inferior wall and inferolateral wall. Normal left ventricular 
size with mild hypokinesis at the lateral wall. EF 50%. 



Previous history of Promus stent placement to  - date unknown.  

 NSTEMI (non-ST elevated myocardial infarction) ([facility]) 2017 at [facility] in [place], [place].  

 Tobacco abuse 2017 

 Ischemic cardiomyopathy 2017 - Echo ([facility]): EF 30-35%. Left ventricular 
cavity size increased. Wall thickness normal. Regional wall motion 
abnormalities. Akinesis of the inferior myocardium. Akinesis of the lateral 
myocardium. 



17 - NSTEMI - EF 20%, severe left ventricular systolic function (per cath 
report). Life Vest applied for primary prevention of sudden cardiac death.  

 Mild mitral regurgitation 2017 

 Mild tricuspid regurgitation 2017 

 Protein S deficiency ([facility]) 2017 

  On warfarin.  







Review of Systems 

Constitution: Negative. 

HENT: Negative.  

Eyes: Negative.  

Cardiovascular: Positive for chest pain. 

Respiratory: Negative.  

Endocrine: Negative.  

Hematologic/Lymphatic: Negative.  

Skin: Negative.  

Musculoskeletal: Negative.  

Gastrointestinal: Negative.  

Genitourinary: Negative.  

Neurological: Negative.  

Psychiatric/Behavioral: Negative.  

Allergic/Immunologic: Negative.  



Social History 



Social History 

 Marital status:  

  Spouse name: N/A 

 Number of children: N/A 

 Years of education: N/A 



Social History Main Topics 

 Smoking status: Current Every Day Smoker 

 Smokeless tobacco: None 

 Alcohol use No 

 Drug use: No 

 Sexual activity: Not Asked 



Other Topics Concern 

 None 



Social History Narrative 



History reviewed. No pertinent surgical history.



Physical Exam

Physical Exam 

General Appearance: alert and oriented, no acute distress

Skin: warm, moist, no ulcers

Head: normocephalic, symmetric

Eyes: EOMI, PERRL, sclera are clear and without icterus

ENT: unremarkable, nares patent

Neck Veins: neck veins are flat, neck veins are not distended

Carotid Arteries: normal carotid upstroke bilaterally, no bruits

Chest Inspection: chest is normal in appearance

Auscultation/Percussion: lungs clear to auscultation, no rales, rhonchi, 
wheezes or friction rub appreciated

Cardiac Rhythm: regular rhythm and normal rate

Cardiac Auscultation: Normal S1 & S2, no S3 or S4, no rub - normal pmi

Murmurs: no cardiac murmurs 

Extremities: no lower extremity edema bilaterally; 2+ symmetric distal pulses

Muskuloskeletal: no obvious deformity

Abdominal Exam: soft, non-tender, no masses, bowel sounds normal

Neurologic Exam: neurological assessment grossly intact

Mood and Affect: Appropriate





Cardiovascular Studies

ECG - NSR, PVC - no Q waves



Problems Addressed Today

   

Encounter Diagnoses 

Name Primary? 

 Coronary artery disease involving native coronary artery of native heart 
with angina pectoris (HCC) Yes 

 Ischemic cardiomyopathy  

 Mild mitral regurgitation  





Assessment and Plan

 

1. Chronic total occlusion of the proximal right coronary artery with prominent 
left to right collateralization to the posterior descending and posterior 
lateral branches -he is referred for complex percutaneous intervention.  I 
discussed the risks and benefits and will plan to go ahead and proceed.  We 
will obtain bilateral groin access and will likely require a retrograde 
approach given the anatomy noted on his angiograms.  We will plan to obtain an 
INR this morning to ensure that his INR is less than 1.9.  We will plan to keep 
you informed this results of his upcoming procedure. 



 Thank you for allowing us to participate in his care.

 





Current Medications (including today's revisions)

 aspirin EC 81 mg tablet Take 81 mg by mouth daily. Take with food. 

 atorvastatin (LIPITOR) 40 mg tablet Take 40 mg by mouth daily. 

 carisoprodol(+) (SOMA) 350 mg tablet Take 350 mg by mouth at bedtime daily. 

 clopiDOGrel (PLAVIX) 75 mg tablet Take 75 mg by mouth daily. 

 gabapentin (NEURONTIN) 600 mg tablet Take 600 mg by mouth four times daily. 

 gemfibrozil (LOPID) 600 mg tablet Take 600 mg by mouth twice daily. 

 metoprolol XL (TOPROL XL) 25 mg extended release tablet Take 25 mg by mouth 
daily. 

 nitroglycerin (NITROSTAT) 0.4 mg tablet Place 0.4 mg under tongue every 5 
minutes as needed for Chest Pain. Max of 3 tablets, call 911. 

 Omega-3 Acid Ethyl Esters 1 gram cap Take 1 g by mouth three times daily. 

 omeprazole DR(+) (PRILOSEC) 20 mg capsule Take 20 mg by mouth daily before 
breakfast. 

 tiZANidine (ZANAFLEX) 4 mg tablet Take 8 mg by mouth every 8 hours as 
needed. Indications: MUSCLE SPASM 

 warfarin (COUMADIN) 5 mg tablet Take 5 mg by mouth as directed. Take 7.5 mg 
by mouth on Mon and Wed. Take 5 mg by mouth on , Tues, Thurs, Fri, and 
Sat. Take at bedtime.  Indications: THROMBOTIC DISORDER 



 

 





in this encounter



Procedure Notes

* Abner Trevizo MD - 2017  3:12 PM CST



Associated Order(s): CARDIAC CATH REPORT



[facility]



CARDIAC CATHETERIZATION REPORT

Page 3

NIKO VETNURA :  1970

KU#: [id number]



 

[facility] MR #/Billing ID #:  [id number] / [id number]

DATE:  2017

CARDIOLOGIST:  Cande Delcid MD

DICTATING PROVIDER: Abner Trevizo MD

REFERRING PHYSICIAN:  RAIZA CHAMPAGNE



INTERVENTIONAL CARDIOLOGY FELLOW:  Abner Trevizo MD.



PROCEDURES PERFORMED:  

1. Selective right and left coronary angiograms with dual arterial injections.

2. Bilateral groin accesses, both 7-French common femoral arterial.

3. Dual coronary injections.

4. Chronic Total Occlusion () PCI of the proximal right RCA extending into 
the right PLV with 3 drug-eluting stents in overlapping fashion with antegrade 
wire escalation technique.

5. Right common femoral angiogram, limited.



INDICATION FOR THE PROCEDURE:  Niko Huber is a pleasant, 47-year-old 
gentleman with a history of recent non-ST-elevation MI, status post PCI to his 
left circumflex extending into his obtuse marginal with a Resolute Integrity 
stent from an outside institution.  There was an attempted  antegrade PCI on 
his RCA , which was unsuccessful. We would like to perform an invasive 
evaluation of his coronary anatomy with an intent to revascularize with the 
retrograde approach, given the fact that the antegrade cap was very ambiguous, 
based on outside hospital films.



CONSENT:  Risks, benefits, and alternatives of the procedure were explained to 
the patient by both Dr. Delcid and myself.  Risks of access site complications, 
bleeding, arterial dissection, death, contrast nephropathy, and radiation 
hazards were explained to the patient, who verbalized understanding of these 
conditions and consented to the procedure.  A written, informed consent has 
been placed in the chart as well.



DETAILS OF THE PROCEDURE:  The patient was brought in the cath lab in the 
fasting, nonsedated state.  After giving the patient a total of 225 mcg of 
fentanyl and 5 mg of Versed, we achieved moderate conscious sedation, which was 
monitored and maintained throughout the procedure for a total of 126 minutes.  
Respiratory, hemodynamic, and neurological parameters were monitored throughout 
the procedure by the operators and by the cath lab staff. 



The patient was prepped and draped in sterile fashion.  We exposed bilateral 
groins and prepped with 1% chlorhexidine and instilled a total of 20 mL of 1% 
lidocaine for good local anesthesia in both groins. 



We then gained access into the right common femoral artery using a 
micropuncture needle and modified Seldinger technique to insert a 7-French 10 
cm arterial sheath with good blood flow return.  Similar technique was adopted 
to gain access to the left common femoral artery with the same 7-French 10 cm 
arterial sheath.  We went in with the intent to perform a retrograde  PCI, 
and hence, we obtained dual coronary injections.



CORONARY ANGIOGRAM:  

1. The left main arises normally from the left coronary cusp and is free from 
angiographic evidence of disease.  It bifurcates into a left anterior 
descending artery, as well as a left circumflex artery.

2. The left anterior descending artery arises normally from the left main.  Its 
proximal, mid, and distal portions are free from disease, except for 30% 
stenosis in the mid segment.  The left main is also free from disease.  It 
gives rise to 1 diagonal branch, the LAD, and it is also free from disease.

3. The left circumflex artery arises normally from the left main.  Its proximal
, mid, and distal portions are free from disease.  It has a previously placed 
stent extending from the proximal circumflex, extending into the OM, is widely 
patent without any thrombosis or in-stent restenosis.

4. The right coronary artery arises normally from the right coronary cusp, and 
its proximal portion has 40% to 50% diffuse disease, followed by a long chronic 
total occlusion segment extending from the mid RCA to the PLV.  There was 
collateral flow from the LAD to the RPLV territory, and also from the 
circumflex artery as well.  We noted that on outside hospital films there was 
an abrupt cutoff of the proximal RCA with a very ambiguous cap; however, on our 
films, we noted an extra RV marginal branch which was filling in, though we 
could not localize the true nature of the proximal cap.  Hence, we decided to 
adopt a retrograde approach initially.



DETAILS OF THE PERCUTANEOUS CORONARY INTERVENTION to the RCA (Full metal Jacket
) (CHRONIC TOTAL OCCLUSION):  

1. We used an EBU 3.75, 7-French guide catheter to engage the left main, while 
we used an AL1, 7-French guide catheter to engage the RCA for dual arterial 
injections.

2. We then introduced an 0.014 x 300 cm Fielder FC wire with a 150 cm Corsair 
microcatheter and tried to get across a couple of septals.  We were able to 
track through the septals pretty well; however, the anatomy for reentry into 
the PLV/PDA was not favorable.  After multiple attempts through different 
septals, we were not able to gain access into the right PLV or PDA segments, 
even despite tracking through the septals pretty well through both the Fielder 
FC, as well as the Corsair; hence, we switched to an antegrade approach.

3. We then switched the AL1, 7-French guide for a Hockey-Stick 1, 6-French guide
, given the fact that an AL1 was a little too big to engage the RCA, given the 
size of the aortic root.  The Hockey-Stick 1 gave us moderate seating and 
support throughout the entire procedure.  We then introduced an 0.014 x 300 cm 
Fielder FC wire over a 150 cm Corsair into the right coronary artery to switch 
to an antegrade wire escalation approach.  We were able to make very little 
progress across the proximal cap, which was very ambiguous with a Fielder FC 
wire.  We then switched for an 0.009 tapering into an 0.014 x 300 cm Fielder XT 
wire and tried to make some progress across the mid RCA, and we tracked the 
Corsair across it.  Though we were able to get into the distal RCA, we were 
unsure whether we were in the true lumen or not.  We then switched out for a 
 200, 0.014 x 300 cm wire and got across the mid to distal RCA with 
moderate amount of difficulty.  We were unsure again whether we were at the 
true lumen or not.  Hence, we took a dual injection on the LAD, which 
demonstrated that our wire was indeed in the right PLV.  After this, we tracked 
the Corsair down into the right PLV and switched out the  200 wire for an 
0.014 x 300 cm Luge wire.  We then tracked the Corsair out and introduced a 2.0 
x 30 mm Emerge RX balloon and performed balloon angioplasty for a total of 6 
different inflations, starting from the right PLV, extending into the proximal 
or ostial segment of the RCA, all of which were 8 atmospheres, lasting 20-30 
seconds.  We got moderate amount of expansion with this.  We were then easily 
able to deliver a 2.25 x 38 mm Synergy drug-eluting stent  extending into the 
distal RPLV and the proximal edge of the stent into the distal portion of the 
right coronary artery.  The stent was deployed at 8 atmospheres for a total of 
26 seconds.  We then overlapped this proximally with a 2.25 x 38 mm Syndergy 
TESFAYE (10 carloz for 8 seconds).  We then deployed a 2.5 x 32 mm Synergy TESFAYE at 10 
atmospheres, lasting 25 seconds in overlapping fashion in the ostium to the 
midportion of the RCA.  We got excellent angiographic results.  After this, we 
noticed that the distal edge of the distal stent in the right PLV was oversized
, compared to the rest of the RCA.  This could have been an element of spasm.  
Hence, we gave the patient a total of 200 mcg of Cardene and 300 mcg of 
nitroglycerin.  Even after vasodilatation, we noticed that the distal edge was 
a little pinched.  Hence, we decided to dilate this using a 2.0 x 15 mm MINI 
TREK RX balloon for 10 atmospheres for 24 seconds.  Excellent angiographic 
results were achieved at the distal edge of the stent with restoration of MARIA LUISA-
3 flow to the PLV.  We then removed the stent and postdilated both the stents 
using a 2.75 x 20 mm TREK NC balloon for a total of 6 different inflations, 
lasting 16 atmospheres for at least 16 seconds.  Excellent stent expansion and 
apposition were achieved.  There was no evidence of edge dissection or distal 
embolization.  MARIA LUISA-3 flow was restored to the PDA and the PLV territories.  
Excellent flow was noted across the stent.  Good stent expansion and apposition 
were achieved.  Wire-out frames confirmed the above findings as well. 

The patient tolerated the procedure very well without any evidence of 
hemodynamic complications, and was transferred out of the cath lab in stable 
condition without any chest pain. 



Dr. Delcid, my attending, was scrubbed and performed key portions of the 
procedure and supervised the rest of it as well.



TOTAL CONTRAST USED:  340 mL.



TOTAL AIR KERMA:  3528 mGy. 



Right common femoral angiogram demonstrated a high bifurcation of the right side
, and hence, we opted for manual compression method.



LESION CHARACTERISTICS:  

1. RCA  ACC/AHA type C lesion.

2. MARIA LUISA 0 flow was noted preprocedure, and MARIA LUISA-3 flow was restored after the 
procedure.



IMPRESSION:  

1. Successful chronic total occlusion and revascularization of the proximal RCA 
with antegrade wire escalation technique, extending into the right PLV with 3 
Celestine (all Synergy - distal 2.25 x 38 mm, mid 2.25 x 38 mm and proximal 2.5 x 32 
mm) in overlapping fashion with excellent angiographic results.

2. Aspirin, Plavix, and Coumadin therapy, given the fact that the patient had a 
recent pulmonary embolism and needs Coumadin therapy.  Once the INR reaches 
therapeutic level, we recommend continuing Plavix and Coumadin x 12 months.



Cande Delcid MD



PCG/MedQ

DD:  2017 15:12:35  DT:  2017 16:09:41

Job #:  [id number]



cc: 

  - RAIZA CHAMPAGNE 



in this encounter



Plan of Treatment





Not on fileas of this encounter



Procedures







    



  Procedure Name   Priority   Date/Time   Associated Diagnosis   Comments

 

    



  TELEMETRY STRIPS-SCAN    2017    Results for this



    2:42 PM CST    procedure are in the



      results section.

 

    



  PROCEDURE RECORD-SCAN    2017    Results for this



    1:47 PM CST    procedure are in the



      results section.

 

    



  ECG-SCAN    2017    Results for this



    10:46 AM CST    procedure are in the



      results section.

 

    



  ECG-SCAN    2017    Results for this



    7:30 AM CST    procedure are in the



      results section.

 

    



  ECG-SCAN    2017    Results for this



    9:40 PM CST    procedure are in the



      results section.



in this encounter



Results

* TELEMETRY STRIPS-SCAN (2017  2:42 PM)





 Narrative

 

 



Ordered by an unspecified provider.





* PROCEDURE RECORD-SCAN (2017  1:47 PM)





 Narrative

 

 



Ordered by an unspecified provider.





* ECG-SCAN (2017 10:46 AM)





 Narrative

 

 



Ordered by an unspecified provider.





* CARDIAC CATH REPORT (2017 10:36 AM)





 



  Specimen   Performing Laboratory

 

 



   OTHER OUTSIDE LAB









 Procedure Note

 

 



Abner Trevizo MD - 2017  3:12 PM [facility] at [facility]



CARDIAC CATHETERIZATION REPORT

Page 3

NIKO VENTURA :  1970

KU#: [id number]







 

 MR #/Billing ID #:  [id number] / [id number]

DATE:  2017

CARDIOLOGIST:  Cande Delcid MD

DICTATING PROVIDER: Abner Trevizo MD

REFERRING PHYSICIAN:  RAIZA CHAMPAGNE



INTERVENTIONAL CARDIOLOGY FELLOW:  Abner Trevizo MD.



PROCEDURES PERFORMED:  

 1. Selective right and left coronary angiograms with dual arterial injections.

 2. Bilateral groin accesses, both 7-French common femoral arterial.

 3. Dual coronary injections.

 4. Chronic Total Occlusion () PCI of the proximal right RCA extending into 
the right PLV with 3 drug-eluting stents in overlapping fashion with antegrade 
wire escalation technique.

 5. Right common femoral angiogram, limited.



INDICATION FOR THE PROCEDURE:  Niko Huber is a pleasant, 47-year-old 
gentleman with a history of recent non-ST-elevation MI, status post PCI to his 
left circumflex extending into his obtuse marginal with a Resolute Integrity 
stent from an outside institution.  There was an attempted  antegrade PCI on 
his RCA , which was unsuccessful. We would like to perform an invasive 
evaluation of his coronary anatomy with an intent to revascularize with the 
retrograde approach, given the fact that the antegrade cap was very ambiguous, 
based on outside hospital films.



CONSENT:  Risks, benefits, and alternatives of the procedure were explained to 
the patient by both Dr. Delcid and myself.  Risks of access site complications, 
bleeding, arterial dissection, death, contrast nephropathy, and radiation 
hazards were explained to the patient, who verbalized understanding of these 
conditions and consented to the procedure.  A written, informed consent has 
been placed in the chart as well.



DETAILS OF THE PROCEDURE:  The patient was brought in the cath lab in the 
fasting, nonsedated state.  After giving the patient a total of 225 mcg of 
fentanyl and 5 mg of Versed, we achieved moderate conscious sedation, which was 
monitored and maintained throughout the procedure for a total of 126 minutes.  
Respiratory, hemodynamic, and neurological parameters were monitored throughout 
the procedure by the operators and by the cath lab staff. 



The patient was prepped and draped in sterile fashion.  We exposed bilateral 
groins and prepped with 1% chlorhexidine and instilled a total of 20 mL of 1% 
lidocaine for good local anesthesia in both groins. 



We then gained access into the right common femoral artery using a 
micropuncture needle and modified Seldinger technique to insert a 7-French 10 
cm arterial sheath with good blood flow return.  Similar technique was adopted 
to gain access to the left common femoral artery with the same 7-French 10 cm 
arterial sheath.  We went in with the intent to perform a retrograde  PCI, 
and hence, we obtained dual coronary injections.



CORONARY ANGIOGRAM:  

 1. The left main arises normally from the left coronary cusp and is free from 
angiographic evidence of disease.  It bifurcates into a left anterior 
descending artery, as well as a left circumflex artery.

 2. The left anterior descending artery arises normally from the left main.  
Its proximal, mid, and distal portions are free from disease, except for 30% 
stenosis in the mid segment.  The left main is also free from disease.  It 
gives rise to 1 diagonal branch, the LAD, and it is also free from disease.

 3. The left circumflex artery arises normally from the left main.  Its proximal
, mid, and distal portions are free from disease.  It has a previously placed 
stent extending from the proximal circumflex, extending into the OM, is widely 
patent without any thrombosis or in-stent restenosis.

 4. The right coronary artery arises normally from the right coronary cusp, and 
its proximal portion has 40% to 50% diffuse disease, followed by a long chronic 
total occlusion segment extending from the mid RCA to the PLV.  There was 
collateral flow from the LAD to the RPLV territory, and also from the 
circumflex artery as well.  We noted that on outside hospital films there was 
an abrupt cutoff of the proximal RCA with a very ambiguous cap; however, on our 
films, we noted an extra RV marginal branch which was filling in, though we 
could not localize the true nature of the proximal cap.  Hence, we decided to 
adopt a retrograde approach initially.



DETAILS OF THE PERCUTANEOUS CORONARY INTERVENTION to the RCA (Full metal Jacket
) (CHRONIC TOTAL OCCLUSION):  

 1. We used an EBU 3.75, 7-French guide catheter to engage the left main, while 
we used an AL1, 7-French guide catheter to engage the RCA for dual arterial 
injections.

 2. We then introduced an 0.014 x 300 cm Fielder FC wire with a 150 cm Corsair 
microcatheter and tried to get across a couple of septals.  We were able to 
track through the septals pretty well; however, the anatomy for reentry into 
the PLV/PDA was not favorable.  After multiple attempts through different 
septals, we were not able to gain access into the right PLV or PDA segments, 
even despite tracking through the septals pretty well through both the Fielder 
FC, as well as the Corsair; hence, we switched to an antegrade approach.

 3. We then switched the AL1, 7-French guide for a Hockey-Stick 1, 6-French 
guide, given the fact that an AL1 was a little too big to engage the RCA, given 
the size of the aortic root.  The Hockey-Stick 1 gave us moderate seating and 
support throughout the entire procedure.  We then introduced an 0.014 x 300 cm 
Fielder FC wire over a 150 cm Corsair into the right coronary artery to switch 
to an antegrade wire escalation approach.  We were able to make very little 
progress across the proximal cap, which was very ambiguous with a Fielder FC 
wire.  We then switched for an 0.009 tapering into an 0.014 x 300 cm Fielder XT 
wire and tried to make some progress across the mid RCA, and we tracked the 
Corsair across it.  Though we were able to get into the distal RCA, we were 
unsure whether we were in the true lumen or not.  We then switched out for a 
 200, 0.014 x 300 cm wire and got across the mid to distal RCA with 
moderate amount of difficulty.  We were unsure again whether we were at the 
true lumen or not.  Hence, we took a dual injection on the LAD, which 
demonstrated that our wire was indeed in the right PLV.  After this, we tracked 
the Corsair down into the right PLV and switched out the  200 wire for an 
0.014 x 300 cm Luge wire.  We then tracked the Corsair out and introduced a 2.0 
x 30 mm Emerge RX balloon and performed balloon angioplasty for a total of 6 
different inflations, starting from the right PLV, extending into the proximal 
or ostial segment of the RCA, all of which were 8 atmospheres, lasting 20-30 
seconds.  We got moderate amount of expansion with this.  We were then easily 
able to deliver a 2.25 x 38 mm Synergy drug-eluting stent  extending into the 
distal RPLV and the proximal edge of the stent into the distal portion of the 
right coronary artery.  The stent was deployed at 8 atmospheres for a total of 
26 seconds.  We then overlapped this proximally with a 2.25 x 38 mm Syndergy 
TESFAYE (10 carloz for 8 seconds).  We then deployed a 2.5 x 32 mm Synergy TESFAYE at 10 
atmospheres, lasting 25 seconds in overlapping fashion in the ostium to the 
midportion of the RCA.  We got excellent angiographic results.  After this, we 
noticed that the distal edge of the distal stent in the right PLV was oversized
, compared to the rest of the RCA.  This could have been an element of spasm.  
Hence, we gave the patient a total of 200 mcg of Cardene and 300 mcg of 
nitroglycerin.  Even after vasodilatation, we noticed that the distal edge was 
a little pinched.  Hence, we decided to dilate this using a 2.0 x 15 mm MINI 
TREK RX balloon for 10 atmospheres for 24 seconds.  Excellent angiographic 
results were achieved at the distal edge of the stent with restoration of MARIA LUISA-
3 flow to the PLV.  We then removed the stent and postdilated both the stents 
using a 2.75 x 20 mm TREK NC balloon for a total of 6 different inflations, 
lasting 16 atmospheres for at least 16 seconds.  Excellent stent expansion and 
apposition were achieved.  There was no evidence of edge dissection or distal 
embolization.  MARIA LUISA-3 flow was restored to the PDA and the PLV territories.  
Excellent flow was noted across the stent.  Good stent expansion and apposition 
were achieved.  Wire-out frames confirmed the above findings as well. 

The patient tolerated the procedure very well without any evidence of 
hemodynamic complications, and was transferred out of the cath lab in stable 
condition without any chest pain. 



Dr. Delcid, my attending, was scrubbed and performed key portions of the 
procedure and supervised the rest of it as well.



TOTAL CONTRAST USED:  340 mL.



TOTAL AIR KERMA:  3528 mGy. 



Right common femoral angiogram demonstrated a high bifurcation of the right side
, and hence, we opted for manual compression method.



LESION CHARACTERISTICS:  

 1. RCA  ACC/AHA type C lesion.

 2. MARIA LUISA 0 flow was noted preprocedure, and MARIA LUISA-3 flow was restored after the 
procedure.



IMPRESSION:  

 1. Successful chronic total occlusion and revascularization of the proximal 
RCA with antegrade wire escalation technique, extending into the right PLV with 
3 Celestine (all Synergy - distal 2.25 x 38 mm, mid 2.25 x 38 mm and proximal 2.5 x 
32 mm) in overlapping fashion with excellent angiographic results.

 2. Aspirin, Plavix, and Coumadin therapy, given the fact that the patient had 
a recent pulmonary embolism and needs Coumadin therapy.  Once the INR reaches 
therapeutic level, we recommend continuing Plavix and Coumadin x 12 months.



Cande Delcid MD





[facility]

DD:  2017 15:12:35  DT:  2017 16:09:41

Job #:  [id number]



cc: 

  - RAIZA CHAMPAGNE 







* ECG-SCAN (2017  7:30 AM)





 Narrative

 

 



Ordered by an unspecified provider.





* CBC (2017  3:45 AM)





  



  Component   Value   Ref Range

 

  



  White Blood Cells   9.6   4.5 - 11.0 K/UL

 

  



  RBC   4.23 (L)   4.4 - 5.5 M/UL

 

  



  Hemoglobin   12.3 (L)   13.5 - 16.5 GM/DL

 

  



  Hematocrit   36.3 (L)   40 - 50 %

 

  



  MCV   85.8   80 - 100 FL

 

  



  MCH   29.1   26 - 34 PG

 

  



  MCHC   34.0   32.0 - 36.0 G/DL

 

  



  RDW   13.9   11 - 15 %

 

  



  Platelet Count   279   150 - 400 K/UL

 

  



  MPV   7.5   7 - 11 FL









 



  Specimen   Performing Laboratory

 

 



  Blood   [facility]



   [address] [place] [address]





* BASIC METABOLIC PANEL (2017  3:45 AM)





  



  Component   Value   Ref Range

 

  



  Sodium   138   137 - 147 MMOL/L

 

  



  Potassium   3.9   3.5 - 5.1 MMOL/L

 

  



  Chloride   105   98 - 110 MMOL/L

 

  



  CO2   25   21 - 30 MMOL/L

 

  



  Anion Gap   8   3 - 12

 

  



  Glucose   107 (H)   70 - 100 MG/DL

 

  



  Blood Urea Nitrogen   12   7 - 25 MG/DL

 

  



  Creatinine   0.72   0.4 - 1.24 MG/DL

 

  



  Calcium   9.2   8.5 - 10.6 MG/DL

 

  



  eGFR Non African American   >60   >60 mL/min



   Comment: 



   The eGFR is not validated for use in drug dosing 



   adjustments.    Continue to use 



   estimated creatinine clearance per dosing 



   reference text.    Please contact the 



   Clinical Pharmacist for questions. 

 

  



  eGFR    >60   >60 mL/min



   Comment: 



   The eGFR is not validated for use in drug dosing 



   adjustments.    Continue to use 



   estimated creatinine clearance per dosing 



   reference text.    Please contact the 



   Clinical Pharmacist for questions. 









 



  Specimen   Performing Laboratory

 

 



  Blood    [facility]



   [address] [place] [id number]





* PROTIME INR (PT) (2017  3:45 AM)





  



  Component   Value   Ref Range

 

  



  INR   1.1   0.8 - 1.2









 



  Specimen   Performing Laboratory

 

 



  Blood   [facility]



   [address] [place] [id number]





* ECG-SCAN (2017  9:40 PM)





 Narrative

 

 



Ordered by an unspecified provider.





* POC ACTIVATED CLOTTING TIME (2017  4:49 PM)





  



  Component   Value   Ref Range

 

  



  Activated Clotting Time   162   s









 



  Specimen   Performing Laboratory

 

 



    MAIN LAB



   [address] [place] [id number]





* POC ACTIVATED CLOTTING TIME (2017  4:18 PM)





  



  Component   Value   Ref Range

 

  



  Activated Clotting Time   186   s









 



  Specimen   Performing Laboratory

 

 



    MAIN LAB



   [address] [place] [phone]





* POC ACTIVATED CLOTTING TIME (2017  4:00 PM)





  



  Component   Value   Ref Range

 

  



  Activated Clotting Time   183   s









 



  Specimen   Performing Laboratory

 

 



    MAIN LAB



   [address] [place] [id number]





* POC ACTIVATED CLOTTING TIME (2017  2:55 PM)





  



  Component   Value   Ref Range

 

  



  Activated Clotting Time   400   s









 



  Specimen   Performing Laboratory

 

 



    MAIN LAB



   [address] [place] [id number]





* POC ACTIVATED CLOTTING TIME (2017  2:34 PM)





  



  Component   Value   Ref Range

 

  



  Activated Clotting Time   251   s









 



  Specimen   Performing Laboratory

 

 



    MAIN LAB



   [address] [place] [id number]





* POC ACTIVATED CLOTTING TIME (2017  2:08 PM)





  



  Component   Value   Ref Range

 

  



  Activated Clotting Time   349   s









 



  Specimen   Performing Laboratory

 

 



    MAIN LAB



   [address] [place] [phone]





* POC ACTIVATED CLOTTING TIME (2017  1:35 PM)





  



  Component   Value   Ref Range

 

  



  Activated Clotting Time   337   s









 



  Specimen   Performing Laboratory

 

 



    MAIN LAB



   [address] [place] [address]





* POC ACTIVATED CLOTTING TIME (2017  1:05 PM)





  



  Component   Value   Ref Range

 

  



  Activated Clotting Time   370   s









 



  Specimen   Performing Laboratory

 

 



   [facility]



   [address] [place] [address]





in this encounter



Visit Diagnoses











  Diagnosis

 





  Coronary artery disease involving native coronary artery of native heart with 
angina pectoris (HCC)



  - Primary

 





  Ischemic cardiomyopathy

 





  Other specified forms of chronic ischemic heart disease

 





  NSTEMI (non-ST elevated myocardial infarction) (HCC)

 





  Acute myocardial infarction, subendocardial infarction, episode of care 
unspecified

 





  Tobacco abuse

 





  Tobacco use disorder







Admitting Diagnoses











  Diagnosis

 





  Chronic Total Occlusion

 





  CAD (coronary artery disease)







Administered Medications







     



  Medication Order   MAR Action   Action Date   Dose   Rate   Site

 

     



  aspirin chewable tablet 81 mg   Given   2017   81 mg  



  81 mg, Oral, DAILY, First dose on Mon    08:20 CST   



  17 at 1600, Until Discontinued     

 

  

 

     



  atorvastatin (LIPITOR) tablet 40 mg   Given   2017   40 mg  



  40 mg, Oral, DAILY, First dose on Mon    22:24 CST   



  17 at 1300, Until Discontinued,     



  Admission/Obs/Extended Recovery     









    



  Given   2017   40 mg  



   08:21 CST   









  

 

     



  carisoprodol(+) (SOMA) tablet 350 mg   Given   2017   350 mg  



  350 mg, Oral, AT BEDTIME DAILY, First    22:24 CST   



  dose on 17 at 2100, Until     



  Discontinued, Admission/Obs/Extended     



  Recovery     

 

  

 

     



  clopiDOGrel (PLAVIX) tablet 75 mg   Given   2017   75 mg  



  75 mg, Oral, DAILY, First dose on Tue    08:21 CST   



  17 at 0900, Until Discontinued,     



  Clopidogrel (PLAVIX) load given in cath     



  lab. This Medication can increase the     



  risk of bleeding and may need to be held     



  prior to surgery or invasive procedures.     



  Consult physician in advance.     

 

  

 

     



  gabapentin (NEURONTIN) capsule 600 mg   Given   2017   600 mg  



  600 mg, Oral, FOUR TIMES DAILY, First    15:47 CST   



  dose on 17 at 1300, Until     



  Discontinued, Admission/Obs/Extended     



  Recovery     









    



  Given   2017   600 mg  



   22:24 CST   

 

    



  Given   2017   600 mg  



   08:20 CST   









  

 

     



  HYDROcodone/acetaminophen (NORCO) 5/325   Given   2017   1 tablet  



  mg tablet 1 tablet    15:47 CST   



  1 tablet, Oral, EVERY  6 HOURS PRN,     



  Starting 17 at 1156, Until 17 at 1052, Pain PO, TOTAL     



  ACETAMINOPHEN DOSE NOT TO EXCEED 4GM     



  DAILY NOTE: This is a HIGH ALERT     



  Medication.     

 

  

 

     



  pantoprazole DR (PROTONIX) tablet 40 mg   Given   2017   40 mg  



  40 mg, Oral, DAILY, First dose on Mon    22:24 CST   



  17 at 2100, Until Discontinued, Do     



  not crush or chew tablet.     

 

  

 

     



  sacubitril/valsartan (ENTRESTO) 24/26 mg   Given   2017   1 tablet  



  tablet 1 tablet    08:21 CST   



  1 tablet, Oral, TWICE DAILY, First dose     



  on 17 at 0900, Until     



  Discontinued, Admission/Obs/Extended     



  Recovery     

 

  

 

     



  sodium chloride 0.9 %   infusion   Given - New   2017   1,000 mL   50 mL
/hr 



  1,000 mL, 1,000 mL, Intravenous, at 50   Bag   08:38 CST   



  mL/hr, CONTINUOUS, Starting 17     



  at 0815, Until 17 at 1458, If     



  EF is <40%, contact CCL Charge Nurse     



  [phone] before initiating fluid.     

 

  

 

     



  sodium chloride 0.9 %   infusion   Dose/Rate   2017    75 mL/hr 



  1,000 mL, Intravenous, at 75 mL/hr,   Change   15:41 CST   



  CONTINUOUS, Starting 17 at     



  1500, Until 17 at 0059, Once     



  patient out of bed, then saline lock and     



  DC IV fluid.     









    



  Bolus from Infusion   2017   250 mL   999 mL/hr 



   17:30 CST   

 

    



  Dose/Rate Verify   2017    75 mL/hr 



   17:45 CST   









  

 

     



  warfarin (COUMADIN) tablet 7.5 mg   Given   2017   7.5 mg  



  7.5 mg, Oral, TWO TIMES WEEKLY (Once per    22:25 CST   



  day on ), First dose on 17 at 2100, Until Discontinued,     



  NURSING: Provide patient with warfarin     



  education leaflet and video. Do not give     



  with cranberry juice. NOTE: This is a     



  HIGH ALERT Medication.     

 

  



in this encounter

## 2018-01-29 NOTE — ED INTEGUMENTARY GENERAL
General


Chief Complaint:  Skin/Wound Problems


Stated Complaint:  L LEG PAIN


Nursing Triage Note:  


PT HAS REDNESS/SWELLING/WARMTH TO L LOWER EXTREMITY. PT HAS BEEN TX FOR GOUT 

FOR 


S/S, BUT REPORTS WORSENING OVER THE LAST 24 HOURS. PT IS AFEBRILE, AND HAS HX 

OF 


DVT'S.


Source:  patient, spouse (WIFE DOES NEARLY ALL TALKING FOR PT)





History of Present Illness


Date Seen by Provider:  Jan 28, 2018


Time Seen by Provider:  23:43


Initial Comments


C/O LEFT LEG PAIN, REDNESS AND SWELLING SINCE 01/21/18


SEEN HERE ON 01/21/18 AND DX WITH GOUT, AND GIVEN RX FOR COLCHICINE


SAW NP AT [facility] IN [place] ON 01/23/18 AND GIVEN ANOTHER RX FOR 

COLCHICINE


PT STATES PAIN AND REDNESS HAVE CONTINUED TO WORSEN AND REDNESS EXTENDS TO HIS 

THIGH AND IS HAVING PAIN IN LEFT GROIN


HAS HAD SUBJECTIVE FEVER, SWEATS AND CHILLS AS WELL


NO PARESTHESIAS OR MOTOR DEFICITS


NO KNOWN INJURY


PT IS NOT DIABETIC


NO HISTORY OF SIMILAR. HAS HISTORY OF GOUT, BUT IS NOTHING LIKE THIS. TAKES 

ALLOPURINOL DAILY


PT HAS HISTORY OF PE'S, AND MI X 2 WITH STENTS X 4, AND DVT. PT IS ON PLAVIX 

AND COUMADIN AND IS FOLLOWED BY DR HERNANDEZ


CALLED DR. HERNANDEZ'S OFFICE AND HAD OUTPATIENT ULTRASOUND/DOPPLER OF LEFT LEG 3 

DAYS AGO, AND WAS REPORTED AS NEGATIVE FOR DVT. DID NOT ACTUALLY SEE DR. HERNANDEZ 

YET, BUT HAS AN APPOINTMENT ON TUESDAY 01/30/18 FOR THIS PROBLEM


PT TAKES HYDROCODONE AND IBUPROFEN DAILY FOR CHRONIC BACK PAIN . LAST DOSE OF 

HYDROCODONE WAS AT 2200 TONIGHT, WITHOUT RELIEF.





PCP: NP AT [facility] IN [place]





Allergies and Home Medications


Allergies


Coded Allergies:  


     Penicillins (Unverified  Allergy, Mild, 6/4/09)





Home Medications


Aspirin 81 Mg Tablet., 81 MG PO DAILY, (Reported)


Atorvastatin Calcium 40 Mg Tablet, 40 MG PO DAILY, (Reported)


Carisoprodol 350 Mg Tablet, 350 MG PO HS, (Reported)


Clopidogrel Bisulfate 75 Mg Tablet, 75 MG PO DAILY, #30


   Prescribed by: RONAN MILLS on 11/20/17 0752


Gabapentin 600 Mg Tablet, 600 MG PO TID, (Reported)


Gemfibrozil 600 Mg Tablet, 600 MG PO BID, (Reported)


Hydrocodone/Acetaminophen 1 Each Tablet, 1 TAB PO TID PRN for PAIN-MODERATE, (

Reported)


Lisinopril 2.5 Mg Tablet, 2.5 MG PO DAILY, (Reported)


Metoprolol Succinate 25 Mg Tab.er.24h, 25 MG PO DAILY, (Reported)


Omega 3 Polyunsat Fatty Acids 1,000 Mg Cap, 1,000 MG PO TID, (Reported)


Prednisone 20 Mg Tab, 40 MG PO DAILY for 2 Days


   Prescribed by: RADHA ARELLANO on 1/21/18 1427


Tizanidine HCl 4 Mg Tablet, 8 MG PO TID PRN for MUSCLE SPASMS, (Reported)


   TAKES 2 (4MG) TABLETS 


Warfarin Sodium 5 Mg Tablet, 7.5 MG PO MoWe, (Reported)


   TAKES 1 & 1/2 (5MG) TABLETS 


Warfarin Sodium 5 Mg Tablet, 5 MG PO SuTuThFrSa, (Reported)





Constitutional:  chills, diaphoresis, fever


Respiratory:  no symptoms reported


Cardiovascular:  no symptoms reported


Gastrointestinal:  no symptoms reported


Genitourinary:  no symptoms reported


Musculoskeletal:  see HPI


Skin:  see HPI


Psychiatric/Neurological:  No Symptoms Reported


Endocrine:  No Symptoms Reported


Hematologic/Lymphatic:  No Symptoms Reported





Past Medical-Social-Family Hx


Patient Social History


Alcohol Use:  Denies Use (USED "AS A KID")


Recreational Drug Use:  No (THC "AS A KID" )


Smoking Status:  Former Smoker (4 PPD--QUIT 2017)


Type Used:  Cigarettes


2nd Hand Smoke Exposure:  No


Recent Foreign Travel:  No


Contact w/Someone Who Travel:  No


Recent Infectious Disease Expo:  No


Recent Hopitalizations:  No





Immunizations Up To Date


Date of Pneumonia Vaccine:  Dec 3, 2012


Date of Influenza Vaccine:  Oct 14, 2017





Seasonal Allergies


Seasonal Allergies:  No





Surgeries


History of Surgeries:  Yes (CARDIAC CATHS--STENTS X 1. LAST CATH 11/16/17 WITH 

STENT X 1)


Surgeries:  Cardiac, Coronary Stent





Respiratory


History of Respiratory Disorde:  Yes


Respiratory Disorders:  Pulmonary Embolism





Cardiovascular


History of Cardiac Disorders:  Yes (MI X 2; STENTS X 4; -NSTEMI 11/16/17 WITH 1 

STENT PLACED)


Cardiac Disorders:  Coronary Artery Disease, Deep Vein Thrombosis, Heart Attack

, High Cholesterol, Hypertension





Neurological


History of Neurological Disord:  No





Reproductive System


Hx Reproductive Disorders:  No





Genitourinary


History of Genitourinary Disor:  No





Gastrointestinal


History of Gastrointestinal Di:  Yes


Gastrointestinal Disorders:  Gastroesophageal Reflux





Musculoskeletal


History of Musculoskeletal Dis:  Yes (HYDROCODONE + IBUPROFEN DAILY)


Musculoskeletal Disorders:  Chronic Back Pain, Gout





Endocrine


History of Endocrine Disorders:  No





HEENT


History of HEENT Disorders:  No





Cancer


History of Cancer:  No





Psychosocial


History of Psychiatric Problem:  No





Integumentary


History of Skin or Integumenta:  No





Blood Transfusions


History of Blood Disorders:  Yes (PROTEIN S DEFICIENCY--DVT'S AND P.E.'S AND MI 

X 2)





Family Medical History


Significant Family History:  Heart Disease, Vascular Disease





Physical Exam


Vital Signs





Vital Sign - Last 12Hours








 1/28/18





 23:40


 


Temp 98.7


 


Pulse 97


 


Resp 20


 


B/P (MAP) 138/77 (97)


 


Pulse Ox 97


 


O2 Delivery Room Air





Capillary Refill : Less Than 3 Seconds


General Appearance:  WD/WN, other (EXTREMELY MALODOROUS. VERY DRAMATIC--MOANING 

VERY LOUDLY, WHIMPERING, WHINING. APPEARS VERY IMMATURE. PT WAILING LOUDLY AND 

SCREAMING AS STAFF CLEANING SKIN TO ARMS, WITH ALCOHOL WIPES PRIOR TO 

ATTEMPTING IV START/BLOOD DRAW. )


Cardiovascular:  regular rate, rhythm, no murmur


Respiratory:  normal breath sounds, no respiratory distress, no accessory 

muscle use


Gastrointestinal:  normal bowel sounds, soft


Extremities:  other (MARKED ERYTHEMA AND WARMTH TO ENTIRE LEFT LOWER LEG AND 

FOOT, WITH MILDER ERYTHEMA AND WARMTH TO MID THIGH-MEDIALLY, POSTERIORLY AND 

LATERALLY. MARKEDLY EXAGGERATED PAIN RESPONSE-WAILS LOUDLY EVEN BEFORE HE IS 

TOUCHED. MODERATE SWELLING TO LEFT FOOT AND LOWER LEG. DISTAL MOTOR/SENSORY/

VASCULAR INTACT. NO OPEN WOUNDS, SORES, FISSURES, DRAINAGE, ETC. NOTED. BUT 

LIMITED FOOT / TOE EXAM DUE TO PT UNCOOPERATIVENESS. )


Neurologic/Psychiatric:  CNs II-XII nml as tested, no motor/sensory deficits, 

alert, oriented x 3


Skin:  warm/dry, other (AS ABOVE)





Progress/Results/Core Measures


Results/Orders


Lab Results





Laboratory Tests








Test


  1/28/18


23:50 Range/Units


 


 


White Blood Count


  12.6 H


  4.3-11.0


10^3/uL


 


Red Blood Count


  3.67 L


  4.35-5.85


10^6/uL


 


Hemoglobin 10.6 L 13.3-17.7  G/DL


 


Hematocrit 32 L 40-54  %


 


Mean Corpuscular Volume 87  80-99  FL


 


Mean Corpuscular Hemoglobin 29  25-34  PG


 


Mean Corpuscular Hemoglobin


Concent 33 


  32-36  G/DL


 


 


Red Cell Distribution Width 13.8  10.0-14.5  %


 


Platelet Count


  514 H


  130-400


10^3/uL


 


Mean Platelet Volume 9.1  7.4-10.4  FL


 


Neutrophils (%) (Auto) 79 H 42-75  %


 


Lymphocytes (%) (Auto) 10 L 12-44  %


 


Monocytes (%) (Auto) 10  0-12  %


 


Eosinophils (%) (Auto) 2  0-10  %


 


Basophils (%) (Auto) 0  0-10  %


 


Neutrophils # (Auto) 9.9 H 1.8-7.8  X 10^3


 


Lymphocytes # (Auto) 1.2  1.0-4.0  X 10^3


 


Monocytes # (Auto) 1.2 H 0.0-1.0  X 10^3


 


Eosinophils # (Auto)


  0.2 


  0.0-0.3


10^3/uL


 


Basophils # (Auto)


  0.0 


  0.0-0.1


10^3/uL


 


Erythrocyte Sedimentation Rate > 140 H 0-15  MM/HR


 


Prothrombin Time 34.2 H 12.2-14.7  SEC


 


INR Comment 3.4 H 0.8-1.4  


 


Activated Partial


Thromboplast Time 124 *H


  24-35  SEC


 


 


Sodium Level 137  135-145  MMOL/L


 


Potassium Level 3.8  3.6-5.0  MMOL/L


 


Chloride Level 95 L   MMOL/L


 


Carbon Dioxide Level 29  21-32  MMOL/L


 


Anion Gap 13  5-14  MMOL/L


 


Blood Urea Nitrogen 9  7-18  MG/DL


 


Creatinine


  0.76 


  0.60-1.30


MG/DL


 


Estimat Glomerular Filtration


Rate > 60 


   


 


 


BUN/Creatinine Ratio 12   


 


Glucose Level 115 H   MG/DL


 


Lactic Acid Level


  0.81 


  0.50-2.00


MMOL/L


 


Calcium Level 9.6  8.5-10.1  MG/DL


 


Total Bilirubin 0.4  0.1-1.0  MG/DL


 


Aspartate Amino Transf


(AST/SGOT) 21 


  5-34  U/L


 


 


Alanine Aminotransferase


(ALT/SGPT) 19 


  0-55  U/L


 


 


Alkaline Phosphatase 80    U/L


 


C-Reactive Protein High


Sensitivity 34.60 H


  0.00-0.50


MG/DL


 


Total Protein 7.8  6.4-8.2  GM/DL


 


Albumin 3.5  3.2-4.5  GM/DL








My Orders





Orders - SRINIVAS,VLADIMIR K DO


Saline Lock/Iv-Start (1/28/18 23:43)


Cbc With Automated Diff (1/28/18 23:43)


Comprehensive Metabolic Panel (1/28/18 23:43)


Hs C Reactive Protein (1/28/18 23:43)


Erythrocyte Sedimentation Rate (1/28/18 23:43)


Lactic Acid Analyzer (1/28/18 23:43)


Protime With Inr (1/28/18 23:43)


Partial Thromboplastin Time (1/28/18 23:43)


Blood Culture (1/28/18 23:43)


Vancomycin Injection (Vancomycin Injecti (1/29/18 01:00)


Ketorolac Injection (Toradol Injection) (1/29/18 01:00)


Morphine  Injection (Morphine  Injection (1/29/18 00:52)





Vital Signs/I&O





Vital Sign - Last 12Hours








 1/28/18





 23:40


 


Temp 98.7


 


Pulse 97


 


Resp 20


 


B/P (MAP) 138/77 (97)


 


Pulse Ox 97


 


O2 Delivery Room Air














Blood Pressure Mean:  97








Progress Note :  


Progress Note


NO DETERIORATION IN PT'S CONDITION DURING ER STAY





Departure


Communication (Admissions)


Time/Spoke to Admitting Phy:  00:53


Communication


SPOKE WITH DR. HERNANDEZ, HOSPITALIST ON CALL. ACCEPTS PT FOR ADMIT





Impression


Impression:  


 Primary Impression:  


 CELLULIIIS LEFT LEG AND FOOT


Disposition:  09 ADMITTED AS INPATIENT


Condition:  Stable





Admissions


Decision to Admit Reason:  Admit from ER (General)


Decision to Admit/Date:  Jan 29, 2018


Time/Decision to Admit Time:  00:55





Departure-Patient Inst.


Referrals:  


RAIZA FARRELL MD (PCP/Family)


Primary Care Physician











VLADIMIR ESPINO DO Jan 29, 2018 00:56

## 2018-01-29 NOTE — XMS REPORT
Encounter Summary

 Created on: 2018



Maulki Hubern CARMEN

External Reference #: [id number]

: 1970

Sex: Male



Demographics







 Address  [address] [place]  [address]

 

 Home Phone  [phone]

 

 Preferred Language  English

 

 Marital Status  Unknown

 

 Rastafarian Affiliation  NON

 

 Race  White

 

 Ethnic Group  Not  or 





Author







 Author  [facility]

 

 Organization  [facility]

 

 Address  Unknown

 

 Phone  Unavailable







Support







 Name  Relationship  Address  Phone

 

 Steffanie Huber  ECON  Unknown  [phone]







Care Team Providers







 Care Team Member Name  Role  Phone

 

 Neftali Bacon MD  21  [phone]

 

 Mahesh Champagne MD  PCP  [phone]







Encounter Details







    



  Date   Type   Department   Care Team   Description

 

    



  2017   Orders Only   [facility]   Shanell Hendrickson, RN   
Chronic anticoagulation



    [address] [place]    (Primary Dx)



    [address]  



    [place] [address]  



    [phone]  







Social History







    



  Tobacco Use   Types   Packs/Day   Years Used   Date

 

    



  Current Every Day Smoker    









   



  Alcohol Use   Drinks/Week   oz/Week   Comments

 

   



  No   









 



  Sex Assigned at Birth   Date Recorded

 

 



  Not on file 



as of this encounter



Plan of Treatment





Not on fileas of this encounter



Visit Diagnoses











  Diagnosis

 





  Chronic anticoagulation - Primary

 





  Long-term (current) use of anticoagulants

## 2018-01-29 NOTE — XMS REPORT
Encounter Summary

 Created on: 2018



Francisco Huber

External Reference #: [id number]

: 1970

Sex: Male



Demographics







 Address  [address] [place]  [address]

 

 Home Phone  [phone]

 

 Preferred Language  English

 

 Marital Status  Unknown

 

 Adventist Affiliation  NON

 

 Race  White

 

 Ethnic Group  Not  or 





Author







 Author  [facility]

 

 Organization  [facility]

 

 Address  Unknown

 

 Phone  Unavailable







Support







 Name  Relationship  Address  Phone

 

 Steffanie Huber  Unknown  [phone]







Care Team Providers







 Care Team Member Name  Role  Phone

 

 Neftali Bacon MD  21  [phone]

 

 Mahesh Champagne MD  PCP  [phone]







Reason for Visit

* 





 



  Reason   Comments

 

 



  Cardiac Eval 





* Auth/Cert (Routine)





     



  Status   Reason   Specialty   Diagnoses /   Referred By   Referred To



     Procedures   Contact   Contact

 

     











Encounter Details







    



  Date   Type   Department   Care Team   Description

 

    



  2017   Office Visit   [facility]   Tomasz Delcid MD   
Cardiac Eval



    [address]   [address] 



    [address]   [address] 



    [place] [address]   [place] [address] 



    [phone] (Fax) 







Social History







    



  Tobacco Use   Types   Packs/Day   Years Used   Date

 

    



  Current Every Day Smoker    









   



  Alcohol Use   Drinks/Week   oz/Week   Comments

 

   



  No   









 



  Sex Assigned at Birth   Date Recorded

 

 



  Not on file 



as of this encounter



Last Filed Vital Signs







  



  Vital Sign   Reading   Time Taken

 

  



  Blood Pressure   116/68   2017  7:18 AM CST

 

  



  Pulse   79   2017  7:18 AM CST

 

  



  Temperature   -   -

 

  



  Respiratory Rate   -   -

 

  



  Oxygen Saturation   -   -

 

  



  Inhaled Oxygen   -   -



  Concentration  

 

  



  Weight   87.1 kg (192 lb)   2017  7:18 AM CST

 

  



  Height   162.6 cm (5' 4")   2017  7:18 AM CST

 

  



  Body Mass Index   32.96   2017  7:18 AM CST



in this encounter



Progress Notes

* Tomasz Delcid MD - 2017  7:15 AM CST



Formatting of this note may be different from the original.

Date of Service: 2017



Francisco Huber is a 47 y.o. male.   



HPI

 

Mr. Huber is a 47-year-old male with a history of coronary artery disease who 
had a non-ST elevation myocardial infarction back in November at that time he 
successfully underwent stenting with a drug-eluting stent to the obtuse 
marginal.  They utilized a 2.5 x 26 mm resolute integrity stent.  In addition, 
he has a chronic total occlusion of the right coronary artery which is occluded 
proximally.  He has good left-to-right collateralization to the posterior 
descending and posterior lateral branches.  Of concern, his ejection fraction 
is severely impaired estimated at around 30% and he currently has a LifeVest.  
He has a previous myocardial perfusion study suggesting viability in the 
inferior wall and given his age and LV impairment, it was discussed and elected 
to go ahead and proceed with percutaneous revascularization to give him every 
opportunity to recover.  Currently, he has been noting intermittent chest 
discomfort which occurs with activity and at rest.  He is on aspirin, Plavix 
and warfarin and is tolerated this without any active bleeding issues.  He is 
taking warfarin due to a history of a pulmonary embolus per his report with the 
addition of aspirin and Plavix given his recent coronary event.



 



Vitals: 

 17 0718 

BP: 116/68 

Pulse: 79 

Weight: 87.1 kg (192 lb) 

Height: 1.626 m (5' 4") 



Body mass index is 32.96 kg/(m^2). 



Past Medical History

Patient Active Problem List 

 Diagnosis Date Noted 

 Coronary artery disease involving native coronary artery of native heart 
with angina pectoris ([facility]) 2017 - NSTEMI at [facility] in [place], [place]. Successful PCI 
with a Resolute Integrity 2.5 x 26 mm stent to the OM with Dr. Miguel. 
Unsuccessful PCI to the RCA . Pt referred to Dr. Delcid for possible  
procedure. 



16 - Nuclear stress test ([facility]): No ischemia or 
arrhythmia on EKG. Diaphragmatic attenuation with reversible ischemia involving 
the mid to apical inferior wall and inferolateral wall. Normal left ventricular 
size with mild hypokinesis at the lateral wall. EF 50%. 



Previous history of Promus stent placement to  - date unknown. 

 

 NSTEMI (non-ST elevated myocardial infarction) ([facility]) 2017 at [facility] in [place], [place]. 

 

 Tobacco abuse 2017 

 Ischemic cardiomyopathy 2017 - Echo ([facility]): EF 30-35%. Left ventricular cavity 
size increased. Wall thickness normal. Regional wall motion abnormalities. 
Akinesis of the inferior myocardium. Akinesis of the lateral myocardium. 



17 - NSTEMI - EF 20%, severe left ventricular systolic function (per cath 
report). Life Vest applied for primary prevention of sudden cardiac death. 

 

 Mild mitral regurgitation 2017 

 Mild tricuspid regurgitation 2017 

 Protein S deficiency (HCC) 2017 

  On warfarin. 

 



Review of Systems 

Constitution: Negative. 

HENT: Negative.  

Eyes: Negative.  

Cardiovascular: Positive for chest pain. 

Respiratory: Negative.  

Endocrine: Negative.  

Hematologic/Lymphatic: Negative.  

Skin: Negative.  

Musculoskeletal: Negative.  

Gastrointestinal: Negative.  

Genitourinary: Negative.  

Neurological: Negative.  

Psychiatric/Behavioral: Negative.  

Allergic/Immunologic: Negative.  



Physical Exam

Physical Exam 

General Appearance: alert and oriented, no acute distress

Skin: warm, moist, no ulcers

Head: normocephalic, symmetric

Eyes: EOMI, PERRL, sclera are clear and without icterus

ENT: unremarkable, nares patent

Neck Veins: neck veins are flat, neck veins are not distended

Carotid Arteries: normal carotid upstroke bilaterally, no bruits

Chest Inspection: chest is normal in appearance

Auscultation/Percussion: lungs clear to auscultation, no rales, rhonchi, 
wheezes or friction rub appreciated

Cardiac Rhythm: regular rhythm and normal rate

Cardiac Auscultation: Normal S1 & S2, no S3 or S4, no rub - normal pmi

Murmurs: no cardiac murmurs 

Extremities: no lower extremity edema bilaterally; 2+ symmetric distal pulses

Muskuloskeletal: no obvious deformity

Abdominal Exam: soft, non-tender, no masses, bowel sounds normal

Neurologic Exam: neurological assessment grossly intact

Mood and Affect: Appropriate



Cardiovascular Studies

ECG - NSR, PVC - no Q waves



Problems Addressed Today

Encounter Diagnoses 

Name Primary? 

 Coronary artery disease involving native coronary artery of native heart 
with angina pectoris (HCC) Yes 

 Ischemic cardiomyopathy  

 Mild mitral regurgitation  



Assessment and Plan

 

1. Chronic total occlusion of the proximal right coronary artery with prominent 
left to right collateralization to the posterior descending and posterior 
lateral branches -he is referred for complex percutaneous intervention.  I 
discussed the risks and benefits and will plan to go ahead and proceed.  We 
will obtain bilateral groin access and will likely require a retrograde 
approach given the anatomy noted on his angiograms.  We will plan to obtain an 
INR this morning to ensure that his INR is less than 1.9.  We will plan to keep 
you informed this results of his upcoming procedure. 



 Thank you for allowing us to participate in his care.

 



Current Medications (including today's revisions)

 aspirin EC 81 mg tablet Take 81 mg by mouth daily. Take with food. 

 atorvastatin (LIPITOR) 40 mg tablet Take 40 mg by mouth daily. 

 carisoprodol(+) (SOMA) 350 mg tablet Take 350 mg by mouth at bedtime daily. 

 clopiDOGrel (PLAVIX) 75 mg tablet Take 75 mg by mouth daily. 

 gabapentin (NEURONTIN) 600 mg tablet Take 600 mg by mouth four times daily. 

 gemfibrozil (LOPID) 600 mg tablet Take 600 mg by mouth twice daily. 

 metoprolol XL (TOPROL XL) 25 mg extended release tablet Take 25 mg by mouth 
daily. 

 nitroglycerin (NITROSTAT) 0.4 mg tablet Place 0.4 mg under tongue every 5 
minutes as needed for Chest Pain. Max of 3 tablets, call 911. 

 Omega-3 Acid Ethyl Esters 1 gram cap Take 1 g by mouth three times daily. 

 omeprazole DR(+) (PRILOSEC) 20 mg capsule Take 20 mg by mouth daily before 
breakfast. 

 tiZANidine (ZANAFLEX) 4 mg tablet Take 8 mg by mouth every 8 hours as 
needed. Indications: MUSCLE SPASM 

 warfarin (COUMADIN) 5 mg tablet Take 5 mg by mouth as directed. Take 7.5 mg 
by mouth on Mon and Wed. Take 5 mg by mouth on , Tues, Thurs, Fri, and 
Sat. Take at bedtime.  Indications: THROMBOTIC DISORDER 



 

in this encounter



Miscellaneous Notes

* Addendum Note - Steffanie Lin - 2017 11:56 AM CST



 Addended by: STEFFANIE LIN on: 2017 11:56 AM



  Modules accepted: Orders



  

in this encounter



Plan of Treatment







   



  Name   Priority   Associated Diagnoses   Order Schedule

 

   



  ECG 12-LEAD   Routine   Coronary artery disease   Ordered: 2017



    involving native coronary 



    artery of native heart 



    with angina pectoris 



    (HCC) 



    Ischemic cardiomyopathy 



as of this encounter



Visit Diagnoses











  Diagnosis

 





  Coronary artery disease involving native coronary artery of native heart with 
angina pectoris (HCC)



  - Primary

 





  Ischemic cardiomyopathy

 





  Other specified forms of chronic ischemic heart disease

 





  Mild mitral regurgitation

 





  Mitral valve disorders

## 2018-01-29 NOTE — XMS REPORT
Encounter Summary

 Created on: 2018



Francisco Huber

External Reference #: [id number]

: 1970

Sex: Male



Demographics







 Address  [address] [place]  [address]

 

 Home Phone  [phone]

 

 Preferred Language  English

 

 Marital Status  Unknown

 

 Judaism Affiliation  NON

 

 Race  White

 

 Ethnic Group  Not  or 





Author







 Author  [facility]

 

 Organization  [facility]

 

 Address  Unknown

 

 Phone  Unavailable







Support







 Name  Relationship  Address  Phone

 

 Steffanie Huber  ECON  Unknown  [phone]







Care Team Providers







 Care Team Member Name  Role  Phone

 

 Neftali Bacon MD  21  [phone]

 

 Mahesh Champagne MD  PCP  [phone]







Reason for Visit

* 





 



  Reason   Comments

 

 



  Records Request   cath report faxed to Dr. Bacon's office









Encounter Details







    



  Date   Type   Department   Care Team   Description

 

    



  2018   Telephone   [facility]   Shanell Hendrickson RN   
Records Request (cath



    [address]    report faxed to Dr. Prather [facility]    Jordi's office)



    [place] [address]  



    [phone]  







Social History







    



  Tobacco Use   Types   Packs/Day   Years Used   Date

 

    



  Current Every Day Smoker    









   



  Alcohol Use   Drinks/Week   oz/Week   Comments

 

   



  No   









 



  Sex Assigned at Birth   Date Recorded

 

 



  Not on file 



as of this encounter



Miscellaneous Notes

* Telephone Encounter - Shanell Hendrickson RN - 2018  5:16 PM CST



Voicemail received from Ariana with Dr. Bacon's office in [place]. She is 
requesting pt's cath report. Faxed to Dr. Bacon as requested. 

in this encounter



Plan of Treatment





Not on fileas of this encounter



Visit Diagnoses

Not on filein this encounter

## 2018-01-29 NOTE — XMS REPORT
Continuity of Care Document

 Created on: 2018



NIKO MARTE

External Reference #: [id number]

: 1970

Sex: Male



Demographics







 Address  [address] [place]  [address]

 

 Home Phone  [phone]

 

 Preferred Language  Unknown

 

 Marital Status  Unknown

 

 Sikhism Affiliation  Unknown

 

 Race  Unknown

 

 Ethnic Group  Unknown





Author







 Author  [employer]

 

 Organization  [facility]

 

 Address  Unknown

 

 Phone  Unavailable







Care Team Providers







 Care Team Member Name  Role  Phone

 

 [employer]  Unavailable  Unavailable



                                    



Problems

                                                                



Medications

                                                                



Allergies, Adverse Reactions, Alerts

                                                        



Immunizations

                                                                



Results

                                                                



Vital Signs

                                                                                



Encounters

                    





 Location                          Location Details                          
Encounter Type                          Encounter Number                        
  Reason For Visit                          Attending Provider                 
         ADM Date                          DC Date                          
Status                          Source                    

 

                                                     SPECIMEN                  
        [id number]                                                              
                  2017               
           Active                          [facility]                    

 

                                                     SPECIMEN                  
        [id number]                                                              
                  2017               
           Active                          [facility]                    

 

                                                     O                         
                                                                               
                                                      Active                   
       [facility]                    



                                                                               
                 



Procedures

                                                                



Plan of Care

                                                                



Social History

                                                                        



Assessment and Plan

                                                                



Family History

                                                                



Advance Directives

                                                                



Functional Status

## 2018-01-29 NOTE — XMS REPORT
Clinical Summary

 Created on: 2018



Niko Huber

External Reference #: [id number]

: 1970

Sex: Male



Demographics







 Address  [address] [place]  [address]

 

 Home Phone  [phone]

 

 Preferred Language  English

 

 Marital Status  Unknown

 

 Jain Affiliation  NON

 

 Race  White

 

 Ethnic Group  Not  or 





Author







 Author  [facility]

 

 Organization  [facility]

 

 Address  Unknown

 

 Phone  Unavailable







Support







 Name  Relationship  Address  Phone

 

 Steffanie Huber  ECON  Unknown  [phone]







Care Team Providers







 Care Team Member Name  Role  Phone

 

 Neftali Bacon MD  21  [phone]

 

 Raiza Champagne MD  PCP  [phone]







Source Comments

Some departments are not documenting in the electronic medical record.  If you 
do not see the information that you expected, contact Release of Information in 
the Health Information Management department at [phone] for further 
assistance in locating additional records.[facility]



Allergies







    



  Active Allergy   Reactions   Severity   Noted Date   Comments

 

    



  Penicillins   UNKNOWN   Low   2017 







Current Medications







      



  Prescription   Sig.   Disp.   Refills   Start   End Date   Status



      Date  

 

      



  atorvastatin (LIPITOR) 40   Take 40 mg by mouth       Active



  mg tablet   daily.     

 

      



  carisoprodol(+) (SOMA)   Take 350 mg by mouth at       Active



  350 mg tablet   bedtime daily.     

 

      



  gabapentin (NEURONTIN)   Take 600 mg by mouth four       Active



  600 mg tablet   times daily.     

 

      



  gemfibrozil (LOPID) 600   Take 600 mg by mouth       Active



  mg tablet   twice daily.     

 

      



  metoprolol XL (TOPROL XL)   Take 25 mg by mouth       Active



  25 mg extended release   daily.     



  tablet      

 

      



  Omega-3 Acid Ethyl Esters   Take 1 g by mouth three       Active



  1 gram cap   times daily.     

 

      



  tiZANidine (ZANAFLEX) 4   Take 8 mg by mouth every       Active



  mg tabletIndications:   8 hours as needed.     



  MUSCLE SPASM   Indications: MUSCLE SPASM     

 

      



  warfarin (COUMADIN) 5 mg   Take 5 mg by mouth as       Active



  tabletIndications:   directed. Take 7.5 mg by     



  THROMBOTIC DISORDER   mouth on Mon and Wed.     



   Take 5 mg by mouth on     



   , Tues, Thurs, Fri,     



   and Sat. Take at bedtime.     



   Indications: THROMBOTIC     



   DISORDER     

 

      



  omeprazole DR(+)   Take 20 mg by mouth daily       Active



  (PRILOSEC) 20 mg capsule   before breakfast.     

 

      



  nitroglycerin (NITROSTAT)   Place 0.4 mg under tongue       Active



  0.4 mg tablet   every 5 minutes as needed     



   for Chest Pain. Max of 3     



   tablets, call 911.     

 

      



  clopiDOGrel (PLAVIX) 75   Take 75 mg by mouth       Active



  mg tablet   daily.     

 

      



  sacubitril/valsartan   Take 1 tablet by mouth       Active



  (ENTRESTO) 24/26 mg   twice daily.     



  tablet      

 

      



  HYDROcodone/acetaminophen   Take 1 tablet by mouth       Active



  (NORCO) 7.5/325 mg tablet   every 6 hours as needed     



   for Pain     







Active Problems







 



  Problem   Noted Date

 

 



  CAD (coronary artery disease)   2017

 

 



  Coronary artery disease involving native coronary artery of native heart   



  with angina pectoris (HCC) 

 

 



  Overview:



  17 - NSTEMI at [facility] in [place], [place]. Successful PCI



  with a Resolute Integrity 2.5 x 26 mm stent to the OM with Dr. Miguel.



  Unsuccessful PCI to the RCA . Pt referred to Dr. Delcid for possible 



  procedure.





  16 - Nuclear stress test ([facility]): No ischemia or



  arrhythmia on EKG. Diaphragmatic attenuation with reversible ischemia



  involving the mid to apical inferior wall and inferolateral wall. Normal



  left ventricular size with mild hypokinesis at the lateral wall. EF 50%.





  Previous history of Promus stent placement to  - date unknown.

 

 



  NSTEMI (non-ST elevated myocardial infarction) ([facility])   2017

 

 



  Overview:



  17 at [address] [place].

 

 



  Tobacco abuse   2017

 

 



  Ischemic cardiomyopathy   2017

 

 



  Overview:



  17 - Echo ([facility]): EF 30-35%. Left ventricular cavity



  size increased. Wall thickness normal. Regional wall motion abnormalities.



  Akinesis of the inferior myocardium. Akinesis of the lateral myocardium.





  17 - NSTEMI - EF 20%, severe left ventricular systolic function (per



  cath report). Life Vest applied for primary prevention of sudden cardiac



  death.

 

 



  Mild mitral regurgitation   2017

 

 



  Mild tricuspid regurgitation   2017

 

 



  Protein S deficiency (HCC)   2017

 

 



  Overview:



  On warfarin.







Encounters







    



  Date   Type   Specialty   Care Team   Description

 

    



  2018   Telephone   Cardiology   Shanell Hendrickson RN   Records Request (
cath



      report faxed to Dr. Bacon's office)

 

    



  2017   Hospital   Cardiology   Tomasz Delcid MD   Coronary artery 
disease



  -   Encounter     involving native coronary



  2017      artery of native heart



      with angina pectoris



      (HCC)

 

    



  2017   Hospital   Lab   Yue Zhu APRN   Atherosclerotic heart



   Encounter     disease of native



      coronary artery with



      unspecified angina



      pectoris (HCC)

 

    



  2017   Hospital   Cardiology   Tomasz Delcid MD 



   Encounter   

 

    



  2017   Office Visit   Cardiology   Tomasz Delcid MD   Cardiac Eval

 

    



  2017   Orders Only   Cardiology   Shanell Hendrickson RN   Chronic 
anticoagulation



      (Primary Dx)

 

    



  2017   Procedure Pass   Cardiology  

 

    



  2017   Surgery   Cardiology   Tomasz Delcid MD   Percutaneous Coronary



      Intervention of Chronic



      Total Occlusion Right



      Coronary Artery



      (Retrograde Approach)

 

    



  12/15/2017   Documentation   Cardiology   Shanell Hendrickson RN   Labs Only (CBC/
BMP (creat



      clearance 149.06) )

 

    



  2017   Documentation   Cardiology   Gooch, Duane, RN   Precertification



      (Approval for LVCORS



      through [employer] [place])

 

    



  2017   Telephone   Cardiology   Shanell Hendrickson RN   Appointment (cath 
& OV



      same day)

 

    



  2017   Patient Profile   Cardiology   Shanell Hendrickson RN   New Patient (
patient



      profile)

 

    



  2017   Telephone   Cardiology   Shanell Hendrickson RN   Referral ( - Dr. Delcid)



from Last 3 Months



Social History







    



  Tobacco Use   Types   Packs/Day   Years Used   Date

 

    



  Current Every Day Smoker    









   



  Alcohol Use   Drinks/Week   oz/Week   Comments

 

   



  No   









 



  Sex Assigned at Birth   Date Recorded

 

 



  Not on file 







Last Filed Vital Signs







  



  Vital Sign   Reading   Time Taken

 

  



  Blood Pressure   99/58   2017  7:35 AM CST

 

  



  Pulse   76   2017  7:35 AM CST

 

  



  Temperature   37.1   C (98.7   F)   2017  6:15 AM CST

 

  



  Respiratory Rate   -   -

 

  



  Oxygen Saturation   97%   2017  7:35 AM CST

 

  



  Inhaled Oxygen   -   -



  Concentration  

 

  



  Weight   87.5 kg (192 lb 14.4 oz)   2017  8:13 AM CST

 

  



  Height   162.6 cm (5' 4.02")   2017  8:13 AM CST

 

  



  Body Mass Index   33.1   2017  8:13 AM CST







Plan of Treatment







   



  Health Maintenance   Due Date   Last Done   Comments

 

   



  PHYSICAL (COMPREHENSIVE)   1979  



  EXAM   

 

   



  PERTUSSIS VACCINE   1983  

 

   



  TETANUS VACCINE   1989  

 

   



  INFLUENZA VACCINE   2017  







Procedures







    



  Procedure Name   Priority   Date/Time   Associated Diagnosis   Comments

 

    



  TELEMETRY STRIPS-SCAN    2017    Results for this



    2:42 PM CST    procedure are in the



      results section.

 

    



  PROCEDURE RECORD-SCAN    2017    Results for this



    1:47 PM CST    procedure are in the



      results section.

 

    



  ECG-SCAN    2017    Results for this



    10:46 AM CST    procedure are in the



      results section.

 

    



  ECG-SCAN    2017    Results for this



    7:30 AM CST    procedure are in the



      results section.

 

    



  ECG-SCAN    2017    Results for this



    9:40 PM CST    procedure are in the



      results section.



from Last 3 Months



Results

* TELEMETRY STRIPS-SCAN (2017  2:42 PM)





 Narrative

 

 



Ordered by an unspecified provider.





* PROCEDURE RECORD-SCAN (2017  1:47 PM)





 Narrative

 

 



Ordered by an unspecified provider.





* ECG-SCAN (2017 10:46 AM)





 Narrative

 

 



Ordered by an unspecified provider.





* CARDIAC CATH REPORT (2017 10:36 AM)





 



  Specimen   Performing Laboratory

 

 



   OTHER OUTSIDE LAB









 Procedure Note

 

 



Abner Trevizo MD - 2017  3:12 PM CST



[facility]



CARDIAC CATHETERIZATION REPORT

Page 3

NIKO VENTURA :  1970

[facility]#: [id number]







 

 MR #/Billing ID #:  [id number] / [id number]

DATE:  2017

CARDIOLOGIST:  Tomasz Delcid MD

DICTATING PROVIDER: Abner Trevizo MD

REFERRING PHYSICIAN:  RAIZA CHAMPAGNE



INTERVENTIONAL CARDIOLOGY FELLOW:  Abner Trevizo MD.



PROCEDURES PERFORMED:  

 1. Selective right and left coronary angiograms with dual arterial injections.

 2. Bilateral groin accesses, both 7-French common femoral arterial.

 3. Dual coronary injections.

 4. Chronic Total Occlusion () PCI of the proximal right RCA extending into 
the right PLV with 3 drug-eluting stents in overlapping fashion with antegrade 
wire escalation technique.

 5. Right common femoral angiogram, limited.



INDICATION FOR THE PROCEDURE:  Niko Huber is a pleasant, 47-year-old 
gentleman with a history of recent non-ST-elevation MI, status post PCI to his 
left circumflex extending into his obtuse marginal with a Resolute Integrity 
stent from an outside institution.  There was an attempted  antegrade PCI on 
his RCA , which was unsuccessful. We would like to perform an invasive 
evaluation of his coronary anatomy with an intent to revascularize with the 
retrograde approach, given the fact that the antegrade cap was very ambiguous, 
based on outside hospital films.



CONSENT:  Risks, benefits, and alternatives of the procedure were explained to 
the patient by both Dr. Delcid and myself.  Risks of access site complications, 
bleeding, arterial dissection, death, contrast nephropathy, and radiation 
hazards were explained to the patient, who verbalized understanding of these 
conditions and consented to the procedure.  A written, informed consent has 
been placed in the chart as well.



DETAILS OF THE PROCEDURE:  The patient was brought in the cath lab in the 
fasting, nonsedated state.  After giving the patient a total of 225 mcg of 
fentanyl and 5 mg of Versed, we achieved moderate conscious sedation, which was 
monitored and maintained throughout the procedure for a total of 126 minutes.  
Respiratory, hemodynamic, and neurological parameters were monitored throughout 
the procedure by the operators and by the cath lab staff. 



The patient was prepped and draped in sterile fashion.  We exposed bilateral 
groins and prepped with 1% chlorhexidine and instilled a total of 20 mL of 1% 
lidocaine for good local anesthesia in both groins. 



We then gained access into the right common femoral artery using a 
micropuncture needle and modified Seldinger technique to insert a 7-French 10 
cm arterial sheath with good blood flow return.  Similar technique was adopted 
to gain access to the left common femoral artery with the same 7-French 10 cm 
arterial sheath.  We went in with the intent to perform a retrograde  PCI, 
and hence, we obtained dual coronary injections.



CORONARY ANGIOGRAM:  

 1. The left main arises normally from the left coronary cusp and is free from 
angiographic evidence of disease.  It bifurcates into a left anterior 
descending artery, as well as a left circumflex artery.

 2. The left anterior descending artery arises normally from the left main.  
Its proximal, mid, and distal portions are free from disease, except for 30% 
stenosis in the mid segment.  The left main is also free from disease.  It 
gives rise to 1 diagonal branch, the LAD, and it is also free from disease.

 3. The left circumflex artery arises normally from the left main.  Its proximal
, mid, and distal portions are free from disease.  It has a previously placed 
stent extending from the proximal circumflex, extending into the OM, is widely 
patent without any thrombosis or in-stent restenosis.

 4. The right coronary artery arises normally from the right coronary cusp, and 
its proximal portion has 40% to 50% diffuse disease, followed by a long chronic 
total occlusion segment extending from the mid RCA to the PLV.  There was 
collateral flow from the LAD to the RPLV territory, and also from the 
circumflex artery as well.  We noted that on outside hospital films there was 
an abrupt cutoff of the proximal RCA with a very ambiguous cap; however, on our 
films, we noted an extra RV marginal branch which was filling in, though we 
could not localize the true nature of the proximal cap.  Hence, we decided to 
adopt a retrograde approach initially.



DETAILS OF THE PERCUTANEOUS CORONARY INTERVENTION to the RCA (Full metal Jacket
) (CHRONIC TOTAL OCCLUSION):  

 1. We used an EBU 3.75, 7-French guide catheter to engage the left main, while 
we used an AL1, 7-French guide catheter to engage the RCA for dual arterial 
injections.

 2. We then introduced an 0.014 x 300 cm Fielder FC wire with a 150 cm Corsair 
microcatheter and tried to get across a couple of septals.  We were able to 
track through the septals pretty well; however, the anatomy for reentry into 
the PLV/PDA was not favorable.  After multiple attempts through different 
septals, we were not able to gain access into the right PLV or PDA segments, 
even despite tracking through the septals pretty well through both the Fielder 
FC, as well as the Corsair; hence, we switched to an antegrade approach.

 3. We then switched the AL1, 7-French guide for a Hockey-Stick 1, 6-French 
guide, given the fact that an AL1 was a little too big to engage the RCA, given 
the size of the aortic root.  The Hockey-Stick 1 gave us moderate seating and 
support throughout the entire procedure.  We then introduced an 0.014 x 300 cm 
Fielder FC wire over a 150 cm Corsair into the right coronary artery to switch 
to an antegrade wire escalation approach.  We were able to make very little 
progress across the proximal cap, which was very ambiguous with a Fielder FC 
wire.  We then switched for an 0.009 tapering into an 0.014 x 300 cm Fielder XT 
wire and tried to make some progress across the mid RCA, and we tracked the 
Corsair across it.  Though we were able to get into the distal RCA, we were 
unsure whether we were in the true lumen or not.  We then switched out for a 
 200, 0.014 x 300 cm wire and got across the mid to distal RCA with 
moderate amount of difficulty.  We were unsure again whether we were at the 
true lumen or not.  Hence, we took a dual injection on the LAD, which 
demonstrated that our wire was indeed in the right PLV.  After this, we tracked 
the Corsair down into the right PLV and switched out the  200 wire for an 
0.014 x 300 cm Luge wire.  We then tracked the Corsair out and introduced a 2.0 
x 30 mm Emerge RX balloon and performed balloon angioplasty for a total of 6 
different inflations, starting from the right PLV, extending into the proximal 
or ostial segment of the RCA, all of which were 8 atmospheres, lasting 20-30 
seconds.  We got moderate amount of expansion with this.  We were then easily 
able to deliver a 2.25 x 38 mm Synergy drug-eluting stent  extending into the 
distal RPLV and the proximal edge of the stent into the distal portion of the 
right coronary artery.  The stent was deployed at 8 atmospheres for a total of 
26 seconds.  We then overlapped this proximally with a 2.25 x 38 mm Syndergy 
TESFAYE (10 carloz for 8 seconds).  We then deployed a 2.5 x 32 mm Synergy TESFAYE at 10 
atmospheres, lasting 25 seconds in overlapping fashion in the ostium to the 
midportion of the RCA.  We got excellent angiographic results.  After this, we 
noticed that the distal edge of the distal stent in the right PLV was oversized
, compared to the rest of the RCA.  This could have been an element of spasm.  
Hence, we gave the patient a total of 200 mcg of Cardene and 300 mcg of 
nitroglycerin.  Even after vasodilatation, we noticed that the distal edge was 
a little pinched.  Hence, we decided to dilate this using a 2.0 x 15 mm MINI 
TREK RX balloon for 10 atmospheres for 24 seconds.  Excellent angiographic 
results were achieved at the distal edge of the stent with restoration of MARIA LUISA-
3 flow to the PLV.  We then removed the stent and postdilated both the stents 
using a 2.75 x 20 mm TREK NC balloon for a total of 6 different inflations, 
lasting 16 atmospheres for at least 16 seconds.  Excellent stent expansion and 
apposition were achieved.  There was no evidence of edge dissection or distal 
embolization.  MARIA LUISA-3 flow was restored to the PDA and the PLV territories.  
Excellent flow was noted across the stent.  Good stent expansion and apposition 
were achieved.  Wire-out frames confirmed the above findings as well. 

The patient tolerated the procedure very well without any evidence of 
hemodynamic complications, and was transferred out of the cath lab in stable 
condition without any chest pain. 



Dr. Delcid, my attending, was scrubbed and performed key portions of the 
procedure and supervised the rest of it as well.



TOTAL CONTRAST USED:  340 mL.



TOTAL AIR KERMA:  3528 mGy. 



Right common femoral angiogram demonstrated a high bifurcation of the right side
, and hence, we opted for manual compression method.



LESION CHARACTERISTICS:  

 1. RCA  ACC/AHA type C lesion.

 2. MARIA LUISA 0 flow was noted preprocedure, and MARIA LUISA-3 flow was restored after the 
procedure.



IMPRESSION:  

 1. Successful chronic total occlusion and revascularization of the proximal 
RCA with antegrade wire escalation technique, extending into the right PLV with 
3 Celestine (all Synergy - distal 2.25 x 38 mm, mid 2.25 x 38 mm and proximal 2.5 x 
32 mm) in overlapping fashion with excellent angiographic results.

 2. Aspirin, Plavix, and Coumadin therapy, given the fact that the patient had 
a recent pulmonary embolism and needs Coumadin therapy.  Once the INR reaches 
therapeutic level, we recommend continuing Plavix and Coumadin x 12 months.



Tomasz Delcid MD





PCG/Corina

DD:  2017 15:12:35  DT:  2017 16:09:41

Job #:  [id number]



cc: 

  - RAIZA CHAMPAGNE 







* ECG-SCAN (2017  7:30 AM)





 Narrative

 

 



Ordered by an unspecified provider.





* PROTIME INR (PT) (2017  3:45 AM)



Only the most recent of 2 results within the time period is included.





  



  Component   Value   Ref Range

 

  



  INR   1.1   0.8 - 1.2









 



  Specimen   Performing Laboratory

 

 



  Blood   [facility]



   [address] [place] [address]





* CBC (2017  3:45 AM)



Only the most recent of 2 results within the time period is included.





  



  Component   Value   Ref Range

 

  



  White Blood Cells   9.6   4.5 - 11.0 K/UL

 

  



  RBC   4.23 (L)   4.4 - 5.5 M/UL

 

  



  Hemoglobin   12.3 (L)   13.5 - 16.5 GM/DL

 

  



  Hematocrit   36.3 (L)   40 - 50 %

 

  



  MCV   85.8   80 - 100 FL

 

  



  MCH   29.1   26 - 34 PG

 

  



  MCHC   34.0   32.0 - 36.0 G/DL

 

  



  RDW   13.9   11 - 15 %

 

  



  Platelet Count   279   150 - 400 K/UL

 

  



  MPV   7.5   7 - 11 FL









 



  Specimen   Performing Laboratory

 

 



  Blood   [facility]



   [address] [place] [address]





* BASIC METABOLIC PANEL (2017  3:45 AM)



Only the most recent of 2 results within the time period is included.





  



  Component   Value   Ref Range

 

  



  Sodium   138   137 - 147 MMOL/L

 

  



  Potassium   3.9   3.5 - 5.1 MMOL/L

 

  



  Chloride   105   98 - 110 MMOL/L

 

  



  CO2   25   21 - 30 MMOL/L

 

  



  Anion Gap   8   3 - 12

 

  



  Glucose   107 (H)   70 - 100 MG/DL

 

  



  Blood Urea Nitrogen   12   7 - 25 MG/DL

 

  



  Creatinine   0.72   0.4 - 1.24 MG/DL

 

  



  Calcium   9.2   8.5 - 10.6 MG/DL

 

  



  eGFR Non African American   >60   >60 mL/min



   Comment: 



   The eGFR is not validated for use in drug dosing 



   adjustments.    Continue to use 



   estimated creatinine clearance per dosing 



   reference text.    Please contact the 



   Clinical Pharmacist for questions. 

 

  



  eGFR    >60   >60 mL/min



   Comment: 



   The eGFR is not validated for use in drug dosing 



   adjustments.    Continue to use 



   estimated creatinine clearance per dosing 



   reference text.    Please contact the 



   Clinical Pharmacist for questions. 









 



  Specimen   Performing Laboratory

 

 



  Blood   [facility]



   [address] [place] [address]





* ECG-SCAN (2017  9:40 PM)





 Narrative

 

 



Ordered by an unspecified provider.





* POC ACTIVATED CLOTTING TIME (2017  4:49 PM)



Only the most recent of 8 results within the time period is included.





  



  Component   Value   Ref Range

 

  



  Activated Clotting Time   162   s









 



  Specimen   Performing Laboratory

 

 



   [facility]



   [address] [place] [address]





from Last 3 Months

## 2018-01-29 NOTE — XMS REPORT
Encounter Summary

 Created on: 2018



Francisco Huber

External Reference #: [id number]

: 1970

Sex: Male



Demographics







 Address  [address] [place]  [address]

 

 Home Phone  [phone]

 

 Preferred Language  English

 

 Marital Status  Unknown

 

 Yazidi Affiliation  NON

 

 Race  White

 

 Ethnic Group  Not  or 





Author







 Author  [facility]

 

 Organization  [facility]

 

 Address  Unknown

 

 Phone  Unavailable







Support







 Name  Relationship  Address  Phone

 

 Steffanie Huber  Unknown  [phone]







Care Team Providers







 Care Team Member Name  Role  Phone

 

 Neftali Bacon MD  23  [phone]

 

 Mahesh Champagne MD  PCP  [phone]







Reason for Visit

* Auth/Cert (Routine)





     



  Status   Reason   Specialty   Diagnoses /   Referred By   Referred To



     Procedures   Contact   Contact

 

     











Encounter Details







    



  Date   Type   Department   Care Team   Description

 

    



  2017   [facility]   Yue Zhu APRN   Atherosclerotic 
heart



   Encounter   [address]   [address]   disease of [place]



    [place] [address]   [address]   coronary artery [place]



     [place] [address]   unspecified angina



     [phone]   pectoris ([facility])



     [phone] (Fax) 







Social History







    



  Tobacco Use   Types   Packs/Day   Years Used   Date

 

    



  Current Every Day Smoker    









   



  Alcohol Use   Drinks/Week   oz/Week   Comments

 

   



  No   









 



  Sex Assigned at Birth   Date Recorded

 

 



  Not on file 



as of this encounter



Medications at Time of Discharge







     



  Medication   Sig.   Disp.   Refills   Start Date   End Date

 

     



  atorvastatin (LIPITOR) 40   Take 40 mg by mouth    



  mg tablet   daily.    

 

     



  carisoprodol(+) (SOMA)   Take 350 mg by mouth at    



  350 mg tablet   bedtime daily.    

 

     



  clopiDOGrel (PLAVIX) 75   Take 75 mg by mouth    



  mg tablet   daily.    

 

     



  gabapentin (NEURONTIN)   Take 600 mg by mouth four    



  600 mg tablet   times daily.    

 

     



  gemfibrozil (LOPID) 600   Take 600 mg by mouth    



  mg tablet   twice daily.    

 

     



  HYDROcodone/acetaminophen   Take 1 tablet by mouth    



  (NORCO) 7.5/325 mg tablet   every 6 hours as needed    



   for Pain    

 

     



  metoprolol XL (TOPROL XL)   Take 25 mg by mouth    



  25 mg extended release   daily.    



  tablet     

 

     



  nitroglycerin (NITROSTAT)   Place 0.4 mg under tongue    



  0.4 mg tablet   every 5 minutes as needed    



   for Chest Pain. Max of 3    



   tablets, call 911.    

 

     



  Omega-3 Acid Ethyl Esters   Take 1 g by mouth three    



  1 gram cap   times daily.    

 

     



  omeprazole DR(+)   Take 20 mg by mouth daily    



  (PRILOSEC) 20 mg capsule   before breakfast.    

 

     



  sacubitril/valsartan   Take 1 tablet by mouth    



  (ENTRESTO) 24/26 mg   twice daily.    



  tablet     

 

     



  tiZANidine (ZANAFLEX) 4   Take 8 mg by mouth every    



  mg tabletIndications:   8 hours as needed.    



  MUSCLE SPASM   Indications: MUSCLE SPASM    

 

     



  warfarin (COUMADIN) 5 mg   Take 5 mg by mouth as    



  tabletIndications:   directed. Take 7.5 mg by    



  THROMBOTIC DISORDER   mouth on Mon and Wed.    



   Take 5 mg by mouth on    



   , Tues, Thurs, Fri,    



   and Sat. Take at bedtime.    



   Indications: THROMBOTIC    



   DISORDER    

 

     



  aspirin EC 81 mg   Take 1 tablet by mouth   90 tablet   3   2017



  tabletIndications:   daily for 7 days. Take    



  Coronary artery disease   with food.    



  involving native coronary     



  artery of native heart     



  with angina pectoris     



  (HCC), Ischemic     



  cardiomyopathy, NSTEMI     



  (non-ST elevated     



  myocardial infarction)     



  (HCC), Tobacco abuse     

 

     



  aspirin EC 81 mg tablet   Take 81 mg by mouth      2017



   daily. Take with food.    



as of this encounter



Plan of Treatment





Not on fileas of this encounter



Results

* PROTIME INR (PT) (2017  7:43 AM)





  



  Component   Value   Ref Range

 

  



  INR   1.0   0.8 - 1.2









 



  Specimen   Performing Laboratory

 

 



    MAIN LAB



   [address] [place] [address]





in this encounter



Visit Diagnoses

Not on filein this encounter



Admitting Diagnoses











  Diagnosis

 





  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 
angina pectoris (HCC)

 





  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 
angina pectoris

## 2018-01-29 NOTE — XMS REPORT
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                         Ot            272.4                        
          

 

 2015                         Ot            401.9                        
          

 

 2015                         Ot            414.01                       
           

 

 2015                         Ot            401.9                        
          

 

 2015                         Ot            414.01                       
           

 

 2015                         Ot            428.0                        
          

 

 2015                         Ot            719.40                       
           

 

 2015                         Ot            782.3                        
          

 

 2015                         Ot            V58.61                       
           

 

 2015                         Ot            272.1                        
          

 

 2015                         Ot            414.00                       
           

 

 2015                         Ot            786.50                       
           

 

 2015                         Ot            397.0                        
          

 

 2015                         Ot            414.00                       
           

 

 2015                         Ot            424.0                        
          

 

 2015                         Ot            786.50                       
           

 

 2015                         Ot            272.4                        
          

 

 2015                         Ot            401.9                        
          

 

 2015                         Ot            414.01                       
           

 

 2015                         Ot            401.9                        
          

 

 2015                         Ot            414.01                       
           

 

 2015                         Ot            428.0                        
          

 

 2015                         Ot            719.40                       
           

 

 2015                         Ot            782.3                        
          

 

 2015                         Ot            V58.61                       
           

 

 2015            NORTH HERNANDEZ MD            Ot            V12.51      
                            

 

 2015            NORTH HERNANDEZ MD            Ot            V58.61      
                            

 

 2015            NORTH HERNANDEZ MD            Ot            V58.83      
                            

 

 2015            COLE CONTRERAS MD            Ot            274.9      
      GOUT NOS                     

 

 2015            COLE CONTRERAS MD            Ot            729.5      
      PAIN IN LIMB                     

 

 2015                         Ot            272.1                        
          

 

 2015                         Ot            414.00                       
           

 

 2015                         Ot            786.50                       
           

 

 2015                         Ot            397.0                        
          

 

 2015                         Ot            414.00                       
           

 

 2015                         Ot            424.0                        
          

 

 2015                         Ot            786.50                       
           

 

 2015                         Ot            272.4                        
          

 

 2015                         Ot            401.9                        
          

 

 2015                         Ot            414.01                       
           

 

 2015                         Ot            401.9                        
          

 

 2015                         Ot            414.01                       
           

 

 2015                         Ot            428.0                        
          

 

 2015                         Ot            719.40                       
           

 

 2015                         Ot            782.3                        
          

 

 2015                         Ot            V58.61                       
           

 

 2015            NORTH HERNANDEZ MD            Ot            V12.51      
                            

 

 2015            NORTH HERNANDEZ MD            Ot            V58.61      
                            

 

 2015            NORTH HERNANDEZ MD            Ot            V58.83      
                            

 

 08/10/2015            NORTH HERNANDEZ MD            Ot            V12.51      
                            

 

 08/10/2015            NORTH HERNANDEZ MD            Ot            V58.61      
                            

 

 08/10/2015            NORTH HERNANDEZ MD            Ot            V58.83      
                            

 

 2015            NORTH HERNANDEZ MD            Ot            V12.51      
                            

 

 2015            NORTH HERNANDEZ MD            Ot            V58.61      
                            

 

 2015            NORTH HERNANDEZ MD            Ot            V58.83      
                            

 

 2015            NORTH HERNANDEZ MD            Ot            V12.51      
      HX-VENOUS THROMBOSIS EMBOLISM                     

 

 2015            NORTH HERNANDEZ MD            Ot            V58.61      
      ANTICOAGULANTS,LT,CURRENT USE                     

 

 2015            NORTH HERNANDEZ MD            Ot            V58.83      
      ENCOUNTER FOR THERAPEUTIC DRUG MONITORIN                     

 

 2015                         Ot            270.4                        
          

 

 2015                         Ot            305.1                        
          

 

 2015                         Ot            536.8                        
          

 

 2015                         Ot            729.81                       
           

 

 2015                         Ot            V12.51                       
           

 

 2015                         Ot            V58.61                       
           

 

 2015                         Ot            V58.69                       
           

 

 2015                         Ot            270.4                        
          

 

 2015                         Ot            305.1                        
          

 

 2015                         Ot            536.8                        
          

 

 2015                         Ot            V12.51                       
           

 

 2015                         Ot            V58.61                       
           

 

 2015                         Ot            V58.69                       
           

 

 2015                         Ot            272.4                        
          

 

 2015                         Ot            729.5                        
          

 

 2015                         Ot            780.79                       
           

 

 2015                         Ot            270.4                        
          

 

 2015                         Ot            305.1                        
          

 

 2015                         Ot            536.8                        
          

 

 2015                         Ot            729.5                        
          

 

 2015                         Ot            V12.51                       
           

 

 2015                         Ot            V58.69                       
           

 

 2015                         Ot            270.4                        
          

 

 2015                         Ot            305.1                        
          

 

 2015                         Ot            536.8                        
          

 

 2015                         Ot            V12.51                       
           

 

 2015                         Ot            V58.69                       
           

 

 2015                         Ot            786.09                       
           

 

 2015                         Ot            786.50                       
           

 

 2015                         Ot            786.09                       
           

 

 2015                         Ot            786.50                       
           

 

 2015                         Ot            270.4                        
          

 

 2015                         Ot            305.1                        
          

 

 2015                         Ot            533.90                       
           

 

 2015                         Ot            V12.51                       
           

 

 2015                         Ot            272.4                        
          

 

 2015                         Ot            401.9                        
          

 

 2015                         Ot            414.00                       
           

 

 2015                         Ot            272.4                        
          

 

 2015                         Ot            356.9                        
          

 

 2015                         Ot            272.4                        
          

 

 2015                         Ot            729.5                        
          

 

 2015                         Ot            V58.61                       
           

 

 2015                         Ot            272.1                        
          

 

 2015                         Ot            414.00                       
           

 

 2015                         Ot            786.50                       
           

 

 2015                         Ot            397.0                        
          

 

 2015                         Ot            414.00                       
           

 

 2015                         Ot            424.0                        
          

 

 2015                         Ot            786.50                       
           

 

 2015                         Ot            272.4                        
          

 

 2015                         Ot            401.9                        
          

 

 2015                         Ot            414.01                       
           

 

 2015                         Ot            401.9                        
          

 

 2015                         Ot            414.01                       
           

 

 2015                         Ot            428.0                        
          

 

 2015                         Ot            719.40                       
           

 

 2015                         Ot            782.3                        
          

 

 2015                         Ot            V58.61                       
           

 

 2015                         Ot            272.1                        
          

 

 2015                         Ot            414.00                       
           

 

 2015                         Ot            786.50                       
           

 

 2015                         Ot            397.0                        
          

 

 2015                         Ot            414.00                       
           

 

 2015                         Ot            424.0                        
          

 

 2015                         Ot            786.50                       
           

 

 2015                         Ot            272.4                        
          

 

 2015                         Ot            401.9                        
          

 

 2015                         Ot            414.01                       
           

 

 2015                         Ot            401.9                        
          

 

 2015                         Ot            414.01                       
           

 

 2015                         Ot            428.0                        
          

 

 2015                         Ot            719.40                       
           

 

 2015                         Ot            782.3                        
          

 

 2015                         Ot            V58.61                       
           

 

 2016                         Ot            414.00            CORON 
ATHEROSCLER NOS TYPE VESSEL, NATIV                     

 

 2016                         Ot            786.50            CHEST PAIN 
NOS                     

 

 2016                         Ot            397.0            TRICUSPID 
VALVE DISEASE                     

 

 2016                         Ot            414.00            CORON 
ATHEROSCLER NOS TYPE VESSEL, NATIV                     

 

 2016                         Ot            424.0            MITRAL VALVE 
DISORDER                     

 

 2016                         Ot            786.50            CHEST PAIN 
NOS                     

 

 2016                         Ot            272.4            
HYPERLIPIDEMIA NEC/NOS                     

 

 2016                         Ot            401.9            HYPERTENSION 
NOS                     

 

 2016                         Ot            414.01            CORONARY 
ATHEROSCLEROSIS OF NATIVE CORON                     

 

 2016                         Ot            401.9            HYPERTENSION 
NOS                     

 

 2016                         Ot            414.01            CORONARY 
ATHEROSCLEROSIS OF NATIVE CORON                     

 

 2016                         Ot            428.0            CONGESTIVE 
HEART FAILURE NOS                     

 

 2016                         Ot            719.40            JOINT PAIN-
UNSPEC                     

 

 2016                         Ot            782.3            EDEMA       
              

 

 2016                         Ot            V58.61            
ANTICOAGULANTS,LT,CURRENT USE                     

 

 2016            RADHA ARELLANO APRN            Ot            F17.210    
        NICOTINE DEPENDENCE, CIGARETTES, UNCOMPL                     

 

 2016            RADHA ARELLANO APRN            Ot            M25.511    
        PAIN IN RIGHT SHOULDER                     

 

 2016            NORTH HERNANDEZ MD            Ot            I82.403     
       ACUTE EMBOLISM AND THOMBOS UNSP DEEP VEI                     

 

 2016            NORTH HERNANDEZ MD            Ot            Z79.01      
      LONG TERM (CURRENT) USE OF ANTICOAGULANT                     

 

 08/15/2016            NORTH HERNANDEZ MD            Ot            I82.403     
       ACUTE EMBOLISM AND THOMBOS UNSP DEEP VEI                     

 

 08/15/2016            NORTH HERNANDEZ MD            Ot            Z79.01      
      LONG TERM (CURRENT) USE OF ANTICOAGULANT                     

 

 2016            NORTH HERNANDEZ MD            Ot            I82.403     
       ACUTE EMBOLISM AND THOMBOS UNSP DEEP VEI                     

 

 2016            NORTH HERNANDEZ MD            Ot            Z79.01      
      LONG TERM (CURRENT) USE OF ANTICOAGULANT                     

 

 10/07/2016                         Ot            414.00            CORON 
ATHEROSCLER NOS TYPE VESSEL, NATIV                     

 

 10/07/2016                         Ot            786.50            CHEST PAIN 
NOS                     

 

 10/07/2016                         Ot            397.0            TRICUSPID 
VALVE DISEASE                     

 

 10/07/2016                         Ot            414.00            CORON 
ATHEROSCLER NOS TYPE VESSEL, NATIV                     

 

 10/07/2016                         Ot            424.0            MITRAL VALVE 
DISORDER                     

 

 10/07/2016                         Ot            786.50            CHEST PAIN 
NOS                     

 

 10/07/2016                         Ot            272.4            
HYPERLIPIDEMIA NEC/NOS                     

 

 10/07/2016                         Ot            401.9            HYPERTENSION 
NOS                     

 

 10/07/2016                         Ot            414.01            CORONARY 
ATHEROSCLEROSIS OF NATIVE CORON                     

 

 10/07/2016                         Ot            401.9            HYPERTENSION 
NOS                     

 

 10/07/2016                         Ot            414.01            CORONARY 
ATHEROSCLEROSIS OF NATIVE CORON                     

 

 10/07/2016                         Ot            428.0            CONGESTIVE 
HEART FAILURE NOS                     

 

 10/07/2016                         Ot            719.40            JOINT PAIN-
UNSPEC                     

 

 10/07/2016                         Ot            782.3            EDEMA       
              

 

 10/07/2016                         Ot            V58.61            
ANTICOAGULANTS,LT,CURRENT USE                     

 

 10/10/2016            NORTH HERNANDEZ MD            Ot            I82.403     
       ACUTE EMBOLISM AND THOMBOS UNSP DEEP VEI                     

 

 10/10/2016            NORTH HERNANDEZ MD            Ot            Z79.01      
      LONG TERM (CURRENT) USE OF ANTICOAGULANT                     

 

 2016            NORTH HERNANDEZ MD            Ot            I82.403     
       ACUTE EMBOLISM AND THOMBOS UNSP DEEP VEI                     

 

 2016            NORTH HERNANDEZ MD            Ot            Z79.01      
      LONG TERM (CURRENT) USE OF ANTICOAGULANT                     

 

 2016                         Ot            414.00            CORON 
ATHEROSCLER NOS TYPE VESSEL, NATIV                     

 

 2016                         Ot            786.50            CHEST PAIN 
NOS                     

 

 2016                         Ot            397.0            TRICUSPID 
VALVE DISEASE                     

 

 2016                         Ot            414.00            CORON 
ATHEROSCLER NOS TYPE VESSEL, NATIV                     

 

 2016                         Ot            424.0            MITRAL VALVE 
DISORDER                     

 

 2016                         Ot            786.50            CHEST PAIN 
NOS                     

 

 2016                         Ot            272.4            
HYPERLIPIDEMIA NEC/NOS                     

 

 2016                         Ot            401.9            HYPERTENSION 
NOS                     

 

 2016                         Ot            414.01            CORONARY 
ATHEROSCLEROSIS OF NATIVE CORON                     

 

 2016                         Ot            401.9            HYPERTENSION 
NOS                     

 

 2016                         Ot            414.01            CORONARY 
ATHEROSCLEROSIS OF NATIVE CORON                     

 

 2016                         Ot            428.0            CONGESTIVE 
HEART FAILURE NOS                     

 

 2016                         Ot            719.40            JOINT PAIN-
UNSPEC                     

 

 2016                         Ot            782.3            EDEMA       
              

 

 2016                         Ot            V58.61            
ANTICOAGULANTS,LT,CURRENT USE                     

 

 2016            NORTH HERNANDEZ MD            Ot            I82.403     
       ACUTE EMBOLISM AND THOMBOS UNSP DEEP VEI                     

 

 2016            NORTH HERNANDEZ MD            Ot            Z79.01      
      LONG TERM (CURRENT) USE OF ANTICOAGULANT                     

 

 2016            NORTH HERNANDEZ MD            Ot            I11.0       
     HYPERTENSIVE HEART DISEASE WITH HEART FA                     

 

 2016            NORTH HERNANDEZ MD            Ot            I25.10      
      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                      

 

 2016            NORTH HERNANDEZ MD            Ot            I26.99      
      OTHER PULMONARY EMBOLISM WITHOUT ACUTE C                     

 

 2016            NORTH HERNANDEZ MD            Ot            I50.9       
     HEART FAILURE, UNSPECIFIED                     

 

 2016            NORTH HERNANDEZ MD            Ot            I82.409     
       ACUTE EMBOLISM AND THOMBOS UNSP DEEP VN                      

 

 2016            NORTH HERNANDEZ MD            Ot            R07.9       
     CHEST PAIN, UNSPECIFIED                     

 

 2016                         Ot            414.00            CORON 
ATHEROSCLER NOS TYPE VESSEL, NATIV                     

 

 2016                         Ot            786.50            CHEST PAIN 
NOS                     

 

 2016                         Ot            397.0            TRICUSPID 
VALVE DISEASE                     

 

 2016                         Ot            414.00            CORON 
ATHEROSCLER NOS TYPE VESSEL, NATIV                     

 

 2016                         Ot            424.0            MITRAL VALVE 
DISORDER                     

 

 2016                         Ot            786.50            CHEST PAIN 
NOS                     

 

 2016                         Ot            272.4            
HYPERLIPIDEMIA NEC/NOS                     

 

 2016                         Ot            401.9            HYPERTENSION 
NOS                     

 

 2016                         Ot            414.01            CORONARY 
ATHEROSCLEROSIS OF NATIVE CORON                     

 

 2016                         Ot            401.9            HYPERTENSION 
NOS                     

 

 2016                         Ot            414.01            CORONARY 
ATHEROSCLEROSIS OF NATIVE CORON                     

 

 2016                         Ot            428.0            CONGESTIVE 
HEART FAILURE NOS                     

 

 2016                         Ot            719.40            JOINT PAIN-
UNSPEC                     

 

 2016                         Ot            782.3            EDEMA       
              

 

 2016                         Ot            V58.61            
ANTICOAGULANTS,LT,CURRENT USE                     

 

 2016            NORTH HERNANDEZ MD            Ot            I82.403     
       ACUTE EMBOLISM AND THOMBOS UNSP DEEP VEI                     

 

 2016            NORTH HERNANDEZ MD            Ot            Z79.01      
      LONG TERM (CURRENT) USE OF ANTICOAGULANT                     

 

 2016            NORTH HERNANDEZ MD            Ot            I11.0       
     HYPERTENSIVE HEART DISEASE WITH HEART FA                     

 

 2016            NORTH HERNANDEZ MD            Ot            I25.10      
      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                      

 

 2016            NORTH HERNANDEZ MD            Ot            I26.99      
      OTHER PULMONARY EMBOLISM WITHOUT ACUTE C                     

 

 2016            NORTH HERNANDEZ MD            Ot            I50.9       
     HEART FAILURE, UNSPECIFIED                     

 

 2016            NORTH HERNANDEZ MD            Ot            I82.409     
       ACUTE EMBOLISM AND THOMBOS UNSP DEEP VN                      

 

 2016            NROTH HERNANDEZ MD            Ot            R07.9       
     CHEST PAIN, UNSPECIFIED                     

 

 2016            NORTH HERNANDEZ MD            Ot            I25.10      
      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                      

 

 2016            NORTH HERNANDEZ MD            Ot            I50.9       
     HEART FAILURE, UNSPECIFIED                     

 

 2016            NORTH HERNANDEZ MD            Ot            R07.9       
     CHEST PAIN, UNSPECIFIED                     

 

 2016            NORTH HERNANDEZ MD            Ot            I25.10      
      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                      

 

 2016            NORTH HERNANDEZ MD            Ot            I50.9       
     HEART FAILURE, UNSPECIFIED                     

 

 2016            NORTH HERNANDEZ MD            Ot            R07.9       
     CHEST PAIN, UNSPECIFIED                     

 

 2016                         Ot            414.00            CORON 
ATHEROSCLER NOS TYPE VESSEL, NATIV                     

 

 2016                         Ot            786.50            CHEST PAIN 
NOS                     

 

 2016                         Ot            397.0            TRICUSPID 
VALVE DISEASE                     

 

 2016                         Ot            414.00            CORON 
ATHEROSCLER NOS TYPE VESSEL, NATIV                     

 

 2016                         Ot            424.0            MITRAL VALVE 
DISORDER                     

 

 2016                         Ot            786.50            CHEST PAIN 
NOS                     

 

 2016                         Ot            272.4            
HYPERLIPIDEMIA NEC/NOS                     

 

 2016                         Ot            401.9            HYPERTENSION 
NOS                     

 

 2016                         Ot            414.01            CORONARY 
ATHEROSCLEROSIS OF NATIVE CORON                     

 

 2016                         Ot            401.9            HYPERTENSION 
NOS                     

 

 2016                         Ot            414.01            CORONARY 
ATHEROSCLEROSIS OF NATIVE CORON                     

 

 2016                         Ot            428.0            CONGESTIVE 
HEART FAILURE NOS                     

 

 2016                         Ot            719.40            JOINT PAIN-
UNSPEC                     

 

 2016                         Ot            782.3            EDEMA       
              

 

 2016                         Ot            V58.61            
ANTICOAGULANTS,LT,CURRENT USE                     

 

 2016            NORTH HERNANDEZ MD            Ot            I82.403     
       ACUTE EMBOLISM AND THOMBOS UNSP DEEP VEI                     

 

 2016            NORTH HERNANDEZ MD            Ot            Z79.01      
      LONG TERM (CURRENT) USE OF ANTICOAGULANT                     

 

 2016            NORTH HERNANDEZ MD            Ot            I11.0       
     HYPERTENSIVE HEART DISEASE WITH HEART FA                     

 

 2016            NORTH HERNANDEZ MD            Ot            I25.10      
      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                      

 

 2016            NORTH HERNANDEZ MD            Ot            I26.99      
      OTHER PULMONARY EMBOLISM WITHOUT ACUTE C                     

 

 2016            NORTH HERNANDEZ MD            Ot            I50.9       
     HEART FAILURE, UNSPECIFIED                     

 

 2016            NORTH HERNANDEZ MD            Ot            I82.409     
       ACUTE EMBOLISM AND THOMBOS UNSP DEEP VN                      

 

 2016            NORTH HERNANDEZ MD            Ot            R07.9       
     CHEST PAIN, UNSPECIFIED                     

 

 2016            NORTH HERNANDEZ MD            Ot            I25.10      
      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                      

 

 2016            NORTH HERNANDEZ MD            Ot            I50.9       
     HEART FAILURE, UNSPECIFIED                     

 

 2016            NORTH HERNANDEZ MD            Ot            R07.9       
     CHEST PAIN, UNSPECIFIED                     

 

 2016            NORTH HERNANDEZ MD            Ot            I11.0       
     HYPERTENSIVE HEART DISEASE WITH HEART FA                     

 

 2016            NORTH HERNANDEZ MD            Ot            I25.10      
      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                      

 

 2016            NORTH HERNANDEZ MD            Ot            I26.99      
      OTHER PULMONARY EMBOLISM WITHOUT ACUTE C                     

 

 2016            NORTH HERNANDEZ MD            Ot            I50.9       
     HEART FAILURE, UNSPECIFIED                     

 

 2016            NORTH HERNANDEZ MD            Ot            I82.409     
       ACUTE EMBOLISM AND THOMBOS UNSP DEEP VN                      

 

 2016            NORTH HERNANDEZ MD            Ot            R07.9       
     CHEST PAIN, UNSPECIFIED                     

 

 2016            NORTH HERNANDEZ MD            Ot            E78.5       
     HYPERLIPIDEMIA, UNSPECIFIED                     

 

 2016            NORTH HERNANDEZ MD            Ot            F17.210     
       NICOTINE DEPENDENCE, CIGARETTES, UNCOMPL                     

 

 2016            NORTH HERNANDEZ MD            Ot            I10         
   ESSENTIAL (PRIMARY) HYPERTENSION                     

 

 2016            NORTH HERNANDEZ MD            Ot            I25.10      
      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                      

 

 2016            NORTH HERNANDEZ MD            Ot            R07.89      
      OTHER CHEST PAIN                     

 

 2016            NORTH HERNANDEZ MD            Ot            R94.39      
      ABNORMAL RESULT OF OTHER CARDIOVASCULAR                      

 

 2016            NORTH HERNANDEZ MD            Ot            Z79.01      
      LONG TERM (CURRENT) USE OF ANTICOAGULANT                     

 

 2016            NORTH HERNANDEZ MD            Ot            Z79.899     
       OTHER LONG TERM (CURRENT) DRUG THERAPY                     

 

 2016            NORTH HERNANDEZ MD            Ot            Z86.718     
       PERSONAL HISTORY OF OTHER VENOUS THROMBO                     

 

 2016            NORTH HERNANDEZ MD            Ot            Z95.5       
     PRESENCE OF CORONARY ANGIOPLASTY IMPLANT                     

 

 2016            NORTH HERNANDEZ MD            Ot            I25.10      
      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                      

 

 2016            NORTH HERNANDEZ MD            Ot            I50.9       
     HEART FAILURE, UNSPECIFIED                     

 

 2016            NORTH HERNANDEZ MD            Ot            R07.9       
     CHEST PAIN, UNSPECIFIED                     

 

 2016            NORTH HERNANDEZ MD            Ot            I25.10      
      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                      

 

 2016            NORTH HERNANDEZ MD            Ot            I50.9       
     HEART FAILURE, UNSPECIFIED                     

 

 2016            NORTH HERNANDEZ MD            Ot            R07.9       
     CHEST PAIN, UNSPECIFIED                     

 

 2017            NORTH HERNANDEZ MD            Ot            I82.403     
       ACUTE EMBOLISM AND THOMBOS UNSP DEEP VEI                     

 

 2017            NORTH HERNANDEZ MD            Ot            Z79.01      
      LONG TERM (CURRENT) USE OF ANTICOAGULANT                     

 

 2017            NORTH HERNANDEZ MD            Ot            I82.403     
       ACUTE EMBOLISM AND THOMBOS UNSP DEEP VEI                     

 

 2017            NORTH HERNANDEZ MD            Ot            Z79.01      
      LONG TERM (CURRENT) USE OF ANTICOAGULANT                     

 

 2017            NORTH HERNANDEZ MD            Ot            I82.403     
       ACUTE EMBOLISM AND THOMBOS UNSP DEEP VEI                     

 

 2017            NORTH HERNANDEZ MD            Ot            Z79.01      
      LONG TERM (CURRENT) USE OF ANTICOAGULANT                     

 

 2017            NORTH HERNANDEZ MD            Ot            I82.403     
       ACUTE EMBOLISM AND THOMBOS UNSP DEEP VEI                     

 

 2017            NORTH HERNANDEZ MD            Ot            Z79.01      
      LONG TERM (CURRENT) USE OF ANTICOAGULANT                     

 

 2017            NORTH HERNANDEZ MD            Ot            I82.403     
       ACUTE EMBOLISM AND THOMBOS UNSP DEEP VEI                     

 

 2017            NORTH HERNANDEZ MD            Ot            Z79.01      
      LONG TERM (CURRENT) USE OF ANTICOAGULANT                     

 

 02/15/2017            NORTH HERNANDEZ MD            Ot            I82.403     
       ACUTE EMBOLISM AND THOMBOS UNSP DEEP VEI                     

 

 02/15/2017            NORTH HERNANDEZ MD            Ot            Z79.01      
      LONG TERM (CURRENT) USE OF ANTICOAGULANT                     

 

 2017            NORTH HERNANDEZ MD            Ot            I82.403     
       ACUTE EMBOLISM AND THOMBOS UNSP DEEP VEI                     

 

 2017            NORTH HERNANDEZ MD            Ot            Z79.01      
      LONG TERM (CURRENT) USE OF ANTICOAGULANT                     

 

 2017            NORTH HERNANDEZ MD            Ot            I82.403     
       ACUTE EMBOLISM AND THOMBOS UNSP DEEP VEI                     

 

 2017            NORTH HERNANDEZ MD            Ot            Z79.01      
      LONG TERM (CURRENT) USE OF ANTICOAGULANT                     

 

 2017            NORTH HERNANDEZ MD            Ot            I82.403     
       ACUTE EMBOLISM AND THOMBOS UNSP DEEP VEI                     

 

 2017            NORTH HERNANDEZ MD            Ot            Z79.01      
      LONG TERM (CURRENT) USE OF ANTICOAGULANT                     

 

 2017                         Ot            414.00            CORON 
ATHEROSCLER NOS TYPE VESSEL, NATIV                     

 

 2017                         Ot            786.50            CHEST PAIN 
NOS                     

 

 2017                         Ot            397.0            TRICUSPID 
VALVE DISEASE                     

 

 2017                         Ot            414.00            CORON 
ATHEROSCLER NOS TYPE VESSEL, NATIV                     

 

 2017                         Ot            424.0            MITRAL VALVE 
DISORDER                     

 

 2017                         Ot            786.50            CHEST PAIN 
NOS                     

 

 2017                         Ot            272.4            
HYPERLIPIDEMIA NEC/NOS                     

 

 2017                         Ot            401.9            HYPERTENSION 
NOS                     

 

 2017                         Ot            414.01            CORONARY 
ATHEROSCLEROSIS OF NATIVE CORON                     

 

 2017                         Ot            401.9            HYPERTENSION 
NOS                     

 

 2017                         Ot            414.01            CORONARY 
ATHEROSCLEROSIS OF NATIVE CORON                     

 

 2017                         Ot            428.0            CONGESTIVE 
HEART FAILURE NOS                     

 

 2017                         Ot            719.40            JOINT PAIN-
UNSPEC                     

 

 2017                         Ot            782.3            EDEMA       
              

 

 2017                         Ot            V58.61            
ANTICOAGULANTS,LT,CURRENT USE                     

 

 2017            NORTH HERNANDEZ MD            Ot            I11.0       
     HYPERTENSIVE HEART DISEASE WITH HEART FA                     

 

 2017            NORTH HERNANDEZ MD            Ot            I25.10      
      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                      

 

 2017            NORTH HERNANDEZ MD            Ot            I26.99      
      OTHER PULMONARY EMBOLISM WITHOUT ACUTE C                     

 

 2017            NORTH HERNANDEZ MD            Ot            I50.9       
     HEART FAILURE, UNSPECIFIED                     

 

 2017            NORTH HERNANDEZ MD            Ot            I82.409     
       ACUTE EMBOLISM AND THOMBOS UNSP DEEP VN                      

 

 2017            NORTH HERNANDEZ MD            Ot            R07.9       
     CHEST PAIN, UNSPECIFIED                     

 

 2017            NORTH HERNANDEZ MD            Ot            I25.10      
      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                      

 

 2017            NORTH HERNANDEZ MD            Ot            I50.9       
     HEART FAILURE, UNSPECIFIED                     

 

 2017            NORTH HERNANDEZ MD            Ot            R07.9       
     CHEST PAIN, UNSPECIFIED                     

 

 2017            DAMARIS WYNN            Ot            F17.210  
          NICOTINE DEPENDENCE, CIGARETTES, UNCOMPL                     

 

 2017            ASHIA VILLA DAMARIS L            Ot            R10.10   
         UPPER ABDOMINAL PAIN, UNSPECIFIED                     

 

 2017            DAMARIS WYNN            Ot            R10.13   
         EPIGASTRIC PAIN                     

 

 2017            DAMARIS WYNN            Ot            Z79.01   
         LONG TERM (CURRENT) USE OF ANTICOAGULANT                     

 

 2017            DAMARIS WYNN            Ot            Z79.82   
         LONG TERM (CURRENT) USE OF ASPIRIN                     

 

 2017            DAMARIS WYNN            Ot            Z79.899  
          OTHER LONG TERM (CURRENT) DRUG THERAPY                     

 

 2017            DAMARIS WYNN            Ot            Z95.5    
        PRESENCE OF CORONARY ANGIOPLASTY IMPLANT                     

 

 2017            DAMARIS WYNN            Ot            F17.210  
          NICOTINE DEPENDENCE, CIGARETTES, UNCOMPL                     

 

 2017            DAMARIS WYNN            Ot            R10.10   
         UPPER ABDOMINAL PAIN, UNSPECIFIED                     

 

 2017            DAMARIS WYNN            Ot            R10.13   
         EPIGASTRIC PAIN                     

 

 2017            DAMARIS WYNN            Ot            Z79.01   
         LONG TERM (CURRENT) USE OF ANTICOAGULANT                     

 

 2017            DAMARIS WYNN            Ot            Z79.82   
         LONG TERM (CURRENT) USE OF ASPIRIN                     

 

 2017            DAMARIS WYNN            Ot            Z79.899  
          OTHER LONG TERM (CURRENT) DRUG THERAPY                     

 

 2017            DAMARIS WYNN            Ot            Z95.5    
        PRESENCE OF CORONARY ANGIOPLASTY IMPLANT                     

 

 2017            MARY GREEN, NORTH WHITTAKER            Ot            I82.403     
       ACUTE EMBOLISM AND THOMBOS UNSP DEEP VEI                     

 

 2017            NORTH HERNANDEZ MD            Ot            Z79.01      
      LONG TERM (CURRENT) USE OF ANTICOAGULANT                     

 

 2017                         Ot            414.00            CORON 
ATHEROSCLER NOS TYPE VESSEL, NATIV                     

 

 2017                         Ot            786.50            CHEST PAIN 
NOS                     

 

 2017                         Ot            397.0            TRICUSPID 
VALVE DISEASE                     

 

 2017                         Ot            414.00            CORON 
ATHEROSCLER NOS TYPE VESSEL, NATIV                     

 

 2017                         Ot            424.0            MITRAL VALVE 
DISORDER                     

 

 2017                         Ot            786.50            CHEST PAIN 
NOS                     

 

 2017                         Ot            272.4            
HYPERLIPIDEMIA NEC/NOS                     

 

 2017                         Ot            401.9            HYPERTENSION 
NOS                     

 

 2017                         Ot            414.01            CORONARY 
ATHEROSCLEROSIS OF NATIVE CORON                     

 

 2017                         Ot            401.9            HYPERTENSION 
NOS                     

 

 2017                         Ot            414.01            CORONARY 
ATHEROSCLEROSIS OF NATIVE CORON                     

 

 2017                         Ot            428.0            CONGESTIVE 
HEART FAILURE NOS                     

 

 2017                         Ot            719.40            JOINT PAIN-
UNSPEC                     

 

 2017                         Ot            782.3            EDEMA       
              

 

 2017                         Ot            V58.61            
ANTICOAGULANTS,LT,CURRENT USE                     

 

 2017            NORTH HERNANDEZ MD            Ot            I11.0       
     HYPERTENSIVE HEART DISEASE WITH HEART FA                     

 

 2017            NORTH HERNANDEZ MD            Ot            I25.10      
      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                      

 

 2017            NORTH HERNANDEZ MD            Ot            I26.99      
      OTHER PULMONARY EMBOLISM WITHOUT ACUTE C                     

 

 2017            NORTH HERNANDEZ MD            Ot            I50.9       
     HEART FAILURE, UNSPECIFIED                     

 

 2017            NORTH HERNANDEZ MD            Ot            R07.9       
     CHEST PAIN, UNSPECIFIED                     

 

 2017            NORTH HERNANDEZ MD            Ot            I25.10      
      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                      

 

 2017            NORTH HERNANDEZ MD            Ot            I50.9       
     HEART FAILURE, UNSPECIFIED                     

 

 2017            NORTH HERNANDEZ MD            Ot            R07.9       
     CHEST PAIN, UNSPECIFIED                     

 

 2017            NORTH HERNANDEZ MD            Ot            I82.403     
       ACUTE EMBOLISM AND THOMBOS UNSP DEEP VEI                     

 

 2017            NORTH HERNANDEZ MD            Ot            Z79.01      
      LONG TERM (CURRENT) USE OF ANTICOAGULANT                     

 

 2017            NORTH HERNANDEZ MD            Ot            I82.403     
       ACUTE EMBOLISM AND THOMBOS UNSP DEEP VEI                     

 

 2017            NORTH HERNANDEZ MD            Ot            Z79.01      
      LONG TERM (CURRENT) USE OF ANTICOAGULANT                     

 

 2017            NORTH HERNANDEZ MD            Ot            I82.403     
       ACUTE EMBOLISM AND THOMBOS UNSP DEEP VEI                     

 

 2017            NORTH HERNANDEZ MD            Ot            Z79.01      
      LONG TERM (CURRENT) USE OF ANTICOAGULANT                     

 

 2017            NORTH HERNANDEZ MD            Ot            I26.99      
      OTHER PULMONARY EMBOLISM WITHOUT ACUTE C                     

 

 2017            NORTH HERNANDEZ MD            Ot            Z51.81      
      ENCOUNTER FOR THERAPEUTIC DRUG LEVEL MON                     

 

 2017            DAMARIS WYNN            Ot            F17.200  
          NICOTINE DEPENDENCE, UNSPECIFIED, UNCOMP                     

 

 2017            DAMARIS WYNN            Ot            I25.2    
        OLD MYOCARDIAL INFARCTION                     

 

 2017            DAMARIS WYNN            Ot            K21.9    
        GASTRO-ESOPHAGEAL REFLUX DISEASE WITHOUT                     

 

 2017            DAMARIS WYNN            Ot            L50.9    
        URTICARIA, UNSPECIFIED                     

 

 2017            DAMARIS WYNN            Ot            M79.601  
          PAIN IN RIGHT ARM                     

 

 2017            DAMARIS WYNN            Ot            Z79.01   
         LONG TERM (CURRENT) USE OF ANTICOAGULANT                     

 

 2017            DAMARIS WYNN            Ot            Z79.82   
         LONG TERM (CURRENT) USE OF ASPIRIN                     

 

 2017            DAMARIS WYNN            Ot            Z82.49   
         FAMILY HX OF ISCHEM HEART DIS AND OTH DI                     

 

 2017            DAMARIS WYNN            Ot            Z95.5    
        PRESENCE OF CORONARY ANGIOPLASTY IMPLANT                     

 

 2017            NORTH HERNANDEZ MD            Ot            I26.99      
      OTHER PULMONARY EMBOLISM WITHOUT ACUTE C                     

 

 2017            NORTH HERNANDEZ MD            Ot            Z51.81      
      ENCOUNTER FOR THERAPEUTIC DRUG LEVEL 2017            NORTH HERNANDEZ MD            Ot            I26.99      
      OTHER PULMONARY EMBOLISM WITHOUT ACUTE C                     

 

 2017            NORTH HERNANDEZ MD            Ot            Z51.81      
      ENCOUNTER FOR THERAPEUTIC DRUG LEVEL MON                     

 

 09/15/2017            NORTH HERNANDEZ MD            Ot            I26.99      
      OTHER PULMONARY EMBOLISM WITHOUT ACUTE C                     

 

 09/15/2017            NORTH HERNANDEZ MD            Ot            Z51.81      
      ENCOUNTER FOR THERAPEUTIC DRUG LEVEL MON                     

 

 09/15/2017            NORTH HERNANDEZ MD            Ot            Z79.899     
       OTHER LONG TERM (CURRENT) DRUG THERAPY                     

 

 2017            NORTH HERNANDEZ MD            Ot            I26.99      
      OTHER PULMONARY EMBOLISM WITHOUT ACUTE C                     

 

 2017            NORTH HERNANDEZ MD            Ot            Z51.81      
      ENCOUNTER FOR THERAPEUTIC DRUG LEVEL 2017            NORTH HERNANDEZ MD            Ot            Z79.899     
       OTHER LONG TERM (CURRENT) DRUG THERAPY                     

 

 10/06/2017            NORTH HERNANDEZ MD            Ot            I26.99      
      OTHER PULMONARY EMBOLISM WITHOUT ACUTE C                     

 

 10/06/2017            NORTH HERNANDEZ MD            Ot            Z51.81      
      ENCOUNTER FOR THERAPEUTIC DRUG LEVEL MON                     

 

 10/06/2017            NORTH HERNANDEZ MD            Ot            Z79.899     
       OTHER LONG TERM (CURRENT) DRUG THERAPY                     

 

 2017            FELISHA MORENO DO            Ot            D68.59        
    OTHER PRIMARY THROMBOPHILIA                     

 

 2017            MORENO DO, FELISHA            Ot            E78.1         
   PURE HYPERGLYCERIDEMIA                     

 

 2017            MICHAEL MORENO DOI            Ot            E78.5         
   HYPERLIPIDEMIA, UNSPECIFIED                     

 

 2017            MICHAEL MORENO DOI            Ot            F17.210       
     NICOTINE DEPENDENCE, CIGARETTES, UNCOMPL                     

 

 2017            TIFFANIE ALFARO FELISHA            Ot            I10            
ESSENTIAL (PRIMARY) HYPERTENSION                     

 

 2017            TIFFANIE ALFARO FELISHA            Ot            I21.4         
   NON-ST ELEVATION (NSTEMI) MYOCARDIAL INF                     

 

 2017            MICHAEL MORENO DOI            Ot            I25.10        
    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                      

 

 2017            TIFFANIE ALFARO FELISHA            Ot            I25.82        
    CHRONIC TOTAL OCCLUSION OF CORONARY LORI                     

 

 2017            MICHAEL MORENO DOI            Ot            I50.20        
    UNSPECIFIED SYSTOLIC (CONGESTIVE) HEART                      

 

 2017            TIFFANIE ALFARO FELISHA            Ot            K21.9         
   GASTRO-ESOPHAGEAL REFLUX DISEASE WITHOUT                     

 

 2017            MICHAEL MORENO DOI            Ot            R06.2         
   WHEEZING                     

 

 2017            MICHAEL MORENO DOI            Ot            Z79.01        
    LONG TERM (CURRENT) USE OF ANTICOAGULANT                     

 

 2017            MICHAEL MORENO DOI            Ot            Z82.49        
    FAMILY HX OF ISCHEM HEART DIS AND OTH DI                     

 

 2017            FELISHA MORENO DO            Ot            Z95.5         
   PRESENCE OF CORONARY ANGIOPLASTY IMPLANT                     

 

 2017            FELISHA MORENO DO            Ot            D68.59        
    OTHER PRIMARY THROMBOPHILIA                     

 

 2017            MICHAEL MORENO DOI            Ot            E78.1         
   PURE HYPERGLYCERIDEMIA                     

 

 2017            MICHAEL MORENO DOI            Ot            E78.5         
   HYPERLIPIDEMIA, UNSPECIFIED                     

 

 2017            MICHAEL MORENO DOI            Ot            F17.210       
     NICOTINE DEPENDENCE, CIGARETTES, UNCOMPL                     

 

 2017            MICHAEL MORENO DOI            Ot            I10            
ESSENTIAL (PRIMARY) HYPERTENSION                     

 

 2017            TIFFANIE ALFARO FELISHA            Ot            I21.4         
   NON-ST ELEVATION (NSTEMI) MYOCARDIAL INF                     

 

 2017            MICHAEL MORENO DOI            Ot            I25.10        
    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                      

 

 2017            TIFFANIE ALFARO FELISHA            Ot            I25.82        
    CHRONIC TOTAL OCCLUSION OF CORONARY LORI                     

 

 2017            TIFFANIE ALFARO FELISHA            Ot            I50.20        
    UNSPECIFIED SYSTOLIC (CONGESTIVE) HEART                      

 

 2017            MICHAEL MORENO DOI            Ot            K21.9         
   GASTRO-ESOPHAGEAL REFLUX DISEASE WITHOUT                     

 

 2017            FELISHA MORENO DO            Ot            R06.2         
   WHEEZING                     

 

 2017            FELISHA MORENO DO            Ot            Z79.01        
    LONG TERM (CURRENT) USE OF ANTICOAGULANT                     

 

 2017            FELISHA MORENO DO            Ot            Z82.49        
    FAMILY HX OF ISCHEM HEART DIS AND OTH DI                     

 

 2017            FELISHA MORENO DO            Ot            Z95.5         
   PRESENCE OF CORONARY ANGIOPLASTY IMPLANT                     

 

 2017            FELISHA MORENO DO            Ot            D68.59        
    OTHER PRIMARY THROMBOPHILIA                     

 

 2017            FELISHA MORENO DO            Ot            E78.1         
   PURE HYPERGLYCERIDEMIA                     

 

 2017            FELISHA MORENO DO            Ot            E78.5         
   HYPERLIPIDEMIA, UNSPECIFIED                     

 

 2017            FELISHA MORENO DO            Ot            F17.210       
     NICOTINE DEPENDENCE, CIGARETTES, UNCOMPL                     

 

 2017            FELISHA MORENO DO            Ot            I10            
ESSENTIAL (PRIMARY) HYPERTENSION                     

 

 2017            FELISHA MORENO DO            Ot            I21.4         
   NON-ST ELEVATION (NSTEMI) MYOCARDIAL INF                     

 

 2017            FELISHA MORENO DO            Ot            I25.10        
    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                      

 

 2017            FELISHA MORENO DO            Ot            I25.82        
    CHRONIC TOTAL OCCLUSION OF CORONARY LORI                     

 

 2017            FELISHA MORENO DO            Ot            I50.20        
    UNSPECIFIED SYSTOLIC (CONGESTIVE) HEART                      

 

 2017            FELISHA MORENO DO            Ot            K21.9         
   GASTRO-ESOPHAGEAL REFLUX DISEASE WITHOUT                     

 

 2017            FELISHA MORENO DO            Ot            R06.2         
   WHEEZING                     

 

 2017            FELISHA MORENO DO            Ot            Z79.01        
    LONG TERM (CURRENT) USE OF ANTICOAGULANT                     

 

 2017            FELISHA MORENO DO            Ot            Z82.49        
    FAMILY HX OF ISCHEM HEART DIS AND OTH DI                     

 

 2017            FELISHA MORENO DO            Ot            Z95.5         
   PRESENCE OF CORONARY ANGIOPLASTY IMPLANT                     

 

 2017                         Ot            414.00            CORON 
ATHEROSCLER NOS TYPE VESSEL, NATIV                     

 

 2017                         Ot            786.50            CHEST PAIN 
NOS                     

 

 2017                         Ot            397.0            TRICUSPID 
VALVE DISEASE                     

 

 2017                         Ot            414.00            CORON 
ATHEROSCLER NOS TYPE VESSEL, NATIV                     

 

 2017                         Ot            424.0            MITRAL VALVE 
DISORDER                     

 

 2017                         Ot            786.50            CHEST PAIN 
NOS                     

 

 2017                         Ot            272.4            
HYPERLIPIDEMIA NEC/NOS                     

 

 2017                         Ot            401.9            HYPERTENSION 
NOS                     

 

 2017                         Ot            414.01            CORONARY 
ATHEROSCLEROSIS OF NATIVE CORON                     

 

 2017                         Ot            401.9            HYPERTENSION 
NOS                     

 

 2017                         Ot            414.01            CORONARY 
ATHEROSCLEROSIS OF NATIVE CORON                     

 

 2017                         Ot            428.0            CONGESTIVE 
HEART FAILURE NOS                     

 

 2017                         Ot            719.40            JOINT PAIN-
UNSPEC                     

 

 2017                         Ot            782.3            EDEMA       
              

 

 2017                         Ot            V58.61            
ANTICOAGULANTS,LT,CURRENT USE                     

 

 2017            NORTH HERNANDEZ MD            Ot            I11.0       
     HYPERTENSIVE HEART DISEASE WITH HEART FA                     

 

 2017            NORTH HERNANDEZ MD            Ot            I25.10      
      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                      

 

 2017            NORTH HERNANDEZ MD            Ot            I26.99      
      OTHER PULMONARY EMBOLISM WITHOUT ACUTE C                     

 

 2017            NORTH HERNANDEZ MD            Ot            I50.9       
     HEART FAILURE, UNSPECIFIED                     

 

 2017            NORTH HERNANDEZ MD            Ot            R07.9       
     CHEST PAIN, UNSPECIFIED                     

 

 2017            NORTH HERNANDEZ MD            Ot            I25.10      
      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                      

 

 2017            NORTH HERNANDEZ MD            Ot            I50.9       
     HEART FAILURE, UNSPECIFIED                     

 

 2017            NORTH HERNANDEZ MD            Ot            R07.9       
     CHEST PAIN, UNSPECIFIED                     

 

 2017            NORTH HERNANDEZ MD            Ot            I82.403     
       ACUTE EMBOLISM AND THOMBOS UNSP DEEP VEI                     

 

 2017            NORTH HERNANDEZ MD            Ot            Z79.01      
      LONG TERM (CURRENT) USE OF ANTICOAGULANT                     

 

 2017            NORTH HERNANDEZ MD            Ot            I26.99      
      OTHER PULMONARY EMBOLISM WITHOUT ACUTE C                     

 

 2017            NORTH HERNANDEZ MD            Ot            Z51.81      
      ENCOUNTER FOR THERAPEUTIC DRUG LEVEL MON                     

 

 2017            NORTH HERNANDEZ MD            Ot            I26.99      
      OTHER PULMONARY EMBOLISM WITHOUT ACUTE C                     

 

 2017            NORTH HERNANDEZ MD            Ot            Z51.81      
      ENCOUNTER FOR THERAPEUTIC DRUG LEVEL MON                     

 

 2017            NORTH HERNANDEZ MD            Ot            Z79.899     
       OTHER LONG TERM (CURRENT) DRUG THERAPY                     

 

 2017            DAMARIS WYNN            Ot            E78.00   
         PURE HYPERCHOLESTEROLEMIA, UNSPECIFIED                     

 

 2017            DAMARIS WYNN            Ot            I10      
      ESSENTIAL (PRIMARY) HYPERTENSION                     

 

 2017            DAMARIS WYNN            Ot            I25.2    
        OLD MYOCARDIAL INFARCTION                     

 

 2017            DAMARIS WYNN            Ot            K21.9    
        GASTRO-ESOPHAGEAL REFLUX DISEASE WITHOUT                     

 

 2017            DAMARIS WYNN            Ot            R22.2    
        LOCALIZED SWELLING, MASS AND LUMP, TRUNK                     

 

 2017            DAMARIS WYNN            Ot            S20.212A 
           CONTUSION OF LEFT FRONT WALL OF THORAX,                      

 

 2017            DAMARIS WYNN            Ot            X58.XXXA 
           EXPOSURE TO OTHER SPECIFIED FACTORS, INI                     

 

 2017            DAMARIS WYNN            Ot            Z79.01   
         LONG TERM (CURRENT) USE OF ANTICOAGULANT                     

 

 2017            DAMARIS WYNN            Ot            Z79.82   
         LONG TERM (CURRENT) USE OF ASPIRIN                     

 

 2017            DAMARIS WYNN            Ot            Z82.49   
         FAMILY HX OF ISCHEM HEART DIS AND OTH DI                     

 

 2017            DAMARIS WYNN            Ot            Z95.5    
        PRESENCE OF CORONARY ANGIOPLASTY IMPLANT                     

 

 2017            NORTH HERNANDEZ MD            Ot            I26.99      
      OTHER PULMONARY EMBOLISM WITHOUT ACUTE C                     

 

 2017            NORTH HERNANDEZ MD            Ot            Z79.01      
      LONG TERM (CURRENT) USE OF ANTICOAGULANT                     

 

 2017            NORTH HERNANDEZ MD            Ot            I50.9       
     HEART FAILURE, UNSPECIFIED                     

 

 12/15/2017                         Ot            414.00            CORON 
ATHEROSCLER NOS TYPE VESSEL, NATIV                     

 

 12/15/2017                         Ot            786.50            CHEST PAIN 
NOS                     

 

 12/15/2017                         Ot            397.0            TRICUSPID 
VALVE DISEASE                     

 

 12/15/2017                         Ot            414.00            CORON 
ATHEROSCLER NOS TYPE VESSEL, NATIV                     

 

 12/15/2017                         Ot            424.0            MITRAL VALVE 
DISORDER                     

 

 12/15/2017                         Ot            786.50            CHEST PAIN 
NOS                     

 

 12/15/2017                         Ot            272.4            
HYPERLIPIDEMIA NEC/NOS                     

 

 12/15/2017                         Ot            401.9            HYPERTENSION 
NOS                     

 

 12/15/2017                         Ot            414.01            CORONARY 
ATHEROSCLEROSIS OF NATIVE CORON                     

 

 12/15/2017                         Ot            401.9            HYPERTENSION 
NOS                     

 

 12/15/2017                         Ot            414.01            CORONARY 
ATHEROSCLEROSIS OF NATIVE CORON                     

 

 12/15/2017                         Ot            428.0            CONGESTIVE 
HEART FAILURE NOS                     

 

 12/15/2017                         Ot            719.40            JOINT PAIN-
UNSPEC                     

 

 12/15/2017                         Ot            782.3            EDEMA       
              

 

 12/15/2017                         Ot            V58.61            
ANTICOAGULANTS,LT,CURRENT USE                     

 

 12/15/2017            NORTH HERNANDEZ MD            Ot            I11.0       
     HYPERTENSIVE HEART DISEASE WITH HEART FA                     

 

 12/15/2017            NORTH HERNANDEZ MD            Ot            I25.10      
      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                      

 

 12/15/2017            NORTH HERNANDEZ MD            Ot            I26.99      
      OTHER PULMONARY EMBOLISM WITHOUT ACUTE C                     

 

 12/15/2017            NORTH HERNANDEZ MD            Ot            I50.9       
     HEART FAILURE, UNSPECIFIED                     

 

 12/15/2017            NORTH HERNANDEZ MD            Ot            R07.9       
     CHEST PAIN, UNSPECIFIED                     

 

 12/15/2017            NORTH HERNANDEZ MD            Ot            I25.10      
      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                      

 

 12/15/2017            NORTH HERNANDEZ MD            Ot            I50.9       
     HEART FAILURE, UNSPECIFIED                     

 

 12/15/2017            NORTH HERNANDEZ MD            Ot            R07.9       
     CHEST PAIN, UNSPECIFIED                     

 

 12/15/2017            NORTH HERNANDEZ MD            Ot            I82.403     
       ACUTE EMBOLISM AND THOMBOS UNSP DEEP VEI                     

 

 12/15/2017            NORTH HERNANDEZ MD            Ot            Z79.01      
      LONG TERM (CURRENT) USE OF ANTICOAGULANT                     

 

 12/15/2017            NORTH HERNANDEZ MD            Ot            I26.99      
      OTHER PULMONARY EMBOLISM WITHOUT ACUTE C                     

 

 12/15/2017            NORTH HERNANDEZ MD            Ot            Z51.81      
      ENCOUNTER FOR THERAPEUTIC DRUG LEVEL MON                     

 

 12/15/2017            NORTH HERNANDEZ MD            Ot            I26.99      
      OTHER PULMONARY EMBOLISM WITHOUT ACUTE C                     

 

 12/15/2017            NORTH HERNANDEZ MD            Ot            Z79.01      
      LONG TERM (CURRENT) USE OF ANTICOAGULANT                     

 

 12/15/2017            NORTH HERNANDEZ MD            Ot            I50.9       
     HEART FAILURE, UNSPECIFIED                     

 

 2017            NORTH HERNANDEZ MD            Ot            I26.99      
      OTHER PULMONARY EMBOLISM WITHOUT ACUTE C                     

 

 2017            NORTH HERNANDEZ MD            Ot            Z79.01      
      LONG TERM (CURRENT) USE OF ANTICOAGULANT                     

 

 2017            CANDE PIKE MD            Ot            I25.119       
     ATHSCL HEART DISEASE OF NATIVE COR ART W                     

 

 2017            CANDE PIKE MD            Ot            Z01.812       
     ENCOUNTER FOR PREPROCEDURAL LABORATORY E                     

 

 2017            NORTH HERNANDEZ MD            Ot            I26.99      
      OTHER PULMONARY EMBOLISM WITHOUT ACUTE C                     

 

 2017            NORTH HERNANDEZ MD            Ot            Z79.01      
      LONG TERM (CURRENT) USE OF ANTICOAGULANT                     

 

 2018            NORTH HERNANDEZ MD            Ot            I26.99      
      OTHER PULMONARY EMBOLISM WITHOUT ACUTE C                     

 

 2018            NORTH HERNANDEZ MD            Ot            Z79.01      
      LONG TERM (CURRENT) USE OF ANTICOAGULANT                     

 

 2018            NORTH HERNANDEZ MD            Ot            I26.99      
      OTHER PULMONARY EMBOLISM WITHOUT ACUTE C                     

 

 2018            NORTH HERNANDEZ MD            Ot            Z79.01      
      LONG TERM (CURRENT) USE OF ANTICOAGULANT                     

 

 2018            NORTH HERNANDEZ MD            Ot            I10         
   ESSENTIAL (PRIMARY) HYPERTENSION                     

 

 2018            NORTH HERNANDEZ MD            Ot            I25.10      
      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                      

 

 2018            NORTH HERNANDEZ MD            Ot            R06.00      
      DYSPNEA, UNSPECIFIED                     

 

 2018            NORTH HERNANDEZ MD            Ot            R07.89      
      OTHER CHEST PAIN                     

 

 2018            NORTH HERNANDEZ MD            Ot            I50.9       
     HEART FAILURE, UNSPECIFIED                     

 

 2018            RADHA ARELLANO            Ot            E78.00     
       PURE HYPERCHOLESTEROLEMIA, UNSPECIFIED                     

 

 2018            RADHA ARELLANO            Ot            I10        
    ESSENTIAL (PRIMARY) HYPERTENSION                     

 

 2018            RADHA ARELLANO            Ot            I20.9      
      ANGINA PECTORIS, UNSPECIFIED                     

 

 2018            RADHA ARELLANO            Ot            I25.2      
      OLD MYOCARDIAL INFARCTION                     

 

 2018            RADHA ARELLANO            Ot            K21.9      
      GASTRO-ESOPHAGEAL REFLUX DISEASE WITHOUT                     

 

 2018            RADHA ARELLANO            Ot            M10.9      
      GOUT, UNSPECIFIED                     

 

 2018            RADHA ARELLANO            Ot            M79.672    
        PAIN IN LEFT FOOT                     

 

 2018            RADHA ARELLANO            Ot            Z79.01     
       LONG TERM (CURRENT) USE OF ANTICOAGULANT                     

 

 2018            RADHA ARELLANO            Ot            Z79.02     
       LONG TERM (CURRENT) USE OF ANTITHROMBOTI                     

 

 2018            RADHA ARELLANO            Ot            Z79.82     
       LONG TERM (CURRENT) USE OF ASPIRIN                     

 

 2018            RADHA ARELLANO            Ot            Z82.49     
       FAMILY HX OF ISCHEM HEART DIS AND OTH DI                     

 

 2018            RADHA ARELLANO            Ot            Z86.718    
        PERSONAL HISTORY OF OTHER VENOUS THROMBO                     

 

 2018            RADHA ARELLANO            Ot            Z95.5      
      PRESENCE OF CORONARY ANGIOPLASTY IMPLANT                     

 

 2018            NORTH HERNANDEZ MD, Ot            I10         
   ESSENTIAL (PRIMARY) HYPERTENSION                     

 

 2018            NORTH HERNANDEZ MD, Ot            I25.10      
      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                      

 

 2018            NORTH HERNANDEZ MD, Ot            R06.00      
      DYSPNEA, UNSPECIFIED                     

 

 2018            NORTH HERNANDEZ MD, Ot            R07.89      
      OTHER CHEST PAIN                     



                                                                               
                                                                               
                                                                               
                                                                               
                                                                               
                                                                               
                                                                               
                                                                               
                                                                               
                                                                               
                                                                               
                                                                               
                                                                               
                                                                               
                                                                               
                                                                               
                                                                               
                                                                               
                                                                               
   



Procedures

      





 Code            Description            Performed By            Performed On   
     

 

             CARDIOLOG                                  NOR[facility] HERNANDEZ          
                         2014        

 

             [id number]                                  DILATION OF 1 COR ART 
WITH DRUG-ELUT INT                                   2017        

 

             [id number]                                  MEASURE OF CARDIAC SAMPL 
  PRESSURE, L H                                   2017        

 

             [id number]                                  FLUOROSCOPY OF MULT COR 
ART USING L OSM                                    2017        

 

             [id number]                                  FLUOROSCOPY OF LEFT HEART 
USING LOW OSMO                                   2017        



                          



Results

      





 Test            Result            Range        









 Complete urinalysis with reflex to culture - 16 10:52         









 Urine color determination            YELLOW             NRG        

 

 Urine clarity determination            CLEAR             NRG        

 

 Urine pH measurement by test strip            8             5-9        

 

 Specific gravity of urine by test strip            1.010             1.016-
1.022        

 

 Urine protein assay by test strip, semi-quantitative            NEGATIVE      
       NEGATIVE        

 

 Urine glucose detection by automated test strip            NEGATIVE           
  NEGATIVE        

 

 Erythrocytes detection in urine sediment by light microscopy            
NEGATIVE             NEGATIVE        

 

 Urine ketones detection by automated test strip            NEGATIVE           
  NEGATIVE        

 

 Urine nitrite detection by test strip            NEGATIVE             NEGATIVE
        

 

 Urine total bilirubin detection by test strip            NEGATIVE             
NEGATIVE        

 

 Urine urobilinogen measurement by automated test strip (mass/volume)          
  NORMAL             NORMAL        

 

 Urine leukocyte esterase detection by dipstick            NEGATIVE             
NEGATIVE        

 

 Automated urine sediment erythrocyte count by microscopy (number/high power 
field)            NONE             NRG        

 

 Automated urine sediment leukocyte count by microscopy (number/high power field
)            NONE             NRG        

 

 Bacteria detection in urine sediment by light microscopy            NEGATIVE  
           NRG        

 

 Crystals detection in urine sediment by light microscopy            NONE      
       NRG        

 

 Casts detection in urine sediment by light microscopy            NONE         
    NRG        

 

 Mucus detection in urine sediment by light microscopy            NEGATIVE     
        NRG        

 

 Complete urinalysis with reflex to culture            NO             NRG      
  









 Automated blood complete blood count (hemogram) panel - 16 11:00         









 Blood leukocytes automated count (number/volume)            9.9 10*3/uL       
     4.3-11.0        

 

 Blood erythrocytes automated count (number/volume)            5.46 10*6/uL    
        4.35-5.85        

 

 Venous blood hemoglobin measurement (mass/volume)            15.7 g/dL        
    13.3-17.7        

 

 Blood hematocrit (volume fraction)            47 %            40-54        

 

 Automated erythrocyte mean corpuscular volume            86 [foz_us]          
  80-99        

 

 Automated erythrocyte mean corpuscular hemoglobin (mass per erythrocyte)      
      29 pg            25-34        

 

 Automated erythrocyte mean corpuscular hemoglobin concentration measurement (
mass/volume)            33 g/dL            32-36        

 

 Automated erythrocyte distribution width ratio            13.9 %            
10.0-14.5        

 

 Automated blood platelet count (count/volume)            282 10*3/uL          
  130-400        

 

 Automated blood platelet mean volume measurement            9.0 [foz_us]      
      7.4-10.4        









 PT panel in platelet poor plasma by coagulation assay - 16 11:00         









 Prothrombin time (PT) in platelet poor plasma by coagulation assay            
22.5 s            12.2-14.7        

 

 INR in platelet poor plasma or blood by coagulation assay            2.0      
       0.8-1.4        









 Activated partial thromboplastin time (aPTT) in platelet poor plasma 
bycoagulation assay - 16 11:00         









 Activated partial thromboplastin time (aPTT) in platelet poor plasma 
bycoagulation assay            45 s            24-35        









 Comprehensive metabolic panel - 16 11:00         









 Serum or plasma sodium measurement (moles/volume)            140 mmol/L       
     135-145        

 

 Serum or plasma potassium measurement (moles/volume)            4.1 mmol/L    
        3.6-5.0        

 

 Serum or plasma chloride measurement (moles/volume)            103 mmol/L     
               

 

 Carbon dioxide            27 mmol/L            21-32        

 

 Serum or plasma anion gap determination (moles/volume)            10 mmol/L   
         5-14        

 

 Serum or plasma urea nitrogen measurement (mass/volume)            9 mg/dL    
        7-18        

 

 Serum or plasma creatinine measurement (mass/volume)            0.79 mg/dL    
        0.60-1.30        

 

 Serum or plasma urea nitrogen/creatinine mass ratio            11             
NRG        

 

 Serum or plasma creatinine measurement with calculation of estimated 
glomerular filtration rate            >             NRG        

 

 Serum or plasma glucose measurement (mass/volume)            96 mg/dL         
           

 

 Serum or plasma calcium measurement (mass/volume)            9.5 mg/dL        
    8.5-10.1        

 

 Serum or plasma total bilirubin measurement (mass/volume)            0.8 mg/dL
            0.1-1.0        

 

 Serum or plasma alkaline phosphatase measurement (enzymatic activity/volume)  
          79 U/L                    

 

 Serum or plasma aspartate aminotransferase measurement (enzymatic activity/
volume)            26 U/L            5-34        

 

 Serum or plasma alanine aminotransferase measurement (enzymatic activity/volume
)            27 U/L            0-55        

 

 Serum or plasma protein measurement (mass/volume)            7.3 g/dL         
   6.4-8.2        

 

 Serum or plasma albumin measurement (mass/volume)            4.4 g/dL         
   3.2-4.5        









 Lipid 1996 panel - 16 11:00         









 Serum or plasma triglyceride measurement (mass/volume)            278 mg/dL   
         <150        

 

 Serum or plasma cholesterol measurement (mass/volume)            241 mg/dL    
        < 200        

 

 Serum or plasma cholesterol in HDL measurement (mass/volume)            34 mg/
dL            40-60        

 

 Cholesterol in LDL [mass/volume] in serum or plasma by direct assay            
165 mg/dL            1-129        

 

 Serum or plasma cholesterol in VLDL measurement (mass/volume)            56 mg/
dL            5-40        









 Methicillin resistant Staphylococcus aureus (MRSA) screening culture -  11:00         









 Methicillin resistant Staphylococcus aureus (MRSA) screening culture          
  NEG             NRG        









 PT panel in platelet poor plasma by coagulation assay - 17 14:21         









 Prothrombin time (PT) in platelet poor plasma by coagulation assay            
27.0 s            12.2-14.7        

 

 INR in platelet poor plasma or blood by coagulation assay            2.5      
       0.8-1.4        









 Complete blood count (CBC) with automated white blood cell (WBC) differential 
- 17 11:03         









 Blood leukocytes automated count (number/volume)            8.9 10*3/uL       
     4.3-11.0        

 

 Blood erythrocytes automated count (number/volume)            4.97 10*6/uL    
        4.35-5.85        

 

 Venous blood hemoglobin measurement (mass/volume)            14.6 g/dL        
    13.3-17.7        

 

 Blood hematocrit (volume fraction)            43 %            40-54        

 

 Automated erythrocyte mean corpuscular volume            86 [foz_us]          
  80-99        

 

 Automated erythrocyte mean corpuscular hemoglobin (mass per erythrocyte)      
      29 pg            25-34        

 

 Automated erythrocyte mean corpuscular hemoglobin concentration measurement (
mass/volume)            34 g/dL            32-36        

 

 Automated erythrocyte distribution width ratio            13.7 %            
10.0-14.5        

 

 Automated blood platelet count (count/volume)            285 10*3/uL          
  130-400        

 

 Automated blood platelet mean volume measurement            9.7 [foz_us]      
      7.4-10.4        

 

 Automated blood neutrophils/100 leukocytes            44 %            42-75   
     

 

 Automated blood lymphocytes/100 leukocytes            40 %            12-44   
     

 

 Blood monocytes/100 leukocytes            11 %            0-12        

 

 Automated blood eosinophils/100 leukocytes            4 %            0-10     
   

 

 Automated blood basophils/100 leukocytes            1 %            0-10        

 

 Blood neutrophils automated count (number/volume)            3.9 10*3         
   1.8-7.8        

 

 Blood lymphocytes automated count (number/volume)            3.5 10*3         
   1.0-4.0        

 

 Blood monocytes automated count (number/volume)            1.0 10*3            
0.0-1.0        

 

 Automated eosinophil count            0.4 10*3/uL            0.0-0.3        

 

 Automated blood basophil count (count/volume)            0.1 10*3/uL          
  0.0-0.1        









 Comprehensive metabolic panel - 17 11:03         









 Serum or plasma sodium measurement (moles/volume)            140 mmol/L       
     135-145        

 

 Serum or plasma potassium measurement (moles/volume)            4.5 mmol/L    
        3.6-5.0        

 

 Serum or plasma chloride measurement (moles/volume)            105 mmol/L     
               

 

 Carbon dioxide            27 mmol/L            21-32        

 

 Serum or plasma anion gap determination (moles/volume)            8 mmol/L    
        5-14        

 

 Serum or plasma urea nitrogen measurement (mass/volume)            8 mg/dL    
        7-18        

 

 Serum or plasma creatinine measurement (mass/volume)            0.76 mg/dL    
        0.60-1.30        

 

 Serum or plasma urea nitrogen/creatinine mass ratio            11             
NRG        

 

 Serum or plasma creatinine measurement with calculation of estimated 
glomerular filtration rate            >             NRG        

 

 Serum or plasma glucose measurement (mass/volume)            91 mg/dL         
           

 

 Serum or plasma calcium measurement (mass/volume)            8.9 mg/dL        
    8.5-10.1        

 

 Serum or plasma total bilirubin measurement (mass/volume)            0.3 mg/dL
            0.1-1.0        

 

 Serum or plasma alkaline phosphatase measurement (enzymatic activity/volume)  
          61 U/L                    

 

 Serum or plasma aspartate aminotransferase measurement (enzymatic activity/
volume)            24 U/L            5-34        

 

 Serum or plasma alanine aminotransferase measurement (enzymatic activity/volume
)            18 U/L            0-55        

 

 Serum or plasma protein measurement (mass/volume)            6.4 g/dL         
   6.4-8.2        

 

 Serum or plasma albumin measurement (mass/volume)            3.9 g/dL         
   3.2-4.5        









 Lipase - 17 11:03         









 Lipase            14 U/L            8-78        









 PT panel in platelet poor plasma by coagulation assay - 08/15/17 11:03         









 Prothrombin time (PT) in platelet poor plasma by coagulation assay            
25.0 s            12.2-14.7        

 

 INR in platelet poor plasma or blood by coagulation assay            2.3      
       0.8-1.4        









 PT panel in platelet poor plasma by coagulation assay - 17 11:00         









 Prothrombin time (PT) in platelet poor plasma by coagulation assay            
20.4 s            12.2-14.7        

 

 INR in platelet poor plasma or blood by coagulation assay            1.7      
       0.8-1.4        









 PT panel in platelet poor plasma by coagulation assay - 17 08:15         









 Prothrombin time (PT) in platelet poor plasma by coagulation assay            
28.3 s            12.2-14.7        

 

 INR in platelet poor plasma or blood by coagulation assay            2.7      
       0.8-1.4        









 Complete blood count (CBC) with automated white blood cell (WBC) differential 
- 17 15:15         









 Blood leukocytes automated count (number/volume)            13.4 10*3/uL      
      4.3-11.0        

 

 Blood erythrocytes automated count (number/volume)            5.18 10*6/uL    
        4.35-5.85        

 

 Venous blood hemoglobin measurement (mass/volume)            15.5 g/dL        
    13.3-17.7        

 

 Blood hematocrit (volume fraction)            44 %            40-54        

 

 Automated erythrocyte mean corpuscular volume            86 [foz_us]          
  80-99        

 

 Automated erythrocyte mean corpuscular hemoglobin (mass per erythrocyte)      
      30 pg            25-34        

 

 Automated erythrocyte mean corpuscular hemoglobin concentration measurement (
mass/volume)            35 g/dL            32-36        

 

 Automated erythrocyte distribution width ratio            13.7 %            
10.0-14.5        

 

 Automated blood platelet count (count/volume)            336 10*3/uL          
  130-400        

 

 Automated blood platelet mean volume measurement            9.0 [foz_us]      
      7.4-10.4        

 

 Automated blood neutrophils/100 leukocytes            46 %            42-75   
     

 

 Automated blood lymphocytes/100 leukocytes            39 %            12-44   
     

 

 Blood monocytes/100 leukocytes            11 %            0-12        

 

 Automated blood eosinophils/100 leukocytes            3 %            0-10     
   

 

 Automated blood basophils/100 leukocytes            1 %            0-10        

 

 Blood neutrophils automated count (number/volume)            6.1 10*3         
   1.8-7.8        

 

 Blood lymphocytes automated count (number/volume)            5.2 10*3         
   1.0-4.0        

 

 Blood monocytes automated count (number/volume)            1.5 10*3            
0.0-1.0        

 

 Automated eosinophil count            0.4 10*3/uL            0.0-0.3        

 

 Automated blood basophil count (count/volume)            0.1 10*3/uL          
  0.0-0.1        









 Comprehensive metabolic panel - 17 15:15         









 Serum or plasma sodium measurement (moles/volume)            140 mmol/L       
     135-145        

 

 Serum or plasma potassium measurement (moles/volume)            3.5 mmol/L    
        3.6-5.0        

 

 Serum or plasma chloride measurement (moles/volume)            102 mmol/L     
               

 

 Carbon dioxide            27 mmol/L            21-32        

 

 Serum or plasma anion gap determination (moles/volume)            11 mmol/L   
         5-14        

 

 Serum or plasma urea nitrogen measurement (mass/volume)            9 mg/dL    
        7-18        

 

 Serum or plasma creatinine measurement (mass/volume)            0.82 mg/dL    
        0.60-1.30        

 

 Serum or plasma urea nitrogen/creatinine mass ratio            11             
NRG        

 

 Serum or plasma creatinine measurement with calculation of estimated 
glomerular filtration rate            >             NRG        

 

 Serum or plasma glucose measurement (mass/volume)            102 mg/dL        
            

 

 Serum or plasma calcium measurement (mass/volume)            9.4 mg/dL        
    8.5-10.1        

 

 Serum or plasma total bilirubin measurement (mass/volume)            0.4 mg/dL
            0.1-1.0        

 

 Serum or plasma alkaline phosphatase measurement (enzymatic activity/volume)  
          78 U/L                    

 

 Serum or plasma aspartate aminotransferase measurement (enzymatic activity/
volume)            17 U/L            5-34        

 

 Serum or plasma alanine aminotransferase measurement (enzymatic activity/volume
)            17 U/L            0-55        

 

 Serum or plasma protein measurement (mass/volume)            7.6 g/dL         
   6.4-8.2        

 

 Serum or plasma albumin measurement (mass/volume)            4.3 g/dL         
   3.2-4.5        









 Magnesium - 17 15:15         









 Magnesium            2.1 mg/dL            1.8-2.4        









 PT panel in platelet poor plasma by coagulation assay - 17 15:15         









 Prothrombin time (PT) in platelet poor plasma by coagulation assay            
15.7 s            12.2-14.7        

 

 INR in platelet poor plasma or blood by coagulation assay            1.2      
       0.8-1.4        









 Activated partial thromboplastin time (aPTT) in platelet poor plasma 
bycoagulation assay - 17 15:15         









 Activated partial thromboplastin time (aPTT) in platelet poor plasma 
bycoagulation assay            28 s            24-35        









 Serum or plasma troponin i.cardiac measurement (mass/volume) - 17 15:15 
        









 Serum or plasma troponin i.cardiac measurement (mass/volume)            < ng/
mL            <0.30        









 Fibrin D-dimer FEU measurement in platelet poor plasma (mass/volume) -  15:15         









 Fibrin D-dimer FEU measurement in platelet poor plasma (mass/volume)          
  0.28 ug/mL            0.00-0.49        









 Myoglobin, serum - 17 15:15         









 Myoglobin, serum            31.5 ng/mL            10.0-92.0        









 Serum or plasma troponin i.cardiac measurement (mass/volume) - 17 21:45 
        









 Serum or plasma troponin i.cardiac measurement (mass/volume)            0.57 ng
/mL            <0.30        









 Complete blood count (CBC) with automated white blood cell (WBC) differential 
- 17 05:24         









 Blood leukocytes automated count (number/volume)            15.1 10*3/uL      
      4.3-11.0        

 

 Blood erythrocytes automated count (number/volume)            4.61 10*6/uL    
        4.35-5.85        

 

 Venous blood hemoglobin measurement (mass/volume)            13.3 g/dL        
    13.3-17.7        

 

 Blood hematocrit (volume fraction)            40 %            40-54        

 

 Automated erythrocyte mean corpuscular volume            87 [foz_us]          
  80-99        

 

 Automated erythrocyte mean corpuscular hemoglobin (mass per erythrocyte)      
      29 pg            25-34        

 

 Automated erythrocyte mean corpuscular hemoglobin concentration measurement (
mass/volume)            33 g/dL            32-36        

 

 Automated erythrocyte distribution width ratio            13.8 %            
10.0-14.5        

 

 Automated blood platelet count (count/volume)            278 10*3/uL          
  130-400        

 

 Automated blood platelet mean volume measurement            9.5 [foz_us]      
      7.4-10.4        

 

 Automated blood neutrophils/100 leukocytes            77 %            42-75   
     

 

 Automated blood lymphocytes/100 leukocytes            14 %            12-44   
     

 

 Blood monocytes/100 leukocytes            8 %            0-12        

 

 Automated blood eosinophils/100 leukocytes            0 %            0-10     
   

 

 Automated blood basophils/100 leukocytes            0 %            0-10        

 

 Blood neutrophils automated count (number/volume)            11.7 10*3        
    1.8-7.8        

 

 Blood lymphocytes automated count (number/volume)            2.1 10*3         
   1.0-4.0        

 

 Blood monocytes automated count (number/volume)            1.2 10*3            
0.0-1.0        

 

 Automated eosinophil count            0.0 10*3/uL            0.0-0.3        

 

 Automated blood basophil count (count/volume)            0.0 10*3/uL          
  0.0-0.1        









 Comprehensive metabolic panel - 17 05:24         









 Serum or plasma sodium measurement (moles/volume)            137 mmol/L       
     135-145        

 

 Serum or plasma potassium measurement (moles/volume)            3.8 mmol/L    
        3.6-5.0        

 

 Serum or plasma chloride measurement (moles/volume)            103 mmol/L     
               

 

 Carbon dioxide            25 mmol/L            21-32        

 

 Serum or plasma anion gap determination (moles/volume)            9 mmol/L    
        5-14        

 

 Serum or plasma urea nitrogen measurement (mass/volume)            8 mg/dL    
        7-18        

 

 Serum or plasma creatinine measurement (mass/volume)            0.67 mg/dL    
        0.60-1.30        

 

 Serum or plasma urea nitrogen/creatinine mass ratio            12             
NRG        

 

 Serum or plasma creatinine measurement with calculation of estimated 
glomerular filtration rate            >             NRG        

 

 Serum or plasma glucose measurement (mass/volume)            123 mg/dL        
            

 

 Serum or plasma calcium measurement (mass/volume)            8.7 mg/dL        
    8.5-10.1        

 

 Serum or plasma total bilirubin measurement (mass/volume)            0.7 mg/dL
            0.1-1.0        

 

 Serum or plasma alkaline phosphatase measurement (enzymatic activity/volume)  
          64 U/L                    

 

 Serum or plasma aspartate aminotransferase measurement (enzymatic activity/
volume)            95 U/L            5-34        

 

 Serum or plasma alanine aminotransferase measurement (enzymatic activity/volume
)            26 U/L            0-55        

 

 Serum or plasma protein measurement (mass/volume)            6.3 g/dL         
   6.4-8.2        

 

 Serum or plasma albumin measurement (mass/volume)            3.7 g/dL         
   3.2-4.5        









 Lipid 1996 panel - 17 05:24         









 Serum or plasma triglyceride measurement (mass/volume)            185 mg/dL   
         <150        

 

 Serum or plasma cholesterol measurement (mass/volume)            192 mg/dL    
        < 200        

 

 Serum or plasma cholesterol in HDL measurement (mass/volume)            34 mg/
dL            40-60        

 

 Cholesterol in LDL [mass/volume] in serum or plasma by direct assay            
143 mg/dL            1-129        

 

 Serum or plasma cholesterol in VLDL measurement (mass/volume)            37 mg/
dL            5-40        









 Blood manual differential performed detection - 17 05:24         









 Blood monocytes/100 leukocytes            14 %            NRG        

 

 Manual blood segmented neutrophils/100 leukocytes            73 %            
NRG        

 

 Blood band neutrophils/100 leukocytes            0 %            NRG        

 

 Manual blood lymphocytes/100 leukocytes            13 %            NRG        

 

 Blood erythrocyte morphology finding identification            NORMAL         
    NRG        









 Automated blood complete blood count (hemogram) panel - 17 03:08         









 Blood leukocytes automated count (number/volume)            11.1 10*3/uL      
      4.3-11.0        

 

 Blood erythrocytes automated count (number/volume)            4.39 10*6/uL    
        4.35-5.85        

 

 Venous blood hemoglobin measurement (mass/volume)            12.8 g/dL        
    13.3-17.7        

 

 Blood hematocrit (volume fraction)            39 %            40-54        

 

 Automated erythrocyte mean corpuscular volume            88 [foz_us]          
  80-99        

 

 Automated erythrocyte mean corpuscular hemoglobin (mass per erythrocyte)      
      29 pg            25-34        

 

 Automated erythrocyte mean corpuscular hemoglobin concentration measurement (
mass/volume)            33 g/dL            32-36        

 

 Automated erythrocyte distribution width ratio            14.0 %            
10.0-14.5        

 

 Automated blood platelet count (count/volume)            252 10*3/uL          
  130-400        

 

 Automated blood platelet mean volume measurement            9.6 [foz_us]      
      7.4-10.4        









 Whole blood basic metabolic panel - 17 03:08         









 Serum or plasma sodium measurement (moles/volume)            139 mmol/L       
     135-145        

 

 Serum or plasma potassium measurement (moles/volume)            4.0 mmol/L    
        3.6-5.0        

 

 Serum or plasma chloride measurement (moles/volume)            104 mmol/L     
               

 

 Carbon dioxide            27 mmol/L            21-32        

 

 Serum or plasma anion gap determination (moles/volume)            8 mmol/L    
        5-14        

 

 Serum or plasma urea nitrogen measurement (mass/volume)            9 mg/dL    
        7-18        

 

 Serum or plasma creatinine measurement (mass/volume)            0.74 mg/dL    
        0.60-1.30        

 

 Serum or plasma urea nitrogen/creatinine mass ratio            12             
NRG        

 

 Serum or plasma creatinine measurement with calculation of estimated 
glomerular filtration rate            >             NRG        

 

 Serum or plasma glucose measurement (mass/volume)            101 mg/dL        
            

 

 Serum or plasma calcium measurement (mass/volume)            8.8 mg/dL        
    8.5-10.1        









 PT panel in platelet poor plasma by coagulation assay - 17 10:30         









 Prothrombin time (PT) in platelet poor plasma by coagulation assay            
14.2 s            12.2-14.7        

 

 INR in platelet poor plasma or blood by coagulation assay            1.1      
       0.8-1.4        









 PT panel in platelet poor plasma by coagulation assay - 17 04:45         









 Prothrombin time (PT) in platelet poor plasma by coagulation assay            
15.5 s            12.2-14.7        

 

 INR in platelet poor plasma or blood by coagulation assay            1.2      
       0.8-1.4        









 Automated blood complete blood count (hemogram) panel - 12/15/17 09:29         









 Blood leukocytes automated count (number/volume)            7.4 10*3/uL       
     4.3-11.0        

 

 Blood erythrocytes automated count (number/volume)            4.80 10*6/uL    
        4.35-5.85        

 

 Venous blood hemoglobin measurement (mass/volume)            13.7 g/dL        
    13.3-17.7        

 

 Blood hematocrit (volume fraction)            42 %            40-54        

 

 Automated erythrocyte mean corpuscular volume            87 [foz_us]          
  80-99        

 

 Automated erythrocyte mean corpuscular hemoglobin (mass per erythrocyte)      
      29 pg            25-34        

 

 Automated erythrocyte mean corpuscular hemoglobin concentration measurement (
mass/volume)            33 g/dL            32-36        

 

 Automated erythrocyte distribution width ratio            13.9 %            
10.0-14.5        

 

 Automated blood platelet count (count/volume)            291 10*3/uL          
  130-400        

 

 Automated blood platelet mean volume measurement            9.0 [foz_us]      
      7.4-10.4        









 Whole blood basic metabolic panel - 12/15/17 09:29         









 Serum or plasma sodium measurement (moles/volume)            142 mmol/L       
     135-145        

 

 Serum or plasma potassium measurement (moles/volume)            4.5 mmol/L    
        3.6-5.0        

 

 Serum or plasma chloride measurement (moles/volume)            105 mmol/L     
               

 

 Carbon dioxide            27 mmol/L            21-32        

 

 Serum or plasma anion gap determination (moles/volume)            10 mmol/L   
         5-14        

 

 Serum or plasma urea nitrogen measurement (mass/volume)            16 mg/dL   
         7-18        

 

 Serum or plasma creatinine measurement (mass/volume)            0.74 mg/dL    
        0.60-1.30        

 

 Serum or plasma urea nitrogen/creatinine mass ratio            22             
NRG        

 

 Serum or plasma creatinine measurement with calculation of estimated 
glomerular filtration rate            >             NRG        

 

 Serum or plasma glucose measurement (mass/volume)            100 mg/dL        
            

 

 Serum or plasma calcium measurement (mass/volume)            10.0 mg/dL       
     8.5-10.1        









 Complete blood count (CBC) with automated white blood cell (WBC) differential 
- 18 12:45         









 Blood leukocytes automated count (number/volume)            18.3 10*3/uL      
      4.3-11.0        

 

 Blood erythrocytes automated count (number/volume)            4.57 10*6/uL    
        4.35-5.85        

 

 Venous blood hemoglobin measurement (mass/volume)            13.5 g/dL        
    13.3-17.7        

 

 Blood hematocrit (volume fraction)            40 %            40-54        

 

 Automated erythrocyte mean corpuscular volume            88 [foz_us]          
  80-99        

 

 Automated erythrocyte mean corpuscular hemoglobin (mass per erythrocyte)      
      30 pg            25-34        

 

 Automated erythrocyte mean corpuscular hemoglobin concentration measurement (
mass/volume)            34 g/dL            32-36        

 

 Automated erythrocyte distribution width ratio            14.5 %            
10.0-14.5        

 

 Automated blood platelet count (count/volume)            357 10*3/uL          
  130-400        

 

 Automated blood platelet mean volume measurement            8.8 [foz_us]      
      7.4-10.4        

 

 Automated blood neutrophils/100 leukocytes            86 %            42-75   
     

 

 Automated blood lymphocytes/100 leukocytes            8 %            12-44    
    

 

 Blood monocytes/100 leukocytes            6 %            0-12        

 

 Automated blood eosinophils/100 leukocytes            1 %            0-10     
   

 

 Automated blood basophils/100 leukocytes            0 %            0-10        

 

 Blood neutrophils automated count (number/volume)            15.7 10*3        
    1.8-7.8        

 

 Blood lymphocytes automated count (number/volume)            1.4 10*3         
   1.0-4.0        

 

 Blood monocytes automated count (number/volume)            1.1 10*3            
0.0-1.0        

 

 Automated eosinophil count            0.2 10*3/uL            0.0-0.3        

 

 Automated blood basophil count (count/volume)            0.0 10*3/uL          
  0.0-0.1        









 PT panel in platelet poor plasma by coagulation assay - 18 12:45         









 Prothrombin time (PT) in platelet poor plasma by coagulation assay            
32.8 s            12.2-14.7        

 

 INR in platelet poor plasma or blood by coagulation assay            3.2      
       0.8-1.4        









 Whole blood basic metabolic panel - 18 12:45         









 Serum or plasma sodium measurement (moles/volume)            142 mmol/L       
     135-145        

 

 Serum or plasma potassium measurement (moles/volume)            3.6 mmol/L    
        3.6-5.0        

 

 Serum or plasma chloride measurement (moles/volume)            99 mmol/L      
              

 

 Carbon dioxide            28 mmol/L            21-32        

 

 Serum or plasma anion gap determination (moles/volume)            15 mmol/L   
         5-14        

 

 Serum or plasma urea nitrogen measurement (mass/volume)            10 mg/dL   
         7-18        

 

 Serum or plasma creatinine measurement (mass/volume)            0.89 mg/dL    
        0.60-1.30        

 

 Serum or plasma urea nitrogen/creatinine mass ratio            11             
NRG        

 

 Serum or plasma creatinine measurement with calculation of estimated 
glomerular filtration rate            >             NRG        

 

 Serum or plasma glucose measurement (mass/volume)            150 mg/dL        
            

 

 Serum or plasma calcium measurement (mass/volume)            9.6 mg/dL        
    8.5-10.1        









 Serum or plasma uric acid measurement (mass/volume) - 18 12:45         









 Serum or plasma uric acid measurement (mass/volume)            5.3 mg/dL      
      2.6-7.2        









 Blood manual differential performed detection - 18 12:45         









 Blood monocytes/100 leukocytes            3 %            NRG        

 

 Manual blood segmented neutrophils/100 leukocytes            88 %            
NRG        

 

 Blood band neutrophils/100 leukocytes            4 %            NRG        

 

 Manual blood lymphocytes/100 leukocytes            4 %            NRG        

 

 Manual eosinophils/100 leukocytes in nose            1 %            NRG        

 

 Manual blood basophils/100 leukocytes            0 %            NRG        

 

 Blood erythrocyte morphology finding identification            NORMAL         
    NRG        









 Serum or plasma troponin i.cardiac measurement (mass/volume) - 18 12:45 
        









 Serum or plasma troponin i.cardiac measurement (mass/volume)            < ng/
mL            <0.30        









 Serum or plasma C reactive protein measurement (mass/volume) - 18 12:45 
        









 Serum or plasma C reactive protein measurement (mass/volume)            0.80 mg
/dL            0.00-0.50        









 Erythrocyte sedimentation rate by westergren method - 18 12:45         









 Erythrocyte sedimentation rate by westergren method            13 mm          
  0-15        









 Complete blood count (CBC) with automated white blood cell (WBC) differential 
- 18 23:50         









 Blood leukocytes automated count (number/volume)            12.6 10*3/uL      
      4.3-11.0        

 

 Blood erythrocytes automated count (number/volume)            3.67 10*6/uL    
        4.35-5.85        

 

 Venous blood hemoglobin measurement (mass/volume)            10.6 g/dL        
    13.3-17.7        

 

 Blood hematocrit (volume fraction)            32 %            40-54        

 

 Automated erythrocyte mean corpuscular volume            87 [foz_us]          
  80-99        

 

 Automated erythrocyte mean corpuscular hemoglobin (mass per erythrocyte)      
      29 pg            25-34        

 

 Automated erythrocyte mean corpuscular hemoglobin concentration measurement (
mass/volume)            33 g/dL            32-36        

 

 Automated erythrocyte distribution width ratio            13.8 %            
10.0-14.5        

 

 Automated blood platelet count (count/volume)            514 10*3/uL          
  130-400        

 

 Automated blood platelet mean volume measurement            9.1 [foz_us]      
      7.4-10.4        

 

 Automated blood neutrophils/100 leukocytes            79 %            42-75   
     

 

 Automated blood lymphocytes/100 leukocytes            10 %            12-44   
     

 

 Blood monocytes/100 leukocytes            10 %            0-12        

 

 Automated blood eosinophils/100 leukocytes            2 %            0-10     
   

 

 Automated blood basophils/100 leukocytes            0 %            0-10        

 

 Blood neutrophils automated count (number/volume)            9.9 10*3         
   1.8-7.8        

 

 Blood lymphocytes automated count (number/volume)            1.2 10*3         
   1.0-4.0        

 

 Blood monocytes automated count (number/volume)            1.2 10*3            
0.0-1.0        

 

 Automated eosinophil count            0.2 10*3/uL            0.0-0.3        

 

 Automated blood basophil count (count/volume)            0.0 10*3/uL          
  0.0-0.1        









 Blood lactic acid measurement (moles/volume) - 18 23:50         









 Blood lactic acid measurement (moles/volume)            0.81 mmol/L            
0.50-2.00        









 PT panel in platelet poor plasma by coagulation assay - 18 23:50         









 Prothrombin time (PT) in platelet poor plasma by coagulation assay            
34.2 s            12.2-14.7        

 

 INR in platelet poor plasma or blood by coagulation assay            3.4      
       0.8-1.4        









 Activated partial thromboplastin time (aPTT) in platelet poor plasma 
bycoagulation assay - 18 23:50         









 Activated partial thromboplastin time (aPTT) in platelet poor plasma 
bycoagulation assay            124 s            24-35        









 Comprehensive metabolic panel - 18 23:50         









 Serum or plasma sodium measurement (moles/volume)            137 mmol/L       
     135-145        

 

 Serum or plasma potassium measurement (moles/volume)            3.8 mmol/L    
        3.6-5.0        

 

 Serum or plasma chloride measurement (moles/volume)            95 mmol/L      
              

 

 Carbon dioxide            29 mmol/L            21-32        

 

 Serum or plasma anion gap determination (moles/volume)            13 mmol/L   
         5-14        

 

 Serum or plasma urea nitrogen measurement (mass/volume)            9 mg/dL    
        7-18        

 

 Serum or plasma creatinine measurement (mass/volume)            0.76 mg/dL    
        0.60-1.30        

 

 Serum or plasma urea nitrogen/creatinine mass ratio            12             
NRG        

 

 Serum or plasma creatinine measurement with calculation of estimated 
glomerular filtration rate            >             NRG        

 

 Serum or plasma glucose measurement (mass/volume)            115 mg/dL        
            

 

 Serum or plasma calcium measurement (mass/volume)            9.6 mg/dL        
    8.5-10.1        

 

 Serum or plasma total bilirubin measurement (mass/volume)            0.4 mg/dL
            0.1-1.0        

 

 Serum or plasma alkaline phosphatase measurement (enzymatic activity/volume)  
          80 U/L                    

 

 Serum or plasma aspartate aminotransferase measurement (enzymatic activity/
volume)            21 U/L            5-34        

 

 Serum or plasma alanine aminotransferase measurement (enzymatic activity/volume
)            19 U/L            0-55        

 

 Serum or plasma protein measurement (mass/volume)            7.8 g/dL         
   6.4-8.2        

 

 Serum or plasma albumin measurement (mass/volume)            3.5 g/dL         
   3.2-4.5        









 Serum or plasma C reactive protein measurement (mass/volume) - 18 23:50 
        









 Serum or plasma C reactive protein measurement (mass/volume)            34.60 
mg/dL            0.00-0.50        









 Erythrocyte sedimentation rate by westergren method - 18 23:50         









 Erythrocyte sedimentation rate by westergren method            > mm            
0-15        



                                                                               
                               



Encounters

      





 ACCT No.            Visit Date/Time            Discharge            Status    
        Pt. Type            Provider            Facility            Loc./Unit  
          Complaint        

 

 [id number]            2015 09:47:00            2015 23:59:59          
  CLS            Outpatient            CHARLY ESCALONA                    
                           

 

 [id number]            2014 12:54:00            2014 23:59:59          
  CLS            Outpatient            NEENA VELAZCO                   
                            

 

 [id number]            2013 09:24:00            2013 23:59:59          
  CLS            Outpatient            LILLIAN LANDRY DO                        
                       

 

 [id number]            2018 09:08:00            2018 23:59:59    
        CLS            Outpatient            NORTH HERNANDEZ MD            [facility]            RAD            LEFT LEG PAIN        

 

 [id number]            2018 11:43:00            2018 15:31:00    
        DIS            Outpatient            RADHA ARELLANO            [facility]            ER            L LEG PAIN/SWELLING/HX 
BLOOD CLOTS        

 

 [id number]            2018 10:30:00            2018 23:59:59    
        CLS            Outpatient            NORTH HERNANDEZ MD            [facility]            CARD            CAD, CHEST PAIN 
SYNDROME        

 

 [id number]            01/10/2018 11:37:00            01/10/2018 23:59:59    
        CLS            Outpatient            NORTH HERNANDEZ MD            [facility]            CR            CHF        

 

 [id number]            2018 11:00:00            2018 23:59:59    
        CLS            Outpatient            NORTH HERNANDEZ MD            [facility]            LAB            I26.99        

 

 [id number]            10/01/2017 07:00:00            2017 00:01:00    
        DIS            Outpatient            NORTH HERNANDEZ MD            [facility]            LAB            I26.99        

 

 [id number]            12/15/2017 09:16:00            12/15/2017 23:59:59    
        CLS            Outpatient            CANDE PIKE MD            [facility]            LAB            Z01.812 I25.119        

 

 [id number]            2017 11:24:00            2017 11:53:00    
        DIS            Emergency            DAMARIS WYNN            
[facility]            ER            LUMP ON CHEST        

 

 [id number]            2017 17:12:00            2017 14:45:00    
        DIS            Inpatient            FELISHA MORENO DO            [facility]            ICU            CHEST PAIN,N/V        

 

 [id number]            2017 10:49:00            2017 00:01:00    
        DIS            Outpatient            NORTH HERNANDEZ MD            [facility]            LAB            I26.99        

 

 [id number]            2017 15:07:00            2017 17:28:00    
        DIS            Emergency            DAMARIS WYNN            
[facility]            ER            SWELLING IN R ARM 
AFTER FLU SHOT        

 

 [id number]            08/15/2017 10:55:00            08/15/2017 23:59:59    
        CLS            Outpatient            NORTH HERNANDEZ MD            [facility]            LAB            I26.99,Z51.81        

 

 [id number]            2017 00:08:00            2017 23:59:59    
        CLS            Preadmit            NORTH HERNANDEZ MD            [facility]            LAB            DVT        

 

 [id number]            2017 11:24:00            2017 00:01:00    
        DIS            Outpatient            NORTH HERNANDEZ MD            [facility]            LAB            DVT        

 

 [id number]            2017 17:21:00            2017 19:18:00    
        DIS            Emergency            DAMARIS WYNN            
[facility]            ER            LOWER CHEST PAIN     
   

 

 [id number]            2017 08:17:00            2017 00:01:00    
        DIS            Outpatient            NORTH HERNANDEZ MD            [facility]            LAB            DVT        

 

 [id number]            2016 13:09:00            2017 00:01:00    
        DIS            Outpatient            NORTH HERNANDEZ MD            [facility]            LAB            DVT        

 

 [id number]            2016 10:25:00            2016 19:55:00    
        DIS            Outpatient            NORTH HERNANDEZ MD            [facility]            CATH            ABN STRESS,CP,CAD      
  

 

 [id number]            2016 07:31:00            2016 23:59:59    
        CLS            Outpatient            NORTH HERNANDEZ MD            [facility]            CARD            CAD, CHF, CHEST PAIN 
SYNDROME, DVT, HTN, PE        

 

 [id number]            2016 11:12:00            2016 23:59:59    
        CLS            Outpatient            NORTH HERNANDEZ MD            [facility]            CARD            CAD, CHF, CHEST PAIN 
SYNDROME, DVT, HTN, PE        

 

 [id number]            2016 13:12:00            2016 00:01:00    
        DIS            Outpatient            NORTH HERNANDEZ MD            [facility]            LAB            DVT        

 

 [id number]            2016 14:55:00            2016 17:15:00    
        DIS            Emergency            RADHA ARELLANO            [facility]            ER            R ARM PAIN        

 

 [id number]            2015 09:29:00            2015 00:01:00    
        DIS            Outpatient            NORTH HERNANDEZ MD            [facility]            LAB            ANTICOAG THERAPY,HX PE  
      

 

 [id number]            2015 08:34:00            2015 10:54:00    
        DIS            Emergency            DIANE GREEN, COLE GUZMAN            [facility]            ER            RIGHT FOOT PAIN/COUGH    
    

 

 [id number]            2015 11:05:00            2015 00:01:00    
        DIS            Outpatient            NORTH HERNANDEZ MD            [facility]            LAB            ANTICOAG THERAPY,HX PE  
      

 

 [id number]            10/07/2014 14:51:00            2014 00:01:00    
        DIS            Outpatient            NORTH HERNANDEZ MD            [facility]            LAB            ANTICOAG THERAPY,HX PE  
      

 

 [id number]            2014 18:10:00            2014 00:01:00    
        DIS            Outpatient            NORTH HERNANDEZ MD            [facility]            LAB            ANTICOAG THERAPY,HX PE  
      

 

 [id number]            2013 15:59:00            2013 00:01:00    
        DIS            Outpatient            NORTH HERNANDEZ MD            [facility]            LAB            ANTICOAG THERAPY,HX PE  
      

 

 [id number]            2013 18:23:00            2013 19:22:00    
        DIS            Emergency            HANNA GREEN, CAROL PORTILLO            
[facility]            ER            LEFT ANKLE PAIN      
  

 

 [id number]            2018 00:09:00                                   
   Document Registration                                                       
     

 

 [id number]            2015 09:36:00                                   
   Document Registration                                                       
     

 

 [id number]            2015 09:36:00                                   
   Document Registration                                                       
     

 

 [id number]            2015 09:36:00                                   
   Document Registration                                                       
     

 

 [id number]            2015 09:36:00                                   
   Document Registration                                                       
     

 

 [id number]            2015 09:36:00                                   
   Document Registration                                                       
     

 

 [id number]            2015 09:36:00                                   
   Document Registration                                                       
     

 

 [id number]            2015 09:36:00                                   
   Document Registration                                                       
     

 

 [id number]            2015 09:36:00                                   
   Document Registration                                                       
     

 

 [id number]            2015 09:36:00                                   
   Document Registration                                                       
     

 

 [id number]            2015 09:36:00                                   
   Document Registration                                                       
     

 

 [id number]            2015 09:36:00                                   
   Document Registration                                                       
     

 

 [id number]            2015 09:36:00                                   
   Document Registration                                                       
     

 

 [id number]            2015 10:38:00                                   
   Document Registration                                                       
     

 

 [id number]            2015 10:38:00                                   
   Document Registration                                                       
     

 

 [id number]            2015 10:37:00                                   
   Document Registration                                                       
     

 

 [id number]            2015 10:37:00                                   
   Document Registration                                                       
     

 

 [id number]            2015 21:35:00                                   
   Document Registration                                                       
     

 

 [id number]            2013 16:55:00                                   
   Document Registration                                                       
     

 

 [id number]            2013 08:29:00                                   
   Document Registration                                                       
     

 

 [id number]            2013 10:36:00                                   
   Document Registration                                                       
     

 

 [id number]            2013 07:36:00                                   
   Document Registration                                                       
     

 

 [id number]            2013 10:00:00                                   
   Document Registration                                                       
     

 

 [id number]            2013 13:04:00                                   
   Document Registration                                                       
     

 

 [id number]            2012 16:16:00                                   
   Document Registration                                                       
     

 

 [id number]            2012 21:41:00                                   
   Document Registration                                                       
     

 

 [id number]            2012 12:15:00                                   
   Document Registration                                                       
     

 

 [id number]            2012 13:43:00                                   
   Document Registration                                                       
     

 

 [id number]            10/10/2011 10:15:00                                   
   Document Registration                                                       
     

 

 [id number]            2011 06:51:00                                   
   Document Registration                                                       
     

 

 [id number]            2011 08:46:00                                   
   Document Registration                                                       
     

 

 [id number]            2011 10:21:00                                   
   Document Registration                                                       
     

 

 [id number]            2011 14:37:00                                   
   Document Registration                                                       
     

 

 [id number]            2010 09:40:00                                   
   Document Registration                                                       
     

 

 [id number]            2010 17:02:00                                   
   Document Registration                                                       
     

 

 [id number]            2010 09:19:00                                   
   Document Registration                                                       
     

 

 [id number]            12/10/2009 08:28:00                                   
   Document Registration                                                       
     

 

 [id number]            2009 13:16:00                                   
   Document Registration                                                       
     

 

 [id number]            10/15/2008 08:46:00                                   
   Document Registration                                                       
     

 

 [id number]            09/15/2008 12:11:00                                   
   Document Registration                                                       
     

 

 [id number]            2008 14:03:00                                   
   Document Registration                                                       
     

 

 [id number]            05/10/2008 08:26:00                                   
   Document Registration                                                       
     

 

 [id number]            2008 09:23:00                                   
   Document Registration                                                       
     

 

 [id number]            2008 11:12:00                                   
   Document Registration                                                       
     

 

 [id number]            2008 12:14:00                                   
   Document Registration                                                       
     

 

 [id number]            2007 10:33:00                                   
   Document Registration                                                       
     

 

 [id number]            05/15/2007 14:25:00                                   
   Document Registration                                                       
     

 

 [id number]            2007 08:46:00                                   
   Document Registration                                                       
     

 

 [id number]            2007 14:49:00                                   
   Document Registration                                                       
     

 

 [id number]            2007 07:21:00                                   
   Document Registration                                                       
     

 

 [id number]            2006 08:00:00                                   
   Document Registration                                                       
     

 

 [id number]            2006 08:52:00                                   
   Document Registration                                                       
     

 

 [id number]            10/25/2006 16:48:00                                   
   Document Registration                                                       
     

 

 [id number]            2006 11:44:00                                   
   Document Registration                                                       
     

 

 [id number]            07/10/2006 09:32:00                                   
   Document Registration                                                       
     

 

 [id number]            2006 08:15:00                                   
   Document Registration                                                       
     

 

 [id number]            2006 11:33:00                                   
   Document Registration                                                       
     

 

 [id number]            2006 11:41:00                                   
   Document Registration

## 2018-01-29 NOTE — XMS REPORT
Encounter Summary

 Created on: 2018



Cecile Francisco CARMEN

External Reference #: [id number]

: 1970

Sex: Male



Demographics







 Address  [address] [place]  [address]

 

 Home Phone  [phone]

 

 Preferred Language  English

 

 Marital Status  Unknown

 

 Orthodox Affiliation  NON

 

 Race  White

 

 Ethnic Group  Not  or 





Author







 Author  [facility]

 

 Organization  [facility]

 

 Address  Unknown

 

 Phone  Unavailable







Support







 Name  Relationship  Address  Phone

 

 Steffanie Huber  RAVINDER  Unknown  [phone]







Care Team Providers







 Care Team Member Name  Role  Phone

 

 Neftali Bacon MD  21  [phone]

 

 Mahesh Champagne MD  PCP  [phone]







Encounter Details







    



  Date   Type   Department   Care Team   Description

 

    



  2017   Procedure Pass   Cardiac Catheterization  



    Laboratory  



    [address] [place] [address]  



    [phone]  







Social History







    



  Tobacco Use   Types   Packs/Day   Years Used   Date

 

    



  Current Every Day Smoker    









   



  Alcohol Use   Drinks/Week   oz/Week   Comments

 

   



  No   









 



  Sex Assigned at Birth   Date Recorded

 

 



  Not on file 



as of this encounter



Plan of Treatment





Not on fileas of this encounter



Visit Diagnoses

Not on filein this encounter

## 2018-01-29 NOTE — XMS REPORT
Encounter Summary

 Created on: 2018



Francisco Huber

External Reference #: [id number]

: 1970

Sex: Male



Demographics







 Address  [address] [place]  [address]

 

 Home Phone  [phone]

 

 Preferred Language  English

 

 Marital Status  Unknown

 

 Alevism Affiliation  NON

 

 Race  White

 

 Ethnic Group  Not  or 





Author







 Author  [facility]

 

 Organization  [facility]

 

 Address  Unknown

 

 Phone  Unavailable







Support







 Name  Relationship  Address  Phone

 

 Steffanie Huber  ECON  Unknown  [phone]







Care Team Providers







 Care Team Member Name  Role  Phone

 

 Neftali Bacon MD  21  [phone]







Reason for Visit

* 





 



  Reason   Comments

 

 



  Appointment   cath & OV same day









Encounter Details







    



  Date   Type   Department   Care Team   Description

 

    



  2017   Telephone   [facility]   Shanell Hendrickson RN   
Appointment (cath & OV



    [address]    same day)



    [address]  



    [place] [address]  



    [phone]  







Social History







    



  Tobacco Use   Types   Packs/Day   Years Used   Date

 

    



  Current Every Day Smoker    









   



  Alcohol Use   Drinks/Week   oz/Week   Comments

 

   



  No   









 



  Sex Assigned at Birth   Date Recorded

 

 



  Not on file 



as of this encounter



Miscellaneous Notes

* Telephone Encounter - Shanell Hendrickson RN - 2017  1:35 PM CST



 cath date confirmed for this  - confirmed with Dr. Delcid and 
with spouse. 

* Telephone Encounter - Shanell Hendrickson RN - 2017  2:37 PM CST



Connected with patient's spouse, Steffanie, over the phone this afternoon. Let 
her know I am checking with Dr. Delcid about a possible cath and OV same day 
either Monday,  or . She states either date works for them. Home 
number listed on pt's chart is spouse's cell number. She can be reached there 
anytime. 

* Telephone Encounter - Shanell Hendrickson RN - 2017  2:36 PM CST



----- Message from Bessy Bell sent at 2017  1:23 PM CST -----

Regarding: apt request

Call pt wife back at [phone] Steffanie if it's before 3

in this encounter



Plan of Treatment





Not on fileas of this encounter



Visit Diagnoses

Not on filein this encounter

## 2018-01-29 NOTE — XMS REPORT
Encounter Summary

 Created on: 2018



Francisco Huber

External Reference #: [id number]

: 1970

Sex: Male



Demographics







 Address  [address] [place]  [address]

 

 Home Phone  [phone]

 

 Preferred Language  English

 

 Marital Status  Unknown

 

 Mormonism Affiliation  NON

 

 Race  White

 

 Ethnic Group  Not  or 





Author







 Author  [facility]

 

 Organization  [facility]

 

 Address  Unknown

 

 Phone  Unavailable







Support







 Name  Relationship  Address  Phone

 

 Steffanie Huber  ECON  Unknown  [phone]







Care Team Providers







 Care Team Member Name  Role  Phone

 

 Neftali Bacon MD  21  [phone]







Reason for Visit

* 





 



  Reason   Comments

 

 



  Precertification   Approval for LVCORS through [employer] [place]









Encounter Details







    



  Date   Type   Department   Care Team   Description

 

    



  2017   Documentation   [facility]   Gooch, Duane, RN   
Precertification



    [address]    (Approval for LVCORS



    [address]    through [employer] [place])



    [place] [address]  



    [phone]  







Social History







    



  Tobacco Use   Types   Packs/Day   Years Used   Date

 

    



  Current Every Day Smoker    









   



  Alcohol Use   Drinks/Week   oz/Week   Comments

 

   



  No   









 



  Sex Assigned at Birth   Date Recorded

 

 



  Not on file 



as of this encounter



Progress Notes

* Gooch, Duane, RN - 2017  1:57 PM CST



Aure with [employer] [place] for [employer], [phone], confirmed benefits and 
eligibility:  Current and active since 2016, $2750 deductible with required 
co-insurance of 25% to max OOP $6850, then plan will pay 100% of allowable 
charges.  No pre-certification is required for LVCORS with PCI of  [id number] 
[id number].  Reference #[id number]



 





in this encounter



Plan of Treatment





Not on fileas of this encounter



Visit Diagnoses

Not on filein this encounter

## 2018-01-29 NOTE — XMS REPORT
Encounter Summary

 Created on: 2018



Francisco Huber

External Reference #: [id number]

: 1970

Sex: Male



Demographics







 Address  [address] [place]  [address]

 

 Home Phone  [phone]

 

 Preferred Language  English

 

 Marital Status  Unknown

 

 Gnosticist Affiliation  NON

 

 Race  White

 

 Ethnic Group  Not  or 





Author







 Author  [facility]

 

 Organization  [facility]

 

 Address  Unknown

 

 Phone  Unavailable







Support







 Name  Relationship  Address  Phone

 

 Steffanie Huber  ECON  Unknown  [phone]







Care Team Providers







 Care Team Member Name  Role  Phone

 

 Neftali Bacon MD  21  [phone]







Reason for Visit

* 





 



  Reason   Comments

 

 



  Referral    - Dr. Delcid









Encounter Details







    



  Date   Type   Department   Care Team   Description

 

    



  2017   Telephone   [facility]   Shanell Hendrickson RN   
Referral ( - 



    [address])



    [facility] [address]  



    [place] [address]  



    [phone]  







Social History







    



  Tobacco Use   Types   Packs/Day   Years Used   Date

 

    



  Never Assessed    









 



  Sex Assigned at Birth   Date Recorded

 

 



  Not on file 



as of this encounter



Miscellaneous Notes

* Telephone Encounter - Kate Higginbotham RN - 2017  3:26 PM CST



Rec'd vm from Dr. Miguel's nurse, Mary dunlap: referral for pt  PCI. Dr. Delcid 
also advised he spoke with Dr. Miguel and wants to proceed with OV/PCI same day. 
Contacted Dr. Miguel's nurse back with this information. Nurse gave a better 
contact number for pt of [phone]. Msg sent to Dr. Delcid's admin to work in 
pt for OV before PCI. Will contact pt to schedule OV and PCI. 

* Telephone Encounter - Shanell Hendrickson RN - 2017  2:57 PM CST



Voicemail received from Ariana - pt's last NUC was 2016. Dr. Delcid is okay with 
this and okay to proceed with cath scheduling as long as NUC shows viability. 
LM for Ariana to please call back. Dr. Delcid also sent a message to Dr. Bacon 
regarding this information. Need NUC results faxed to us. They were not 
included in the original records fax we received. 

* Telephone Encounter - Shanell Hendrickson RN - 2017  6:51 PM CST



Dr. Delcid reviewed cath images this afternoon. He is agreeable to bringing pt 
in for high risk PCI (retrograde approach - CIRC/RCA). Dr. Delcid will need a 
couple hours blocked for the case. Dr. Delcid inquiring if pt has had a recent 
nuclear thallium or viability study. LM for Ariana to please call and let us know. 

* Telephone Encounter - Shanell Hendrickson RN - 2017 11:32 AM CST



CD not yet received. Connected with Ariana at Dr. Bacon's office over the phone 
this morning to check in. She states she will call the hospital to ensure they 
have been mailed. If they haven't, asked that they please be sent via [employer] 
overnight. She will keep us posted.



Ariana phone: [phone], fax: [phone]

* Telephone Encounter - Shanell Hendrickson RN - 2017  3:41 PM CST



Per Duane, no precert is needed for the cath procedure. Will await the cath 
images on CD. Called Ariana to update her that we will plan to schedule OV and 
cath on the same day once Dr. Delcid has reviewed the images. 

* Telephone Encounter - Shanell Hendrickson RN - 2017 12:14 PM CST



Received a referral for possible  procedure with Dr. Delcid from Dr. Bacon 
in [place], [place]. Checking with Duane about insurance precert then will 
schedule the patient accordingly. Prefer to schedule H&P and cath on the same 
day due to the patient drive and Dr. Delcid's limited clinic availability and to 
expedite getting pt to procedure. Will await the feedback. 



Paper records received from ARMIDA Lane. She is sending the cath images on CD via 
[employer]. We will need Dr. Delcid to review the images before patient is scheduled 
- to assess whether pt is a candidate for high risk PCI. 

in this encounter



Plan of Treatment





Not on fileas of this encounter



Visit Diagnoses

Not on filein this encounter

## 2018-01-29 NOTE — HISTORY & PHYSICAL-HOSPITALIST
HPI


History of Present Illness:


HPI/Chief Complaint


CC: Left leg cellulitis





HPI: This is a 47-year-old white male known to me from prior admission for 

coronary artery disease with recent stent placement one month ago by Dr. Bacon 

who presents with left leg swelling and pain and diagnosed with cellulitis in 

the ER.  Patient was placed on vancomycin empirically we'll add Rocephin due to 

the elevated white count and continued erythema although improved but will 

broaden antibiotic spectrum.  He reports he has pain and he is constantly 

asking for pain medication.  His primary care provider is Dr. Champagne.


Source:  patient


Exam Limitations:  no limitations


Date Seen


18


Time Seen by Provider:  09:45


Attending Physician


Tomasz Callahan MD


PCP


Mahesh Champagne MD


Referring Physician





Date of Admission


2018 at 00:55





Home Medications & Allergies


Home Medications


Reviewed patient Home Medication Reconciliation Form





Allergies





Allergies


Coded Allergies


  Penicillins (Unverified Allergy, Mild, 09)








Past Medical-Social-Family Hx


Patient Social History


Marrital Status:  


Employed/Student:  unemployed


Alcohol Use:  Denies Use (USED "AS A KID")


Recreational Drug Use:  No (THC "AS A KID" )


Smoking Status:  Former Smoker (4 PPD--QUIT )


Former Smoker, Quit:  2017


Type Used:  Cigarettes


2nd Hand Smoke Exposure:  No


Physical Abuse Screen:  No


Sexual Abuse:  No


Recent Foreign Travel:  No


Contact w/other who traveled:  No


Recent Hopitalizations:  No


Recent Infectious Disease Expo:  No





Immunizations Up To Date


Date of Pneumonia Vaccine:  Dec 3, 2012


Date of Influenza Vaccine:  Oct 14, 2017





Seasonal Allergies


Seasonal Allergies:  No





Surgeries


Yes (CARDIAC CATHS--STENTS X 1. LAST CATH 17 WITH STENT X 1)


Cardiac, Coronary Stent





Respiratory


Yes


Pulmonary Embolism


Currently Using CPAP:  No


Currently Using BIPAP:  No





Cardiovascular


Yes (MI X 2; STENTS X 4; -NSTEMI 17 WITH 1 STENT PLACED)


Coronary Artery Disease, Deep Vein Thrombosis, Heart Attack, High Cholesterol, 

Hypertension





Neurological


No





Reproductive System


Hx Reproductive Disorders:  No





Genitourinary


No





Gastrointestinal


Yes


Gastroesophageal Reflux





Musculoskeletal


Yes (HYDROCODONE + IBUPROFEN DAILY)


Chronic Back Pain, Gout





Endocrine


History of Endocrine Disorders:  No





HEENT


History of HEENT Disorders:  No





Cancer


No





Psychosocial


History of Psychiatric Problem:  No





Integumentary


History of Skin or Integumenta:  No





Blood Transfusions


History of Blood Disorders:  Yes (PROTEIN S DEFICIENCY--DVT'S AND P.E.'S AND MI 

X 2)





Family Medical History


Significant Family History:  Heart Disease, Vascular Disease


Family Hx:  


Arthritis


  G8 BROTHER


Blood clots


  19 MOTHER ( of blood clot)


Cardiac disorder


  19 FATHER


Diabetes mellitus


  G8 BROTHER


FH: cancer


  paternal grandmother


FHx: inflammatory bowel disease


  G8 BROTHER





No Family History of:


  FH: sudden cardiac death (SCD)





Review of Systems


Constitutional:  see HPI, fever, weakness


EENTM:  no symptoms reported


Respiratory:  no symptoms reported


Cardiovascular:  no symptoms reported


Gastrointestinal:  no symptoms reported


Genitourinary:  no symptoms reported


Musculoskeletal:  no symptoms reported


Skin:  see HPI


Psychiatric/Neurological:  No Symptoms Reported


All Other Systems Reviewed


Negative Unless Noted:  Yes





Physical Exam


Physical Exam


Vital Signs





Vital Sign - Last 12Hours








 18





 23:40


 


Temp 98.7


 


Pulse 97


 


Resp 20


 


B/P (MAP) 138/77 (97)


 


Pulse Ox 97


 


O2 Delivery Room Air





Capillary Refill : Less Than 3 Seconds


General Appearance:  No Apparent Distress, WD/WN, Chronically ill


Eyes:  Bilateral Eye Normal Inspection, Bilateral Eye PERRL


HEENT:  PERRL/EOMI, Normal ENT Inspection, Pharynx Normal


Neck:  Full Range of Motion, Normal Inspection, Non Tender, Supple, Carotid 

Bruit


Respiratory:  Chest Non Tender, Lungs Clear, Normal Breath Sounds, No Accessory 

Muscle Use, No Respiratory Distress


Cardiovascular:  Regular Rate, Rhythm, No Edema, No Gallop, No JVD, No Murmur, 

Normal Peripheral Pulses


Gastrointestinal:  Normal Bowel Sounds, No Organomegaly, No Pulsatile Mass, Non 

Tender, Soft


Back:  Normal Inspection, No CVA Tenderness, No Vertebral Tenderness


Extremity:  Normal Capillary Refill, Normal Inspection, Normal Range of Motion, 

Non Tender, No Calf Tenderness, No Pedal Edema


Neurologic/Psychiatric:  Alert, Oriented x3, No Motor/Sensory Deficits, Normal 

Mood/Affect


Skin:  Normal Color, Warm/Dry, Rash (left leg erythema with mild edema and 

tenderness to palpation and warm to touch)


Lymphatic:  No Adenopathy





Results


Results/Procedures


Lab


Laboratory Tests


18 23:50








18 05:26














Assessment/Plan


Admission Diagnosis


Assessment:


Acute left leg cellulitis


History of CAD recent stent placement


Hyperlipidemia


Hypertension


Congestive heart failure maintain on Entresto





Assessment and Plan


Plan:


Empiric antibiotics and add Rocephin to vancomycin


Monitor labs


Reconcile all home meds


Monitor closely


Pain control


DVT prophylaxis





Clinical Quality Measures


DVT/VTE Risk/Contraindication:


Risk Factor Score Per Nursin


RFS Level Per Nursing on Admit:  4+=Very High











FELISHA MORENO DO 2018 09:58

## 2018-01-30 VITALS — DIASTOLIC BLOOD PRESSURE: 68 MMHG | SYSTOLIC BLOOD PRESSURE: 132 MMHG

## 2018-01-30 VITALS — DIASTOLIC BLOOD PRESSURE: 76 MMHG | SYSTOLIC BLOOD PRESSURE: 148 MMHG

## 2018-01-30 VITALS — DIASTOLIC BLOOD PRESSURE: 80 MMHG | SYSTOLIC BLOOD PRESSURE: 139 MMHG

## 2018-01-30 VITALS — SYSTOLIC BLOOD PRESSURE: 130 MMHG | DIASTOLIC BLOOD PRESSURE: 62 MMHG

## 2018-01-30 VITALS — DIASTOLIC BLOOD PRESSURE: 72 MMHG | SYSTOLIC BLOOD PRESSURE: 129 MMHG

## 2018-01-30 LAB
ALBUMIN SERPL-MCNC: 3.5 GM/DL (ref 3.2–4.5)
ALP SERPL-CCNC: 90 U/L (ref 40–136)
ALT SERPL-CCNC: 21 U/L (ref 0–55)
BASOPHILS # BLD AUTO: 0 10^3/UL (ref 0–0.1)
BASOPHILS NFR BLD AUTO: 0 % (ref 0–10)
BILIRUB SERPL-MCNC: 0.4 MG/DL (ref 0.1–1)
BUN/CREAT SERPL: 10
CALCIUM SERPL-MCNC: 9.8 MG/DL (ref 8.5–10.1)
CHLORIDE SERPL-SCNC: 99 MMOL/L (ref 98–107)
CO2 SERPL-SCNC: 29 MMOL/L (ref 21–32)
CREAT SERPL-MCNC: 0.71 MG/DL (ref 0.6–1.3)
EOSINOPHIL # BLD AUTO: 0.2 10^3/UL (ref 0–0.3)
EOSINOPHIL NFR BLD AUTO: 1 % (ref 0–10)
ERYTHROCYTE [DISTWIDTH] IN BLOOD BY AUTOMATED COUNT: 14 % (ref 10–14.5)
GFR SERPLBLD BASED ON 1.73 SQ M-ARVRAT: > 60 ML/MIN
GLUCOSE SERPL-MCNC: 139 MG/DL (ref 70–105)
HCT VFR BLD CALC: 34 % (ref 40–54)
HGB BLD-MCNC: 11.3 G/DL (ref 13.3–17.7)
INR PPP: 3.2 (ref 0.8–1.4)
LYMPHOCYTES # BLD AUTO: 1 X 10^3 (ref 1–4)
LYMPHOCYTES NFR BLD AUTO: 9 % (ref 12–44)
MANUAL DIFFERENTIAL PERFORMED BLD QL: NO
MCH RBC QN AUTO: 29 PG (ref 25–34)
MCHC RBC AUTO-ENTMCNC: 33 G/DL (ref 32–36)
MCV RBC AUTO: 87 FL (ref 80–99)
MONOCYTES # BLD AUTO: 0.5 X 10^3 (ref 0–1)
MONOCYTES NFR BLD AUTO: 5 % (ref 0–12)
NEUTROPHILS # BLD AUTO: 8.7 X 10^3 (ref 1.8–7.8)
NEUTROPHILS NFR BLD AUTO: 84 % (ref 42–75)
PLATELET # BLD: 570 10^3/UL (ref 130–400)
PMV BLD AUTO: 8.9 FL (ref 7.4–10.4)
POTASSIUM SERPL-SCNC: 3.6 MMOL/L (ref 3.6–5)
PROT SERPL-MCNC: 7.6 GM/DL (ref 6.4–8.2)
PROTHROMBIN TIME: 32.3 SEC (ref 12.2–14.7)
RBC # BLD AUTO: 3.93 10^6/UL (ref 4.35–5.85)
SODIUM SERPL-SCNC: 141 MMOL/L (ref 135–145)
VANCOMYCIN TROUGH SERPL-MCNC: 13.6 UG/ML (ref 10–20)
WBC # BLD AUTO: 10.4 10^3/UL (ref 4.3–11)

## 2018-01-30 RX ADMIN — ATORVASTATIN CALCIUM SCH MG: 40 TABLET, FILM COATED ORAL at 08:09

## 2018-01-30 RX ADMIN — GABAPENTIN SCH MG: 600 TABLET, FILM COATED ORAL at 22:24

## 2018-01-30 RX ADMIN — OMEGA-3 FATTY ACIDS CAP 1000 MG SCH MG: 1000 CAP at 08:09

## 2018-01-30 RX ADMIN — OMEGA-3 FATTY ACIDS CAP 1000 MG SCH MG: 1000 CAP at 22:24

## 2018-01-30 RX ADMIN — OMEGA-3 FATTY ACIDS CAP 1000 MG SCH MG: 1000 CAP at 12:03

## 2018-01-30 RX ADMIN — ALLOPURINOL SCH MG: 300 TABLET ORAL at 08:09

## 2018-01-30 RX ADMIN — MORPHINE SULFATE PRN MG: 4 INJECTION, SOLUTION INTRAMUSCULAR; INTRAVENOUS at 03:24

## 2018-01-30 RX ADMIN — CLOPIDOGREL BISULFATE SCH MG: 75 TABLET, FILM COATED ORAL at 08:09

## 2018-01-30 RX ADMIN — GABAPENTIN SCH MG: 600 TABLET, FILM COATED ORAL at 12:03

## 2018-01-30 RX ADMIN — Medication SCH ML: at 12:03

## 2018-01-30 RX ADMIN — PANTOPRAZOLE SCH MG: 20 TABLET, DELAYED RELEASE ORAL at 05:48

## 2018-01-30 RX ADMIN — FENTANYL CITRATE PRN MCG: 50 INJECTION, SOLUTION INTRAMUSCULAR; INTRAVENOUS at 05:43

## 2018-01-30 RX ADMIN — VANCOMYCIN HYDROCHLORIDE SCH MLS/HR: 500 INJECTION, POWDER, LYOPHILIZED, FOR SOLUTION INTRAVENOUS at 15:28

## 2018-01-30 RX ADMIN — ACETAMINOPHEN PRN MG: 500 TABLET ORAL at 16:54

## 2018-01-30 RX ADMIN — FENTANYL CITRATE PRN MCG: 50 INJECTION, SOLUTION INTRAMUSCULAR; INTRAVENOUS at 11:33

## 2018-01-30 RX ADMIN — SACUBITRIL AND VALSARTAN SCH TAB: 24; 26 TABLET, FILM COATED ORAL at 22:24

## 2018-01-30 RX ADMIN — SODIUM CHLORIDE SCH MLS/HR: 900 INJECTION INTRAVENOUS at 08:07

## 2018-01-30 RX ADMIN — VANCOMYCIN HYDROCHLORIDE SCH MLS/HR: 500 INJECTION, POWDER, LYOPHILIZED, FOR SOLUTION INTRAVENOUS at 08:13

## 2018-01-30 RX ADMIN — CARISOPRODOL SCH MG: 350 TABLET ORAL at 22:24

## 2018-01-30 RX ADMIN — SACUBITRIL AND VALSARTAN SCH EACH: 24; 26 TABLET, FILM COATED ORAL at 08:09

## 2018-01-30 RX ADMIN — HYDROCODONE BITARTRATE AND ACETAMINOPHEN PRN EA: 7.5; 325 TABLET ORAL at 22:34

## 2018-01-30 RX ADMIN — Medication SCH ML: at 22:24

## 2018-01-30 RX ADMIN — HYDROCODONE BITARTRATE AND ACETAMINOPHEN PRN EA: 7.5; 325 TABLET ORAL at 11:34

## 2018-01-30 RX ADMIN — GABAPENTIN SCH MG: 600 TABLET, FILM COATED ORAL at 16:54

## 2018-01-30 RX ADMIN — GABAPENTIN SCH MG: 600 TABLET, FILM COATED ORAL at 08:09

## 2018-01-30 RX ADMIN — FENTANYL CITRATE PRN MCG: 50 INJECTION, SOLUTION INTRAMUSCULAR; INTRAVENOUS at 16:57

## 2018-01-30 RX ADMIN — Medication SCH ML: at 05:43

## 2018-01-30 NOTE — PHYSICAL THERAPY EVALUATION
PT Evaluation-General


Medical Diagnosis


Admission Date


2018 at 00:55


Medical Diagnosis:  cellulitis left LE


Onset Date:  2018





Therapy Diagnosis


Therapy Diagnosis:  debility





Height/Weight


Height (Feet):  5


Height (Inches):  11.00


Weight (Pounds):  198


Weight (Ounces):  0.0





Precautions


Precautions/Isolations:  Fall Prevention, Standard Precautions





Weight Bear Status


Right Lower Extremity:  Right


Full Weight Bearing


Left Lower Extremity:  Left


Full Weight Bearing





Referral


Physician:  Bryon


Reason for Referral:  Evaluation/Treatment





Medical History


Pertinent Medical History:  CAD, Heart Failure, HTN, MI


Additional Medical History


PE, DVT


Current History


has been treated for gout without success/admitted for IV antibiotics


Reviewed History:  Yes





Social History


Home:  Single Level


Current Living Status:  Spouse





Prior/Core FIM


Prior Level of Function


              Functional Olive Measure


0=Not Assessed/NA   4=Minimal Assistance


1=Total Assistance   5=Supervision or Setup


2=Maximal Assistance   6=Modified Olive


3=Moderate Assistance   7=Complete Olive


Bed Mobility:  7


Transfers (B,C,W/C) (FIM):  7


Gait:  7





PT Evaluation-Current


Subjective


Patient begins crying, moaning and yelling upon PT entry.  He states, "I don't 

want to do this."  After much encouragement, patient agrees.





Pain





   Numeric Pain Scale:  10-Worst Possible Pain


   Location:  Left


   Location Body Site:  Calf


   Pain Description:  Pressure, Sharp





Objective


Patient Orientation:  Normal For Age


Problem Solving:  Fair





ROM/Strength


ROM Lower Extremities


patient declined PT to test left LE due to pain and tenderness


right WFL


Strength Lower Extremities


right LE 5/5; left LE NT (patient refusal)





Integumentary/Posture


Integumentary


noted redness/edema distal left LE/foot


Bowel Incontinence:  No


Bladder Incontinence:  No


Posture


WFL





Neuromuscular


(Tone, Coordination, Reflexes)


grossly intact





Sensory


Vision:  Wears Glasses


Hearing:  Functional


Sensation Right Lower Extremit:  Intact


Sensation Left Lower Extremity:  Intact





Transfers


              Functional Olive Measure


0=Not Assessed/NA   4=Minimal Assistance


1=Total Assistance   5=Supervision or Setup


2=Maximal Assistance   6=Modified Olive


3=Moderate Assistance   7=Complete Olive


Transfers (B, C, W/C) (FIM):  7


Scootin


Rollin


Supine to/from Sit:  7


Sit to/from Stand:  7





Gait


Mode of Locomotion:  Walk


Anticipated Mode of Locomotion:  Walk


Gait (FIM):  1


Distance (FIM):  1=up to 49 ft


Distance:  40'


Gait Level of Assist:  5


Gait Assistive Device:  FWW


Comments/Gait Description


PT instructed patient to ambulate flat foot gait sequence, however, patient 

refused to perform due to pain.  Patient did perform TTWB left LE with use of 

FWW





Balance


Sitting Static:  Normal


Sitting Dynamic:  Normal


Standing Static:  Normal


 Standing Dynamic:  Normal





Assessment/Needs


47 y.o. male, will be seen by skilled PT short term to address gait training 

and ROM left foot.  PT encouraged patient to perform independently, however, 

patient refused to move his left foot or toes due to pain.  When PT attempted 

to perform left ankle ROM, patient screamed and yelled to stop.  Education with 

patient and spouse on importance of performing this independently vs. the 

possible negative side effects that could occur.  Patient again refused due to 

pain.  RN notified and aware of situation.


Rehab Potential:  Fair


Post Rehab Potential-Barriers:  compliance





PT Long Term Goals


Long Term Goals


PT Long Term Goals Time Frame:  2018


Transfers (B,C,W/C) (FIM):  7


Gait (FIM):  6


Gait distance (FIM):  3=150 ft


Distance:  150'


Gait Level of Assist:  6


Gait Assistive Device:  None, FWW





PT Plan


Problem List


Problem List:  Other (pain control)





Treatment/Plan


Treatment Plan:  Continue Plan of Care


Treatment Plan:  Education, Functional Activity Gala, Functional Strength, Gait

, Safety, Therapeutic Exercise


Treatment Duration:  2018


Frequency:  4 times per week


Estimated Hrs Per Day:  .25 hour per day


Patient and/or Family Agrees t:  Yes





Safety Risks/Education


Patient Education:  Gait Training, Disease Process, Safety Issues


Teaching Recipient:  Patient, Significant Other


Teaching Methods:  Discussion


Response to Teaching:  Reinforcement Needed





Time/GCodes


Time In:  1100


Time Out:  1123


Total Billed Treatment Time:  23


Total Billed Treatment


1 visit


EVMod 23 min











MANJINDER MANZANO PT 2018 11:49

## 2018-01-30 NOTE — PROGRESS NOTE-HOSPITALIST
Progress Note


HPI/CC on Admission


CC: Left leg cellulitis





HPI: This is a 47-year-old white male known to me from prior admission for 

coronary artery disease with recent stent placement one month ago by Dr. Bacon 

who presents with left leg swelling and pain and diagnosed with cellulitis in 

the ER.  Patient was placed on vancomycin empirically we'll add Rocephin due to 

the elevated white count and continued erythema although improved but will 

broaden antibiotic spectrum.  He reports he has pain and he is constantly 

asking for pain medication.  His primary care provider is Dr. Champagne.





Progress Notes/Assess & Plan


Date Seen


1/30/18


Time Seen by Provider:  09:30


Admission Dx/Process


Assessment:


Acute left leg cellulitis


History of CAD recent stent placement


Hyperlipidemia


Hypertension


Congestive heart failure maintain on Entresto


Diagonsis/Assessment & Plan


Patient much improved


Left leg is much improved but still afraid to move


Pain is out of proportion to clinical scenario


Checked meds and labs





AFVSS, Pleasant, O x 3, flat affect


RRR, CTAB


No edema


less erythema left leg





Laboratory Tests


1/30/18 07:07

















Assessment:


Acute left leg cellulitis improved on broad spectrum Vanc and Rocephin


History of CAD recent stent placement


Hyperlipidemia


Hypertension


Congestive heart failure maintain on Entresto





Plan:


Empiric antibiotics and add Rocephin to vancomycin


Monitor labs


Reconcile all home meds


Monitor closely


Pain control


DVT prophylaxis


DC tomorrow


OOB today











FELISHA MORENO DO Jan 30, 2018 08:56

## 2018-01-31 VITALS — SYSTOLIC BLOOD PRESSURE: 136 MMHG | DIASTOLIC BLOOD PRESSURE: 76 MMHG

## 2018-01-31 VITALS — SYSTOLIC BLOOD PRESSURE: 121 MMHG | DIASTOLIC BLOOD PRESSURE: 78 MMHG

## 2018-01-31 VITALS — DIASTOLIC BLOOD PRESSURE: 76 MMHG | SYSTOLIC BLOOD PRESSURE: 130 MMHG

## 2018-01-31 VITALS — DIASTOLIC BLOOD PRESSURE: 80 MMHG | SYSTOLIC BLOOD PRESSURE: 126 MMHG

## 2018-01-31 LAB
ALBUMIN SERPL-MCNC: 3.3 GM/DL (ref 3.2–4.5)
ALP SERPL-CCNC: 78 U/L (ref 40–136)
ALT SERPL-CCNC: 24 U/L (ref 0–55)
BASOPHILS # BLD AUTO: 0 10^3/UL (ref 0–0.1)
BASOPHILS NFR BLD AUTO: 0 % (ref 0–10)
BILIRUB SERPL-MCNC: 0.4 MG/DL (ref 0.1–1)
BUN/CREAT SERPL: 10
CALCIUM SERPL-MCNC: 9.6 MG/DL (ref 8.5–10.1)
CHLORIDE SERPL-SCNC: 98 MMOL/L (ref 98–107)
CO2 SERPL-SCNC: 30 MMOL/L (ref 21–32)
CREAT SERPL-MCNC: 0.7 MG/DL (ref 0.6–1.3)
EOSINOPHIL # BLD AUTO: 0.2 10^3/UL (ref 0–0.3)
EOSINOPHIL NFR BLD AUTO: 2 % (ref 0–10)
ERYTHROCYTE [DISTWIDTH] IN BLOOD BY AUTOMATED COUNT: 14.2 % (ref 10–14.5)
GFR SERPLBLD BASED ON 1.73 SQ M-ARVRAT: > 60 ML/MIN
GLUCOSE SERPL-MCNC: 109 MG/DL (ref 70–105)
HCT VFR BLD CALC: 33 % (ref 40–54)
HGB BLD-MCNC: 10.8 G/DL (ref 13.3–17.7)
INR PPP: 2.8 (ref 0.8–1.4)
LYMPHOCYTES # BLD AUTO: 1.3 X 10^3 (ref 1–4)
LYMPHOCYTES NFR BLD AUTO: 12 % (ref 12–44)
MANUAL DIFFERENTIAL PERFORMED BLD QL: NO
MCH RBC QN AUTO: 29 PG (ref 25–34)
MCHC RBC AUTO-ENTMCNC: 33 G/DL (ref 32–36)
MCV RBC AUTO: 88 FL (ref 80–99)
MONOCYTES # BLD AUTO: 1 X 10^3 (ref 0–1)
MONOCYTES NFR BLD AUTO: 9 % (ref 0–12)
NEUTROPHILS # BLD AUTO: 8.1 X 10^3 (ref 1.8–7.8)
NEUTROPHILS NFR BLD AUTO: 77 % (ref 42–75)
PLATELET # BLD: 631 10^3/UL (ref 130–400)
PMV BLD AUTO: 8.9 FL (ref 7.4–10.4)
POTASSIUM SERPL-SCNC: 3.6 MMOL/L (ref 3.6–5)
PROT SERPL-MCNC: 7.5 GM/DL (ref 6.4–8.2)
PROTHROMBIN TIME: 29.6 SEC (ref 12.2–14.7)
RBC # BLD AUTO: 3.74 10^6/UL (ref 4.35–5.85)
SODIUM SERPL-SCNC: 141 MMOL/L (ref 135–145)
WBC # BLD AUTO: 10.5 10^3/UL (ref 4.3–11)

## 2018-01-31 RX ADMIN — Medication SCH ML: at 12:11

## 2018-01-31 RX ADMIN — VANCOMYCIN HYDROCHLORIDE SCH MLS/HR: 500 INJECTION, POWDER, LYOPHILIZED, FOR SOLUTION INTRAVENOUS at 07:44

## 2018-01-31 RX ADMIN — ALLOPURINOL SCH MG: 300 TABLET ORAL at 08:03

## 2018-01-31 RX ADMIN — OMEGA-3 FATTY ACIDS CAP 1000 MG SCH MG: 1000 CAP at 12:11

## 2018-01-31 RX ADMIN — PANTOPRAZOLE SCH MG: 20 TABLET, DELAYED RELEASE ORAL at 05:41

## 2018-01-31 RX ADMIN — HYDROCODONE BITARTRATE AND ACETAMINOPHEN PRN EA: 7.5; 325 TABLET ORAL at 05:41

## 2018-01-31 RX ADMIN — SACUBITRIL AND VALSARTAN SCH TAB: 24; 26 TABLET, FILM COATED ORAL at 08:06

## 2018-01-31 RX ADMIN — HYDROCODONE BITARTRATE AND ACETAMINOPHEN PRN EA: 7.5; 325 TABLET ORAL at 12:11

## 2018-01-31 RX ADMIN — VANCOMYCIN HYDROCHLORIDE SCH MLS/HR: 500 INJECTION, POWDER, LYOPHILIZED, FOR SOLUTION INTRAVENOUS at 00:08

## 2018-01-31 RX ADMIN — SODIUM CHLORIDE SCH MLS/HR: 900 INJECTION INTRAVENOUS at 08:07

## 2018-01-31 RX ADMIN — FENTANYL CITRATE PRN MCG: 50 INJECTION, SOLUTION INTRAMUSCULAR; INTRAVENOUS at 10:23

## 2018-01-31 RX ADMIN — ATORVASTATIN CALCIUM SCH MG: 40 TABLET, FILM COATED ORAL at 08:02

## 2018-01-31 RX ADMIN — Medication SCH ML: at 05:41

## 2018-01-31 RX ADMIN — CLOPIDOGREL BISULFATE SCH MG: 75 TABLET, FILM COATED ORAL at 08:03

## 2018-01-31 RX ADMIN — GABAPENTIN SCH MG: 600 TABLET, FILM COATED ORAL at 08:07

## 2018-01-31 RX ADMIN — OMEGA-3 FATTY ACIDS CAP 1000 MG SCH MG: 1000 CAP at 08:03

## 2018-01-31 RX ADMIN — GABAPENTIN SCH MG: 600 TABLET, FILM COATED ORAL at 12:11

## 2018-01-31 NOTE — PHYSICAL THERAPY DAILY NOTE
PT Daily Note-Current


Subjective


Pt sitting up in bed upon arrival.  Pt agrees to PT for walking but reports its 

painful.





Pain





   Numeric Pain Scale:  8


   Location:  Left


   Location Body Site:  Foot


   Pain Description:  Ache, Tightness





Mental Status


Patient Orientation:  Person, Place, Situation


Attachments:  IV





Transfers


              Functional [place] Measure


0=Not Assessed/NA   4=Minimal Assistance


1=Total Assistance   5=Supervision or Setup


2=Maximal Assistance   6=Modified [place]


3=Moderate Assistance   7=Complete IndependenceIRFPAI Quality Coding Scale











6 Independent with activity with or without an assistive device


 


5  Patient requires set up or clean up by helper.  Patient completes activity  

by  themselves


 


4 Supervision or touching assist (CGA). [place] provide cues , steadying assist


 


3 The helper provides less than half the effort to complete the activity


 


2 The helper provides more than half the effort to complete the activity


 


1 Dependent.  The helper does all the effort to complete an activity 


 


7 Patient refused to complete or attempt activity


 


9 The patient did not perform the activity before the current illness or injury


 


88 Not attempted due to Medical conditions or safety concerns








Scootin


Rollin


Supine to/from Sit:  5


Sit to/from Stand:  5





Weight Bearing


Right Lower Extremity:  Right


Full Weight Bearing


Left Lower Extremity:  Left


Full Weight Bearing





Gait Training


Distance (FIM):  3=150 ft


Distance:  200'


Gait Level of Assist:  5


Gait Persons Needed:  1


Gait Assistive Device:  FWW


Pt will only TTWB despite encouragement from PTA.  Pt moans/groans in pain 

during ambulation.  Pt has very slow harriet, all most a hoping gait pattern 

due to pt imposed TTWB.





Treatments


Pt transfers from Supine to EOB to standing at FWW at [facility].  Pt ambulates using 

FWW in hallway at [facility].  Pt returns to room to rest supine in bed at end of walk 

with all needs met.





Assessment


Current Status:  Good Progress


Pt moans & groans throughout WB.  Pt needs encouragement to put more WB/walk 

flat foot during ambulation.  Pt needs encouragement to focus on something 

other than the pain to try to push through.





PT Long Term Goals


Long Term Goals


PT Long Term Goals Time Frame:  2018


Transfers (B,C,W/C) (FIM):  7


Gait (FIM):  6


Gait distance (FIM):  3=150 ft


Distance:  150'


Gait Level of Assist:  6


Gait Assistive Device:  None, FWW





PT Plan


Problem List


Problem List:  Activity Tolerance, Functional Strength, Safety, Balance, Gait





Treatment/Plan


Treatment Plan:  Continue Plan of Care


Treatment Plan:  Education, Functional Activity [place], Functional Strength, Gait

, Safety, Therapeutic Exercise


Treatment Duration:  2018


Frequency:  4 times per week


Estimated Hrs Per Day:  .25 hour per day


Patient and/or Family Agrees t:  Yes





Safety Risks/Education


Patient Education:  Gait Training, Correct Positioning, Disease Process, Safety 

Issues


Teaching Recipient:  Patient


Teaching Methods:  Discussion


Response to Teaching:  Verbalize Understanding





Time/GCodes


Time In:  910


Time Out:  927


Total Billed Treatment Time:  17


Total Billed Treatment


1, GT (17m)











ABRAM MEJIA PTA 2018 10:11

## 2018-01-31 NOTE — DISCHARGE SUMMARY-HOSPITALIST
Diagnosis/Chief Complaint


Date of Admission


2018 at 00:55


Date of Discharge





Admission Diagnosis


Assessment:


Acute left leg cellulitis


History of CAD recent stent placement


Hyperlipidemia


Hypertension


Congestive heart failure maintain on Entresto





Discharge Diagnosis


Assessment:


Acute left leg cellulitis improved on broad spectrum Vanc and Rocephin


History of CAD recent stent placement


Hyperlipidemia


Hypertension


Congestive heart failure maintain on Entresto





Plan:


Empiric antibiotics and add Rocephin to vancomycin


Monitor labs


Reconcile all home meds


Monitor closely


Pain control


DVT prophylaxis


DC tomorrow


OOB today








Discharge Summary


Discharge Physical Examination


Allergies:  


Coded Allergies:  


     Penicillins (Unverified  Allergy, Mild, 09)


Vitals & I&Os





Vital Signs








  Date Time  Temp Pulse Resp B/P (MAP) Pulse Ox O2 Delivery O2 Flow Rate FiO2


 


18 08:00 98.8 87 18 136/76 (96) 93 Room Air  











Hospital Course


Pt states he is ready to DC


Pt confirms having BMs.


Pt confirms PCP as Dr. Champagne.


Pt was informed that he will remain on abx and will need to see Dr. Champagne next 

week, Monday.


Pt states he is unsure how much pain medication he has at home. Pt was informed 

that I will send some home with him.


Pt confirms pharmacy as [facility].





No fever, vital signs stable, pleasant, improved


Left leg much improved erythema but still peak in color no warmth out of normal





Hospital course: Patient had a standard hospital course he was empirically 

placed on vancomycin and Rocephin due to antibiotic allergies.  He improved 

less erythema less edema less pain and was deemed stable with normal white 

count at day of discharge and Coumadin was held due to supratherapeutic level 

and he was discharged on 5 mg of Coumadin daily less than is 7.5 alternating 

dose and will have his pro time checked on Monday.


Labs (last 24 hrs)


Laboratory Tests


18 05:20: 


White Blood Count 10.5, Red Blood Count 3.74L, Hemoglobin 10.8L, Hematocrit 33L

, Mean Corpuscular Volume 88, Mean Corpuscular Hemoglobin 29, Mean Corpuscular 

Hemoglobin Concent 33, Red Cell Distribution Width 14.2, Platelet Count 631H, 

Mean Platelet Volume 8.9, Neutrophils (%) (Auto) 77H, Lymphocytes (%) (Auto) 12

, Monocytes (%) (Auto) 9, Eosinophils (%) (Auto) 2, Basophils (%) (Auto) 0, 

Neutrophils # (Auto) 8.1H, Lymphocytes # (Auto) 1.3, Monocytes # (Auto) 1.0, 

Eosinophils # (Auto) 0.2, Basophils # (Auto) 0.0, Prothrombin Time 29.6H, INR 

Comment 2.8H, Sodium Level 141, Potassium Level 3.6, Chloride Level 98, Carbon 

Dioxide Level 30, Anion Gap 13, Blood Urea Nitrogen 7, Creatinine 0.70, Estimat 

Glomerular Filtration Rate > 60, BUN/Creatinine Ratio 10, Glucose Level 109H, 

Calcium Level 9.6, Total Bilirubin 0.4, Aspartate Amino Transf (AST/SGOT) 24, 

Alanine Aminotransferase (ALT/SGPT) 24, Alkaline Phosphatase 78, Total Protein 

7.5, Albumin 3.3





Microbiology


18 Blood Culture - Preliminary, Resulted


          No growth





Pending Labs


Laboratory Tests


18 05:20: 


White Blood Count 10.5, Red Blood Count 3.74, Hemoglobin 10.8, Hematocrit 33, 

Mean Corpuscular Volume 88, Mean Corpuscular Hemoglobin 29, Mean Corpuscular 

Hemoglobin Concent 33, Red Cell Distribution Width 14.2, Platelet Count 631, 

Mean Platelet Volume 8.9, Neutrophils (%) (Auto) 77, Lymphocytes (%) (Auto) 12, 

Monocytes (%) (Auto) 9, Eosinophils (%) (Auto) 2, Basophils (%) (Auto) 0, 

Neutrophils # (Auto) 8.1, Lymphocytes # (Auto) 1.3, Monocytes # (Auto) 1.0, 

Eosinophils # (Auto) 0.2, Basophils # (Auto) 0.0, Prothrombin Time 29.6, INR 

Comment 2.8, Sodium Level 141, Potassium Level 3.6, Chloride Level 98, Carbon 

Dioxide Level 30, Anion Gap 13, Blood Urea Nitrogen 7, Creatinine 0.70, Estimat 

Glomerular Filtration Rate > 60, BUN/Creatinine Ratio 10, Glucose Level 109, 

Calcium Level 9.6, Total Bilirubin 0.4, Aspartate Amino Transf (AST/SGOT) 24, 

Alanine Aminotransferase (ALT/SGPT) 24, Alkaline Phosphatase 78, Total Protein 

7.5, Albumin 3.3








Discharge


Home Medications:





Active Scripts


Active


Warfarin Sodium 5 Mg Tablet 5 Mg PO DAILY 7 Days


Bactrim Ds Tablet (Sulfamethoxazole/Trimethoprim) 1 Each Tablet 1 Each PO BID


Hydrocodon-Acetaminoph 7.5-325 (Hydrocodone/Acetaminophen) 1 Each Tablet 1 Tab 

PO Q6H PRN


Reported


Entresto 24 mg-26 mg Tablet (Sacubitril/Valsartan) 1 Each Tablet 1 Tab PO BID


Nitroglycerin 0.4 Mg Tab.subl 0.4 Mg SL UD PRN


One Daily For Men Tablet (Multivits-Minerals/FA/Lycopene) 1 Each Tablet 1 Tab 

PO DAILY


Plavix (Clopidogrel Bisulfate) 75 Mg Tablet 75 Mg PO DAILY


Prilosec Otc (Omeprazole Magnesium) 20 Mg Tablet.dr 20 Mg PO DAILY


Allopurinol 300 Mg Tablet 300 Mg PO DAILY


Tizanidine HCl 4 Mg Tablet 8 Mg PO Q8H PRN


     TAKES 2 (4MG) TABLETS


Fish Oil 1,000 mg Capsule (Omega 3 Polyunsat Fatty Acids) 1,000 Mg Cap 1,000 Mg 

PO TID


Warfarin Sodium 5 Mg Tablet 5 Mg PO SUTUTHFRSA


Warfarin Sodium 5 Mg Tablet 7.5 Mg PO MOWE


     TAKES 1 & 1/2 (5MG) TABLETS


Metoprolol Succinate 25 Mg Tab.er.24h 25 Mg PO DAILY


Atorvastatin Calcium 40 Mg Tablet 40 Mg PO DAILY


Gabapentin 600 Mg Tablet 600 Mg PO QID


Carisoprodol 350 Mg Tablet 350 Mg PO HS





Instructions to patient/family


Please see electronic discharge instructions given to patient.





Clinical Quality Measures


DVT/VTE Risk/Contraindication:


Risk Factor Score Per Nursin


RFS Level Per Nursing on Admit:  4+=Very High











FELISHA MORENO DO 2018 09:54

## 2018-02-19 ENCOUNTER — HOSPITAL ENCOUNTER (OUTPATIENT)
Dept: HOSPITAL 75 - WOUNDCARE | Age: 48
End: 2018-02-19
Attending: SURGERY
Payer: COMMERCIAL

## 2018-02-19 DIAGNOSIS — M79.662: ICD-10-CM

## 2018-02-19 DIAGNOSIS — G63: ICD-10-CM

## 2018-02-19 DIAGNOSIS — I75.022: Primary | ICD-10-CM

## 2018-02-19 PROCEDURE — 99213 OFFICE O/P EST LOW 20 MIN: CPT

## 2018-02-22 ENCOUNTER — HOSPITAL ENCOUNTER (OUTPATIENT)
Dept: HOSPITAL 75 - LAB | Age: 48
LOS: 42 days | Discharge: HOME | End: 2018-04-05
Attending: INTERNAL MEDICINE
Payer: COMMERCIAL

## 2018-02-22 ENCOUNTER — HOSPITAL ENCOUNTER (OUTPATIENT)
Dept: HOSPITAL 75 - RAD | Age: 48
End: 2018-02-22
Attending: INTERNAL MEDICINE
Payer: COMMERCIAL

## 2018-02-22 DIAGNOSIS — M89.572: ICD-10-CM

## 2018-02-22 DIAGNOSIS — I70.0: Primary | ICD-10-CM

## 2018-02-22 DIAGNOSIS — I26.99: Primary | ICD-10-CM

## 2018-02-22 DIAGNOSIS — I25.10: ICD-10-CM

## 2018-02-22 DIAGNOSIS — R06.00: ICD-10-CM

## 2018-02-22 DIAGNOSIS — I82.409: ICD-10-CM

## 2018-02-22 DIAGNOSIS — I70.8: ICD-10-CM

## 2018-02-22 DIAGNOSIS — F17.200: ICD-10-CM

## 2018-02-22 DIAGNOSIS — I10: ICD-10-CM

## 2018-02-22 DIAGNOSIS — Z79.01: ICD-10-CM

## 2018-02-22 DIAGNOSIS — I26.99: ICD-10-CM

## 2018-02-22 LAB
INR PPP: 2.1 (ref 0.8–1.4)
PROTHROMBIN TIME: 23.3 SEC (ref 12.2–14.7)

## 2018-02-22 PROCEDURE — 85610 PROTHROMBIN TIME: CPT

## 2018-02-22 PROCEDURE — 36415 COLL VENOUS BLD VENIPUNCTURE: CPT

## 2018-02-22 PROCEDURE — 75635 CT ANGIO ABDOMINAL ARTERIES: CPT

## 2018-02-22 NOTE — DIAGNOSTIC IMAGING REPORT
INDICATION: Left foot swelling and pain. Recent endovascular

intervention.



COMPARISON: 05/17/2017.



TECHNIQUE: Postcontrast CTA was obtained through the abdomen,

pelvis, and bilateral lower extremities. Multiplanar and 3D

reformats were also performed and reviewed.



FINDINGS: Included portions of the lung bases are clear.



CTA ABDOMEN: There is mild calcified aortic atherosclerosis.

There is no evidence of dissection, aneurysm, nor focal stenosis.

There is aberrant anatomy, as the hepatic artery arises from the

superior mesenteric artery. Otherwise, the celiac trunk and

superior mesenteric arteries are patent. There are two renal

arteries, bilaterally. Accessory renal artery is seen superiorly

on the left and inferiorly on the right. Both the main and

accessory renal arteries appear to be patent without evidence of

focal stenosis. There is also normal opacification of the

inferior mesenteric artery.



The kidneys, adrenal glands, spleen, pancreas, and liver have a

normal CT appearance. Small bowel loops are nondistended. Normal

appendix is identified. There is no loculated fluid collection,

free fluid, nor free air within the abdomen. No abnormal

mesenteric or retroperitoneal adenopathy is seen. Bony structures

show no acute abnormalities.



CTA PELVIS: There is mild-to-moderate calcified and noncalcified

atherosclerotic disease of the bilateral common iliac arteries.

This results in approximately 30% narrowing, bilaterally. Note is

made of suggestion of focal ulceration on the right (image 81,

series 5 and image 54, series 7). There is mild atherosclerotic

disease of the bilateral external iliac arteries, but no

additional focal hemodynamically significant stenosis is

identified.



Urinary bladder is unopacified. No calculi are seen within the

urinary bladder. There is no loculated fluid collection, free

fluid, nor free air within the pelvis. No abnormal lymph nodes

are identified. Bony structures show no acute abnormalities.



CTA RIGHT LOWER EXTREMITY: The right common femoral, superficial

femoral, and popliteal arteries have a normal appearance. There

is no significant atherosclerotic disease. Calf arteries are

difficult to evaluate on this exam secondary to their small

caliber, but the anterior and posterior tibial arteries are

patent at least to the ankle. Osseous structures of the right

lower extremity are unremarkable. No acute osseous abnormalities

are identified. Superficial soft tissue structures are

unremarkable as well.



CTA LEFT LOWER EXTREMITY: The left common femoral, superficial

femoral, and popliteal arteries are normal in appearance as well.

There is no evidence of focal hemodynamically significant

stenosis. Evaluation of the calf arteries is also suboptimal on

the left secondary to their small caliber. Additionally, there is

moderate asymmetric soft tissue edema. The anterior and posterior

tibial arteries, however, do appear to be patent at least to the

ankle. Again, there is moderate soft tissue edema. There is no

abnormal soft tissue emphysema. No unexpected radiopaque foreign

bodies are seen. No focal fluid collections are identified. Note

is made, however, of asymmetric osteolytic appearance to the

medial malleolus of the distal left tibia as well as the

underlying lateral margins of the talus, calcaneus, and navicular

bones.



IMPRESSION:

1. Mild atherosclerotic disease of the abdominal aorta, but no

evidence of focal stenosis, dissection, or aneurysm.

2. Aberrant origin of the hepatic artery, as it arises from the

superior mesenteric artery.

3. Mild-to-moderate atherosclerotic disease of the bilateral

common iliac arteries with suggestion of ulcerative plaque on the

right.

4. No hemodynamically significant arterial stenosis of either

lower extremity.

5. Abnormal soft tissue edema of the left foot and ankle.

Correlation for cellulitis is recommended. Additionally, there is

abnormal asymmetric osteolysis of the osseous structures of the

medial left foot and ankle concerning for underlying

osteomyelitis. Correlation with MRI is recommended.



Dictated by: 



  Dictated on workstation # [id number]

## 2018-03-01 ENCOUNTER — HOSPITAL ENCOUNTER (OUTPATIENT)
Dept: HOSPITAL 75 - RAD | Age: 48
End: 2018-03-01
Attending: INTERNAL MEDICINE
Payer: COMMERCIAL

## 2018-03-01 ENCOUNTER — HOSPITAL ENCOUNTER (OUTPATIENT)
Dept: HOSPITAL 75 - LAB | Age: 48
End: 2018-03-01
Attending: INTERNAL MEDICINE
Payer: COMMERCIAL

## 2018-03-01 DIAGNOSIS — R06.02: ICD-10-CM

## 2018-03-01 DIAGNOSIS — R07.89: ICD-10-CM

## 2018-03-01 DIAGNOSIS — M79.89: Primary | ICD-10-CM

## 2018-03-01 DIAGNOSIS — K27.9: ICD-10-CM

## 2018-03-01 DIAGNOSIS — I25.10: Primary | ICD-10-CM

## 2018-03-01 DIAGNOSIS — I10: ICD-10-CM

## 2018-03-01 DIAGNOSIS — I21.3: ICD-10-CM

## 2018-03-01 LAB
ALBUMIN SERPL-MCNC: 3.9 GM/DL (ref 3.2–4.5)
ALP SERPL-CCNC: 102 U/L (ref 40–136)
ALT SERPL-CCNC: 17 U/L (ref 0–55)
ANISOCYTOSIS BLD QL SMEAR: SLIGHT
BASOPHILS # BLD AUTO: 0 10^3/UL (ref 0–0.1)
BASOPHILS NFR BLD AUTO: 0 % (ref 0–10)
BASOPHILS NFR BLD MANUAL: 0 %
BILIRUB SERPL-MCNC: 0.2 MG/DL (ref 0.1–1)
BUN/CREAT SERPL: 11
CALCIUM SERPL-MCNC: 9.7 MG/DL (ref 8.5–10.1)
CHLORIDE SERPL-SCNC: 103 MMOL/L (ref 98–107)
CO2 SERPL-SCNC: 25 MMOL/L (ref 21–32)
CREAT SERPL-MCNC: 0.75 MG/DL (ref 0.6–1.3)
EOSINOPHIL # BLD AUTO: 0.1 10^3/UL (ref 0–0.3)
EOSINOPHIL NFR BLD AUTO: 0 % (ref 0–10)
EOSINOPHIL NFR BLD MANUAL: 1 %
ERYTHROCYTE [DISTWIDTH] IN BLOOD BY AUTOMATED COUNT: 16.2 % (ref 10–14.5)
ERYTHROCYTE [DISTWIDTH] IN BLOOD BY AUTOMATED COUNT: 16.2 % (ref 10–14.5)
ERYTHROCYTE [SEDIMENTATION RATE] IN BLOOD: 10 MM/HR (ref 0–15)
GFR SERPLBLD BASED ON 1.73 SQ M-ARVRAT: > 60 ML/MIN
GLUCOSE SERPL-MCNC: 112 MG/DL (ref 70–105)
HCT VFR BLD CALC: 44 % (ref 40–54)
HCT VFR BLD CALC: 44 % (ref 40–54)
HGB BLD-MCNC: 14.2 G/DL (ref 13.3–17.7)
HGB BLD-MCNC: 14.2 G/DL (ref 13.3–17.7)
LYMPHOCYTES # BLD AUTO: 2.8 X 10^3 (ref 1–4)
LYMPHOCYTES NFR BLD AUTO: 20 % (ref 12–44)
MANUAL DIFFERENTIAL PERFORMED BLD QL: YES
MCH RBC QN AUTO: 28 PG (ref 25–34)
MCH RBC QN AUTO: 28 PG (ref 25–34)
MCHC RBC AUTO-ENTMCNC: 33 G/DL (ref 32–36)
MCHC RBC AUTO-ENTMCNC: 33 G/DL (ref 32–36)
MCV RBC AUTO: 85 FL (ref 80–99)
MCV RBC AUTO: 85 FL (ref 80–99)
MONOCYTES # BLD AUTO: 0.8 X 10^3 (ref 0–1)
MONOCYTES NFR BLD AUTO: 6 % (ref 0–12)
MONOCYTES NFR BLD: 3 %
NEUTROPHILS # BLD AUTO: 10.6 X 10^3 (ref 1.8–7.8)
NEUTROPHILS NFR BLD AUTO: 74 % (ref 42–75)
NEUTS BAND NFR BLD MANUAL: 74 %
NEUTS BAND NFR BLD: 0 %
PLATELET # BLD: 391 10^3/UL (ref 130–400)
PLATELET # BLD: 391 10^3/UL (ref 130–400)
PMV BLD AUTO: 8.6 FL (ref 7.4–10.4)
PMV BLD AUTO: 8.6 FL (ref 7.4–10.4)
POTASSIUM SERPL-SCNC: 3.3 MMOL/L (ref 3.6–5)
PROT SERPL-MCNC: 7.6 GM/DL (ref 6.4–8.2)
RBC # BLD AUTO: 5.13 10^6/UL (ref 4.35–5.85)
RBC # BLD AUTO: 5.13 10^6/UL (ref 4.35–5.85)
SODIUM SERPL-SCNC: 142 MMOL/L (ref 135–145)
URATE SERPL-MCNC: 3.4 MG/DL (ref 2.6–7.2)
VARIANT LYMPHS NFR BLD MANUAL: 22 %
WBC # BLD AUTO: 14.3 10^3/UL (ref 4.3–11)
WBC # BLD AUTO: 14.3 10^3/UL (ref 4.3–11)

## 2018-03-01 PROCEDURE — 86430 RHEUMATOID FACTOR TEST QUAL: CPT

## 2018-03-01 PROCEDURE — 86668 FRANCISELLA TULARENSIS: CPT

## 2018-03-01 PROCEDURE — 36415 COLL VENOUS BLD VENIPUNCTURE: CPT

## 2018-03-01 PROCEDURE — 85007 BL SMEAR W/DIFF WBC COUNT: CPT

## 2018-03-01 PROCEDURE — 85027 COMPLETE CBC AUTOMATED: CPT

## 2018-03-01 PROCEDURE — 84550 ASSAY OF BLOOD/URIC ACID: CPT

## 2018-03-01 PROCEDURE — 86618 LYME DISEASE ANTIBODY: CPT

## 2018-03-01 PROCEDURE — 86666 EHRLICHIA ANTIBODY: CPT

## 2018-03-01 PROCEDURE — 86141 C-REACTIVE PROTEIN HS: CPT

## 2018-03-01 PROCEDURE — 80053 COMPREHEN METABOLIC PANEL: CPT

## 2018-03-01 PROCEDURE — 86038 ANTINUCLEAR ANTIBODIES: CPT

## 2018-03-01 PROCEDURE — 73630 X-RAY EXAM OF FOOT: CPT

## 2018-03-01 PROCEDURE — 86757 RICKETTSIA ANTIBODY: CPT

## 2018-03-01 PROCEDURE — 85652 RBC SED RATE AUTOMATED: CPT

## 2018-03-01 PROCEDURE — 73610 X-RAY EXAM OF ANKLE: CPT

## 2018-03-01 NOTE — DIAGNOSTIC IMAGING REPORT
INDICATION: Redness and swelling to the left ankle.



TIME OF EXAM: 11:34 AM



Three views of the left ankle were obtained.



FINDINGS: Alignment is normal. There does appear to be some

generalized soft tissue swelling both medially and laterally.

Ankle mortise is maintained. There is some questionable bony

destructive changes involving the medial malleolus. This could be

on the basis of osteomyelitis. No soft tissue gas is identified.

No acute fracture is seen. Posterior and plantar calcaneal spurs

are noted. Midfoot is unremarkable.



IMPRESSION: Soft tissue swelling and destructive changes of the

medial malleolus. This has changed when compared with ankle

radiographs from one month earlier. Infectious etiology cannot be

excluded and MRI would be useful for further evaluation.



Dictated by: 



  Dictated on workstation # [id number]

## 2018-03-01 NOTE — DIAGNOSTIC IMAGING REPORT
INDICATION: Left foot redness and swelling.



TIME OF EXAMINATION: 11:34 a.m.



FINDINGS: Three views of the left foot were obtained. The

metatarsals are intact. The phalanges are intact. No fractures

are seen. Midfoot is unremarkable. Small plantar calcaneal spur

is noted.



IMPRESSION: No acute bony abnormalities detected.



Dictated by: 



  Dictated on workstation # [id number]

## 2018-04-06 ENCOUNTER — HOSPITAL ENCOUNTER (OUTPATIENT)
Dept: HOSPITAL 75 - LAB | Age: 48
LOS: 122 days | Discharge: HOME | End: 2018-08-06
Attending: INTERNAL MEDICINE
Payer: COMMERCIAL

## 2018-04-06 DIAGNOSIS — Z79.01: ICD-10-CM

## 2018-04-06 DIAGNOSIS — I26.99: Primary | ICD-10-CM

## 2018-04-06 PROCEDURE — 36415 COLL VENOUS BLD VENIPUNCTURE: CPT

## 2018-04-06 PROCEDURE — 85610 PROTHROMBIN TIME: CPT

## 2018-07-21 LAB
INR PPP: 3.7 (ref 0.8–1.4)
PROTHROMBIN TIME: 37.1 SEC (ref 12.2–14.7)

## 2018-08-28 ENCOUNTER — HOSPITAL ENCOUNTER (OUTPATIENT)
Dept: HOSPITAL 75 - LAB | Age: 48
LOS: 3 days | Discharge: HOME | End: 2018-08-31
Attending: INTERNAL MEDICINE
Payer: COMMERCIAL

## 2018-08-28 DIAGNOSIS — I26.99: Primary | ICD-10-CM

## 2018-08-28 DIAGNOSIS — Z79.01: ICD-10-CM

## 2018-08-28 LAB
INR PPP: 3.1 (ref 0.8–1.4)
PROTHROMBIN TIME: 31.9 SEC (ref 12.2–14.7)

## 2018-08-28 PROCEDURE — 36415 COLL VENOUS BLD VENIPUNCTURE: CPT

## 2018-08-28 PROCEDURE — 85610 PROTHROMBIN TIME: CPT

## 2018-11-16 ENCOUNTER — HOSPITAL ENCOUNTER (OUTPATIENT)
Dept: HOSPITAL 75 - ER | Age: 48
Setting detail: OBSERVATION
LOS: 2 days | Discharge: HOME | End: 2018-11-18
Attending: INTERNAL MEDICINE | Admitting: INTERNAL MEDICINE
Payer: COMMERCIAL

## 2018-11-16 VITALS — WEIGHT: 209.25 LBS | HEIGHT: 65 IN | BODY MASS INDEX: 34.86 KG/M2

## 2018-11-16 VITALS — DIASTOLIC BLOOD PRESSURE: 85 MMHG | SYSTOLIC BLOOD PRESSURE: 121 MMHG

## 2018-11-16 VITALS — DIASTOLIC BLOOD PRESSURE: 83 MMHG | SYSTOLIC BLOOD PRESSURE: 109 MMHG

## 2018-11-16 DIAGNOSIS — Z95.5: ICD-10-CM

## 2018-11-16 DIAGNOSIS — Z79.01: ICD-10-CM

## 2018-11-16 DIAGNOSIS — Z86.711: ICD-10-CM

## 2018-11-16 DIAGNOSIS — Z86.718: ICD-10-CM

## 2018-11-16 DIAGNOSIS — K21.9: ICD-10-CM

## 2018-11-16 DIAGNOSIS — F17.210: ICD-10-CM

## 2018-11-16 DIAGNOSIS — I25.2: ICD-10-CM

## 2018-11-16 DIAGNOSIS — Z79.899: ICD-10-CM

## 2018-11-16 DIAGNOSIS — I10: ICD-10-CM

## 2018-11-16 DIAGNOSIS — I73.9: ICD-10-CM

## 2018-11-16 DIAGNOSIS — I25.10: ICD-10-CM

## 2018-11-16 DIAGNOSIS — M10.9: ICD-10-CM

## 2018-11-16 DIAGNOSIS — I21.4: Primary | ICD-10-CM

## 2018-11-16 DIAGNOSIS — E78.00: ICD-10-CM

## 2018-11-16 LAB
ALBUMIN SERPL-MCNC: 4.5 GM/DL (ref 3.2–4.5)
ALP SERPL-CCNC: 96 U/L (ref 40–136)
ALT SERPL-CCNC: 32 U/L (ref 0–55)
APTT BLD: 32 SEC (ref 24–35)
BASOPHILS # BLD AUTO: 0 10^3/UL (ref 0–0.1)
BASOPHILS NFR BLD AUTO: 0 % (ref 0–10)
BILIRUB SERPL-MCNC: 0.4 MG/DL (ref 0.1–1)
BUN/CREAT SERPL: 10
CALCIUM SERPL-MCNC: 9.8 MG/DL (ref 8.5–10.1)
CHLORIDE SERPL-SCNC: 100 MMOL/L (ref 98–107)
CK SERPL-CCNC: 206 U/L (ref 30–200)
CO2 SERPL-SCNC: 29 MMOL/L (ref 21–32)
CREAT SERPL-MCNC: 0.8 MG/DL (ref 0.6–1.3)
EOSINOPHIL # BLD AUTO: 0.3 10^3/UL (ref 0–0.3)
EOSINOPHIL NFR BLD AUTO: 3 % (ref 0–10)
ERYTHROCYTE [DISTWIDTH] IN BLOOD BY AUTOMATED COUNT: 14 % (ref 10–14.5)
GFR SERPLBLD BASED ON 1.73 SQ M-ARVRAT: > 60 ML/MIN
GLUCOSE SERPL-MCNC: 120 MG/DL (ref 70–105)
HCT VFR BLD CALC: 44 % (ref 40–54)
HGB BLD-MCNC: 14.9 G/DL (ref 13.3–17.7)
INR PPP: 1.1 (ref 0.8–1.4)
LIPASE SERPL-CCNC: 11 U/L (ref 8–78)
LYMPHOCYTES # BLD AUTO: 3.2 X 10^3 (ref 1–4)
LYMPHOCYTES NFR BLD AUTO: 30 % (ref 12–44)
MAGNESIUM SERPL-MCNC: 2.1 MG/DL (ref 1.8–2.4)
MANUAL DIFFERENTIAL PERFORMED BLD QL: NO
MCH RBC QN AUTO: 30 PG (ref 25–34)
MCHC RBC AUTO-ENTMCNC: 34 G/DL (ref 32–36)
MCV RBC AUTO: 88 FL (ref 80–99)
MONOCYTES # BLD AUTO: 1.2 X 10^3 (ref 0–1)
MONOCYTES NFR BLD AUTO: 11 % (ref 0–12)
MYOGLOBIN SERPL-MCNC: 21.2 NG/ML (ref 10–92)
MYOGLOBIN SERPL-MCNC: 238.6 NG/ML (ref 10–92)
MYOGLOBIN SERPL-MCNC: 459.1 NG/ML (ref 10–92)
NEUTROPHILS # BLD AUTO: 6.1 X 10^3 (ref 1.8–7.8)
NEUTROPHILS NFR BLD AUTO: 57 % (ref 42–75)
PLATELET # BLD: 362 10^3/UL (ref 130–400)
PMV BLD AUTO: 8.7 FL (ref 7.4–10.4)
POTASSIUM SERPL-SCNC: 3.5 MMOL/L (ref 3.6–5)
PROT SERPL-MCNC: 7.5 GM/DL (ref 6.4–8.2)
PROTHROMBIN TIME: 13.8 SEC (ref 12.2–14.7)
RBC # BLD AUTO: 5.04 10^6/UL (ref 4.35–5.85)
SODIUM SERPL-SCNC: 139 MMOL/L (ref 135–145)
WBC # BLD AUTO: 10.7 10^3/UL (ref 4.3–11)

## 2018-11-16 PROCEDURE — 90471 IMMUNIZATION ADMIN: CPT

## 2018-11-16 PROCEDURE — 83874 ASSAY OF MYOGLOBIN: CPT

## 2018-11-16 PROCEDURE — 80061 LIPID PANEL: CPT

## 2018-11-16 PROCEDURE — 85027 COMPLETE CBC AUTOMATED: CPT

## 2018-11-16 PROCEDURE — 93458 L HRT ARTERY/VENTRICLE ANGIO: CPT

## 2018-11-16 PROCEDURE — 93567 NJX CAR CTH SPRVLV AORTGRPHY: CPT

## 2018-11-16 PROCEDURE — 36415 COLL VENOUS BLD VENIPUNCTURE: CPT

## 2018-11-16 PROCEDURE — 90686 IIV4 VACC NO PRSV 0.5 ML IM: CPT

## 2018-11-16 PROCEDURE — 83735 ASSAY OF MAGNESIUM: CPT

## 2018-11-16 PROCEDURE — 80053 COMPREHEN METABOLIC PANEL: CPT

## 2018-11-16 PROCEDURE — 85347 COAGULATION TIME ACTIVATED: CPT

## 2018-11-16 PROCEDURE — 82550 ASSAY OF CK (CPK): CPT

## 2018-11-16 PROCEDURE — 85025 COMPLETE CBC W/AUTO DIFF WBC: CPT

## 2018-11-16 PROCEDURE — 84484 ASSAY OF TROPONIN QUANT: CPT

## 2018-11-16 PROCEDURE — 85610 PROTHROMBIN TIME: CPT

## 2018-11-16 PROCEDURE — 85730 THROMBOPLASTIN TIME PARTIAL: CPT

## 2018-11-16 PROCEDURE — 80048 BASIC METABOLIC PNL TOTAL CA: CPT

## 2018-11-16 PROCEDURE — 93041 RHYTHM ECG TRACING: CPT

## 2018-11-16 PROCEDURE — 93005 ELECTROCARDIOGRAM TRACING: CPT

## 2018-11-16 PROCEDURE — 83690 ASSAY OF LIPASE: CPT

## 2018-11-16 PROCEDURE — 71045 X-RAY EXAM CHEST 1 VIEW: CPT

## 2018-11-16 RX ADMIN — SODIUM CHLORIDE SCH MLS/HR: 900 INJECTION, SOLUTION INTRAVENOUS at 22:01

## 2018-11-16 NOTE — XMS REPORT
Encounter Summary

 Created on: 2018



Francisco Huber

External Reference #: [id number]

: 1970

Sex: Male



Demographics







 Address  [address] [place]  [address]

 

 Home Phone  [phone]

 

 Preferred Language  English

 

 Marital Status  Unknown

 

 Zoroastrianism Affiliation  NON

 

 Race  White

 

 Ethnic Group  Not  or 





Author







 Author  [facility]

 

 Organization  [facility]

 

 Address  Unknown

 

 Phone  Unavailable







Support







 Name  Relationship  Address  Phone

 

 Steffanie Huber  [employer]  Unknown  [phone]







Care Team Providers







 Care Team Member Name  Role  Phone

 

 Neftali Bacon MD  21  [phone]

 

 Mahesh Champagne MD  PCP  [phone]







Reason for Referral

* Consult, Test & Treat (Routine)





     



  Status   Reason   Specialty   Diagnoses /   Referred By   Referred To



     Procedures   Contact   Contact

 

     



  No Auth Needed   Specialty   Anesthesia Pain   Diagnoses   Osorio Arreaga Spn Anes Pain



   Services    Reflex   MD Jose   Cl



   Required    sympathetic   [address] MD



     dystrophy   [facility]



      MS    [address] [place], [place]



      [id number]



      Phone:   Phone:



      [phone]



      Fax:   Fax: [phone] 











Reason for Visit

* 





 



  Reason   Comments

 

 



  Results 









Encounter Details







    



  Date   Type   Department   Care Team   Description

 

    



  2018   Telephone   [facility]   Dania Guardado MD   Results



    Physicians - Internal   [address] [facility] 



    [place] [address] 



    Ortho and Medical  



    [facility] [address]  



    [address] [place], [place]  



    [address] [phone]  







Social History







    



  Tobacco Use   Types   Packs/Day   Years Used   Date

 

    



  Current Every Day Smoker    

 

    



  Smokeless Tobacco: Never   



  Used   









   



  Alcohol Use   Drinks/Week   oz/Week   Comments

 

   



  No   









 



  Sex Assigned at Birth   Date Recorded

 

 



  Not on file 



as of this encounter



Miscellaneous Notes

* Telephone Encounter - Dania Guardado MD - 2018  6:39 PM CDT



Disclosed and discussed biopsy results which were nonspecific (neg for lymphoma/
infection).



Discussed that based on our clinic evaluation and pathology, he likely has 
reflex sympathetic 

Dystrophy. 



Referral to pain clinic placed for additional management of condition. 

in this encounter



Plan of Treatment







   



  Name   Priority   Associated Diagnoses   Order Schedule

 

   



  AMB REFERRAL TO PAIN CLINIC   Routine   Reflex sympathetic   Ordered: 2018



    dystrophy 



as of this encounter



Visit Diagnoses











  Diagnosis

 





  Reflex sympathetic dystrophy - Primary

 





  Reflex sympathetic dystrophy, unspecified

## 2018-11-16 NOTE — XMS REPORT
Clinical Summary

 Created on: 2018



Francisco Huber

External Reference #: [id number]

: 1970

Sex: Male



Demographics







 Address  [address] [place]  [address]

 

 Home Phone  [phone]

 

 Preferred Language  English

 

 Marital Status  Unknown

 

 Judaism Affiliation  NON

 

 Race  White

 

 Ethnic Group  Not  or 





Author







 Author  [facility]

 

 Organization  [facility]

 

 Address  Unknown

 

 Phone  Unavailable







Support







 Name  Relationship  Address  Phone

 

 Steffanie Hubre  ECON  Unknown  [phone]







Care Team Providers







 Care Team Member Name  Role  Phone

 

 Neftali Bacon MD  21  [phone]

 

 Mahesh Champagne MD  PCP  [phone]







Source Comments

Some departments are not documenting in the electronic medical record.  If you 
do not see the information that you expected, contact Release of Information in 
the Health Information Management department at [phone] for further 
assistance in locating additional records.[facility]



Allergies







    



  Active Allergy   Reactions   Severity   Noted Date   Comments

 

    



  Penicillins   UNKNOWN   Low   2017 

 

    



  Pneumococcal Vaccine   RASH   Medium   08/10/2018   Cellulitis type rash.







Current Medications







      



  Prescription   Sig.   Disp.   Refills   Start   End Date   Status



      Date  

 

      



  atorvastatin (LIPITOR) 40   Take 40 mg by mouth       Active



  mg tablet   daily.     

 

      



  carisoprodol(+) (SOMA)   Take 350 mg by mouth at       Active



  350 mg tablet   bedtime daily.     

 

      



  gabapentin (NEURONTIN)   Take 600 mg by mouth four       Active



  600 mg tablet   times daily.     

 

      



  gemfibrozil (LOPID) 600   Take 600 mg by mouth       Active



  mg tablet   twice daily.     

 

      



  Omega-3 Acid Ethyl Esters   Take 1 g by mouth three       Active



  1 gram cap   times daily.     

 

      



  tiZANidine (ZANAFLEX) 4   Take 8 mg by mouth every       Active



  mg tabletIndications:   8 hours as needed.     



  Muscle Spasm   Indications: MUSCLE SPASM     

 

      



  warfarin (COUMADIN) 5 mg   Take 5 mg by mouth as       Active



  tabletIndications:   directed. Take 7.5 mg by     



  thrombotic disorder   mouth on Mon and Wed.     



   Take 5 mg by mouth on     



   , Tues, Thurs, Fri,     



   and Sat. Take at bedtime.     



   Indications: THROMBOTIC     



   DISORDER     

 

      



  omeprazole DR(+)   Take 20 mg by mouth daily       Active



  (PRILOSEC) 20 mg capsule   before breakfast.     

 

      



  nitroglycerin (NITROSTAT)   Place 0.4 mg under tongue       Active



  0.4 mg tablet   every 5 minutes as needed     



   for Chest Pain. Max of 3     



   tablets, call 911.     

 

      



  clopiDOGrel (PLAVIX) 75   Take 75 mg by mouth       Active



  mg tablet   daily.     

 

      



  HYDROcodone/acetaminophen   Take 1 tablet by mouth       Active



  (+) (NORCO) 10/325 mg   every 6 hours as needed     



  tablet   for Pain     

 

      



  aspirin 325 mg tablet   Take 325 mg by mouth       Active



   daily. Take with food.     

 

      



  LOSARTAN PO   Take  by mouth.       Active

 

      



  oxycodone(+) (ROXICODONE,   Take 10 mg by mouth every       Active



  OXY-IR) 10 mg tablet   6 hours as needed for     



   Pain     

 

      



  NIFEdipine (PROCARDIA;   Take 10 mg by mouth three       Active



  ADALAT) 10 mg capsule   times daily.     

 

      



  pregabalin (LYRICA) 100   Take one capsule by mouth   90 capsule   3   10/11/
20    Active



  mg capsule   three times daily. Take 1     18  



   cap qhs x5d, then 1 cap     



   bid x5d, then 1 cap tid     



   thereafter     







Active Problems







 



  Problem   Noted Date

 

 



  CAD (coronary artery disease)   2017

 

 



  Coronary artery disease involving native coronary artery of native heart   



  with angina pectoris (HCC) 

 

 



  Overview:



  17 - NSTEMI at [place], [place]. Successful PCI



  with a Resolute Integrity 2.5 x 26 mm stent to the OM with Dr. Miguel.



  Unsuccessful PCI to the RCA . Pt referred to Dr. Delcid for possible 



  procedure.





  16 - Nuclear stress test ([facility]ia [facility]): No ischemia or



  arrhythmia on EKG. Diaphragmatic attenuation with reversible ischemia



  involving the mid to apical inferior wall and inferolateral wall. Normal



  left ventricular size with mild hypokinesis at the lateral wall. EF 50%.





  Previous history of Promus stent placement to OM - date unknown.

 

 



  NSTEMI (non-ST elevated myocardial infarction) (HCC)   2017

 

 



  Overview:



  17 at [facility] in [place], [place].

 

 



  Tobacco abuse   2017

 

 



  Ischemic cardiomyopathy   2017

 

 



  Overview:



  17 - Echo ([facility]ia [facility]): EF 30-35%. Left ventricular cavity



  size increased. Wall thickness normal. Regional wall motion abnormalities.



  Akinesis of the inferior myocardium. Akinesis of the lateral myocardium.





  17 - NSTEMI - EF 20%, severe left ventricular systolic function (per



  cath report). Life Vest applied for primary prevention of sudden cardiac



  death.

 

 



  Mild mitral regurgitation   2017

 

 



  Mild tricuspid regurgitation   2017

 

 



  Protein S deficiency (HCC)   2017

 

 



  Overview:



  On warfarin.







Encounters







    



  Date   Type   Specialty   Care Team   Description

 

    



  10/11/2018   Office Visit   Anesthesia Pain   Osorio Arreagaladarryl,   
Complex regional pain



     MD   syndrome type 1 of left



     DamonWily MD   lower extremity (Primary



      Dx);



      Neuropathic pain;



      Left leg pain;



      Allodynia

 

    



  2018   Telephone   Dermatology   Dania Guardado MD   Results



from Last 3 Months



Family History







   



  Relation   Name   Status   Comments

 

   



  Father    Alive 

 

   



  Mother     







Social History







    



  Tobacco Use   Types   Packs/Day   Years Used   Date

 

    



  Current Every Day Smoker    

 

    



  Smokeless Tobacco: Never   



  Used   









   



  Alcohol Use   Drinks/Week   oz/Week   Comments

 

   



  No   









 



  Sex Assigned at Birth   Date Recorded

 

 



  Not on file 







Last Filed Vital Signs







  



  Vital Sign   Reading   Time Taken

 

  



  Blood Pressure   117/71   10/11/2018  9:14 AM CDT

 

  



  Pulse   91   10/11/2018  9:14 AM CDT

 

  



  Temperature   37.1 C (98.7 F)   2017  6:15 AM CST

 

  



  Respiratory Rate   17   10/11/2018  9:14 AM CDT

 

  



  Oxygen Saturation   94%   10/11/2018  9:14 AM CDT

 

  



  Inhaled Oxygen   -   -



  Concentration  

 

  



  Weight   87.1 kg (192 lb)   10/11/2018  9:14 AM CDT

 

  



  Height   162.6 cm (5' 4")   10/11/2018  9:14 AM CDT

 

  



  Body Mass Index   32.96   10/11/2018  9:14 AM CDT







Plan of Treatment







   



  Health Maintenance   Due Date   Last Done   Comments

 

   



  PHYSICAL (COMPREHENSIVE)   1979  



  EXAM   

 

   



  PERTUSSIS VACCINE   1983  

 

   



  HIV SCREENING   1987  

 

   



  TETANUS VACCINE   1989  

 

   



  INFLUENZA VACCINE   2018  







Results

Not on filefrom Last 3 Months

## 2018-11-16 NOTE — XMS REPORT
Clinical Summary

 Created on: 2018



Francisco Huber

External Reference #: [id number]

: 1970

Sex: Male



Demographics







 Address  [address] [place]  [address]

 

 Home Phone  [phone]

 

 Preferred Language  English

 

 Marital Status  Unknown

 

 Lutheran Affiliation  NON

 

 Race  White

 

 Ethnic Group  Not  or 





Author







 Author  [facility]

 

 Organization  [facility]

 

 Address  Unknown

 

 Phone  Unavailable







Support







 Name  Relationship  Address  Phone

 

 Steffanie Huber  [employer]  Unknown  [phone]







Care Team Providers







 Care Team Member Name  Role  Phone

 

 Neftali Bacon MD  21  [phone]

 

 Mahesh Champagne MD  PCP  [phone]







Source Comments

Some departments are not documenting in the electronic medical record.  If you 
do not see the information that you expected, contact Release of Information in 
the Health Information Management department at [phone] for further 
assistance in locating additional records.[facility]



Allergies







    



  Active Allergy   Reactions   Severity   Noted Date   Comments

 

    



  Penicillins   UNKNOWN   Low   2017 

 

    



  Pneumococcal Vaccine   RASH   Medium   08/10/2018   Cellulitis type rash.







Current Medications







      



  Prescription   Sig.   Disp.   Refills   Start   End Date   Status



      Date  

 

      



  atorvastatin (LIPITOR) 40   Take 40 mg by mouth       Active



  mg tablet   daily.     

 

      



  carisoprodol(+) (SOMA)   Take 350 mg by mouth at       Active



  350 mg tablet   bedtime daily.     

 

      



  gabapentin (NEURONTIN)   Take 600 mg by mouth four       Active



  600 mg tablet   times daily.     

 

      



  gemfibrozil (LOPID) 600   Take 600 mg by mouth       Active



  mg tablet   twice daily.     

 

      



  Omega-3 Acid Ethyl Esters   Take 1 g by mouth three       Active



  1 gram cap   times daily.     

 

      



  tiZANidine (ZANAFLEX) 4   Take 8 mg by mouth every       Active



  mg tabletIndications:   8 hours as needed.     



  Muscle Spasm   Indications: MUSCLE SPASM     

 

      



  warfarin (COUMADIN) 5 mg   Take 5 mg by mouth as       Active



  tabletIndications:   directed. Take 7.5 mg by     



  thrombotic disorder   mouth on Mon and Wed.     



   Take 5 mg by mouth on     



   , Tues, Thurs, Fri,     



   and Sat. Take at bedtime.     



   Indications: THROMBOTIC     



   DISORDER     

 

      



  omeprazole DR(+)   Take 20 mg by mouth daily       Active



  (PRILOSEC) 20 mg capsule   before breakfast.     

 

      



  nitroglycerin (NITROSTAT)   Place 0.4 mg under tongue       Active



  0.4 mg tablet   every 5 minutes as needed     



   for Chest Pain. Max of 3     



   tablets, call 911.     

 

      



  clopiDOGrel (PLAVIX) 75   Take 75 mg by mouth       Active



  mg tablet   daily.     

 

      



  HYDROcodone/acetaminophen   Take 1 tablet by mouth       Active



  (+) (NORCO) 10/325 mg   every 6 hours as needed     



  tablet   for Pain     

 

      



  aspirin 325 mg tablet   Take 325 mg by mouth       Active



   daily. Take with food.     

 

      



  LOSARTAN PO   Take  by mouth.       Active

 

      



  oxycodone(+) (ROXICODONE,   Take 10 mg by mouth every       Active



  OXY-IR) 10 mg tablet   6 hours as needed for     



   Pain     

 

      



  NIFEdipine (PROCARDIA;   Take 10 mg by mouth three       Active



  ADALAT) 10 mg capsule   times daily.     

 

      



  pregabalin (LYRICA) 100   Take one capsule by mouth   90 capsule   3   10/11/
20    Active



  mg capsule   three times daily. Take 1     18  



   cap qhs x5d, then 1 cap     



   bid x5d, then 1 cap tid     



   thereafter     







Active Problems







 



  Problem   Noted Date

 

 



  CAD (coronary artery disease)   2017

 

 



  Coronary artery disease involving native coronary artery of native heart   



  with angina pectoris (HCC) 

 

 



  Overview:



  17 - NSTEMI at [place], [place]. Successful PCI



  with a Resolute Integrity 2.5 x 26 mm stent to the OM with Dr. Miguel.



  Unsuccessful PCI to the RCA . Pt referred to Dr. Delcid for possible 



  procedure.





  16 - Nuclear stress test ([facility]ia [facility]): No ischemia or



  arrhythmia on EKG. Diaphragmatic attenuation with reversible ischemia



  involving the mid to apical inferior wall and inferolateral wall. Normal



  left ventricular size with mild hypokinesis at the lateral wall. EF 50%.





  Previous history of Promus stent placement to OM - date unknown.

 

 



  NSTEMI (non-ST elevated myocardial infarction) (HCC)   2017

 

 



  Overview:



  17 at [facility] in [place], [place].

 

 



  Tobacco abuse   2017

 

 



  Ischemic cardiomyopathy   2017

 

 



  Overview:



  17 - Echo ([facility]ia [facility]): EF 30-35%. Left ventricular cavity



  size increased. Wall thickness normal. Regional wall motion abnormalities.



  Akinesis of the inferior myocardium. Akinesis of the lateral myocardium.





  17 - NSTEMI - EF 20%, severe left ventricular systolic function (per



  cath report). Life Vest applied for primary prevention of sudden cardiac



  death.

 

 



  Mild mitral regurgitation   2017

 

 



  Mild tricuspid regurgitation   2017

 

 



  Protein S deficiency (HCC)   2017

 

 



  Overview:



  On warfarin.







Encounters







    



  Date   Type   Specialty   Care Team   Description

 

    



  10/11/2018   Office Visit   Anesthesia Pain   Osorio Arreagaladarryl,   
Complex regional pain



     MD   syndrome type 1 of left



     DamonWily MD   lower extremity (Primary



      Dx);



      Neuropathic pain;



      Left leg pain;



      Allodynia

 

    



  2018   Telephone   Dermatology   Dania Guardado MD   Results



from Last 3 Months



Family History







   



  Relation   Name   Status   Comments

 

   



  Father    Alive 

 

   



  Mother     







Social History







    



  Tobacco Use   Types   Packs/Day   Years Used   Date

 

    



  Current Every Day Smoker    

 

    



  Smokeless Tobacco: Never   



  Used   









   



  Alcohol Use   Drinks/Week   oz/Week   Comments

 

   



  No   









 



  Sex Assigned at Birth   Date Recorded

 

 



  Not on file 







Last Filed Vital Signs







  



  Vital Sign   Reading   Time Taken

 

  



  Blood Pressure   117/71   10/11/2018  9:14 AM CDT

 

  



  Pulse   91   10/11/2018  9:14 AM CDT

 

  



  Temperature   37.1 C (98.7 F)   2017  6:15 AM CST

 

  



  Respiratory Rate   17   10/11/2018  9:14 AM CDT

 

  



  Oxygen Saturation   94%   10/11/2018  9:14 AM CDT

 

  



  Inhaled Oxygen   -   -



  Concentration  

 

  



  Weight   87.1 kg (192 lb)   10/11/2018  9:14 AM CDT

 

  



  Height   162.6 cm (5' 4")   10/11/2018  9:14 AM CDT

 

  



  Body Mass Index   32.96   10/11/2018  9:14 AM CDT







Plan of Treatment







   



  Health Maintenance   Due Date   Last Done   Comments

 

   



  PHYSICAL (COMPREHENSIVE)   1979  



  EXAM   

 

   



  PERTUSSIS VACCINE   1983  

 

   



  HIV SCREENING   1987  

 

   



  TETANUS VACCINE   1989  

 

   



  INFLUENZA VACCINE   2018  







Results

Not on filefrom Last 3 Months

## 2018-11-16 NOTE — XMS REPORT
Encounter Summary

 Created on: 2018



Francisco Huber

External Reference #: [id number]

: 1970

Sex: Male



Demographics







 Address  [address] [place]  [address]

 

 Home Phone  [phone]

 

 Preferred Language  English

 

 Marital Status  Unknown

 

 Hoahaoism Affiliation  NON

 

 Race  White

 

 Ethnic Group  Not  or 





Author







 Author  [facility]

 

 Organization  [facility]

 

 Address  Unknown

 

 Phone  Unavailable







Support







 Name  Relationship  Address  Phone

 

 Steffanie Huber  ECON  Unknown  [phone]







Care Team Providers







 Care Team Member Name  Role  Phone

 

 Neftali Bacon MD  23  [phone]

 

 Mahesh Champagne MD  PCP  [phone]







Reason for Visit

* 





 



  Reason   Comments

 

 



  Pain 

 

 



  Pain 





* Consult, Test & Treat (Routine)





     



  Status   Reason   Specialty   Diagnoses /   Referred By   Referred To



     Procedures   Contact   Contact

 

     



  No Auth Needed   Specialty   Anesthesia Pain   Diagnoses   Osorio Arreaga Sp[facility] Pain



   Services    Reflex   MD Jose   Cl



   Required    sympathetic   [address]



     [address]   [address]   [facility]



      MS    [address] [place]



      [address]   [id number]



      Phone:   Phone:



      [phone]



      Fax:   Fax: [phone] 











Encounter Details







    



  Date   Type   Department   Care Team   Description

 

    



  10/11/2018   Office Visit   [facility]   Osorio Arreaga
,   Complex regional pain



    Pain Clinic   MD   syndrome type 1 of left



    [address]   [address]   lower extremity ([place] [address]   [place]    Dx);



    [phone]   [place] [address]   Neuropathic pain;



     [phone]   Left leg pain;



     [phone] (Fax)

   Allodynia



     L 



     Wily ward MD 



     [address] [facility] 



     [address] 



     [place] [address] 



     [phone] (Fax) 







Social History







    



  Tobacco Use   Types   Packs/Day   Years Used   Date

 

    



  Current Every Day Smoker    

 

    



  Smokeless Tobacco: Never   



  Used   









   



  Alcohol Use   Drinks/Week   oz/Week   Comments

 

   



  No   









 



  Sex Assigned at Birth   Date Recorded

 

 



  Not on file 



as of this encounter



Last Filed Vital Signs







  



  Vital Sign   Reading   Time Taken

 

  



  Blood Pressure   117/71   10/11/2018  9:14 AM CDT

 

  



  Pulse   91   10/11/2018  9:14 AM CDT

 

  



  Temperature   -   -

 

  



  Respiratory Rate   17   10/11/2018  9:14 AM CDT

 

  



  Oxygen Saturation   94%   10/11/2018  9:14 AM CDT

 

  



  Inhaled Oxygen   -   -



  Concentration  

 

  



  Weight   87.1 kg (192 lb)   10/11/2018  9:14 AM CDT

 

  



  Height   162.6 cm (5' 4")   10/11/2018  9:14 AM CDT

 

  



  Body Mass Index   32.96   10/11/2018  9:14 AM CDT



in this encounter



Functional Status







  



  Functional Status   Response   Date of Assessment

 

  



  Does the patient have a hearing impairment:   No   10/11/2018

 

  



  Does the patient have a visual impairment:   Yes   10/11/2018

 

  



  Does the patient have impaired ambulation:   No   10/11/2018

 

  



  Does the patient have an activity of daily living   No   10/11/2018



  (ADL) impairment:  

 

  



  Does the patient have an instrumental activity of   No   10/11/2018



  daily living (IADL) impairment:  









  



  Cognitive Status   Response   Date of Assessment

 

  



  Does the patient have a cognitive impairment:   No   10/11/2018



as of this encounter



Instructions

* Patient Instructions - Goldie Brasher - 10/11/2018  9:00 AM CDT



General Instructions:

 How to reach me: Please send a [email or web address] message to the [facility] or leave 
a voicemail for my nurse Merced at [phone].

 Scheduling: Our scheduling phone number is [phone].

 How to get a medication refill: Five business days before refill needed, 
please use the [email or web address] Refill request or contact your pharmacy directly to 
request medication refills. 

 How to receive your test results: If you have signed up for [email or web address], you 
will receive your test results and messages from me this way. Otherwise, you 
will get a phone call or letter. If you are expecting results and have not 
heard from my office within 2 weeks of your testing, please send a [email or web address] 
message or call my office.

 Support for many chronic illnesses is available through [facility]: 
[email or web address] or [phone].

 For questions on nights, weekends or holidays, call the  at [phone]
[phone], and ask for the doctor on call for Anesthesia Pain Management.

in this encounter



Progress Notes

* Merced Mars, RN - 10/11/2018  9:00 AM CDT



Patient unsure where MRI L-spine completed in the last year. Checked with [facility] 
and [facility] but they have no records of MRI. If pt remembers where he had 
MRI completed, he will notify the office.

* Wily Jose MD - 10/11/2018  9:00 AM CDT



Formatting of this note may be different from the original.



Dear Dr. Osorio Arreaga,



I appreciate your kind referral of Francisco Huber for evaluation of pain. 
Please see my note below for the full details of the evaluation and management 
plan.



Thank you,



Wily Jose MD



[facility]

NEW PATIENT HISTORY AND PHYSICAL

Subjective 



Chief Complaint: LLE pain



HPI: Francisco Huber is a 47 y.o. male who  has a past medical history of 
Coronary artery disease involving native coronary artery of native heart with 
angina pectoris ([facility]) (2017); Coronary artery disease involving native 
coronary artery of native heart with angina pectoris ([facility]) (2017); 
Ischemic cardiomyopathy; Mild mitral regurgitation (2017); Mild tricuspid 
regurgitation (2017); NSTEMI (non-ST elevated myocardial infarction) ([facility]
) (2017); Protein S deficiency ([facility]) (2017); and Tobacco abuse (). who now presents for initial consultation.



The pain is in the left leg. 

It is circumferential from the knee down to the toes. 



Pain started: 2017



Initial inciting injury or event: No



Numbness/tingling: Yes



The pain ranges 8-10/10



The pain is described as aching, stabbing, shooting, burning, throbbing. 



The pain is exacerbated by standing, bending. 



The pain is partially alleviated by medication, ice, heat, rest. 



No muscle tightness. 



There is allodynia. 

It sometimes feels cooler than the other leg.

Difference in sweating. 

Difference in hair/nail growth. 

There is markedly limited ROM of the ankle.

There is purplish hue. 

+edema



He talks about considering amputation below the knee. 



 



 



PRIOR MEDICATIONS: 

Effective



Ineffective

ASA

Capsaicin

Gabapentin 600mg tid

Tizanidine

Lidocaine patch



Unable to tolerate

NSAID



Never

Lyrica

Ami/Nortriptyline

Cymbalta



PRIOR INTERVENTIONS:  No spine surgery

Effective



Ineffective

Lumbar sympathectomy

Physician-ordered PT



Francisco Huber denies any recent fevers, chills, infection, antibiotics, 
bowel or bladder incontinence, saddle anesthesia. On Plavix and Coumadin.



ROS: All 14 systems reviewed and found to be negative except as above and as 
follows. +gout, cold/heat intolerance. 



Past Medical History:

Past Medical History: 

Diagnosis Date 

 Coronary artery disease involving native coronary artery of native heart 
with angina pectoris ([facility]) 2017 

 Coronary artery disease involving native coronary artery of native heart 
with angina pectoris ([facility]) 2017 - NSTEMI at [facility] in [place], [place]. Successful PCI 
with a Resolute Integrity 2.5 x 26 mm stent to the OM with Dr. Miguel. 
Unsuccessful PCI to the RCA . Pt referred to Dr. Delcid for possible  
procedure.   16 - Nuclear stress test ([facility]): No 
ischemia or arrhythmia on EKG. Diaphragmatic attenuation with reversible 
ischemia involving the mid to apic 

 Ischemic cardiomyopathy  

 Mild mitral regurgitation 2017 

 Mild tricuspid regurgitation 2017 

 NSTEMI (non-ST elevated myocardial infarction) ([facility]) 2017 

 Protein S deficiency ([facility]) 2017 

 Tobacco abuse 2017 



Family History:

History reviewed. No pertinent family history.



Social History:

Lives in [facility] [address]



Social History 



Social History 

 Marital status:  

  Spouse name: N/A 

 Number of children: N/A 

 Years of education: N/A 



Occupational History 

 Not on file. 



Social History Main Topics 

 Smoking status: Current Every Day Smoker 

 Smokeless tobacco: Never Used 

 Alcohol use No 

 Drug use: No 

 Sexual activity: Not on file 



Other Topics Concern 

 Not on file 



Social History Narrative 

 No narrative on file 



Allergies:

Allergies 

Allergen Reactions 

 Pneumococcal Vaccine RASH 

  Cellulitis type rash. 

 Pcn [Penicillins] UNKNOWN 



Medications:



Current Outpatient Prescriptions: 

  aspirin 325 mg tablet, Take 325 mg by mouth daily. Take with food., Disp: 
, Rfl: 

  atorvastatin (LIPITOR) 40 mg tablet, Take 40 mg by mouth daily., Disp: , 
Rfl: 

  carisoprodol(+) (SOMA) 350 mg tablet, Take 350 mg by mouth at bedtime 
daily., Disp: , Rfl: 

  clopiDOGrel (PLAVIX) 75 mg tablet, Take 75 mg by mouth daily., Disp: , Rfl
: 

  gabapentin (NEURONTIN) 600 mg tablet, Take 600 mg by mouth four times 
daily., Disp: , Rfl: 

  gemfibrozil (LOPID) 600 mg tablet, Take 600 mg by mouth twice daily., Disp
: , Rfl: 

  HYDROcodone/acetaminophen(+) (NORCO) 10/325 mg tablet, Take 1 tablet by 
mouth every 6 hours as needed for Pain, Disp: , Rfl: 

  LOSARTAN PO, Take  by mouth., Disp: , Rfl: 

  NIFEdipine (PROCARDIA; ADALAT) 10 mg capsule, Take 10 mg by mouth three 
times daily., Disp: , Rfl: 

  nitroglycerin (NITROSTAT) 0.4 mg tablet, Place 0.4 mg under tongue every 5 
minutes as needed for Chest Pain. Max of 3 tablets, call 911., Disp: , Rfl: 

  Omega-3 Acid Ethyl Esters 1 gram cap, Take 1 g by mouth three times daily.
, Disp: , Rfl: 

  omeprazole DR(+) (PRILOSEC) 20 mg capsule, Take 20 mg by mouth daily 
before breakfast., Disp: , Rfl: 

  oxycodone(+) (ROXICODONE, OXY-IR) 10 mg tablet, Take 10 mg by mouth every 
6 hours as needed for Pain, Disp: , Rfl: 

  tiZANidine (ZANAFLEX) 4 mg tablet, Take 8 mg by mouth every 8 hours as 
needed. Indications: MUSCLE SPASM, Disp: , Rfl: 

  warfarin (COUMADIN) 5 mg tablet, Take 5 mg by mouth as directed. Take 7.5 
mg by mouth on Mon and Wed. Take 5 mg by mouth on , Tues, Thurs, Fri, and 
Sat. Take at bedtime.  Indications: THROMBOTIC DISORDER, Disp: , Rfl: 



Physical examination: 

/71  | Pulse 91  | Resp 17  | Ht 162.6 cm (64")  | Wt 87.1 kg (192 lb)  | 
SpO2 94%  | BMI 32.96 kg/m 

Pain Score: Nine



General: The patient is a well-developed, well nourished 47 y.o. male in no 
acute distress. 

HEENT: Head is normocephalic and atraumatic. Pupils are equal and reactive to 
light bilaterally. 

Cardiac: Based on palpation, pulse appears to be regular rate and rhythm. 

Pulmonary: The patient has unlabored respirations and bilateral symmetric chest 
excursion. 

Abdomen: Soft, nontender, and nondistended. There is no rebound or guarding. 

Extremities: No clubbing, cyanosis, or edema. 



Neurologic: 

The patient is alert and oriented times 3. 

Cranial nerves II through XII are intact without any focal deficits. 

There is no sensory deficit to light touch or pinprick in the affected areas. 
There is no allodynia noted.



Musculoskeletal: 

Sitting in a wheelchair. 



L-Spine 

There is no point vertebral tenderness in the midline.  

There is mild-mod low lumbar paraspinal tenderness. Paraspinal muscle tone is 
normal.

Facet loading is positive.

There is no tenderness or radiating pain with palpation over the SI joints, 
piriformis, or greater trochanteric bursae bilaterally.

ROM with flexion, extension, rotation, and lateral bending is intact.

Strength is equal and adequate bilaterally in the flexors and extensors of the 
bilateral lower extremities. 

SLR is positive in the LLE.



Last Cr and LFT's:

Creatinine 

Date Value Ref Range Status 

2017 0.72 0.4 - 1.24 MG/DL Final 



 

Assessment:



Francisco Huber is a 47 y.o. male who  has a past medical history of 
Coronary artery disease involving native coronary artery of native heart with 
angina pectoris ([facility]) (2017); Coronary artery disease involving native 
coronary artery of native heart with angina pectoris ([facility]) (2017); 
Ischemic cardiomyopathy; Mild mitral regurgitation (2017); Mild tricuspid 
regurgitation (2017); NSTEMI (non-ST elevated myocardial infarction) ([facility]
) (2017); Protein S deficiency ([facility]) (2017); and Tobacco abuse (). who presents for evaluation of LLE pain. 



The pain complaints are most likely due to:



1. Complex regional pain syndrome type 1 of left lower extremity   

2. Neuropathic pain   

3. Left leg pain   

4. Allodynia   



Patient has had an adequate trial of > 3 months of rest, exercise, NSAID's, 
multimodal treatment, and the passage of time without improvement of symptoms. 
The pain has significant impact on the daily quality of life. 



Plan:



1. Will trial Lyrica 100mg up to tid instead of the gabapentin. 

2. Consider trial of Nortriptyline 25mg up to 75mg qhs next. 

3. If he fails to improve, could potentially consider SCS trial with Nevro 
HF10. 

4. Will request record of L-Spine MRI to evaluate for neuraxial etiology.

5. Follow up as needed. 



Risks/benefits of all pharmacologic and interventional treatments discussed and 
questions answered. 



Thank you for this kind referral for consultation. Please feel free to contact 
me with any questions or concerns. 



 



in this encounter



Plan of Treatment





Not on fileas of this encounter



Visit Diagnoses











  Diagnosis

 





  Complex regional pain syndrome type 1 of left lower extremity - Primary

 





  Neuropathic pain

 





  Neuralgia, neuritis, and radiculitis, unspecified

 





  Left leg pain

 





  Pain in limb

 





  Allodynia

 





  Disturbance of skin sensation

## 2018-11-16 NOTE — XMS REPORT
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                       
           

 

 2015                         Ot            782.3                        
          

 

 2015                         Ot            V58.61                       
           

 

 2015            NORTH HERNANDEZ MD            Ot            V12.51      
                            

 

 2015            NORTH HERNANDEZ MD            Ot            V58.61      
                            

 

 2015            NORTH HERNANDEZ MD            Ot            V58.83      
                            

 

 2015                         Ot            272.4                        
          

 

 2015                         Ot            401.9                        
          

 

 2015                         Ot            414.01                       
           

 

 2015                         Ot            272.4                        
          

 

 2015                         Ot            401.9                        
          

 

 2015                         Ot            414.01                       
           

 

 2015                         Ot            401.9                        
          

 

 2015                         Ot            414.01                       
           

 

 2015                         Ot            428.0                        
          

 

 2015                         Ot            719.40                       
           

 

 2015                         Ot            782.3                        
          

 

 2015                         Ot            V58.61                       
           

 

 2015                         Ot            272.1                        
          

 

 2015                         Ot            414.00                       
           

 

 2015                         Ot            786.50                       
           

 

 2015                         Ot            397.0                        
          

 

 2015                         Ot            414.00                       
           

 

 2015                         Ot            424.0                        
          

 

 2015                         Ot            786.50                       
           

 

 2015                         Ot            272.4                        
          

 

 2015                         Ot            401.9                        
          

 

 2015                         Ot            414.01                       
           

 

 2015                         Ot            401.9                        
          

 

 2015                         Ot            414.01                       
           

 

 2015                         Ot            428.0                        
          

 

 2015                         Ot            719.40                       
           

 

 2015                         Ot            782.3                        
          

 

 2015                         Ot            V58.61                       
           

 

 2015            NORTH HERNANDEZ MD            Ot            V12.51      
                            

 

 2015            NORTH HERNANDEZ MD            Ot            V58.61      
                            

 

 2015            NORTH HERNANDEZ MD            Ot            V58.83      
                            

 

 2015            COLE CONTRERAS MD            Ot            274.9      
      GOUT NOS                     

 

 2015            COLE CONTRERAS MD            Ot            729.5      
      PAIN IN LIMB                     

 

 2015                         Ot            272.1                        
          

 

 2015                         Ot            414.00                       
           

 

 2015                         Ot            786.50                       
           

 

 2015                         Ot            397.0                        
          

 

 2015                         Ot            414.00                       
           

 

 2015                         Ot            424.0                        
          

 

 2015                         Ot            786.50                       
           

 

 2015                         Ot            272.4                        
          

 

 2015                         Ot            401.9                        
          

 

 2015                         Ot            414.01                       
           

 

 2015                         Ot            401.9                        
          

 

 2015                         Ot            414.01                       
           

 

 2015                         Ot            428.0                        
          

 

 2015                         Ot            719.40                       
           

 

 2015                         Ot            782.3                        
          

 

 2015                         Ot            V58.61                       
           

 

 2015            NORTH HERNANDEZ MD            Ot            V12.51      
                            

 

 2015            NORTH HERNANDEZ MD            Ot            V58.61      
                            

 

 2015            NORTH HERNANDEZ MD            Ot            V58.83      
                            

 

 08/10/2015            NORTH HERNANDEZ MD            Ot            V12.51      
                            

 

 08/10/2015            NORTH HERNANDEZ MD            Ot            V58.61      
                            

 

 08/10/2015            NORTH HERNANDEZ MD            Ot            V58.83      
                            

 

 2015            NORTH HERNANDEZ MD            Ot            V12.51      
                            

 

 2015            NORTH HERNANDEZ MD            Ot            V58.61      
                            

 

 2015            NORTH HERNANDEZ MD            Ot            V58.83      
                            

 

 2015            NORTH HERNANDEZ MD            Ot            V12.51      
      HX-VENOUS THROMBOSIS EMBOLISM                     

 

 2015            NORTH HERNANDEZ MD            Ot            V58.61      
      ANTICOAGULANTS,LT,CURRENT USE                     

 

 2015            NORTH HERNANDEZ MD            Ot            V58.83      
      ENCOUNTER FOR THERAPEUTIC DRUG MONITORIN                     

 

 2015                         Ot            270.4                        
          

 

 2015                         Ot            305.1                        
          

 

 2015                         Ot            536.8                        
          

 

 2015                         Ot            729.81                       
           

 

 2015                         Ot            V12.51                       
           

 

 2015                         Ot            V58.61                       
           

 

 2015                         Ot            V58.69                       
           

 

 2015                         Ot            270.4                        
          

 

 2015                         Ot            305.1                        
          

 

 2015                         Ot            536.8                        
          

 

 2015                         Ot            V12.51                       
           

 

 2015                         Ot            V58.61                       
           

 

 2015                         Ot            V58.69                       
           

 

 2015                         Ot            272.4                        
          

 

 2015                         Ot            729.5                        
          

 

 2015                         Ot            780.79                       
           

 

 2015                         Ot            270.4                        
          

 

 2015                         Ot            305.1                        
          

 

 2015                         Ot            536.8                        
          

 

 2015                         Ot            729.5                        
          

 

 2015                         Ot            V12.51                       
           

 

 2015                         Ot            V58.69                       
           

 

 2015                         Ot            270.4                        
          

 

 2015                         Ot            305.1                        
          

 

 2015                         Ot            536.8                        
          

 

 2015                         Ot            V12.51                       
           

 

 2015                         Ot            V58.69                       
           

 

 2015                         Ot            786.09                       
           

 

 2015                         Ot            786.50                       
           

 

 2015                         Ot            786.09                       
           

 

 2015                         Ot            786.50                       
           

 

 2015                         Ot            270.4                        
          

 

 2015                         Ot            305.1                        
          

 

 2015                         Ot            533.90                       
           

 

 2015                         Ot            V12.51                       
           

 

 2015                         Ot            272.4                        
          

 

 2015                         Ot            401.9                        
          

 

 2015                         Ot            414.00                       
           

 

 2015                         Ot            272.4                        
          

 

 2015                         Ot            356.9                        
          

 

 2015                         Ot            272.4                        
          

 

 2015                         Ot            729.5                        
          

 

 2015                         Ot            V58.61                       
           

 

 2015                         Ot            272.1                        
          

 

 2015                         Ot            414.00                       
           

 

 2015                         Ot            786.50                       
           

 

 2015                         Ot            397.0                        
          

 

 2015                         Ot            414.00                       
           

 

 2015                         Ot            424.0                        
          

 

 2015                         Ot            786.50                       
           

 

 2015                         Ot            272.4                        
          

 

 2015                         Ot            401.9                        
          

 

 2015                         Ot            414.01                       
           

 

 2015                         Ot            401.9                        
          

 

 2015                         Ot            414.01                       
           

 

 2015                         Ot            428.0                        
          

 

 2015                         Ot            719.40                       
           

 

 2015                         Ot            782.3                        
          

 

 2015                         Ot            V58.61                       
           

 

 2015                         Ot            272.1                        
          

 

 2015                         Ot            414.00                       
           

 

 2015                         Ot            786.50                       
           

 

 2015                         Ot            397.0                        
          

 

 2015                         Ot            414.00                       
           

 

 2015                         Ot            424.0                        
          

 

 2015                         Ot            786.50                       
           

 

 2015                         Ot            272.4                        
          

 

 2015                         Ot            401.9                        
          

 

 2015                         Ot            414.01                       
           

 

 2015                         Ot            401.9                        
          

 

 2015                         Ot            414.01                       
           

 

 2015                         Ot            428.0                        
          

 

 2015                         Ot            719.40                       
           

 

 2015                         Ot            782.3                        
          

 

 2015                         Ot            V58.61                       
           

 

 2016                         Ot            414.00            CORON 
ATHEROSCLER NOS TYPE VESSEL, NATIV                     

 

 2016                         Ot            786.50            CHEST PAIN 
NOS                     

 

 2016                         Ot            397.0            TRICUSPID 
VALVE DISEASE                     

 

 2016                         Ot            414.00            CORON 
ATHEROSCLER NOS TYPE VESSEL, NATIV                     

 

 2016                         Ot            424.0            MITRAL VALVE 
DISORDER                     

 

 2016                         Ot            786.50            CHEST PAIN 
NOS                     

 

 2016                         Ot            272.4            
HYPERLIPIDEMIA NEC/NOS                     

 

 2016                         Ot            401.9            HYPERTENSION 
NOS                     

 

 2016                         Ot            414.01            CORONARY 
ATHEROSCLEROSIS OF NATIVE CORON                     

 

 2016                         Ot            401.9            HYPERTENSION 
NOS                     

 

 2016                         Ot            414.01            CORONARY 
ATHEROSCLEROSIS OF NATIVE CORON                     

 

 2016                         Ot            428.0            CONGESTIVE 
HEART FAILURE NOS                     

 

 2016                         Ot            719.40            JOINT PAIN-
UNSPEC                     

 

 2016                         Ot            782.3            EDEMA       
              

 

 2016                         Ot            V58.61            
ANTICOAGULANTS,LT,CURRENT USE                     

 

 2016            ARDHA ARELLANO APRN            Ot            F17.210    
        NICOTINE DEPENDENCE, CIGARETTES, UNCOMPL                     

 

 2016            RADHA ARELLANO APRN            Ot            M25.511    
        PAIN IN RIGHT SHOULDER                     

 

 2016            NORTH HERNANDEZ MD            Ot            I82.403     
       ACUTE EMBOLISM AND THOMBOS UNSP DEEP VEI                     

 

 2016            NORTH HERNANDEZ MD            Ot            Z79.01      
      LONG TERM (CURRENT) USE OF ANTICOAGULANT                     

 

 08/15/2016            NORTH HERNANDEZ MD            Ot            I82.403     
       ACUTE EMBOLISM AND THOMBOS UNSP DEEP VEI                     

 

 08/15/2016            NORTH HERNANDEZ MD            Ot            Z79.01      
      LONG TERM (CURRENT) USE OF ANTICOAGULANT                     

 

 2016            NORTH HERNANDEZ MD            Ot            I82.403     
       ACUTE EMBOLISM AND THOMBOS UNSP DEEP VEI                     

 

 2016            NORTH HERNANDEZ MD            Ot            Z79.01      
      LONG TERM (CURRENT) USE OF ANTICOAGULANT                     

 

 10/07/2016                         Ot            414.00            CORON 
ATHEROSCLER NOS TYPE VESSEL, NATIV                     

 

 10/07/2016                         Ot            786.50            CHEST PAIN 
NOS                     

 

 10/07/2016                         Ot            397.0            TRICUSPID 
VALVE DISEASE                     

 

 10/07/2016                         Ot            414.00            CORON 
ATHEROSCLER NOS TYPE VESSEL, NATIV                     

 

 10/07/2016                         Ot            424.0            MITRAL VALVE 
DISORDER                     

 

 10/07/2016                         Ot            786.50            CHEST PAIN 
NOS                     

 

 10/07/2016                         Ot            272.4            
HYPERLIPIDEMIA NEC/NOS                     

 

 10/07/2016                         Ot            401.9            HYPERTENSION 
NOS                     

 

 10/07/2016                         Ot            414.01            CORONARY 
ATHEROSCLEROSIS OF NATIVE CORON                     

 

 10/07/2016                         Ot            401.9            HYPERTENSION 
NOS                     

 

 10/07/2016                         Ot            414.01            CORONARY 
ATHEROSCLEROSIS OF NATIVE CORON                     

 

 10/07/2016                         Ot            428.0            CONGESTIVE 
HEART FAILURE NOS                     

 

 10/07/2016                         Ot            719.40            JOINT PAIN-
UNSPEC                     

 

 10/07/2016                         Ot            782.3            EDEMA       
              

 

 10/07/2016                         Ot            V58.61            
ANTICOAGULANTS,LT,CURRENT USE                     

 

 10/10/2016            NORTH HERNANDEZ MD            Ot            I82.403     
       ACUTE EMBOLISM AND THOMBOS UNSP DEEP VEI                     

 

 10/10/2016            NORTH HERNANDEZ MD            Ot            Z79.01      
      LONG TERM (CURRENT) USE OF ANTICOAGULANT                     

 

 2016            NORTH HERNANDEZ MD            Ot            I82.403     
       ACUTE EMBOLISM AND THOMBOS UNSP DEEP VEI                     

 

 2016            NORTH HERNANDEZ MD            Ot            Z79.01      
      LONG TERM (CURRENT) USE OF ANTICOAGULANT                     

 

 2016                         Ot            414.00            CORON 
ATHEROSCLER NOS TYPE VESSEL, NATIV                     

 

 2016                         Ot            786.50            CHEST PAIN 
NOS                     

 

 2016                         Ot            397.0            TRICUSPID 
VALVE DISEASE                     

 

 2016                         Ot            414.00            CORON 
ATHEROSCLER NOS TYPE VESSEL, NATIV                     

 

 2016                         Ot            424.0            MITRAL VALVE 
DISORDER                     

 

 2016                         Ot            786.50            CHEST PAIN 
NOS                     

 

 2016                         Ot            272.4            
HYPERLIPIDEMIA NEC/NOS                     

 

 2016                         Ot            401.9            HYPERTENSION 
NOS                     

 

 2016                         Ot            414.01            CORONARY 
ATHEROSCLEROSIS OF NATIVE CORON                     

 

 2016                         Ot            401.9            HYPERTENSION 
NOS                     

 

 2016                         Ot            414.01            CORONARY 
ATHEROSCLEROSIS OF NATIVE CORON                     

 

 2016                         Ot            428.0            CONGESTIVE 
HEART FAILURE NOS                     

 

 2016                         Ot            719.40            JOINT PAIN-
UNSPEC                     

 

 2016                         Ot            782.3            EDEMA       
              

 

 2016                         Ot            V58.61            
ANTICOAGULANTS,LT,CURRENT USE                     

 

 2016            NORTH HERNANDEZ MD            Ot            I82.403     
       ACUTE EMBOLISM AND THOMBOS UNSP DEEP VEI                     

 

 2016            NORTH HERNANDEZ MD            Ot            Z79.01      
      LONG TERM (CURRENT) USE OF ANTICOAGULANT                     

 

 2016            NORTH HERNANDEZ MD            Ot            I11.0       
     HYPERTENSIVE HEART DISEASE WITH HEART FA                     

 

 2016            NORTH HERNANDEZ MD            Ot            I25.10      
      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                      

 

 2016            NORTH HERNANDEZ MD            Ot            I26.99      
      OTHER PULMONARY EMBOLISM WITHOUT ACUTE C                     

 

 2016            NORTH HERNANDEZ MD            Ot            I50.9       
     HEART FAILURE, UNSPECIFIED                     

 

 2016            NORTH HERNANDEZ MD            Ot            I82.409     
       ACUTE EMBOLISM AND THOMBOS UNSP DEEP VN                      

 

 2016            NORTH HERNANDEZ MD            Ot            R07.9       
     CHEST PAIN, UNSPECIFIED                     

 

 2016                         Ot            414.00            CORON 
ATHEROSCLER NOS TYPE VESSEL, NATIV                     

 

 2016                         Ot            786.50            CHEST PAIN 
NOS                     

 

 2016                         Ot            397.0            TRICUSPID 
VALVE DISEASE                     

 

 2016                         Ot            414.00            CORON 
ATHEROSCLER NOS TYPE VESSEL, NATIV                     

 

 2016                         Ot            424.0            MITRAL VALVE 
DISORDER                     

 

 2016                         Ot            786.50            CHEST PAIN 
NOS                     

 

 2016                         Ot            272.4            
HYPERLIPIDEMIA NEC/NOS                     

 

 2016                         Ot            401.9            HYPERTENSION 
NOS                     

 

 2016                         Ot            414.01            CORONARY 
ATHEROSCLEROSIS OF NATIVE CORON                     

 

 2016                         Ot            401.9            HYPERTENSION 
NOS                     

 

 2016                         Ot            414.01            CORONARY 
ATHEROSCLEROSIS OF NATIVE CORON                     

 

 2016                         Ot            428.0            CONGESTIVE 
HEART FAILURE NOS                     

 

 2016                         Ot            719.40            JOINT PAIN-
UNSPEC                     

 

 2016                         Ot            782.3            EDEMA       
              

 

 2016                         Ot            V58.61            
ANTICOAGULANTS,LT,CURRENT USE                     

 

 2016            NORTH HERNANDEZ MD            Ot            I82.403     
       ACUTE EMBOLISM AND THOMBOS UNSP DEEP VEI                     

 

 2016            NORTH HERNANDEZ MD            Ot            Z79.01      
      LONG TERM (CURRENT) USE OF ANTICOAGULANT                     

 

 2016            NORTH HERNANDEZ MD            Ot            I11.0       
     HYPERTENSIVE HEART DISEASE WITH HEART FA                     

 

 2016            NORTH HERNANDEZ MD            Ot            I25.10      
      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                      

 

 2016            NORTH HERNANDEZ MD            Ot            I26.99      
      OTHER PULMONARY EMBOLISM WITHOUT ACUTE C                     

 

 2016            NORTH HERNANDEZ MD            Ot            I50.9       
     HEART FAILURE, UNSPECIFIED                     

 

 2016            NORTH HERNANDEZ MD            Ot            I82.409     
       ACUTE EMBOLISM AND THOMBOS UNSP DEEP VN                      

 

 2016            NORTH HERNANDEZ MD            Ot            R07.9       
     CHEST PAIN, UNSPECIFIED                     

 

 2016            NORTH HERNANDEZ MD            Ot            I25.10      
      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                      

 

 2016            NORTH HERNANDEZ MD            Ot            I50.9       
     HEART FAILURE, UNSPECIFIED                     

 

 2016            NORTH HERNANDEZ MD            Ot            R07.9       
     CHEST PAIN, UNSPECIFIED                     

 

 2016            NORTH HERNANDEZ MD            Ot            I25.10      
      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                      

 

 2016            NORTH HERNANDEZ MD            Ot            I50.9       
     HEART FAILURE, UNSPECIFIED                     

 

 2016            NORTH HERNANDEZ MD            Ot            R07.9       
     CHEST PAIN, UNSPECIFIED                     

 

 2016                         Ot            414.00            CORON 
ATHEROSCLER NOS TYPE VESSEL, NATIV                     

 

 2016                         Ot            786.50            CHEST PAIN 
NOS                     

 

 2016                         Ot            397.0            TRICUSPID 
VALVE DISEASE                     

 

 2016                         Ot            414.00            CORON 
ATHEROSCLER NOS TYPE VESSEL, NATIV                     

 

 2016                         Ot            424.0            MITRAL VALVE 
DISORDER                     

 

 2016                         Ot            786.50            CHEST PAIN 
NOS                     

 

 2016                         Ot            272.4            
HYPERLIPIDEMIA NEC/NOS                     

 

 2016                         Ot            401.9            HYPERTENSION 
NOS                     

 

 2016                         Ot            414.01            CORONARY 
ATHEROSCLEROSIS OF NATIVE CORON                     

 

 2016                         Ot            401.9            HYPERTENSION 
NOS                     

 

 2016                         Ot            414.01            CORONARY 
ATHEROSCLEROSIS OF NATIVE CORON                     

 

 2016                         Ot            428.0            CONGESTIVE 
HEART FAILURE NOS                     

 

 2016                         Ot            719.40            JOINT PAIN-
UNSPEC                     

 

 2016                         Ot            782.3            EDEMA       
              

 

 2016                         Ot            V58.61            
ANTICOAGULANTS,LT,CURRENT USE                     

 

 2016            NORTH HERNANDEZ MD            Ot            I82.403     
       ACUTE EMBOLISM AND THOMBOS UNSP DEEP VEI                     

 

 2016            NORTH HERNANDEZ MD            Ot            Z79.01      
      LONG TERM (CURRENT) USE OF ANTICOAGULANT                     

 

 2016            NORTH HERNANDEZ MD            Ot            I11.0       
     HYPERTENSIVE HEART DISEASE WITH HEART FA                     

 

 2016            NORTH HERNANDEZ MD            Ot            I25.10      
      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                      

 

 2016            NORTH HERNANDEZ MD            Ot            I26.99      
      OTHER PULMONARY EMBOLISM WITHOUT ACUTE C                     

 

 2016            NORTH HERNANDEZ MD            Ot            I50.9       
     HEART FAILURE, UNSPECIFIED                     

 

 2016            NORTH HERNANDEZ MD            Ot            I82.409     
       ACUTE EMBOLISM AND THOMBOS UNSP DEEP VN                      

 

 2016            NORTH HERNANDEZ MD            Ot            R07.9       
     CHEST PAIN, UNSPECIFIED                     

 

 2016            NORTH HERNANDEZ MD            Ot            I25.10      
      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                      

 

 2016            NORTH HERNANDEZ MD            Ot            I50.9       
     HEART FAILURE, UNSPECIFIED                     

 

 2016            NORTH HERNANDEZ MD            Ot            R07.9       
     CHEST PAIN, UNSPECIFIED                     

 

 2016            NORTH HERNANDEZ MD            Ot            I11.0       
     HYPERTENSIVE HEART DISEASE WITH HEART FA                     

 

 2016            NORTH HERNANDEZ MD            Ot            I25.10      
      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                      

 

 2016            NORTH HERNANDEZ MD            Ot            I26.99      
      OTHER PULMONARY EMBOLISM WITHOUT ACUTE C                     

 

 2016            NORTH HERNANDEZ MD            Ot            I50.9       
     HEART FAILURE, UNSPECIFIED                     

 

 2016            NORTH HERNANDEZ MD            Ot            I82.409     
       ACUTE EMBOLISM AND THOMBOS UNSP DEEP VN                      

 

 2016            NORTH HERNANDEZ MD            Ot            R07.9       
     CHEST PAIN, UNSPECIFIED                     

 

 2016            NORTH HERNANDEZ MD            Ot            E78.5       
     HYPERLIPIDEMIA, UNSPECIFIED                     

 

 2016            NORTH HERNANDEZ MD            Ot            F17.210     
       NICOTINE DEPENDENCE, CIGARETTES, UNCOMPL                     

 

 2016            NORTH HERNANDEZ MD            Ot            I10         
   ESSENTIAL (PRIMARY) HYPERTENSION                     

 

 2016            NORTH HERNANDEZ MD            Ot            I25.10      
      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                      

 

 2016            NORTH HERNANDEZ MD            Ot            R07.89      
      OTHER CHEST PAIN                     

 

 2016            NORTH HERNANDEZ MD            Ot            R94.39      
      ABNORMAL RESULT OF OTHER CARDIOVASCULAR                      

 

 2016            NORTH HERNANDEZ MD            Ot            Z79.01      
      LONG TERM (CURRENT) USE OF ANTICOAGULANT                     

 

 2016            NORTH HERNANDEZ MD            Ot            Z79.899     
       OTHER LONG TERM (CURRENT) DRUG THERAPY                     

 

 2016            NORTH HERNANDEZ MD            Ot            Z86.718     
       PERSONAL HISTORY OF OTHER VENOUS THROMBO                     

 

 2016            NORTH HERNANDEZ MD            Ot            Z95.5       
     PRESENCE OF CORONARY ANGIOPLASTY IMPLANT                     

 

 2016            NORTH HERNANDEZ MD            Ot            I25.10      
      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                      

 

 2016            NORTH HERNANDEZ MD            Ot            I50.9       
     HEART FAILURE, UNSPECIFIED                     

 

 2016            NORTH HERNANDEZ MD            Ot            R07.9       
     CHEST PAIN, UNSPECIFIED                     

 

 2016            NORTH HERNANDEZ MD            Ot            I25.10      
      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                      

 

 2016            NORTH HERNANDEZ MD            Ot            I50.9       
     HEART FAILURE, UNSPECIFIED                     

 

 2016            NORTH HERNANDEZ MD            Ot            R07.9       
     CHEST PAIN, UNSPECIFIED                     

 

 2017            NORTH HERNANDEZ MD            Ot            I82.403     
       ACUTE EMBOLISM AND THOMBOS UNSP DEEP VEI                     

 

 2017            NORTH HERNANDEZ MD            Ot            Z79.01      
      LONG TERM (CURRENT) USE OF ANTICOAGULANT                     

 

 2017            NORTH HERNANDEZ MD            Ot            I82.403     
       ACUTE EMBOLISM AND THOMBOS UNSP DEEP VEI                     

 

 2017            NORTH HERNANDEZ MD            Ot            Z79.01      
      LONG TERM (CURRENT) USE OF ANTICOAGULANT                     

 

 2017            NORTH HERNANDEZ MD            Ot            I82.403     
       ACUTE EMBOLISM AND THOMBOS UNSP DEEP VEI                     

 

 2017            NORTH HERNANDEZ MD            Ot            Z79.01      
      LONG TERM (CURRENT) USE OF ANTICOAGULANT                     

 

 2017            NORTH HERNANDEZ MD            Ot            I82.403     
       ACUTE EMBOLISM AND THOMBOS UNSP DEEP VEI                     

 

 2017            NORTH HERNANDEZ MD            Ot            Z79.01      
      LONG TERM (CURRENT) USE OF ANTICOAGULANT                     

 

 2017            NORTH HERNANDEZ MD            Ot            I82.403     
       ACUTE EMBOLISM AND THOMBOS UNSP DEEP VEI                     

 

 2017            NORTH HERNANDEZ MD            Ot            Z79.01      
      LONG TERM (CURRENT) USE OF ANTICOAGULANT                     

 

 02/15/2017            NORTH HERNANDEZ MD            Ot            I82.403     
       ACUTE EMBOLISM AND THOMBOS UNSP DEEP VEI                     

 

 02/15/2017            NORTH HERNANDEZ MD            Ot            Z79.01      
      LONG TERM (CURRENT) USE OF ANTICOAGULANT                     

 

 2017            NORTH HERNANDEZ MD            Ot            I82.403     
       ACUTE EMBOLISM AND THOMBOS UNSP DEEP VEI                     

 

 2017            NORTH HERNANDEZ MD            Ot            Z79.01      
      LONG TERM (CURRENT) USE OF ANTICOAGULANT                     

 

 2017            NORTH HERNANDEZ MD            Ot            I82.403     
       ACUTE EMBOLISM AND THOMBOS UNSP DEEP VEI                     

 

 2017            NORTH HERNANDEZ MD            Ot            Z79.01      
      LONG TERM (CURRENT) USE OF ANTICOAGULANT                     

 

 2017            NORTH HERNANDEZ MD            Ot            I82.403     
       ACUTE EMBOLISM AND THOMBOS UNSP DEEP VEI                     

 

 2017            NORTH HERNANDEZ MD            Ot            Z79.01      
      LONG TERM (CURRENT) USE OF ANTICOAGULANT                     

 

 2017                         Ot            414.00            CORON 
ATHEROSCLER NOS TYPE VESSEL, NATIV                     

 

 2017                         Ot            786.50            CHEST PAIN 
NOS                     

 

 2017                         Ot            397.0            TRICUSPID 
VALVE DISEASE                     

 

 2017                         Ot            414.00            CORON 
ATHEROSCLER NOS TYPE VESSEL, NATIV                     

 

 2017                         Ot            424.0            MITRAL VALVE 
DISORDER                     

 

 2017                         Ot            786.50            CHEST PAIN 
NOS                     

 

 2017                         Ot            272.4            
HYPERLIPIDEMIA NEC/NOS                     

 

 2017                         Ot            401.9            HYPERTENSION 
NOS                     

 

 2017                         Ot            414.01            CORONARY 
ATHEROSCLEROSIS OF NATIVE CORON                     

 

 2017                         Ot            401.9            HYPERTENSION 
NOS                     

 

 2017                         Ot            414.01            CORONARY 
ATHEROSCLEROSIS OF NATIVE CORON                     

 

 2017                         Ot            428.0            CONGESTIVE 
HEART FAILURE NOS                     

 

 2017                         Ot            719.40            JOINT PAIN-
UNSPEC                     

 

 2017                         Ot            782.3            EDEMA       
              

 

 2017                         Ot            V58.61            
ANTICOAGULANTS,LT,CURRENT USE                     

 

 2017            NORTH HERNANDEZ MD            Ot            I11.0       
     HYPERTENSIVE HEART DISEASE WITH HEART FA                     

 

 2017            NORTH HERNANDEZ MD            Ot            I25.10      
      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                      

 

 2017            NORTH HERNANDEZ MD            Ot            I26.99      
      OTHER PULMONARY EMBOLISM WITHOUT ACUTE C                     

 

 2017            NORTH HERNANDEZ MD            Ot            I50.9       
     HEART FAILURE, UNSPECIFIED                     

 

 2017            NORTH HERNANDEZ MD            Ot            I82.409     
       ACUTE EMBOLISM AND THOMBOS UNSP DEEP VN                      

 

 2017            NORTH HERNANDEZ MD            Ot            R07.9       
     CHEST PAIN, UNSPECIFIED                     

 

 2017            NORTH HERNANDEZ MD            Ot            I25.10      
      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                      

 

 2017            NORTH HERNANDEZ MD            Ot            I50.9       
     HEART FAILURE, UNSPECIFIED                     

 

 2017            NORTH HERNANDEZ MD            Ot            R07.9       
     CHEST PAIN, UNSPECIFIED                     

 

 2017            DAMARIS WYNN            Ot            F17.210  
          NICOTINE DEPENDENCE, CIGARETTES, UNCOMPL                     

 

 2017            DAMARIS WYNN            Ot            R10.10   
         UPPER ABDOMINAL PAIN, UNSPECIFIED                     

 

 2017            DAMARIS WNYN            Ot            R10.13   
         EPIGASTRIC PAIN                     

 

 2017            DAMARIS WYNN            Ot            Z79.01   
         LONG TERM (CURRENT) USE OF ANTICOAGULANT                     

 

 2017            DAMARIS WYNN            Ot            Z79.82   
         LONG TERM (CURRENT) USE OF ASPIRIN                     

 

 2017            DAMARIS WYNN            Ot            Z79.899  
          OTHER LONG TERM (CURRENT) DRUG THERAPY                     

 

 2017            DAMARIS WYNN            Ot            Z95.5    
        PRESENCE OF CORONARY ANGIOPLASTY IMPLANT                     

 

 2017            DAMARIS WYNN            Ot            F17.210  
          NICOTINE DEPENDENCE, CIGARETTES, UNCOMPL                     

 

 2017            DAMARIS WYNN            Ot            R10.10   
         UPPER ABDOMINAL PAIN, UNSPECIFIED                     

 

 2017            DAMARIS WYNN            Ot            R10.13   
         EPIGASTRIC PAIN                     

 

 2017            DAMARIS WYNN            Ot            Z79.01   
         LONG TERM (CURRENT) USE OF ANTICOAGULANT                     

 

 2017            DAMARIS WYNN            Ot            Z79.82   
         LONG TERM (CURRENT) USE OF ASPIRIN                     

 

 2017            DAMARIS WYNN            Ot            Z79.899  
          OTHER LONG TERM (CURRENT) DRUG THERAPY                     

 

 2017            DAMARIS WYNN            Ot            Z95.5    
        PRESENCE OF CORONARY ANGIOPLASTY IMPLANT                     

 

 2017            ONRTH HERNANDEZ MD            Ot            I82.403     
       ACUTE EMBOLISM AND THOMBOS UNSP DEEP VEI                     

 

 2017            NORTH HERNANDEZ MD            Ot            Z79.01      
      LONG TERM (CURRENT) USE OF ANTICOAGULANT                     

 

 2017                         Ot            414.00            CORON 
ATHEROSCLER NOS TYPE VESSEL, NATIV                     

 

 2017                         Ot            786.50            CHEST PAIN 
NOS                     

 

 2017                         Ot            397.0            TRICUSPID 
VALVE DISEASE                     

 

 2017                         Ot            414.00            CORON 
ATHEROSCLER NOS TYPE VESSEL, NATIV                     

 

 2017                         Ot            424.0            MITRAL VALVE 
DISORDER                     

 

 2017                         Ot            786.50            CHEST PAIN 
NOS                     

 

 2017                         Ot            272.4            
HYPERLIPIDEMIA NEC/NOS                     

 

 2017                         Ot            401.9            HYPERTENSION 
NOS                     

 

 2017                         Ot            414.01            CORONARY 
ATHEROSCLEROSIS OF NATIVE CORON                     

 

 2017                         Ot            401.9            HYPERTENSION 
NOS                     

 

 2017                         Ot            414.01            CORONARY 
ATHEROSCLEROSIS OF NATIVE CORON                     

 

 2017                         Ot            428.0            CONGESTIVE 
HEART FAILURE NOS                     

 

 2017                         Ot            719.40            JOINT PAIN-
UNSPEC                     

 

 2017                         Ot            782.3            EDEMA       
              

 

 2017                         Ot            V58.61            
ANTICOAGULANTS,LT,CURRENT USE                     

 

 2017            NORTH HERNANDEZ MD            Ot            I11.0       
     HYPERTENSIVE HEART DISEASE WITH HEART FA                     

 

 2017            NORTH HERNANDEZ MD            Ot            I25.10      
      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                      

 

 2017            NORTH HERNANDEZ MD            Ot            I26.99      
      OTHER PULMONARY EMBOLISM WITHOUT ACUTE C                     

 

 2017            NORTH HERNANDEZ MD            Ot            I50.9       
     HEART FAILURE, UNSPECIFIED                     

 

 2017            NORTH HERNANDEZ MD            Ot            R07.9       
     CHEST PAIN, UNSPECIFIED                     

 

 2017            NORTH HERNANDEZ MD            Ot            I25.10      
      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                      

 

 2017            NORTH HERNANDEZ MD            Ot            I50.9       
     HEART FAILURE, UNSPECIFIED                     

 

 2017            NORTH HERNANDEZ MD            Ot            R07.9       
     CHEST PAIN, UNSPECIFIED                     

 

 2017            NORTH HERNANDEZ MD            Ot            I82.403     
       ACUTE EMBOLISM AND THOMBOS UNSP DEEP VEI                     

 

 2017            NORTH HERNANDEZ MD            Ot            Z79.01      
      LONG TERM (CURRENT) USE OF ANTICOAGULANT                     

 

 2017            NORTH HERNANDEZ MD            Ot            I82.403     
       ACUTE EMBOLISM AND THOMBOS UNSP DEEP VEI                     

 

 2017            NORTH HERNANDEZ MD            Ot            Z79.01      
      LONG TERM (CURRENT) USE OF ANTICOAGULANT                     

 

 2017            NORTH HERNANDEZ MD            Ot            I82.403     
       ACUTE EMBOLISM AND THOMBOS UNSP DEEP VEI                     

 

 2017            NORTH HERNANDEZ MD            Ot            Z79.01      
      LONG TERM (CURRENT) USE OF ANTICOAGULANT                     

 

 2017            NORTH HERNANDEZ MD            Ot            I26.99      
      OTHER PULMONARY EMBOLISM WITHOUT ACUTE C                     

 

 2017            NORTH HERNANDEZ MD            Ot            Z51.81      
      ENCOUNTER FOR THERAPEUTIC DRUG LEVEL MON                     

 

 2017            DAMARIS WYNN            Ot            F17.200  
          NICOTINE DEPENDENCE, UNSPECIFIED, UNCOMP                     

 

 2017            DAMARIS WYNN            Ot            I25.2    
        OLD MYOCARDIAL INFARCTION                     

 

 2017            DAMARIS WYNN            Ot            K21.9    
        GASTRO-ESOPHAGEAL REFLUX DISEASE WITHOUT                     

 

 2017            DAMARIS WYNN            Ot            L50.9    
        URTICARIA, UNSPECIFIED                     

 

 2017            DAMARIS WYNN            Ot            M79.601  
          PAIN IN RIGHT ARM                     

 

 2017            DAMARIS WYNN            Ot            Z79.01   
         LONG TERM (CURRENT) USE OF ANTICOAGULANT                     

 

 2017            DAMARIS WYNN            Ot            Z79.82   
         LONG TERM (CURRENT) USE OF ASPIRIN                     

 

 2017            DAMARIS WYNN            Ot            Z82.49   
         FAMILY HX OF ISCHEM HEART DIS AND OTH DI                     

 

 2017            DAMARIS WYNN            Ot            Z95.5    
        PRESENCE OF CORONARY ANGIOPLASTY IMPLANT                     

 

 2017            NORTH HERNANDEZ MD            Ot            I26.99      
      OTHER PULMONARY EMBOLISM WITHOUT ACUTE C                     

 

 2017            NORTH HERNANDEZ MD            Ot            Z51.81      
      ENCOUNTER FOR THERAPEUTIC DRUG LEVEL 2017            NORTH HERNANDEZ MD            Ot            I26.99      
      OTHER PULMONARY EMBOLISM WITHOUT ACUTE C                     

 

 2017            NORTH HERNANDEZ MD            Ot            Z51.81      
      ENCOUNTER FOR THERAPEUTIC DRUG LEVEL MON                     

 

 09/15/2017            NORTH HERNANDEZ MD            Ot            I26.99      
      OTHER PULMONARY EMBOLISM WITHOUT ACUTE C                     

 

 09/15/2017            NORTH HERNANDEZ MD            Ot            Z51.81      
      ENCOUNTER FOR THERAPEUTIC DRUG LEVEL MON                     

 

 09/15/2017            NORTH HERNANDEZ MD            Ot            Z79.899     
       OTHER LONG TERM (CURRENT) DRUG THERAPY                     

 

 2017            NORTH HERNANDEZ MD            Ot            I26.99      
      OTHER PULMONARY EMBOLISM WITHOUT ACUTE C                     

 

 2017            NORTH HERNANDEZ MD            Ot            Z51.81      
      ENCOUNTER FOR THERAPEUTIC DRUG LEVEL MON                     

 

 2017            NORTH HERNANDEZ MD            Ot            Z79.899     
       OTHER LONG TERM (CURRENT) DRUG THERAPY                     

 

 10/06/2017            NORTH HERNANDEZ MD            Ot            I26.99      
      OTHER PULMONARY EMBOLISM WITHOUT ACUTE C                     

 

 10/06/2017            NORTH HERNANDEZ MD            Ot            Z51.81      
      ENCOUNTER FOR THERAPEUTIC DRUG LEVEL MON                     

 

 10/06/2017            NORTH HERNANDEZ MD            Ot            Z79.899     
       OTHER LONG TERM (CURRENT) DRUG THERAPY                     

 

 2017            FELISHA MORENO DO            Ot            D68.59        
    OTHER PRIMARY THROMBOPHILIA                     

 

 2017            FELISHA MORENO DO            Ot            E78.1         
   PURE HYPERGLYCERIDEMIA                     

 

 2017            MICHAEL MORENO DOI            Ot            E78.5         
   HYPERLIPIDEMIA, UNSPECIFIED                     

 

 2017            TIFFANIE ALFARO FELISHA            Ot            F17.210       
     NICOTINE DEPENDENCE, CIGARETTES, UNCOMPL                     

 

 2017            TIFFANIE ALFARO FELISHA            Ot            I10            
ESSENTIAL (PRIMARY) HYPERTENSION                     

 

 2017            MICHAEL MORENO DOI            Ot            I21.4         
   NON-ST ELEVATION (NSTEMI) MYOCARDIAL INF                     

 

 2017            MICHAEL MORENO DOI            Ot            I25.10        
    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                      

 

 2017            MICHAEL MORENO DOI            Ot            I25.82        
    CHRONIC TOTAL OCCLUSION OF CORONARY LORI                     

 

 2017            MICHAEL MORENO DOI            Ot            I50.20        
    UNSPECIFIED SYSTOLIC (CONGESTIVE) HEART                      

 

 2017            FELISHA MORENO DO            Ot            K21.9         
   GASTRO-ESOPHAGEAL REFLUX DISEASE WITHOUT                     

 

 2017            MICHAEL MORENO DOI            Ot            R06.2         
   WHEEZING                     

 

 2017            FELISHA MORENO DO            Ot            Z79.01        
    LONG TERM (CURRENT) USE OF ANTICOAGULANT                     

 

 2017            FELISHA MORENO DO            Ot            Z82.49        
    FAMILY HX OF ISCHEM HEART DIS AND OTH DI                     

 

 2017            FELISHA MORENO DO            Ot            Z95.5         
   PRESENCE OF CORONARY ANGIOPLASTY IMPLANT                     

 

 2017            FELISHA MORENO DO            Ot            D68.59        
    OTHER PRIMARY THROMBOPHILIA                     

 

 2017            FELISHA MORENO DO            Ot            E78.1         
   PURE HYPERGLYCERIDEMIA                     

 

 2017            MICHAEL MORENO DOI            Ot            E78.5         
   HYPERLIPIDEMIA, UNSPECIFIED                     

 

 2017            MICHAEL MORENO DOI            Ot            F17.210       
     NICOTINE DEPENDENCE, CIGARETTES, UNCOMPL                     

 

 2017            MICHAEL MORENO DOI            Ot            I10            
ESSENTIAL (PRIMARY) HYPERTENSION                     

 

 2017            MORENO DO, FELISHA            Ot            I21.4         
   NON-ST ELEVATION (NSTEMI) MYOCARDIAL INF                     

 

 2017            TIFFANIE ALFARO FELISHA            Ot            I25.10        
    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                      

 

 2017            TIFFANIE ALFARO FELISHA            Ot            I25.82        
    CHRONIC TOTAL OCCLUSION OF CORONARY LORI                     

 

 2017            TIFFANIE ALFARO FELISHA            Ot            I50.20        
    UNSPECIFIED SYSTOLIC (CONGESTIVE) HEART                      

 

 2017            TIFFANIE ALFARO FELISHA            Ot            K21.9         
   GASTRO-ESOPHAGEAL REFLUX DISEASE WITHOUT                     

 

 2017            TIFFANIE ALFARO FELISHA            Ot            R06.2         
   WHEEZING                     

 

 2017            TIFFANIE ALFARO FELISHA            Ot            Z79.01        
    LONG TERM (CURRENT) USE OF ANTICOAGULANT                     

 

 2017            TIFFANIE ALFARO FELISHA            Ot            Z82.49        
    FAMILY HX OF ISCHEM HEART DIS AND OTH DI                     

 

 2017            TIFFANIE ALFARO FELISHA            Ot            Z95.5         
   PRESENCE OF CORONARY ANGIOPLASTY IMPLANT                     

 

 2017            TIFFANIE ALFARO FELISHA            Ot            D68.59        
    OTHER PRIMARY THROMBOPHILIA                     

 

 2017            TIFFANIE ALFARO FELISHA            Ot            E78.1         
   PURE HYPERGLYCERIDEMIA                     

 

 2017            TIFFANIE ALFARO FELISHA            Ot            E78.5         
   HYPERLIPIDEMIA, UNSPECIFIED                     

 

 2017            TIFFANIE ALFARO FELISHA            Ot            F17.210       
     NICOTINE DEPENDENCE, CIGARETTES, UNCOMPL                     

 

 2017            TIFFANIE ALFARO FELISHA            Ot            I10            
ESSENTIAL (PRIMARY) HYPERTENSION                     

 

 2017            TIFFANIE ALFARO FELISHA            Ot            I21.4         
   NON-ST ELEVATION (NSTEMI) MYOCARDIAL INF                     

 

 2017            TIFFANIE ALFARO FELISHA            Ot            I25.10        
    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                      

 

 2017            TIFFANIE ALFARO FELISHA            Ot            I25.82        
    CHRONIC TOTAL OCCLUSION OF CORONARY LORI                     

 

 2017            TIFFANIE ALFARO FELISHA            Ot            I50.20        
    UNSPECIFIED SYSTOLIC (CONGESTIVE) HEART                      

 

 2017            TIFFANIE ALFARO FELISHA            Ot            K21.9         
   GASTRO-ESOPHAGEAL REFLUX DISEASE WITHOUT                     

 

 2017            TIFFANIE ALFARO FELISHA            Ot            R06.2         
   WHEEZING                     

 

 2017            TIFFANIE ALFARO FELISHA            Ot            Z79.01        
    LONG TERM (CURRENT) USE OF ANTICOAGULANT                     

 

 2017            TIFFANIE ALFARO FELISHA            Ot            Z82.49        
    FAMILY HX OF ISCHEM HEART DIS AND OTH DI                     

 

 2017            TIFFANIE ALFARO FELISHA            Ot            Z95.5         
   PRESENCE OF CORONARY ANGIOPLASTY IMPLANT                     

 

 2017                         Ot            414.00            CORON 
ATHEROSCLER NOS TYPE VESSEL, NATIV                     

 

 2017                         Ot            786.50            CHEST PAIN 
NOS                     

 

 2017                         Ot            397.0            TRICUSPID 
VALVE DISEASE                     

 

 2017                         Ot            414.00            CORON 
ATHEROSCLER NOS TYPE VESSEL, NATIV                     

 

 2017                         Ot            424.0            MITRAL VALVE 
DISORDER                     

 

 2017                         Ot            786.50            CHEST PAIN 
NOS                     

 

 2017                         Ot            272.4            
HYPERLIPIDEMIA NEC/NOS                     

 

 2017                         Ot            401.9            HYPERTENSION 
NOS                     

 

 2017                         Ot            414.01            CORONARY 
ATHEROSCLEROSIS OF NATIVE CORON                     

 

 2017                         Ot            401.9            HYPERTENSION 
NOS                     

 

 2017                         Ot            414.01            CORONARY 
ATHEROSCLEROSIS OF NATIVE CORON                     

 

 2017                         Ot            428.0            CONGESTIVE 
HEART FAILURE NOS                     

 

 2017                         Ot            719.40            JOINT PAIN-
UNSPEC                     

 

 2017                         Ot            782.3            EDEMA       
              

 

 2017                         Ot            V58.61            
ANTICOAGULANTS,LT,CURRENT USE                     

 

 2017            NORTH HERNANDEZ MD            Ot            I11.0       
     HYPERTENSIVE HEART DISEASE WITH HEART FA                     

 

 2017            NORTH HERNANDEZ MD            Ot            I25.10      
      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                      

 

 2017            NORTH HERNANDEZ MD            Ot            I26.99      
      OTHER PULMONARY EMBOLISM WITHOUT ACUTE C                     

 

 2017            NORTH HERNANDEZ MD            Ot            I50.9       
     HEART FAILURE, UNSPECIFIED                     

 

 2017            NORTH HERNANDEZ MD            Ot            R07.9       
     CHEST PAIN, UNSPECIFIED                     

 

 2017            NORTH HERNANDEZ MD            Ot            I25.10      
      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                      

 

 2017            NORTH HERNANDEZ MD            Ot            I50.9       
     HEART FAILURE, UNSPECIFIED                     

 

 2017            NORTH HERNANDEZ MD            Ot            R07.9       
     CHEST PAIN, UNSPECIFIED                     

 

 2017            NORTH HERNANDEZ MD            Ot            I82.403     
       ACUTE EMBOLISM AND THOMBOS UNSP DEEP VEI                     

 

 2017            NORTH HERNANDEZ MD            Ot            Z79.01      
      LONG TERM (CURRENT) USE OF ANTICOAGULANT                     

 

 2017            NORTH HERNANDEZ MD            Ot            I26.99      
      OTHER PULMONARY EMBOLISM WITHOUT ACUTE C                     

 

 2017            NORTH HERNANDEZ MD            Ot            Z51.81      
      ENCOUNTER FOR THERAPEUTIC DRUG LEVEL MON                     

 

 2017            NORTH HERNANDEZ MD            Ot            I26.99      
      OTHER PULMONARY EMBOLISM WITHOUT ACUTE C                     

 

 2017            NORTH HERNANDEZ MD            Ot            Z51.81      
      ENCOUNTER FOR THERAPEUTIC DRUG LEVEL MON                     

 

 2017            NORTH HERNANDEZ MD            Ot            Z79.899     
       OTHER LONG TERM (CURRENT) DRUG THERAPY                     

 

 2017            DAMARIS WYNN            Ot            E78.00   
         PURE HYPERCHOLESTEROLEMIA, UNSPECIFIED                     

 

 2017            DAMARIS WYNN            Ot            I10      
      ESSENTIAL (PRIMARY) HYPERTENSION                     

 

 2017            DAMARIS WYNN            Ot            I25.2    
        OLD MYOCARDIAL INFARCTION                     

 

 2017            DAMARIS WYNN            Ot            K21.9    
        GASTRO-ESOPHAGEAL REFLUX DISEASE WITHOUT                     

 

 2017            DAMARIS WYNN            Ot            R22.2    
        LOCALIZED SWELLING, MASS AND LUMP, TRUNK                     

 

 2017            DAMARIS WYNN            Ot            S20.212A 
           CONTUSION OF LEFT FRONT WALL OF THORAX,                      

 

 2017            DAMARIS WYNN            Ot            X58.XXXA 
           EXPOSURE TO OTHER SPECIFIED FACTORS, INI                     

 

 2017            DAMARIS WYNN            Ot            Z79.01   
         LONG TERM (CURRENT) USE OF ANTICOAGULANT                     

 

 2017            DAMARIS WYNN            Ot            Z79.82   
         LONG TERM (CURRENT) USE OF ASPIRIN                     

 

 2017            DAMARIS WYNN            Ot            Z82.49   
         FAMILY HX OF ISCHEM HEART DIS AND OTH DI                     

 

 2017            DAMARIS WYNN            Ot            Z95.5    
        PRESENCE OF CORONARY ANGIOPLASTY IMPLANT                     

 

 2017            NORTH HERNANDEZ MD            Ot            I26.99      
      OTHER PULMONARY EMBOLISM WITHOUT ACUTE C                     

 

 2017            NORTH HERNANDEZ MD            Ot            Z79.01      
      LONG TERM (CURRENT) USE OF ANTICOAGULANT                     

 

 2017            NORTH HERNANDEZ MD            Ot            I50.9       
     HEART FAILURE, UNSPECIFIED                     

 

 12/15/2017                         Ot            414.00            CORON 
ATHEROSCLER NOS TYPE VESSEL, NATIV                     

 

 12/15/2017                         Ot            786.50            CHEST PAIN 
NOS                     

 

 12/15/2017                         Ot            397.0            TRICUSPID 
VALVE DISEASE                     

 

 12/15/2017                         Ot            414.00            CORON 
ATHEROSCLER NOS TYPE VESSEL, NATIV                     

 

 12/15/2017                         Ot            424.0            MITRAL VALVE 
DISORDER                     

 

 12/15/2017                         Ot            786.50            CHEST PAIN 
NOS                     

 

 12/15/2017                         Ot            272.4            
HYPERLIPIDEMIA NEC/NOS                     

 

 12/15/2017                         Ot            401.9            HYPERTENSION 
NOS                     

 

 12/15/2017                         Ot            414.01            CORONARY 
ATHEROSCLEROSIS OF NATIVE CORON                     

 

 12/15/2017                         Ot            401.9            HYPERTENSION 
NOS                     

 

 12/15/2017                         Ot            414.01            CORONARY 
ATHEROSCLEROSIS OF NATIVE CORON                     

 

 12/15/2017                         Ot            428.0            CONGESTIVE 
HEART FAILURE NOS                     

 

 12/15/2017                         Ot            719.40            JOINT PAIN-
UNSPEC                     

 

 12/15/2017                         Ot            782.3            EDEMA       
              

 

 12/15/2017                         Ot            V58.61            
ANTICOAGULANTS,LT,CURRENT USE                     

 

 12/15/2017            NORTH HERNANDEZ MD            Ot            I11.0       
     HYPERTENSIVE HEART DISEASE WITH HEART FA                     

 

 12/15/2017            NORTH HERNANDEZ MD            Ot            I25.10      
      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                      

 

 12/15/2017            NORTH HERNANDEZ MD            Ot            I26.99      
      OTHER PULMONARY EMBOLISM WITHOUT ACUTE C                     

 

 12/15/2017            NORTH HERNANDEZ MD            Ot            I50.9       
     HEART FAILURE, UNSPECIFIED                     

 

 12/15/2017            NORTH HERNANDEZ MD            Ot            R07.9       
     CHEST PAIN, UNSPECIFIED                     

 

 12/15/2017            NORTH HERNANDEZ MD            Ot            I25.10      
      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                      

 

 12/15/2017            NORTH HERNADNEZ MD            Ot            I50.9       
     HEART FAILURE, UNSPECIFIED                     

 

 12/15/2017            NORTH HERNANDEZ MD            Ot            R07.9       
     CHEST PAIN, UNSPECIFIED                     

 

 12/15/2017            NORTH HERNANDEZ MD            Ot            I82.403     
       ACUTE EMBOLISM AND THOMBOS UNSP DEEP VEI                     

 

 12/15/2017            NORTH HERNANDEZ MD            Ot            Z79.01      
      LONG TERM (CURRENT) USE OF ANTICOAGULANT                     

 

 12/15/2017            NORTH HERNANDEZ MD            Ot            I26.99      
      OTHER PULMONARY EMBOLISM WITHOUT ACUTE C                     

 

 12/15/2017            NORTH HERNANDEZ MD            Ot            Z51.81      
      ENCOUNTER FOR THERAPEUTIC DRUG LEVEL MON                     

 

 12/15/2017            NORTH HERNANDEZ MD            Ot            I26.99      
      OTHER PULMONARY EMBOLISM WITHOUT ACUTE C                     

 

 12/15/2017            NORTH HERNANDEZ MD            Ot            Z79.01      
      LONG TERM (CURRENT) USE OF ANTICOAGULANT                     

 

 12/15/2017            NORTH HERNANDEZ MD            Ot            I50.9       
     HEART FAILURE, UNSPECIFIED                     

 

 2017            NORTH HERNANDEZ MD            Ot            I26.99      
      OTHER PULMONARY EMBOLISM WITHOUT ACUTE C                     

 

 2017            NORTH HERNANDEZ MD            Ot            Z79.01      
      LONG TERM (CURRENT) USE OF ANTICOAGULANT                     

 

 2017            CANDE PIKE MD            Ot            I25.119       
     ATHSCL HEART DISEASE OF NATIVE COR ART W                     

 

 2017            GLENDY GREEN, CANDE DEWEY            Ot            Z01.812       
     ENCOUNTER FOR PREPROCEDURAL LABORATORY E                     

 

 2017            NORTH HERNANDEZ MD            Ot            I26.99      
      OTHER PULMONARY EMBOLISM WITHOUT ACUTE C                     

 

 2017            NORTH HERNANDEZ MD, Ot            Z79.01      
      LONG TERM (CURRENT) USE OF ANTICOAGULANT                     

 

 2018            NORTH HERNANDEZ MD, Ot            I26.99      
      OTHER PULMONARY EMBOLISM WITHOUT ACUTE C                     

 

 2018            NORTH HERNANDEZ MD, Ot            Z79.01      
      LONG TERM (CURRENT) USE OF ANTICOAGULANT                     

 

 2018            NORTH HERNANDEZ MD, Ot            I26.99      
      OTHER PULMONARY EMBOLISM WITHOUT ACUTE C                     

 

 2018            NORTH HERNANDEZ MD, Ot            Z79.01      
      LONG TERM (CURRENT) USE OF ANTICOAGULANT                     

 

 2018            NORTH HERNANDEZ MD, Ot            I10         
   ESSENTIAL (PRIMARY) HYPERTENSION                     

 

 2018            NORTH HERNANDEZ MD, Ot            I25.10      
      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                      

 

 2018            NORTH HERNANDEZ MD            Ot            R06.00      
      DYSPNEA, UNSPECIFIED                     

 

 2018            NORTH HERNANDEZ MD            Ot            R07.89      
      OTHER CHEST PAIN                     

 

 2018            NORTH HERNANDEZ MD            Ot            I50.9       
     HEART FAILURE, UNSPECIFIED                     

 

 2018            RADHA ARELLANO            Ot            E78.00     
       PURE HYPERCHOLESTEROLEMIA, UNSPECIFIED                     

 

 2018            RADHA ARELLANO            Ot            I10        
    ESSENTIAL (PRIMARY) HYPERTENSION                     

 

 2018            RADHA ARELLANO            Ot            I20.9      
      ANGINA PECTORIS, UNSPECIFIED                     

 

 2018            RADHA AERLLANO            Ot            I25.2      
      OLD MYOCARDIAL INFARCTION                     

 

 2018            RADHA ARELLANO            Ot            K21.9      
      GASTRO-ESOPHAGEAL REFLUX DISEASE WITHOUT                     

 

 2018            RADHA ARELLANO            Ot            M10.9      
      GOUT, UNSPECIFIED                     

 

 2018            RADHA ARELLANO            Ot            M79.672    
        PAIN IN LEFT FOOT                     

 

 2018            RADHA ARELLANO            Ot            Z79.01     
       LONG TERM (CURRENT) USE OF ANTICOAGULANT                     

 

 2018            RADHA ARELLANO            Ot            Z79.02     
       LONG TERM (CURRENT) USE OF ANTITHROMBOTI                     

 

 2018            RADHA ARELLANO            Ot            Z79.82     
       LONG TERM (CURRENT) USE OF ASPIRIN                     

 

 2018            RADHA ARELLNAO            Ot            Z82.49     
       FAMILY HX OF ISCHEM HEART DIS AND OTH DI                     

 

 2018            RADHA ARELLANO            Ot            Z86.718    
        PERSONAL HISTORY OF OTHER VENOUS THROMBO                     

 

 2018            RADHA ARELLANO            Ot            Z95.5      
      PRESENCE OF CORONARY ANGIOPLASTY IMPLANT                     

 

 2018            RADHA ARELLANO            Ot            E78.00     
       PURE HYPERCHOLESTEROLEMIA, UNSPECIFIED                     

 

 2018            RADHA ARELLANO            Ot            I10        
    ESSENTIAL (PRIMARY) HYPERTENSION                     

 

 2018            RADHA ARELLANO            Ot            I20.9      
      ANGINA PECTORIS, UNSPECIFIED                     

 

 2018            RADHA ARELLANO            Ot            I25.2      
      OLD MYOCARDIAL INFARCTION                     

 

 2018            RADHA ARELLANO            Ot            K21.9      
      GASTRO-ESOPHAGEAL REFLUX DISEASE WITHOUT                     

 

 2018            RADHA ARELLANO            Ot            M10.9      
      GOUT, UNSPECIFIED                     

 

 2018            RADHA ARELLANO            Ot            M79.672    
        PAIN IN LEFT FOOT                     

 

 2018            RADHA ARELLANO            Ot            Z79.01     
       LONG TERM (CURRENT) USE OF ANTICOAGULANT                     

 

 2018            RADHA ARELLANO            Ot            Z79.02     
       LONG TERM (CURRENT) USE OF ANTITHROMBOTI                     

 

 2018            RADHA ARELLANO            Ot            Z79.82     
       LONG TERM (CURRENT) USE OF ASPIRIN                     

 

 2018            RADHA ARELLANO            Ot            Z82.49     
       FAMILY HX OF ISCHEM HEART DIS AND OTH DI                     

 

 2018            RADHA ARELLANO            Ot            Z86.718    
        PERSONAL HISTORY OF OTHER VENOUS THROMBO                     

 

 2018            RADHA ARELLANO            Ot            Z95.5      
      PRESENCE OF CORONARY ANGIOPLASTY IMPLANT                     

 

 2018            NORTH HERNANDEZ MD            Ot            I10         
   ESSENTIAL (PRIMARY) HYPERTENSION                     

 

 2018            NORTH HERNANDEZ MD            Ot            I25.10      
      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                      

 

 2018            NORTH HERNANDEZ MD            Ot            R06.00      
      DYSPNEA, UNSPECIFIED                     

 

 2018            NORTH HERNANDEZ MD            Ot            R07.89      
      OTHER CHEST PAIN                     

 

 2018            MARY GREEN, CANDE MA            Ot            E78.00      
      PURE HYPERCHOLESTEROLEMIA, UNSPECIFIED                     

 

 2018            MARY GREEN, CANDE MA            Ot            E78.5       
     HYPERLIPIDEMIA, UNSPECIFIED                     

 

 2018            MARY GREEN, CANDE MA            Ot            I11.0       
     HYPERTENSIVE HEART DISEASE WITH HEART FA                     

 

 2018            MARY GREEN, CANDE MA            Ot            I25.10      
      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                      

 

 2018            MARY GREEN, CANDE MA            Ot            I25.2       
     OLD MYOCARDIAL INFARCTION                     

 

 2018            MARY GREEN, CANDE MA            Ot            I50.9       
     HEART FAILURE, UNSPECIFIED                     

 

 2018            MARY GREEN, CANDE MA            Ot            K21.9       
     GASTRO-ESOPHAGEAL REFLUX DISEASE WITHOUT                     

 

 2018            MARY GREEN, CANDE MA            Ot            L03.116     
       CELLULITIS OF LEFT LOWER LIMB                     

 

 2018            MARY GREEN, CANDE MA            Ot            M10.9       
     GOUT, UNSPECIFIED                     

 

 2018            MARY GREEN, CANDE MA            Ot            Z86.718     
       PERSONAL HISTORY OF OTHER VENOUS THROMBO                     

 

 2018            MARY GREEN, CANDE MA            Ot            Z87.891     
       PERSONAL HISTORY OF NICOTINE DEPENDENCE                     

 

 2018            MARY GREEN, CANDE MA            Ot            Z95.5       
     PRESENCE OF CORONARY ANGIOPLASTY IMPLANT                     

 

 2018            Brokob, Dena            W            238.71            
ESSENTIAL THROMBOCYTHEMIA                     

 

 2018            Brokob, Dena            A            682.6            
CELLULITIS AND ABSCESS OF LEG, EXCEPT FOOT                     

 

 2018            Elizabethb Dena            W            D47.3            
ESSENTIAL (HEMORRHAGIC) THROMBOCYTHEMIA                     

 

 2018            Brokob, Dena            A            L03.116            
CELLULITIS OF LEFT LOWER LIMB                     

 

 2018            MARY GREEN, NORTH WHITTAKER            Ot            M79.605     
       PAIN IN LEFT LEG                     

 

 2018                         Ot            414.00            CORON 
ATHEROSCLER NOS TYPE VESSEL, NATIV                     

 

 2018                         Ot            786.50            CHEST PAIN 
NOS                     

 

 2018                         Ot            397.0            TRICUSPID 
VALVE DISEASE                     

 

 2018                         Ot            414.00            CORON 
ATHEROSCLER NOS TYPE VESSEL, NATIV                     

 

 2018                         Ot            424.0            MITRAL VALVE 
DISORDER                     

 

 2018                         Ot            786.50            CHEST PAIN 
NOS                     

 

 2018                         Ot            272.4            
HYPERLIPIDEMIA NEC/NOS                     

 

 2018                         Ot            401.9            HYPERTENSION 
NOS                     

 

 2018                         Ot            414.01            CORONARY 
ATHEROSCLEROSIS OF NATIVE CORON                     

 

 2018                         Ot            401.9            HYPERTENSION 
NOS                     

 

 2018                         Ot            414.01            CORONARY 
ATHEROSCLEROSIS OF NATIVE CORON                     

 

 2018                         Ot            428.0            CONGESTIVE 
HEART FAILURE NOS                     

 

 2018                         Ot            719.40            JOINT PAIN-
UNSPEC                     

 

 2018                         Ot            782.3            EDEMA       
              

 

 2018                         Ot            V58.61            
ANTICOAGULANTS,LT,CURRENT USE                     

 

 2018            NORTH HERNANDEZ MD            Ot            I11.0       
     HYPERTENSIVE HEART DISEASE WITH HEART FA                     

 

 2018            NORTH HERNANDEZ MD            Ot            I25.10      
      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                      

 

 2018            NORTH HERNANDEZ MD            Ot            I26.99      
      OTHER PULMONARY EMBOLISM WITHOUT ACUTE C                     

 

 2018            NORTH HERNANDEZ MD            Ot            I50.9       
     HEART FAILURE, UNSPECIFIED                     

 

 2018            NORTH HERNANDEZ MD            Ot            R07.9       
     CHEST PAIN, UNSPECIFIED                     

 

 2018            NORTH HERNANDEZ MD            Ot            I25.10      
      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                      

 

 2018            NORTH HERNANDEZ MD            Ot            I50.9       
     HEART FAILURE, UNSPECIFIED                     

 

 2018            NORTH HERNANDEZ MD            Ot            R07.9       
     CHEST PAIN, UNSPECIFIED                     

 

 2018            NORTH HERNANDEZ MD            Ot            I82.403     
       ACUTE EMBOLISM AND THOMBOS UNSP DEEP VEI                     

 

 2018            NORTH HERNANDEZ MD            Ot            Z79.01      
      LONG TERM (CURRENT) USE OF ANTICOAGULANT                     

 

 2018            NORTH HERNANDEZ MD            Ot            I26.99      
      OTHER PULMONARY EMBOLISM WITHOUT ACUTE C                     

 

 2018            NORTH HERNANDEZ MD            Ot            Z51.81      
      ENCOUNTER FOR THERAPEUTIC DRUG LEVEL MON                     

 

 2018            NORTH HERNANDEZ MD            Ot            I50.9       
     HEART FAILURE, UNSPECIFIED                     

 

 2018            NORTH HERNANDEZ MD            Ot            I10         
   ESSENTIAL (PRIMARY) HYPERTENSION                     

 

 2018            NORTH HERNANDEZ MD            Ot            I25.10      
      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                      

 

 2018            NORTH HERNANDEZ MD            Ot            R06.00      
      DYSPNEA, UNSPECIFIED                     

 

 2018            NORTH HERNANDEZ MD            Ot            R07.89      
      OTHER CHEST PAIN                     

 

 2018            CANDE PIKE MD            Ot            I25.119       
     ATHSCL HEART DISEASE OF NATIVE COR ART W                     

 

 2018            CANDE PIKE MD            Ot            Z01.812       
     ENCOUNTER FOR PREPROCEDURAL LABORATORY E                     

 

 2018            MARY MD, BASHAR J            Ot            I26.99      
      OTHER PULMONARY EMBOLISM WITHOUT ACUTE C                     

 

 2018            NORTH HERNANDEZ MD            Ot            Z79.01      
      LONG TERM (CURRENT) USE OF ANTICOAGULANT                     

 

 2018            NORTH HERNANDEZ MD            Ot            M79.605     
       PAIN IN LEFT LEG                     

 

 2018            NORTH HERNANDEZ MD            Ot            I26.99      
      OTHER PULMONARY EMBOLISM WITHOUT ACUTE C                     

 

 2018            NORTH HERNANDEZ MD            Ot            Z79.01      
      LONG TERM (CURRENT) USE OF ANTICOAGULANT                     

 

 2018            NORTH HERNANDEZ MD            Ot            M79.605     
       PAIN IN LEFT LEG                     

 

 2018            NORTH HERNANDEZ MD            Ot            F17.200     
       NICOTINE DEPENDENCE, UNSPECIFIED, UNCOMP                     

 

 2018            NORTH HERNANDEZ MD            Ot            I10         
   ESSENTIAL (PRIMARY) HYPERTENSION                     

 

 2018            NORTH HERNANDEZ MD            Ot            I25.10      
      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                      

 

 2018            NORTH HERNANDEZ MD            Ot            I26.99      
      OTHER PULMONARY EMBOLISM WITHOUT ACUTE C                     

 

 2018            NORTH HERNANDEZ MD            Ot            I70.0       
     ATHEROSCLEROSIS OF AORTA                     

 

 2018            NORTH HERNANDEZ MD            Ot            I70.8       
     ATHEROSCLEROSIS OF OTHER ARTERIES                     

 

 2018            NORTH HERNANDEZ MD            Ot            I82.409     
       ACUTE EMBOLISM AND THOMBOS UNSP DEEP VN                      

 

 2018            NORTH HERNANDEZ MD            Ot            M89.572     
       OSTEOLYSIS, LEFT ANKLE AND FOOT                     

 

 2018            NORTH HERNANDEZ MD            Ot            R06.00      
      DYSPNEA, UNSPECIFIED                     

 

 2018            GONSALO SOMERS MD            Ot            G63        
    POLYNEUROPATHY IN DISEASES CLASSIFIED EL                     

 

 2018            GONSALO SOMERS MD            Ot            I75.022    
        ATHEROEMBOLISM OF LEFT LOWER EXTREMITY                     

 

 2018            GONSALO SOMERS MD            Ot            M79.662    
        PAIN IN LEFT LOWER LEG                     

 

 2018            NORTH HERNANDEZ MD            Ot            F17.200     
       NICOTINE DEPENDENCE, UNSPECIFIED, UNCOMP                     

 

 2018            NORTH HERNANDEZ MD            Ot            I10         
   ESSENTIAL (PRIMARY) HYPERTENSION                     

 

 2018            NORTH HERNANDEZ MD            Ot            I25.10      
      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                      

 

 2018            NORTH HERNANDEZ MD            Ot            I26.99      
      OTHER PULMONARY EMBOLISM WITHOUT ACUTE C                     

 

 2018            NORTH HERNANDEZ MD            Ot            I70.0       
     ATHEROSCLEROSIS OF AORTA                     

 

 2018            NORTH HERNANDEZ MD            Ot            I70.8       
     ATHEROSCLEROSIS OF OTHER ARTERIES                     

 

 2018            NORTH HERNANDEZ MD            Ot            I82.409     
       ACUTE EMBOLISM AND THOMBOS UNSP DEEP VN                      

 

 2018            NORTH HERNANDEZ MD            Ot            M89.572     
       OSTEOLYSIS, LEFT ANKLE AND FOOT                     

 

 2018            NORTH HERNANDEZ MD            Ot            R06.00      
      DYSPNEA, UNSPECIFIED                     

 

 2018            NORTH HERNANDEZ MD            Ot            I10         
   ESSENTIAL (PRIMARY) HYPERTENSION                     

 

 2018            NORTH HERNANDEZ MD            Ot            I21.3       
     ST ELEVATION (STEMI) MYOCARDIAL INFARCTI                     

 

 2018            NORTH HERNANDEZ MD            Ot            I25.10      
      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                      

 

 2018            NORTH HERNANDEZ MD            Ot            K27.9       
     PEPTIC ULC, SITE UNSP, UNSP AS AC OR CHR                     

 

 2018            NORTH HERNANDEZ MD            Ot            R06.02      
      SHORTNESS OF BREATH                     

 

 2018            NORTH HERNANDEZ MD            Ot            R07.89      
      OTHER CHEST PAIN                     

 

 2018            RUTH GREEN, KANDACE WHITTAKER            Ot            M79.89    
        OTHER SPECIFIED SOFT TISSUE DISORDERS                     

 

 2018            NORTH HERNANDEZ MD            Ot            I50.9       
     HEART FAILURE, UNSPECIFIED                     

 

 2018            NORTH HERNANDEZ MD            Ot            F17.200     
       NICOTINE DEPENDENCE, UNSPECIFIED, UNCOMP                     

 

 2018            NORTH HERNANDEZ MD            Ot            I10         
   ESSENTIAL (PRIMARY) HYPERTENSION                     

 

 2018            NORTH HERNANDEZ MD            Ot            I25.10      
      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                      

 

 2018            NORTH HERNANDEZ MD            Ot            I26.99      
      OTHER PULMONARY EMBOLISM WITHOUT ACUTE C                     

 

 2018            NORTH HERNANDEZ MD            Ot            I70.0       
     ATHEROSCLEROSIS OF AORTA                     

 

 2018            NORTH HERNANDEZ MD            Ot            I70.8       
     ATHEROSCLEROSIS OF OTHER ARTERIES                     

 

 2018            NORTH HERNANDEZ MD            Ot            I82.409     
       ACUTE EMBOLISM AND THOMBOS UNSP DEEP VN                      

 

 2018            NORTH HERNANDEZ MD            Ot            M89.572     
       OSTEOLYSIS, LEFT ANKLE AND FOOT                     

 

 2018            NORTH HERNANDEZ MD            Ot            R06.00      
      DYSPNEA, UNSPECIFIED                     

 

 2018            NORTH HERNANDEZ MD            Ot            I50.9       
     HEART FAILURE, UNSPECIFIED                     

 

 2018            YONIS GREEN, GONSALO MERCADO            Ot            G63        
    POLYNEUROPATHY IN DISEASES CLASSIFIED EL                     

 

 2018            YONIS GREEN, GONSALO MERCADO            Ot            I75.022    
        ATHEROEMBOLISM OF LEFT LOWER EXTREMITY                     

 

 2018            YONIS GREEN, GONSALO MERCADO            Ot            M79.662    
        PAIN IN LEFT LOWER LEG                     

 

 2018            RUTH GREEN, KANDACE WHITTAKER            Ot            M79.89    
        OTHER SPECIFIED SOFT TISSUE DISORDERS                     

 

 2018            NORTH HERNANDEZ MD            Ot            I10         
   ESSENTIAL (PRIMARY) HYPERTENSION                     

 

 2018            NORTH HERNANDEZ MD            Ot            I21.3       
     ST ELEVATION (STEMI) MYOCARDIAL INFARCTI                     

 

 2018            NORTH HERNANDEZ MD            Ot            I25.10      
      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                      

 

 2018            NORTH HERNANDEZ MD            Ot            K27.9       
     PEPTIC ULC, SITE UNSP, UNSP AS AC OR CHR                     

 

 2018            NORTH HERNANDEZ MD            Ot            R06.02      
      SHORTNESS OF BREATH                     

 

 2018            NORTH HERNANDEZ MD            Ot            R07.89      
      OTHER CHEST PAIN                     

 

 2018            NORTH HERNANDEZ MD            Ot            F17.200     
       NICOTINE DEPENDENCE, UNSPECIFIED, UNCOMP                     

 

 2018            NORTH HERNANDEZ MD            Ot            I10         
   ESSENTIAL (PRIMARY) HYPERTENSION                     

 

 2018            NORTH HERNANDEZ MD            Ot            I25.10      
      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                      

 

 2018            NORTH HERNANDEZ MD            Ot            I26.99      
      OTHER PULMONARY EMBOLISM WITHOUT ACUTE C                     

 

 2018            NORTH HERNANDEZ MD            Ot            I70.0       
     ATHEROSCLEROSIS OF AORTA                     

 

 2018            NORTH HERNANDEZ MD            Ot            I70.8       
     ATHEROSCLEROSIS OF OTHER ARTERIES                     

 

 2018            NORTH HERNANDEZ MD            Ot            I82.402     
       ACUTE EMBOLISM AND THOMBOS UNSP DEEP VEI                     

 

 2018            NORTH HERNANDEZ MD            Ot            M89.572     
       OSTEOLYSIS, LEFT ANKLE AND FOOT                     

 

 2018            NORTH HERNANDEZ MD            Ot            R06.00      
      DYSPNEA, UNSPECIFIED                     

 

 2018            NORTH HERNANDEZ MD            Ot            F17.200     
       NICOTINE DEPENDENCE, UNSPECIFIED, UNCOMP                     

 

 2018            NORTH HERNANDEZ MD            Ot            I10         
   ESSENTIAL (PRIMARY) HYPERTENSION                     

 

 2018            NORTH HERNANDEZ MD            Ot            I25.10      
      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                      

 

 2018            NORTH HERNANDEZ MD            Ot            I26.99      
      OTHER PULMONARY EMBOLISM WITHOUT ACUTE C                     

 

 2018            NORTH HERNANDEZ MD            Ot            I70.0       
     ATHEROSCLEROSIS OF AORTA                     

 

 2018            NORTH HERNANDEZ MD            Ot            I70.8       
     ATHEROSCLEROSIS OF OTHER ARTERIES                     

 

 2018            NORTH HERNANDEZ MD            Ot            I82.402     
       ACUTE EMBOLISM AND THOMBOS UNSP DEEP VEI                     

 

 2018            NORTH HERNANDEZ MD            Ot            M89.572     
       OSTEOLYSIS, LEFT ANKLE AND FOOT                     

 

 2018            NORTH HERNANDEZ MD            Ot            R06.00      
      DYSPNEA, UNSPECIFIED                     

 

 2018            NORTH HERNANDEZ MD            Ot            I26.99      
      OTHER PULMONARY EMBOLISM WITHOUT ACUTE C                     

 

 2018            NORTH HERNANDEZ MD            Ot            Z79.01      
      LONG TERM (CURRENT) USE OF ANTICOAGULANT                     

 

 2018            NORTH HERNANDEZ MD            Ot            I26.99      
      OTHER PULMONARY EMBOLISM WITHOUT ACUTE C                     

 

 2018            NORTH HERNANDEZ MD            Ot            Z79.01      
      LONG TERM (CURRENT) USE OF ANTICOAGULANT                     

 

 2018            NORTH HERNANDEZ MD            Ot            I26.99      
      OTHER PULMONARY EMBOLISM WITHOUT ACUTE C                     

 

 2018            NORTH HERNANDEZ MD            Ot            Z79.01      
      LONG TERM (CURRENT) USE OF ANTICOAGULANT                     

 

 2018            NORTH HERNANDEZ MD            Ot            I26.99      
      OTHER PULMONARY EMBOLISM WITHOUT ACUTE C                     

 

 2018            NORTH HERNANDEZ MD            Ot            Z79.01      
      LONG TERM (CURRENT) USE OF ANTICOAGULANT                     

 

 2018            NORTH HERNANDEZ MD            Ot            I26.99      
      OTHER PULMONARY EMBOLISM WITHOUT ACUTE C                     

 

 2018            NORTH HERNANDEZ MD            Ot            Z79.01      
      LONG TERM (CURRENT) USE OF ANTICOAGULANT                     

 

 2018            NORTH HERNANDEZ MD            Ot            I26.99      
      OTHER PULMONARY EMBOLISM WITHOUT ACUTE C                     

 

 2018            NORTH HERNANDEZ MD            Ot            Z79.01      
      LONG TERM (CURRENT) USE OF ANTICOAGULANT                     

 

 2018            NORTH HERNANDEZ MD            Ot            I26.99      
      OTHER PULMONARY EMBOLISM WITHOUT ACUTE C                     

 

 2018            NORTH HERNANDEZ MD            Ot            Z79.01      
      LONG TERM (CURRENT) USE OF ANTICOAGULANT                     

 

 10/03/2018            NORTH HERNANDEZ MD            Ot            I26.99      
      OTHER PULMONARY EMBOLISM WITHOUT ACUTE C                     

 

 10/03/2018            NORTH HERNANDEZ MD, Ot            Z79.01      
      LONG TERM (CURRENT) USE OF ANTICOAGULANT                     



                                                                               
                                                                               
                                                                               
                                                                               
                                                                               
                                                                               
                                                                               
                                                                               
                                                                               
                                                                               
                                                                               
                                                                               
                                                                               
                                                                               
                                                                               
                                                                               
                                                                               
                                                                               
                                                                               
                                                                               
                                                                               
                                                                               
                                                                        



Procedures

      





 Code            Description            Performed By            Performed On   
     

 

             CARDIOLOG                                  NORTH HERNANDEZ          
                         2014        

 

             [id number]                                  DILATION OF 1 COR ART 
WITH DRUG-ELUT INT                                   2017        

 

             [id number]                                  MEASURE OF CARDIAC SAMPL 
  PRESSURE, L H                                   2017        

 

             [id number]                                  FLUOROSCOPY OF MULT COR 
ART USING L OSM                                    2017        

 

             [id number]                                  FLUOROSCOPY OF LEFT HEART 
USING LOW OSMO                                   2017        



                          



Results

      





 Test            Result            Range        









 Complete urinalysis with reflex to culture - 16 10:52         









 Urine color determination            YELLOW             NRG        

 

 Urine clarity determination            CLEAR             NRG        

 

 Urine pH measurement by test strip            8             5-9        

 

 Specific gravity of urine by test strip            1.010             1.016-
1.022        

 

 Urine protein assay by test strip, semi-quantitative            NEGATIVE      
       NEGATIVE        

 

 Urine glucose detection by automated test strip            NEGATIVE           
  NEGATIVE        

 

 Erythrocytes detection in urine sediment by light microscopy            
NEGATIVE             NEGATIVE        

 

 Urine ketones detection by automated test strip            NEGATIVE           
  NEGATIVE        

 

 Urine nitrite detection by test strip            NEGATIVE             NEGATIVE
        

 

 Urine total bilirubin detection by test strip            NEGATIVE             
NEGATIVE        

 

 Urine urobilinogen measurement by automated test strip (mass/volume)          
  NORMAL             NORMAL        

 

 Urine leukocyte esterase detection by dipstick            NEGATIVE             
NEGATIVE        

 

 Automated urine sediment erythrocyte count by microscopy (number/high power 
field)            NONE             NRG        

 

 Automated urine sediment leukocyte count by microscopy (number/high power field
)            NONE             NRG        

 

 Bacteria detection in urine sediment by light microscopy            NEGATIVE  
           NRG        

 

 Crystals detection in urine sediment by light microscopy            NONE      
       NRG        

 

 Casts detection in urine sediment by light microscopy            NONE         
    NRG        

 

 Mucus detection in urine sediment by light microscopy            NEGATIVE     
        NRG        

 

 Complete urinalysis with reflex to culture            NO             NRG      
  









 Automated blood complete blood count (hemogram) panel - 16 11:00         









 Blood leukocytes automated count (number/volume)            9.9 10*3/uL       
     4.3-11.0        

 

 Blood erythrocytes automated count (number/volume)            5.46 10*6/uL    
        4.35-5.85        

 

 Venous blood hemoglobin measurement (mass/volume)            15.7 g/dL        
    13.3-17.7        

 

 Blood hematocrit (volume fraction)            47 %            40-54        

 

 Automated erythrocyte mean corpuscular volume            86 [foz_us]          
  80-99        

 

 Automated erythrocyte mean corpuscular hemoglobin (mass per erythrocyte)      
      29 pg            25-34        

 

 Automated erythrocyte mean corpuscular hemoglobin concentration measurement (
mass/volume)            33 g/dL            32-36        

 

 Automated erythrocyte distribution width ratio            13.9 %            
10.0-14.5        

 

 Automated blood platelet count (count/volume)            282 10*3/uL          
  130-400        

 

 Automated blood platelet mean volume measurement            9.0 [foz_us]      
      7.4-10.4        









 PT panel in platelet poor plasma by coagulation assay - 16 11:00         









 Prothrombin time (PT) in platelet poor plasma by coagulation assay            
22.5 s            12.2-14.7        

 

 INR in platelet poor plasma or blood by coagulation assay            2.0      
       0.8-1.4        









 Activated partial thromboplastin time (aPTT) in platelet poor plasma 
bycoagulation assay - 16 11:00         









 Activated partial thromboplastin time (aPTT) in platelet poor plasma 
bycoagulation assay            45 s            24-35        









 Comprehensive metabolic panel - 16 11:00         









 Serum or plasma sodium measurement (moles/volume)            140 mmol/L       
     135-145        

 

 Serum or plasma potassium measurement (moles/volume)            4.1 mmol/L    
        3.6-5.0        

 

 Serum or plasma chloride measurement (moles/volume)            103 mmol/L     
               

 

 Carbon dioxide            27 mmol/L            21-32        

 

 Serum or plasma anion gap determination (moles/volume)            10 mmol/L   
         5-14        

 

 Serum or plasma urea nitrogen measurement (mass/volume)            9 mg/dL    
        7-18        

 

 Serum or plasma creatinine measurement (mass/volume)            0.79 mg/dL    
        0.60-1.30        

 

 Serum or plasma urea nitrogen/creatinine mass ratio            11             
NRG        

 

 Serum or plasma creatinine measurement with calculation of estimated 
glomerular filtration rate            >             NRG        

 

 Serum or plasma glucose measurement (mass/volume)            96 mg/dL         
           

 

 Serum or plasma calcium measurement (mass/volume)            9.5 mg/dL        
    8.5-10.1        

 

 Serum or plasma total bilirubin measurement (mass/volume)            0.8 mg/dL
            0.1-1.0        

 

 Serum or plasma alkaline phosphatase measurement (enzymatic activity/volume)  
          79 U/L                    

 

 Serum or plasma aspartate aminotransferase measurement (enzymatic activity/
volume)            26 U/L            5-34        

 

 Serum or plasma alanine aminotransferase measurement (enzymatic activity/volume
)            27 U/L            0-55        

 

 Serum or plasma protein measurement (mass/volume)            7.3 g/dL         
   6.4-8.2        

 

 Serum or plasma albumin measurement (mass/volume)            4.4 g/dL         
   3.2-4.5        









 Lipid 1996 panel - 16 11:00         









 Serum or plasma triglyceride measurement (mass/volume)            278 mg/dL   
         <150        

 

 Serum or plasma cholesterol measurement (mass/volume)            241 mg/dL    
        < 200        

 

 Serum or plasma cholesterol in HDL measurement (mass/volume)            34 mg/
dL            40-60        

 

 Cholesterol in LDL [mass/volume] in serum or plasma by direct assay            
165 mg/dL            1-129        

 

 Serum or plasma cholesterol in VLDL measurement (mass/volume)            56 mg/
dL            5-40        









 Methicillin resistant Staphylococcus aureus (MRSA) screening culture -  11:00         









 Methicillin resistant Staphylococcus aureus (MRSA) screening culture          
  NEG             NRG        









 PT panel in platelet poor plasma by coagulation assay - 17 14:21         









 Prothrombin time (PT) in platelet poor plasma by coagulation assay            
27.0 s            12.2-14.7        

 

 INR in platelet poor plasma or blood by coagulation assay            2.5      
       0.8-1.4        









 Complete blood count (CBC) with automated white blood cell (WBC) differential 
- 17 11:03         









 Blood leukocytes automated count (number/volume)            8.9 10*3/uL       
     4.3-11.0        

 

 Blood erythrocytes automated count (number/volume)            4.97 10*6/uL    
        4.35-5.85        

 

 Venous blood hemoglobin measurement (mass/volume)            14.6 g/dL        
    13.3-17.7        

 

 Blood hematocrit (volume fraction)            43 %            40-54        

 

 Automated erythrocyte mean corpuscular volume            86 [foz_us]          
  80-99        

 

 Automated erythrocyte mean corpuscular hemoglobin (mass per erythrocyte)      
      29 pg            25-34        

 

 Automated erythrocyte mean corpuscular hemoglobin concentration measurement (
mass/volume)            34 g/dL            32-36        

 

 Automated erythrocyte distribution width ratio            13.7 %            
10.0-14.5        

 

 Automated blood platelet count (count/volume)            285 10*3/uL          
  130-400        

 

 Automated blood platelet mean volume measurement            9.7 [foz_us]      
      7.4-10.4        

 

 Automated blood neutrophils/100 leukocytes            44 %            42-75   
     

 

 Automated blood lymphocytes/100 leukocytes            40 %            12-44   
     

 

 Blood monocytes/100 leukocytes            11 %            0-12        

 

 Automated blood eosinophils/100 leukocytes            4 %            0-10     
   

 

 Automated blood basophils/100 leukocytes            1 %            0-10        

 

 Blood neutrophils automated count (number/volume)            3.9 10*3         
   1.8-7.8        

 

 Blood lymphocytes automated count (number/volume)            3.5 10*3         
   1.0-4.0        

 

 Blood monocytes automated count (number/volume)            1.0 10*3            
0.0-1.0        

 

 Automated eosinophil count            0.4 10*3/uL            0.0-0.3        

 

 Automated blood basophil count (count/volume)            0.1 10*3/uL          
  0.0-0.1        









 Comprehensive metabolic panel - 17 11:03         









 Serum or plasma sodium measurement (moles/volume)            140 mmol/L       
     135-145        

 

 Serum or plasma potassium measurement (moles/volume)            4.5 mmol/L    
        3.6-5.0        

 

 Serum or plasma chloride measurement (moles/volume)            105 mmol/L     
               

 

 Carbon dioxide            27 mmol/L            21-32        

 

 Serum or plasma anion gap determination (moles/volume)            8 mmol/L    
        5-14        

 

 Serum or plasma urea nitrogen measurement (mass/volume)            8 mg/dL    
        7-18        

 

 Serum or plasma creatinine measurement (mass/volume)            0.76 mg/dL    
        0.60-1.30        

 

 Serum or plasma urea nitrogen/creatinine mass ratio            11             
NRG        

 

 Serum or plasma creatinine measurement with calculation of estimated 
glomerular filtration rate            >             NRG        

 

 Serum or plasma glucose measurement (mass/volume)            91 mg/dL         
           

 

 Serum or plasma calcium measurement (mass/volume)            8.9 mg/dL        
    8.5-10.1        

 

 Serum or plasma total bilirubin measurement (mass/volume)            0.3 mg/dL
            0.1-1.0        

 

 Serum or plasma alkaline phosphatase measurement (enzymatic activity/volume)  
          61 U/L                    

 

 Serum or plasma aspartate aminotransferase measurement (enzymatic activity/
volume)            24 U/L            5-34        

 

 Serum or plasma alanine aminotransferase measurement (enzymatic activity/volume
)            18 U/L            0-55        

 

 Serum or plasma protein measurement (mass/volume)            6.4 g/dL         
   6.4-8.2        

 

 Serum or plasma albumin measurement (mass/volume)            3.9 g/dL         
   3.2-4.5        









 Lipase - 17 11:03         









 Lipase            14 U/L            8-78        









 PT panel in platelet poor plasma by coagulation assay - 08/15/17 11:03         









 Prothrombin time (PT) in platelet poor plasma by coagulation assay            
25.0 s            12.2-14.7        

 

 INR in platelet poor plasma or blood by coagulation assay            2.3      
       0.8-1.4        









 PT panel in platelet poor plasma by coagulation assay - 17 11:00         









 Prothrombin time (PT) in platelet poor plasma by coagulation assay            
20.4 s            12.2-14.7        

 

 INR in platelet poor plasma or blood by coagulation assay            1.7      
       0.8-1.4        









 PT panel in platelet poor plasma by coagulation assay - 17 08:15         









 Prothrombin time (PT) in platelet poor plasma by coagulation assay            
28.3 s            12.2-14.7        

 

 INR in platelet poor plasma or blood by coagulation assay            2.7      
       0.8-1.4        









 Complete blood count (CBC) with automated white blood cell (WBC) differential 
- 17 15:15         









 Blood leukocytes automated count (number/volume)            13.4 10*3/uL      
      4.3-11.0        

 

 Blood erythrocytes automated count (number/volume)            5.18 10*6/uL    
        4.35-5.85        

 

 Venous blood hemoglobin measurement (mass/volume)            15.5 g/dL        
    13.3-17.7        

 

 Blood hematocrit (volume fraction)            44 %            40-54        

 

 Automated erythrocyte mean corpuscular volume            86 [foz_us]          
  80-99        

 

 Automated erythrocyte mean corpuscular hemoglobin (mass per erythrocyte)      
      30 pg            25-34        

 

 Automated erythrocyte mean corpuscular hemoglobin concentration measurement (
mass/volume)            35 g/dL            32-36        

 

 Automated erythrocyte distribution width ratio            13.7 %            
10.0-14.5        

 

 Automated blood platelet count (count/volume)            336 10*3/uL          
  130-400        

 

 Automated blood platelet mean volume measurement            9.0 [foz_us]      
      7.4-10.4        

 

 Automated blood neutrophils/100 leukocytes            46 %            42-75   
     

 

 Automated blood lymphocytes/100 leukocytes            39 %            12-44   
     

 

 Blood monocytes/100 leukocytes            11 %            0-12        

 

 Automated blood eosinophils/100 leukocytes            3 %            0-10     
   

 

 Automated blood basophils/100 leukocytes            1 %            0-10        

 

 Blood neutrophils automated count (number/volume)            6.1 10*3         
   1.8-7.8        

 

 Blood lymphocytes automated count (number/volume)            5.2 10*3         
   1.0-4.0        

 

 Blood monocytes automated count (number/volume)            1.5 10*3            
0.0-1.0        

 

 Automated eosinophil count            0.4 10*3/uL            0.0-0.3        

 

 Automated blood basophil count (count/volume)            0.1 10*3/uL          
  0.0-0.1        









 Comprehensive metabolic panel - 17 15:15         









 Serum or plasma sodium measurement (moles/volume)            140 mmol/L       
     135-145        

 

 Serum or plasma potassium measurement (moles/volume)            3.5 mmol/L    
        3.6-5.0        

 

 Serum or plasma chloride measurement (moles/volume)            102 mmol/L     
               

 

 Carbon dioxide            27 mmol/L            21-32        

 

 Serum or plasma anion gap determination (moles/volume)            11 mmol/L   
         5-14        

 

 Serum or plasma urea nitrogen measurement (mass/volume)            9 mg/dL    
        7-18        

 

 Serum or plasma creatinine measurement (mass/volume)            0.82 mg/dL    
        0.60-1.30        

 

 Serum or plasma urea nitrogen/creatinine mass ratio            11             
NRG        

 

 Serum or plasma creatinine measurement with calculation of estimated 
glomerular filtration rate            >             NRG        

 

 Serum or plasma glucose measurement (mass/volume)            102 mg/dL        
            

 

 Serum or plasma calcium measurement (mass/volume)            9.4 mg/dL        
    8.5-10.1        

 

 Serum or plasma total bilirubin measurement (mass/volume)            0.4 mg/dL
            0.1-1.0        

 

 Serum or plasma alkaline phosphatase measurement (enzymatic activity/volume)  
          78 U/L                    

 

 Serum or plasma aspartate aminotransferase measurement (enzymatic activity/
volume)            17 U/L            5-34        

 

 Serum or plasma alanine aminotransferase measurement (enzymatic activity/volume
)            17 U/L            0-55        

 

 Serum or plasma protein measurement (mass/volume)            7.6 g/dL         
   6.4-8.2        

 

 Serum or plasma albumin measurement (mass/volume)            4.3 g/dL         
   3.2-4.5        









 Magnesium - 17 15:15         









 Magnesium            2.1 mg/dL            1.8-2.4        









 PT panel in platelet poor plasma by coagulation assay - 18 14:16         









 Prothrombin time (PT) in platelet poor plasma by coagulation assay            
13.8 s            12.2-14.7        

 

 INR in platelet poor plasma or blood by coagulation assay            1.1      
       0.8-1.4        









 Activated partial thromboplastin time (aPTT) in platelet poor plasma 
bycoagulation assay - 18 14:16         









 Activated partial thromboplastin time (aPTT) in platelet poor plasma 
bycoagulation assay            32 s            24-35        









 Serum or plasma troponin i.cardiac measurement (mass/volume) - 17 15:15 
        









 Serum or plasma troponin i.cardiac measurement (mass/volume)            < ng/
mL            <0.30        









 Fibrin D-dimer FEU measurement in platelet poor plasma (mass/volume) -  15:15         









 Fibrin D-dimer FEU measurement in platelet poor plasma (mass/volume)          
  0.28 ug/mL            0.00-0.49        









 Myoglobin, serum - 17 15:15         









 Myoglobin, serum            31.5 ng/mL            10.0-92.0        









 Serum or plasma troponin i.cardiac measurement (mass/volume) - 17 21:45 
        









 Serum or plasma troponin i.cardiac measurement (mass/volume)            0.57 ng
/mL            <0.30        









 Complete blood count (CBC) with automated white blood cell (WBC) differential 
- 17 05:24         









 Blood leukocytes automated count (number/volume)            15.1 10*3/uL      
      4.3-11.0        

 

 Blood erythrocytes automated count (number/volume)            4.61 10*6/uL    
        4.35-5.85        

 

 Venous blood hemoglobin measurement (mass/volume)            13.3 g/dL        
    13.3-17.7        

 

 Blood hematocrit (volume fraction)            40 %            40-54        

 

 Automated erythrocyte mean corpuscular volume            87 [foz_us]          
  80-99        

 

 Automated erythrocyte mean corpuscular hemoglobin (mass per erythrocyte)      
      29 pg            25-34        

 

 Automated erythrocyte mean corpuscular hemoglobin concentration measurement (
mass/volume)            33 g/dL            32-36        

 

 Automated erythrocyte distribution width ratio            13.8 %            
10.0-14.5        

 

 Automated blood platelet count (count/volume)            278 10*3/uL          
  130-400        

 

 Automated blood platelet mean volume measurement            9.5 [foz_us]      
      7.4-10.4        

 

 Automated blood neutrophils/100 leukocytes            77 %            42-75   
     

 

 Automated blood lymphocytes/100 leukocytes            14 %            12-44   
     

 

 Blood monocytes/100 leukocytes            8 %            0-12        

 

 Automated blood eosinophils/100 leukocytes            0 %            0-10     
   

 

 Automated blood basophils/100 leukocytes            0 %            0-10        

 

 Blood neutrophils automated count (number/volume)            11.7 10*3        
    1.8-7.8        

 

 Blood lymphocytes automated count (number/volume)            2.1 10*3         
   1.0-4.0        

 

 Blood monocytes automated count (number/volume)            1.2 10*3            
0.0-1.0        

 

 Automated eosinophil count            0.0 10*3/uL            0.0-0.3        

 

 Automated blood basophil count (count/volume)            0.0 10*3/uL          
  0.0-0.1        









 Comprehensive metabolic panel - 17 05:24         









 Serum or plasma sodium measurement (moles/volume)            137 mmol/L       
     135-145        

 

 Serum or plasma potassium measurement (moles/volume)            3.8 mmol/L    
        3.6-5.0        

 

 Serum or plasma chloride measurement (moles/volume)            103 mmol/L     
               

 

 Carbon dioxide            25 mmol/L            21-32        

 

 Serum or plasma anion gap determination (moles/volume)            9 mmol/L    
        5-14        

 

 Serum or plasma urea nitrogen measurement (mass/volume)            8 mg/dL    
        7-18        

 

 Serum or plasma creatinine measurement (mass/volume)            0.67 mg/dL    
        0.60-1.30        

 

 Serum or plasma urea nitrogen/creatinine mass ratio            12             
NRG        

 

 Serum or plasma creatinine measurement with calculation of estimated 
glomerular filtration rate            >             NRG        

 

 Serum or plasma glucose measurement (mass/volume)            123 mg/dL        
            

 

 Serum or plasma calcium measurement (mass/volume)            8.7 mg/dL        
    8.5-10.1        

 

 Serum or plasma total bilirubin measurement (mass/volume)            0.7 mg/dL
            0.1-1.0        

 

 Serum or plasma alkaline phosphatase measurement (enzymatic activity/volume)  
          64 U/L                    

 

 Serum or plasma aspartate aminotransferase measurement (enzymatic activity/
volume)            95 U/L            5-34        

 

 Serum or plasma alanine aminotransferase measurement (enzymatic activity/volume
)            26 U/L            0-55        

 

 Serum or plasma protein measurement (mass/volume)            6.3 g/dL         
   6.4-8.2        

 

 Serum or plasma albumin measurement (mass/volume)            3.7 g/dL         
   3.2-4.5        









 Lipid 1996 panel - 17 05:24         









 Serum or plasma triglyceride measurement (mass/volume)            185 mg/dL   
         <150        

 

 Serum or plasma cholesterol measurement (mass/volume)            192 mg/dL    
        < 200        

 

 Serum or plasma cholesterol in HDL measurement (mass/volume)            34 mg/
dL            40-60        

 

 Cholesterol in LDL [mass/volume] in serum or plasma by direct assay            
143 mg/dL            1-129        

 

 Serum or plasma cholesterol in VLDL measurement (mass/volume)            37 mg/
dL            5-40        









 Blood manual differential performed detection - 17 05:24         









 Blood monocytes/100 leukocytes            14 %            NRG        

 

 Manual blood segmented neutrophils/100 leukocytes            73 %            
NRG        

 

 Blood band neutrophils/100 leukocytes            0 %            NRG        

 

 Manual blood lymphocytes/100 leukocytes            13 %            NRG        

 

 Blood erythrocyte morphology finding identification            NORMAL         
    NRG        









 Automated blood complete blood count (hemogram) panel - 17 03:08         









 Blood leukocytes automated count (number/volume)            11.1 10*3/uL      
      4.3-11.0        

 

 Blood erythrocytes automated count (number/volume)            4.39 10*6/uL    
        4.35-5.85        

 

 Venous blood hemoglobin measurement (mass/volume)            12.8 g/dL        
    13.3-17.7        

 

 Blood hematocrit (volume fraction)            39 %            40-54        

 

 Automated erythrocyte mean corpuscular volume            88 [foz_us]          
  80-99        

 

 Automated erythrocyte mean corpuscular hemoglobin (mass per erythrocyte)      
      29 pg            25-34        

 

 Automated erythrocyte mean corpuscular hemoglobin concentration measurement (
mass/volume)            33 g/dL            32-36        

 

 Automated erythrocyte distribution width ratio            14.0 %            
10.0-14.5        

 

 Automated blood platelet count (count/volume)            252 10*3/uL          
  130-400        

 

 Automated blood platelet mean volume measurement            9.6 [foz_us]      
      7.4-10.4        









 Whole blood basic metabolic panel - 17 03:08         









 Serum or plasma sodium measurement (moles/volume)            139 mmol/L       
     135-145        

 

 Serum or plasma potassium measurement (moles/volume)            4.0 mmol/L    
        3.6-5.0        

 

 Serum or plasma chloride measurement (moles/volume)            104 mmol/L     
               

 

 Carbon dioxide            27 mmol/L            21-32        

 

 Serum or plasma anion gap determination (moles/volume)            8 mmol/L    
        5-14        

 

 Serum or plasma urea nitrogen measurement (mass/volume)            9 mg/dL    
        7-18        

 

 Serum or plasma creatinine measurement (mass/volume)            0.74 mg/dL    
        0.60-1.30        

 

 Serum or plasma urea nitrogen/creatinine mass ratio            12             
NRG        

 

 Serum or plasma creatinine measurement with calculation of estimated 
glomerular filtration rate            >             NRG        

 

 Serum or plasma glucose measurement (mass/volume)            101 mg/dL        
            

 

 Serum or plasma calcium measurement (mass/volume)            8.8 mg/dL        
    8.5-10.1        









 PT panel in platelet poor plasma by coagulation assay - 17 10:30         









 Prothrombin time (PT) in platelet poor plasma by coagulation assay            
14.2 s            12.2-14.7        

 

 INR in platelet poor plasma or blood by coagulation assay            1.1      
       0.8-1.4        









 PT panel in platelet poor plasma by coagulation assay - 17 04:45         









 Prothrombin time (PT) in platelet poor plasma by coagulation assay            
15.5 s            12.2-14.7        

 

 INR in platelet poor plasma or blood by coagulation assay            1.2      
       0.8-1.4        









 Automated blood complete blood count (hemogram) panel - 12/15/17 09:29         









 Blood leukocytes automated count (number/volume)            7.4 10*3/uL       
     4.3-11.0        

 

 Blood erythrocytes automated count (number/volume)            4.80 10*6/uL    
        4.35-5.85        

 

 Venous blood hemoglobin measurement (mass/volume)            13.7 g/dL        
    13.3-17.7        

 

 Blood hematocrit (volume fraction)            42 %            40-54        

 

 Automated erythrocyte mean corpuscular volume            87 [foz_us]          
  80-99        

 

 Automated erythrocyte mean corpuscular hemoglobin (mass per erythrocyte)      
      29 pg            25-34        

 

 Automated erythrocyte mean corpuscular hemoglobin concentration measurement (
mass/volume)            33 g/dL            32-36        

 

 Automated erythrocyte distribution width ratio            13.9 %            
10.0-14.5        

 

 Automated blood platelet count (count/volume)            291 10*3/uL          
  130-400        

 

 Automated blood platelet mean volume measurement            9.0 [foz_us]      
      7.4-10.4        









 Whole blood basic metabolic panel - 12/15/17 09:29         









 Serum or plasma sodium measurement (moles/volume)            142 mmol/L       
     135-145        

 

 Serum or plasma potassium measurement (moles/volume)            4.5 mmol/L    
        3.6-5.0        

 

 Serum or plasma chloride measurement (moles/volume)            105 mmol/L     
               

 

 Carbon dioxide            27 mmol/L            21-32        

 

 Serum or plasma anion gap determination (moles/volume)            10 mmol/L   
         5-14        

 

 Serum or plasma urea nitrogen measurement (mass/volume)            16 mg/dL   
         7-18        

 

 Serum or plasma creatinine measurement (mass/volume)            0.74 mg/dL    
        0.60-1.30        

 

 Serum or plasma urea nitrogen/creatinine mass ratio            22             
NRG        

 

 Serum or plasma creatinine measurement with calculation of estimated 
glomerular filtration rate            >             NRG        

 

 Serum or plasma glucose measurement (mass/volume)            100 mg/dL        
            

 

 Serum or plasma calcium measurement (mass/volume)            10.0 mg/dL       
     8.5-10.1        









 Complete blood count (CBC) with automated white blood cell (WBC) differential 
- 18 12:45         









 Blood leukocytes automated count (number/volume)            18.3 10*3/uL      
      4.3-11.0        

 

 Blood erythrocytes automated count (number/volume)            4.57 10*6/uL    
        4.35-5.85        

 

 Venous blood hemoglobin measurement (mass/volume)            13.5 g/dL        
    13.3-17.7        

 

 Blood hematocrit (volume fraction)            40 %            40-54        

 

 Automated erythrocyte mean corpuscular volume            88 [foz_us]          
  80-99        

 

 Automated erythrocyte mean corpuscular hemoglobin (mass per erythrocyte)      
      30 pg            25-34        

 

 Automated erythrocyte mean corpuscular hemoglobin concentration measurement (
mass/volume)            34 g/dL            32-36        

 

 Automated erythrocyte distribution width ratio            14.5 %            
10.0-14.5        

 

 Automated blood platelet count (count/volume)            357 10*3/uL          
  130-400        

 

 Automated blood platelet mean volume measurement            8.8 [foz_us]      
      7.4-10.4        

 

 Automated blood neutrophils/100 leukocytes            86 %            42-75   
     

 

 Automated blood lymphocytes/100 leukocytes            8 %            12-44    
    

 

 Blood monocytes/100 leukocytes            6 %            0-12        

 

 Automated blood eosinophils/100 leukocytes            1 %            0-10     
   

 

 Automated blood basophils/100 leukocytes            0 %            0-10        

 

 Blood neutrophils automated count (number/volume)            15.7 10*3        
    1.8-7.8        

 

 Blood lymphocytes automated count (number/volume)            1.4 10*3         
   1.0-4.0        

 

 Blood monocytes automated count (number/volume)            1.1 10*3            
0.0-1.0        

 

 Automated eosinophil count            0.2 10*3/uL            0.0-0.3        

 

 Automated blood basophil count (count/volume)            0.0 10*3/uL          
  0.0-0.1        









 PT panel in platelet poor plasma by coagulation assay - 18 12:45         









 Prothrombin time (PT) in platelet poor plasma by coagulation assay            
32.8 s            12.2-14.7        

 

 INR in platelet poor plasma or blood by coagulation assay            3.2      
       0.8-1.4        









 Whole blood basic metabolic panel - 18 12:45         









 Serum or plasma sodium measurement (moles/volume)            142 mmol/L       
     135-145        

 

 Serum or plasma potassium measurement (moles/volume)            3.6 mmol/L    
        3.6-5.0        

 

 Serum or plasma chloride measurement (moles/volume)            99 mmol/L      
              

 

 Carbon dioxide            28 mmol/L            21-32        

 

 Serum or plasma anion gap determination (moles/volume)            15 mmol/L   
         5-14        

 

 Serum or plasma urea nitrogen measurement (mass/volume)            10 mg/dL   
         7-18        

 

 Serum or plasma creatinine measurement (mass/volume)            0.89 mg/dL    
        0.60-1.30        

 

 Serum or plasma urea nitrogen/creatinine mass ratio            11             
NRG        

 

 Serum or plasma creatinine measurement with calculation of estimated 
glomerular filtration rate            >             NRG        

 

 Serum or plasma glucose measurement (mass/volume)            150 mg/dL        
            

 

 Serum or plasma calcium measurement (mass/volume)            9.6 mg/dL        
    8.5-10.1        









 Serum or plasma uric acid measurement (mass/volume) - 18 12:45         









 Serum or plasma uric acid measurement (mass/volume)            5.3 mg/dL      
      2.6-7.2        









 Blood manual differential performed detection - 18 12:45         









 Blood monocytes/100 leukocytes            3 %            NRG        

 

 Manual blood segmented neutrophils/100 leukocytes            88 %            
NRG        

 

 Blood band neutrophils/100 leukocytes            4 %            NRG        

 

 Manual blood lymphocytes/100 leukocytes            4 %            NRG        

 

 Manual eosinophils/100 leukocytes in nose            1 %            NRG        

 

 Manual blood basophils/100 leukocytes            0 %            NRG        

 

 Blood erythrocyte morphology finding identification            NORMAL         
    NRG        









 Serum or plasma troponin i.cardiac measurement (mass/volume) - 18 12:45 
        









 Serum or plasma troponin i.cardiac measurement (mass/volume)            < ng/
mL            <0.30        









 Serum or plasma C reactive protein measurement (mass/volume) - 18 12:45 
        









 Serum or plasma C reactive protein measurement (mass/volume)            0.80 mg
/dL            0.00-0.50        









 Erythrocyte sedimentation rate by westergren method - 18 12:45         









 Erythrocyte sedimentation rate by westergren method            13 mm          
  0-15        









 Bacterial blood culture - 18 23:30         









 Bacterial blood culture            NG             NRG        









 Complete blood count (CBC) with automated white blood cell (WBC) differential 
- 18 23:50         









 Blood leukocytes automated count (number/volume)            12.6 10*3/uL      
      4.3-11.0        

 

 Blood erythrocytes automated count (number/volume)            3.67 10*6/uL    
        4.35-5.85        

 

 Venous blood hemoglobin measurement (mass/volume)            10.6 g/dL        
    13.3-17.7        

 

 Blood hematocrit (volume fraction)            32 %            40-54        

 

 Automated erythrocyte mean corpuscular volume            87 [foz_us]          
  80-99        

 

 Automated erythrocyte mean corpuscular hemoglobin (mass per erythrocyte)      
      29 pg            25-34        

 

 Automated erythrocyte mean corpuscular hemoglobin concentration measurement (
mass/volume)            33 g/dL            32-36        

 

 Automated erythrocyte distribution width ratio            13.8 %            
10.0-14.5        

 

 Automated blood platelet count (count/volume)            514 10*3/uL          
  130-400        

 

 Automated blood platelet mean volume measurement            9.1 [foz_us]      
      7.4-10.4        

 

 Automated blood neutrophils/100 leukocytes            79 %            42-75   
     

 

 Automated blood lymphocytes/100 leukocytes            10 %            12-44   
     

 

 Blood monocytes/100 leukocytes            10 %            0-12        

 

 Automated blood eosinophils/100 leukocytes            2 %            0-10     
   

 

 Automated blood basophils/100 leukocytes            0 %            0-10        

 

 Blood neutrophils automated count (number/volume)            9.9 10*3         
   1.8-7.8        

 

 Blood lymphocytes automated count (number/volume)            1.2 10*3         
   1.0-4.0        

 

 Blood monocytes automated count (number/volume)            1.2 10*3            
0.0-1.0        

 

 Automated eosinophil count            0.2 10*3/uL            0.0-0.3        

 

 Automated blood basophil count (count/volume)            0.0 10*3/uL          
  0.0-0.1        









 Blood lactic acid measurement (moles/volume) - 18 23:50         









 Blood lactic acid measurement (moles/volume)            0.81 mmol/L            
0.50-2.00        









 PT panel in platelet poor plasma by coagulation assay - 18 23:50         









 Prothrombin time (PT) in platelet poor plasma by coagulation assay            
34.2 s            12.2-14.7        

 

 INR in platelet poor plasma or blood by coagulation assay            3.4      
       0.8-1.4        









 Activated partial thromboplastin time (aPTT) in platelet poor plasma 
bycoagulation assay - 18 23:50         









 Activated partial thromboplastin time (aPTT) in platelet poor plasma 
bycoagulation assay            124 s            24-35        









 Comprehensive metabolic panel - 18 23:50         









 Serum or plasma sodium measurement (moles/volume)            137 mmol/L       
     135-145        

 

 Serum or plasma potassium measurement (moles/volume)            3.8 mmol/L    
        3.6-5.0        

 

 Serum or plasma chloride measurement (moles/volume)            95 mmol/L      
              

 

 Carbon dioxide            29 mmol/L            21-32        

 

 Serum or plasma anion gap determination (moles/volume)            13 mmol/L   
         5-14        

 

 Serum or plasma urea nitrogen measurement (mass/volume)            9 mg/dL    
        7-18        

 

 Serum or plasma creatinine measurement (mass/volume)            0.76 mg/dL    
        0.60-1.30        

 

 Serum or plasma urea nitrogen/creatinine mass ratio            12             
NRG        

 

 Serum or plasma creatinine measurement with calculation of estimated 
glomerular filtration rate            >             NRG        

 

 Serum or plasma glucose measurement (mass/volume)            115 mg/dL        
            

 

 Serum or plasma calcium measurement (mass/volume)            9.6 mg/dL        
    8.5-10.1        

 

 Serum or plasma total bilirubin measurement (mass/volume)            0.4 mg/dL
            0.1-1.0        

 

 Serum or plasma alkaline phosphatase measurement (enzymatic activity/volume)  
          80 U/L                    

 

 Serum or plasma aspartate aminotransferase measurement (enzymatic activity/
volume)            21 U/L            5-34        

 

 Serum or plasma alanine aminotransferase measurement (enzymatic activity/volume
)            19 U/L            0-55        

 

 Serum or plasma protein measurement (mass/volume)            7.8 g/dL         
   6.4-8.2        

 

 Serum or plasma albumin measurement (mass/volume)            3.5 g/dL         
   3.2-4.5        









 Serum or plasma C reactive protein measurement (mass/volume) - 18 23:50 
        









 Serum or plasma C reactive protein measurement (mass/volume)            34.60 
mg/dL            0.00-0.50        









 Erythrocyte sedimentation rate by westergren method - 18 23:50         









 Erythrocyte sedimentation rate by westergren method            > mm            
0-15        









 Bacterial blood culture - 18 23:50         









 Bacterial blood culture            NG             NRG        









 PT panel in platelet poor plasma by coagulation assay - 18 05:26         









 Prothrombin time (PT) in platelet poor plasma by coagulation assay            
37.3 s            12.2-14.7        

 

 INR in platelet poor plasma or blood by coagulation assay            3.8      
       0.8-1.4        









 Activated partial thromboplastin time (aPTT) in platelet poor plasma 
bycoagulation assay - 18 05:26         









 Activated partial thromboplastin time (aPTT) in platelet poor plasma 
bycoagulation assay            116 s            24-35        









 Complete blood count (CBC) with automated white blood cell (WBC) differential 
- 18 05:26         









 Blood leukocytes automated count (number/volume)            11.4 10*3/uL      
      4.3-11.0        

 

 Blood erythrocytes automated count (number/volume)            3.34 10*6/uL    
        4.35-5.85        

 

 Venous blood hemoglobin measurement (mass/volume)            9.7 g/dL         
   13.3-17.7        

 

 Blood hematocrit (volume fraction)            29 %            40-54        

 

 Automated erythrocyte mean corpuscular volume            88 [foz_us]          
  80-99        

 

 Automated erythrocyte mean corpuscular hemoglobin (mass per erythrocyte)      
      29 pg            25-34        

 

 Automated erythrocyte mean corpuscular hemoglobin concentration measurement (
mass/volume)            33 g/dL            32-36        

 

 Automated erythrocyte distribution width ratio            13.7 %            
10.0-14.5        

 

 Automated blood platelet count (count/volume)            487 10*3/uL          
  130-400        

 

 Automated blood platelet mean volume measurement            9.6 [foz_us]      
      7.4-10.4        

 

 Automated blood neutrophils/100 leukocytes            75 %            42-75   
     

 

 Automated blood lymphocytes/100 leukocytes            10 %            12-44   
     

 

 Blood monocytes/100 leukocytes            12 %            0-12        

 

 Automated blood eosinophils/100 leukocytes            3 %            0-10     
   

 

 Automated blood basophils/100 leukocytes            0 %            0-10        

 

 Blood neutrophils automated count (number/volume)            8.5 10*3         
   1.8-7.8        

 

 Blood lymphocytes automated count (number/volume)            1.2 10*3         
   1.0-4.0        

 

 Blood monocytes automated count (number/volume)            1.4 10*3            
0.0-1.0        

 

 Automated eosinophil count            0.3 10*3/uL            0.0-0.3        

 

 Automated blood basophil count (count/volume)            0.0 10*3/uL          
  0.0-0.1        









 Comprehensive metabolic panel - 18 05:26         









 Serum or plasma sodium measurement (moles/volume)            141 mmol/L       
     135-145        

 

 Serum or plasma potassium measurement (moles/volume)            3.4 mmol/L    
        3.6-5.0        

 

 Serum or plasma chloride measurement (moles/volume)            98 mmol/L      
              

 

 Carbon dioxide            29 mmol/L            21-32        

 

 Serum or plasma anion gap determination (moles/volume)            14 mmol/L   
         5-14        

 

 Serum or plasma urea nitrogen measurement (mass/volume)            8 mg/dL    
        7-18        

 

 Serum or plasma creatinine measurement (mass/volume)            0.68 mg/dL    
        0.60-1.30        

 

 Serum or plasma urea nitrogen/creatinine mass ratio            12             
NRG        

 

 Serum or plasma creatinine measurement with calculation of estimated 
glomerular filtration rate            >             NRG        

 

 Serum or plasma glucose measurement (mass/volume)            92 mg/dL         
           

 

 Serum or plasma calcium measurement (mass/volume)            9.3 mg/dL        
    8.5-10.1        

 

 Serum or plasma total bilirubin measurement (mass/volume)            0.4 mg/dL
            0.1-1.0        

 

 Serum or plasma alkaline phosphatase measurement (enzymatic activity/volume)  
          78 U/L                    

 

 Serum or plasma aspartate aminotransferase measurement (enzymatic activity/
volume)            17 U/L            5-34        

 

 Serum or plasma alanine aminotransferase measurement (enzymatic activity/volume
)            17 U/L            0-55        

 

 Serum or plasma protein measurement (mass/volume)            7.0 g/dL         
   6.4-8.2        

 

 Serum or plasma albumin measurement (mass/volume)            3.2 g/dL         
   3.2-4.5        









 Complete blood count (CBC) with automated white blood cell (WBC) differential 
- 18 07:07         









 Blood leukocytes automated count (number/volume)            10.4 10*3/uL      
      4.3-11.0        

 

 Blood erythrocytes automated count (number/volume)            3.93 10*6/uL    
        4.35-5.85        

 

 Venous blood hemoglobin measurement (mass/volume)            11.3 g/dL        
    13.3-17.7        

 

 Blood hematocrit (volume fraction)            34 %            40-54        

 

 Automated erythrocyte mean corpuscular volume            87 [foz_us]          
  80-99        

 

 Automated erythrocyte mean corpuscular hemoglobin (mass per erythrocyte)      
      29 pg            25-34        

 

 Automated erythrocyte mean corpuscular hemoglobin concentration measurement (
mass/volume)            33 g/dL            32-36        

 

 Automated erythrocyte distribution width ratio            14.0 %            
10.0-14.5        

 

 Automated blood platelet count (count/volume)            570 10*3/uL          
  130-400        

 

 Automated blood platelet mean volume measurement            8.9 [foz_us]      
      7.4-10.4        

 

 Automated blood neutrophils/100 leukocytes            84 %            42-75   
     

 

 Automated blood lymphocytes/100 leukocytes            9 %            12-44    
    

 

 Blood monocytes/100 leukocytes            5 %            0-12        

 

 Automated blood eosinophils/100 leukocytes            1 %            0-10     
   

 

 Automated blood basophils/100 leukocytes            0 %            0-10        

 

 Blood neutrophils automated count (number/volume)            8.7 10*3         
   1.8-7.8        

 

 Blood lymphocytes automated count (number/volume)            1.0 10*3         
   1.0-4.0        

 

 Blood monocytes automated count (number/volume)            0.5 10*3            
0.0-1.0        

 

 Automated eosinophil count            0.2 10*3/uL            0.0-0.3        

 

 Automated blood basophil count (count/volume)            0.0 10*3/uL          
  0.0-0.1        









 PT panel in platelet poor plasma by coagulation assay - 18 07:07         









 Prothrombin time (PT) in platelet poor plasma by coagulation assay            
32.3 s            12.2-14.7        

 

 INR in platelet poor plasma or blood by coagulation assay            3.2      
       0.8-1.4        









 Comprehensive metabolic panel - 18 07:07         









 Serum or plasma sodium measurement (moles/volume)            141 mmol/L       
     135-145        

 

 Serum or plasma potassium measurement (moles/volume)            3.6 mmol/L    
        3.6-5.0        

 

 Serum or plasma chloride measurement (moles/volume)            99 mmol/L      
              

 

 Carbon dioxide            29 mmol/L            21-32        

 

 Serum or plasma anion gap determination (moles/volume)            13 mmol/L   
         5-14        

 

 Serum or plasma urea nitrogen measurement (mass/volume)            7 mg/dL    
        7-18        

 

 Serum or plasma creatinine measurement (mass/volume)            0.71 mg/dL    
        0.60-1.30        

 

 Serum or plasma urea nitrogen/creatinine mass ratio            10             
NRG        

 

 Serum or plasma creatinine measurement with calculation of estimated 
glomerular filtration rate            >             NRG        

 

 Serum or plasma glucose measurement (mass/volume)            139 mg/dL        
            

 

 Serum or plasma calcium measurement (mass/volume)            9.8 mg/dL        
    8.5-10.1        

 

 Serum or plasma total bilirubin measurement (mass/volume)            0.4 mg/dL
            0.1-1.0        

 

 Serum or plasma alkaline phosphatase measurement (enzymatic activity/volume)  
          90 U/L                    

 

 Serum or plasma aspartate aminotransferase measurement (enzymatic activity/
volume)            22 U/L            5-34        

 

 Serum or plasma alanine aminotransferase measurement (enzymatic activity/volume
)            21 U/L            0-55        

 

 Serum or plasma protein measurement (mass/volume)            7.6 g/dL         
   6.4-8.2        

 

 Serum or plasma albumin measurement (mass/volume)            3.5 g/dL         
   3.2-4.5        









 Vancomycin trough - 18 07:07         









 Vancomycin trough            13.6 ug/mL            10.0-20.0        









 Complete blood count (CBC) with automated white blood cell (WBC) differential 
- 18 05:20         









 Blood leukocytes automated count (number/volume)            10.5 10*3/uL      
      4.3-11.0        

 

 Blood erythrocytes automated count (number/volume)            3.74 10*6/uL    
        4.35-5.85        

 

 Venous blood hemoglobin measurement (mass/volume)            10.8 g/dL        
    13.3-17.7        

 

 Blood hematocrit (volume fraction)            33 %            40-54        

 

 Automated erythrocyte mean corpuscular volume            88 [foz_us]          
  80-99        

 

 Automated erythrocyte mean corpuscular hemoglobin (mass per erythrocyte)      
      29 pg            25-34        

 

 Automated erythrocyte mean corpuscular hemoglobin concentration measurement (
mass/volume)            33 g/dL            32-36        

 

 Automated erythrocyte distribution width ratio            14.2 %            
10.0-14.5        

 

 Automated blood platelet count (count/volume)            631 10*3/uL          
  130-400        

 

 Automated blood platelet mean volume measurement            8.9 [foz_us]      
      7.4-10.4        

 

 Automated blood neutrophils/100 leukocytes            77 %            42-75   
     

 

 Automated blood lymphocytes/100 leukocytes            12 %            12-44   
     

 

 Blood monocytes/100 leukocytes            9 %            0-12        

 

 Automated blood eosinophils/100 leukocytes            2 %            0-10     
   

 

 Automated blood basophils/100 leukocytes            0 %            0-10        

 

 Blood neutrophils automated count (number/volume)            8.1 10*3         
   1.8-7.8        

 

 Blood lymphocytes automated count (number/volume)            1.3 10*3         
   1.0-4.0        

 

 Blood monocytes automated count (number/volume)            1.0 10*3            
0.0-1.0        

 

 Automated eosinophil count            0.2 10*3/uL            0.0-0.3        

 

 Automated blood basophil count (count/volume)            0.0 10*3/uL          
  0.0-0.1        









 Comprehensive metabolic panel - 18 05:20         









 Serum or plasma sodium measurement (moles/volume)            141 mmol/L       
     135-145        

 

 Serum or plasma potassium measurement (moles/volume)            3.6 mmol/L    
        3.6-5.0        

 

 Serum or plasma chloride measurement (moles/volume)            98 mmol/L      
              

 

 Carbon dioxide            30 mmol/L            21-32        

 

 Serum or plasma anion gap determination (moles/volume)            13 mmol/L   
         5-14        

 

 Serum or plasma urea nitrogen measurement (mass/volume)            7 mg/dL    
        7-18        

 

 Serum or plasma creatinine measurement (mass/volume)            0.70 mg/dL    
        0.60-1.30        

 

 Serum or plasma urea nitrogen/creatinine mass ratio            10             
NRG        

 

 Serum or plasma creatinine measurement with calculation of estimated 
glomerular filtration rate            >             NRG        

 

 Serum or plasma glucose measurement (mass/volume)            109 mg/dL        
            

 

 Serum or plasma calcium measurement (mass/volume)            9.6 mg/dL        
    8.5-10.1        

 

 Serum or plasma total bilirubin measurement (mass/volume)            0.4 mg/dL
            0.1-1.0        

 

 Serum or plasma alkaline phosphatase measurement (enzymatic activity/volume)  
          78 U/L                    

 

 Serum or plasma aspartate aminotransferase measurement (enzymatic activity/
volume)            24 U/L            5-34        

 

 Serum or plasma alanine aminotransferase measurement (enzymatic activity/volume
)            24 U/L            0-55        

 

 Serum or plasma protein measurement (mass/volume)            7.5 g/dL         
   6.4-8.2        

 

 Serum or plasma albumin measurement (mass/volume)            3.3 g/dL         
   3.2-4.5        









 PT panel in platelet poor plasma by coagulation assay - 18 05:20         









 Prothrombin time (PT) in platelet poor plasma by coagulation assay            
29.6 s            12.2-14.7        

 

 INR in platelet poor plasma or blood by coagulation assay            2.8      
       0.8-1.4        









 BMP - 18 11:00         









 Anion Gap            17             6-14        

 

 BUN            7 mg/dL            5-25        

 

 Calcium            10.4 mg/dL            8.3-10.4        

 

 Chloride            97 mmol/L                    

 

 CO2            30 mEq/L            22-33        

 

 Creat            0.74 mg/dL            0.50-1.50        

 

 eGFR            114 mL/min/1.73m2            >59        

 

 Glucose            98 mg/dL                    

 

 Osmo            287             280-295        

 

 Potassium            3.8 mmol/L            3.5-5.3        

 

 Sodium            140 mmol/L            134-148        









 D-Dimer  - 18 11:01         









 DDimer            397.00 ng/mL            21..00        









 Platelets - 18 13:06         









 Plt            774 K/uL            150-400        









 Automated blood complete blood count (hemogram) panel - 18 11:06         









 Blood leukocytes automated count (number/volume)            14.3 10*3/uL      
      4.3-11.0        

 

 Blood erythrocytes automated count (number/volume)            5.13 10*6/uL    
        4.35-5.85        

 

 Venous blood hemoglobin measurement (mass/volume)            14.2 g/dL        
    13.3-17.7        

 

 Blood hematocrit (volume fraction)            44 %            40-54        

 

 Automated erythrocyte mean corpuscular volume            85 [foz_us]          
  80-99        

 

 Automated erythrocyte mean corpuscular hemoglobin (mass per erythrocyte)      
      28 pg            25-34        

 

 Automated erythrocyte mean corpuscular hemoglobin concentration measurement (
mass/volume)            33 g/dL            32-36        

 

 Automated erythrocyte distribution width ratio            16.2 %            
10.0-14.5        

 

 Automated blood platelet count (count/volume)            391 10*3/uL          
  130-400        

 

 Automated blood platelet mean volume measurement            8.6 [foz_us]      
      7.4-10.4        









 Serum or plasma uric acid measurement (mass/volume) - 18 11:06         









 Serum or plasma uric acid measurement (mass/volume)            3.4 mg/dL      
      2.6-7.2        









 Serum or plasma C reactive protein measurement (mass/volume) - 18 11:06 
        









 Serum or plasma C reactive protein measurement (mass/volume)            0.31 mg
/dL            0.00-0.50        









 Complete blood count (CBC) with automated white blood cell (WBC) differential 
- 18 14:16         









 Blood leukocytes automated count (number/volume)            10.7 10*3/uL      
      4.3-11.0        

 

 Blood erythrocytes automated count (number/volume)            5.04 10*6/uL    
        4.35-5.85        

 

 Venous blood hemoglobin measurement (mass/volume)            14.9 g/dL        
    13.3-17.7        

 

 Blood hematocrit (volume fraction)            44 %            40-54        

 

 Automated erythrocyte mean corpuscular volume            88 [foz_us]          
  80-99        

 

 Automated erythrocyte mean corpuscular hemoglobin (mass per erythrocyte)      
      30 pg            25-34        

 

 Automated erythrocyte mean corpuscular hemoglobin concentration measurement (
mass/volume)            34 g/dL            32-36        

 

 Automated erythrocyte distribution width ratio            14.0 %            
10.0-14.5        

 

 Automated blood platelet count (count/volume)            362 10*3/uL          
  130-400        

 

 Automated blood platelet mean volume measurement            8.7 [foz_us]      
      7.4-10.4        

 

 Automated blood neutrophils/100 leukocytes            57 %            42-75   
     

 

 Automated blood lymphocytes/100 leukocytes            30 %            12-44   
     

 

 Blood monocytes/100 leukocytes            11 %            0-12        

 

 Automated blood eosinophils/100 leukocytes            3 %            0-10     
   

 

 Automated blood basophils/100 leukocytes            0 %            0-10        

 

 Blood neutrophils automated count (number/volume)            6.1 10*3         
   1.8-7.8        

 

 Blood lymphocytes automated count (number/volume)            3.2 10*3         
   1.0-4.0        

 

 Blood monocytes automated count (number/volume)            1.2 10*3            
0.0-1.0        

 

 Automated eosinophil count            0.3 10*3/uL            0.0-0.3        

 

 Automated blood basophil count (count/volume)            0.0 10*3/uL          
  0.0-0.1        









 Comprehensive metabolic panel - 18 14:16         









 Serum or plasma sodium measurement (moles/volume)            139 mmol/L       
     135-145        

 

 Serum or plasma potassium measurement (moles/volume)            3.5 mmol/L    
        3.6-5.0        

 

 Serum or plasma chloride measurement (moles/volume)            100 mmol/L     
               

 

 Carbon dioxide            29 mmol/L            21-32        

 

 Serum or plasma anion gap determination (moles/volume)            10 mmol/L   
         5-14        

 

 Serum or plasma urea nitrogen measurement (mass/volume)            8 mg/dL    
        7-18        

 

 Serum or plasma creatinine measurement (mass/volume)            0.80 mg/dL    
        0.60-1.30        

 

 Serum or plasma urea nitrogen/creatinine mass ratio            10             
NRG        

 

 Serum or plasma creatinine measurement with calculation of estimated 
glomerular filtration rate            >             NRG        

 

 Serum or plasma glucose measurement (mass/volume)            120 mg/dL        
            

 

 Serum or plasma calcium measurement (mass/volume)            9.8 mg/dL        
    8.5-10.1        

 

 Serum or plasma total bilirubin measurement (mass/volume)            0.4 mg/dL
            0.1-1.0        

 

 Serum or plasma alkaline phosphatase measurement (enzymatic activity/volume)  
          96 U/L                    

 

 Serum or plasma aspartate aminotransferase measurement (enzymatic activity/
volume)            32 U/L            5-34        

 

 Serum or plasma alanine aminotransferase measurement (enzymatic activity/volume
)            32 U/L            0-55        

 

 Serum or plasma protein measurement (mass/volume)            7.5 g/dL         
   6.4-8.2        

 

 Serum or plasma albumin measurement (mass/volume)            4.5 g/dL         
   3.2-4.5        

 

 CALCIUM CORRECTED            9.4 mg/dL            8.5-10.1        









 Magnesium - 18 14:16         









 Magnesium            2.1 mg/dL            1.8-2.4        









 Lipase - 18 14:16         









 Lipase            11 U/L            8-78        



                                                                               
                                                                             



Encounters

      





 ACCT No.            Visit Date/Time            Discharge            Status    
        Pt. Type            Provider            Facility            Loc./Unit  
          Complaint        

 

 [id number]            2015 09:47:00            2015 23:59:59          
  CLS            Outpatient            CHARLY ESCALONA                    
                           

 

 [id number]            2014 12:54:00            2014 23:59:59          
  CLS            Outpatient            NEENA VELAZCO                   
                            

 

 [id number]            2013 09:24:00            2013 23:59:59          
  CLS            Outpatient            LILLIAN LANDRY DO                        
                       

 

 [id number]            2018 10:36:00            2018 13:45:00          
  DIS            Outpatient            Dena Deluca                           
                    

 

 [id number]            2018 11:26:46                                      
Document Registration                                                          
  

 

 [id number]            10/01/2018 00:54:00            10/01/2018 23:59:59    
        CLS            Preadmit            NORTH HERNANDEZ MD            [facility] 
[facility]            LAB            I26.99        

 

 [id number]            2018 12:38:00            2018 00:01:00    
        DIS            Outpatient            NORTH HERNANDEZ MD            [facility]            LAB            I26.99        

 

 [id number]            2018 00:17:00            2018 23:59:59    
        CLS            Preadmit            NORTH HERNANDEZ MD            [facility]            LAB            I26.99        

 

 [id number]            2018 09:38:00            2018 00:01:00    
        DIS            Outpatient            NORTH HERNANDEZ MD            [facility]            LAB            I26.99        

 

 [id number]            2018 11:00:00            2018 23:59:59    
        CLS            Preadmit            NORTH HERNANDEZ MD            [facility]        

 

 [id number]            01/10/2018 11:37:00            2018 00:01:00    
        DIS            Outpatient            NORTH HERNANDEZ MD            [facility]            CR            CHF        

 

 [id number]            2018 10:36:00            2018 23:59:59    
        CLS            Outpatient            NORTH HERNANDEZ MD            [facility]            LAB            I25.10        

 

 [id number]            2018 10:27:00            2018 23:59:59    
        CLS            Outpatient            KANDACE MIRANDA MD            
[facility]            RAD            M25.572        

 

 [id number]            2018 15:13:00            2018 23:59:59    
        CLS            Outpatient            NORTH HERNANDEZ MD            [facility]            RAD            ASDF        

 

 [id number]            2018 11:45:00            2018 23:59:59    
        CLS            Outpatient            GONSALO SOMERS MD            [facility]            WOUNDCARE                     

 

 [id number]            2018 00:55:00            2018 13:00:00    
        DIS            Inpatient            MARY GREEN, CANDE MA            [facility]            4TH            CELLULITIS L LEG        

 

 [id number]            2018 09:08:00            2018 23:59:59    
        CLS            Outpatient            NORTH HERNANDEZ MD            [facility]            RAD            LEFT LEG PAIN        

 

 [id number]            2018 11:43:00            2018 15:31:00    
        DIS            Emergency            RADHA ARELLANO            [facility]            ER            L LEG PAIN/SWELLING/HX 
BLOOD CLOTS        

 

 [id number]            2018 10:30:00            2018 23:59:59    
        CLS            Outpatient            NORTH HERNANDEZ MD            [facility]            CARD            CAD, CHEST PAIN 
SYNDROME        

 

 [id number]            10/01/2017 07:00:00            2017 00:01:00    
        DIS            Outpatient            NORTH HERNANDEZ MD            [facility]            LAB            I26.99        

 

 [id number]            12/15/2017 09:16:00            12/15/2017 23:59:59    
        CLS            Outpatient            CANDE PIKE MD            [facility]            LAB            Z01.812 I25.119        

 

 [id number]            2017 11:24:00            2017 11:53:00    
        DIS            Emergency            DAMARIS WYNN            
[facility]            ER            LUMP ON CHEST        

 

 [id number]            2017 17:12:00            2017 14:45:00    
        DIS            Inpatient            FELISHA MORENO DO            [facility]            ICU            CHEST PAIN,N/V        

 

 [id number]            2017 10:49:00            2017 00:01:00    
        DIS            Outpatient            NORTH HERNANDEZ MD            [facility]            LAB            I26.99        

 

 [id number]            2017 15:07:00            2017 17:28:00    
        DIS            Emergency            DAMARIS WYNN            
[facility]            ER            SWELLING IN R ARM 
AFTER FLU SHOT        

 

 [id number]            08/15/2017 10:55:00            08/15/2017 23:59:59    
        CLS            Outpatient            NORTH HERNANDEZ MD            [facility]            LAB            I26.99,Z51.81        

 

 [id number]            2017 00:08:00            2017 23:59:59    
        CLS            Preadmit            NORTH HERNANDEZ MD            [facility]            LAB            DVT        

 

 [id number]            2017 11:24:00            2017 00:01:00    
        DIS            Outpatient            NORTH HERNANDEZ MD            [facility]            LAB            DVT        

 

 [id number]            2017 17:21:00            2017 19:18:00    
        DIS            Emergency            DAMARIS WYNN            
[facility]            ER            LOWER CHEST PAIN     
   

 

 [id number]            2017 08:17:00            2017 00:01:00    
        DIS            Outpatient            NORTH HERNANDEZ MD            [facility]            LAB            DVT        

 

 [id number]            2016 13:09:00            2017 00:01:00    
        DIS            Outpatient            NORTH HERNANDEZ MD            [facility]            LAB            DVT        

 

 [id number]            2016 10:25:00            2016 19:55:00    
        DIS            Outpatient            NORTH HERNANDEZ MD            [facility]            CATH            ABN STRESS,CP,CAD      
  

 

 [id number]            2016 07:31:00            2016 23:59:59    
        CLS            Outpatient            NORTH HERNANDEZ MD            [facility]            CARD            CAD, CHF, CHEST PAIN 
SYNDROME, DVT, HTN, PE        

 

 [id number]            2016 11:12:00            2016 23:59:59    
        CLS            Outpatient            NORTH HERNANDEZ MD            [facility]            CAD, CHF, CHEST PAIN 
SYNDROME, DVT, HTN, PE        

 

 [id number]            2016 13:12:00            2016 00:01:00    
        DIS            Outpatient            NORTH HERNANDEZ MD            [facility]            LAB            DVT        

 

 [id number]            2016 14:55:00            2016 17:15:00    
        DIS            Emergency            RADHA ARELLANO            [facility]            ER            R ARM PAIN        

 

 [id number]            2015 09:29:00            2015 00:01:00    
        DIS            Outpatient            NORTH HERNANDZE MD            [facility]            LAB            ANTICOAG THERAPY,HX PE  
      

 

 [id number]            2015 08:34:00            2015 10:54:00    
        DIS            Emergency            COLE CONTRERAS MD            [facility]            ER            RIGHT FOOT PAIN/COUGH    
    

 

 [id number]            2015 11:05:00            2015 00:01:00    
        DIS            Outpatient            NORTH HERNANDEZ MD            [facility]            LAB            ANTICOAG THERAPY,HX PE  
      

 

 [id number]            10/07/2014 14:51:00            2014 00:01:00    
        DIS            Outpatient            NORTH HERNANDEZ MD            [facility]            LAB            ANTICOAG THERAPY,HX PE  
      

 

 [id number]            2014 18:10:00            2014 00:01:00    
        DIS            Outpatient            NORTH HERNANDEZ MD            [facility]            LAB            ANTICOAG THERAPY,HX PE  
      

 

 [id number]            2013 15:59:00            2013 00:01:00    
        DIS            Outpatient            NORTH HERNANDEZ MD            [facility]            LAB            ANTICOAG THERAPY,HX PE  
      

 

 [id number]            2013 18:23:00            2013 19:22:00    
        DIS            Emergency            HANNA GREEN, CAROL PORTILLO            
[facility]            ER            LEFT ANKLE PAIN      
  

 

 [id number]            2018 14:04:00                         ACT       
     Emergency            DIANE GREEN, BRODY MA            [facility]            ER            CHEST PAIN        

 

 [id number]            2015 09:36:00                                   
   Document Registration                                                       
     

 

 [id number]            2015 09:36:00                                   
   Document Registration                                                       
     

 

 [id number]            2015 09:36:00                                   
   Document Registration                                                       
     

 

 [id number]            2015 09:36:00                                   
   Document Registration                                                       
     

 

 [id number]            2015 09:36:00                                   
   Document Registration                                                       
     

 

 [id number]            2015 09:36:00                                   
   Document Registration                                                       
     

 

 [id number]            2015 09:36:00                                   
   Document Registration                                                       
     

 

 [id number]            2015 09:36:00                                   
   Document Registration                                                       
     

 

 [id number]            2015 09:36:00                                   
   Document Registration                                                       
     

 

 [id number]            2015 09:36:00                                   
   Document Registration                                                       
     

 

 [id number]            2015 09:36:00                                   
   Document Registration                                                       
     

 

 [id number]            2015 09:36:00                                   
   Document Registration                                                       
     

 

 [id number]            2015 10:38:00                                   
   Document Registration                                                       
     

 

 [id number]            2015 10:38:00                                   
   Document Registration                                                       
     

 

 [id number]            2015 10:37:00                                   
   Document Registration                                                       
     

 

 [id number]            2015 10:37:00                                   
   Document Registration                                                       
     

 

 [id number]            2015 21:35:00                                   
   Document Registration                                                       
     

 

 [id number]            2013 16:55:00                                   
   Document Registration                                                       
     

 

 [id number]            2013 08:29:00                                   
   Document Registration                                                       
     

 

 [id number]            2013 10:36:00                                   
   Document Registration                                                       
     

 

 [id number]            2013 07:36:00                                   
   Document Registration                                                       
     

 

 [id number]            2013 10:00:00                                   
   Document Registration                                                       
     

 

 [id number]            2013 13:04:00                                   
   Document Registration                                                       
     

 

 [id number]            2012 16:16:00                                   
   Document Registration                                                       
     

 

 [id number]            2012 21:41:00                                   
   Document Registration                                                       
     

 

 [id number]            2012 12:15:00                                   
   Document Registration                                                       
     

 

 [id number]            2012 13:43:00                                   
   Document Registration                                                       
     

 

 [id number]            10/10/2011 10:15:00                                   
   Document Registration                                                       
     

 

 [id number]            2011 06:51:00                                   
   Document Registration                                                       
     

 

 [id number]            2011 08:46:00                                   
   Document Registration                                                       
     

 

 [id number]            2011 10:21:00                                   
   Document Registration                                                       
     

 

 [id number]            2011 14:37:00                                   
   Document Registration                                                       
     

 

 [id number]            2010 09:40:00                                   
   Document Registration                                                       
     

 

 [id number]            2010 17:02:00                                   
   Document Registration                                                       
     

 

 [id number]            2010 09:19:00                                   
   Document Registration                                                       
     

 

 [id number]            12/10/2009 08:28:00                                   
   Document Registration                                                       
     

 

 [id number]            2009 13:16:00                                   
   Document Registration                                                       
     

 

 [id number]            10/15/2008 08:46:00                                   
   Document Registration                                                       
     

 

 [id number]            09/15/2008 12:11:00                                   
   Document Registration                                                       
     

 

 [id number]            2008 14:03:00                                   
   Document Registration                                                       
     

 

 [id number]            05/10/2008 08:26:00                                   
   Document Registration                                                       
     

 

 [id number]            2008 09:23:00                                   
   Document Registration                                                       
     

 

 [id number]            2008 11:12:00                                   
   Document Registration                                                       
     

 

 [id number]            2008 12:14:00                                   
   Document Registration                                                       
     

 

 [id number]            2007 10:33:00                                   
   Document Registration                                                       
     

 

 [id number]            05/15/2007 14:25:00                                   
   Document Registration                                                       
     

 

 [id number]            2007 08:46:00                                   
   Document Registration                                                       
     

 

 [id number]            2007 14:49:00                                   
   Document Registration                                                       
     

 

 [id number]            2007 07:21:00                                   
   Document Registration                                                       
     

 

 [id number]            2006 08:00:00                                   
   Document Registration                                                       
     

 

 [id number]            2006 08:52:00                                   
   Document Registration                                                       
     

 

 [id number]            10/25/2006 16:48:00                                   
   Document Registration                                                       
     

 

 [id number]            2006 11:44:00                                   
   Document Registration                                                       
     

 

 [id number]            07/10/2006 09:32:00                                   
   Document Registration                                                       
     

 

 [id number]            2006 08:15:00                                   
   Document Registration                                                       
     

 

 [id number]            2006 11:33:00                                   
   Document Registration                                                       
     

 

 [id number]            2006 11:41:00                                   
   Document Registration

## 2018-11-16 NOTE — DIAGNOSTIC IMAGING REPORT
CLINICAL INDICATION: Patient with chest pain and numbness

radiating down left arm today.



EXAM: Portable chest x-ray, upright view.



COMPARISONS: Chest x-ray dated 11/16/2017.



FINDINGS:



Lungs/pleura: Lungs are clear. There is no pneumothorax. There is

no pleural effusion.



Mediastinum: Unremarkable. 



Pulmonary vasculature: Unremarkable.



Heart: Unremarkable.



Bones/extrathoracic soft tissue: Unremarkable.



IMPRESSION:

There is no radiographic evidence of acute cardiopulmonary

process.



Dictated by: 



  Dictated on workstation # [id number]

## 2018-11-16 NOTE — XMS REPORT
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                         Ot            782.3                        
          

 

 2015                         Ot            V58.61                       
           

 

 2015            NORTH HERNANDEZ MD            Ot            V12.51      
                            

 

 2015            NORTH HERNANDEZ MD            Ot            V58.61      
                            

 

 2015            NORTH HERNANDEZ MD            Ot            V58.83      
                            

 

 2015                         Ot            272.4                        
          

 

 2015                         Ot            401.9                        
          

 

 2015                         Ot            414.01                       
           

 

 2015                         Ot            272.4                        
          

 

 2015                         Ot            401.9                        
          

 

 2015                         Ot            414.01                       
           

 

 2015                         Ot            401.9                        
          

 

 2015                         Ot            414.01                       
           

 

 2015                         Ot            428.0                        
          

 

 2015                         Ot            719.40                       
           

 

 2015                         Ot            782.3                        
          

 

 2015                         Ot            V58.61                       
           

 

 2015                         Ot            272.1                        
          

 

 2015                         Ot            414.00                       
           

 

 2015                         Ot            786.50                       
           

 

 2015                         Ot            397.0                        
          

 

 2015                         Ot            414.00                       
           

 

 2015                         Ot            424.0                        
          

 

 2015                         Ot            786.50                       
           

 

 2015                         Ot            272.4                        
          

 

 2015                         Ot            401.9                        
          

 

 2015                         Ot            414.01                       
           

 

 2015                         Ot            401.9                        
          

 

 2015                         Ot            414.01                       
           

 

 2015                         Ot            428.0                        
          

 

 2015                         Ot            719.40                       
           

 

 2015                         Ot            782.3                        
          

 

 2015                         Ot            V58.61                       
           

 

 2015            NORTH HERNANDEZ MD            Ot            V12.51      
                            

 

 2015            NORTH HERNANDEZ MD            Ot            V58.61      
                            

 

 2015            NORTH HERNANDEZ MD            Ot            V58.83      
                            

 

 2015            COLE CONTRERAS MD            Ot            274.9      
      GOUT NOS                     

 

 2015            COLE CONTRERAS MD            Ot            729.5      
      PAIN IN LIMB                     

 

 2015                         Ot            272.1                        
          

 

 2015                         Ot            414.00                       
           

 

 2015                         Ot            786.50                       
           

 

 2015                         Ot            397.0                        
          

 

 2015                         Ot            414.00                       
           

 

 2015                         Ot            424.0                        
          

 

 2015                         Ot            786.50                       
           

 

 2015                         Ot            272.4                        
          

 

 2015                         Ot            401.9                        
          

 

 2015                         Ot            414.01                       
           

 

 2015                         Ot            401.9                        
          

 

 2015                         Ot            414.01                       
           

 

 2015                         Ot            428.0                        
          

 

 2015                         Ot            719.40                       
           

 

 2015                         Ot            782.3                        
          

 

 2015                         Ot            V58.61                       
           

 

 2015            NORTH HERNANDEZ MD            Ot            V12.51      
                            

 

 2015            NORTH HERNANDEZ MD            Ot            V58.61      
                            

 

 2015            NORTH HERNANDEZ MD            Ot            V58.83      
                            

 

 08/10/2015            NORTH HERNANDEZ MD            Ot            V12.51      
                            

 

 08/10/2015            NORTH HERNANDEZ MD            Ot            V58.61      
                            

 

 08/10/2015            NORTH HERNANDEZ MD            Ot            V58.83      
                            

 

 2015            NORTH HERNANDEZ MD            Ot            V12.51      
                            

 

 2015            NORTH HERNANDEZ MD            Ot            V58.61      
                            

 

 2015            NORTH HERNANDEZ MD            Ot            V58.83      
                            

 

 2015            NORTH HERNANDEZ MD            Ot            V12.51      
      HX-VENOUS THROMBOSIS EMBOLISM                     

 

 2015            NORTH HERNANDEZ MD            Ot            V58.61      
      ANTICOAGULANTS,LT,CURRENT USE                     

 

 2015            NORTH HERNANDEZ MD            Ot            V58.83      
      ENCOUNTER FOR THERAPEUTIC DRUG MONITORIN                     

 

 2015                         Ot            270.4                        
          

 

 2015                         Ot            305.1                        
          

 

 2015                         Ot            536.8                        
          

 

 2015                         Ot            729.81                       
           

 

 2015                         Ot            V12.51                       
           

 

 2015                         Ot            V58.61                       
           

 

 2015                         Ot            V58.69                       
           

 

 2015                         Ot            270.4                        
          

 

 2015                         Ot            305.1                        
          

 

 2015                         Ot            536.8                        
          

 

 2015                         Ot            V12.51                       
           

 

 2015                         Ot            V58.61                       
           

 

 2015                         Ot            V58.69                       
           

 

 2015                         Ot            272.4                        
          

 

 2015                         Ot            729.5                        
          

 

 2015                         Ot            780.79                       
           

 

 2015                         Ot            270.4                        
          

 

 2015                         Ot            305.1                        
          

 

 2015                         Ot            536.8                        
          

 

 2015                         Ot            729.5                        
          

 

 2015                         Ot            V12.51                       
           

 

 2015                         Ot            V58.69                       
           

 

 2015                         Ot            270.4                        
          

 

 2015                         Ot            305.1                        
          

 

 2015                         Ot            536.8                        
          

 

 2015                         Ot            V12.51                       
           

 

 2015                         Ot            V58.69                       
           

 

 2015                         Ot            786.09                       
           

 

 2015                         Ot            786.50                       
           

 

 2015                         Ot            786.09                       
           

 

 2015                         Ot            786.50                       
           

 

 2015                         Ot            270.4                        
          

 

 2015                         Ot            305.1                        
          

 

 2015                         Ot            533.90                       
           

 

 2015                         Ot            V12.51                       
           

 

 2015                         Ot            272.4                        
          

 

 2015                         Ot            401.9                        
          

 

 2015                         Ot            414.00                       
           

 

 2015                         Ot            272.4                        
          

 

 2015                         Ot            356.9                        
          

 

 2015                         Ot            272.4                        
          

 

 2015                         Ot            729.5                        
          

 

 2015                         Ot            V58.61                       
           

 

 2015                         Ot            272.1                        
          

 

 2015                         Ot            414.00                       
           

 

 2015                         Ot            786.50                       
           

 

 2015                         Ot            397.0                        
          

 

 2015                         Ot            414.00                       
           

 

 2015                         Ot            424.0                        
          

 

 2015                         Ot            786.50                       
           

 

 2015                         Ot            272.4                        
          

 

 2015                         Ot            401.9                        
          

 

 2015                         Ot            414.01                       
           

 

 2015                         Ot            401.9                        
          

 

 2015                         Ot            414.01                       
           

 

 2015                         Ot            428.0                        
          

 

 2015                         Ot            719.40                       
           

 

 2015                         Ot            782.3                        
          

 

 2015                         Ot            V58.61                       
           

 

 2015                         Ot            272.1                        
          

 

 2015                         Ot            414.00                       
           

 

 2015                         Ot            786.50                       
           

 

 2015                         Ot            397.0                        
          

 

 2015                         Ot            414.00                       
           

 

 2015                         Ot            424.0                        
          

 

 2015                         Ot            786.50                       
           

 

 2015                         Ot            272.4                        
          

 

 2015                         Ot            401.9                        
          

 

 2015                         Ot            414.01                       
           

 

 2015                         Ot            401.9                        
          

 

 2015                         Ot            414.01                       
           

 

 2015                         Ot            428.0                        
          

 

 2015                         Ot            719.40                       
           

 

 2015                         Ot            782.3                        
          

 

 2015                         Ot            V58.61                       
           

 

 2016                         Ot            414.00            CORON 
ATHEROSCLER NOS TYPE VESSEL, NATIV                     

 

 2016                         Ot            786.50            CHEST PAIN 
NOS                     

 

 2016                         Ot            397.0            TRICUSPID 
VALVE DISEASE                     

 

 2016                         Ot            414.00            CORON 
ATHEROSCLER NOS TYPE VESSEL, NATIV                     

 

 2016                         Ot            424.0            MITRAL VALVE 
DISORDER                     

 

 2016                         Ot            786.50            CHEST PAIN 
NOS                     

 

 2016                         Ot            272.4            
HYPERLIPIDEMIA NEC/NOS                     

 

 2016                         Ot            401.9            HYPERTENSION 
NOS                     

 

 2016                         Ot            414.01            CORONARY 
ATHEROSCLEROSIS OF NATIVE CORON                     

 

 2016                         Ot            401.9            HYPERTENSION 
NOS                     

 

 2016                         Ot            414.01            CORONARY 
ATHEROSCLEROSIS OF NATIVE CORON                     

 

 2016                         Ot            428.0            CONGESTIVE 
HEART FAILURE NOS                     

 

 2016                         Ot            719.40            JOINT PAIN-
UNSPEC                     

 

 2016                         Ot            782.3            EDEMA       
              

 

 2016                         Ot            V58.61            
ANTICOAGULANTS,LT,CURRENT USE                     

 

 2016            RADHA ARELLANO APRN            Ot            F17.210    
        NICOTINE DEPENDENCE, CIGARETTES, UNCOMPL                     

 

 2016            RADHA ARELLANO APRN            Ot            M25.511    
        PAIN IN RIGHT SHOULDER                     

 

 2016            NORTH HERNANDEZ MD            Ot            I82.403     
       ACUTE EMBOLISM AND THOMBOS UNSP DEEP VEI                     

 

 2016            NORTH HERNANDEZ MD            Ot            Z79.01      
      LONG TERM (CURRENT) USE OF ANTICOAGULANT                     

 

 08/15/2016            NORTH HERNANDEZ MD            Ot            I82.403     
       ACUTE EMBOLISM AND THOMBOS UNSP DEEP VEI                     

 

 08/15/2016            NORTH HERNANDEZ MD            Ot            Z79.01      
      LONG TERM (CURRENT) USE OF ANTICOAGULANT                     

 

 2016            NORTH HERNANDEZ MD            Ot            I82.403     
       ACUTE EMBOLISM AND THOMBOS UNSP DEEP VEI                     

 

 2016            NORTH HERNANDEZ MD            Ot            Z79.01      
      LONG TERM (CURRENT) USE OF ANTICOAGULANT                     

 

 10/07/2016                         Ot            414.00            CORON 
ATHEROSCLER NOS TYPE VESSEL, NATIV                     

 

 10/07/2016                         Ot            786.50            CHEST PAIN 
NOS                     

 

 10/07/2016                         Ot            397.0            TRICUSPID 
VALVE DISEASE                     

 

 10/07/2016                         Ot            414.00            CORON 
ATHEROSCLER NOS TYPE VESSEL, NATIV                     

 

 10/07/2016                         Ot            424.0            MITRAL VALVE 
DISORDER                     

 

 10/07/2016                         Ot            786.50            CHEST PAIN 
NOS                     

 

 10/07/2016                         Ot            272.4            
HYPERLIPIDEMIA NEC/NOS                     

 

 10/07/2016                         Ot            401.9            HYPERTENSION 
NOS                     

 

 10/07/2016                         Ot            414.01            CORONARY 
ATHEROSCLEROSIS OF NATIVE CORON                     

 

 10/07/2016                         Ot            401.9            HYPERTENSION 
NOS                     

 

 10/07/2016                         Ot            414.01            CORONARY 
ATHEROSCLEROSIS OF NATIVE CORON                     

 

 10/07/2016                         Ot            428.0            CONGESTIVE 
HEART FAILURE NOS                     

 

 10/07/2016                         Ot            719.40            JOINT PAIN-
UNSPEC                     

 

 10/07/2016                         Ot            782.3            EDEMA       
              

 

 10/07/2016                         Ot            V58.61            
ANTICOAGULANTS,LT,CURRENT USE                     

 

 10/10/2016            NORTH HERNANDEZ MD            Ot            I82.403     
       ACUTE EMBOLISM AND THOMBOS UNSP DEEP VEI                     

 

 10/10/2016            NORTH HERNANDEZ MD            Ot            Z79.01      
      LONG TERM (CURRENT) USE OF ANTICOAGULANT                     

 

 2016            NORTH HERNANDEZ MD            Ot            I82.403     
       ACUTE EMBOLISM AND THOMBOS UNSP DEEP VEI                     

 

 2016            NORTH HERNANDEZ MD            Ot            Z79.01      
      LONG TERM (CURRENT) USE OF ANTICOAGULANT                     

 

 2016                         Ot            414.00            CORON 
ATHEROSCLER NOS TYPE VESSEL, NATIV                     

 

 2016                         Ot            786.50            CHEST PAIN 
NOS                     

 

 2016                         Ot            397.0            TRICUSPID 
VALVE DISEASE                     

 

 2016                         Ot            414.00            CORON 
ATHEROSCLER NOS TYPE VESSEL, NATIV                     

 

 2016                         Ot            424.0            MITRAL VALVE 
DISORDER                     

 

 2016                         Ot            786.50            CHEST PAIN 
NOS                     

 

 2016                         Ot            272.4            
HYPERLIPIDEMIA NEC/NOS                     

 

 2016                         Ot            401.9            HYPERTENSION 
NOS                     

 

 2016                         Ot            414.01            CORONARY 
ATHEROSCLEROSIS OF NATIVE CORON                     

 

 2016                         Ot            401.9            HYPERTENSION 
NOS                     

 

 2016                         Ot            414.01            CORONARY 
ATHEROSCLEROSIS OF NATIVE CORON                     

 

 2016                         Ot            428.0            CONGESTIVE 
HEART FAILURE NOS                     

 

 2016                         Ot            719.40            JOINT PAIN-
UNSPEC                     

 

 2016                         Ot            782.3            EDEMA       
              

 

 2016                         Ot            V58.61            
ANTICOAGULANTS,LT,CURRENT USE                     

 

 2016            NORTH HERNANDEZ MD            Ot            I82.403     
       ACUTE EMBOLISM AND THOMBOS UNSP DEEP VEI                     

 

 2016            NORTH HERNANDEZ MD            Ot            Z79.01      
      LONG TERM (CURRENT) USE OF ANTICOAGULANT                     

 

 2016            NORTH HERNANDEZ MD            Ot            I11.0       
     HYPERTENSIVE HEART DISEASE WITH HEART FA                     

 

 2016            NORTH HERNANDEZ MD            Ot            I25.10      
      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                      

 

 2016            NORTH HERNANDEZ MD            Ot            I26.99      
      OTHER PULMONARY EMBOLISM WITHOUT ACUTE C                     

 

 2016            NORTH HERNANDEZ MD            Ot            I50.9       
     HEART FAILURE, UNSPECIFIED                     

 

 2016            NORTH HERNANDEZ MD            Ot            I82.409     
       ACUTE EMBOLISM AND THOMBOS UNSP DEEP VN                      

 

 2016            NORTH HERNANDEZ MD            Ot            R07.9       
     CHEST PAIN, UNSPECIFIED                     

 

 2016                         Ot            414.00            CORON 
ATHEROSCLER NOS TYPE VESSEL, NATIV                     

 

 2016                         Ot            786.50            CHEST PAIN 
NOS                     

 

 2016                         Ot            397.0            TRICUSPID 
VALVE DISEASE                     

 

 2016                         Ot            414.00            CORON 
ATHEROSCLER NOS TYPE VESSEL, NATIV                     

 

 2016                         Ot            424.0            MITRAL VALVE 
DISORDER                     

 

 2016                         Ot            786.50            CHEST PAIN 
NOS                     

 

 2016                         Ot            272.4            
HYPERLIPIDEMIA NEC/NOS                     

 

 2016                         Ot            401.9            HYPERTENSION 
NOS                     

 

 2016                         Ot            414.01            CORONARY 
ATHEROSCLEROSIS OF NATIVE CORON                     

 

 2016                         Ot            401.9            HYPERTENSION 
NOS                     

 

 2016                         Ot            414.01            CORONARY 
ATHEROSCLEROSIS OF NATIVE CORON                     

 

 2016                         Ot            428.0            CONGESTIVE 
HEART FAILURE NOS                     

 

 2016                         Ot            719.40            JOINT PAIN-
UNSPEC                     

 

 2016                         Ot            782.3            EDEMA       
              

 

 2016                         Ot            V58.61            
ANTICOAGULANTS,LT,CURRENT USE                     

 

 2016            NORTH HERNANDEZ MD            Ot            I82.403     
       ACUTE EMBOLISM AND THOMBOS UNSP DEEP VEI                     

 

 2016            NORTH HERNANDEZ MD            Ot            Z79.01      
      LONG TERM (CURRENT) USE OF ANTICOAGULANT                     

 

 2016            NORTH HERNANDEZ MD            Ot            I11.0       
     HYPERTENSIVE HEART DISEASE WITH HEART FA                     

 

 2016            NORTH HERNANDEZ MD            Ot            I25.10      
      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                      

 

 2016            NORTH HERNANDEZ MD            Ot            I26.99      
      OTHER PULMONARY EMBOLISM WITHOUT ACUTE C                     

 

 2016            NORTH HERNANDEZ MD            Ot            I50.9       
     HEART FAILURE, UNSPECIFIED                     

 

 2016            NORTH HERNANDEZ MD            Ot            I82.409     
       ACUTE EMBOLISM AND THOMBOS UNSP DEEP VN                      

 

 2016            NORTH HERNANDEZ MD            Ot            R07.9       
     CHEST PAIN, UNSPECIFIED                     

 

 2016            NORTH HERNANDEZ MD            Ot            I25.10      
      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                      

 

 2016            NORTH HERNANDEZ MD            Ot            I50.9       
     HEART FAILURE, UNSPECIFIED                     

 

 2016            NORTH HERNANDEZ MD            Ot            R07.9       
     CHEST PAIN, UNSPECIFIED                     

 

 2016            NORTH HERNANDEZ MD            Ot            I25.10      
      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                      

 

 2016            NORTH HERNANDEZ MD            Ot            I50.9       
     HEART FAILURE, UNSPECIFIED                     

 

 2016            NORTH HERNANDEZ MD            Ot            R07.9       
     CHEST PAIN, UNSPECIFIED                     

 

 2016                         Ot            414.00            CORON 
ATHEROSCLER NOS TYPE VESSEL, NATIV                     

 

 2016                         Ot            786.50            CHEST PAIN 
NOS                     

 

 2016                         Ot            397.0            TRICUSPID 
VALVE DISEASE                     

 

 2016                         Ot            414.00            CORON 
ATHEROSCLER NOS TYPE VESSEL, NATIV                     

 

 2016                         Ot            424.0            MITRAL VALVE 
DISORDER                     

 

 2016                         Ot            786.50            CHEST PAIN 
NOS                     

 

 2016                         Ot            272.4            
HYPERLIPIDEMIA NEC/NOS                     

 

 2016                         Ot            401.9            HYPERTENSION 
NOS                     

 

 2016                         Ot            414.01            CORONARY 
ATHEROSCLEROSIS OF NATIVE CORON                     

 

 2016                         Ot            401.9            HYPERTENSION 
NOS                     

 

 2016                         Ot            414.01            CORONARY 
ATHEROSCLEROSIS OF NATIVE CORON                     

 

 2016                         Ot            428.0            CONGESTIVE 
HEART FAILURE NOS                     

 

 2016                         Ot            719.40            JOINT PAIN-
UNSPEC                     

 

 2016                         Ot            782.3            EDEMA       
              

 

 2016                         Ot            V58.61            
ANTICOAGULANTS,LT,CURRENT USE                     

 

 2016            NORTH HERNANDEZ MD            Ot            I82.403     
       ACUTE EMBOLISM AND THOMBOS UNSP DEEP VEI                     

 

 2016            NORTH HERNANDEZ MD            Ot            Z79.01      
      LONG TERM (CURRENT) USE OF ANTICOAGULANT                     

 

 2016            NORTH HERNANDEZ MD            Ot            I11.0       
     HYPERTENSIVE HEART DISEASE WITH HEART FA                     

 

 2016            NORTH HERNANDEZ MD            Ot            I25.10      
      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                      

 

 2016            NORTH HERNANDEZ MD            Ot            I26.99      
      OTHER PULMONARY EMBOLISM WITHOUT ACUTE C                     

 

 2016            NORTH HERNANDEZ MD            Ot            I50.9       
     HEART FAILURE, UNSPECIFIED                     

 

 2016            NORTH HERNANDEZ MD            Ot            I82.409     
       ACUTE EMBOLISM AND THOMBOS UNSP DEEP VN                      

 

 2016            NORTH HERNANDEZ MD            Ot            R07.9       
     CHEST PAIN, UNSPECIFIED                     

 

 2016            NORTH HERNANDEZ MD            Ot            I25.10      
      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                      

 

 2016            NORTH HERNANDEZ MD            Ot            I50.9       
     HEART FAILURE, UNSPECIFIED                     

 

 2016            NORTH HERNANDEZ MD            Ot            R07.9       
     CHEST PAIN, UNSPECIFIED                     

 

 2016            NORTH HERNANDEZ MD            Ot            I11.0       
     HYPERTENSIVE HEART DISEASE WITH HEART FA                     

 

 2016            NORTH HERNANDEZ MD            Ot            I25.10      
      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                      

 

 2016            NORTH HERNANDEZ MD            Ot            I26.99      
      OTHER PULMONARY EMBOLISM WITHOUT ACUTE C                     

 

 2016            NORTH HERNANDEZ MD            Ot            I50.9       
     HEART FAILURE, UNSPECIFIED                     

 

 2016            NORTH HERNANDEZ MD            Ot            I82.409     
       ACUTE EMBOLISM AND THOMBOS UNSP DEEP VN                      

 

 2016            NORTH HERNANDEZ MD            Ot            R07.9       
     CHEST PAIN, UNSPECIFIED                     

 

 2016            NORTH HERNANDEZ MD            Ot            E78.5       
     HYPERLIPIDEMIA, UNSPECIFIED                     

 

 2016            NORTH HERNANDEZ MD            Ot            F17.210     
       NICOTINE DEPENDENCE, CIGARETTES, UNCOMPL                     

 

 2016            NORTH HERNANDEZ MD            Ot            I10         
   ESSENTIAL (PRIMARY) HYPERTENSION                     

 

 2016            NORTH HERNANDEZ MD            Ot            I25.10      
      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                      

 

 2016            NORTH HERNANDEZ MD            Ot            R07.89      
      OTHER CHEST PAIN                     

 

 2016            NORTH HERNANDEZ MD            Ot            R94.39      
      ABNORMAL RESULT OF OTHER CARDIOVASCULAR                      

 

 2016            NORTH HERNANDEZ MD            Ot            Z79.01      
      LONG TERM (CURRENT) USE OF ANTICOAGULANT                     

 

 2016            NORTH HERNANDEZ MD            Ot            Z79.899     
       OTHER LONG TERM (CURRENT) DRUG THERAPY                     

 

 2016            NORTH HERNANDEZ MD            Ot            Z86.718     
       PERSONAL HISTORY OF OTHER VENOUS THROMBO                     

 

 2016            NORTH HERNANDEZ MD            Ot            Z95.5       
     PRESENCE OF CORONARY ANGIOPLASTY IMPLANT                     

 

 2016            NORTH HERNANDEZ MD            Ot            I25.10      
      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                      

 

 2016            NORTH HERNANDEZ MD            Ot            I50.9       
     HEART FAILURE, UNSPECIFIED                     

 

 2016            NORTH HERNANDEZ MD            Ot            R07.9       
     CHEST PAIN, UNSPECIFIED                     

 

 2016            NORTH HERNANDEZ MD            Ot            I25.10      
      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                      

 

 2016            NORTH HERNANDEZ MD            Ot            I50.9       
     HEART FAILURE, UNSPECIFIED                     

 

 2016            NORTH HERNANDEZ MD            Ot            R07.9       
     CHEST PAIN, UNSPECIFIED                     

 

 2017            NORTH HERNANDEZ MD            Ot            I82.403     
       ACUTE EMBOLISM AND THOMBOS UNSP DEEP VEI                     

 

 2017            NORTH HERNANDEZ MD            Ot            Z79.01      
      LONG TERM (CURRENT) USE OF ANTICOAGULANT                     

 

 2017            NORTH HERNANDEZ MD            Ot            I82.403     
       ACUTE EMBOLISM AND THOMBOS UNSP DEEP VEI                     

 

 2017            NORTH HERNANDEZ MD            Ot            Z79.01      
      LONG TERM (CURRENT) USE OF ANTICOAGULANT                     

 

 2017            NORTH HERNANDEZ MD            Ot            I82.403     
       ACUTE EMBOLISM AND THOMBOS UNSP DEEP VEI                     

 

 2017            NORTH HERNANDEZ MD            Ot            Z79.01      
      LONG TERM (CURRENT) USE OF ANTICOAGULANT                     

 

 2017            NORTH HERNANDEZ MD            Ot            I82.403     
       ACUTE EMBOLISM AND THOMBOS UNSP DEEP VEI                     

 

 2017            NORTH HERNANDEZ MD            Ot            Z79.01      
      LONG TERM (CURRENT) USE OF ANTICOAGULANT                     

 

 2017            NORTH HERNANDEZ MD            Ot            I82.403     
       ACUTE EMBOLISM AND THOMBOS UNSP DEEP VEI                     

 

 2017            NORTH HERNANDEZ MD            Ot            Z79.01      
      LONG TERM (CURRENT) USE OF ANTICOAGULANT                     

 

 02/15/2017            NORTH HERNANDEZ MD            Ot            I82.403     
       ACUTE EMBOLISM AND THOMBOS UNSP DEEP VEI                     

 

 02/15/2017            NORTH HERNANDEZ MD            Ot            Z79.01      
      LONG TERM (CURRENT) USE OF ANTICOAGULANT                     

 

 2017            NORTH HERNANDEZ MD            Ot            I82.403     
       ACUTE EMBOLISM AND THOMBOS UNSP DEEP VEI                     

 

 2017            NORTH HERNANDEZ MD            Ot            Z79.01      
      LONG TERM (CURRENT) USE OF ANTICOAGULANT                     

 

 2017            NORTH HERNANDEZ MD            Ot            I82.403     
       ACUTE EMBOLISM AND THOMBOS UNSP DEEP VEI                     

 

 2017            NORTH HERNANDEZ MD            Ot            Z79.01      
      LONG TERM (CURRENT) USE OF ANTICOAGULANT                     

 

 2017            NORTH HERNANDEZ MD            Ot            I82.403     
       ACUTE EMBOLISM AND THOMBOS UNSP DEEP VEI                     

 

 2017            NORTH HERNANDEZ MD            Ot            Z79.01      
      LONG TERM (CURRENT) USE OF ANTICOAGULANT                     

 

 2017                         Ot            414.00            CORON 
ATHEROSCLER NOS TYPE VESSEL, NATIV                     

 

 2017                         Ot            786.50            CHEST PAIN 
NOS                     

 

 2017                         Ot            397.0            TRICUSPID 
VALVE DISEASE                     

 

 2017                         Ot            414.00            CORON 
ATHEROSCLER NOS TYPE VESSEL, NATIV                     

 

 2017                         Ot            424.0            MITRAL VALVE 
DISORDER                     

 

 2017                         Ot            786.50            CHEST PAIN 
NOS                     

 

 2017                         Ot            272.4            
HYPERLIPIDEMIA NEC/NOS                     

 

 2017                         Ot            401.9            HYPERTENSION 
NOS                     

 

 2017                         Ot            414.01            CORONARY 
ATHEROSCLEROSIS OF NATIVE CORON                     

 

 2017                         Ot            401.9            HYPERTENSION 
NOS                     

 

 2017                         Ot            414.01            CORONARY 
ATHEROSCLEROSIS OF NATIVE CORON                     

 

 2017                         Ot            428.0            CONGESTIVE 
HEART FAILURE NOS                     

 

 2017                         Ot            719.40            JOINT PAIN-
UNSPEC                     

 

 2017                         Ot            782.3            EDEMA       
              

 

 2017                         Ot            V58.61            
ANTICOAGULANTS,LT,CURRENT USE                     

 

 2017            NORTH HERNANDEZ MD            Ot            I11.0       
     HYPERTENSIVE HEART DISEASE WITH HEART FA                     

 

 2017            NORTH HERNANDEZ MD            Ot            I25.10      
      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                      

 

 2017            NORTH HERNANDEZ MD            Ot            I26.99      
      OTHER PULMONARY EMBOLISM WITHOUT ACUTE C                     

 

 2017            NORTH HERNANDEZ MD            Ot            I50.9       
     HEART FAILURE, UNSPECIFIED                     

 

 2017            NORTH HERNANDEZ MD            Ot            I82.409     
       ACUTE EMBOLISM AND THOMBOS UNSP DEEP VN                      

 

 2017            NORTH HERNANDEZ MD            Ot            R07.9       
     CHEST PAIN, UNSPECIFIED                     

 

 2017            NORTH HERNANDEZ MD            Ot            I25.10      
      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                      

 

 2017            NORTH HERNANDEZ MD            Ot            I50.9       
     HEART FAILURE, UNSPECIFIED                     

 

 2017            NORTH HERNANDEZ MD            Ot            R07.9       
     CHEST PAIN, UNSPECIFIED                     

 

 2017            DAMARIS WYNN            Ot            F17.210  
          NICOTINE DEPENDENCE, CIGARETTES, UNCOMPL                     

 

 2017            DAMARIS WYNN            Ot            R10.10   
         UPPER ABDOMINAL PAIN, UNSPECIFIED                     

 

 2017            DAMARIS WYNN            Ot            R10.13   
         EPIGASTRIC PAIN                     

 

 2017            DAMARIS WYNN            Ot            Z79.01   
         LONG TERM (CURRENT) USE OF ANTICOAGULANT                     

 

 2017            DAMARIS WYNN            Ot            Z79.82   
         LONG TERM (CURRENT) USE OF ASPIRIN                     

 

 2017            DAMARIS WYNN            Ot            Z79.899  
          OTHER LONG TERM (CURRENT) DRUG THERAPY                     

 

 2017            DAMARIS WYNN            Ot            Z95.5    
        PRESENCE OF CORONARY ANGIOPLASTY IMPLANT                     

 

 2017            DAMARIS WYNN            Ot            F17.210  
          NICOTINE DEPENDENCE, CIGARETTES, UNCOMPL                     

 

 2017            DAMARIS WYNN            Ot            R10.10   
         UPPER ABDOMINAL PAIN, UNSPECIFIED                     

 

 2017            DAMARIS WYNN            Ot            R10.13   
         EPIGASTRIC PAIN                     

 

 2017            DAMARIS WYNN            Ot            Z79.01   
         LONG TERM (CURRENT) USE OF ANTICOAGULANT                     

 

 2017            DAMARIS WYNN            Ot            Z79.82   
         LONG TERM (CURRENT) USE OF ASPIRIN                     

 

 2017            DAMARIS WYNN            Ot            Z79.899  
          OTHER LONG TERM (CURRENT) DRUG THERAPY                     

 

 2017            DAMARIS WYNN            Ot            Z95.5    
        PRESENCE OF CORONARY ANGIOPLASTY IMPLANT                     

 

 2017            NORTH HERNANDEZ MD            Ot            I82.403     
       ACUTE EMBOLISM AND THOMBOS UNSP DEEP VEI                     

 

 2017            NORTH HERNANDEZ MD            Ot            Z79.01      
      LONG TERM (CURRENT) USE OF ANTICOAGULANT                     

 

 2017                         Ot            414.00            CORON 
ATHEROSCLER NOS TYPE VESSEL, NATIV                     

 

 2017                         Ot            786.50            CHEST PAIN 
NOS                     

 

 2017                         Ot            397.0            TRICUSPID 
VALVE DISEASE                     

 

 2017                         Ot            414.00            CORON 
ATHEROSCLER NOS TYPE VESSEL, NATIV                     

 

 2017                         Ot            424.0            MITRAL VALVE 
DISORDER                     

 

 2017                         Ot            786.50            CHEST PAIN 
NOS                     

 

 2017                         Ot            272.4            
HYPERLIPIDEMIA NEC/NOS                     

 

 2017                         Ot            401.9            HYPERTENSION 
NOS                     

 

 2017                         Ot            414.01            CORONARY 
ATHEROSCLEROSIS OF NATIVE CORON                     

 

 2017                         Ot            401.9            HYPERTENSION 
NOS                     

 

 2017                         Ot            414.01            CORONARY 
ATHEROSCLEROSIS OF NATIVE CORON                     

 

 2017                         Ot            428.0            CONGESTIVE 
HEART FAILURE NOS                     

 

 2017                         Ot            719.40            JOINT PAIN-
UNSPEC                     

 

 2017                         Ot            782.3            EDEMA       
              

 

 2017                         Ot            V58.61            
ANTICOAGULANTS,LT,CURRENT USE                     

 

 2017            NORTH HERNANDEZ MD            Ot            I11.0       
     HYPERTENSIVE HEART DISEASE WITH HEART FA                     

 

 2017            NORTH HERNANDEZ MD            Ot            I25.10      
      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                      

 

 2017            NORTH HERNANDEZ MD            Ot            I26.99      
      OTHER PULMONARY EMBOLISM WITHOUT ACUTE C                     

 

 2017            ONRTH HERNANDEZ MD            Ot            I50.9       
     HEART FAILURE, UNSPECIFIED                     

 

 2017            NORTH HERNANDEZ MD            Ot            R07.9       
     CHEST PAIN, UNSPECIFIED                     

 

 2017            NORTH HERNANDEZ MD            Ot            I25.10      
      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                      

 

 2017            NORTH HERNANDEZ MD            Ot            I50.9       
     HEART FAILURE, UNSPECIFIED                     

 

 2017            NORTH HERNANDEZ MD            Ot            R07.9       
     CHEST PAIN, UNSPECIFIED                     

 

 2017            NORTH HERNANDZE MD            Ot            I82.403     
       ACUTE EMBOLISM AND THOMBOS UNSP DEEP VEI                     

 

 2017            NORTH HERNANDEZ MD            Ot            Z79.01      
      LONG TERM (CURRENT) USE OF ANTICOAGULANT                     

 

 2017            NORTH HERNANDEZ MD            Ot            I82.403     
       ACUTE EMBOLISM AND THOMBOS UNSP DEEP VEI                     

 

 2017            NORTH HERNANDEZ MD            Ot            Z79.01      
      LONG TERM (CURRENT) USE OF ANTICOAGULANT                     

 

 2017            NORTH HERNANDEZ MD            Ot            I82.403     
       ACUTE EMBOLISM AND THOMBOS UNSP DEEP VEI                     

 

 2017            NORTH HERNANDEZ MD            Ot            Z79.01      
      LONG TERM (CURRENT) USE OF ANTICOAGULANT                     

 

 2017            NORTH HERNANDEZ MD            Ot            I26.99      
      OTHER PULMONARY EMBOLISM WITHOUT ACUTE C                     

 

 2017            NORTH HERNANDEZ MD            Ot            Z51.81      
      ENCOUNTER FOR THERAPEUTIC DRUG LEVEL MON                     

 

 2017            DAMARIS WYNN            Ot            F17.200  
          NICOTINE DEPENDENCE, UNSPECIFIED, UNCOMP                     

 

 2017            DAMARIS WYNN            Ot            I25.2    
        OLD MYOCARDIAL INFARCTION                     

 

 2017            DAMARIS WYNN            Ot            K21.9    
        GASTRO-ESOPHAGEAL REFLUX DISEASE WITHOUT                     

 

 2017            DAMARIS WYNN            Ot            L50.9    
        URTICARIA, UNSPECIFIED                     

 

 2017            DAMARIS WYNN            Ot            M79.601  
          PAIN IN RIGHT ARM                     

 

 2017            DAMARIS WYNN            Ot            Z79.01   
         LONG TERM (CURRENT) USE OF ANTICOAGULANT                     

 

 2017            DAMARIS WYNN            Ot            Z79.82   
         LONG TERM (CURRENT) USE OF ASPIRIN                     

 

 2017            DAMARIS WYNN            Ot            Z82.49   
         FAMILY HX OF ISCHEM HEART DIS AND OTH DI                     

 

 2017            DAMARIS WYNN            Ot            Z95.5    
        PRESENCE OF CORONARY ANGIOPLASTY IMPLANT                     

 

 2017            NORTH HERNANDEZ MD            Ot            I26.99      
      OTHER PULMONARY EMBOLISM WITHOUT ACUTE C                     

 

 2017            NORTH HERNANDEZ MD            Ot            Z51.81      
      ENCOUNTER FOR THERAPEUTIC DRUG LEVEL 2017            NORTH HERNANDEZ MD            Ot            I26.99      
      OTHER PULMONARY EMBOLISM WITHOUT ACUTE C                     

 

 2017            NORTH HERNANDEZ MD            Ot            Z51.81      
      ENCOUNTER FOR THERAPEUTIC DRUG LEVEL MON                     

 

 09/15/2017            NORTH HERNANDEZ MD            Ot            I26.99      
      OTHER PULMONARY EMBOLISM WITHOUT ACUTE C                     

 

 09/15/2017            NORTH HERNANDEZ MD            Ot            Z51.81      
      ENCOUNTER FOR THERAPEUTIC DRUG LEVEL MON                     

 

 09/15/2017            NORTH HERNANDEZ MD            Ot            Z79.899     
       OTHER LONG TERM (CURRENT) DRUG THERAPY                     

 

 2017            NORTH HERNANDEZ MD            Ot            I26.99      
      OTHER PULMONARY EMBOLISM WITHOUT ACUTE C                     

 

 2017            NORTH HERNANDEZ MD            Ot            Z51.81      
      ENCOUNTER FOR THERAPEUTIC DRUG LEVEL MON                     

 

 2017            NORTH HERNANDEZ MD            Ot            Z79.899     
       OTHER LONG TERM (CURRENT) DRUG THERAPY                     

 

 10/06/2017            NORTH HERNANDEZ MD            Ot            I26.99      
      OTHER PULMONARY EMBOLISM WITHOUT ACUTE C                     

 

 10/06/2017            NORTH HERNANDEZ MD            Ot            Z51.81      
      ENCOUNTER FOR THERAPEUTIC DRUG LEVEL MON                     

 

 10/06/2017            NORTH HERNANDEZ MD            Ot            Z79.899     
       OTHER LONG TERM (CURRENT) DRUG THERAPY                     

 

 2017            FELISHA MORENO DO            Ot            D68.59        
    OTHER PRIMARY THROMBOPHILIA                     

 

 2017            FELISHA MORENO DO            Ot            E78.1         
   PURE HYPERGLYCERIDEMIA                     

 

 2017            MICHAEL MORENO DOI            Ot            E78.5         
   HYPERLIPIDEMIA, UNSPECIFIED                     

 

 2017            TIFFANIE ALFARO FELISHA            Ot            F17.210       
     NICOTINE DEPENDENCE, CIGARETTES, UNCOMPL                     

 

 2017            TIFFANIE ALFARO FELISHA            Ot            I10            
ESSENTIAL (PRIMARY) HYPERTENSION                     

 

 2017            MICHAEL MORENO DOI            Ot            I21.4         
   NON-ST ELEVATION (NSTEMI) MYOCARDIAL INF                     

 

 2017            MICHAEL MORENO DOI            Ot            I25.10        
    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                      

 

 2017            MICHAEL MORENO DOI            Ot            I25.82        
    CHRONIC TOTAL OCCLUSION OF CORONARY LORI                     

 

 2017            MICHAEL MORENO DOI            Ot            I50.20        
    UNSPECIFIED SYSTOLIC (CONGESTIVE) HEART                      

 

 2017            FELISHA MORENO DO            Ot            K21.9         
   GASTRO-ESOPHAGEAL REFLUX DISEASE WITHOUT                     

 

 2017            MICHAEL MORENO DOI            Ot            R06.2         
   WHEEZING                     

 

 2017            FELISHA MORENO DO            Ot            Z79.01        
    LONG TERM (CURRENT) USE OF ANTICOAGULANT                     

 

 2017            FELISHA MORENO DO            Ot            Z82.49        
    FAMILY HX OF ISCHEM HEART DIS AND OTH DI                     

 

 2017            FELISHA MORENO DO            Ot            Z95.5         
   PRESENCE OF CORONARY ANGIOPLASTY IMPLANT                     

 

 2017            FELISHA MORENO DO            Ot            D68.59        
    OTHER PRIMARY THROMBOPHILIA                     

 

 2017            FELISHA MORENO DO            Ot            E78.1         
   PURE HYPERGLYCERIDEMIA                     

 

 2017            MICHAEL MORENO DOI            Ot            E78.5         
   HYPERLIPIDEMIA, UNSPECIFIED                     

 

 2017            MICHAEL MORENO DOI            Ot            F17.210       
     NICOTINE DEPENDENCE, CIGARETTES, UNCOMPL                     

 

 2017            MICHAEL MORENO DOI            Ot            I10            
ESSENTIAL (PRIMARY) HYPERTENSION                     

 

 2017            MORENO DO, FELISHA            Ot            I21.4         
   NON-ST ELEVATION (NSTEMI) MYOCARDIAL INF                     

 

 2017            TIFFANIE ALFARO FELISHA            Ot            I25.10        
    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                      

 

 2017            TIFFANIE ALFARO FELISHA            Ot            I25.82        
    CHRONIC TOTAL OCCLUSION OF CORONARY LORI                     

 

 2017            TIFFANIE ALFARO FELISHA            Ot            I50.20        
    UNSPECIFIED SYSTOLIC (CONGESTIVE) HEART                      

 

 2017            TIFFANIE ALFARO FELISHA            Ot            K21.9         
   GASTRO-ESOPHAGEAL REFLUX DISEASE WITHOUT                     

 

 2017            TIFFANIE ALFARO FELISHA            Ot            R06.2         
   WHEEZING                     

 

 2017            TIFFANIE ALFARO FELISHA            Ot            Z79.01        
    LONG TERM (CURRENT) USE OF ANTICOAGULANT                     

 

 2017            TIFFANIE ALFARO FELISHA            Ot            Z82.49        
    FAMILY HX OF ISCHEM HEART DIS AND OTH DI                     

 

 2017            TIFFANIE ALFARO FELISHA            Ot            Z95.5         
   PRESENCE OF CORONARY ANGIOPLASTY IMPLANT                     

 

 2017            TIFFANIE ALFARO FELISHA            Ot            D68.59        
    OTHER PRIMARY THROMBOPHILIA                     

 

 2017            TIFFANIE ALFARO FELISHA            Ot            E78.1         
   PURE HYPERGLYCERIDEMIA                     

 

 2017            TIFFANIE ALFARO FELISHA            Ot            E78.5         
   HYPERLIPIDEMIA, UNSPECIFIED                     

 

 2017            TIFFANIE ALFARO FELISHA            Ot            F17.210       
     NICOTINE DEPENDENCE, CIGARETTES, UNCOMPL                     

 

 2017            TIFFANIE ALFARO FELISHA            Ot            I10            
ESSENTIAL (PRIMARY) HYPERTENSION                     

 

 2017            TIFFANIE ALFARO FELISHA            Ot            I21.4         
   NON-ST ELEVATION (NSTEMI) MYOCARDIAL INF                     

 

 2017            TIFFANIE ALFARO FELISHA            Ot            I25.10        
    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                      

 

 2017            TIFFANIE ALFARO FELISHA            Ot            I25.82        
    CHRONIC TOTAL OCCLUSION OF CORONARY LORI                     

 

 2017            TIFFANIE ALFARO FELISHA            Ot            I50.20        
    UNSPECIFIED SYSTOLIC (CONGESTIVE) HEART                      

 

 2017            TIFFANIE ALFARO FELISHA            Ot            K21.9         
   GASTRO-ESOPHAGEAL REFLUX DISEASE WITHOUT                     

 

 2017            TIFFANIE ALFARO FELISHA            Ot            R06.2         
   WHEEZING                     

 

 2017            TIFFANIE ALFARO FELISHA            Ot            Z79.01        
    LONG TERM (CURRENT) USE OF ANTICOAGULANT                     

 

 2017            TIFFANIE ALFARO FELISHA            Ot            Z82.49        
    FAMILY HX OF ISCHEM HEART DIS AND OTH DI                     

 

 2017            TIFFANIE ALFARO FELISHA            Ot            Z95.5         
   PRESENCE OF CORONARY ANGIOPLASTY IMPLANT                     

 

 2017                         Ot            414.00            CORON 
ATHEROSCLER NOS TYPE VESSEL, NATIV                     

 

 2017                         Ot            786.50            CHEST PAIN 
NOS                     

 

 2017                         Ot            397.0            TRICUSPID 
VALVE DISEASE                     

 

 2017                         Ot            414.00            CORON 
ATHEROSCLER NOS TYPE VESSEL, NATIV                     

 

 2017                         Ot            424.0            MITRAL VALVE 
DISORDER                     

 

 2017                         Ot            786.50            CHEST PAIN 
NOS                     

 

 2017                         Ot            272.4            
HYPERLIPIDEMIA NEC/NOS                     

 

 2017                         Ot            401.9            HYPERTENSION 
NOS                     

 

 2017                         Ot            414.01            CORONARY 
ATHEROSCLEROSIS OF NATIVE CORON                     

 

 2017                         Ot            401.9            HYPERTENSION 
NOS                     

 

 2017                         Ot            414.01            CORONARY 
ATHEROSCLEROSIS OF NATIVE CORON                     

 

 2017                         Ot            428.0            CONGESTIVE 
HEART FAILURE NOS                     

 

 2017                         Ot            719.40            JOINT PAIN-
UNSPEC                     

 

 2017                         Ot            782.3            EDEMA       
              

 

 2017                         Ot            V58.61            
ANTICOAGULANTS,LT,CURRENT USE                     

 

 2017            NORTH HERNANDEZ MD            Ot            I11.0       
     HYPERTENSIVE HEART DISEASE WITH HEART FA                     

 

 2017            NORTH HERNANDEZ MD            Ot            I25.10      
      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                      

 

 2017            NORTH HERNANDEZ MD            Ot            I26.99      
      OTHER PULMONARY EMBOLISM WITHOUT ACUTE C                     

 

 2017            NORTH HERNANDEZ MD            Ot            I50.9       
     HEART FAILURE, UNSPECIFIED                     

 

 2017            NORTH HERNANDEZ MD            Ot            R07.9       
     CHEST PAIN, UNSPECIFIED                     

 

 2017            NORTH HERNANDEZ MD            Ot            I25.10      
      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                      

 

 2017            NORTH HERNANDEZ MD            Ot            I50.9       
     HEART FAILURE, UNSPECIFIED                     

 

 2017            NORTH HERNANDEZ MD            Ot            R07.9       
     CHEST PAIN, UNSPECIFIED                     

 

 2017            NORTH HERNANDEZ MD            Ot            I82.403     
       ACUTE EMBOLISM AND THOMBOS UNSP DEEP VEI                     

 

 2017            NORTH HERNANDEZ MD            Ot            Z79.01      
      LONG TERM (CURRENT) USE OF ANTICOAGULANT                     

 

 2017            NORTH HERNANDEZ MD            Ot            I26.99      
      OTHER PULMONARY EMBOLISM WITHOUT ACUTE C                     

 

 2017            NORTH HERNANDEZ MD            Ot            Z51.81      
      ENCOUNTER FOR THERAPEUTIC DRUG LEVEL MON                     

 

 2017            NORTH HERNANDEZ MD            Ot            I26.99      
      OTHER PULMONARY EMBOLISM WITHOUT ACUTE C                     

 

 2017            NORTH HERNANDEZ MD            Ot            Z51.81      
      ENCOUNTER FOR THERAPEUTIC DRUG LEVEL MON                     

 

 2017            NORTH HERNANDEZ MD            Ot            Z79.899     
       OTHER LONG TERM (CURRENT) DRUG THERAPY                     

 

 2017            DAMARIS WYNN            Ot            E78.00   
         PURE HYPERCHOLESTEROLEMIA, UNSPECIFIED                     

 

 2017            DAMARIS WYNN            Ot            I10      
      ESSENTIAL (PRIMARY) HYPERTENSION                     

 

 2017            DAMARIS WYNN            Ot            I25.2    
        OLD MYOCARDIAL INFARCTION                     

 

 2017            DAMARIS WYNN            Ot            K21.9    
        GASTRO-ESOPHAGEAL REFLUX DISEASE WITHOUT                     

 

 2017            DAMARIS WYNN            Ot            R22.2    
        LOCALIZED SWELLING, MASS AND LUMP, TRUNK                     

 

 2017            DAMARIS WYNN            Ot            S20.212A 
           CONTUSION OF LEFT FRONT WALL OF THORAX,                      

 

 2017            DAMARIS WYNN            Ot            X58.XXXA 
           EXPOSURE TO OTHER SPECIFIED FACTORS, INI                     

 

 2017            DAMARIS WYNN            Ot            Z79.01   
         LONG TERM (CURRENT) USE OF ANTICOAGULANT                     

 

 2017            DAMARIS WYNN            Ot            Z79.82   
         LONG TERM (CURRENT) USE OF ASPIRIN                     

 

 2017            DAMARIS WYNN            Ot            Z82.49   
         FAMILY HX OF ISCHEM HEART DIS AND OTH DI                     

 

 2017            DAMARIS WYNN            Ot            Z95.5    
        PRESENCE OF CORONARY ANGIOPLASTY IMPLANT                     

 

 2017            NORTH HERNANDEZ MD            Ot            I26.99      
      OTHER PULMONARY EMBOLISM WITHOUT ACUTE C                     

 

 2017            NORTH HERNANDEZ MD            Ot            Z79.01      
      LONG TERM (CURRENT) USE OF ANTICOAGULANT                     

 

 2017            NORTH HERNANDEZ MD            Ot            I50.9       
     HEART FAILURE, UNSPECIFIED                     

 

 12/15/2017                         Ot            414.00            CORON 
ATHEROSCLER NOS TYPE VESSEL, NATIV                     

 

 12/15/2017                         Ot            786.50            CHEST PAIN 
NOS                     

 

 12/15/2017                         Ot            397.0            TRICUSPID 
VALVE DISEASE                     

 

 12/15/2017                         Ot            414.00            CORON 
ATHEROSCLER NOS TYPE VESSEL, NATIV                     

 

 12/15/2017                         Ot            424.0            MITRAL VALVE 
DISORDER                     

 

 12/15/2017                         Ot            786.50            CHEST PAIN 
NOS                     

 

 12/15/2017                         Ot            272.4            
HYPERLIPIDEMIA NEC/NOS                     

 

 12/15/2017                         Ot            401.9            HYPERTENSION 
NOS                     

 

 12/15/2017                         Ot            414.01            CORONARY 
ATHEROSCLEROSIS OF NATIVE CORON                     

 

 12/15/2017                         Ot            401.9            HYPERTENSION 
NOS                     

 

 12/15/2017                         Ot            414.01            CORONARY 
ATHEROSCLEROSIS OF NATIVE CORON                     

 

 12/15/2017                         Ot            428.0            CONGESTIVE 
HEART FAILURE NOS                     

 

 12/15/2017                         Ot            719.40            JOINT PAIN-
UNSPEC                     

 

 12/15/2017                         Ot            782.3            EDEMA       
              

 

 12/15/2017                         Ot            V58.61            
ANTICOAGULANTS,LT,CURRENT USE                     

 

 12/15/2017            NORTH HERNANDEZ MD            Ot            I11.0       
     HYPERTENSIVE HEART DISEASE WITH HEART FA                     

 

 12/15/2017            NORTH HERNANDEZ MD            Ot            I25.10      
      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                      

 

 12/15/2017            NORTH HERNANDEZ MD            Ot            I26.99      
      OTHER PULMONARY EMBOLISM WITHOUT ACUTE C                     

 

 12/15/2017            NORTH HERNANDEZ MD            Ot            I50.9       
     HEART FAILURE, UNSPECIFIED                     

 

 12/15/2017            NORTH HERNANDEZ MD            Ot            R07.9       
     CHEST PAIN, UNSPECIFIED                     

 

 12/15/2017            NORTH HERNANDEZ MD            Ot            I25.10      
      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                      

 

 12/15/2017            NORTH HERNANDEZ MD            Ot            I50.9       
     HEART FAILURE, UNSPECIFIED                     

 

 12/15/2017            NORTH HERNANDEZ MD            Ot            R07.9       
     CHEST PAIN, UNSPECIFIED                     

 

 12/15/2017            NORTH HERNANDEZ MD            Ot            I82.403     
       ACUTE EMBOLISM AND THOMBOS UNSP DEEP VEI                     

 

 12/15/2017            NORTH HERNANDEZ MD            Ot            Z79.01      
      LONG TERM (CURRENT) USE OF ANTICOAGULANT                     

 

 12/15/2017            NORTH HERNANDEZ MD            Ot            I26.99      
      OTHER PULMONARY EMBOLISM WITHOUT ACUTE C                     

 

 12/15/2017            NORTH HERNANDEZ MD            Ot            Z51.81      
      ENCOUNTER FOR THERAPEUTIC DRUG LEVEL MON                     

 

 12/15/2017            NORTH HERNANDEZ MD            Ot            I26.99      
      OTHER PULMONARY EMBOLISM WITHOUT ACUTE C                     

 

 12/15/2017            NORTH HERNANDEZ MD            Ot            Z79.01      
      LONG TERM (CURRENT) USE OF ANTICOAGULANT                     

 

 12/15/2017            NORTH HERNANDEZ MD            Ot            I50.9       
     HEART FAILURE, UNSPECIFIED                     

 

 2017            NORTH HERNANDEZ MD            Ot            I26.99      
      OTHER PULMONARY EMBOLISM WITHOUT ACUTE C                     

 

 2017            NORTH HERNANDEZ MD            Ot            Z79.01      
      LONG TERM (CURRENT) USE OF ANTICOAGULANT                     

 

 2017            CANDE PIKE MD            Ot            I25.119       
     ATHSCL HEART DISEASE OF NATIVE COR ART W                     

 

 2017            GLENDY GREEN, CANDE DEWEY            Ot            Z01.812       
     ENCOUNTER FOR PREPROCEDURAL LABORATORY E                     

 

 2017            NORTH HERNANDEZ MD            Ot            I26.99      
      OTHER PULMONARY EMBOLISM WITHOUT ACUTE C                     

 

 2017            NORTH HERNANDEZ MD, Ot            Z79.01      
      LONG TERM (CURRENT) USE OF ANTICOAGULANT                     

 

 2018            NORTH HERNANDEZ MD, Ot            I26.99      
      OTHER PULMONARY EMBOLISM WITHOUT ACUTE C                     

 

 2018            NORTH HERNANDEZ MD, Ot            Z79.01      
      LONG TERM (CURRENT) USE OF ANTICOAGULANT                     

 

 2018            NORTH HERNANDEZ MD, Ot            I26.99      
      OTHER PULMONARY EMBOLISM WITHOUT ACUTE C                     

 

 2018            NORTH HERNANDEZ MD, Ot            Z79.01      
      LONG TERM (CURRENT) USE OF ANTICOAGULANT                     

 

 2018            NORTH HERNANDEZ MD, Ot            I10         
   ESSENTIAL (PRIMARY) HYPERTENSION                     

 

 2018            NORTH HERNANDEZ MD, Ot            I25.10      
      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                      

 

 2018            NORTH HERNANDEZ MD            Ot            R06.00      
      DYSPNEA, UNSPECIFIED                     

 

 2018            NORTH HERNANDEZ MD            Ot            R07.89      
      OTHER CHEST PAIN                     

 

 2018            NORTH HERNANDEZ MD            Ot            I50.9       
     HEART FAILURE, UNSPECIFIED                     

 

 2018            RADHA ARELLANO            Ot            E78.00     
       PURE HYPERCHOLESTEROLEMIA, UNSPECIFIED                     

 

 2018            RADHA ARELLANO            Ot            I10        
    ESSENTIAL (PRIMARY) HYPERTENSION                     

 

 2018            RADHA ARELLANO            Ot            I20.9      
      ANGINA PECTORIS, UNSPECIFIED                     

 

 2018            RADHA ARELLANO            Ot            I25.2      
      OLD MYOCARDIAL INFARCTION                     

 

 2018            RADHA ARELLANO            Ot            K21.9      
      GASTRO-ESOPHAGEAL REFLUX DISEASE WITHOUT                     

 

 2018            RADHA ARELLANO            Ot            M10.9      
      GOUT, UNSPECIFIED                     

 

 2018            RADHA ARELLANO            Ot            M79.672    
        PAIN IN LEFT FOOT                     

 

 2018            RADHA ARELLANO            Ot            Z79.01     
       LONG TERM (CURRENT) USE OF ANTICOAGULANT                     

 

 2018            RADHA ARELLANO            Ot            Z79.02     
       LONG TERM (CURRENT) USE OF ANTITHROMBOTI                     

 

 2018            RADHA ARELLANO            Ot            Z79.82     
       LONG TERM (CURRENT) USE OF ASPIRIN                     

 

 2018            RADHA ARELLANO            Ot            Z82.49     
       FAMILY HX OF ISCHEM HEART DIS AND OTH DI                     

 

 2018            RADHA ARELLANO            Ot            Z86.718    
        PERSONAL HISTORY OF OTHER VENOUS THROMBO                     

 

 2018            RADHA ARELLANO            Ot            Z95.5      
      PRESENCE OF CORONARY ANGIOPLASTY IMPLANT                     

 

 2018            RADHA ARELLANO            Ot            E78.00     
       PURE HYPERCHOLESTEROLEMIA, UNSPECIFIED                     

 

 2018            RADHA ARELLANO            Ot            I10        
    ESSENTIAL (PRIMARY) HYPERTENSION                     

 

 2018            RADHA ARELLANO            Ot            I20.9      
      ANGINA PECTORIS, UNSPECIFIED                     

 

 2018            RADHA ARELLANO            Ot            I25.2      
      OLD MYOCARDIAL INFARCTION                     

 

 2018            RADHA ARELLANO            Ot            K21.9      
      GASTRO-ESOPHAGEAL REFLUX DISEASE WITHOUT                     

 

 2018            RADHA ARELLANO            Ot            M10.9      
      GOUT, UNSPECIFIED                     

 

 2018            RADHA ARELLANO            Ot            M79.672    
        PAIN IN LEFT FOOT                     

 

 2018            RADHA ARELLANO            Ot            Z79.01     
       LONG TERM (CURRENT) USE OF ANTICOAGULANT                     

 

 2018            RADHA ARELLANO            Ot            Z79.02     
       LONG TERM (CURRENT) USE OF ANTITHROMBOTI                     

 

 2018            RADHA ARELLANO            Ot            Z79.82     
       LONG TERM (CURRENT) USE OF ASPIRIN                     

 

 2018            RADHA ARELLANO            Ot            Z82.49     
       FAMILY HX OF ISCHEM HEART DIS AND OTH DI                     

 

 2018            RADHA ARELLANO            Ot            Z86.718    
        PERSONAL HISTORY OF OTHER VENOUS THROMBO                     

 

 2018            RADHA ARELLANO            Ot            Z95.5      
      PRESENCE OF CORONARY ANGIOPLASTY IMPLANT                     

 

 2018            NORTH HERNANDEZ MD            Ot            I10         
   ESSENTIAL (PRIMARY) HYPERTENSION                     

 

 2018            NORTH HERNANDEZ MD            Ot            I25.10      
      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                      

 

 2018            NORTH HERNANDEZ MD            Ot            R06.00      
      DYSPNEA, UNSPECIFIED                     

 

 2018            NORTH HERNANDEZ MD            Ot            R07.89      
      OTHER CHEST PAIN                     

 

 2018            MARY GREEN, CANDE MA            Ot            E78.00      
      PURE HYPERCHOLESTEROLEMIA, UNSPECIFIED                     

 

 2018            MARY GREEN, CANDE MA            Ot            E78.5       
     HYPERLIPIDEMIA, UNSPECIFIED                     

 

 2018            MARY GREEN, CANDE MA            Ot            I11.0       
     HYPERTENSIVE HEART DISEASE WITH HEART FA                     

 

 2018            MARY GREEN, CANDE MA            Ot            I25.10      
      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                      

 

 2018            MARY GREEN, CANDE MA            Ot            I25.2       
     OLD MYOCARDIAL INFARCTION                     

 

 2018            MARY GREEN, CANDE MA            Ot            I50.9       
     HEART FAILURE, UNSPECIFIED                     

 

 2018            MARY GREEN, CANDE MA            Ot            K21.9       
     GASTRO-ESOPHAGEAL REFLUX DISEASE WITHOUT                     

 

 2018            MARY GREEN, CANDE MA            Ot            L03.116     
       CELLULITIS OF LEFT LOWER LIMB                     

 

 2018            MARY GREEN, CANDE MA            Ot            M10.9       
     GOUT, UNSPECIFIED                     

 

 2018            MARY GREEN, CANDE MA            Ot            Z86.718     
       PERSONAL HISTORY OF OTHER VENOUS THROMBO                     

 

 2018            MARY GREEN, CANDE MA            Ot            Z87.891     
       PERSONAL HISTORY OF NICOTINE DEPENDENCE                     

 

 2018            MARY GREEN, CANDE MA            Ot            Z95.5       
     PRESENCE OF CORONARY ANGIOPLASTY IMPLANT                     

 

 2018            Brokob, Dena            W            238.71            
ESSENTIAL THROMBOCYTHEMIA                     

 

 2018            Brokob, Dena            A            682.6            
CELLULITIS AND ABSCESS OF LEG, EXCEPT FOOT                     

 

 2018            Elizabethb Dena            W            D47.3            
ESSENTIAL (HEMORRHAGIC) THROMBOCYTHEMIA                     

 

 2018            Brokob, Dena            A            L03.116            
CELLULITIS OF LEFT LOWER LIMB                     

 

 2018            MARY GREEN, ONRTH WHITTAKER            Ot            M79.605     
       PAIN IN LEFT LEG                     

 

 2018                         Ot            414.00            CORON 
ATHEROSCLER NOS TYPE VESSEL, NATIV                     

 

 2018                         Ot            786.50            CHEST PAIN 
NOS                     

 

 2018                         Ot            397.0            TRICUSPID 
VALVE DISEASE                     

 

 2018                         Ot            414.00            CORON 
ATHEROSCLER NOS TYPE VESSEL, NATIV                     

 

 2018                         Ot            424.0            MITRAL VALVE 
DISORDER                     

 

 2018                         Ot            786.50            CHEST PAIN 
NOS                     

 

 2018                         Ot            272.4            
HYPERLIPIDEMIA NEC/NOS                     

 

 2018                         Ot            401.9            HYPERTENSION 
NOS                     

 

 2018                         Ot            414.01            CORONARY 
ATHEROSCLEROSIS OF NATIVE CORON                     

 

 2018                         Ot            401.9            HYPERTENSION 
NOS                     

 

 2018                         Ot            414.01            CORONARY 
ATHEROSCLEROSIS OF NATIVE CORON                     

 

 2018                         Ot            428.0            CONGESTIVE 
HEART FAILURE NOS                     

 

 2018                         Ot            719.40            JOINT PAIN-
UNSPEC                     

 

 2018                         Ot            782.3            EDEMA       
              

 

 2018                         Ot            V58.61            
ANTICOAGULANTS,LT,CURRENT USE                     

 

 2018            NORTH EHRNANDEZ MD            Ot            I11.0       
     HYPERTENSIVE HEART DISEASE WITH HEART FA                     

 

 2018            NORTH HERNANDEZ MD            Ot            I25.10      
      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                      

 

 2018            NORTH HERNANDEZ MD            Ot            I26.99      
      OTHER PULMONARY EMBOLISM WITHOUT ACUTE C                     

 

 2018            NORTH HERNANDEZ MD            Ot            I50.9       
     HEART FAILURE, UNSPECIFIED                     

 

 2018            NORTH HERNANDEZ MD            Ot            R07.9       
     CHEST PAIN, UNSPECIFIED                     

 

 2018            NORTH HERNANDEZ MD            Ot            I25.10      
      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                      

 

 2018            NORTH HERNANDEZ MD            Ot            I50.9       
     HEART FAILURE, UNSPECIFIED                     

 

 2018            NORTH HERNANDEZ MD            Ot            R07.9       
     CHEST PAIN, UNSPECIFIED                     

 

 2018            NORTH HERNANDEZ MD            Ot            I82.403     
       ACUTE EMBOLISM AND THOMBOS UNSP DEEP VEI                     

 

 2018            NORTH HERNANDEZ MD            Ot            Z79.01      
      LONG TERM (CURRENT) USE OF ANTICOAGULANT                     

 

 2018            NORTH HERNANDEZ MD            Ot            I26.99      
      OTHER PULMONARY EMBOLISM WITHOUT ACUTE C                     

 

 2018            NORTH HERNANDEZ MD            Ot            Z51.81      
      ENCOUNTER FOR THERAPEUTIC DRUG LEVEL MON                     

 

 2018            NORTH HERNANDEZ MD            Ot            I50.9       
     HEART FAILURE, UNSPECIFIED                     

 

 2018            NORTH HERNANDEZ MD            Ot            I10         
   ESSENTIAL (PRIMARY) HYPERTENSION                     

 

 2018            NORTH HERNANDEZ MD            Ot            I25.10      
      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                      

 

 2018            NORTH HERNANDEZ MD            Ot            R06.00      
      DYSPNEA, UNSPECIFIED                     

 

 2018            NORTH HERNANDEZ MD            Ot            R07.89      
      OTHER CHEST PAIN                     

 

 2018            CANDE PIKE MD            Ot            I25.119       
     ATHSCL HEART DISEASE OF NATIVE COR ART W                     

 

 2018            CANDE PIKE MD            Ot            Z01.812       
     ENCOUNTER FOR PREPROCEDURAL LABORATORY E                     

 

 2018            MARY MD, BASHAR J            Ot            I26.99      
      OTHER PULMONARY EMBOLISM WITHOUT ACUTE C                     

 

 2018            NORTH HERNANDEZ MD            Ot            Z79.01      
      LONG TERM (CURRENT) USE OF ANTICOAGULANT                     

 

 2018            NORTH HERNANDEZ MD            Ot            M79.605     
       PAIN IN LEFT LEG                     

 

 2018            NORTH HERNANDEZ MD            Ot            I26.99      
      OTHER PULMONARY EMBOLISM WITHOUT ACUTE C                     

 

 2018            NORTH HERNANDEZ MD            Ot            Z79.01      
      LONG TERM (CURRENT) USE OF ANTICOAGULANT                     

 

 2018            NORTH HERNANDEZ MD            Ot            M79.605     
       PAIN IN LEFT LEG                     

 

 2018            NORTH HERNANDEZ MD            Ot            F17.200     
       NICOTINE DEPENDENCE, UNSPECIFIED, UNCOMP                     

 

 2018            NORTH HERNANDEZ MD            Ot            I10         
   ESSENTIAL (PRIMARY) HYPERTENSION                     

 

 2018            NORTH HERNANDEZ MD            Ot            I25.10      
      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                      

 

 2018            NORTH HERNANDEZ MD            Ot            I26.99      
      OTHER PULMONARY EMBOLISM WITHOUT ACUTE C                     

 

 2018            NORTH HERNANDEZ MD            Ot            I70.0       
     ATHEROSCLEROSIS OF AORTA                     

 

 2018            NORTH HERNANDEZ MD            Ot            I70.8       
     ATHEROSCLEROSIS OF OTHER ARTERIES                     

 

 2018            NORTH HERNANDEZ MD            Ot            I82.409     
       ACUTE EMBOLISM AND THOMBOS UNSP DEEP VN                      

 

 2018            NORTH HERNANDEZ MD            Ot            M89.572     
       OSTEOLYSIS, LEFT ANKLE AND FOOT                     

 

 2018            NORTH HERNANDEZ MD            Ot            R06.00      
      DYSPNEA, UNSPECIFIED                     

 

 2018            GONSALO SOMERS MD            Ot            G63        
    POLYNEUROPATHY IN DISEASES CLASSIFIED EL                     

 

 2018            GONSALO SOMERS MD            Ot            I75.022    
        ATHEROEMBOLISM OF LEFT LOWER EXTREMITY                     

 

 2018            GONSALO SOMERS MD            Ot            M79.662    
        PAIN IN LEFT LOWER LEG                     

 

 2018            NORTH HERNANDEZ MD            Ot            F17.200     
       NICOTINE DEPENDENCE, UNSPECIFIED, UNCOMP                     

 

 2018            NORTH HERNANDEZ MD            Ot            I10         
   ESSENTIAL (PRIMARY) HYPERTENSION                     

 

 2018            NORTH HERNANDEZ MD            Ot            I25.10      
      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                      

 

 2018            NORTH HERNANDEZ MD            Ot            I26.99      
      OTHER PULMONARY EMBOLISM WITHOUT ACUTE C                     

 

 2018            NORTH HERNANDEZ MD            Ot            I70.0       
     ATHEROSCLEROSIS OF AORTA                     

 

 2018            NORTH HERNANDEZ MD            Ot            I70.8       
     ATHEROSCLEROSIS OF OTHER ARTERIES                     

 

 2018            NORTH HERNANDEZ MD            Ot            I82.409     
       ACUTE EMBOLISM AND THOMBOS UNSP DEEP VN                      

 

 2018            NORTH HERNANDEZ MD            Ot            M89.572     
       OSTEOLYSIS, LEFT ANKLE AND FOOT                     

 

 2018            NORTH HERNANDEZ MD            Ot            R06.00      
      DYSPNEA, UNSPECIFIED                     

 

 2018            NORTH HERNANDEZ MD            Ot            I10         
   ESSENTIAL (PRIMARY) HYPERTENSION                     

 

 2018            NORTH HERNANDEZ MD            Ot            I21.3       
     ST ELEVATION (STEMI) MYOCARDIAL INFARCTI                     

 

 2018            NORTH HERNANDEZ MD            Ot            I25.10      
      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                      

 

 2018            NORTH HERNANDEZ MD            Ot            K27.9       
     PEPTIC ULC, SITE UNSP, UNSP AS AC OR CHR                     

 

 2018            NORTH HERNANDEZ MD            Ot            R06.02      
      SHORTNESS OF BREATH                     

 

 2018            NORTH HERNANDEZ MD            Ot            R07.89      
      OTHER CHEST PAIN                     

 

 2018            RUTH GREEN, KANDACE WHITTAKER            Ot            M79.89    
        OTHER SPECIFIED SOFT TISSUE DISORDERS                     

 

 2018            NORTH HERNANDEZ MD            Ot            I50.9       
     HEART FAILURE, UNSPECIFIED                     

 

 2018            NORTH HERNANDEZ MD            Ot            F17.200     
       NICOTINE DEPENDENCE, UNSPECIFIED, UNCOMP                     

 

 2018            NORTH HERNANDEZ MD            Ot            I10         
   ESSENTIAL (PRIMARY) HYPERTENSION                     

 

 2018            NORTH HERNANDEZ MD            Ot            I25.10      
      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                      

 

 2018            NORTH HERNANDEZ MD            Ot            I26.99      
      OTHER PULMONARY EMBOLISM WITHOUT ACUTE C                     

 

 2018            NORTH HERNANDEZ MD            Ot            I70.0       
     ATHEROSCLEROSIS OF AORTA                     

 

 2018            NORTH HERNANDEZ MD            Ot            I70.8       
     ATHEROSCLEROSIS OF OTHER ARTERIES                     

 

 2018            NORTH HERNANDEZ MD            Ot            I82.409     
       ACUTE EMBOLISM AND THOMBOS UNSP DEEP VN                      

 

 2018            NORTH HERNANDEZ MD            Ot            M89.572     
       OSTEOLYSIS, LEFT ANKLE AND FOOT                     

 

 2018            NORTH HERNANDEZ MD            Ot            R06.00      
      DYSPNEA, UNSPECIFIED                     

 

 2018            NORTH HERNANDEZ MD            Ot            I50.9       
     HEART FAILURE, UNSPECIFIED                     

 

 2018            YONIS GREEN, GONSALO MERCADO            Ot            G63        
    POLYNEUROPATHY IN DISEASES CLASSIFIED EL                     

 

 2018            YONIS GREEN, GONSALO MERCADO            Ot            I75.022    
        ATHEROEMBOLISM OF LEFT LOWER EXTREMITY                     

 

 2018            YONIS GREEN, GONSALO MERCADO            Ot            M79.662    
        PAIN IN LEFT LOWER LEG                     

 

 2018            RUTH GREEN, KANDACE WHITTAKER            Ot            M79.89    
        OTHER SPECIFIED SOFT TISSUE DISORDERS                     

 

 2018            NORTH HERNANDEZ MD            Ot            I10         
   ESSENTIAL (PRIMARY) HYPERTENSION                     

 

 2018            NORTH HERNANDEZ MD            Ot            I21.3       
     ST ELEVATION (STEMI) MYOCARDIAL INFARCTI                     

 

 2018            NORTH HERNANDEZ MD            Ot            I25.10      
      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                      

 

 2018            NORTH HERNANDEZ MD            Ot            K27.9       
     PEPTIC ULC, SITE UNSP, UNSP AS AC OR CHR                     

 

 2018            NORTH HERNANDEZ MD            Ot            R06.02      
      SHORTNESS OF BREATH                     

 

 2018            NORTH HERNANDEZ MD            Ot            R07.89      
      OTHER CHEST PAIN                     

 

 2018            NORTH HERNANDEZ MD            Ot            F17.200     
       NICOTINE DEPENDENCE, UNSPECIFIED, UNCOMP                     

 

 2018            NORTH HERNANDEZ MD            Ot            I10         
   ESSENTIAL (PRIMARY) HYPERTENSION                     

 

 2018            NORTH HERNANDEZ MD            Ot            I25.10      
      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                      

 

 2018            NORTH HERNANDEZ MD            Ot            I26.99      
      OTHER PULMONARY EMBOLISM WITHOUT ACUTE C                     

 

 2018            NORTH HERNANDEZ MD            Ot            I70.0       
     ATHEROSCLEROSIS OF AORTA                     

 

 2018            NORTH HERNANDEZ MD            Ot            I70.8       
     ATHEROSCLEROSIS OF OTHER ARTERIES                     

 

 2018            NORTH HERNANDEZ MD            Ot            I82.402     
       ACUTE EMBOLISM AND THOMBOS UNSP DEEP VEI                     

 

 2018            NORTH HERNANDEZ MD            Ot            M89.572     
       OSTEOLYSIS, LEFT ANKLE AND FOOT                     

 

 2018            NORTH HERNANDEZ MD            Ot            R06.00      
      DYSPNEA, UNSPECIFIED                     

 

 2018            NORTH HERNANDEZ MD            Ot            F17.200     
       NICOTINE DEPENDENCE, UNSPECIFIED, UNCOMP                     

 

 2018            NORTH HERNANDEZ MD            Ot            I10         
   ESSENTIAL (PRIMARY) HYPERTENSION                     

 

 2018            NORTH HERNANDEZ MD            Ot            I25.10      
      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                      

 

 2018            NORTH HERNANDEZ MD            Ot            I26.99      
      OTHER PULMONARY EMBOLISM WITHOUT ACUTE C                     

 

 2018            NORTH HERNANDEZ MD            Ot            I70.0       
     ATHEROSCLEROSIS OF AORTA                     

 

 2018            NORTH HERNANDEZ MD            Ot            I70.8       
     ATHEROSCLEROSIS OF OTHER ARTERIES                     

 

 2018            NORTH HERNANDEZ MD            Ot            I82.402     
       ACUTE EMBOLISM AND THOMBOS UNSP DEEP VEI                     

 

 2018            NORTH HERNANDEZ MD            Ot            M89.572     
       OSTEOLYSIS, LEFT ANKLE AND FOOT                     

 

 2018            NORTH HERNANDEZ MD            Ot            R06.00      
      DYSPNEA, UNSPECIFIED                     

 

 2018            NORTH HERNANDEZ MD            Ot            I26.99      
      OTHER PULMONARY EMBOLISM WITHOUT ACUTE C                     

 

 2018            NORTH HERNANDEZ MD            Ot            Z79.01      
      LONG TERM (CURRENT) USE OF ANTICOAGULANT                     

 

 2018            NORTH HERNANDEZ MD            Ot            I26.99      
      OTHER PULMONARY EMBOLISM WITHOUT ACUTE C                     

 

 2018            NORTH HERNANDEZ MD            Ot            Z79.01      
      LONG TERM (CURRENT) USE OF ANTICOAGULANT                     

 

 2018            NORTH HERNANDEZ MD            Ot            I26.99      
      OTHER PULMONARY EMBOLISM WITHOUT ACUTE C                     

 

 2018            NORTH HERNANDEZ MD            Ot            Z79.01      
      LONG TERM (CURRENT) USE OF ANTICOAGULANT                     

 

 2018            NORTH HERNANDEZ MD            Ot            I26.99      
      OTHER PULMONARY EMBOLISM WITHOUT ACUTE C                     

 

 2018            NORTH HERNANDEZ MD            Ot            Z79.01      
      LONG TERM (CURRENT) USE OF ANTICOAGULANT                     

 

 2018            NORTH HERNANDEZ MD            Ot            I26.99      
      OTHER PULMONARY EMBOLISM WITHOUT ACUTE C                     

 

 2018            NORTH HERNANDEZ MD            Ot            Z79.01      
      LONG TERM (CURRENT) USE OF ANTICOAGULANT                     

 

 2018            NORTH HERNANDEZ MD            Ot            I26.99      
      OTHER PULMONARY EMBOLISM WITHOUT ACUTE C                     

 

 2018            NORTH HERNANDEZ MD            Ot            Z79.01      
      LONG TERM (CURRENT) USE OF ANTICOAGULANT                     

 

 2018            NORTH HERNANDEZ MD            Ot            I26.99      
      OTHER PULMONARY EMBOLISM WITHOUT ACUTE C                     

 

 2018            NORTH HERNANDEZ MD            Ot            Z79.01      
      LONG TERM (CURRENT) USE OF ANTICOAGULANT                     

 

 10/03/2018            NORTH HERNANDEZ MD            Ot            I26.99      
      OTHER PULMONARY EMBOLISM WITHOUT ACUTE C                     

 

 10/03/2018            NORTH HERNANDEZ MD, Ot            Z79.01      
      LONG TERM (CURRENT) USE OF ANTICOAGULANT                     



                                                                               
                                                                               
                                                                               
                                                                               
                                                                               
                                                                               
                                                                               
                                                                               
                                                                               
                                                                               
                                                                               
                                                                               
                                                                               
                                                                               
                                                                               
                                                                               
                                                                               
                                                                               
                                                                               
                                                                               
                                                                               
                                                                               
                                                                        



Procedures

      





 Code            Description            Performed By            Performed On   
     

 

             CARDIOLOG                                  NORTH HERNANDEZ          
                         2014        

 

             [id number]                                  DILATION OF 1 COR ART 
WITH DRUG-ELUT INT                                   2017        

 

             [id number]                                  MEASURE OF CARDIAC SAMPL 
  PRESSURE, L H                                   2017        

 

             [id number]                                  FLUOROSCOPY OF MULT COR 
ART USING L OSM                                    2017        

 

             [id number]                                  FLUOROSCOPY OF LEFT HEART 
USING LOW OSMO                                   2017        



                          



Results

      





 Test            Result            Range        









 Complete urinalysis with reflex to culture - 16 10:52         









 Urine color determination            YELLOW             NRG        

 

 Urine clarity determination            CLEAR             NRG        

 

 Urine pH measurement by test strip            8             5-9        

 

 Specific gravity of urine by test strip            1.010             1.016-
1.022        

 

 Urine protein assay by test strip, semi-quantitative            NEGATIVE      
       NEGATIVE        

 

 Urine glucose detection by automated test strip            NEGATIVE           
  NEGATIVE        

 

 Erythrocytes detection in urine sediment by light microscopy            
NEGATIVE             NEGATIVE        

 

 Urine ketones detection by automated test strip            NEGATIVE           
  NEGATIVE        

 

 Urine nitrite detection by test strip            NEGATIVE             NEGATIVE
        

 

 Urine total bilirubin detection by test strip            NEGATIVE             
NEGATIVE        

 

 Urine urobilinogen measurement by automated test strip (mass/volume)          
  NORMAL             NORMAL        

 

 Urine leukocyte esterase detection by dipstick            NEGATIVE             
NEGATIVE        

 

 Automated urine sediment erythrocyte count by microscopy (number/high power 
field)            NONE             NRG        

 

 Automated urine sediment leukocyte count by microscopy (number/high power field
)            NONE             NRG        

 

 Bacteria detection in urine sediment by light microscopy            NEGATIVE  
           NRG        

 

 Crystals detection in urine sediment by light microscopy            NONE      
       NRG        

 

 Casts detection in urine sediment by light microscopy            NONE         
    NRG        

 

 Mucus detection in urine sediment by light microscopy            NEGATIVE     
        NRG        

 

 Complete urinalysis with reflex to culture            NO             NRG      
  









 Automated blood complete blood count (hemogram) panel - 16 11:00         









 Blood leukocytes automated count (number/volume)            9.9 10*3/uL       
     4.3-11.0        

 

 Blood erythrocytes automated count (number/volume)            5.46 10*6/uL    
        4.35-5.85        

 

 Venous blood hemoglobin measurement (mass/volume)            15.7 g/dL        
    13.3-17.7        

 

 Blood hematocrit (volume fraction)            47 %            40-54        

 

 Automated erythrocyte mean corpuscular volume            86 [foz_us]          
  80-99        

 

 Automated erythrocyte mean corpuscular hemoglobin (mass per erythrocyte)      
      29 pg            25-34        

 

 Automated erythrocyte mean corpuscular hemoglobin concentration measurement (
mass/volume)            33 g/dL            32-36        

 

 Automated erythrocyte distribution width ratio            13.9 %            
10.0-14.5        

 

 Automated blood platelet count (count/volume)            282 10*3/uL          
  130-400        

 

 Automated blood platelet mean volume measurement            9.0 [foz_us]      
      7.4-10.4        









 PT panel in platelet poor plasma by coagulation assay - 16 11:00         









 Prothrombin time (PT) in platelet poor plasma by coagulation assay            
22.5 s            12.2-14.7        

 

 INR in platelet poor plasma or blood by coagulation assay            2.0      
       0.8-1.4        









 Activated partial thromboplastin time (aPTT) in platelet poor plasma 
bycoagulation assay - 16 11:00         









 Activated partial thromboplastin time (aPTT) in platelet poor plasma 
bycoagulation assay            45 s            24-35        









 Comprehensive metabolic panel - 16 11:00         









 Serum or plasma sodium measurement (moles/volume)            140 mmol/L       
     135-145        

 

 Serum or plasma potassium measurement (moles/volume)            4.1 mmol/L    
        3.6-5.0        

 

 Serum or plasma chloride measurement (moles/volume)            103 mmol/L     
               

 

 Carbon dioxide            27 mmol/L            21-32        

 

 Serum or plasma anion gap determination (moles/volume)            10 mmol/L   
         5-14        

 

 Serum or plasma urea nitrogen measurement (mass/volume)            9 mg/dL    
        7-18        

 

 Serum or plasma creatinine measurement (mass/volume)            0.79 mg/dL    
        0.60-1.30        

 

 Serum or plasma urea nitrogen/creatinine mass ratio            11             
NRG        

 

 Serum or plasma creatinine measurement with calculation of estimated 
glomerular filtration rate            >             NRG        

 

 Serum or plasma glucose measurement (mass/volume)            96 mg/dL         
           

 

 Serum or plasma calcium measurement (mass/volume)            9.5 mg/dL        
    8.5-10.1        

 

 Serum or plasma total bilirubin measurement (mass/volume)            0.8 mg/dL
            0.1-1.0        

 

 Serum or plasma alkaline phosphatase measurement (enzymatic activity/volume)  
          79 U/L                    

 

 Serum or plasma aspartate aminotransferase measurement (enzymatic activity/
volume)            26 U/L            5-34        

 

 Serum or plasma alanine aminotransferase measurement (enzymatic activity/volume
)            27 U/L            0-55        

 

 Serum or plasma protein measurement (mass/volume)            7.3 g/dL         
   6.4-8.2        

 

 Serum or plasma albumin measurement (mass/volume)            4.4 g/dL         
   3.2-4.5        









 Lipid 1996 panel - 16 11:00         









 Serum or plasma triglyceride measurement (mass/volume)            278 mg/dL   
         <150        

 

 Serum or plasma cholesterol measurement (mass/volume)            241 mg/dL    
        < 200        

 

 Serum or plasma cholesterol in HDL measurement (mass/volume)            34 mg/
dL            40-60        

 

 Cholesterol in LDL [mass/volume] in serum or plasma by direct assay            
165 mg/dL            1-129        

 

 Serum or plasma cholesterol in VLDL measurement (mass/volume)            56 mg/
dL            5-40        









 Methicillin resistant Staphylococcus aureus (MRSA) screening culture -  11:00         









 Methicillin resistant Staphylococcus aureus (MRSA) screening culture          
  NEG             NRG        









 PT panel in platelet poor plasma by coagulation assay - 17 14:21         









 Prothrombin time (PT) in platelet poor plasma by coagulation assay            
27.0 s            12.2-14.7        

 

 INR in platelet poor plasma or blood by coagulation assay            2.5      
       0.8-1.4        









 Complete blood count (CBC) with automated white blood cell (WBC) differential 
- 17 11:03         









 Blood leukocytes automated count (number/volume)            8.9 10*3/uL       
     4.3-11.0        

 

 Blood erythrocytes automated count (number/volume)            4.97 10*6/uL    
        4.35-5.85        

 

 Venous blood hemoglobin measurement (mass/volume)            14.6 g/dL        
    13.3-17.7        

 

 Blood hematocrit (volume fraction)            43 %            40-54        

 

 Automated erythrocyte mean corpuscular volume            86 [foz_us]          
  80-99        

 

 Automated erythrocyte mean corpuscular hemoglobin (mass per erythrocyte)      
      29 pg            25-34        

 

 Automated erythrocyte mean corpuscular hemoglobin concentration measurement (
mass/volume)            34 g/dL            32-36        

 

 Automated erythrocyte distribution width ratio            13.7 %            
10.0-14.5        

 

 Automated blood platelet count (count/volume)            285 10*3/uL          
  130-400        

 

 Automated blood platelet mean volume measurement            9.7 [foz_us]      
      7.4-10.4        

 

 Automated blood neutrophils/100 leukocytes            44 %            42-75   
     

 

 Automated blood lymphocytes/100 leukocytes            40 %            12-44   
     

 

 Blood monocytes/100 leukocytes            11 %            0-12        

 

 Automated blood eosinophils/100 leukocytes            4 %            0-10     
   

 

 Automated blood basophils/100 leukocytes            1 %            0-10        

 

 Blood neutrophils automated count (number/volume)            3.9 10*3         
   1.8-7.8        

 

 Blood lymphocytes automated count (number/volume)            3.5 10*3         
   1.0-4.0        

 

 Blood monocytes automated count (number/volume)            1.0 10*3            
0.0-1.0        

 

 Automated eosinophil count            0.4 10*3/uL            0.0-0.3        

 

 Automated blood basophil count (count/volume)            0.1 10*3/uL          
  0.0-0.1        









 Comprehensive metabolic panel - 17 11:03         









 Serum or plasma sodium measurement (moles/volume)            140 mmol/L       
     135-145        

 

 Serum or plasma potassium measurement (moles/volume)            4.5 mmol/L    
        3.6-5.0        

 

 Serum or plasma chloride measurement (moles/volume)            105 mmol/L     
               

 

 Carbon dioxide            27 mmol/L            21-32        

 

 Serum or plasma anion gap determination (moles/volume)            8 mmol/L    
        5-14        

 

 Serum or plasma urea nitrogen measurement (mass/volume)            8 mg/dL    
        7-18        

 

 Serum or plasma creatinine measurement (mass/volume)            0.76 mg/dL    
        0.60-1.30        

 

 Serum or plasma urea nitrogen/creatinine mass ratio            11             
NRG        

 

 Serum or plasma creatinine measurement with calculation of estimated 
glomerular filtration rate            >             NRG        

 

 Serum or plasma glucose measurement (mass/volume)            91 mg/dL         
           

 

 Serum or plasma calcium measurement (mass/volume)            8.9 mg/dL        
    8.5-10.1        

 

 Serum or plasma total bilirubin measurement (mass/volume)            0.3 mg/dL
            0.1-1.0        

 

 Serum or plasma alkaline phosphatase measurement (enzymatic activity/volume)  
          61 U/L                    

 

 Serum or plasma aspartate aminotransferase measurement (enzymatic activity/
volume)            24 U/L            5-34        

 

 Serum or plasma alanine aminotransferase measurement (enzymatic activity/volume
)            18 U/L            0-55        

 

 Serum or plasma protein measurement (mass/volume)            6.4 g/dL         
   6.4-8.2        

 

 Serum or plasma albumin measurement (mass/volume)            3.9 g/dL         
   3.2-4.5        









 Lipase - 17 11:03         









 Lipase            14 U/L            8-78        









 PT panel in platelet poor plasma by coagulation assay - 08/15/17 11:03         









 Prothrombin time (PT) in platelet poor plasma by coagulation assay            
25.0 s            12.2-14.7        

 

 INR in platelet poor plasma or blood by coagulation assay            2.3      
       0.8-1.4        









 PT panel in platelet poor plasma by coagulation assay - 17 11:00         









 Prothrombin time (PT) in platelet poor plasma by coagulation assay            
20.4 s            12.2-14.7        

 

 INR in platelet poor plasma or blood by coagulation assay            1.7      
       0.8-1.4        









 PT panel in platelet poor plasma by coagulation assay - 17 08:15         









 Prothrombin time (PT) in platelet poor plasma by coagulation assay            
28.3 s            12.2-14.7        

 

 INR in platelet poor plasma or blood by coagulation assay            2.7      
       0.8-1.4        









 Complete blood count (CBC) with automated white blood cell (WBC) differential 
- 17 15:15         









 Blood leukocytes automated count (number/volume)            13.4 10*3/uL      
      4.3-11.0        

 

 Blood erythrocytes automated count (number/volume)            5.18 10*6/uL    
        4.35-5.85        

 

 Venous blood hemoglobin measurement (mass/volume)            15.5 g/dL        
    13.3-17.7        

 

 Blood hematocrit (volume fraction)            44 %            40-54        

 

 Automated erythrocyte mean corpuscular volume            86 [foz_us]          
  80-99        

 

 Automated erythrocyte mean corpuscular hemoglobin (mass per erythrocyte)      
      30 pg            25-34        

 

 Automated erythrocyte mean corpuscular hemoglobin concentration measurement (
mass/volume)            35 g/dL            32-36        

 

 Automated erythrocyte distribution width ratio            13.7 %            
10.0-14.5        

 

 Automated blood platelet count (count/volume)            336 10*3/uL          
  130-400        

 

 Automated blood platelet mean volume measurement            9.0 [foz_us]      
      7.4-10.4        

 

 Automated blood neutrophils/100 leukocytes            46 %            42-75   
     

 

 Automated blood lymphocytes/100 leukocytes            39 %            12-44   
     

 

 Blood monocytes/100 leukocytes            11 %            0-12        

 

 Automated blood eosinophils/100 leukocytes            3 %            0-10     
   

 

 Automated blood basophils/100 leukocytes            1 %            0-10        

 

 Blood neutrophils automated count (number/volume)            6.1 10*3         
   1.8-7.8        

 

 Blood lymphocytes automated count (number/volume)            5.2 10*3         
   1.0-4.0        

 

 Blood monocytes automated count (number/volume)            1.5 10*3            
0.0-1.0        

 

 Automated eosinophil count            0.4 10*3/uL            0.0-0.3        

 

 Automated blood basophil count (count/volume)            0.1 10*3/uL          
  0.0-0.1        









 Comprehensive metabolic panel - 17 15:15         









 Serum or plasma sodium measurement (moles/volume)            140 mmol/L       
     135-145        

 

 Serum or plasma potassium measurement (moles/volume)            3.5 mmol/L    
        3.6-5.0        

 

 Serum or plasma chloride measurement (moles/volume)            102 mmol/L     
               

 

 Carbon dioxide            27 mmol/L            21-32        

 

 Serum or plasma anion gap determination (moles/volume)            11 mmol/L   
         5-14        

 

 Serum or plasma urea nitrogen measurement (mass/volume)            9 mg/dL    
        7-18        

 

 Serum or plasma creatinine measurement (mass/volume)            0.82 mg/dL    
        0.60-1.30        

 

 Serum or plasma urea nitrogen/creatinine mass ratio            11             
NRG        

 

 Serum or plasma creatinine measurement with calculation of estimated 
glomerular filtration rate            >             NRG        

 

 Serum or plasma glucose measurement (mass/volume)            102 mg/dL        
            

 

 Serum or plasma calcium measurement (mass/volume)            9.4 mg/dL        
    8.5-10.1        

 

 Serum or plasma total bilirubin measurement (mass/volume)            0.4 mg/dL
            0.1-1.0        

 

 Serum or plasma alkaline phosphatase measurement (enzymatic activity/volume)  
          78 U/L                    

 

 Serum or plasma aspartate aminotransferase measurement (enzymatic activity/
volume)            17 U/L            5-34        

 

 Serum or plasma alanine aminotransferase measurement (enzymatic activity/volume
)            17 U/L            0-55        

 

 Serum or plasma protein measurement (mass/volume)            7.6 g/dL         
   6.4-8.2        

 

 Serum or plasma albumin measurement (mass/volume)            4.3 g/dL         
   3.2-4.5        









 Magnesium - 17 15:15         









 Magnesium            2.1 mg/dL            1.8-2.4        









 PT panel in platelet poor plasma by coagulation assay - 17 15:15         









 Prothrombin time (PT) in platelet poor plasma by coagulation assay            
15.7 s            12.2-14.7        

 

 INR in platelet poor plasma or blood by coagulation assay            1.2      
       0.8-1.4        









 Activated partial thromboplastin time (aPTT) in platelet poor plasma 
bycoagulation assay - 17 15:15         









 Activated partial thromboplastin time (aPTT) in platelet poor plasma 
bycoagulation assay            28 s            24-35        









 Serum or plasma troponin i.cardiac measurement (mass/volume) - 17 15:15 
        









 Serum or plasma troponin i.cardiac measurement (mass/volume)            < ng/
mL            <0.30        









 Fibrin D-dimer FEU measurement in platelet poor plasma (mass/volume) -  15:15         









 Fibrin D-dimer FEU measurement in platelet poor plasma (mass/volume)          
  0.28 ug/mL            0.00-0.49        









 Myoglobin, serum - 17 15:15         









 Myoglobin, serum            31.5 ng/mL            10.0-92.0        









 Serum or plasma troponin i.cardiac measurement (mass/volume) - 17 21:45 
        









 Serum or plasma troponin i.cardiac measurement (mass/volume)            0.57 ng
/mL            <0.30        









 Complete blood count (CBC) with automated white blood cell (WBC) differential 
- 17 05:24         









 Blood leukocytes automated count (number/volume)            15.1 10*3/uL      
      4.3-11.0        

 

 Blood erythrocytes automated count (number/volume)            4.61 10*6/uL    
        4.35-5.85        

 

 Venous blood hemoglobin measurement (mass/volume)            13.3 g/dL        
    13.3-17.7        

 

 Blood hematocrit (volume fraction)            40 %            40-54        

 

 Automated erythrocyte mean corpuscular volume            87 [foz_us]          
  80-99        

 

 Automated erythrocyte mean corpuscular hemoglobin (mass per erythrocyte)      
      29 pg            25-34        

 

 Automated erythrocyte mean corpuscular hemoglobin concentration measurement (
mass/volume)            33 g/dL            32-36        

 

 Automated erythrocyte distribution width ratio            13.8 %            
10.0-14.5        

 

 Automated blood platelet count (count/volume)            278 10*3/uL          
  130-400        

 

 Automated blood platelet mean volume measurement            9.5 [foz_us]      
      7.4-10.4        

 

 Automated blood neutrophils/100 leukocytes            77 %            42-75   
     

 

 Automated blood lymphocytes/100 leukocytes            14 %            12-44   
     

 

 Blood monocytes/100 leukocytes            8 %            0-12        

 

 Automated blood eosinophils/100 leukocytes            0 %            0-10     
   

 

 Automated blood basophils/100 leukocytes            0 %            0-10        

 

 Blood neutrophils automated count (number/volume)            11.7 10*3        
    1.8-7.8        

 

 Blood lymphocytes automated count (number/volume)            2.1 10*3         
   1.0-4.0        

 

 Blood monocytes automated count (number/volume)            1.2 10*3            
0.0-1.0        

 

 Automated eosinophil count            0.0 10*3/uL            0.0-0.3        

 

 Automated blood basophil count (count/volume)            0.0 10*3/uL          
  0.0-0.1        









 Comprehensive metabolic panel - 17 05:24         









 Serum or plasma sodium measurement (moles/volume)            137 mmol/L       
     135-145        

 

 Serum or plasma potassium measurement (moles/volume)            3.8 mmol/L    
        3.6-5.0        

 

 Serum or plasma chloride measurement (moles/volume)            103 mmol/L     
               

 

 Carbon dioxide            25 mmol/L            21-32        

 

 Serum or plasma anion gap determination (moles/volume)            9 mmol/L    
        5-14        

 

 Serum or plasma urea nitrogen measurement (mass/volume)            8 mg/dL    
        7-18        

 

 Serum or plasma creatinine measurement (mass/volume)            0.67 mg/dL    
        0.60-1.30        

 

 Serum or plasma urea nitrogen/creatinine mass ratio            12             
NRG        

 

 Serum or plasma creatinine measurement with calculation of estimated 
glomerular filtration rate            >             NRG        

 

 Serum or plasma glucose measurement (mass/volume)            123 mg/dL        
            

 

 Serum or plasma calcium measurement (mass/volume)            8.7 mg/dL        
    8.5-10.1        

 

 Serum or plasma total bilirubin measurement (mass/volume)            0.7 mg/dL
            0.1-1.0        

 

 Serum or plasma alkaline phosphatase measurement (enzymatic activity/volume)  
          64 U/L                    

 

 Serum or plasma aspartate aminotransferase measurement (enzymatic activity/
volume)            95 U/L            5-34        

 

 Serum or plasma alanine aminotransferase measurement (enzymatic activity/volume
)            26 U/L            0-55        

 

 Serum or plasma protein measurement (mass/volume)            6.3 g/dL         
   6.4-8.2        

 

 Serum or plasma albumin measurement (mass/volume)            3.7 g/dL         
   3.2-4.5        









 Lipid 1996 panel - 17 05:24         









 Serum or plasma triglyceride measurement (mass/volume)            185 mg/dL   
         <150        

 

 Serum or plasma cholesterol measurement (mass/volume)            192 mg/dL    
        < 200        

 

 Serum or plasma cholesterol in HDL measurement (mass/volume)            34 mg/
dL            40-60        

 

 Cholesterol in LDL [mass/volume] in serum or plasma by direct assay            
143 mg/dL            1-129        

 

 Serum or plasma cholesterol in VLDL measurement (mass/volume)            37 mg/
dL            5-40        









 Blood manual differential performed detection - 17 05:24         









 Blood monocytes/100 leukocytes            14 %            NRG        

 

 Manual blood segmented neutrophils/100 leukocytes            73 %            
NRG        

 

 Blood band neutrophils/100 leukocytes            0 %            NRG        

 

 Manual blood lymphocytes/100 leukocytes            13 %            NRG        

 

 Blood erythrocyte morphology finding identification            NORMAL         
    NRG        









 Automated blood complete blood count (hemogram) panel - 17 03:08         









 Blood leukocytes automated count (number/volume)            11.1 10*3/uL      
      4.3-11.0        

 

 Blood erythrocytes automated count (number/volume)            4.39 10*6/uL    
        4.35-5.85        

 

 Venous blood hemoglobin measurement (mass/volume)            12.8 g/dL        
    13.3-17.7        

 

 Blood hematocrit (volume fraction)            39 %            40-54        

 

 Automated erythrocyte mean corpuscular volume            88 [foz_us]          
  80-99        

 

 Automated erythrocyte mean corpuscular hemoglobin (mass per erythrocyte)      
      29 pg            25-34        

 

 Automated erythrocyte mean corpuscular hemoglobin concentration measurement (
mass/volume)            33 g/dL            32-36        

 

 Automated erythrocyte distribution width ratio            14.0 %            
10.0-14.5        

 

 Automated blood platelet count (count/volume)            252 10*3/uL          
  130-400        

 

 Automated blood platelet mean volume measurement            9.6 [foz_us]      
      7.4-10.4        









 Whole blood basic metabolic panel - 17 03:08         









 Serum or plasma sodium measurement (moles/volume)            139 mmol/L       
     135-145        

 

 Serum or plasma potassium measurement (moles/volume)            4.0 mmol/L    
        3.6-5.0        

 

 Serum or plasma chloride measurement (moles/volume)            104 mmol/L     
               

 

 Carbon dioxide            27 mmol/L            21-32        

 

 Serum or plasma anion gap determination (moles/volume)            8 mmol/L    
        5-14        

 

 Serum or plasma urea nitrogen measurement (mass/volume)            9 mg/dL    
        7-18        

 

 Serum or plasma creatinine measurement (mass/volume)            0.74 mg/dL    
        0.60-1.30        

 

 Serum or plasma urea nitrogen/creatinine mass ratio            12             
NRG        

 

 Serum or plasma creatinine measurement with calculation of estimated 
glomerular filtration rate            >             NRG        

 

 Serum or plasma glucose measurement (mass/volume)            101 mg/dL        
            

 

 Serum or plasma calcium measurement (mass/volume)            8.8 mg/dL        
    8.5-10.1        









 PT panel in platelet poor plasma by coagulation assay - 17 10:30         









 Prothrombin time (PT) in platelet poor plasma by coagulation assay            
14.2 s            12.2-14.7        

 

 INR in platelet poor plasma or blood by coagulation assay            1.1      
       0.8-1.4        









 PT panel in platelet poor plasma by coagulation assay - 17 04:45         









 Prothrombin time (PT) in platelet poor plasma by coagulation assay            
15.5 s            12.2-14.7        

 

 INR in platelet poor plasma or blood by coagulation assay            1.2      
       0.8-1.4        









 Automated blood complete blood count (hemogram) panel - 12/15/17 09:29         









 Blood leukocytes automated count (number/volume)            7.4 10*3/uL       
     4.3-11.0        

 

 Blood erythrocytes automated count (number/volume)            4.80 10*6/uL    
        4.35-5.85        

 

 Venous blood hemoglobin measurement (mass/volume)            13.7 g/dL        
    13.3-17.7        

 

 Blood hematocrit (volume fraction)            42 %            40-54        

 

 Automated erythrocyte mean corpuscular volume            87 [foz_us]          
  80-99        

 

 Automated erythrocyte mean corpuscular hemoglobin (mass per erythrocyte)      
      29 pg            25-34        

 

 Automated erythrocyte mean corpuscular hemoglobin concentration measurement (
mass/volume)            33 g/dL            32-36        

 

 Automated erythrocyte distribution width ratio            13.9 %            
10.0-14.5        

 

 Automated blood platelet count (count/volume)            291 10*3/uL          
  130-400        

 

 Automated blood platelet mean volume measurement            9.0 [foz_us]      
      7.4-10.4        









 Whole blood basic metabolic panel - 12/15/17 09:29         









 Serum or plasma sodium measurement (moles/volume)            142 mmol/L       
     135-145        

 

 Serum or plasma potassium measurement (moles/volume)            4.5 mmol/L    
        3.6-5.0        

 

 Serum or plasma chloride measurement (moles/volume)            105 mmol/L     
               

 

 Carbon dioxide            27 mmol/L            21-32        

 

 Serum or plasma anion gap determination (moles/volume)            10 mmol/L   
         5-14        

 

 Serum or plasma urea nitrogen measurement (mass/volume)            16 mg/dL   
         7-18        

 

 Serum or plasma creatinine measurement (mass/volume)            0.74 mg/dL    
        0.60-1.30        

 

 Serum or plasma urea nitrogen/creatinine mass ratio            22             
NRG        

 

 Serum or plasma creatinine measurement with calculation of estimated 
glomerular filtration rate            >             NRG        

 

 Serum or plasma glucose measurement (mass/volume)            100 mg/dL        
            

 

 Serum or plasma calcium measurement (mass/volume)            10.0 mg/dL       
     8.5-10.1        









 Complete blood count (CBC) with automated white blood cell (WBC) differential 
- 18 12:45         









 Blood leukocytes automated count (number/volume)            18.3 10*3/uL      
      4.3-11.0        

 

 Blood erythrocytes automated count (number/volume)            4.57 10*6/uL    
        4.35-5.85        

 

 Venous blood hemoglobin measurement (mass/volume)            13.5 g/dL        
    13.3-17.7        

 

 Blood hematocrit (volume fraction)            40 %            40-54        

 

 Automated erythrocyte mean corpuscular volume            88 [foz_us]          
  80-99        

 

 Automated erythrocyte mean corpuscular hemoglobin (mass per erythrocyte)      
      30 pg            25-34        

 

 Automated erythrocyte mean corpuscular hemoglobin concentration measurement (
mass/volume)            34 g/dL            32-36        

 

 Automated erythrocyte distribution width ratio            14.5 %            
10.0-14.5        

 

 Automated blood platelet count (count/volume)            357 10*3/uL          
  130-400        

 

 Automated blood platelet mean volume measurement            8.8 [foz_us]      
      7.4-10.4        

 

 Automated blood neutrophils/100 leukocytes            86 %            42-75   
     

 

 Automated blood lymphocytes/100 leukocytes            8 %            12-44    
    

 

 Blood monocytes/100 leukocytes            6 %            0-12        

 

 Automated blood eosinophils/100 leukocytes            1 %            0-10     
   

 

 Automated blood basophils/100 leukocytes            0 %            0-10        

 

 Blood neutrophils automated count (number/volume)            15.7 10*3        
    1.8-7.8        

 

 Blood lymphocytes automated count (number/volume)            1.4 10*3         
   1.0-4.0        

 

 Blood monocytes automated count (number/volume)            1.1 10*3            
0.0-1.0        

 

 Automated eosinophil count            0.2 10*3/uL            0.0-0.3        

 

 Automated blood basophil count (count/volume)            0.0 10*3/uL          
  0.0-0.1        









 PT panel in platelet poor plasma by coagulation assay - 18 12:45         









 Prothrombin time (PT) in platelet poor plasma by coagulation assay            
32.8 s            12.2-14.7        

 

 INR in platelet poor plasma or blood by coagulation assay            3.2      
       0.8-1.4        









 Whole blood basic metabolic panel - 18 12:45         









 Serum or plasma sodium measurement (moles/volume)            142 mmol/L       
     135-145        

 

 Serum or plasma potassium measurement (moles/volume)            3.6 mmol/L    
        3.6-5.0        

 

 Serum or plasma chloride measurement (moles/volume)            99 mmol/L      
              

 

 Carbon dioxide            28 mmol/L            21-32        

 

 Serum or plasma anion gap determination (moles/volume)            15 mmol/L   
         5-14        

 

 Serum or plasma urea nitrogen measurement (mass/volume)            10 mg/dL   
         7-18        

 

 Serum or plasma creatinine measurement (mass/volume)            0.89 mg/dL    
        0.60-1.30        

 

 Serum or plasma urea nitrogen/creatinine mass ratio            11             
NRG        

 

 Serum or plasma creatinine measurement with calculation of estimated 
glomerular filtration rate            >             NRG        

 

 Serum or plasma glucose measurement (mass/volume)            150 mg/dL        
            

 

 Serum or plasma calcium measurement (mass/volume)            9.6 mg/dL        
    8.5-10.1        









 Serum or plasma uric acid measurement (mass/volume) - 18 12:45         









 Serum or plasma uric acid measurement (mass/volume)            5.3 mg/dL      
      2.6-7.2        









 Blood manual differential performed detection - 18 12:45         









 Blood monocytes/100 leukocytes            3 %            NRG        

 

 Manual blood segmented neutrophils/100 leukocytes            88 %            
NRG        

 

 Blood band neutrophils/100 leukocytes            4 %            NRG        

 

 Manual blood lymphocytes/100 leukocytes            4 %            NRG        

 

 Manual eosinophils/100 leukocytes in nose            1 %            NRG        

 

 Manual blood basophils/100 leukocytes            0 %            NRG        

 

 Blood erythrocyte morphology finding identification            NORMAL         
    NRG        









 Serum or plasma troponin i.cardiac measurement (mass/volume) - 18 12:45 
        









 Serum or plasma troponin i.cardiac measurement (mass/volume)            < ng/
mL            <0.30        









 Serum or plasma C reactive protein measurement (mass/volume) - 18 12:45 
        









 Serum or plasma C reactive protein measurement (mass/volume)            0.80 mg
/dL            0.00-0.50        









 Erythrocyte sedimentation rate by westergren method - 18 12:45         









 Erythrocyte sedimentation rate by westergren method            13 mm          
  0-15        









 Bacterial blood culture - 18 23:30         









 Bacterial blood culture            NG             NRG        









 Complete blood count (CBC) with automated white blood cell (WBC) differential 
- 18 23:50         









 Blood leukocytes automated count (number/volume)            12.6 10*3/uL      
      4.3-11.0        

 

 Blood erythrocytes automated count (number/volume)            3.67 10*6/uL    
        4.35-5.85        

 

 Venous blood hemoglobin measurement (mass/volume)            10.6 g/dL        
    13.3-17.7        

 

 Blood hematocrit (volume fraction)            32 %            40-54        

 

 Automated erythrocyte mean corpuscular volume            87 [foz_us]          
  80-99        

 

 Automated erythrocyte mean corpuscular hemoglobin (mass per erythrocyte)      
      29 pg            25-34        

 

 Automated erythrocyte mean corpuscular hemoglobin concentration measurement (
mass/volume)            33 g/dL            32-36        

 

 Automated erythrocyte distribution width ratio            13.8 %            
10.0-14.5        

 

 Automated blood platelet count (count/volume)            514 10*3/uL          
  130-400        

 

 Automated blood platelet mean volume measurement            9.1 [foz_us]      
      7.4-10.4        

 

 Automated blood neutrophils/100 leukocytes            79 %            42-75   
     

 

 Automated blood lymphocytes/100 leukocytes            10 %            12-44   
     

 

 Blood monocytes/100 leukocytes            10 %            0-12        

 

 Automated blood eosinophils/100 leukocytes            2 %            0-10     
   

 

 Automated blood basophils/100 leukocytes            0 %            0-10        

 

 Blood neutrophils automated count (number/volume)            9.9 10*3         
   1.8-7.8        

 

 Blood lymphocytes automated count (number/volume)            1.2 10*3         
   1.0-4.0        

 

 Blood monocytes automated count (number/volume)            1.2 10*3            
0.0-1.0        

 

 Automated eosinophil count            0.2 10*3/uL            0.0-0.3        

 

 Automated blood basophil count (count/volume)            0.0 10*3/uL          
  0.0-0.1        









 Blood lactic acid measurement (moles/volume) - 18 23:50         









 Blood lactic acid measurement (moles/volume)            0.81 mmol/L            
0.50-2.00        









 PT panel in platelet poor plasma by coagulation assay - 18 23:50         









 Prothrombin time (PT) in platelet poor plasma by coagulation assay            
34.2 s            12.2-14.7        

 

 INR in platelet poor plasma or blood by coagulation assay            3.4      
       0.8-1.4        









 Activated partial thromboplastin time (aPTT) in platelet poor plasma 
bycoagulation assay - 18 23:50         









 Activated partial thromboplastin time (aPTT) in platelet poor plasma 
bycoagulation assay            124 s            24-35        









 Comprehensive metabolic panel - 18 23:50         









 Serum or plasma sodium measurement (moles/volume)            137 mmol/L       
     135-145        

 

 Serum or plasma potassium measurement (moles/volume)            3.8 mmol/L    
        3.6-5.0        

 

 Serum or plasma chloride measurement (moles/volume)            95 mmol/L      
              

 

 Carbon dioxide            29 mmol/L            21-32        

 

 Serum or plasma anion gap determination (moles/volume)            13 mmol/L   
         5-14        

 

 Serum or plasma urea nitrogen measurement (mass/volume)            9 mg/dL    
        7-18        

 

 Serum or plasma creatinine measurement (mass/volume)            0.76 mg/dL    
        0.60-1.30        

 

 Serum or plasma urea nitrogen/creatinine mass ratio            12             
NRG        

 

 Serum or plasma creatinine measurement with calculation of estimated 
glomerular filtration rate            >             NRG        

 

 Serum or plasma glucose measurement (mass/volume)            115 mg/dL        
            

 

 Serum or plasma calcium measurement (mass/volume)            9.6 mg/dL        
    8.5-10.1        

 

 Serum or plasma total bilirubin measurement (mass/volume)            0.4 mg/dL
            0.1-1.0        

 

 Serum or plasma alkaline phosphatase measurement (enzymatic activity/volume)  
          80 U/L                    

 

 Serum or plasma aspartate aminotransferase measurement (enzymatic activity/
volume)            21 U/L            5-34        

 

 Serum or plasma alanine aminotransferase measurement (enzymatic activity/volume
)            19 U/L            0-55        

 

 Serum or plasma protein measurement (mass/volume)            7.8 g/dL         
   6.4-8.2        

 

 Serum or plasma albumin measurement (mass/volume)            3.5 g/dL         
   3.2-4.5        









 Serum or plasma C reactive protein measurement (mass/volume) - 18 23:50 
        









 Serum or plasma C reactive protein measurement (mass/volume)            34.60 
mg/dL            0.00-0.50        









 Erythrocyte sedimentation rate by westergren method - 18 23:50         









 Erythrocyte sedimentation rate by westergren method            > mm            
0-15        









 Bacterial blood culture - 18 23:50         









 Bacterial blood culture            NG             NRG        









 PT panel in platelet poor plasma by coagulation assay - 18 05:26         









 Prothrombin time (PT) in platelet poor plasma by coagulation assay            
37.3 s            12.2-14.7        

 

 INR in platelet poor plasma or blood by coagulation assay            3.8      
       0.8-1.4        









 Activated partial thromboplastin time (aPTT) in platelet poor plasma 
bycoagulation assay - 18 05:26         









 Activated partial thromboplastin time (aPTT) in platelet poor plasma 
bycoagulation assay            116 s            24-35        









 Complete blood count (CBC) with automated white blood cell (WBC) differential 
- 18 05:26         









 Blood leukocytes automated count (number/volume)            11.4 10*3/uL      
      4.3-11.0        

 

 Blood erythrocytes automated count (number/volume)            3.34 10*6/uL    
        4.35-5.85        

 

 Venous blood hemoglobin measurement (mass/volume)            9.7 g/dL         
   13.3-17.7        

 

 Blood hematocrit (volume fraction)            29 %            40-54        

 

 Automated erythrocyte mean corpuscular volume            88 [foz_us]          
  80-99        

 

 Automated erythrocyte mean corpuscular hemoglobin (mass per erythrocyte)      
      29 pg            25-34        

 

 Automated erythrocyte mean corpuscular hemoglobin concentration measurement (
mass/volume)            33 g/dL            32-36        

 

 Automated erythrocyte distribution width ratio            13.7 %            
10.0-14.5        

 

 Automated blood platelet count (count/volume)            487 10*3/uL          
  130-400        

 

 Automated blood platelet mean volume measurement            9.6 [foz_us]      
      7.4-10.4        

 

 Automated blood neutrophils/100 leukocytes            75 %            42-75   
     

 

 Automated blood lymphocytes/100 leukocytes            10 %            12-44   
     

 

 Blood monocytes/100 leukocytes            12 %            0-12        

 

 Automated blood eosinophils/100 leukocytes            3 %            0-10     
   

 

 Automated blood basophils/100 leukocytes            0 %            0-10        

 

 Blood neutrophils automated count (number/volume)            8.5 10*3         
   1.8-7.8        

 

 Blood lymphocytes automated count (number/volume)            1.2 10*3         
   1.0-4.0        

 

 Blood monocytes automated count (number/volume)            1.4 10*3            
0.0-1.0        

 

 Automated eosinophil count            0.3 10*3/uL            0.0-0.3        

 

 Automated blood basophil count (count/volume)            0.0 10*3/uL          
  0.0-0.1        









 Comprehensive metabolic panel - 18 05:26         









 Serum or plasma sodium measurement (moles/volume)            141 mmol/L       
     135-145        

 

 Serum or plasma potassium measurement (moles/volume)            3.4 mmol/L    
        3.6-5.0        

 

 Serum or plasma chloride measurement (moles/volume)            98 mmol/L      
              

 

 Carbon dioxide            29 mmol/L            21-32        

 

 Serum or plasma anion gap determination (moles/volume)            14 mmol/L   
         5-14        

 

 Serum or plasma urea nitrogen measurement (mass/volume)            8 mg/dL    
        7-18        

 

 Serum or plasma creatinine measurement (mass/volume)            0.68 mg/dL    
        0.60-1.30        

 

 Serum or plasma urea nitrogen/creatinine mass ratio            12             
NRG        

 

 Serum or plasma creatinine measurement with calculation of estimated 
glomerular filtration rate            >             NRG        

 

 Serum or plasma glucose measurement (mass/volume)            92 mg/dL         
           

 

 Serum or plasma calcium measurement (mass/volume)            9.3 mg/dL        
    8.5-10.1        

 

 Serum or plasma total bilirubin measurement (mass/volume)            0.4 mg/dL
            0.1-1.0        

 

 Serum or plasma alkaline phosphatase measurement (enzymatic activity/volume)  
          78 U/L                    

 

 Serum or plasma aspartate aminotransferase measurement (enzymatic activity/
volume)            17 U/L            5-34        

 

 Serum or plasma alanine aminotransferase measurement (enzymatic activity/volume
)            17 U/L            0-55        

 

 Serum or plasma protein measurement (mass/volume)            7.0 g/dL         
   6.4-8.2        

 

 Serum or plasma albumin measurement (mass/volume)            3.2 g/dL         
   3.2-4.5        









 Complete blood count (CBC) with automated white blood cell (WBC) differential 
- 18 07:07         









 Blood leukocytes automated count (number/volume)            10.4 10*3/uL      
      4.3-11.0        

 

 Blood erythrocytes automated count (number/volume)            3.93 10*6/uL    
        4.35-5.85        

 

 Venous blood hemoglobin measurement (mass/volume)            11.3 g/dL        
    13.3-17.7        

 

 Blood hematocrit (volume fraction)            34 %            40-54        

 

 Automated erythrocyte mean corpuscular volume            87 [foz_us]          
  80-99        

 

 Automated erythrocyte mean corpuscular hemoglobin (mass per erythrocyte)      
      29 pg            25-34        

 

 Automated erythrocyte mean corpuscular hemoglobin concentration measurement (
mass/volume)            33 g/dL            32-36        

 

 Automated erythrocyte distribution width ratio            14.0 %            
10.0-14.5        

 

 Automated blood platelet count (count/volume)            570 10*3/uL          
  130-400        

 

 Automated blood platelet mean volume measurement            8.9 [foz_us]      
      7.4-10.4        

 

 Automated blood neutrophils/100 leukocytes            84 %            42-75   
     

 

 Automated blood lymphocytes/100 leukocytes            9 %            12-44    
    

 

 Blood monocytes/100 leukocytes            5 %            0-12        

 

 Automated blood eosinophils/100 leukocytes            1 %            0-10     
   

 

 Automated blood basophils/100 leukocytes            0 %            0-10        

 

 Blood neutrophils automated count (number/volume)            8.7 10*3         
   1.8-7.8        

 

 Blood lymphocytes automated count (number/volume)            1.0 10*3         
   1.0-4.0        

 

 Blood monocytes automated count (number/volume)            0.5 10*3            
0.0-1.0        

 

 Automated eosinophil count            0.2 10*3/uL            0.0-0.3        

 

 Automated blood basophil count (count/volume)            0.0 10*3/uL          
  0.0-0.1        









 PT panel in platelet poor plasma by coagulation assay - 18 07:07         









 Prothrombin time (PT) in platelet poor plasma by coagulation assay            
32.3 s            12.2-14.7        

 

 INR in platelet poor plasma or blood by coagulation assay            3.2      
       0.8-1.4        









 Comprehensive metabolic panel - 18 07:07         









 Serum or plasma sodium measurement (moles/volume)            141 mmol/L       
     135-145        

 

 Serum or plasma potassium measurement (moles/volume)            3.6 mmol/L    
        3.6-5.0        

 

 Serum or plasma chloride measurement (moles/volume)            99 mmol/L      
              

 

 Carbon dioxide            29 mmol/L            21-32        

 

 Serum or plasma anion gap determination (moles/volume)            13 mmol/L   
         5-14        

 

 Serum or plasma urea nitrogen measurement (mass/volume)            7 mg/dL    
        7-18        

 

 Serum or plasma creatinine measurement (mass/volume)            0.71 mg/dL    
        0.60-1.30        

 

 Serum or plasma urea nitrogen/creatinine mass ratio            10             
NRG        

 

 Serum or plasma creatinine measurement with calculation of estimated 
glomerular filtration rate            >             NRG        

 

 Serum or plasma glucose measurement (mass/volume)            139 mg/dL        
            

 

 Serum or plasma calcium measurement (mass/volume)            9.8 mg/dL        
    8.5-10.1        

 

 Serum or plasma total bilirubin measurement (mass/volume)            0.4 mg/dL
            0.1-1.0        

 

 Serum or plasma alkaline phosphatase measurement (enzymatic activity/volume)  
          90 U/L                    

 

 Serum or plasma aspartate aminotransferase measurement (enzymatic activity/
volume)            22 U/L            5-34        

 

 Serum or plasma alanine aminotransferase measurement (enzymatic activity/volume
)            21 U/L            0-55        

 

 Serum or plasma protein measurement (mass/volume)            7.6 g/dL         
   6.4-8.2        

 

 Serum or plasma albumin measurement (mass/volume)            3.5 g/dL         
   3.2-4.5        









 Vancomycin trough - 18 07:07         









 Vancomycin trough            13.6 ug/mL            10.0-20.0        









 Complete blood count (CBC) with automated white blood cell (WBC) differential 
- 18 05:20         









 Blood leukocytes automated count (number/volume)            10.5 10*3/uL      
      4.3-11.0        

 

 Blood erythrocytes automated count (number/volume)            3.74 10*6/uL    
        4.35-5.85        

 

 Venous blood hemoglobin measurement (mass/volume)            10.8 g/dL        
    13.3-17.7        

 

 Blood hematocrit (volume fraction)            33 %            40-54        

 

 Automated erythrocyte mean corpuscular volume            88 [foz_us]          
  80-99        

 

 Automated erythrocyte mean corpuscular hemoglobin (mass per erythrocyte)      
      29 pg            25-34        

 

 Automated erythrocyte mean corpuscular hemoglobin concentration measurement (
mass/volume)            33 g/dL            32-36        

 

 Automated erythrocyte distribution width ratio            14.2 %            
10.0-14.5        

 

 Automated blood platelet count (count/volume)            631 10*3/uL          
  130-400        

 

 Automated blood platelet mean volume measurement            8.9 [foz_us]      
      7.4-10.4        

 

 Automated blood neutrophils/100 leukocytes            77 %            42-75   
     

 

 Automated blood lymphocytes/100 leukocytes            12 %            12-44   
     

 

 Blood monocytes/100 leukocytes            9 %            0-12        

 

 Automated blood eosinophils/100 leukocytes            2 %            0-10     
   

 

 Automated blood basophils/100 leukocytes            0 %            0-10        

 

 Blood neutrophils automated count (number/volume)            8.1 10*3         
   1.8-7.8        

 

 Blood lymphocytes automated count (number/volume)            1.3 10*3         
   1.0-4.0        

 

 Blood monocytes automated count (number/volume)            1.0 10*3            
0.0-1.0        

 

 Automated eosinophil count            0.2 10*3/uL            0.0-0.3        

 

 Automated blood basophil count (count/volume)            0.0 10*3/uL          
  0.0-0.1        









 Comprehensive metabolic panel - 18 05:20         









 Serum or plasma sodium measurement (moles/volume)            141 mmol/L       
     135-145        

 

 Serum or plasma potassium measurement (moles/volume)            3.6 mmol/L    
        3.6-5.0        

 

 Serum or plasma chloride measurement (moles/volume)            98 mmol/L      
              

 

 Carbon dioxide            30 mmol/L            21-32        

 

 Serum or plasma anion gap determination (moles/volume)            13 mmol/L   
         5-14        

 

 Serum or plasma urea nitrogen measurement (mass/volume)            7 mg/dL    
        7-18        

 

 Serum or plasma creatinine measurement (mass/volume)            0.70 mg/dL    
        0.60-1.30        

 

 Serum or plasma urea nitrogen/creatinine mass ratio            10             
NRG        

 

 Serum or plasma creatinine measurement with calculation of estimated 
glomerular filtration rate            >             NRG        

 

 Serum or plasma glucose measurement (mass/volume)            109 mg/dL        
            

 

 Serum or plasma calcium measurement (mass/volume)            9.6 mg/dL        
    8.5-10.1        

 

 Serum or plasma total bilirubin measurement (mass/volume)            0.4 mg/dL
            0.1-1.0        

 

 Serum or plasma alkaline phosphatase measurement (enzymatic activity/volume)  
          78 U/L                    

 

 Serum or plasma aspartate aminotransferase measurement (enzymatic activity/
volume)            24 U/L            5-34        

 

 Serum or plasma alanine aminotransferase measurement (enzymatic activity/volume
)            24 U/L            0-55        

 

 Serum or plasma protein measurement (mass/volume)            7.5 g/dL         
   6.4-8.2        

 

 Serum or plasma albumin measurement (mass/volume)            3.3 g/dL         
   3.2-4.5        









 PT panel in platelet poor plasma by coagulation assay - 18 05:20         









 Prothrombin time (PT) in platelet poor plasma by coagulation assay            
29.6 s            12.2-14.7        

 

 INR in platelet poor plasma or blood by coagulation assay            2.8      
       0.8-1.4        









 BMP - 18 11:00         









 Anion Gap            17             6-14        

 

 BUN            7 mg/dL            5-25        

 

 Calcium            10.4 mg/dL            8.3-10.4        

 

 Chloride            97 mmol/L                    

 

 CO2            30 mEq/L            22-33        

 

 Creat            0.74 mg/dL            0.50-1.50        

 

 eGFR            114 mL/min/1.73m2            >59        

 

 Glucose            98 mg/dL                    

 

 Osmo            287             280-295        

 

 Potassium            3.8 mmol/L            3.5-5.3        

 

 Sodium            140 mmol/L            134-148        









 D-Dimer  - 18 11:01         









 DDimer            397.00 ng/mL            21..00        









 Platelets - 18 13:06         









 Plt            774 K/uL            150-400        









 Automated blood complete blood count (hemogram) panel - 18 11:06         









 Blood leukocytes automated count (number/volume)            14.3 10*3/uL      
      4.3-11.0        

 

 Blood erythrocytes automated count (number/volume)            5.13 10*6/uL    
        4.35-5.85        

 

 Venous blood hemoglobin measurement (mass/volume)            14.2 g/dL        
    13.3-17.7        

 

 Blood hematocrit (volume fraction)            44 %            40-54        

 

 Automated erythrocyte mean corpuscular volume            85 [foz_us]          
  80-99        

 

 Automated erythrocyte mean corpuscular hemoglobin (mass per erythrocyte)      
      28 pg            25-34        

 

 Automated erythrocyte mean corpuscular hemoglobin concentration measurement (
mass/volume)            33 g/dL            32-36        

 

 Automated erythrocyte distribution width ratio            16.2 %            
10.0-14.5        

 

 Automated blood platelet count (count/volume)            391 10*3/uL          
  130-400        

 

 Automated blood platelet mean volume measurement            8.6 [foz_us]      
      7.4-10.4        









 Serum or plasma uric acid measurement (mass/volume) - 18 11:06         









 Serum or plasma uric acid measurement (mass/volume)            3.4 mg/dL      
      2.6-7.2        









 Serum or plasma C reactive protein measurement (mass/volume) - 18 11:06 
        









 Serum or plasma C reactive protein measurement (mass/volume)            0.31 mg
/dL            0.00-0.50        



                                                                               
                                                                     



Encounters

      





 ACCT No.            Visit Date/Time            Discharge            Status    
        Pt. Type            Provider            Facility            Loc./Unit  
          Complaint        

 

 [id number]            2015 09:47:00            2015 23:59:59          
  [facility]            Outpatient            CHARLY ESCALONA                    
                           

 

 [id number]            2014 12:54:00            2014 23:59:59          
  [facility]            Outpatient            NEENA VELAZCO                   
                            

 

 [id number]            2013 09:24:00            2013 23:59:59          
  [facility]            Outpatient            LILLIAN LANDRY DO                        
                       

 

 [id number]            2018 10:36:00            2018 13:45:00          
  [facility]            Outpatient            Dena Deluca                           
                    

 

 [id number]            2018 11:26:46                                      
Document Registration                                                          
  

 

 [id number]            10/01/2018 00:54:00            10/01/2018 23:59:59    
        CLS            Preadmit            MARY GREEN, NORTH WHITTAKER            [facility]            LAB            I26.99        

 

 [id number]            2018 12:38:00            2018 00:01:00    
        DIS            Outpatient            NORTH HERNANDEZ MD            [facility]ia 
[facility]            LAB            I26.99        

 

 [id number]            2018 00:17:00            2018 23:59:59    
        CLS            Preadmit            NORTH HERNANDEZ MD            [facility]ia 
[facility]            LAB            I26.99        

 

 [id number]            2018 09:38:00            2018 00:01:00    
        DIS            Outpatient            NORTH HERNANDEZ MD            [facility]ia 
[facility]            LAB            I26.99        

 

 [id number]            2018 11:00:00            2018 23:59:59    
        CLS            Preadmit            NORTH HERNANDEZ MD            Via 
[facility]        

 

 [id number]            01/10/2018 11:37:00            2018 00:01:00    
        DIS            Outpatient            NORTH HERNANDEZ MD            Via 
[facility]        

 

 [id number]            2018 10:36:00            2018 23:59:59    
        CLS            Outpatient            NORTH HERNANDEZ MD            [facility] 
[facility]            LAB            I25.10        

 

 [id number]            2018 10:27:00            2018 23:59:59    
        CLS            Outpatient            KANDACE MIRANDA MD            
[facility]            RAD            M25.572        

 

 [id number]            2018 15:13:00            2018 23:59:59    
        CLS            Outpatient            NORTH HERNANDEZ MD            [facility]ia 
[facility]            RAD            ASDF        

 

 [id number]            2018 11:45:00            2018 23:59:59    
        CLS            Outpatient            GONSALO SOMERS MD            [facility]ia 
[facility]            WOUNDCARE                     

 

 [id number]            2018 00:55:00            2018 13:00:00    
        DIS            Inpatient            MARY GREEN, CANDE MA            [facility]ia 
[facility]            4TH            CELLULITIS L LEG        

 

 [id number]            2018 09:08:00            2018 23:59:59    
        CLS            Outpatient            NORTH HERNANDEZ MD            [facility]ia 
[facility]            RAD            LEFT LEG PAIN        

 

 [id number]            2018 11:43:00            2018 15:31:00    
        DIS            Emergency            RADHA ARELLANO            [facility]ia 
[facility]            ER            L LEG PAIN/SWELLING/HX 
BLOOD CLOTS        

 

 [id number]            2018 10:30:00            2018 23:59:59    
        CLS            Outpatient            NORTH HERNANDEZ MD            [facility] 
[facility]            CARD            CAD, CHEST PAIN 
SYNDROME        

 

 [id number]            10/01/2017 07:00:00            2017 00:01:00    
        DIS            Outpatient            NORTH HERNANDEZ MD            [facility]ia 
[facility]            LAB            I26.99        

 

 [id number]            12/15/2017 09:16:00            12/15/2017 23:59:59    
        CLS            Outpatient            CANDE PIKE MD            [facility]ia 
[facility]            LAB            Z01.812 I25.119        

 

 [id number]            2017 11:24:00            2017 11:53:00    
        DIS            Emergency            DAMARIS WYNN            
[facility]            ER            LUMP ON CHEST        

 

 [id number]            2017 17:12:00            2017 14:45:00    
        DIS            Inpatient            FELISHA MORENO DO            [facility]ia 
[facility]            ICU            CHEST PAIN,N/V        

 

 [id number]            2017 10:49:00            2017 00:01:00    
        DIS            Outpatient            NORTH HERNANDEZ MD            [facility]ia 
[facility]            LAB            I26.99        

 

 [id number]            2017 15:07:00            2017 17:28:00    
        DIS            Emergency            DAMARIS WYNN            
[facility]            ER            SWELLING IN R ARM 
AFTER FLU SHOT        

 

 [id number]            08/15/2017 10:55:00            08/15/2017 23:59:59    
        CLS            Outpatient            NORTH HERNANDEZ MD            [facility]ia 
[facility]            LAB            I26.99,Z51.81        

 

 [id number]            2017 00:08:00            2017 23:59:59    
        CLS            Preadmit            NORTH HERNANDEZ MD            [facility]ia 
[facility]            LAB            DVT        

 

 [id number]            2017 11:24:00            2017 00:01:00    
        DIS            Outpatient            NORTH HERNANDEZ MD            [facility]ia 
[facility]            LAB            DVT        

 

 [id number]            2017 17:21:00            2017 19:18:00    
        DIS            Emergency            DAMARIS WYNN            
[facility]            ER            LOWER CHEST PAIN     
   

 

 [id number]            2017 08:17:00            2017 00:01:00    
        DIS            Outpatient            NORTH HERNANDEZ MD            [facility]ia 
[facility]            LAB            DVT        

 

 [id number]            2016 13:09:00            2017 00:01:00    
        DIS            Outpatient            NORTH HERNANDEZ MD            [facility] 
[facility]            LAB            DVT        

 

 [id number]            2016 10:25:00            2016 19:55:00    
        DIS            Outpatient            NORTH HERNANDEZ MD            [facility]ia 
[facility]            CATH            ABN STRESS,CP,CAD      
  

 

 [id number]            2016 07:31:00            2016 23:59:59    
        CLS            Outpatient            NORTH HERNANDEZ MD            [facility] 
[facility]            CARD            CAD, CHF, CHEST PAIN 
SYNDROME, DVT, HTN, PE        

 

 [id number]            2016 11:12:00            2016 23:59:59    
        CLS            Outpatient            NORTH HERNANDEZ MD            [facility] 
[facility]            CARD            CAD, CHF, CHEST PAIN 
SYNDROME, DVT, HTN, PE        

 

 [id number]            2016 13:12:00            2016 00:01:00    
        DIS            Outpatient            NORTH HERNANDEZ MD            [facility] 
[facility]            LAB            DVT        

 

 [id number]            2016 14:55:00            2016 17:15:00    
        DIS            Emergency            RADHA ARELLANO APRN            [facility]ia 
[facility]            ER            R ARM PAIN        

 

 [id number]            2015 09:29:00            2015 00:01:00    
        DIS            Outpatient            NORTH HERNANDEZ MD            [facility] 
[facility]            LAB            ANTICOAG THERAPY,HX PE  
      

 

 [id number]            2015 08:34:00            2015 10:54:00    
        DIS            Emergency            COLE CONTRERAS MD            [facility]ia 
[facility]            ER            RIGHT FOOT PAIN/COUGH    
    

 

 [id number]            2015 11:05:00            2015 00:01:00    
        DIS            Outpatient            MARY GREEN, NORTH WHITTAKER            [facility]ia 
[facility]            LAB            ANTICOAG THERAPY,HX PE  
      

 

 [id number]            10/07/2014 14:51:00            2014 00:01:00    
        DIS            Outpatient            NORTH HERNANDEZ MD            [facility]ia 
[facility]            LAB            ANTICOAG THERAPY,HX PE  
      

 

 [id number]            2014 18:10:00            2014 00:01:00    
        DIS            Outpatient            NORTH HERNANDEZ MD            [facility] 
[facility]            LAB            ANTICOAG THERAPY,HX PE  
      

 

 [id number]            2013 15:59:00            2013 00:01:00    
        DIS            Outpatient            NORTH HERNANDEZ MD            [facility] 
[facility]            LAB            ANTICOAG THERAPY,HX PE  
      

 

 [id number]            2013 18:23:00            2013 19:22:00    
        DIS            Emergency            HANNA GREEN, CAROL PORTILLO            
[facility]            ER            LEFT ANKLE PAIN      
  

 

 [id number]            2018 14:04:00                         ACT       
     Emergency            DIANE GREEN, BRODY MA            [facility]            ER            CHEST PAIN        

 

 [id number]            2015 09:36:00                                   
   Document Registration                                                       
     

 

 [id number]            2015 09:36:00                                   
   Document Registration                                                       
     

 

 [id number]            2015 09:36:00                                   
   Document Registration                                                       
     

 

 [id number]            2015 09:36:00                                   
   Document Registration                                                       
     

 

 [id number]            2015 09:36:00                                   
   Document Registration                                                       
     

 

 [id number]            2015 09:36:00                                   
   Document Registration                                                       
     

 

 [id number]            2015 09:36:00                                   
   Document Registration                                                       
     

 

 [id number]            2015 09:36:00                                   
   Document Registration                                                       
     

 

 [id number]            2015 09:36:00                                   
   Document Registration                                                       
     

 

 [id number]            2015 09:36:00                                   
   Document Registration                                                       
     

 

 [id number]            2015 09:36:00                                   
   Document Registration                                                       
     

 

 [id number]            2015 09:36:00                                   
   Document Registration                                                       
     

 

 [id number]            2015 10:38:00                                   
   Document Registration                                                       
     

 

 [id number]            2015 10:38:00                                   
   Document Registration                                                       
     

 

 [id number]            2015 10:37:00                                   
   Document Registration                                                       
     

 

 [id number]            2015 10:37:00                                   
   Document Registration                                                       
     

 

 [id number]            2015 21:35:00                                   
   Document Registration                                                       
     

 

 [id number]            2013 16:55:00                                   
   Document Registration                                                       
     

 

 [id number]            2013 08:29:00                                   
   Document Registration                                                       
     

 

 [id number]            2013 10:36:00                                   
   Document Registration                                                       
     

 

 [id number]            2013 07:36:00                                   
   Document Registration                                                       
     

 

 [id number]            2013 10:00:00                                   
   Document Registration                                                       
     

 

 [id number]            2013 13:04:00                                   
   Document Registration                                                       
     

 

 [id number]            2012 16:16:00                                   
   Document Registration                                                       
     

 

 [id number]            2012 21:41:00                                   
   Document Registration                                                       
     

 

 [id number]            2012 12:15:00                                   
   Document Registration                                                       
     

 

 [id number]            2012 13:43:00                                   
   Document Registration                                                       
     

 

 [id number]            10/10/2011 10:15:00                                   
   Document Registration                                                       
     

 

 [id number]            2011 06:51:00                                   
   Document Registration                                                       
     

 

 [id number]            2011 08:46:00                                   
   Document Registration                                                       
     

 

 [id number]            2011 10:21:00                                   
   Document Registration                                                       
     

 

 [id number]            2011 14:37:00                                   
   Document Registration                                                       
     

 

 [id number]            2010 09:40:00                                   
   Document Registration                                                       
     

 

 [id number]            2010 17:02:00                                   
   Document Registration                                                       
     

 

 [id number]            2010 09:19:00                                   
   Document Registration                                                       
     

 

 [id number]            12/10/2009 08:28:00                                   
   Document Registration                                                       
     

 

 [id number]            2009 13:16:00                                   
   Document Registration                                                       
     

 

 [id number]            10/15/2008 08:46:00                                   
   Document Registration                                                       
     

 

 [id number]            09/15/2008 12:11:00                                   
   Document Registration                                                       
     

 

 [id number]            2008 14:03:00                                   
   Document Registration                                                       
     

 

 [id number]            05/10/2008 08:26:00                                   
   Document Registration                                                       
     

 

 [id number]            2008 09:23:00                                   
   Document Registration                                                       
     

 

 [id number]            2008 11:12:00                                   
   Document Registration                                                       
     

 

 [id number]            2008 12:14:00                                   
   Document Registration                                                       
     

 

 [id number]            2007 10:33:00                                   
   Document Registration                                                       
     

 

 [id number]            05/15/2007 14:25:00                                   
   Document Registration                                                       
     

 

 [id number]            2007 08:46:00                                   
   Document Registration                                                       
     

 

 [id number]            2007 14:49:00                                   
   Document Registration                                                       
     

 

 [id number]            2007 07:21:00                                   
   Document Registration                                                       
     

 

 [id number]            2006 08:00:00                                   
   Document Registration                                                       
     

 

 [id number]            2006 08:52:00                                   
   Document Registration                                                       
     

 

 [id number]            10/25/2006 16:48:00                                   
   Document Registration                                                       
     

 

 [id number]            2006 11:44:00                                   
   Document Registration                                                       
     

 

 [id number]            07/10/2006 09:32:00                                   
   Document Registration                                                       
     

 

 [id number]            2006 08:15:00                                   
   Document Registration                                                       
     

 

 [id number]            2006 11:33:00                                   
   Document Registration                                                       
     

 

 [id number]            2006 11:41:00                                   
   Document Registration

## 2018-11-16 NOTE — XMS REPORT
Encounter Summary

 Created on: 2018



Francisco Huber

External Reference #: [id number]

: 1970

Sex: Male



Demographics







 Address  [address] [place]  [address]

 

 Home Phone  [phone]

 

 Preferred Language  English

 

 Marital Status  Unknown

 

 Taoism Affiliation  NON

 

 Race  White

 

 Ethnic Group  Not  or 





Author







 Author  [facility]

 

 Organization  [facility]

 

 Address  Unknown

 

 Phone  Unavailable







Support







 Name  Relationship  Address  Phone

 

 Steffanie Huber  [employer]  Unknown  [phone]







Care Team Providers







 Care Team Member Name  Role  Phone

 

 Neftali Bacon MD  23  [phone]

 

 Mahesh Champagne MD  PCP  [phone]







Reason for Referral

* Consult, Test & Treat (Routine)





     



  Status   Reason   Specialty   Diagnoses /   Referred By   Referred To



     Procedures   Contact   Contact

 

     



  No Auth Needed   Specialty   Anesthesia Pain   Diagnoses   Osorio Arreaga Sp[facility] Anes Pain



   Services    Reflex   MD Jose   Cl



   Required    sympathetic   [address] MD



     dystrophy   [facility]



      MS    [address], [place]



      [id number]



      Phone:   Phone:



      [phone]



      Fax:   Fax: [phone] 











Reason for Visit

* 





 



  Reason   Comments

 

 



  Results 









Encounter Details







    



  Date   Type   Department   Care Team   Description

 

    



  2018   Telephone   [facility]   Dania Guardado MD   Results



    Physicians - Internal   [address] [facility] 



    [place] [address] 



    Ortho and Medical  



    [facility] [address]  



    [address] [place], [place]  



    [address] [phone]  







Social History







    



  Tobacco Use   Types   Packs/Day   Years Used   Date

 

    



  Current Every Day Smoker    

 

    



  Smokeless Tobacco: Never   



  Used   









   



  Alcohol Use   Drinks/Week   oz/Week   Comments

 

   



  No   









 



  Sex Assigned at Birth   Date Recorded

 

 



  Not on file 



as of this encounter



Miscellaneous Notes

* Telephone Encounter - Dania Guardado MD - 2018  6:39 PM CDT



Disclosed and discussed biopsy results which were nonspecific (neg for lymphoma/
infection).



Discussed that based on our clinic evaluation and pathology, he likely has 
reflex sympathetic 

Dystrophy. 



Referral to pain clinic placed for additional management of condition. 

in this encounter



Plan of Treatment







   



  Name   Priority   Associated Diagnoses   Order Schedule

 

   



  AMB REFERRAL TO PAIN CLINIC   Routine   Reflex sympathetic   Ordered: 2018



    dystrophy 



as of this encounter



Visit Diagnoses











  Diagnosis

 





  Reflex sympathetic dystrophy - Primary

 





  Reflex sympathetic dystrophy, unspecified

## 2018-11-16 NOTE — ED CHEST PAIN
General


Stated Complaint:  CHEST PAIN


Source:  patient


Exam Limitations:  no limitations





History of Present Illness


Date Seen by Provider:  2018


Time Seen by Provider:  14:17


Initial Comments


Here with report of central chest pain.  Onset one to 2 hours ago.  States it 

feels like a deep ache and pressure in his chest and also has some left arm 

numbness since resolved.  Did take 1 nitroglycerin for 9 out of 10 chest pain 

and brought it down to a 5 out of 10.  Currently is off his Plavix and aspirin 

due to the need to have a left AKA done next Monday.  He is off the meds prior 

to the surgery.  He has not had any aspirin today and is refusing currently due 

to the need for surgery.  Long-standing left leg problem with peripheral artery 

disease with pain that is persisting.  This will be amputated on Monday.


Timing/Duration:  1-3 hours


Severity/Quality:  moderate


Location:  central


Radiation:  no radiation


Activities at Onset:  none


Prior CP/Workup:  angina, cardiac cath, echocardiography, heart attack, stress 

test


Modifying Factors:  improves with nitroglycerin, improves with rest


ASA po PTA:  No


NTG SL PTA:  Yes


Associated Symptoms:  No back pain, No fatigue, No nausea/vomiting, No 

shortness of breath, No weakness





Allergies and Home Medications


Allergies


Coded Allergies:  


     Penicillins (Unverified  Allergy, Mild, 09)





Home Medications


Allopurinol 300 Mg Tablet, 300 MG PO DAILY, (Reported)


Atorvastatin Calcium 40 Mg Tablet, 40 MG PO DAILY, (Reported)


Carisoprodol 350 Mg Tablet, 350 MG PO HS, (Reported)


Clopidogrel Bisulfate 75 Mg Tablet, 75 MG PO DAILY, (Reported)


Gabapentin 600 Mg Tablet, 600 MG PO QID, (Reported)


Hydrocodone Bit/Acetaminophen 1 Each Tablet, 1 TAB PO Q6H PRN for PAIN-MODERATE


   Prescribed by: FELISHA MORENO on 18 1049


Metoprolol Succinate 25 Mg Tab.er.24h, 25 MG PO DAILY, (Reported)


Multivits-Minerals/FA/Lycopene 1 Each Tablet, 1 TAB PO DAILY, (Reported)


Nitroglycerin 0.4 Mg Tab.subl, 0.4 MG SL UD PRN for CHEST PAIN, (Reported)


Omega 3 Polyunsat Fatty Acids 1,000 Mg Cap, 1,000 MG PO TID, (Reported)


Omeprazole Magnesium 20 Mg Tablet.dr, 20 MG PO DAILY, (Reported)


Sacubitril/Valsartan 1 Each Tablet, 1 TAB PO BID, (Reported)


Sulfamethoxazole/Trimethoprim 1 Each Tablet, 1 EACH PO BID


   Prescribed by: FELISHA MORENO on 18 1049


Tizanidine HCl 4 Mg Tablet, 8 MG PO Q8H PRN for MUSCLE SPASMS, (Reported)


   TAKES 2 (4MG) TABLETS 


Warfarin Sodium 5 Mg Tablet, 5 MG PO DAILY


   Prescribed by: FELISHA MORENO on 18 1049





Patient Home Medication List


Home Medication List Reviewed:  Yes





Review of Systems


Review of Systems


Constitutional:  see HPI; No chills, No fever


EENTM:  No Symptoms Reported


Respiratory:  No Symptoms Reported


Cardiovascular:  Chest Pain; Denies Edema


Gastrointestinal:  Denies Abdominal Pain, Denies Nausea, Denies Vomiting


Genitourinary:  No Symptoms Reported


Musculoskeletal:  joint pain, muscle pain


Skin:  no symptoms reported


Psychiatric/Neurological:  See HPI, Numbness (left leg), Pre-Existing Deficit (

and left leg), Tingling (left arm)


Endocrine:  No Symptoms Reported





All Other Systems Reviewed


Negative Unless Noted:  Yes





Past Medical-Social-Family Hx


Past Med/Social Hx:  Reviewed Nursing Past Med/Soc Hx


Patient Social History


Alcohol Use:  Denies Use


Recreational Drug Use:  No


Smoking Status:  Current Everyday Smoker


Type Used:  Cigarettes


Former Smoker, Quit:  2017


2nd Hand Smoke Exposure:  No


Recent Hopitalizations:  No





Immunizations Up To Date


Date of Pneumonia Vaccine:  Dec 3, 2012


Date of Influenza Vaccine:  Oct 14, 2017





Seasonal Allergies


Seasonal Allergies:  No





Past Medical History


Surgeries:  Yes (CARDIAC CATHS--STENTS X 1. LAST CATH 17 WITH STENT X 1)


Cardiac, Coronary Stent


Respiratory:  Yes


Pulmonary Embolism


Currently Using CPAP:  No


Currently Using BIPAP:  No


Cardiac:  Yes (MI X 2; STENTS X 4; -NSTEMI 17 WITH 1 STENT PLACED)


Coronary Artery Disease, Deep Vein Thrombosis, Heart Attack, High Cholesterol, 

Hypertension


Neurological:  No


Reproductive Disorders:  No


Genitourinary:  No


Gastrointestinal:  Yes


Gastroesophageal Reflux


Musculoskeletal:  Yes (HYDROCODONE + IBUPROFEN DAILY)


Chronic Back Pain, Gout


Endocrine:  No


HEENT:  No


Cancer:  No


Psychosocial:  No


Integumentary:  No


Blood Disorders:  Yes (PROTEIN S DEFICIENCY--DVT'S AND P.E.'S AND MI X 2)





Family Medical History


Reviewed Nursing Family Hx





Arthritis


  G8 BROTHER


Blood clots


  19 MOTHER ( of blood clot)


Cardiac disorder


  19 FATHER


Diabetes mellitus


  G8 BROTHER


FH: cancer


  paternal grandmother


FHx: inflammatory bowel disease


  G8 BROTHER





No Family History of:


  FH: sudden cardiac death (SCD)


Heart Disease, Vascular Disease





Physical Exam


Vital Signs





Vital Signs - First Documented








 18





 14:11


 


Temp 97.0


 


Pulse 81


 


Resp 26


 


B/P (MAP) 139/89 (106)


 


Pulse Ox 94





Capillary Refill :


Height, Weight, BMI


Height: 5'11.00"


Weight: 198lbs. 0.0oz. [id number]; 27.6 BMI


Method:Stated


General Appearance:  No Apparent Distress, Chronically ill


HEENT:  PERRL/EOMI, Pharynx Normal


Neck:  Non Tender, Supple


Respiratory:  No Respiratory Distress, Wheezing (a few trace wheezes throughout)


Cardiovascular:  Regular Rate, Rhythm, No Edema, No Murmur, Other (left leg is 

hypotrophic and cool to the touch.  This is chronic.)


Gastrointestinal:  Non Tender, Soft


Extremity:  Normal Range of Motion, Non Tender


Neurologic/Psychiatric:  Alert, Oriented x3


Skin:  Normal Color, Warm/Dry, Cool (left leg)





Progress/Results/Core Measures


Results/Orders


Lab Results





Laboratory Tests








Test


 18


14:16 18


16:34 Range/Units


 


 


White Blood Count


 10.7 


 


 4.3-11.0


10^3/uL


 


Red Blood Count


 5.04 


 


 4.35-5.85


10^6/uL


 


Hemoglobin 14.9   13.3-17.7  G/DL


 


Hematocrit 44   40-54  %


 


Mean Corpuscular Volume 88   80-99  FL


 


Mean Corpuscular Hemoglobin 30   25-34  PG


 


Mean Corpuscular Hemoglobin


Concent 34 


 


 32-36  G/DL





 


Red Cell Distribution Width 14.0   10.0-14.5  %


 


Platelet Count


 362 


 


 130-400


10^3/uL


 


Mean Platelet Volume 8.7   7.4-10.4  FL


 


Neutrophils (%) (Auto) 57   42-75  %


 


Lymphocytes (%) (Auto) 30   12-44  %


 


Monocytes (%) (Auto) 11   0-12  %


 


Eosinophils (%) (Auto) 3   0-10  %


 


Basophils (%) (Auto) 0   0-10  %


 


Neutrophils # (Auto) 6.1   1.8-7.8  X 10^3


 


Lymphocytes # (Auto) 3.2   1.0-4.0  X 10^3


 


Monocytes # (Auto) 1.2 H  0.0-1.0  X 10^3


 


Eosinophils # (Auto)


 0.3 


 


 0.0-0.3


10^3/uL


 


Basophils # (Auto)


 0.0 


 


 0.0-0.1


10^3/uL


 


Prothrombin Time 13.8   12.2-14.7  SEC


 


INR Comment 1.1   0.8-1.4  


 


Activated Partial


Thromboplast Time 32 


 


 24-35  SEC





 


Sodium Level 139   135-145  MMOL/L


 


Potassium Level 3.5 L  3.6-5.0  MMOL/L


 


Chloride Level 100     MMOL/L


 


Carbon Dioxide Level 29   21-32  MMOL/L


 


Anion Gap 10   5-14  MMOL/L


 


Blood Urea Nitrogen 8   7-18  MG/DL


 


Creatinine


 0.80 


 


 0.60-1.30


MG/DL


 


Estimat Glomerular Filtration


Rate > 60 


 


  





 


BUN/Creatinine Ratio 10    


 


Glucose Level 120 H    MG/DL


 


Calcium Level 9.8   8.5-10.1  MG/DL


 


Corrected Calcium 9.4   8.5-10.1  MG/DL


 


Magnesium Level 2.1   1.8-2.4  MG/DL


 


Total Bilirubin 0.4   0.1-1.0  MG/DL


 


Aspartate Amino Transf


(AST/SGOT) 32 


 


 5-34  U/L





 


Alanine Aminotransferase


(ALT/SGPT) 32 


 


 0-55  U/L





 


Alkaline Phosphatase 96     U/L


 


Myoglobin


 21.2 


 238.6 H


 10.0-92.0


NG/ML


 


Troponin I < 0.30  < 0.30  <0.30  NG/ML


 


Total Protein 7.5   6.4-8.2  GM/DL


 


Albumin 4.5   3.2-4.5  GM/DL


 


Lipase 11   8-78  U/L








My Orders





Orders - BRODY CONTRERAS MD


Cbc With Automated Diff (18 14:16)


Magnesium (18 14:16)


Chest 1 View, Ap/Pa Only (18 14:16)


Ekg Tracing (18 14:16)


Cardiac Profile 1 (18 14:16)


Comprehensive Metabolic Panel (18 14:16)


Myoglobin Serum (18 14:16)


Protime With Inr (18 14:16)


Partial Thromboplastin Time (18 14:16)


O2 (18 14:16)


Monitor-Rhythm Ecg Trace Only (18 14:16)


Lipid Panel (18 06:00)


Aspirin Chewable Tablet (Baby Aspirin Ch (18 14:30)


Saline Lock/Iv-Start (18 14:16)


Lipase (18 14:16)


Nitroglycerin 0.4 Mg Btl 25's (Nitrostat (18 14:22)


Nitroglycerin 0.4 Mg Btl 25's (Nitrostat (18 14:30)


Lidocaine 2% Viscous 15 Ml (Xylocaine Vi (18 14:30)


Antacid  Suspension (Mylanta  Suspension (18 14:30)


Morphine  Injection (Morphine  Injection (18 15:29)


Ekg Tracing (18 16:31)


Troponin I (18 16:31)


Myoglobin Serum (18 16:31)


Ekg Tracing (18 17:27)


Aspirin Chewable Tablet (Baby Aspirin Ch (18 17:26)


Ticagrelor Tablet (Brilinta Tablet) (18 17:45)


Heparin  Drip 43797 Unit/500ml (Heparin (18 17:34)


Heparin (Bolus Per Protocol) (Heparin (B (18 17:34)





Medications Given in ED





Current Medications








 Medications  Dose


 Ordered  Sig/Joey


 Route  Start Time


 Stop Time Status Last Admin


Dose Admin


 


 Al Hydrox/Mg


 Hydrox/Simethicone  30 ml  ONCE  ONCE


 PO  18 14:30


 18 14:31 DC 18 15:02


30 ML


 


 Aspirin  81 mg  STK-MED ONCE


 .ROUTE  18 17:26


 18 17:28 DC 18 17:32


81 MG


 


 Lidocaine HCl  15 ml  ONCE  ONCE


 PO  18 14:30


 18 14:31 DC 18 15:02


15 ML


 


 Nitroglycerin  0.4 mg  UD  PRN


 SL  18 14:30


    18 14:58


0.4 MG








Vital Signs/I&O











 18





 14:11


 


Temp 97.0


 


Pulse 81


 


Resp 26


 


B/P (MAP) 139/89 (106)


 


Pulse Ox 94











Progress


Progress Note :  


Progress Note


Seen and evaluated.  IV, labs, EKG and chest x-ray ordered.   milligrams 

ordered but patient refused due to upcoming procedure.  Nitroglycerin 

sublingual ordered.  GI cocktail ordered.  Monitor patient.  1735: Patient had 

acute onset of sweating and not feeling well.  Definitely concerns given his 

history for coronary syndrome.  Myoglobin has bumped significantly elevated 

troponin is still negative.  I did discuss the case with Dr. Bacon and he 

would like the patient to get Brilenta 180 mg by mouth as well as initiated on 

heparin ACS protocol.  This was ordered.  He will likely take the patient for 

heart catheter tomorrow.  He accepts patient in consult.  I did discuss the 

case with Dr. CONTRERAS and he accepts patient for admission, observation status.  

Patient and family agree with plan.  Aspirin 324 mg was given and he accepted 

at this time.  Pain, sweating and odd feeling resolved after brief period of 

rest.  Currently pain-free.  Repeat EKG was done due to onset of symptoms.  No 

acute findings.


Initial ECG Impression Date:  2018


Initial ECG Impression Time:  14:08


Initial ECG Rate:  82


Initial ECG Rhythm:  Normal Sinus


Initial ECG Comparisson:  Unchanged


Comment


Sinus rhythm with probable inferior infarct that is old.  Unchanged from 

previous of 18.  Left axis deviation noted.  No evidence of ST elevation 

MI.  Interpreted by me.


EKG #1:  


   EKG Time:  16:34


   Rate:  80


   Rhythm:  Normal Sinus


Comment


Normal sinus with left axis deviation.  Similar to previous earlier.  No 

evidence of ST elevation MI.  Interpreted by me.


EKG #2:  


   EKG Time:  17:27


   Rate:  78


Comment


Sinus rhythm with normal axis.  No evidence of ST elevation MI.  Similar to 

previous although improved axis.  No evidence of ST elevation MI.  Interpreted 

by me.





Diagnostic Imaging





   Diagonstic Imaging:  Xray


   Plain Films/CT/US/NM/MRI:  chest


Comments


 VIA [facility], [facility].


 [place], [place]





NAME:   NIKO MARTE


[facility] REC#:   [id number]


ACCOUNT#:   [id number]


PT STATUS:   REG ER


:   1970


PHYSICIAN:   BRODY CONTRERAS MD


ADMIT DATE:   18/ER


 ***Draft***


Date of Exam:18





CHEST 1 VIEW, AP/PA ONLY








CLINICAL INDICATION: Patient with chest pain and numbness


radiating down left arm today.





EXAM: Portable chest x-ray, upright view.





COMPARISONS: Chest x-ray dated 2017.





FINDINGS:





Lungs/pleura: Lungs are clear. There is no pneumothorax. There is


no pleural effusion.





Mediastinum: Unremarkable. 





Pulmonary vasculature: Unremarkable.





Heart: Unremarkable.





Bones/extrathoracic soft tissue: Unremarkable.





IMPRESSION:


There is no radiographic evidence of acute cardiopulmonary


process.





  Dictated on workstation # [id number]








Dict:   18 1430


Trans:   18 1433


 [id number]





Interpreted by:     WALLY ZENG MD


Electronically signed by:





Departure


Communication (Admissions)


Time/Spoke to Admitting Phy:  17:35


Time/Spoke to Consulting Phy:  17:30





Impression





 Primary Impression:  


 Chest pain


 Qualified Codes:  R07.9 - Chest pain, unspecified


Disposition:  09 ADMITTED AS INPATIENT


Condition:  Stable





Admissions


Decision to Admit Reason:  Admit from ER (General)


Decision to Admit/Date:  2018


Time/Decision to Admit Time:  17:30





Departure-Patient Inst.


Referrals:  


RAIZA FARRELL MD (PCP/Family)


Primary Care Physician











BRODY CONTRERAS MD 2018 14:36

## 2018-11-16 NOTE — XMS REPORT
Encounter Summary

 Created on: 2018



Francisco Huber

External Reference #: [id number]

: 1970

Sex: Male



Demographics







 Address  [address] [place]  [address]

 

 Home Phone  [phone]

 

 Preferred Language  English

 

 Marital Status  Unknown

 

 [employer] Affiliation  NON

 

 Race  White

 

 Ethnic Group  Not  or 





Author







 Author  [facility]

 

 Organization  [facility]

 

 Address  Unknown

 

 Phone  Unavailable







Support







 Name  Relationship  Address  Phone

 

 Steffanie Huber  ECON  Unknown  [phone]







Care Team Providers







 Care Team Member Name  Role  Phone

 

 Neftali Bacon MD  21  [phone]

 

 Mahesh Champagne MD  PCP  [phone]







Reason for Visit

* 





 



  Reason   Comments

 

 



  Pain 

 

 



  Pain 





* Consult, Test & Treat (Routine)





     



  Status   Reason   Specialty   Diagnoses /   Referred By   Referred To



     Procedures   Contact   Contact

 

     



  No Auth Needed   Specialty   Anesthesia Pain   Diagnoses   Osorio Arreaga Sp[facility] An[facility] Pain



   Services    Reflex   MD Jose   Cl



   Required    sympathetic   [address]



     [address]   [address]   [facility]



      MS    [address] [place]



      [address]   [id number]



      Phone:   Phone:



      [phone]



      Fax:   Fax: [phone] 











Encounter Details







    



  Date   Type   Department   Care Team   Description

 

    



  10/11/2018   Office Visit   [facility]   Osorio Arreaga
,   Complex regional pain



    Pain Clinic   MD   syndrome type 1 of left



    [address]   [address]   lower extremity ([place] [address]   MS    Dx);



    [phone]   [place] [address]   Neuropathic pain;



     [phone]   Left leg pain;



     [phone] (Fax)

   Allodynia



     L 



     Wily ward MD 



     [address] [facility] 



     [address] 



     [place] [address] 



     [phone] (Fax) 







Social History







    



  Tobacco Use   Types   Packs/Day   Years Used   Date

 

    



  Current Every Day Smoker    

 

    



  Smokeless Tobacco: Never   



  Used   









   



  Alcohol Use   Drinks/Week   oz/Week   Comments

 

   



  No   









 



  Sex Assigned at Birth   Date Recorded

 

 



  Not on file 



as of this encounter



Last Filed Vital Signs







  



  Vital Sign   Reading   Time Taken

 

  



  Blood Pressure   117/71   10/11/2018  9:14 AM CDT

 

  



  Pulse   91   10/11/2018  9:14 AM CDT

 

  



  Temperature   -   -

 

  



  Respiratory Rate   17   10/11/2018  9:14 AM CDT

 

  



  Oxygen Saturation   94%   10/11/2018  9:14 AM CDT

 

  



  Inhaled Oxygen   -   -



  Concentration  

 

  



  Weight   87.1 kg (192 lb)   10/11/2018  9:14 AM CDT

 

  



  Height   162.6 cm (5' 4")   10/11/2018  9:14 AM CDT

 

  



  Body Mass Index   32.96   10/11/2018  9:14 AM CDT



in this encounter



Functional Status







  



  Functional Status   Response   Date of Assessment

 

  



  Does the patient have a hearing impairment:   No   10/11/2018

 

  



  Does the patient have a visual impairment:   Yes   10/11/2018

 

  



  Does the patient have impaired ambulation:   No   10/11/2018

 

  



  Does the patient have an activity of daily living   No   10/11/2018



  (ADL) impairment:  

 

  



  Does the patient have an instrumental activity of   No   10/11/2018



  daily living (IADL) impairment:  









  



  Cognitive Status   Response   Date of Assessment

 

  



  Does the patient have a cognitive impairment:   No   10/11/2018



as of this encounter



Instructions

* Patient Instructions - Goldie Brasher - 10/11/2018  9:00 AM CDT



General Instructions:

 How to reach me: Please send a [employer] message to the [facility] or leave 
a voicemail for my nurse Merced at [phone].

 Scheduling: Our scheduling phone number is [phone].

 How to get a medication refill: Five business days before refill needed, 
please use the [employer] Refill request or contact your pharmacy directly to 
request medication refills. 

 How to receive your test results: If you have signed up for [employer], you 
will receive your test results and messages from me this way. Otherwise, you 
will get a phone call or letter. If you are expecting results and have not 
heard from my office within 2 weeks of your testing, please send a [employer] 
message or call my office.

 Support for many chronic illnesses is available through [employer]: 
[email or web address] or [phone].

 For questions on nights, weekends or holidays, call the  at [phone]
[phone], and ask for the doctor on call for Anesthesia Pain Management.

in this encounter



Progress Notes

* Merced Mars, RN - 10/11/2018  9:00 AM CDT



Patient unsure where MRI L-spine completed in the last year. Checked with [facility] 
and [facility] but they have no records of MRI. If pt remembers where he had 
MRI completed, he will notify the office.

* Wily Jose MD - 10/11/2018  9:00 AM CDT



Formatting of this note may be different from the original.



Dear Dr. Osorio Arreaga,



I appreciate your kind referral of Francisco Huber for evaluation of pain. 
Please see my note below for the full details of the evaluation and management 
plan.



Thank you,



Wily Jose MD



[facility] Inter[facility]

NEW PATIENT HISTORY AND PHYSICAL

Subjective 



Chief Complaint: LLE pain



HPI: Francisco Huber is a 47 y.o. male who  has a past medical history of 
Coronary artery disease involving native coronary artery of native heart with 
angina pectoris ([facility]) (2017); Coronary artery disease involving native 
coronary artery of native heart with angina pectoris ([facility]) (2017); 
Ischemic cardiomyopathy; Mild mitral regurgitation (2017); Mild tricuspid 
regurgitation (2017); NSTEMI (non-ST elevated myocardial infarction) ([facility]
) (2017); Protein S deficiency ([facility]) (2017); and Tobacco abuse (). who now presents for initial consultation.



The pain is in the left leg. 

It is circumferential from the knee down to the toes. 



Pain started: 2017



Initial inciting injury or event: No



Numbness/tingling: Yes



The pain ranges 8-10/10



The pain is described as aching, stabbing, shooting, burning, throbbing. 



The pain is exacerbated by standing, bending. 



The pain is partially alleviated by medication, ice, heat, rest. 



No muscle tightness. 



There is allodynia. 

It sometimes feels cooler than the other leg.

Difference in sweating. 

Difference in hair/nail growth. 

There is markedly limited ROM of the ankle.

There is purplish hue. 

+edema



He talks about considering amputation below the knee. 



 



 



PRIOR MEDICATIONS: 

Effective



Ineffective

ASA

Capsaicin

Gabapentin 600mg tid

Tizanidine

Lidocaine patch



Unable to tolerate

NSAID



Never

Lyrica

Ami/Nortriptyline

Cymbalta



PRIOR INTERVENTIONS:  No spine surgery

Effective



Ineffective

Lumbar sympathectomy

Physician-ordered PT



Francisco Huber denies any recent fevers, chills, infection, antibiotics, 
bowel or bladder incontinence, saddle anesthesia. On Plavix and Coumadin.



ROS: All 14 systems reviewed and found to be negative except as above and as 
follows. +gout, cold/heat intolerance. 



Past Medical History:

Past Medical History: 

Diagnosis Date 

 Coronary artery disease involving native coronary artery of native heart 
with angina pectoris ([facility]) 2017 

 Coronary artery disease involving native coronary artery of native heart 
with angina pectoris ([facility]) 2017 - NSTEMI at [facility] in [place], [place]. Successful PCI 
with a Resolute Integrity 2.5 x 26 mm stent to the OM with Dr. Miguel. 
Unsuccessful PCI to the RCA . Pt referred to Dr. Delcid for possible  
procedure.   16 - Nuclear stress test ([facility]): No 
ischemia or arrhythmia on EKG. Diaphragmatic attenuation with reversible 
ischemia involving the mid to apic 

 Ischemic cardiomyopathy  

 Mild mitral regurgitation 2017 

 Mild tricuspid regurgitation 2017 

 NSTEMI (non-ST elevated myocardial infarction) ([facility]) 2017 

 Protein S deficiency ([facility]) 2017 

 Tobacco abuse 2017 



Family History:

History reviewed. No pertinent family history.



Social History:

Lives in [facility] [address]



Social History 



Social History 

 Marital status:  

  Spouse name: N/A 

 Number of children: N/A 

 Years of education: N/A 



Occupational History 

 Not on file. 



Social History Main Topics 

 Smoking status: Current Every Day Smoker 

 Smokeless tobacco: Never Used 

 Alcohol use No 

 Drug use: No 

 Sexual activity: Not on file 



Other Topics Concern 

 Not on file 



Social History Narrative 

 No narrative on file 



Allergies:

Allergies 

Allergen Reactions 

 Pneumococcal Vaccine RASH 

  Cellulitis type rash. 

 Pcn [Penicillins] UNKNOWN 



Medications:



Current Outpatient Prescriptions: 

  aspirin 325 mg tablet, Take 325 mg by mouth daily. Take with food., Disp: 
, Rfl: 

  atorvastatin (LIPITOR) 40 mg tablet, Take 40 mg by mouth daily., Disp: , 
Rfl: 

  carisoprodol(+) (SOMA) 350 mg tablet, Take 350 mg by mouth at bedtime 
daily., Disp: , Rfl: 

  clopiDOGrel (PLAVIX) 75 mg tablet, Take 75 mg by mouth daily., Disp: , Rfl
: 

  gabapentin (NEURONTIN) 600 mg tablet, Take 600 mg by mouth four times 
daily., Disp: , Rfl: 

  gemfibrozil (LOPID) 600 mg tablet, Take 600 mg by mouth twice daily., Disp
: , Rfl: 

  HYDROcodone/acetaminophen(+) (NORCO) 10/325 mg tablet, Take 1 tablet by 
mouth every 6 hours as needed for Pain, Disp: , Rfl: 

  LOSARTAN PO, Take  by mouth., Disp: , Rfl: 

  NIFEdipine (PROCARDIA; ADALAT) 10 mg capsule, Take 10 mg by mouth three 
times daily., Disp: , Rfl: 

  nitroglycerin (NITROSTAT) 0.4 mg tablet, Place 0.4 mg under tongue every 5 
minutes as needed for Chest Pain. Max of 3 tablets, call 911., Disp: , Rfl: 

  Omega-3 Acid Ethyl Esters 1 gram cap, Take 1 g by mouth three times daily.
, Disp: , Rfl: 

  omeprazole DR(+) (PRILOSEC) 20 mg capsule, Take 20 mg by mouth daily 
before breakfast., Disp: , Rfl: 

  oxycodone(+) (ROXICODONE, OXY-IR) 10 mg tablet, Take 10 mg by mouth every 
6 hours as needed for Pain, Disp: , Rfl: 

  tiZANidine (ZANAFLEX) 4 mg tablet, Take 8 mg by mouth every 8 hours as 
needed. Indications: MUSCLE SPASM, Disp: , Rfl: 

  warfarin (COUMADIN) 5 mg tablet, Take 5 mg by mouth as directed. Take 7.5 
mg by mouth on Mon and Wed. Take 5 mg by mouth on , Tues, Thurs, Fri, and 
Sat. Take at bedtime.  Indications: THROMBOTIC DISORDER, Disp: , Rfl: 



Physical examination: 

/71  | Pulse 91  | Resp 17  | Ht 162.6 cm (64")  | Wt 87.1 kg (192 lb)  | 
SpO2 94%  | BMI 32.96 kg/m 

Pain Score: Nine



General: The patient is a well-developed, well nourished 47 y.o. male in no 
acute distress. 

HEENT: Head is normocephalic and atraumatic. Pupils are equal and reactive to 
light bilaterally. 

Cardiac: Based on palpation, pulse appears to be regular rate and rhythm. 

Pulmonary: The patient has unlabored respirations and bilateral symmetric chest 
excursion. 

Abdomen: Soft, nontender, and nondistended. There is no rebound or guarding. 

Extremities: No clubbing, cyanosis, or edema. 



Neurologic: 

The patient is alert and oriented times 3. 

Cranial nerves II through XII are intact without any focal deficits. 

There is no sensory deficit to light touch or pinprick in the affected areas. 
There is no allodynia noted.



Musculoskeletal: 

Sitting in a wheelchair. 



L-Spine 

There is no point vertebral tenderness in the midline.  

There is mild-mod low lumbar paraspinal tenderness. Paraspinal muscle tone is 
normal.

Facet loading is positive.

There is no tenderness or radiating pain with palpation over the SI joints, 
piriformis, or greater trochanteric bursae bilaterally.

ROM with flexion, extension, rotation, and lateral bending is intact.

Strength is equal and adequate bilaterally in the flexors and extensors of the 
bilateral lower extremities. 

SLR is positive in the LLE.



Last Cr and LFT's:

Creatinine 

Date Value Ref Range Status 

2017 0.72 0.4 - 1.24 MG/DL Final 



 

Assessment:



Francisco Huber is a 47 y.o. male who  has a past medical history of 
Coronary artery disease involving native coronary artery of native heart with 
angina pectoris ([facility]) (2017); Coronary artery disease involving native 
coronary artery of native heart with angina pectoris ([facility]) (2017); 
Ischemic cardiomyopathy; Mild mitral regurgitation (2017); Mild tricuspid 
regurgitation (2017); NSTEMI (non-ST elevated myocardial infarction) ([facility]
) (2017); Protein S deficiency ([facility]) (2017); and Tobacco abuse (). who presents for evaluation of LLE pain. 



The pain complaints are most likely due to:



1. Complex regional pain syndrome type 1 of left lower extremity   

2. Neuropathic pain   

3. Left leg pain   

4. Allodynia   



Patient has had an adequate trial of > 3 months of rest, exercise, NSAID's, 
multimodal treatment, and the passage of time without improvement of symptoms. 
The pain has significant impact on the daily quality of life. 



Plan:



1. Will trial Lyrica 100mg up to tid instead of the gabapentin. 

2. Consider trial of Nortriptyline 25mg up to 75mg qhs next. 

3. If he fails to improve, could potentially consider SCS trial with Nevro 
HF10. 

4. Will request record of L-Spine MRI to evaluate for neuraxial etiology.

5. Follow up as needed. 



Risks/benefits of all pharmacologic and interventional treatments discussed and 
questions answered. 



Thank you for this kind referral for consultation. Please feel free to contact 
me with any questions or concerns. 



 



in this encounter



Plan of Treatment





Not on fileas of this encounter



Visit Diagnoses











  Diagnosis

 





  Complex regional pain syndrome type 1 of left lower extremity - Primary

 





  Neuropathic pain

 





  Neuralgia, neuritis, and radiculitis, unspecified

 





  Left leg pain

 





  Pain in limb

 





  Allodynia

 





  Disturbance of skin sensation

## 2018-11-17 VITALS — DIASTOLIC BLOOD PRESSURE: 69 MMHG | SYSTOLIC BLOOD PRESSURE: 130 MMHG

## 2018-11-17 VITALS — SYSTOLIC BLOOD PRESSURE: 150 MMHG | DIASTOLIC BLOOD PRESSURE: 92 MMHG

## 2018-11-17 VITALS — DIASTOLIC BLOOD PRESSURE: 70 MMHG | SYSTOLIC BLOOD PRESSURE: 102 MMHG

## 2018-11-17 VITALS — SYSTOLIC BLOOD PRESSURE: 144 MMHG | DIASTOLIC BLOOD PRESSURE: 57 MMHG

## 2018-11-17 VITALS — DIASTOLIC BLOOD PRESSURE: 90 MMHG | SYSTOLIC BLOOD PRESSURE: 143 MMHG

## 2018-11-17 VITALS — SYSTOLIC BLOOD PRESSURE: 125 MMHG | DIASTOLIC BLOOD PRESSURE: 71 MMHG

## 2018-11-17 VITALS — DIASTOLIC BLOOD PRESSURE: 80 MMHG | SYSTOLIC BLOOD PRESSURE: 139 MMHG

## 2018-11-17 VITALS — SYSTOLIC BLOOD PRESSURE: 121 MMHG | DIASTOLIC BLOOD PRESSURE: 78 MMHG

## 2018-11-17 VITALS — SYSTOLIC BLOOD PRESSURE: 134 MMHG | DIASTOLIC BLOOD PRESSURE: 79 MMHG

## 2018-11-17 VITALS — DIASTOLIC BLOOD PRESSURE: 75 MMHG | SYSTOLIC BLOOD PRESSURE: 138 MMHG

## 2018-11-17 VITALS — DIASTOLIC BLOOD PRESSURE: 80 MMHG | SYSTOLIC BLOOD PRESSURE: 130 MMHG

## 2018-11-17 VITALS — DIASTOLIC BLOOD PRESSURE: 88 MMHG | SYSTOLIC BLOOD PRESSURE: 145 MMHG

## 2018-11-17 VITALS — SYSTOLIC BLOOD PRESSURE: 113 MMHG | DIASTOLIC BLOOD PRESSURE: 72 MMHG

## 2018-11-17 VITALS — SYSTOLIC BLOOD PRESSURE: 130 MMHG | DIASTOLIC BLOOD PRESSURE: 80 MMHG

## 2018-11-17 VITALS — DIASTOLIC BLOOD PRESSURE: 79 MMHG | SYSTOLIC BLOOD PRESSURE: 134 MMHG

## 2018-11-17 VITALS — DIASTOLIC BLOOD PRESSURE: 78 MMHG | SYSTOLIC BLOOD PRESSURE: 131 MMHG

## 2018-11-17 VITALS — DIASTOLIC BLOOD PRESSURE: 75 MMHG | SYSTOLIC BLOOD PRESSURE: 117 MMHG

## 2018-11-17 LAB
ALBUMIN SERPL-MCNC: 4 GM/DL (ref 3.2–4.5)
ALP SERPL-CCNC: 86 U/L (ref 40–136)
ALT SERPL-CCNC: 32 U/L (ref 0–55)
BASOPHILS # BLD AUTO: 0 10^3/UL (ref 0–0.1)
BASOPHILS NFR BLD AUTO: 0 % (ref 0–10)
BILIRUB SERPL-MCNC: 0.6 MG/DL (ref 0.1–1)
BUN/CREAT SERPL: 11
CALCIUM SERPL-MCNC: 9.3 MG/DL (ref 8.5–10.1)
CARDIAC PROFILE 2: 3.39 NG/ML (ref ?–0.3)
CHLORIDE SERPL-SCNC: 102 MMOL/L (ref 98–107)
CHOLEST SERPL-MCNC: 128 MG/DL (ref ?–200)
CO2 SERPL-SCNC: 28 MMOL/L (ref 21–32)
CREAT SERPL-MCNC: 0.76 MG/DL (ref 0.6–1.3)
EOSINOPHIL # BLD AUTO: 0.2 10^3/UL (ref 0–0.3)
EOSINOPHIL NFR BLD AUTO: 2 % (ref 0–10)
ERYTHROCYTE [DISTWIDTH] IN BLOOD BY AUTOMATED COUNT: 13.7 % (ref 10–14.5)
GFR SERPLBLD BASED ON 1.73 SQ M-ARVRAT: > 60 ML/MIN
GLUCOSE SERPL-MCNC: 123 MG/DL (ref 70–105)
HCT VFR BLD CALC: 41 % (ref 40–54)
HDLC SERPL-MCNC: 28 MG/DL (ref 40–60)
HGB BLD-MCNC: 13.4 G/DL (ref 13.3–17.7)
LYMPHOCYTES # BLD AUTO: 2.1 X 10^3 (ref 1–4)
LYMPHOCYTES NFR BLD AUTO: 19 % (ref 12–44)
MANUAL DIFFERENTIAL PERFORMED BLD QL: NO
MCH RBC QN AUTO: 29 PG (ref 25–34)
MCHC RBC AUTO-ENTMCNC: 33 G/DL (ref 32–36)
MCV RBC AUTO: 88 FL (ref 80–99)
MONOCYTES # BLD AUTO: 0.9 X 10^3 (ref 0–1)
MONOCYTES NFR BLD AUTO: 8 % (ref 0–12)
MYOGLOBIN SERPL-MCNC: 175.2 NG/ML (ref 10–92)
NEUTROPHILS # BLD AUTO: 7.8 X 10^3 (ref 1.8–7.8)
NEUTROPHILS NFR BLD AUTO: 71 % (ref 42–75)
PLATELET # BLD: 303 10^3/UL (ref 130–400)
PMV BLD AUTO: 9.4 FL (ref 7.4–10.4)
POTASSIUM SERPL-SCNC: 3.9 MMOL/L (ref 3.6–5)
PROT SERPL-MCNC: 6.6 GM/DL (ref 6.4–8.2)
RBC # BLD AUTO: 4.6 10^6/UL (ref 4.35–5.85)
SODIUM SERPL-SCNC: 140 MMOL/L (ref 135–145)
TRIGL SERPL-MCNC: 104 MG/DL (ref ?–150)
VLDLC SERPL CALC-MCNC: 21 MG/DL (ref 5–40)
WBC # BLD AUTO: 11 10^3/UL (ref 4.3–11)

## 2018-11-17 RX ADMIN — GABAPENTIN SCH MG: 600 TABLET, FILM COATED ORAL at 21:20

## 2018-11-17 RX ADMIN — GABAPENTIN SCH MG: 600 TABLET, FILM COATED ORAL at 08:46

## 2018-11-17 RX ADMIN — SODIUM CHLORIDE SCH MLS/HR: 900 INJECTION, SOLUTION INTRAVENOUS at 08:04

## 2018-11-17 RX ADMIN — SODIUM CHLORIDE SCH MLS/HR: 900 INJECTION, SOLUTION INTRAVENOUS at 14:06

## 2018-11-17 RX ADMIN — EPTIFIBATIDE SCH MLS/HR: 0.75 INJECTION INTRAVENOUS at 18:01

## 2018-11-17 RX ADMIN — SODIUM CHLORIDE SCH MLS/HR: 900 INJECTION, SOLUTION INTRAVENOUS at 21:20

## 2018-11-17 RX ADMIN — GABAPENTIN SCH MG: 600 TABLET, FILM COATED ORAL at 17:18

## 2018-11-17 RX ADMIN — TICAGRELOR SCH MG: 90 TABLET ORAL at 21:19

## 2018-11-17 RX ADMIN — DICYCLOMINE HYDROCHLORIDE PRN EA: 20 TABLET ORAL at 08:46

## 2018-11-17 RX ADMIN — GABAPENTIN SCH MG: 600 TABLET, FILM COATED ORAL at 13:18

## 2018-11-17 RX ADMIN — DICYCLOMINE HYDROCHLORIDE PRN EA: 20 TABLET ORAL at 13:16

## 2018-11-17 RX ADMIN — ASPIRIN SCH MG: 81 TABLET ORAL at 08:57

## 2018-11-17 RX ADMIN — EPTIFIBATIDE SCH MLS/HR: 0.75 INJECTION INTRAVENOUS at 12:22

## 2018-11-17 RX ADMIN — DICYCLOMINE HYDROCHLORIDE PRN EA: 20 TABLET ORAL at 21:21

## 2018-11-17 RX ADMIN — DICYCLOMINE HYDROCHLORIDE PRN EA: 20 TABLET ORAL at 17:18

## 2018-11-17 NOTE — CARDIAC PROCEDURE NOTE-CS/ASA
Pre-Procedure Note


Pre-Op Procedure Note


H&P Reviewed


The H&P was reviewed, patient examined and no changes noted.


Date H&P Reviewed:  Nov 17, 2018


Time H&P Reviewed:  10:15





Conscious Sedation Pre-Proced


Time


10:15





ASA Score


3


For ASA 3 and 4: Consider anesthesia and medical clearance. Also, for 

patients with a history of failed moderate sedation consider anesthesia.

















Airway 


 


Lungs 


 


Heart 


 


 ASA score


 


 ASA 1: a normal healthy patient


 


 ASA 2:  a patient with a mild systemic disease (mid diabetes, controlled 

hypertension, obesity 


 


 ASA 3:  a patient with a severe systemic disease that limits activity  (angina

, COPD, prior Myocardial infarction)


 


 ASA 4:  a patient with an incapacitating disease that is a constant threat to 

life (CHF, renal failure)


 


 ASA 5:  a moribund patient not expected to survive 24 hrs.  (ruptured aneurysm)


 


 ASA 6:  a declared brain dead patient whose organs are being harvested.


 


 For emergent operations, add the letter E after the classification











Mallampati Classification


Grade 1





Sedation Plan


Analgesia, Amnesia, Plan communicated to team members, Discussed options with 

patient/fam, Discussed risks with patient/fam


The patient is an appropriate candidate to undergo the planned procedure, 

sedation, and anesthesia.





The patient immediately re-assessed prior to indication.











LORENZO METZ MD Nov 17, 2018 11:48 am

## 2018-11-17 NOTE — CORONARY ANGIOGRAPHY & PCI
Coronary Angiography & PCI


DATE OF PROCEDURE: 11/17/18 





INDICATION: Non-STEMI.





PREOPERATIVE DIAGNOSIS: Non-STEMI





POSTOPERATIVE DIAGNOSIS: Stent thrombosis, occlusion of mid/distal RCA stents.  

Successful PTCA.





HISTORY:  This is a 47-year-old gentleman with history of active smoking, PAD, 

significant CAD.  He has history of left circumflex artery stent which occluded 

in 11/2017 requiring PCI.  He also had a chronic total occlusion of the RCA 

which was treated at  with 3 long drug-eluting stents in 12/2017.  Patient 

was planning to undergo left AKA on 11/19/2018 and had stopped aspirin, Plavix, 

Coumadin.  The patient was on Coumadin for PE/DVT.  Patient presented yesterday 

with prolonged chest pain and positive enzymes.  Working diagnosis non-ST 

elevation MI.  Patient was given bolus Brilinta, aspirin, IV heparin infusion 

overnight.  Therefore, the patient was scheduled for coronary angiography. 





PROCEDURES PERFORMED: 


1.Coronary angiography. 


2.Left heart catheterization. 


3. Aortic arch angiogram.


4. PTCA to the mid/distal RCA stents.





COMPLICATIONS: None. 


SPECIMENS: None. 


ESTIMATED BLOOD LOSS: 10 mL


ANESTHESIA: Conscious sedation


ANTICOAGULATION: IV heparin, IV Integrilin.


CONTRAST: 145 mL.


FLUOROSCOPY: 11.4 min.


FLOUROSCOPY DOSE: 1260 mgy.





PROCEDURE DETAILS: The patient is a 47 male  and was brought to the cath lab 

after informed consent was taken. All the risks and complications were 

explained in detail; this included the risk of bleeding, vascular damage, stroke

, MI and even death. The patient was draped and prepped in the usual sterile 

fashion.  Access was gained in the right radial artery with a 6 Malagasy sheath.  

Coronary angiography and left heart catheterization was performed with the 

Tiger catheter.





FINDINGS: 


1.Left main: Patent.


2.LAD: Patent.


3.Left circumflex artery: Patent stents in the OM 1 artery.  No significant 

disease.


4.RCA: Stents starting from the ostium to the distal RCA.  Stent thrombosis in 

the mid/distal RCA stents with total occlusion.


5.Left heart catheterization: Aortic pressure 87/65 mmHg.  LV pressure 91/10 

mmHg.  LVEDP 17 mmHg.  LVEF 4550 percent.  No gradient across the aortic valve.


6.  Aortic arch angiogram: No evidence of aneurysm or dissection.  Patent 

proximal segments of the great arteries including brachycephalic artery, left 

common carotid artery, left subclavian artery.





RECOMMENDATIONS:


Intervention to the mid/distal RCA stent thrombosis and occlusion is 

recommended.





INTERVENTION DETAILS:


JR4 guide catheter, medium support whisper wire, IV heparin and Integrilin for 

anticoagulation.  One ACT was done which was 229 seconds.  Significant clot 

burden was noted in the mid and distal RCA therefore double bolus Integrilin 

was given and the patient was continued on Integrilin infusion.  90 mg of 

Brilinta was given.  The lesion was very easily crossed with the whisper wire.  

We then took a emerge 2.0 X 20 mm semi-compliant balloon and performed 

inflations from the distal RCA to the mid RCA with recanalization but 

significant clot burden still.  We therefore took a noncompliant 2.5X 30 mm 

balloon and performed inflations from the distal RCA till the ostium of the RCA 

with significant improved flow and no residue stenosis.  Distal disease and 

clot is noted.  Integrilin infusion will be given overnight.  The wire and 

balloon was taken out and post-angiogram showed good results.  Patient 

tolerated procedure well and did not have any complication.





CONCLUSIONS: 


1.  Stent thrombosis and occlusion of the distal/mid RCA stents treated 

successfully with PTCA.


2.  Integrilin infusion 12 hours.


3.  Dual antiplatelet therapy long-term.  Will defer further treatment as an 

outpatient with Dr. Miguel.   





INDERJIT Bacon MD, FACP, FACC, [facility]


Interventional Cardiology











LORENZO BACON MD Nov 17, 2018 11:59 am

## 2018-11-17 NOTE — CONSULTATION-CARDIOLOGY
HPI-Cardiology


Cardiology Consultation:


Date of Consultation


18


Date of Admission





Attending Physician


Rory Kendall MD


Admitting Physician


Mahesh Champagne MD


Consulting Physician


LORENZO BACON MD





HPI:


Time Seen by a Provider:  10:29


Chief Complaint:


Chest pain


This is a 47-year-old gentleman with history of active smoking, previous PCI to 

left circumflex artery, presented with acute occlusion.  Was treated with PCI 

and PTCA.   RCA was treated with multiple stents at .  Patient also has 

history of PE/DVT and was on Coumadin.  Patient of Dr. Ray.  Patient was 

planned to have a left above-knee amputation on 2018 and stopped aspirin, 

Plavix and Coumadin.  Presents with prolonged episode of recurrent chest pain, 

positive cardiac enzymes.  Patient was given bolus of aspirin, Brilinta and IV 

infusion of heparin.  He continues to smoke.





Review of Systems-Cardiology


Review of Systems


Constitutional:  As described under HPI; No As described under HPI, No no 

symptoms reported, No chills, No fever, No lightheadedness


Eyes:  No As described under HPI, No no symptoms reported, No blindness, No 

blurred vision, No contact lenses, No drainage, No decreased acuity, No foreign 

body sensation, No pain, No vision change


Ears/Nose/Throat:  No As described under HPI, No no symptoms reported, No 

chronic hearing loss, No ear discharge, No ear pain, No nasal drainage, No 

ulcerations


Respiratory:  No no symptoms reported; As described under HPI; No As described 

under HPI, No cough, No orthopnea, No shortness of breath, No SOB with excertion


Cardiovascular:  No no symptoms reported; As described under HPI; No As 

described under HPI; chest pain; No edema, No irregular heart rate, No 

lightheadedness, No palpitations


Gastrointestinal:  No no symptoms reported, No As described under HPI, No 

abdomen distended, No abdominal pain, No blood streaked bowels, No constipation

, No diarrhea, No nausea, No vomiting, No stool coloration changes


Genitourinary:  No As described under HPI, No burning, No dysuria, No discharge

, No frequency, No flank pain, No hematuria, No urgency


Skin:  No rash, No skin related problems, No ulcerations


Psychiatric/Neurological:  No anxiety, No depression, No seizure, No focal 

weakness, No syncope


Hematologic:  No bleeding abnormalities





All Other Systems Reviewed


Negative Unless Noted:  Yes





PMH-Social-Family Hx


Patient Social History


Alcohol Use:  Denies Use


Recreational Drug Use:  No


Smoking Status:  Current Everyday Smoker


Type Used:  Cigarettes


2nd Hand Smoke Exposure:  No


Recent Foreign Travel:  No


Recent Infectious Disease Expo:  No


Physical Abuse Screen:  No


Sexual Abuse:  No





Immunizations Up To Date


Date of Pneumonia Vaccine:  Dec 3, 2012


Date of Influenza Vaccine:  Oct 14, 2017





Past Medical History


PMH


As described under Assessment.





Family Medical History


Family History:  


Arthritis


  G8 BROTHER


Blood clots


  19 MOTHER ( of blood clot)


Cardiac disorder


  19 FATHER


Diabetes mellitus


  G8 BROTHER


FH: cancer


  paternal grandmother


FHx: inflammatory bowel disease


  G8 BROTHER





No Family History of:


  FH: sudden cardiac death (SCD)





Allergies and Home Medications


Allergies


Coded Allergies:  


     Penicillins (Unverified  Allergy, Mild, 09)





Home Medications


Allopurinol 100 Mg Tablet, 100 MG PO DAILY, (Reported)


Atorvastatin Calcium 40 Mg Tablet, 40 MG PO DAILY, (Reported)


Carisoprodol 350 Mg Tablet, 350 MG PO BID PRN for SPASMS, (Reported)


Clopidogrel Bisulfate 75 Mg Tablet, 75 MG PO DAILY, (Reported)


Gabapentin 600 Mg Tablet, 600 MG PO QID, (Reported)


Gemfibrozil 600 Mg Tablet, 600 MG PO BID, (Reported)


Isosorbide Mononitrate 30 Mg Tab.er.24h, 30 MG PO DAILY, (Reported)


Losartan Potassium 50 Mg Tablet, 50 MG PO DAILY, (Reported)


Multivits-Minerals/FA/Lycopene 1 Each Tablet, 1 TAB PO DAILY, (Reported)


Nifedipine 10 Mg Capsule, 10 MG PO TID, (Reported)


Nitroglycerin 0.4 Mg Tab.subl, 0.4 MG SL UD PRN for CHEST PAIN, (Reported)


Omega 3 Polyunsat Fatty Acids 1,000 Mg Cap, 1,000 MG PO TID, (Reported)


Omeprazole Magnesium 20 Mg Tablet.dr, 20 MG PO DAILY, (Reported)


Oxycodone HCl 10 Mg Tablet, 10 MG PO Q4H PRN for lower back and leg pain, (

Reported)


   take one tablet Q4-6H prn for back and left leg pain 


Warfarin Sodium 5 Mg Tablet, 5 MG PO ,MO,WED,FRI, (Reported)


   TAKES ONE TABLET ON , MONDAY, WEDNESDAY, AND FRIDAY 


Warfarin Sodium 5 Mg Tablet, 2.5 MG PO ,,SAT, (Reported)


   TAKES HALF A TAB ON TUESDAY, THURSDAY, AND SATURDAY 





Patient Home Medication List


Home Medication List Reviewed:  Yes





Physical Exam-Cardiology


Physical Exam


Vital Signs/I&O











 18





 04:00 04:00 04:00 07:00


 


Temp  98.1  


 


Pulse   64 80


 


Resp   22 


 


B/P (MAP)   113/72 (86) 


 


Pulse Ox 96  95 


 


O2 Delivery Nasal Cannula  Nasal Cannula 


 


O2 Flow Rate 2.00  2.00 


 


    





 18





 08:00 08:00 08:04 12:22


 


Temp   97.7 98.5


 


Pulse   70 77


 


Resp   20 18


 


B/P (MAP)   121/78 (92) 134/79 (97)


 


Pulse Ox 97 97 95 93


 


O2 Delivery Room Air Room Air Room Air Room Air


 


    





 18   





 13:00   


 


Pulse 83   














 18





 00:00


 


Intake Total 400 ml


 


Output Total 350 ml


 


Balance 50 ml





Capillary Refill : Less Than 3 Seconds


Constitutional:  appears stated age, AAO x 3; No apparent distress; well-

developed, well-nourished


HEENT:  PERRL; No normal ENT inspection, No TMs normal, No pharynx normal, No 

scleral icterus (R), No scleral icterus (L), No pale conjunctivae (R), No pale 

conjunctivae (L), No photophobia, No TM abnormal (R), No TM abnormal (L), No 

pharyngeal erythema, No tonsillar exudate, No other, No discharge, No EOMI; 

hearing is well preserved; No hard of hearing; oral hygience is good; No 

ulceration, No xanthelasmas are seen


Neck:  No non-tender, No full range of motion, No supple, No normal inspection, 

No carotid bruit, No limited range of motion, No lymphadenopathy (R), No 

lymphadenopathy (L), No tender lateral, No tender midline, No thyromegaly, No 

other; carotid pulses are 2 + bilaterally; No with good upstrokes


Respiratory:  No accessory muscle use, No respiratory distress, No chest tender

, No chest expansion is symmetric; chest is bilaterally symmetric; No lungs 

clear to percussion; lungs clear to auscultation; No crackles, No rhonchi, No 

rales, No stridor, No wheezing, No pleural rub, No other


Cardiovascular:  regular rate-rhythm; No irregularly irregular, No extra beats, 

No parasternal heave is noted, No JVD, No edema, No bradycardia, No tachycardia

, No point of maximal impulse, No cardiac thrills are palpable; S1 and S2; No 

gallop/S3, No gallop/S4, No diastolic murmur, No systolic murmur, No friction 

rub, No click, No other


Gastrointestinal:  No tender, No soft, No round, No distended, No pulsatile mass

, No organomegaly, No guarding, No rebound, No tenderness, No hernia, No mass, 

No audible bowel sounds, No abnormal bowel sounds, No abdominal bruits, No 

spleenomegaly, No other


Rectal:  deferred


Extremities:  No normal range of motion, No non-tender, No normal inspection, 

No pedal edema, No calf tenderness, No normal capillary refill, No pelvis stable

, No calf tenderness, No inflammation, No pedal edema, No slow capillary refill

, No swelling, No other, No abrasion, No clubbing, No cyanosis, No ecchymosis, 

No laceration, No no lower extremity edema bilateral, No significant edema, No 

tenderness, No wound


Neurologic/Psychiatric:  no motor/sensory deficits, alert, normal mood/affect, 

oriented x 3, power is 5/5 both on sides


Skin:  No normal color, No warm/dry, No cyanosis, No cool, No diaphoresis, No 

damp, No ecchymosis, No jaundice, No mottled, No pallor, No rash, No tattoos/

piercings, No ulcerations, No rash on exposed areas, No ulcerations on exposed 

areas, No other





Data Review


Labs


Laboratory Tests


18 16:34: 


Myoglobin 238.6H, Troponin I < 0.30


18 19:34: 


Myoglobin 459.1H, Troponin I 0.48*H, Total Creatine Kinase 206H


18 22:27: Activated Partial Thromboplast Time 137*H


18 02:55: 


Myoglobin 175.2H, Troponin I 3.39*H, Activated Partial Thromboplast Time 48H, 

White Blood Count 11.0, Red Blood Count 4.60, Hemoglobin 13.4, Hematocrit 41, 

Mean Corpuscular Volume 88, Mean Corpuscular Hemoglobin 29, Mean Corpuscular 

Hemoglobin Concent 33, Red Cell Distribution Width 13.7, Platelet Count 303, 

Mean Platelet Volume 9.4, Neutrophils (%) (Auto) 71, Lymphocytes (%) (Auto) 19, 

Monocytes (%) (Auto) 8, Eosinophils (%) (Auto) 2, Basophils (%) (Auto) 0, 

Neutrophils # (Auto) 7.8, Lymphocytes # (Auto) 2.1, Monocytes # (Auto) 0.9, 

Eosinophils # (Auto) 0.2, Basophils # (Auto) 0.0, Sodium Level 140, Potassium 

Level 3.9, Chloride Level 102, Carbon Dioxide Level 28, Anion Gap 10, Blood 

Urea Nitrogen 8, Creatinine 0.76, Estimat Glomerular Filtration Rate > 60, BUN/

Creatinine Ratio 11, Glucose Level 123H, Calcium Level 9.3, Corrected Calcium 

9.3, Total Bilirubin 0.6, Aspartate Amino Transf (AST/SGOT) 59H, Alanine 

Aminotransferase (ALT/SGPT) 32, Alkaline Phosphatase 86, Total Protein 6.6, 

Albumin 4.0, Triglycerides Level 104, Cholesterol Level 128, LDL Cholesterol 

Direct 87, VLDL Cholesterol 21, HDL Cholesterol 28L


18 07:55: Activated Partial Thromboplast Time 46H








ECG Impression


ECG


Initial ECG Rhythm:  Normal Sinus


Initial ECG Impression:  Nonspecific Changes





A/P-Cardiology


Assessment/Admission Diagnosis


Non-STEMI,


History of CAD,


Active smoking,





Plan


Urgent coronary angiography is recommended.  I discussed at length with the 

patient all the risks and complications.  Medical compliance was recommended.  

Aspirin, Brilinta, IV heparin.  Patient will continue on statin, lisinopril, 

beta blocker.





Smoking cessation was strongly recommended.





Delay left AKA.  Defer to Dr. Miguel








Thank you for your consultation. Please call me if you have any questions.








INDERJIT Bacon MD, FACP, FACC, FSCAI, FHRS, CCDS


Interventional Cardiology


Cardiac Electrophysiology


Vascular Medicine and Endovascular Interventions





Clinical Quality Measures


AMI/AHF:


ASA po Prior to arrival:  No





DVT/VTE Risk/Contraindication:


Risk Factor Score Per Nursing:  10


RFS Level Per Nursing on Admit:  4+=Very High











LORENZO BACON MD 2018 10:29 am

## 2018-11-18 VITALS — DIASTOLIC BLOOD PRESSURE: 65 MMHG | SYSTOLIC BLOOD PRESSURE: 116 MMHG

## 2018-11-18 VITALS — SYSTOLIC BLOOD PRESSURE: 129 MMHG | DIASTOLIC BLOOD PRESSURE: 69 MMHG

## 2018-11-18 VITALS — DIASTOLIC BLOOD PRESSURE: 69 MMHG | SYSTOLIC BLOOD PRESSURE: 129 MMHG

## 2018-11-18 VITALS — DIASTOLIC BLOOD PRESSURE: 64 MMHG | SYSTOLIC BLOOD PRESSURE: 114 MMHG

## 2018-11-18 VITALS — DIASTOLIC BLOOD PRESSURE: 78 MMHG | SYSTOLIC BLOOD PRESSURE: 111 MMHG

## 2018-11-18 LAB
BUN/CREAT SERPL: 8
CALCIUM SERPL-MCNC: 8.9 MG/DL (ref 8.5–10.1)
CHLORIDE SERPL-SCNC: 105 MMOL/L (ref 98–107)
CO2 SERPL-SCNC: 27 MMOL/L (ref 21–32)
CREAT SERPL-MCNC: 0.76 MG/DL (ref 0.6–1.3)
ERYTHROCYTE [DISTWIDTH] IN BLOOD BY AUTOMATED COUNT: 13.9 % (ref 10–14.5)
GFR SERPLBLD BASED ON 1.73 SQ M-ARVRAT: > 60 ML/MIN
GLUCOSE SERPL-MCNC: 101 MG/DL (ref 70–105)
HCT VFR BLD CALC: 40 % (ref 40–54)
HGB BLD-MCNC: 12.8 G/DL (ref 13.3–17.7)
MCH RBC QN AUTO: 29 PG (ref 25–34)
MCHC RBC AUTO-ENTMCNC: 32 G/DL (ref 32–36)
MCV RBC AUTO: 89 FL (ref 80–99)
PLATELET # BLD: 297 10^3/UL (ref 130–400)
PMV BLD AUTO: 9.4 FL (ref 7.4–10.4)
POTASSIUM SERPL-SCNC: 3.9 MMOL/L (ref 3.6–5)
RBC # BLD AUTO: 4.45 10^6/UL (ref 4.35–5.85)
SODIUM SERPL-SCNC: 141 MMOL/L (ref 135–145)
WBC # BLD AUTO: 8.8 10^3/UL (ref 4.3–11)

## 2018-11-18 RX ADMIN — GABAPENTIN SCH MG: 600 TABLET, FILM COATED ORAL at 07:49

## 2018-11-18 RX ADMIN — DICYCLOMINE HYDROCHLORIDE PRN EA: 20 TABLET ORAL at 01:19

## 2018-11-18 RX ADMIN — ASPIRIN SCH MG: 81 TABLET ORAL at 07:50

## 2018-11-18 RX ADMIN — DICYCLOMINE HYDROCHLORIDE PRN EA: 20 TABLET ORAL at 07:49

## 2018-11-18 RX ADMIN — TICAGRELOR SCH MG: 90 TABLET ORAL at 07:49

## 2018-11-18 RX ADMIN — DICYCLOMINE HYDROCHLORIDE PRN EA: 20 TABLET ORAL at 12:00

## 2018-11-18 NOTE — DISCHARGE INSTRUCTIONS
Discharge Instructions


Patient Instructions


Patient Instructions:  


Medications as listed on the discharge sequence.


Be aware that several of your old medications have been discontinued and


new prescriptions filed for blood thinners.


Appointment with Dr. Miguel and follow-up.





Activity & Diet


Discharge Diet:  No Restrictions


Activity as Tolerated:  Yes











COLE CONTRERAS MD Nov 18, 2018 12:17

## 2018-11-18 NOTE — SHORT STAY SUMMARY-HOSPITALIST
History of Present Illness


HPI/Chief Complaint


The patient is a 47-year-old white male with significant vascular disease.  He 

presented to the emergency room late Friday with complaints of increasing chest 

pain.  Initially his troponin was 0.3.  Given his past history of intervention 

he was admitted the subsequent troponin increased to 0.48 and finally the early 

reading on  was 3.39.  He went to the Cath Lab yesterday and was found to 

have an occluded stent which was intervened upon.  He reports no further pain 

at this time.  He was off his aspirin and Plavix in anticipation of a left AK 

amputation scheduled for Monday by Dr. Marshall in [place].  He has had prolonged 

pain in this leg and has been unable to walk on it for about one year.  They 

are apparently has been minimal flow to that lower extremity.  He also reports 

that his last cardiac stent was almost exactly 1 year ago today.


Source:  patient


Date Seen


18


Time Seen by a Provider:  11:54


Attending Physician


Rory Contreras MD


PCP


Mahesh Champagne MD


Referring Physician





Date of Admission


2018 at 17:35





Home Medications & Allergies


Home Medications


Reviewed patient Home Medication Reconciliation performed by pharmacy 

medication reconciliations technician and/or nursing.


Patients Allergies have been reviewed.





Allergies





Allergies


Coded Allergies


  Penicillins (Unverified Allergy, Mild, 09)








Past Medical-Social-Family Hx


Past Med/Social Hx:  Reviewed Nursing Past Med/Soc Hx


Patient Social History


Alcohol Use:  Denies Use


Recreational Drug Use:  No


Smoking Status:  Current Everyday Smoker


Former Smoker, Quit:  2017


Type Used:  Cigarettes


2nd Hand Smoke Exposure:  No


Physical Abuse Screen:  No


Sexual Abuse:  No


Recent Foreign Travel:  No


Contact w/other who traveled:  No


Recent Hopitalizations:  No


Recent Infectious Disease Expo:  No





Immunizations Up To Date


Date of Pneumonia Vaccine:  Dec 3, 2012


Date of Influenza Vaccine:  Oct 14, 2017





Seasonal Allergies


Seasonal Allergies:  No





Past Medical History


Surgeries:  Cardiac, Coronary Stent


Respiratory:  Pulmonary Embolism


Currently Using CPAP:  No


Currently Using BIPAP:  No


Cardiac:  Coronary Artery Disease, Deep Vein Thrombosis, Heart Attack, High 

Cholesterol, Hypertension


Reproductive:  No


Gastrointestinal:  Gastroesophageal Reflux


Musculoskeletal:  Chronic Back Pain, Gout


History of Blood Disorders:  Yes (PROTEIN S DEFICIENCY--DVT'S AND P.E.'S AND MI 

X 2)





Family History


Reviewed Nursing Family Hx





Arthritis


  G8 BROTHER


Blood clots


  19 MOTHER ( of blood clot)


Cardiac disorder


  19 FATHER


Diabetes mellitus


  G8 BROTHER


FH: cancer


  paternal grandmother


FHx: inflammatory bowel disease


  G8 BROTHER





No Family History of:


  FH: sudden cardiac death (SCD)


Heart Disease, Vascular Disease





Review of Systems


Constitutional:  see HPI


EENTM:  no symptoms reported


Respiratory:  no symptoms reported


Cardiovascular:  see HPI, chest pain, Hx of Intervention


Gastrointestinal:  no symptoms reported


Genitourinary:  no symptoms reported


Musculoskeletal:  other (chronic ischemic left lower extremity pain)


Skin:  no symptoms reported


Psychiatric/Neurological:  No Symptoms Reported





Physical Exam


Physical Exam


Vital Signs





Vital Signs - First Documented








 18





 14:11


 


Temp 97.0


 


Pulse 81


 


Resp 26


 


B/P (MAP) 139/89 (106)


 


Pulse Ox 94





Capillary Refill : Less Than 3 Seconds


Height, Weight, BMI


Height: 5'5.00"


Weight: 209lbs. 4.0oz. 94.534054nw; 34.8 BMI


Method:Stated


General Appearance:  No Apparent Distress


HEENT:  Normal ENT Inspection


Neck:  Normal Inspection


Respiratory:  Chest Non Tender, Lungs Clear, Normal Breath Sounds, No Accessory 

Muscle Use, No Respiratory Distress


Cardiovascular:  Regular Rate, Rhythm, No Edema, No Gallop, No JVD, No Murmur, 

Normal Peripheral Pulses


Gastrointestinal:  Normal Bowel Sounds, No Organomegaly, No Pulsatile Mass, Non 

Tender, Soft


Back:  Normal Inspection, No CVA Tenderness, No Vertebral Tenderness


Neurologic/Psychiatric:  Alert, Oriented x3, No Motor/Sensory Deficits, Normal 

Mood/Affect


Skin:  Other (extreme whole-body freckling)


Comments


The left lower extremity is cool but not cold.  No pulse is palpated.





Results


Results/Procedures


Labs


Laboratory Tests


18 14:16








18 02:55








18 03:05








Patient resulted labs reviewed.





Short Stay Diagnosis


Discharge Diagnosis-Short Stay


Admission Diagnosis


Chest pain consistent with crescendo angina.  2.vasculopathy with planned 

amputation left lower extremity.  3.tobaccoism


Final Discharge Diagnosis


N STEMI post successful intervention.  2.extensive peripheral artery disease.





Conclusion


Plan


Discharged to home.  The patient has been placed back on anticoagulation.  His 

future amputation will have to be cleared through discussions with the surgeon 

and the cardiologist.


See discharge sequence for medications and routines.





Clinical Quality Measures


AMI/AHF:


ASA po Prior to arrival:  No





DVT/VTE Risk/Contraindication:


Risk Factor Score Per Nursing:  10


RFS Level Per Nursing on Admit:  4+=Very High











RORY CONTRERAS MD 2018 11:59

## 2018-11-18 NOTE — CARDIOLOGY PROGRESS NOTE
Cardiology SOAP Progress Note


Subjective:


No further chest pain.





Objective:


I&O/Vital Signs











 11/18/18 11/18/18 11/18/18 11/18/18





 04:00 04:00 04:00 07:00


 


Temp  97.6  


 


Pulse   74 70


 


Resp   17 20


 


B/P (MAP)   116/65 (82) 111/78 (89)


 


Pulse Ox   96 97


 


O2 Delivery Room Air  Nasal Cannula Nasal Cannula


 


O2 Flow Rate   2.00 2.00


 


    





 11/18/18 11/18/18 11/18/18 11/18/18





 07:00 07:52 08:00 09:00


 


Temp  97.8  


 


Pulse 70 96  


 


Resp  15  


 


B/P (MAP)  111/78 (89)  


 


Pulse Ox  95 95 95


 


O2 Delivery  Room Air Room Air Room Air


 


    





 11/18/18 11/18/18  





 12:00 12:00  


 


Temp 98.6   


 


Pulse 96   


 


Resp 20   


 


B/P (MAP) 129/69 (89)   


 


Pulse Ox 96 96  


 


O2 Delivery Room Air Room Air  














 11/18/18





 00:00


 


Intake Total 1170 ml


 


Output Total 400 ml


 


Balance 770 ml








Weight (Pounds):  209


Weight (Ounces):  4.0


Weight (Calculated Kilograms):  94.908658


Constitutional:  appears stated age, AAO x 3; No apparent distress; well-

developed, well-nourished


Respiratory:  No accessory muscle use, No respiratory distress, No chest tender

, No chest expansion is symmetric; chest is bilaterally symmetric; No lungs 

clear to percussion; lungs clear to auscultation; No crackles, No rhonchi, No 

rales, No stridor, No wheezing, No pleural rub, No other


Cardiovascular:  regular rate-rhythm; No irregularly irregular, No extra beats, 

No parasternal heave is noted, No JVD, No edema, No bradycardia, No tachycardia

, No point of maximal impulse, No cardiac thrills are palpable; S1 and S2; No 

gallop/S3, No gallop/S4, No diastolic murmur, No systolic murmur, No friction 

rub, No click, No other


Gastrointestional:  No tender, No soft, No round, No distended, No pulsatile 

mass, No organomegaly, No guarding, No rebound, No tenderness, No hernia, No 

mass, No audible bowel sounds, No abnormal bowel sounds, No abdominal bruits, 

No spleenomegaly, No other


Extremities:  No normal range of motion, No non-tender, No normal inspection, 

No pedal edema, No calf tenderness, No normal capillary refill, No pelvis stable

, No calf tenderness, No inflammation, No pedal edema, No slow capillary refill

, No swelling, No other, No abrasion, No clubbing, No cyanosis, No ecchymosis, 

No laceration, No no lower extremity edema bilateral, No significant edema, No 

tenderness, No wound


Neurologic/Psychiatric:  no motor/sensory deficits, alert, normal mood/affect, 

oriented x 3, power is 5/5 both on sides


Skin:  No normal color, No warm/dry, No cyanosis, No cool, No diaphoresis, No 

damp, No ecchymosis, No jaundice, No mottled, No pallor, No rash, No tattoos/

piercings, No ulcerations, No rash on exposed areas, No ulcerations on exposed 

areas, No other





Results/Procedures:


Labs


Laboratory Tests


11/18/18 03:05: 


White Blood Count 8.8, Red Blood Count 4.45, Hemoglobin 12.8L, Hematocrit 40, 

Mean Corpuscular Volume 89, Mean Corpuscular Hemoglobin 29, Mean Corpuscular 

Hemoglobin Concent 32, Red Cell Distribution Width 13.9, Platelet Count 297, 

Mean Platelet Volume 9.4, Sodium Level 141, Potassium Level 3.9, Chloride Level 

105, Carbon Dioxide Level 27, Anion Gap 9, Blood Urea Nitrogen 6L, Creatinine 

0.76, Estimat Glomerular Filtration Rate > 60, BUN/Creatinine Ratio 8, Glucose 

Level 101, Calcium Level 8.9








A/P:


Assessment/Dx:


Non-STEMI,


History of CAD,


Active smoking,


Plan:


Urgent coronary angiography done yesterday.  Stent thrombosis of RCA stents in 

the mid/distal segment noted.  Aggressive PTCA done with IV Integrilin 

overnight - excellent angiographic results.  Medical compliance was 

recommended.  Patient stable with no further chest pain.  EKG shows T-wave 

inversions in the inferior leads.  Patient will be discharged on aspirin, 

Brilinta, losartan, statin, low-dose beta blocker.  Will hold Coumadin for now.

  Patient will follow with Dr. Miguel's office.





Smoking cessation was strongly recommended.





History of PE/DVT: Hold Coumadin for now.





Delay left AKA.  Defer to Dr. Miguel








Thank you for your consultation. Please call me if you have any questions.








INDERJIT Bacon MD, FACP, FACC, FSCAI, FHRS, CCDS


Interventional Cardiology


Cardiac Electrophysiology


Vascular Medicine and Endovascular Interventions





Clinical Quality Measures


AMI/AHF:


ASA po Prior to arrival:  LORENZO Lawler MD Nov 18, 2018 11:12 am

## 2019-01-31 ENCOUNTER — HOSPITAL ENCOUNTER (INPATIENT)
Dept: HOSPITAL 75 - IRF | Age: 49
LOS: 5 days | Discharge: HOME | DRG: 560 | End: 2019-02-05
Attending: INTERNAL MEDICINE | Admitting: INTERNAL MEDICINE
Payer: COMMERCIAL

## 2019-01-31 VITALS — SYSTOLIC BLOOD PRESSURE: 111 MMHG | DIASTOLIC BLOOD PRESSURE: 75 MMHG

## 2019-01-31 VITALS — HEIGHT: 64 IN | WEIGHT: 203.1 LBS | BODY MASS INDEX: 34.67 KG/M2

## 2019-01-31 DIAGNOSIS — Z86.711: ICD-10-CM

## 2019-01-31 DIAGNOSIS — I73.1: ICD-10-CM

## 2019-01-31 DIAGNOSIS — I25.2: ICD-10-CM

## 2019-01-31 DIAGNOSIS — Z86.718: ICD-10-CM

## 2019-01-31 DIAGNOSIS — I25.10: ICD-10-CM

## 2019-01-31 DIAGNOSIS — Z47.81: Primary | ICD-10-CM

## 2019-01-31 DIAGNOSIS — J44.9: ICD-10-CM

## 2019-01-31 DIAGNOSIS — Z89.612: ICD-10-CM

## 2019-01-31 DIAGNOSIS — E78.5: ICD-10-CM

## 2019-01-31 DIAGNOSIS — K21.9: ICD-10-CM

## 2019-01-31 DIAGNOSIS — M54.9: ICD-10-CM

## 2019-01-31 DIAGNOSIS — I50.22: ICD-10-CM

## 2019-01-31 DIAGNOSIS — Z95.5: ICD-10-CM

## 2019-01-31 DIAGNOSIS — Z87.891: ICD-10-CM

## 2019-01-31 DIAGNOSIS — I11.0: ICD-10-CM

## 2019-01-31 DIAGNOSIS — M10.9: ICD-10-CM

## 2019-01-31 PROCEDURE — 36415 COLL VENOUS BLD VENIPUNCTURE: CPT

## 2019-01-31 PROCEDURE — 94760 N-INVAS EAR/PLS OXIMETRY 1: CPT

## 2019-01-31 PROCEDURE — 80053 COMPREHEN METABOLIC PANEL: CPT

## 2019-01-31 PROCEDURE — 85025 COMPLETE CBC W/AUTO DIFF WBC: CPT

## 2019-01-31 PROCEDURE — 94640 AIRWAY INHALATION TREATMENT: CPT

## 2019-01-31 PROCEDURE — 90471 IMMUNIZATION ADMIN: CPT

## 2019-01-31 RX ADMIN — HYDROCODONE BITARTRATE AND ACETAMINOPHEN PRN TAB: 5; 325 TABLET ORAL at 22:43

## 2019-01-31 RX ADMIN — METOPROLOL TARTRATE SCH MG: 25 TABLET, FILM COATED ORAL at 20:39

## 2019-01-31 RX ADMIN — ALPRAZOLAM PRN MG: 0.25 TABLET ORAL at 18:42

## 2019-01-31 RX ADMIN — POLYETHYLENE GLYCOL (3350) SCH GM: 17 POWDER, FOR SOLUTION ORAL at 20:39

## 2019-01-31 RX ADMIN — HYDROCODONE BITARTRATE AND ACETAMINOPHEN PRN TAB: 5; 325 TABLET ORAL at 18:42

## 2019-01-31 RX ADMIN — GABAPENTIN SCH MG: 600 TABLET, FILM COATED ORAL at 20:39

## 2019-01-31 RX ADMIN — ALPRAZOLAM PRN MG: 0.25 TABLET ORAL at 18:41

## 2019-01-31 NOTE — NUR
Admitted to room 225-1, with an admitting diagnosis of AKA, on 01/31/19 from [facility] 
via w/c, accompanied by .NIKO MARTE introduced to surroundings, call light, bed 
controls, phone, TV, temperature control, lights, meal times, smoking policy, visitor 
policy, side rail policy, bathrooms and showers.  Patient Rights given to patient in the 
handbook.NIKO MARTE verbalizes understanding that [facility] is not responsible for the 
loss or damage to any personal effects or valuables that are kept in the patients posession 
during their hospitalization.  The following Patient Care Plans were discussed with the : 
Discharge Planning, ,, and . NIKO MARTE verbalizes understanding of Interdisciplinary 
Patient Education. Patient and/or family were informed about the Rapid Response Team and its 
purpose. Patient received Patient Rights Booklet, which includes Privacy Act Statement and 
Data Collection Information Summary.

## 2019-02-01 VITALS — SYSTOLIC BLOOD PRESSURE: 124 MMHG | DIASTOLIC BLOOD PRESSURE: 84 MMHG

## 2019-02-01 VITALS — DIASTOLIC BLOOD PRESSURE: 78 MMHG | SYSTOLIC BLOOD PRESSURE: 117 MMHG

## 2019-02-01 VITALS — DIASTOLIC BLOOD PRESSURE: 73 MMHG | SYSTOLIC BLOOD PRESSURE: 108 MMHG

## 2019-02-01 LAB
ALBUMIN SERPL-MCNC: 3.9 GM/DL (ref 3.2–4.5)
ALP SERPL-CCNC: 97 U/L (ref 40–136)
ALT SERPL-CCNC: 10 U/L (ref 0–55)
BASOPHILS # BLD AUTO: 0.1 10^3/UL (ref 0–0.1)
BASOPHILS NFR BLD AUTO: 0 % (ref 0–10)
BILIRUB SERPL-MCNC: 0.8 MG/DL (ref 0.1–1)
BUN/CREAT SERPL: 13
CALCIUM SERPL-MCNC: 9.4 MG/DL (ref 8.5–10.1)
CHLORIDE SERPL-SCNC: 99 MMOL/L (ref 98–107)
CO2 SERPL-SCNC: 28 MMOL/L (ref 21–32)
CREAT SERPL-MCNC: 0.71 MG/DL (ref 0.6–1.3)
EOSINOPHIL # BLD AUTO: 0.4 10^3/UL (ref 0–0.3)
EOSINOPHIL NFR BLD AUTO: 4 % (ref 0–10)
ERYTHROCYTE [DISTWIDTH] IN BLOOD BY AUTOMATED COUNT: 14.9 % (ref 10–14.5)
GFR SERPLBLD BASED ON 1.73 SQ M-ARVRAT: > 60 ML/MIN
GLUCOSE SERPL-MCNC: 100 MG/DL (ref 70–105)
HCT VFR BLD CALC: 41 % (ref 40–54)
HGB BLD-MCNC: 13.3 G/DL (ref 13.3–17.7)
LYMPHOCYTES # BLD AUTO: 2.3 X 10^3 (ref 1–4)
LYMPHOCYTES NFR BLD AUTO: 20 % (ref 12–44)
MANUAL DIFFERENTIAL PERFORMED BLD QL: NO
MCH RBC QN AUTO: 29 PG (ref 25–34)
MCHC RBC AUTO-ENTMCNC: 33 G/DL (ref 32–36)
MCV RBC AUTO: 89 FL (ref 80–99)
MONOCYTES # BLD AUTO: 1.2 X 10^3 (ref 0–1)
MONOCYTES NFR BLD AUTO: 10 % (ref 0–12)
NEUTROPHILS # BLD AUTO: 7.4 X 10^3 (ref 1.8–7.8)
NEUTROPHILS NFR BLD AUTO: 66 % (ref 42–75)
PLATELET # BLD: 353 10^3/UL (ref 130–400)
PMV BLD AUTO: 9 FL (ref 7.4–10.4)
POTASSIUM SERPL-SCNC: 4.1 MMOL/L (ref 3.6–5)
PROT SERPL-MCNC: 6.7 GM/DL (ref 6.4–8.2)
SODIUM SERPL-SCNC: 138 MMOL/L (ref 135–145)
WBC # BLD AUTO: 11.2 10^3/UL (ref 4.3–11)

## 2019-02-01 RX ADMIN — ISOSORBIDE MONONITRATE SCH MG: 30 TABLET, EXTENDED RELEASE ORAL at 10:59

## 2019-02-01 RX ADMIN — ATORVASTATIN CALCIUM SCH MG: 20 TABLET, FILM COATED ORAL at 20:01

## 2019-02-01 RX ADMIN — HYDROCODONE BITARTRATE AND ACETAMINOPHEN PRN TAB: 5; 325 TABLET ORAL at 08:25

## 2019-02-01 RX ADMIN — IPRATROPIUM BROMIDE AND ALBUTEROL SULFATE SCH ML: .5; 3 SOLUTION RESPIRATORY (INHALATION) at 14:47

## 2019-02-01 RX ADMIN — GABAPENTIN SCH MG: 600 TABLET, FILM COATED ORAL at 09:10

## 2019-02-01 RX ADMIN — OMEGA-3 FATTY ACIDS CAP 1000 MG SCH MG: 1000 CAP at 17:30

## 2019-02-01 RX ADMIN — ALPRAZOLAM PRN MG: 0.25 TABLET ORAL at 04:11

## 2019-02-01 RX ADMIN — HYDROCODONE BITARTRATE AND ACETAMINOPHEN PRN TAB: 5; 325 TABLET ORAL at 03:41

## 2019-02-01 RX ADMIN — POLYETHYLENE GLYCOL (3350) SCH GM: 17 POWDER, FOR SOLUTION ORAL at 09:07

## 2019-02-01 RX ADMIN — OMEGA-3 FATTY ACIDS CAP 1000 MG SCH MG: 1000 CAP at 11:53

## 2019-02-01 RX ADMIN — PANTOPRAZOLE SCH MG: 20 TABLET, DELAYED RELEASE ORAL at 10:59

## 2019-02-01 RX ADMIN — GABAPENTIN SCH MG: 600 TABLET, FILM COATED ORAL at 20:01

## 2019-02-01 RX ADMIN — GABAPENTIN SCH MG: 600 TABLET, FILM COATED ORAL at 12:53

## 2019-02-01 RX ADMIN — ALLOPURINOL SCH MG: 300 TABLET ORAL at 11:00

## 2019-02-01 RX ADMIN — GABAPENTIN SCH MG: 600 TABLET, FILM COATED ORAL at 17:30

## 2019-02-01 RX ADMIN — CLOPIDOGREL BISULFATE SCH MG: 75 TABLET, FILM COATED ORAL at 09:51

## 2019-02-01 RX ADMIN — POLYETHYLENE GLYCOL (3350) SCH GM: 17 POWDER, FOR SOLUTION ORAL at 20:03

## 2019-02-01 RX ADMIN — GEMFIBROZIL SCH MG: 600 TABLET ORAL at 15:43

## 2019-02-01 RX ADMIN — CARISOPRODOL SCH MG: 350 TABLET ORAL at 20:01

## 2019-02-01 RX ADMIN — LOSARTAN POTASSIUM SCH MG: 50 TABLET, FILM COATED ORAL at 11:00

## 2019-02-01 RX ADMIN — ASPIRIN SCH MG: 81 TABLET ORAL at 09:51

## 2019-02-01 RX ADMIN — OXYCODONE HYDROCHLORIDE SCH MG: 10 TABLET, FILM COATED, EXTENDED RELEASE ORAL at 09:49

## 2019-02-01 RX ADMIN — OXYCODONE HYDROCHLORIDE SCH MG: 10 TABLET, FILM COATED, EXTENDED RELEASE ORAL at 20:01

## 2019-02-01 RX ADMIN — METOPROLOL TARTRATE SCH MG: 25 TABLET, FILM COATED ORAL at 09:10

## 2019-02-01 RX ADMIN — IPRATROPIUM BROMIDE AND ALBUTEROL SULFATE SCH ML: .5; 3 SOLUTION RESPIRATORY (INHALATION) at 20:20

## 2019-02-01 RX ADMIN — METOPROLOL TARTRATE SCH MG: 25 TABLET, FILM COATED ORAL at 20:01

## 2019-02-01 NOTE — PM&R H&P / POST ADMIT ASSESS
History of Present Illness


HPI/Chief Complaint


CC:Debility following left AKA





HPI: This is a 48-year-old white male known to me from prior hospital 

admissions his primary care provider is Dr. Champagne who presents following a 

left above-the-knee amputation by Dr. Funes at [facility].  Patient had 

struggled with the left leg for many months with failed vascular procedures and 

wound care patient was unable to maintain the limb so that was amputated in an 

uneventful manner by Dr. Funes.  He had no significant events while at [facility] had no infections and no significant clinical status change.


Patient did have a myocardial infarction 8 weeks ago Dr. Miguel is his regular 

cardiologist and he has been consulted.


I have reviewed all of his home medications prior to [facility] and current 

medications from [facility].  Patient is in so much pain that I am unable 

to obtain any more details so will initiate long-acting OxyContin and provide 

Percocet for pain control.  He did have a bowel movement so will initiate stool 

softener for narcotic bowel prevention.


Source:  patient, family, RN/MD


Exam Limitations:  clinical condition


Date Seen


19


Time Seen by a Provider:  09:00


Attending Physician


Angelica Moreno Jonathan L MD


Referring Physician





Date of Admission


2019 at 17:48





Home Medications & Allergies


Home Medications


Reviewed patient Home Medication Reconciliation performed by pharmacy 

medication reconciliations technician and/or nursing.


Patients Allergies have been reviewed.





Allergies





Allergies


Coded Allergies


  Penicillins (Unverified Allergy, Mild, 09)








Past Medical-Social-Family Hx


Past Med/Social Hx:  Reviewed Nursing Past Med/Soc Hx, Reviewed and Corrections 

made


Patient Social History


Marrital Status:  


Employed/Student:  employed


Alcohol Use:  Denies Use


Recreational Drug Use:  No


Smoking Status:  Current Everyday Smoker


Former Smoker, Quit:  2017


Type Used:  Cigarettes


2nd Hand Smoke Exposure:  No


Physical Abuse Screen:  No


Sexual Abuse:  No


Recent Foreign Travel:  No


Contact w/other who traveled:  No


Recent Hopitalizations:  No


Recent Infectious Disease Expo:  No





Immunizations Up To Date


Pediatric:  Yes


Date of Pneumonia Vaccine:  Dec 3, 2012


Date of Influenza Vaccine:  2019





Seasonal Allergies


Seasonal Allergies:  No





Past Medical History


Surgeries:  Cardiac, Coronary Stent, Orthopedic (left aka )


Respiratory:  COPD, Pulmonary Embolism


Currently Using CPAP:  No


Currently Using BIPAP:  No


Cardiac:  Coronary Artery Disease, Deep Vein Thrombosis, Heart Attack, High 

Cholesterol, Hypertension


Reproductive:  No


Gastrointestinal:  Gastroesophageal Reflux


Musculoskeletal:  Chronic Back Pain, Gout


History of Blood Disorders:  Yes (PROTEIN S DEFICIENCY--DVT'S AND P.E.'S AND MI 

X 2)





Family History





Arthritis


  G8 BROTHER


Blood clots


  19 MOTHER ( of blood clot)


Cardiac disorder


  19 FATHER


Diabetes mellitus


  G8 BROTHER


FH: cancer


  paternal grandmother


FHx: inflammatory bowel disease


  G8 BROTHER





No Family History of:


  FH: sudden cardiac death (SCD)


Heart Disease, Vascular Disease





Review of Systems


Constitutional:  see HPI, dizziness


EENTM:  see HPI


Respiratory:  cough


Cardiovascular:  no symptoms reported


Gastrointestinal:  no symptoms reported


Genitourinary:  no symptoms reported


Musculoskeletal:  other (left amputation site pain)


Skin:  see HPI


Psychiatric/Neurological:  See HPI


All Other Systems Reviewed


Negative Unless Noted:  Yes





Physical Exam


Exam


Vital Signs





Vital Signs








  Date Time  Temp Pulse Resp B/P (MAP) Pulse Ox O2 Delivery O2 Flow Rate FiO2


 


19 09:00      Room Air  


 


19 07:00  96  117/78 (91)    


 


19 05:02 98.3  18  92   





Capillary Refill :


General Appearance:  WD/WN, Anxious, Chronically ill, Moderate Distress


HEENT:  PERRL/EOMI, TMs Normal, Normal ENT Inspection, Pharynx Normal, Moist 

Mucous Membranes


Neck:  Full Range of Motion, Normal Inspection, Non Tender, Supple


Respiratory:  Chest Non Tender, No Accessory Muscle Use, No Respiratory Distress

, Crackles, Decreased Breath Sounds


Cardiovascular:  Regular Rate, Rhythm, No Edema, No Gallop, No JVD, No Murmur


Gastrointestinal:  Normal Bowel Sounds, No Organomegaly, No Pulsatile Mass, Non 

Tender, Soft


Rectal:  Normal Exam, Normal Rectal Tone


Genital/Rectal:  Normal Genital Exam, Normal Rectal Exam, Normal Rectal Tone, 

Normal Vaginal Exam


Back:  Normal Inspection, No CVA Tenderness, No Vertebral Tenderness


Extremity:  Normal Capillary Refill, Normal Inspection, Normal Range of Motion, 

Non Tender, No Calf Tenderness, No Pedal Edema, Other (left AKA dressing intact)


Neurologic/Psychiatric:  Alert, Oriented x3, No Motor/Sensory Deficits, Normal 

Mood/Affect


Skin:  Normal Color, Warm/Dry


Lymphatic:  No Adenopathy





Results


Results/Procedures


Labs


Laboratory Tests


19 09:30








Patient resulted labs reviewed.





Assessment/Plan


Assessment and Plan


Assess & Plan/Chief Complaint


Assessment:


Status post uneventful left knee amputation by Dr. Funes and [facility]


Recent MI 8 weeks ago


Smoker


Coarse breath sounds today nebulizer treatments


PVD





Plan:


Initiate intensive therapy


Work on transfers and assistive devices


Monitor labs closely


Check chest x-ray


Nebulized treatments





(1) Above knee amputation of left lower extremity


(2) CAD (coronary artery disease)


(3) Myocardial infarct, old


(4) Smoker


(5) PVD (peripheral vascular disease)


(6) COPD (chronic obstructive pulmonary disease)





Post Admission Physician Asses


Date seen by provider:  2019


Time seen by provider:  09:00


The preadmission screen agrees with the post admission assessment that the 

patient is a good candidate for inpatient rehabilitation.





The patient will have a comprehensive program of inpatient rehabilitation with 

a goal of maximizing level of functional independence prior to discharge home 

with family.   The patient will have PT/OT ninety minutes per day, each 

discipline, five days a week for gait, strengthening, conditioning, balance, 

ADLs, any patient/family/caregiver training as necessary.  Speech therapy to do 

cognitive assessment and treat as indicated. Rehabilitation nursing to assist 

with bowel, bladder, skin, wound care, medication administration, pain 

management.   to assist with discharge planning, community 

reentry. SCD's for DVT prophylaxis.





He appears to be well motivated to participate in three hours of therapy a day.

  He should be able to tolerate three hours of therapy a day from a medical 

standpoint.  He should benefit from the three hours of therapy a day.  He has a 

reasonable discharge plan, reasonable discharge rehabilitation goals and a 

supportive family. He has various comorbidities that need to be closely 

monitored with medications and treatments adjusted on a daily basis as needed. 


These include:


see above





Barriers to discharge for this patient who had been independent prior to this 

are for him to be modified independent to supervision for ADLs and mobility 

skills prior to discharge home with [family], so as to lessen the burden of the 

caregivers. 





Risks for this patient include:


 1.  Fall


 2.  Fracture


 3.  DVT


 4.  Pulmonary embolism


 5.  Wound infection


 6.  Skin breakdown


 7.  Contractures


 8.  Poorly controlled pain


 9.  Urinary retention


10.  UTI


11.  Respiratory infection


12.  Aspiration








Estimated Length of Stay:  10 days





Prognosis:  Rehab prognosis appears good for goal of discharge home with family 

modified independent to supervision for ADLs and mobility skills.


General:  Alert, Oriented X3, Cooperative, No Acute Distress


HEENT:  Atraumatic, PERRLA


Neck:  Supple, No JVD, No Thyromegaly, +2 Carotid Pulse No Bruit, No LAD


Lungs:  Other


Abdomen:  Normal Bowel Sounds, Soft, No Tenderness, No Hepatosplenomegaly, No 

Masses


Extremities:  No Clubbing, No Cyanosis, No Edema, Normal Pulses, No Tenderness/

Swelling


Skin:  No Rashes, No Breakdown, No Significant Lesion


Neuro:  Normal Speech, Strength at 5/5 X4 Ext, Normal Tone, Sensation Intact, 

Cranial Nerves 3-12 NL, Reflexes 2+, Other (Left AKA)


Psych/Mental Status:  Mental Status NL, Mood NL











ANGELICA MORENO DO 2019 08:39

## 2019-02-01 NOTE — OCCUPATIONAL THER DAILY NOTE
OT Current Status-Daily Note


Subjective


Pt in bed, agrees to treatment. Pt reports 9/10 pain in left LE.





Mental Status/Objective


Therapy Code Descriptions/Definitions 





Functional [place] Measure:


0=Not Assessed/NA        4=Minimal Assistance


1=Total Assistance        5=Supervision or Setup


2=Maximal Assistance  6=Modified [place]


3=Moderate Assistance 7=Complete [place]





ADL-Treatment


Pt in bed, has just finished lunch. Pt states he had no difficulty feeding 

himself. Pt supine to sit with supervision. Pt demonstrated ability to perform 

transfer to [facility] with CGA using FWW. Transfer to w/c with CGA using FWW. Slow 

mobility.


Therapy Code Descriptions/Definitions 





Functional Esmeralda Measure:


0=Not Assessed/NA        4=Minimal Assistance


1=Total Assistance        5=Supervision or Setup


2=Maximal Assistance  6=Modified [place]


3=Moderate Assistance 7=Complete [place]da








Therapy Quality Codes:


6    Independent with activity with or without an assistive device


5    Patient requires set up or clean up by helper.  Patient completes activity

  by  themselves


4    Supervision or touching assist (CGA). [place] provide cues , steadying 

assist


3    The helper provides less than half the effort to complete the activity


2    The helper provides more than half the effort to complete the activity


1    Dependent.  The helper does all the effort to complete an activity 


7    Patient refused to complete or attempt activity


9    The patient did not perform the activity before the current illness or 

injury


88  Not attempted due to Medical conditions or safety concerns


Eating (FIM):  6


Eating (QC):  6


Toilet/Commode Transfer (FIM):  4


Toilet Transfer (QC):  4 (CGA)





Other Treatment


Pt performed w/c mobility to therapy gym without assistance. Pt performed 

bilateral UE exercises to increase strength needed for ADLs and transfers. Pt 

performed shoulder flexion, abduction, biceps curls, triceps extension, and 

wrist flex/ext x15 reps with 2# weights. Rest breaks between exercises. 

Bilateral hand  exercises x20 with moderate resistance therapy foam. Care 

transferred to PT after session.





OT Short Term Goals


Short Term Goals


Transfers (B,C,W/C) (FIM):  5


1=Demonstrate adherence to instructed precautions during ADL tasks.


2=Patient will verbalize/demonstrate understanding of assistive devices/

modifications for ADL.


3=Patient will improve strength/tolerance for activity to enable patient to 

perform ADL's.





OT Long Term Goals


Long Term Goals


Time Frame:  2019


Eating (FIM):  7


Eating (QC):  6


Groomin


Oral Hygiene (QC):  6


Bathing(FIM):  6


Shower/Bathe Self (QC):  6


Upper Body Dressing(FIM):  6


Upper Body Dressing (QC):  6


Lower Body Dressing(FIM):  6


Lower Body Dressing (QC):  6


On/Off Footwear (QC):  6


Toileting Hygiene (QC):  6


Toilet/Commode Transfer(FIM):  6


Toilet/Commode Transfer (QC):  6


Shower Transfer(FIM):  5


Additional Goals:  1-Demonstrate ADL Tasks, 2-Verbalize Understanding, 3-

ImproveStrength/Gala


1=Demonstrate adherence to instructed precautions during ADL tasks.


2=Patient will verbalize/demonstrate understanding of assistive devices/

modifications for ADL.


3=Patient will improve strength/tolerance for activity to enable patient to 

perform ADL's.





OT Education/Plan


Discharge Recommendations


Plan/Recommendations:  Continue POC





Treatment Plan/Plan of Care


Patient would benefit from OT for education, treatment and training to promote 

independence in ADL's, mobility, safety and/or upper extremity function for ADL'

s.


Plan of Care:  ADL Retraining, Functional Mobility, Group Exercise/Act as Ind, 

UE Funct Exercise/Act


Treatment Duration:  2019


Frequency:  Modified Program (IRF)


Estimated Hrs Per Day:  1.5 hours per day


Agreement:  Yes


Rehab Potential:  Good





Time/GCodes


Start Time:  13:15


Stop Time:  13:45


Total Time Billed (hr/min):  30


Billed Treatment Time


1 visit, EXx2(30minutes)











RODRIGUE GRAHAM OT 2019 14:11

## 2019-02-01 NOTE — PHYSICAL THERAPY EVALUATION
PT Evaluation-General


Medical Diagnosis


Admission Date


2019 at 17:48


Medical Diagnosis:  left AKA


Onset Date:  2019





Therapy Diagnosis


Therapy Diagnosis:  impaired mobility, strength, endurance, ROM





Height/Weight


Height (Feet):  5


Height (Inches):  4.00


Weight (Pounds):  197


Weight (Ounces):  0.0





Precautions


Precautions/Isolations:  Standard Precautions





Referral


Physician:  Angelica Slater DO


Reason for Referral:  Evaluation/Treatment





Medical History


Pertinent Medical History:  Arthritis, CAD, GERD, Heart Failure, HTN, MI


Additional Medical History


DDD, gout, hyperlipidemia, pulmonary embolism, PE, surg (heart catheterization, 

PTCA, lumbar sympathetic nerve block)


Reviewed History:  Yes





Social History


Home:  Single Level


Current Living Status:  Spouse


Entry Into Home:  Ramp





Prior/Core FIM


Prior Level of Function


Therapy Code Descriptions/Definitions 





Functional [place] Measure:


0=Not Assessed/NA        4=Minimal Assistance


1=Total Assistance        5=Supervision or Setup


2=Maximal Assistance  6=Modified [place]


3=Moderate Assistance 7=Complete [place]








Therapy Quality Codes:


6    Independent with activity with or without an assistive device


5    Patient requires set up or clean up by helper.  Patient completes activity

  by  themselves


4    Supervision or touching assist (CGA). [place] provide cues , steadying 

assist


3    The helper provides less than half the effort to complete the activity


2    The helper provides more than half the effort to complete the activity


1    Dependent.  The helper does all the effort to complete an activity 


7    Patient refused to complete or attempt activity


9    The patient did not perform the activity before the current illness or 

injury


88  Not attempted due to Medical conditions or safety concerns





Functional Abilities and Goals:


Independent: Patient completed the activities by him/herself, with or without 

an assistive device, with no assistance from a helper.


Needed Some Help: Patient needed partial assistance from another person to 

complete activities.


Dependent: A helper completed the activities for the patient. 


Unknown:


Not Applicable:


Bed Mobility:  6


Transfers (B,C,W/C) (FIM):  6


Gait:  6


Wheelchair Mobility:  6


Indoor Mobility (Ambulation):  Independent


Prior Devices Use:  Manual wheelchair, Motorized wheelchair, Walker


Patient was using crutches previously for ambulation.  Wife states that they 

have crutches, walker, motorized wheelchair, bedside commode, shower chair.





PT Evaluation-Current


Subjective


Patient in bed pre tx, agrees reluctantly to PT, has 10/10 pain in left leg.  

Patient is moaning in bed in pain, nurse has given him some pain meds and is 

going to check to see if she can give him something stronger.  Patient needs a 

walker and wheelchair for use in the hospital.  He does not have any stump 

shrinkers yet.





Pt/Family Goals


none, stated, patient can mostly only moan in pain





Objective


Patient Orientation:  Person, Place, Situation





ROM/Strength


ROM Lower Extremities


NT


Strenght Lower Extremities


NT due to pain, patient could not tolerate strength testing in either leg.





Neuromuscular


(Tone, Coordination, Reflexes)


NT





Sensory


Hearing:  Functional





Transfers


Therapy Code Descriptions/Definitions 





Functional [place] Measure:


0=Not Assessed/NA        4=Minimal Assistance


1=Total Assistance        5=Supervision or Setup


2=Maximal Assistance  6=Modified [place]


3=Moderate Assistance 7=Complete [place]








Therapy Quality Codes:


6    Independent with activity with or without an assistive device


5    Patient requires set up or clean up by helper.  Patient completes activity

  by  themselves


4    Supervision or touching assist (CGA). [place] provide cues , steadying 

assist


3    The helper provides less than half the effort to complete the activity


2    The helper provides more than half the effort to complete the activity


1    Dependent.  The helper does all the effort to complete an activity 


7    Patient refused to complete or attempt activity


9    The patient did not perform the activity before the current illness or 

injury


88  Not attempted due to Medical conditions or safety concerns


Transfers (B, C, W/C) (FIM):  5


Scootin


Rollin


Roll Left to Right (QC):  4


Supine to/from Sit:  5


Sit to/from Stand:  4


bed t/f WC(FIM only if WC use):  4


Sit to Lying (QC):  4


Lying to Sitting/Side of Bed(Q:  4


Sit to Stand (QC):  4


Chair/Bed-to-Chair Xfer(QC):  4


Patient performs bed mobility with SBA, supine <-> sit with CGA, sit <-> stand 

with CGA, transfers with CGA.  Car transfer not able to be performed at this 

time due to pain.  Patient moans and yells in pain throughout treatment but has 

steady balance during transfers.





Gait


Does the Patient Walk?:  Yes


Mode of Locomotion:  Walk


Anticipated Mode of Locomotion:  Walk


Gait (FIM):  1


Distance:  5'


Gait Level of Assist:  4


Gait Persons Needed:  1


Gait Assistive Device:  FWW


Comments/Gait Description


Patient was able to hop 5' with a rolling walker with CGA.  Gait was antalgic 

and slow, but steady.  Good foot clearance on the right side.





Stairs


If not tested on admit;explain


stairs not performed at this time due to pain





Balance


Sitting Static:  Normal


Sitting Dynamic:  Normal


Standing Static:  Fair


 Standing Dynamic:  Fair





Assessment/Needs


Patient has impaired mobility, strength, endurance, ROM.  Patient in recliner 

post tx with nurse call, phone, tray, wife in room.  Legs elevated in chair.


Rehab Potential:  Fair





PT Short Term Goals


Short Term Goals


Time Frame:  2019


Transfers (B,C,W/C) (FIM):  5


Gait (FIM):  1


Gait Distance Comment:  20'


Gait Level of Assist:  4


Gait Assistive Device:  FWW


Wheelchair Distance:  SEE PT GOALS





PT Long Term Goals


Long Term Goals


PT Long Term Goals Time Frame:  2019


Transfers (B,C,W/C) (FIM):  6


Sit to Lying (QC):  6


Lying-Sitting on Side/Bed(QC):  6


Sit to Stand (QC):  6


Rollin


Roll Left to Right (QC):  6


Chair/Bed-to-Chair Xfer(QC):  6


Car Transfer (QC):  4


Gait (FIM):  2


Distance:  50'


Walk 10 feet (QC):  4


Walk 10ft-Uneven Surface(QC):  4


Walk 50ft with 2 Turns (QC):  4


Gait Level of Assist:  5


Gait Assistive Device:  FWW


Wheelchair (FIM):  6


Distance:  150'


Wheelchair Level of Assist:  6


Wheel 50 feet with 2 turns (QC:  6


Stairs (FIM):  1


# of Steps:  1


1 Step (curb) (QC):  4


Stairs Level Of Assist:  4





PT Plan


Problem List


Problem List:  Activity Tolerance, Functional Strength, Safety, Balance, Gait, 

Transfer, Bed Mobility, ROM





Treatment/Plan


Treatment Plan:  Continue Plan of Care


Treatment Plan:  Bed Mobility, Concurrent Therapy, Education, Functional 

Activity Gala, Functional Strength, Group Therapy, Gait, Safety, Therapeutic 

Exercise, Transfers


Treatment Duration:  2019


Frequency:  At least 5 of 7 days/Wk (IRF)


Estimated Hrs Per Day:  1.5 hours per day


Patient and/or Family Agrees t:  Yes





Safety Risks/Education


Patient Education:  Gait Training, Transfer Techniques, Correct Positioning, 

Safety Issues


Teaching Recipient:  Patient


Teaching Methods:  Demonstration, Discussion


Response to Teaching:  Reinforcement Needed





Discharge Recommendations


Plan


Patient will perform bed mobility and transfer training, balance and endurance 

training, functional strengthening, stair training, gait training, and education

, to improve functional mobility and independence at home.


Therapy D/C Recommendations:  Home w/ Family Support





Time/GCodes


Time In:  0815


Time Out:  0845


Total Billed Treatment Time:  30


Total Billed Treatment


1 visit


JORDAN 30'











DALIA ROLAND PT 2019 08:46

## 2019-02-01 NOTE — PHYSICAL THERAPY DAILY NOTE
PT Daily Note-Current


Subjective


Patient in therapy gym pre tx, agrees to PT, has 8/10 pain in left leg.





Appearance


Patient in bed post tx with nurse call, phone, tray, all needs met.  Wife in 

room.





Mental Status


Patient Orientation:  Person, Place, Situation





Transfers


Therapy Code Descriptions/Definitions 





Functional [place] Measure:


0=Not Assessed/NA        4=Minimal Assistance


1=Total Assistance        5=Supervision or Setup


2=Maximal Assistance  6=Modified [place]


3=Moderate Assistance 7=Complete [place]








Therapy Quality Codes:


6    Independent with activity with or without an assistive device


5    Patient requires set up or clean up by helper.  Patient completes activity

  by  themselves


4    Supervision or touching assist (CGA). [place] provide cues , steadying 

assist


3    The helper provides less than half the effort to complete the activity


2    The helper provides more than half the effort to complete the activity


1    Dependent.  The helper does all the effort to complete an activity 


7    Patient refused to complete or attempt activity


9    The patient did not perform the activity before the current illness or 

injury


88  Not attempted due to Medical conditions or safety concerns


Transfers (B, C, W/C) (FIM):  5


Scootin


Rollin


Supine to/from Sit:  5


Sit to/from Stand:  5


Bed to/from Chair:  5





Exercises


Supine Ex:  Glut sets, Straight leg raise, Hip abd/add


Supine Reps:  10 (LLE except for glut sets)


NuStep Minutes:  15


 NuStep Workload:  4 (left leg not used)





Treatments


bed mobility and transfers, functional strengthening





Assessment


Current Status:  Fair Progress


improving stand pivot transfers





PT Short Term Goals


Short Term Goals


Time Frame:  2019


Transfers (B,C,W/C) (FIM):  5


Gait (FIM):  1


Gait Distance Comment:  20'


Gait Level of Assist:  4


Gait Assistive Device:  FWW


Wheelchair Distance:  150'x2





PT Long Term Goals


Long Term Goals


PT Long Term Goals Time Frame:  2019


Transfers (B,C,W/C) (FIM):  6


Sit to Lying (QC):  6


Lying-Sitting on Side/Bed(QC):  6


Sit to Stand (QC):  6


Rollin


Roll Left to Right (QC):  6


Chair/Bed-to-Chair Xfer(QC):  6


Car Transfer (QC):  4


Gait (FIM):  2


Distance:  50'


Walk 10 feet (QC):  4


Walk 10ft-Uneven Surface(QC):  4


Walk 50ft with 2 Turns (QC):  4


Gait Level of Assist:  5


Gait Assistive Device:  FWW


Wheelchair (FIM):  6


Distance:  150'


Wheelchair Level of Assist:  6


Wheel 50 feet with 2 turns (QC:  6


Stairs (FIM):  1


# of Steps:  1


1 Step (curb) (QC):  4


Stairs Level Of Assist:  4





PT Plan


Problem List


Problem List:  Activity Tolerance, Functional Strength, Safety, Balance, Gait, 

Transfer, Bed Mobility, ROM





Treatment/Plan


Treatment Plan:  Continue Plan of Care


Treatment Plan:  Bed Mobility, Concurrent Therapy, Education, Functional 

Activity Gala, Functional Strength, Group Therapy, Gait, Safety, Therapeutic 

Exercise, Transfers


Treatment Duration:  2019


Frequency:  At least 5 of 7 days/Wk (IRF)


Estimated Hrs Per Day:  1.5 hours per day


Patient and/or Family Agrees t:  Yes





Safety Risks/Education


Patient Education:  Transfer Techniques, Correct Positioning, Safety Issues


Teaching Recipient:  Patient


Teaching Methods:  Demonstration, Discussion


Response to Teaching:  Reinforcement Needed





Time/GCodes


Time In:  1345


Time Out:  1515


Total Billed Treatment Time:  30


Total Billed Treatment


1 visit


EX 20'


FA 10'











DALIA ROLAND PT 2019 14:13

## 2019-02-01 NOTE — CONSULTATION-CARDIOLOGY
HPI-Cardiology


Cardiology Consultation


Date of Consultation


19


Date of Admission





Time Seen by Provider:  10:47


Indication:  coronary artery disease





HPI


48 years old gentleman with history of Buerger disease, coronary artery disease

, hypertension hyperlipidemia.  Underwent left AKA on 2019.  

Transfer to rehabilitation, receiving physical therapy.  Still having pain at 

the surgical site.  Denied any chest pain or palpitation.  No syncope or near 

syncopal episodes.  No claudications.





Home Medications & Allergies


Allergies:  


Coded Allergies:  


     Penicillins (Unverified  Allergy, Mild, 09)


Home Medication List Reviewed:  Yes





PMH-Social-Family Hx


Patient Social History


Marital Status:  


Employed/Student:  employed


Alcohol Use:  Denies Use


Recreational Drug Use:  No


Type Used:  Cigarettes


2nd Hand Smoke Exposure:  No


Recent Foreign Travel:  No


Recent Infectious Disease Expo:  No


Recent Hopitalizations:  No


Physical Abuse Screen:  No


Sexual Abuse:  No





Immunizations Up To Date


Date of Pneumonia Vaccine:  Dec 3, 2012


Date of Influenza Vaccine:  2019





Past Medical History


past medical history as described below





Family Medical History


Significant Family History:  Heart Disease, Vascular Disease


Family History:  


Arthritis


  G8 BROTHER


Blood clots


  19 MOTHER ( of blood clot)


Cardiac disorder


  19 FATHER


Diabetes mellitus


  G8 BROTHER


FH: cancer


  paternal grandmother


FHx: inflammatory bowel disease


  G8 BROTHER





No Family History of:


  FH: sudden cardiac death (SCD)





Review of Systems


Constitutional:  see HPI, malaise


EENTM:  see HPI, no symptoms reported


Respiratory:  see HPI; No cough, No dyspnea on exertion, No hemoptysis, No 

orthopnea, No phlegm, No short of breath, No stridor, No wheezing, No other


Cardiovascular:  see HPI; No chest pain, No edema, No Hx of Intervention, No 

palpitations, No syncope; vascular heart diseas; No other


Gastrointestinal:  no symptoms reported, see HPI


Genitourinary:  no symptoms reported, see HPI


Musculoskeletal:  see HPI, back pain, other (left AKA)


Skin:  no symptoms reported, see HPI


Psychiatric/Neurological:  No Symptoms Reported, See HPI





Reviewed Test Results


Reviewed Test Results


Lab





Laboratory Tests








Test


 19


09:30 Range/Units


 


 


White Blood Count


 11.2 H


 4.3-11.0


10^3/uL


 


Red Blood Count


 4.55 


 4.35-5.85


10^6/uL


 


Hemoglobin 13.3  13.3-17.7  G/DL


 


Hematocrit 41  40-54  %


 


Mean Corpuscular Volume 89  80-99  FL


 


Mean Corpuscular Hemoglobin 29  25-34  PG


 


Mean Corpuscular Hemoglobin


Concent 33 


 32-36  G/DL





 


Red Cell Distribution Width 14.9 H 10.0-14.5  %


 


Platelet Count


 353 


 130-400


10^3/uL


 


Mean Platelet Volume 9.0  7.4-10.4  FL


 


Neutrophils (%) (Auto) 66  42-75  %


 


Lymphocytes (%) (Auto) 20  12-44  %


 


Monocytes (%) (Auto) 10  0-12  %


 


Eosinophils (%) (Auto) 4  0-10  %


 


Basophils (%) (Auto) 0  0-10  %


 


Neutrophils # (Auto) 7.4  1.8-7.8  X 10^3


 


Lymphocytes # (Auto) 2.3  1.0-4.0  X 10^3


 


Monocytes # (Auto) 1.2 H 0.0-1.0  X 10^3


 


Eosinophils # (Auto)


 0.4 H


 0.0-0.3


10^3/uL


 


Basophils # (Auto)


 0.1 


 0.0-0.1


10^3/uL


 


Sodium Level 138  135-145  MMOL/L


 


Potassium Level 4.1  3.6-5.0  MMOL/L


 


Chloride Level 99    MMOL/L


 


Carbon Dioxide Level 28  21-32  MMOL/L


 


Anion Gap 11  5-14  MMOL/L


 


Blood Urea Nitrogen 9  7-18  MG/DL


 


Creatinine


 0.71 


 0.60-1.30


MG/DL


 


Estimat Glomerular Filtration


Rate > 60 


  





 


BUN/Creatinine Ratio 13   


 


Glucose Level 100    MG/DL


 


Calcium Level 9.4  8.5-10.1  MG/DL


 


Corrected Calcium 9.5  8.5-10.1  MG/DL


 


Total Bilirubin 0.8  0.1-1.0  MG/DL


 


Aspartate Amino Transf


(AST/SGOT) 18 


 5-34  U/L





 


Alanine Aminotransferase


(ALT/SGPT) 10 


 0-55  U/L





 


Alkaline Phosphatase 97    U/L


 


Total Protein 6.7  6.4-8.2  GM/DL


 


Albumin 3.9  3.2-4.5  GM/DL











Physical Exam


Vital Signs





Vital Signs - First Documented








 19





 18:27


 


Temp 99.2


 


Pulse 99


 


Resp 18


 


B/P (MAP) 111/75 (87)


 


Pulse Ox 92


 


O2 Delivery Room Air





Capillary Refill :


Height, Weight, BMI


Height: 5'4.00"


Weight: 197lbs. 0.0oz. [id number]; 33.8 BMI


Method:Stated


General Appearance:  No Apparent Distress, WD/WN


Eyes:  Bilateral Eye Normal Inspection, Bilateral Eye PERRL, Bilateral Eye EOMI


HEENT:  PERRL/EOMI, TMs Normal, Normal ENT Inspection, Pharynx Normal


Neck:  Full Range of Motion, Normal Inspection, Non Tender, Supple, Carotid 

Bruit


Respiratory:  Chest Non Tender, Lungs Clear, Normal Breath Sounds, No Accessory 

Muscle Use, No Respiratory Distress


Cardiovascular:  Regular Rate, Rhythm, No Edema, No Gallop, No JVD, No Murmur


Gastrointestinal:  Normal Bowel Sounds, No Organomegaly, No Pulsatile Mass, Non 

Tender, Soft


Back:  Normal Inspection, No CVA Tenderness, No Vertebral Tenderness


Extremity:  Normal Range of Motion, Non Tender, No Pedal Edema, Other (left AKA)


Neurologic/Psychiatric:  Alert, Oriented x3, No Motor/Sensory Deficits, Normal 

Mood/Affect


Skin:  Normal Color, Warm/Dry


Lymphatic:  No Adenopathy





A/P-Cardiology


Admission Diagnosis


Left AKA


Peripheral arterial disease


Coronary artery disease


Hypertension


Hyperlipidemia





Assessment/Plan


Peripheral arterial disease status post left AKA, still having pain.  Had 

extensive disease, seen and followed by Dr. Funes





Coronary artery disease, history of Promus drug-eluting stent placement using 

2.512 mm in the circumflex artery in 2012 by Dr. Reilly after having 

myocardial infarction.  Most recent cardiac catheterization done 2016 

revealed patent stent to circumflex artery with otherwise nonobstructive disease

, had non-ST elevation myocardial infarction on 2017 had total 

occlusion of the obtuse marginal branch successful the plan of resolute 

integrity 2.526 mm with good results.  Total occlusion of the right coronary 

artery with failed attempt for intervention, referred to Dr. Delcid, had 

intervention on the right coronary artery with 3 drug-eluting stent overlapping 

in the right coronary artery.  Patient had non-ST elevation MI on 2018 and underwent cardiac catheterization with Dr. Bacon revealing stent 

thrombosis and occlusion of the distal/mid RCA stents to date successfully with 

PTCA.  Integrilin infusion 12 hour.  He is maintained on Brilinta and aspirin 

at this time.





History of congestive heart failure, improved, history of Severe left 

ventricular systolic dysfunction, improved with intervention and maximizing 

medical therapy, ejection fraction 45-50 percent.  Continue to monitor





History of deep venous thrombosis/pulmonary embolism, has been maintained on 

Coumadin in the past, Currently on hold while on aspirin and Plavix





Tobaccoism, stopped smoking in 2017.  Continue to monitor





Hyperlipidemia, continue to monitor lipids





History of gastroesophageal reflux disease.





History of elevated liver enzymes, monitor liver enzymes





History of deep venous thrombosis.





Clinical Quality Measures


DVT/VTE Risk/Contraindication:


Risk Factor Score Per Nursin


RFS Level Per Nursing on Admit:  4+=Very High











NORTH HERNANDEZ MD 2019 10:50

## 2019-02-01 NOTE — OCCUPATIONAL THERAPY EVAL
OT Evaluation-General/PLF


Medical Diagnosis


Admission Date


2019 at 17:48


Medical Diagnosis:  left AKA


Onset Date:  2019





Therapy Diagnosis


Therapy Diagnosis:  decreased self care skills





Height/Weight


Height (Feet):  5


Height (Inches):  4.00


Weight (Pounds):  197


Weight (Ounces):  0.0





Precautions


Precautions/Isolations:  Fall Prevention, Standard Precautions


Safety Interventions:  None





Referral


Physician:  Angelica Slater DO





Medical History


Pertinent Medical History:  Arthritis, CAD, GERD, Heart Failure, HTN, MI


Additional Medical History


hyperlipidemia, gout, PE


Current History


Pt s/p left AKA


Reviewed History:  Yes





Social History


Home:  Single Level


Current Living Status:  Spouse


Entry Into Home:  Ramp





ADL-Prior Level of Function


Therapy Code Descriptions/Definitions 





Functional [place] Measure:


0=Not Assessed/NA        4=Minimal Assistance


1=Total Assistance        5=Supervision or Setup


2=Maximal Assistance  6=Modified [place]


3=Moderate Assistance 7=Complete [place]








Therapy Quality Codes:


6    Independent with activity with or without an assistive device


5    Patient requires set up or clean up by helper.  Patient completes activity

  by  themselves


4    Supervision or touching assist (CGA). [place] provide cues , steadying 

assist


3    The helper provides less than half the effort to complete the activity


2    The helper provides more than half the effort to complete the activity


1    Dependent.  The helper does all the effort to complete an activity 


7    Patient refused to complete or attempt activity


9    The patient did not perform the activity before the current illness or 

injury


88  Not attempted due to Medical conditions or safety concerns





Functional Abilities and Goals:


Independent: Patient completed the activities by him/herself, with or without 

an assistive device, with no assistance from a helper.


Needed Some Help: Patient needed partial assistance from another person to 

complete activities.


Dependent: A helper completed the activities for the patient. 


Unknown:


Not Applicable:


ADL PLOF Comments


Pt states he was independent with most basic self care, but spouse assisted as 

needed. Used crutches or walker for mobility.


Self Care:  Needed Some Help





OT Current Status


Subjective


Pt agrees to therapy this am. Pt reports 10/10 pain in left LE. RN is aware and 

provides pain medication





Mental Status/Objective


Patient Orientation:  Person, Place, Time, Situation





Current


Glasses/Contacts:  Yes


Hearing Aids:  No


Dentures/Partials:  No


Hand Dominance:  Right


Upper Extremity ROM


Grossly WFL


Upper Extremity Coordination


Intact


Upper Extremity Sensation


Intact per pt report





ADL-Treatment


ADL-Current


Co-treat with PT during treatment secondary to pt's pain level and decreased 

activity tolerance. Pt completed sponge bath while seated in chair. Able to 

wash all areas except buttocks. Pt moves slowly and moans with pain during 

activity. CGA for balance during standing to wash buttocks. Don pullover shirt 

with SBA. Pt able to thread right LE into underwear and pants, but requires 

assist with left residual limb secondary to pain. Sit to stand with CGA for 

balance. Pt able to complete pant hike with minimal assistance. Pt donned right 

sock with CGA for balance and increased time. Comb hair with set up. Transfer 

to w/c with FWW with CGA. Pt performed w/c mobility to therapy gym.  Pt 

performed gait with FWW. Slow pace, see PT note for distance. Rest break after 

ambulation. Pt completed tabletop peg activity with bilateral UE while standing 

to increase standing balance and activity tolerance. Pt required CGA to SBA for 

balance during activity. One seated rest break secondary to fatigue. Pt 

performed standing activity in parallel bars to increase standing balance and 

safety for mobility and ADL tasks. Pt returned to room, transferred to EOB with 

CGA. Sit to supine with SBA. Pt resting in bed with needs met after session.


Co-treat with PT. OT focusing on ADL completion, UE management, and safety. PT 

focusing on balance and mobility for functional tasks.


Grooming (FIM):  5


Bathing (FIM):  4


Shower/Bathe Self (QC):  3


Upper Body Dressing (FIM):  5


Upper Body Dressing (QC):  4


Lower Body Dressing (FIM):  4


Lower Body Dressing (QC):  3


On/Off Footwear (QC):  4


Toilet/Commode Transfer (FIM):  4





Education


OT Patient Education:  Rehab process


Teaching Recipient:  Patient


Teaching Methods:  Discussion


Response to Teaching:  Verbalize Understanding





OT Short Term Goals


Short Term Goals


Transfers (B,C,W/C) (FIM):  5


1=Demonstrate adherence to instructed precautions during ADL tasks.


2=Patient will verbalize/demonstrate understanding of assistive devices/

modifications for ADL.


3=Patient will improve strength/tolerance for activity to enable patient to 

perform ADL's.





OT Long Term Goals


Long Term Goals


Time Frame:  2019


Eating (FIM):  7


Eating (QC):  6


Groomin


Oral Hygiene (QC):  6


Bathing(FIM):  6


Shower/Bathe Self (QC):  6


Upper Body Dressing(FIM):  6


Upper Body Dressing (QC):  6


Lower Body Dressing(FIM):  6


Lower Body Dressing (QC):  6


On/Off Footwear (QC):  6


Toileting Hygiene (QC):  6


Toilet/Commode Transfer(FIM):  6


Toilet/Commode Transfer (QC):  6


Shower Transfer(FIM):  5


Additional Goals:  1-Demonstrate ADL Tasks, 2-Verbalize Understanding, 3-

ImproveStrength/Gala


1=Demonstrate adherence to instructed precautions during ADL tasks.


2=Patient will verbalize/demonstrate understanding of assistive devices/

modifications for ADL.


3=Patient will improve strength/tolerance for activity to enable patient to 

perform ADL's.





OT Education/Plan


Problem List/Assessment


Assessment:  Decreased Activ Tolerance, Dependent Transfers, Impaired Self-Care 

Skills


Pt s/p left AKA with decreased mobility, ADL functioning, and activity 

tolerance. Currently limited by pain. Pt to benefit from skilled OT 

intervention for ADL training, transfers, strengthening, and safety education 

to increase functional independence and allow safe discharge.





Discharge Recommendations


Plan/Recommendations:  Continue POC





Treatment Plan/Plan of Care


Treatment,Training & Education:  Yes


Patient would benefit from OT for education, treatment and training to promote 

independence in ADL's, mobility, safety and/or upper extremity function for ADL'

s.


Plan of Care:  ADL Retraining, Functional Mobility, Group Exercise/Act as Ind, 

UE Funct Exercise/Act


Treatment Duration:  2019


Frequency:  Modified Program (IRF)


Estimated Hrs Per Day:  1.5 hours per day


Agreement:  Yes


Rehab Potential:  Good





Time/GCodes


Start Time:  09:30


Stop Time:  10:45


Total Time Billed (hr/min):  75


Billed Treatment Time


1 visit, EVM(15minutes), ADLx2(30minutes), FAx2(30minutes) 


Co-treat with PT 60minutes











RODRIGUE GRAHAM OT 2019 11:59

## 2019-02-01 NOTE — NUR
UPDATED THE MED REC WITH THE DISCHARGE MED LIST FROM PREVIOUS FACILITY. I  ALSO CALLED 
[facility] AND [facility] FOR LISTS OF RECENTLY FILLED MEDICATIONS 
TO COMPARE. THE PATIENT STATES HE IS NOW USING [facility] BUT SOME OF HIS 90 DAY SUPPLIES WERE 
LAST FILLED AT [facility]. 



[facility] FILLED THE METOPROLOL ER 25MG DAILY #30 ON 1-11-19 HOWEVER PATIENT'S WIFE STATES 
THAT WAS AN ERROR AND THEY HAVE BEEN TAKING IT BID LIKE IT WAS PRESCRIBED PREVIOUSLY. 
[facility] HAD FILLED THE METOPROLOL TARTRATE 25MG BID #60 11-18-18. I EXPLAINED THERE IS A 
DIFFERENCE BETWEEN THE SUCCINATE AND TARTRATE. SHE STATES THEY WILL GET WITH THE  TO GET 
THE CORRECT SCRIPT SENT IN TO [facility] BUT HE SHOULD BE TAKING THE 25MG BID AS IT WAS 
PRESCRIBED PREVIOUSLY. I LEFT IT ON THE MED REC AS IT WAS FILLED BY [facility] AND HOW THE 
DISCHARGE MED LIST HAD IT. 



[facility] FILLED:

1-24-19 LOSARTAN 50MG DAILY #30

1-24-19 ALLOPURINOL 100MG DAILY #90

1-14-19 CLOPIDOGREL 75MG DAILY #30

1-11-19 BRILINTA 90MG BID #60 (WAS STOPPED 2 WEEKS PRIOR TO SURGERY)

1-11-19 GABAPENTIN 600MG QID #120

1-11-19 VENLAFAXINE ER 37.5MG DAILY #30 (NO LONGER TAKING, DID NOT FEEL IT WORKED)

1-11-19 METOPROLOL SUCCINATE 25MG DAILY #30(ER FILLED BY MISTAKE SEE ABOVE FOR DETAILS)

1-11-19 LISINOPRIL 2.5MG DAILY #30 (STATES HE IS NOT TAKING, UNSURE WHY IT WAS FILLED)

1-11-19 CARISOPRODOL 350MG BID PRN MUSCLE SPASMS #24 (ONLY TAKES AT HS DUE TO DROWSY)

1-11-19 OXYCODONE 10MG Q4-6H PRN #180

12-17-18 IMDUR ER 30MG DAILY #90 



[facility] FILLED:

1-29-19 NIFEDIPINE 10MG TID (NOT PICKED UP, PATIENT STATES HE IS NO LONGER TAKING)

11-18-18 ASPIRIN 81MG (PATIENT IS TAKING OTC)

11-18-18 METOPROLOL TARTRATE 25MG BID #60

11-15-18 GEMFIBROZIL 600MG BID #180

11-13-18 ATORVASTATIN 40MG DAILY #90 (WAS ON DISCHARGE LIST AS 20MG IN ERROR)



PATIENT ALSO TAKES OMEPRAZOLE DAILY, FISH OIL TID, AND ASPIRIN 81MG DAILY OTC.



GAVE MED REC CORRECTION FOR LIPITOR DOSE TO PHARMACIST CAMELIA FOR CLARIFICATION.

## 2019-02-01 NOTE — OCCUPATIONAL THER DAILY NOTE
OT Current Status-Daily Note


Subjective


Pt alert, lying in bed.  Pt agrees to therapy.  C/o pain, did not rate, nrsg 

notified and reported that it was not time for meds.





Mental Status/Objective


Patient Orientation:  Person, Place, Time, Situation


Therapy Code Descriptions/Definitions 





Functional [place] Measure:


0=Not Assessed/NA        4=Minimal Assistance


1=Total Assistance        5=Supervision or Setup


2=Maximal Assistance  6=Modified [place]


3=Moderate Assistance 7=Complete [place]





ADL-Treatment


Therapy Code Descriptions/Definitions 





Functional [place] Measure:


0=Not Assessed/NA        4=Minimal Assistance


1=Total Assistance        5=Supervision or Setup


2=Maximal Assistance  6=Modified [place]


3=Moderate Assistance 7=Complete [place]








Therapy Quality Codes:


6    Independent with activity with or without an assistive device


5    Patient requires set up or clean up by helper.  Patient completes activity

  by  themselves


4    Supervision or touching assist (CGA). [place] provide cues , steadying 

assist


3    The helper provides less than half the effort to complete the activity


2    The helper provides more than half the effort to complete the activity


1    Dependent.  The helper does all the effort to complete an activity 


7    Patient refused to complete or attempt activity


9    The patient did not perform the activity before the current illness or 

injury


88  Not attempted due to Medical conditions or safety concerns





Other Treatment


Pt able to go from supine to sitting EOB with HOB raised and using bed rail, 

mod I.  CGA for safety SPT using FWW from recliner to w/c.  Pt then able to 

complete w/c mobility throughout ARU efficiently.  Pt positioned w/c in front 

of UBE in therapy gym.  Pt completes UBE for 15 min at 15 villavicencio resistance to 

increase strength and activity tolerance for daily functional tasks.  Pt then 

propelled w/c back to room and transferred into bed, SPT with CGA.  Completes 

bed mobility by self using bed rails when necessary.  After therapy, nrsg in 

room, pt lying in bed with call light/phone in reach.  All needs met in room.





OT Short Term Goals


Short Term Goals


Transfers (B,C,W/C) (FIM):  5


1=Demonstrate adherence to instructed precautions during ADL tasks.


2=Patient will verbalize/demonstrate understanding of assistive devices/

modifications for ADL.


3=Patient will improve strength/tolerance for activity to enable patient to 

perform ADL's.





OT Long Term Goals


Long Term Goals


1=Demonstrate adherence to instructed precautions during ADL tasks.


2=Patient will verbalize/demonstrate understanding of assistive devices/

modifications for ADL.


3=Patient will improve strength/tolerance for activity to enable patient to 

perform ADL's.





OT Education/Plan


Discharge Recommendations


Plan/Recommendations:  Continue POC





Treatment Plan/Plan of Care


Patient would benefit from OT for education, treatment and training to promote 

independence in ADL's, mobility, safety and/or upper extremity function for ADL'

s.


Treatment Duration:  Feb 22, 2019


Frequency:  At least 5 of 7 days/Wk (IRF)


Estimated Hrs Per Day:  1.5 hours per day


Rehab Potential:  Good





Time/GCodes


Start Time:  11:15


Stop Time:  12:00


Total Time Billed (hr/min):  45


Billed Treatment Time


1 visit-FA 2 (30 min)  EX 1 (15 min)











TALA ARMENDARIZ Feb 1, 2019 11:58

## 2019-02-01 NOTE — NUR
DR. MORENO HERE AND INFORMED OF PATIENT'S INADEQUATE PAIN RELIEF AND CRYING. PATIENT 
REQUESTING PERCOCET AND STATES LORTAB DOES NOT HELP. MEDICATION CHANGES ORDERED.

## 2019-02-01 NOTE — ST COGNITIVE LINGUISTIC EVAL
Speech Evaluation-General


Medical Diagnosis


left AKA


Onset Date:  2019





Therapy Diagnosis


Therapy Diagnosis:  Cognitive-communication





Precautions


Precautions/Isolations:  Fall Prevention, Standard Precautions





Medical History


Pertinent Medical History:  Arthritis, CAD, GERD, Heart Failure, HTN, MI


Reviewed History:  Yes





Social History


Current Living Status:  Spouse





Speech PLF-Current Status


Prior Level of Function





Patient lives at home with his spouse. He takes care of his daily needs


with assistance as needed.





Subjective


Patient was in severe pain.





Language Eval: Auditory


Comprehends Simple Yes/No Ques:  Functional


Indent/Objects Multiple Fields:  Functional


Ident/Pics in Multiple Fields:  Functional


Follows 1-Step Commands:  Functional


Follows Complex Directions:  Functional


Follows General Conversations:  Functional





Language Eval: Verbal Language


Completes Spontaneous Greeting:  Functional


Produces Auto, Serial Info:  Functional


Imitates Simple Words/Phrases:  Functional


Word Finding:  Functional


Requests Basic Needs:  Functional


States Basic Personal Info:  Functional


Expresses Complex Ideas:  Functional





Objective Cognitive Domain


Attention:  WNL


Memory:  WNL


Problem Solving:  Functional


Executive Functions:  [facility]





Objective


Formal/Standardized Tests


[facility] Cognitive-communication


Results


Memory: Immediate 3/3, Delayed 3/3, Orientation 5/5, Problem Solving: Simple 5/5

, Complex 5/5


Oral Motor/Speech Production


Within Functional Limits


Impression


Patient is a 48 year old male who was transferred to the ARU for therapy due to 

AKA. Patient is experiencing severe pain and asking for more pain medications. 

Wife was present for the evaluation. Patient is within normal range of function 

for the test.





Communication/Social Cognition


Comprehension:  7


Expression:  7


Social Interaction:  6


Problem Solvin


Memory:  7





Speech Patient Assess


Expression of Ideas/Wants:  Expression (4)


Understanding Verbal Content:  Understands (4)


Brief Interview-Mental Status:  Yes


Temporal Orientation: Year:  Correct (3)


Temporal Orientation: Day:  Correct (1)


Recall : Wear to say "Sock":  Yes, no cue required (2)


Recall : Color:  Yes, no cue required (2)


Recall : Bed:  Yes, no cue required (2)


Memory/Recall Ability:  Current season, None of the above were recalled





Speech-Plan


Patient/Family Goals


Patient/Family Goals:  


Patient will return home with his wife post rehab.





Treatment Plan


Speech Therapy Treatment Plan:  Discontinue ST


Patient is not recommended for skilled ST at this time.


Treatment Duration:  2019


Frequency:  1 time per week


Estimated Hrs Per Day:  .25 hour per day


Rehab Potential:  Fair


Barriers to Learning:  


Patient is in a lot of pain.


Pt/Family Agrees to Plan:  Yes





Safety Risks/Education


Teaching Recipient:  Patient, Significant Other


Teaching Methods:  Discussion


Response to Teaching:  Verbalize Understanding


Education Topics Provided:  


Safety within his room.





Time


Speech Therapy Time In:  09:00


Speech Therapy Time Out:  09:15


Total Billed Time:  15


Billed Treatment Time


1, [id number] Ch 2019 09:27

## 2019-02-01 NOTE — PHYSICAL THERAPY DAILY NOTE
PT Daily Note-Current


Subjective


Patient in recliner pre tx, agrees to PT, will be co-treating with OT due to 

pain and poor activity tolerance.





Appearance


Patient in bed post tx with nurse call,phone, tray, all needs met.





Mental Status


Patient Orientation:  Person, Place, Situation





Transfers


Therapy Code Descriptions/Definitions 





Functional [place] Measure:


0=Not Assessed/NA        4=Minimal Assistance


1=Total Assistance        5=Supervision or Setup


2=Maximal Assistance  6=Modified [place]


3=Moderate Assistance 7=Complete [place]








Therapy Quality Codes:


6    Independent with activity with or without an assistive device


5    Patient requires set up or clean up by helper.  Patient completes activity

  by  themselves


4    Supervision or touching assist (CGA). [place] provide cues , steadying 

assist


3    The helper provides less than half the effort to complete the activity


2    The helper provides more than half the effort to complete the activity


1    Dependent.  The helper does all the effort to complete an activity 


7    Patient refused to complete or attempt activity


9    The patient did not perform the activity before the current illness or 

injury


88  Not attempted due to Medical conditions or safety concerns


Transfers (B, C, W/C) (FIM):  4


Scootin


Rollin


Supine to/from Sit:  5


Sit to/from Stand:  4


Bed to/from Chair:  4


Patient can perform a stand pivot transfer to either side with CGA using either 

a rolling walker or by pulling up to the bed in the wheelchair and using hands 

on wheelchair and bed and performing a squat pivot transfer.





Gait Training


Gait (FIM):  1


Distance:  30'


Gait Level of Assist:  4


Gait Persons Needed:  1


Gait Assistive Device:  FWW


Wheelchair follow.  Gait is antalgic, he is able to hop on right leg with fair 

foot clearance.





Wheelchair Training


Does the Pt Use a Wheelchair?:  Yes


Wheelchair (FIM):  5


Distance:  150'x2


Wheelchair Level of Assist:  5


Type of Wheelchair:  Manual





Exercises


Standing:  3 way Ex=Flex, Abd, Ext (with left leg)


Standing Reps:  10





Treatments


Patient bathed on chair and standing, and then got dressed, and then wheeled to 

therapy gym, ambulated, stood in parallel bars and performed UE activities, and 

then LLE AROM while standing, wheeled back to room and got in bed.  PT worked 

on standing during UE activities and bathing/dressing, transfers, wheelchair 

mobility, gait training.  OT worked on bathing, dressing, UE activities, assist 

with ambulation.





Assessment


Current Status:  Fair Progress


improved ambulation





PT Short Term Goals


Short Term Goals


Time Frame:  2019


Transfers (B,C,W/C) (FIM):  5


Gait (FIM):  1


Gait Distance Comment:  20'


Gait Level of Assist:  4


Gait Assistive Device:  FWW


Wheelchair Distance:  SEE PT GOALS





PT Long Term Goals


Long Term Goals


PT Long Term Goals Time Frame:  2019


Transfers (B,C,W/C) (FIM):  6


Sit to Lying (QC):  6


Lying-Sitting on Side/Bed(QC):  6


Sit to Stand (QC):  6


Rollin


Roll Left to Right (QC):  6


Chair/Bed-to-Chair Xfer(QC):  6


Car Transfer (QC):  4


Gait (FIM):  2


Distance:  50'


Walk 10 feet (QC):  4


Walk 10ft-Uneven Surface(QC):  4


Walk 50ft with 2 Turns (QC):  4


Gait Level of Assist:  5


Gait Assistive Device:  FWW


Wheelchair (FIM):  6


Distance:  150'


Wheelchair Level of Assist:  6


Wheel 50 feet with 2 turns (QC:  6


Stairs (FIM):  1


# of Steps:  1


1 Step (curb) (QC):  4


Stairs Level Of Assist:  4





PT Plan


Problem List


Problem List:  Activity Tolerance, Functional Strength, Safety, Balance, Gait, 

Transfer, Bed Mobility, ROM





Treatment/Plan


Treatment Plan:  Continue Plan of Care


Treatment Plan:  Bed Mobility, Concurrent Therapy, Education, Functional 

Activity Gala, Functional Strength, Group Therapy, Gait, Safety, Therapeutic 

Exercise, Transfers


Treatment Duration:  2019


Frequency:  At least 5 of 7 days/Wk (IRF)


Estimated Hrs Per Day:  1.5 hours per day


Patient and/or Family Agrees t:  Yes





Safety Risks/Education


Patient Education:  Gait Training, Transfer Techniques, Correct Positioning, 

Safety Issues


Teaching Recipient:  Patient


Teaching Methods:  Demonstration, Discussion


Response to Teaching:  Reinforcement Needed





Time/GCodes


Time In:  0945


Time Out:  1045


Total Billed Treatment Time:  60


Total Billed Treatment


1 visit


GT 10'


EX 15'


FA 35'





PT and OT co-treated for the whole 60 min.











DALIA ROLAND PT 2019 10:49

## 2019-02-02 VITALS — SYSTOLIC BLOOD PRESSURE: 118 MMHG | DIASTOLIC BLOOD PRESSURE: 80 MMHG

## 2019-02-02 VITALS — DIASTOLIC BLOOD PRESSURE: 60 MMHG | SYSTOLIC BLOOD PRESSURE: 120 MMHG

## 2019-02-02 RX ADMIN — GEMFIBROZIL SCH MG: 600 TABLET ORAL at 16:06

## 2019-02-02 RX ADMIN — IPRATROPIUM BROMIDE AND ALBUTEROL SULFATE SCH ML: .5; 3 SOLUTION RESPIRATORY (INHALATION) at 15:20

## 2019-02-02 RX ADMIN — POLYETHYLENE GLYCOL (3350) SCH GM: 17 POWDER, FOR SOLUTION ORAL at 21:00

## 2019-02-02 RX ADMIN — ALPRAZOLAM PRN MG: 0.25 TABLET ORAL at 12:14

## 2019-02-02 RX ADMIN — CLOPIDOGREL BISULFATE SCH MG: 75 TABLET, FILM COATED ORAL at 08:37

## 2019-02-02 RX ADMIN — OXYCODONE HYDROCHLORIDE SCH MG: 10 TABLET, FILM COATED, EXTENDED RELEASE ORAL at 20:06

## 2019-02-02 RX ADMIN — POLYETHYLENE GLYCOL (3350) SCH GM: 17 POWDER, FOR SOLUTION ORAL at 08:38

## 2019-02-02 RX ADMIN — GEMFIBROZIL SCH MG: 600 TABLET ORAL at 07:07

## 2019-02-02 RX ADMIN — OMEGA-3 FATTY ACIDS CAP 1000 MG SCH MG: 1000 CAP at 16:06

## 2019-02-02 RX ADMIN — METOPROLOL TARTRATE SCH MG: 25 TABLET, FILM COATED ORAL at 20:06

## 2019-02-02 RX ADMIN — GABAPENTIN SCH MG: 600 TABLET, FILM COATED ORAL at 20:06

## 2019-02-02 RX ADMIN — ISOSORBIDE MONONITRATE SCH MG: 30 TABLET, EXTENDED RELEASE ORAL at 08:37

## 2019-02-02 RX ADMIN — OXYCODONE HYDROCHLORIDE SCH MG: 10 TABLET, FILM COATED, EXTENDED RELEASE ORAL at 08:38

## 2019-02-02 RX ADMIN — CARISOPRODOL SCH MG: 350 TABLET ORAL at 20:07

## 2019-02-02 RX ADMIN — PANTOPRAZOLE SCH MG: 20 TABLET, DELAYED RELEASE ORAL at 08:37

## 2019-02-02 RX ADMIN — METOPROLOL TARTRATE SCH MG: 25 TABLET, FILM COATED ORAL at 08:38

## 2019-02-02 RX ADMIN — OXYCODONE HYDROCHLORIDE AND ACETAMINOPHEN PRN TAB: 10; 325 TABLET ORAL at 00:08

## 2019-02-02 RX ADMIN — GABAPENTIN SCH MG: 600 TABLET, FILM COATED ORAL at 12:14

## 2019-02-02 RX ADMIN — ASPIRIN SCH MG: 81 TABLET ORAL at 08:38

## 2019-02-02 RX ADMIN — GABAPENTIN SCH MG: 600 TABLET, FILM COATED ORAL at 08:37

## 2019-02-02 RX ADMIN — ATORVASTATIN CALCIUM SCH MG: 20 TABLET, FILM COATED ORAL at 20:06

## 2019-02-02 RX ADMIN — ALLOPURINOL SCH MG: 300 TABLET ORAL at 08:37

## 2019-02-02 RX ADMIN — IPRATROPIUM BROMIDE AND ALBUTEROL SULFATE SCH ML: .5; 3 SOLUTION RESPIRATORY (INHALATION) at 08:33

## 2019-02-02 RX ADMIN — IPRATROPIUM BROMIDE AND ALBUTEROL SULFATE SCH ML: .5; 3 SOLUTION RESPIRATORY (INHALATION) at 19:04

## 2019-02-02 RX ADMIN — LOSARTAN POTASSIUM SCH MG: 50 TABLET, FILM COATED ORAL at 08:38

## 2019-02-02 RX ADMIN — OMEGA-3 FATTY ACIDS CAP 1000 MG SCH MG: 1000 CAP at 07:07

## 2019-02-02 RX ADMIN — OXYCODONE HYDROCHLORIDE AND ACETAMINOPHEN PRN TAB: 10; 325 TABLET ORAL at 12:14

## 2019-02-02 RX ADMIN — GABAPENTIN SCH MG: 600 TABLET, FILM COATED ORAL at 16:06

## 2019-02-02 RX ADMIN — OMEGA-3 FATTY ACIDS CAP 1000 MG SCH MG: 1000 CAP at 12:14

## 2019-02-02 NOTE — NUR
Pt and wife were downstairs and bruce up for pain pill. Back to downstair a in w/c with pts 
wife at side. Pt is laughing and joking. Pain med given for c/o per pt. Able to propel w/c 
independently. Pt states is very bored today.

## 2019-02-02 NOTE — PHYSICAL THERAPY DAILY NOTE
PT Daily Note-Current


Subjective


Pt sitting up in recliner after just finishing with OT.  Pt agrees to PT but 

reports continued pain in L stump.  Pt received pain med approx. 30 minutes 

previous to Pt tx.





Pain





   Numeric Pain Scale:  10-Worst Possible Pain


   Location:  Incisional, Left


   Location Body Site:  Thigh


   Pain Description:  Tightness, Sharp





Mental Status


Patient Orientation:  Person, Place, Time, Situation





Transfers


Therapy Code Descriptions/Definitions 





Functional [place] Measure:


0=Not Assessed/NA        4=Minimal Assistance


1=Total Assistance        5=Supervision or Setup


2=Maximal Assistance  6=Modified [place]


3=Moderate Assistance 7=Complete [place]








Therapy Quality Codes:


6    Independent with activity with or without an assistive device


5    Patient requires set up or clean up by helper.  Patient completes activity

  by  themselves


4    Supervision or touching assist (CGA). [place] provide cues , steadying 

assist


3    The helper provides less than half the effort to complete the activity


2    The helper provides more than half the effort to complete the activity


1    Dependent.  The helper does all the effort to complete an activity 


7    Patient refused to complete or attempt activity


9    The patient did not perform the activity before the current illness or 

injury


88  Not attempted due to Medical conditions or safety concerns


Scootin


Rollin


Supine to/from Sit:  5


Sit to/from Stand:  5


Sit to Lying (QC):  5


Sit to Stand (QC):  5


Chair/Bed-to-Chair Xfer(QC):  5


Bed to/from Chair:  5


PTA raised mat to height more comparable to bed at home.  Pt had to work a 

little hard but able to complete SBA.





Weight Bearing


Right Lower Extremity:  Right


Full Weight Bearing





Wheelchair Training


Does the Pt Use a Wheelchair?:  Yes


Wheelchair Distance:  3=150 ft


Distance:  150'


Wheelchair Level of Assist:  5


Wheel 50 ft with 2 turns (QC):  5


Wheel 150 ft (QC):  5


Type of Wheelchair:  Manual





Treatments


Pt transfers from recliner to W/C using SPT.  Pt propels W/C in hallway in 

route to Therapy Gym.  Pt transfers to EOM using SPT.  PTA assists pt with 

stretching LLE as well as AB/ADD to help promote ROM as hip.  Pt uses NuStep 

for 10m at WL 4 for increasing strength and activity tolerance.  Pt returns to 

room to rest with all needs met.





Assessment


Current Status:  Fair Progress


PTA encourages pt to continue to stretch and work on extension of L hip as much 

as possible throughout the day to prevent contracture as decrease pain when PT 

works on this during tx.  Pt struggles with self limiting due to pain.





PT Short Term Goals


Short Term Goals


Time Frame:  2019


Transfers (B,C,W/C) (FIM):  5


Gait (FIM):  1


Gait Distance Comment:  20'


Gait Level of Assist:  4


Gait Assistive Device:  FWW


Wheelchair Distance:  150'x2





PT Long Term Goals


Long Term Goals


PT Long Term Goals Time Frame:  2019


Transfers (B,C,W/C) (FIM):  6


Sit to Lying (QC):  6


Lying-Sitting on Side/Bed(QC):  6


Sit to Stand (QC):  6


Rollin


Roll Left to Right (QC):  6


Chair/Bed-to-Chair Xfer(QC):  6


Car Transfer (QC):  4


Gait (FIM):  2


Distance:  50'


Walk 10 feet (QC):  4


Walk 10ft-Uneven Surface(QC):  4


Walk 50ft with 2 Turns (QC):  4


Gait Level of Assist:  5


Gait Assistive Device:  FWW


Wheelchair (FIM):  6


Distance:  150'


Wheelchair Level of Assist:  6


Wheel 50 feet with 2 turns (QC:  6


Stairs (FIM):  1


# of Steps:  1


1 Step (curb) (QC):  4


Stairs Level Of Assist:  4





PT Plan


Problem List


Problem List:  Activity Tolerance, Functional Strength, Bed Mobility





Treatment/Plan


Treatment Plan:  Continue Plan of Care


Treatment Plan:  Bed Mobility, Concurrent Therapy, Education, Functional 

Activity Gala, Functional Strength, Group Therapy, Gait, Safety, Therapeutic 

Exercise, Transfers


Treatment Duration:  2019


Frequency:  At least 5 of 7 days/Wk (IRF)


Estimated Hrs Per Day:  1.5 hours per day


Patient and/or Family Agrees t:  Yes





Safety Risks/Education


Patient Education:  Transfer Techniques, Correct Positioning, Safety Issues


Teaching Recipient:  Patient


Teaching Methods:  Discussion


Response to Teaching:  Verbalize Understanding





Time/GCodes


Time In:  925


Time Out:  1010


Total Billed Treatment Time:  45


Total Billed Treatment


1, [facility] (15m) & EX x2 (30m)


G Codes Necessary:  ABRAM Miguel PTA 2019 10:39

## 2019-02-02 NOTE — OCCUPATIONAL THER DAILY NOTE
OT Current Status-Daily Note


Subjective


Pt in bed, agrees to treatment. Pt reports 7/10 pain in left LE, states he 

recently received pain medication.





Mental Status/Objective


Therapy Code Descriptions/Definitions 





Functional [place] Measure:


0=Not Assessed/NA        4=Minimal Assistance


1=Total Assistance        5=Supervision or Setup


2=Maximal Assistance  6=Modified [place]


3=Moderate Assistance 7=Complete [place]





ADL-Treatment


Pt supine to sit with SBA and increased time. Pt requests to use restroom. Sit 

to stand with supervision. Gait to restroom with FWW, slow pace. Pt stood at 

toilet, but was unable to void. Required seated rest break secondary to 

fatigue. Transfer to w/c with SBA.


Therapy Code Descriptions/Definitions 





Functional [place] Measure:


0=Not Assessed/NA        4=Minimal Assistance


1=Total Assistance        5=Supervision or Setup


2=Maximal Assistance  6=Modified [place]


3=Moderate Assistance 7=Complete [place]








Therapy Quality Codes:


6    Independent with activity with or without an assistive device


5    Patient requires set up or clean up by helper.  Patient completes activity

  by  themselves


4    Supervision or touching assist (CGA). [place] provide cues , steadying 

assist


3    The helper provides less than half the effort to complete the activity


2    The helper provides more than half the effort to complete the activity


1    Dependent.  The helper does all the effort to complete an activity 


7    Patient refused to complete or attempt activity


9    The patient did not perform the activity before the current illness or 

injury


88  Not attempted due to Medical conditions or safety concerns





Other Treatment


W/c mobility to therapy gym without assist. Pt completed arm bike activity x15 

minutes to increase overall strength and activity tolerance needed for ADLs and 

transfers. Pt performed activity with moderate resistance and steady pace. No 

rest breaks needed. Pt returned to room via w/c and transferred to chair with 

supervision. Pt sitting in chair with needs met and spouse present after 

session.





OT Short Term Goals


Short Term Goals


Transfers (B,C,W/C) (FIM):  5


1=Demonstrate adherence to instructed precautions during ADL tasks.


2=Patient will verbalize/demonstrate understanding of assistive devices/

modifications for ADL.


3=Patient will improve strength/tolerance for activity to enable patient to 

perform ADL's.





OT Long Term Goals


Long Term Goals


Time Frame:  2019


Eating (FIM):  7


Eating (QC):  6


Groomin


Oral Hygiene (QC):  6


Bathing(FIM):  6


Shower/Bathe Self (QC):  6


Upper Body Dressing(FIM):  6


Upper Body Dressing (QC):  6


Lower Body Dressing(FIM):  6


Lower Body Dressing (QC):  6


On/Off Footwear (QC):  6


Toileting Hygiene (QC):  6


Toilet/Commode Transfer(FIM):  6


Toilet/Commode Transfer (QC):  6


Shower Transfer(FIM):  5


Additional Goals:  1-Demonstrate ADL Tasks, 2-Verbalize Understanding, 3-

ImproveStrength/Gala


1=Demonstrate adherence to instructed precautions during ADL tasks.


2=Patient will verbalize/demonstrate understanding of assistive devices/

modifications for ADL.


3=Patient will improve strength/tolerance for activity to enable patient to 

perform ADL's.





OT Education/Plan


Discharge Recommendations


Plan/Recommendations:  Continue POC





Treatment Plan/Plan of Care


Patient would benefit from OT for education, treatment and training to promote 

independence in ADL's, mobility, safety and/or upper extremity function for ADL'

s.


Plan of Care:  ADL Retraining, Functional Mobility, Group Exercise/Act as Ind, 

UE Funct Exercise/Act


Treatment Duration:  2019


Frequency:  Modified [place] (IRF)


Estimated Hrs Per Day:  1.5 hours per day


Agreement:  Yes


Rehab Potential:  Good





Time/GCodes


Start Time:  08:45


Stop Time:  09:15


Total Time Billed (hr/min):  30


Billed Treatment Time


1 visit, ADL(10minutes), Ex(20minutes)











RODRIGUE GRAHAM OT 2019 10:53

## 2019-02-03 VITALS — DIASTOLIC BLOOD PRESSURE: 68 MMHG | SYSTOLIC BLOOD PRESSURE: 117 MMHG

## 2019-02-03 VITALS — SYSTOLIC BLOOD PRESSURE: 138 MMHG | DIASTOLIC BLOOD PRESSURE: 67 MMHG

## 2019-02-03 RX ADMIN — IPRATROPIUM BROMIDE AND ALBUTEROL SULFATE SCH ML: .5; 3 SOLUTION RESPIRATORY (INHALATION) at 14:47

## 2019-02-03 RX ADMIN — POLYETHYLENE GLYCOL (3350) SCH GM: 17 POWDER, FOR SOLUTION ORAL at 21:35

## 2019-02-03 RX ADMIN — GABAPENTIN SCH MG: 600 TABLET, FILM COATED ORAL at 09:22

## 2019-02-03 RX ADMIN — OXYCODONE HYDROCHLORIDE SCH MG: 10 TABLET, FILM COATED, EXTENDED RELEASE ORAL at 09:22

## 2019-02-03 RX ADMIN — ASPIRIN SCH MG: 81 TABLET ORAL at 09:22

## 2019-02-03 RX ADMIN — GEMFIBROZIL SCH MG: 600 TABLET ORAL at 15:34

## 2019-02-03 RX ADMIN — IPRATROPIUM BROMIDE AND ALBUTEROL SULFATE SCH ML: .5; 3 SOLUTION RESPIRATORY (INHALATION) at 19:48

## 2019-02-03 RX ADMIN — METOPROLOL TARTRATE SCH MG: 25 TABLET, FILM COATED ORAL at 09:22

## 2019-02-03 RX ADMIN — GEMFIBROZIL SCH MG: 600 TABLET ORAL at 06:45

## 2019-02-03 RX ADMIN — CLOPIDOGREL BISULFATE SCH MG: 75 TABLET, FILM COATED ORAL at 09:22

## 2019-02-03 RX ADMIN — PANTOPRAZOLE SCH MG: 20 TABLET, DELAYED RELEASE ORAL at 09:21

## 2019-02-03 RX ADMIN — POLYETHYLENE GLYCOL (3350) SCH GM: 17 POWDER, FOR SOLUTION ORAL at 09:23

## 2019-02-03 RX ADMIN — IPRATROPIUM BROMIDE AND ALBUTEROL SULFATE SCH ML: .5; 3 SOLUTION RESPIRATORY (INHALATION) at 08:23

## 2019-02-03 RX ADMIN — GABAPENTIN SCH MG: 600 TABLET, FILM COATED ORAL at 20:20

## 2019-02-03 RX ADMIN — OMEGA-3 FATTY ACIDS CAP 1000 MG SCH MG: 1000 CAP at 06:45

## 2019-02-03 RX ADMIN — OMEGA-3 FATTY ACIDS CAP 1000 MG SCH MG: 1000 CAP at 13:03

## 2019-02-03 RX ADMIN — ISOSORBIDE MONONITRATE SCH MG: 30 TABLET, EXTENDED RELEASE ORAL at 09:22

## 2019-02-03 RX ADMIN — OXYCODONE HYDROCHLORIDE AND ACETAMINOPHEN PRN TAB: 10; 325 TABLET ORAL at 09:22

## 2019-02-03 RX ADMIN — OMEGA-3 FATTY ACIDS CAP 1000 MG SCH MG: 1000 CAP at 17:21

## 2019-02-03 RX ADMIN — OXYCODONE HYDROCHLORIDE AND ACETAMINOPHEN PRN TAB: 10; 325 TABLET ORAL at 15:35

## 2019-02-03 RX ADMIN — ALLOPURINOL SCH MG: 300 TABLET ORAL at 09:22

## 2019-02-03 RX ADMIN — METOPROLOL TARTRATE SCH MG: 25 TABLET, FILM COATED ORAL at 20:20

## 2019-02-03 RX ADMIN — LOSARTAN POTASSIUM SCH MG: 50 TABLET, FILM COATED ORAL at 09:21

## 2019-02-03 RX ADMIN — OXYCODONE HYDROCHLORIDE SCH MG: 10 TABLET, FILM COATED, EXTENDED RELEASE ORAL at 20:19

## 2019-02-03 RX ADMIN — GABAPENTIN SCH MG: 600 TABLET, FILM COATED ORAL at 13:03

## 2019-02-03 RX ADMIN — GABAPENTIN SCH MG: 600 TABLET, FILM COATED ORAL at 17:21

## 2019-02-03 RX ADMIN — ATORVASTATIN CALCIUM SCH MG: 20 TABLET, FILM COATED ORAL at 20:20

## 2019-02-03 RX ADMIN — CARISOPRODOL SCH MG: 350 TABLET ORAL at 20:20

## 2019-02-03 NOTE — PM&R PROGRESS NOTE
Subjective


HPI/CC On Admission


Date Seen by Provider:  Feb 3, 2019


Time Seen by Provider:  12:20


CC:Debility following left AKA





HPI: This is a 48-year-old white male known to me from prior hospital 

admissions his primary care provider is Dr. Champagne who presents following a 

left above-the-knee amputation by Dr. Funes at [facility].  Patient had 

struggled with the left leg for many months with failed vascular procedures and 

wound care patient was unable to maintain the limb so that was amputated in an 

uneventful manner by Dr. Funes.  He had no significant events while at [facility] had no infections and no significant clinical status change.


Patient did have a myocardial infarction 8 weeks ago Dr. Miguel is his regular 

cardiologist and he has been consulted.


I have reviewed all of his home medications prior to [facility] and current 

medications from [facility].  Patient is in so much pain that I am unable 

to obtain any more details so will initiate long-acting OxyContin and provide 

Percocet for pain control.  He did have a bowel movement so will initiate stool 

softener for narcotic bowel prevention.


Subjective/Events-last exam


Patient wants to go home tomorrow


Pain is well controlled


Will monitor for narcotic bowel


Participating in therapy


Dressing changes per RN


Cough is resolved


Tolerating nebulizer and will continue that until DC


Smoking cessation discussed


Checked meds





Review of Systems


Musculoskeletal:  leg pain





Objective


Exam


Vital Signs





Vital Signs








  Date Time  Temp Pulse Resp B/P (MAP) Pulse Ox O2 Delivery O2 Flow Rate FiO2


 


2/3/19 09:00      Room Air  


 


2/3/19 08:23     96   


 


2/3/19 06:58 97.4 100 18 138/67 (90)    





Capillary Refill : Less Than 3 Seconds


General Appearance:  WD/WN, Anxious, Chronically ill, Moderate Distress


HEENT:  PERRL/EOMI, TMs Normal, Normal ENT Inspection, Pharynx Normal, Moist 

Mucous Membranes


Neck:  Full Range of Motion, Normal Inspection, Non Tender, Supple


Respiratory:  Chest Non Tender, Lungs Clear, Normal Breath Sounds, No Accessory 

Muscle Use, No Respiratory Distress


Cardiovascular:  Regular Rate, Rhythm, No Edema, No Gallop, No JVD, No Murmur


Gastrointestinal:  Normal Bowel Sounds, No Organomegaly, No Pulsatile Mass, Non 

Tender, Soft


Rectal:  Normal Exam, Normal Rectal Tone


Genital/Rectal:  Normal Genital Exam, Normal Rectal Exam, Normal Rectal Tone, 

Normal Vaginal Exam


Back:  Normal Inspection, No CVA Tenderness, No Vertebral Tenderness


Extremity:  Normal Capillary Refill, Normal Inspection, Normal Range of Motion, 

Non Tender, No Calf Tenderness, No Pedal Edema, Other (left AKA dressing intact)


Neurologic/Psychiatric:  Alert, Oriented x3, No Motor/Sensory Deficits, Normal 

Mood/Affect


Skin:  Normal Color, Warm/Dry


Lymphatic:  No Adenopathy





Results/Procedures


Lab


Patient resulted labs reviewed.





Assessment/Plan


Assessment and Plan


Assess & Plan/Chief Complaint


Assessment:


Status post uneventful left knee amputation by Dr. Funes and [facility]


Recent MI 8 weeks ago


Smoker


Coarse breath sounds today nebulizer treatments now improved


PVD





Plan:


Initiate intensive therapy


Work on transfers and assistive devices


Monitor labs closely


Nebulized treatments


Pain control


Prevent narcotic bowel





(1) Above knee amputation of left lower extremity


(2) CAD (coronary artery disease)


(3) Myocardial infarct, old


(4) Smoker


(5) PVD (peripheral vascular disease)


(6) COPD (chronic obstructive pulmonary disease)





Clinical Quality Measures


DVT/VTE Risk/Contraindication:


Risk Factor Score Per Nursin


RFS Level Per Nursing on Admit:  4+=Very High











FELISHA MORENO DO Feb 3, 2019 12:48

## 2019-02-03 NOTE — PROGRESS NOTE-CARDIOLOGY
Cardiology SOAP Progress Note


Subjective:


Notes marked stump pain, intermittent, bearable at the time of this exam


No cp or palp or syncope 


Chronic exertional shortness of breath





Objective:


I&O/Vital Signs











 2/3/19 2/3/19 2/3/19 2/3/19





 06:58 08:23 09:00 14:47


 


Temp 97.4   


 


Pulse 100   


 


Resp 18   


 


B/P (MAP) 138/67 (90)   


 


Pulse Ox 95 96  96


 


O2 Delivery Room Air Room Air Room Air Room Air














 2/3/19





 00:00


 


Intake Total 2400 ml


 


Output Total 2000 ml


 


Balance 400 ml








Weight (Pounds):  197


Weight (Ounces):  0.0


Weight (Calculated Kilograms):  89.864970


Constitutional:  AAO x 3, well-developed, well-nourished


Respiratory:  No accessory muscle use; lungs clear to percussion, lungs clear 

to auscultation


Cardiovascular:  regular rate-rhythm, S1 and S2, systolic murmur (faint RUBY at 

card base)


Gastrointestional:  No tender; soft; No guarding, No rebound; audible bowel 

sounds


Extremities:  other (L AKA); No clubbing, No cyanosis, No significant edema


Neurologic/Psychiatric:  oriented x 3, grossly intact (moves all limbs equally)


Skin:  No rash on exposed areas, No ulcerations on exposed areas





A/P:


Assessment:





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





Coronary artery disease: multiple cor interventions, including stenting of LCX 

and RCA; last intervention in Nov 2018 that consisted of PTCA for RCA stent 

thrombosis





Chronic systolic CHF, currently compensated, ejection fraction 45-50 percent





Chronic tobaccoism. He states he has cut back lately, but has not stopped





Hyperlipidemia





History of gastroesophageal reflux disease.





History of elevated liver enzymes





History of deep venous thrombosis





Physician Assessment


Physician Assessment





* I reviewed his hosp records, interviewed him, and examined him. I answered 

his and his wife's CV-related questions


* He suffers from multiple comorbidities that are outlined above


* We reviewed risk factor mod


* We advised immediate and complete smoking cessation


* Continue current regimen, including ASA and Plavix











KANDACE MAZARIEGOS MD FACP FAC CCDS Feb 3, 2019 16:05

## 2019-02-04 VITALS — DIASTOLIC BLOOD PRESSURE: 80 MMHG | SYSTOLIC BLOOD PRESSURE: 114 MMHG

## 2019-02-04 VITALS — DIASTOLIC BLOOD PRESSURE: 77 MMHG | SYSTOLIC BLOOD PRESSURE: 115 MMHG

## 2019-02-04 VITALS — DIASTOLIC BLOOD PRESSURE: 76 MMHG | SYSTOLIC BLOOD PRESSURE: 124 MMHG

## 2019-02-04 RX ADMIN — GABAPENTIN SCH MG: 600 TABLET, FILM COATED ORAL at 11:53

## 2019-02-04 RX ADMIN — GABAPENTIN SCH MG: 600 TABLET, FILM COATED ORAL at 21:26

## 2019-02-04 RX ADMIN — OMEGA-3 FATTY ACIDS CAP 1000 MG SCH MG: 1000 CAP at 11:54

## 2019-02-04 RX ADMIN — GEMFIBROZIL SCH MG: 600 TABLET ORAL at 16:22

## 2019-02-04 RX ADMIN — ALLOPURINOL SCH MG: 300 TABLET ORAL at 08:05

## 2019-02-04 RX ADMIN — IPRATROPIUM BROMIDE AND ALBUTEROL SULFATE SCH ML: .5; 3 SOLUTION RESPIRATORY (INHALATION) at 09:00

## 2019-02-04 RX ADMIN — POLYETHYLENE GLYCOL (3350) SCH GM: 17 POWDER, FOR SOLUTION ORAL at 08:07

## 2019-02-04 RX ADMIN — OXYCODONE HYDROCHLORIDE AND ACETAMINOPHEN PRN TAB: 10; 325 TABLET ORAL at 11:54

## 2019-02-04 RX ADMIN — OXYCODONE HYDROCHLORIDE AND ACETAMINOPHEN PRN TAB: 10; 325 TABLET ORAL at 19:55

## 2019-02-04 RX ADMIN — OXYCODONE HYDROCHLORIDE SCH MG: 10 TABLET, FILM COATED, EXTENDED RELEASE ORAL at 21:27

## 2019-02-04 RX ADMIN — OMEGA-3 FATTY ACIDS CAP 1000 MG SCH MG: 1000 CAP at 06:03

## 2019-02-04 RX ADMIN — OXYCODONE HYDROCHLORIDE AND ACETAMINOPHEN PRN TAB: 10; 325 TABLET ORAL at 04:38

## 2019-02-04 RX ADMIN — GEMFIBROZIL SCH MG: 600 TABLET ORAL at 06:03

## 2019-02-04 RX ADMIN — METOPROLOL TARTRATE SCH MG: 25 TABLET, FILM COATED ORAL at 08:05

## 2019-02-04 RX ADMIN — OXYCODONE HYDROCHLORIDE SCH MG: 10 TABLET, FILM COATED, EXTENDED RELEASE ORAL at 08:06

## 2019-02-04 RX ADMIN — IPRATROPIUM BROMIDE AND ALBUTEROL SULFATE SCH ML: .5; 3 SOLUTION RESPIRATORY (INHALATION) at 15:01

## 2019-02-04 RX ADMIN — POLYETHYLENE GLYCOL (3350) SCH GM: 17 POWDER, FOR SOLUTION ORAL at 21:33

## 2019-02-04 RX ADMIN — OMEGA-3 FATTY ACIDS CAP 1000 MG SCH MG: 1000 CAP at 16:22

## 2019-02-04 RX ADMIN — GABAPENTIN SCH MG: 600 TABLET, FILM COATED ORAL at 16:22

## 2019-02-04 RX ADMIN — PANTOPRAZOLE SCH MG: 20 TABLET, DELAYED RELEASE ORAL at 08:47

## 2019-02-04 RX ADMIN — ISOSORBIDE MONONITRATE SCH MG: 30 TABLET, EXTENDED RELEASE ORAL at 08:05

## 2019-02-04 RX ADMIN — IPRATROPIUM BROMIDE AND ALBUTEROL SULFATE SCH ML: .5; 3 SOLUTION RESPIRATORY (INHALATION) at 22:28

## 2019-02-04 RX ADMIN — OXYCODONE HYDROCHLORIDE AND ACETAMINOPHEN PRN TAB: 10; 325 TABLET ORAL at 00:35

## 2019-02-04 RX ADMIN — CLOPIDOGREL BISULFATE SCH MG: 75 TABLET, FILM COATED ORAL at 08:05

## 2019-02-04 RX ADMIN — LOSARTAN POTASSIUM SCH MG: 50 TABLET, FILM COATED ORAL at 08:05

## 2019-02-04 RX ADMIN — ATORVASTATIN CALCIUM SCH MG: 20 TABLET, FILM COATED ORAL at 21:26

## 2019-02-04 RX ADMIN — CARISOPRODOL SCH MG: 350 TABLET ORAL at 21:26

## 2019-02-04 RX ADMIN — METOPROLOL TARTRATE SCH MG: 25 TABLET, FILM COATED ORAL at 21:32

## 2019-02-04 RX ADMIN — ASPIRIN SCH MG: 81 TABLET ORAL at 08:06

## 2019-02-04 RX ADMIN — GABAPENTIN SCH MG: 600 TABLET, FILM COATED ORAL at 08:06

## 2019-02-04 NOTE — OCCUPATIONAL THER DAILY NOTE
OT Current Status-Daily Note


Subjective


Pt alert, sitting in recliner.  Pt agrees to therapy.  Pt is very vocal about 

going home.





Mental Status/Objective


Patient Orientation:  Person, Place, Time, Situation


Therapy Code Descriptions/Definitions 





Functional [place] Measure:


0=Not Assessed/NA        4=Minimal Assistance


1=Total Assistance        5=Supervision or Setup


2=Maximal Assistance  6=Modified [place]


3=Moderate Assistance 7=Complete [place]





ADL-Treatment


Pt is very modest and adamantly requested wife help with bathing/dressing.  Pt 

maneuvered w/c to large shower room to use tub shower and tub transfer bench.  

Pt able to complete tub bench transfer with SBA for safety.  Pt's wife assists 

with bathing and dressing.  Pt tends to tell wife to complete most of bathing/

dressing for him though pt has demonstrated ability to complete by self or use 

AE.  When AE for lower body dressing is recommended pt states that he has slip 

on shoes and probably won't even wear socks.  Declined any AE for dressing.


Therapy Code Descriptions/Definitions 





Functional [place] Measure:


0=Not Assessed/NA        4=Minimal Assistance


1=Total Assistance        5=Supervision or Setup


2=Maximal Assistance  6=Modified [place]


3=Moderate Assistance 7=Complete [place]








Therapy Quality Codes:


6    Independent with activity with or without an assistive device


5    Patient requires set up or clean up by helper.  Patient completes activity

  by  themselves


4    Supervision or touching assist (CGA). [place] provide cues , steadying 

assist


3    The helper provides less than half the effort to complete the activity


2    The helper provides more than half the effort to complete the activity


1    Dependent.  The helper does all the effort to complete an activity 


7    Patient refused to complete or attempt activity


9    The patient did not perform the activity before the current illness or 

injury


88  Not attempted due to Medical conditions or safety concerns


Grooming (FIM):  6 (Sitting in w/c at sink, pt completes by self.)


Oral Hygiene (QC):  6


Bathing (FIM):  4 (Wife assisted with bathing.  Pt states that he is to fat to 

bend over to reach feet and wife will do it.  Pt uses tub transfer bench, 

grabbar, hand held shower.)


Bathing Location:  L Arm, R Arm, L Upper Leg, R Upper Leg, Chest, Abdomen, 

Buttocks, Perineal Area


Shower/Bathe Self (QC):  3


Upper Body (FIM):  5 (Wife retrieves clothing.  Pt able to don/doff by self.)


Upper Body Dressing (QC):  5


Lower Body Dressing (FIM):  3 (Wife assist completing most of lower body 

dressing.  Pt declines AE for lower body dressing.)


Lower Body Dressing (QC):  2


On/Off Footwear (QC):  2


Toileting (FIM):  4 (Assist by wife to manipulate clothing.  Completes hygiene 

by self.)


Toileting Hygiene (QC):  3


Toilet/Commode Transfer (FIM):  6 (Completes with BSC, w/c and grabbars.)


Toilet Transfer (QC):  6


Shower Transfer(FIM):  5 (Using tub bench and w/c completes with supervision.)





Other Treatment


Pt completed w/c mobility through hospital to increase UE strength and activity 

tolerance.  Pt able to propel w/c over multiple surfaces and up w/c ramp 

without difficulty.  After therapy, pt sitting in w/c with call light/phone in 

reach.  All needs met in room.





OT Short Term Goals


Short Term Goals


Transfers (B,C,W/C) (FIM):  5


1=Demonstrate adherence to instructed precautions during ADL tasks.


2=Patient will verbalize/demonstrate understanding of assistive devices/

modifications for ADL.


3=Patient will improve strength/tolerance for activity to enable patient to 

perform ADL's.





OT Long Term Goals


Long Term Goals


Time Frame:  2019


Eating (FIM):  7


Eating (QC):  6


Groomin


Oral Hygiene (QC):  6


Bathing(FIM):  6


Shower/Bathe Self (QC):  6


Upper Body Dressing(FIM):  6


Upper Body Dressing (QC):  6


Lower Body Dressing(FIM):  6


Lower Body Dressing (QC):  6


On/Off Footwear (QC):  6


Toileting Hygiene (QC):  6


Toilet/Commode Transfer(FIM):  6


Toilet/Commode Transfer (QC):  6


Shower Transfer(FIM):  5


Additional Goals:  1-Demonstrate ADL Tasks, 2-Verbalize Understanding, 3-

ImproveStrength/Gala


1=Demonstrate adherence to instructed precautions during ADL tasks.


2=Patient will verbalize/demonstrate understanding of assistive devices/

modifications for ADL.


3=Patient will improve strength/tolerance for activity to enable patient to 

perform ADL's.





OT Education/Plan


Discharge Recommendations


Plan/Recommendations:  Continue POC





Treatment Plan/Plan of Care


Patient would benefit from OT for education, treatment and training to promote 

independence in ADL's, mobility, safety and/or upper extremity function for ADL'

s.


Plan of Care:  ADL Retraining, Functional Mobility, Group Exercise/Act as Ind, 

UE Funct Exercise/Act


Treatment Duration:  2019


Frequency:  Modified Program (IRF)


Estimated Hrs Per Day:  1.5 hours per day


Agreement:  Yes


Rehab Potential:  Good





Time/GCodes


Start Time:  09:00


Stop Time:  10:00


Total Time Billed (hr/min):  60


Billed Treatment Time


1 visit-ADL 3 (40 min)  FA 1 (20 min)











TALA ARMENDARIZ 2019 10:15

## 2019-02-04 NOTE — NUR
assessments & interventions completed see assessments & interventions, pt refused miralax 34 
grams, scheduled  OxyContin 10 mg & soma 350mg states pain level 7/10 on numeric scale

## 2019-02-04 NOTE — PHYSICAL THERAPY DAILY NOTE
PT Daily Note-Current


Subjective


Patient in recliner pre tx, agrees to PT, has pain of 7-8/10, nurse gives pain 

meds at the beginning of treatment.





Appearance


Patient in recliner post tx with nurse call, phone, tray, all needs met.





Mental Status


Patient Orientation:  Person, Place, Situation





Transfers


Therapy Code Descriptions/Definitions 





Functional [place] Measure:


0=Not Assessed/NA        4=Minimal Assistance


1=Total Assistance        5=Supervision or Setup


2=Maximal Assistance  6=Modified [place]


3=Moderate Assistance 7=Complete [place]








Therapy Quality Codes:


6    Independent with activity with or without an assistive device


5    Patient requires set up or clean up by helper.  Patient completes activity

  by  themselves


4    Supervision or touching assist (CGA). [place] provide cues , steadying 

assist


3    The helper provides less than half the effort to complete the activity


2    The helper provides more than half the effort to complete the activity


1    Dependent.  The helper does all the effort to complete an activity 


7    Patient refused to complete or attempt activity


9    The patient did not perform the activity before the current illness or 

injury


88  Not attempted due to Medical conditions or safety concerns


Transfers (B, C, W/C) (FIM):  5


Scootin


Rollin


Supine to/from Sit:  5


Sit to/from Stand:  5


Bed to/from Chair:  5


Appropriate placement of hands during transfers.





Weight Bearing


Right Lower Extremity:  Right


Full Weight Bearing





Gait Training


Gait (FIM):  2


Distance:  50'x4


Gait Level of Assist:  5


Gait Persons Needed:  1


Gait Assistive Device:  FWW


Steady ambulation, no LOB, good foot clearance on the right side.





Wheelchair Training


Does the Pt Use a Wheelchair?:  Yes


Wheelchair (FIM):  6


Distance:  150'


Type of Wheelchair:  Manual





Exercises


Supine Ex:  Bridging (RLE), Quad Set (LLE), Straight leg raise (LLE), Hip abd/

add (LLE)


Supine Reps:  15


LAQ right side with 3# ankle weight for 5 min





Treatments


bed mobility and transfers, ambulation, functional strengthening





Assessment


Current Status:  Fair Progress


improved ambulation and transfers, patient is not able to lay on his stomach 

for hip extension stretch





PT Short Term Goals


Short Term Goals


Time Frame:  2019


Transfers (B,C,W/C) (FIM):  5


Gait (FIM):  1


Gait Distance Comment:  20'


Gait Level of Assist:  4


Gait Assistive Device:  FWW


Wheelchair Distance:  150'





PT Long Term Goals


Long Term Goals


PT Long Term Goals Time Frame:  2019


Transfers (B,C,W/C) (FIM):  6


Sit to Lying (QC):  6


Lying-Sitting on Side/Bed(QC):  6


Sit to Stand (QC):  6


Rollin


Roll Left to Right (QC):  6


Chair/Bed-to-Chair Xfer(QC):  6


Car Transfer (QC):  4


Gait (FIM):  2


Distance:  50'


Walk 10 feet (QC):  4


Walk 10ft-Uneven Surface(QC):  4


Walk 50ft with 2 Turns (QC):  4


Gait Level of Assist:  5


Gait Assistive Device:  FWW


Wheelchair (FIM):  6


Distance:  150'


Wheelchair Level of Assist:  6


Wheel 50 feet with 2 turns (QC:  6


Stairs (FIM):  1


# of Steps:  1


1 Step (curb) (QC):  4


Stairs Level Of Assist:  4





PT Plan


Problem List


Problem List:  Activity Tolerance, Functional Strength, Safety, Balance, Gait, 

Transfer, Bed Mobility, ROM





Treatment/Plan


Treatment Plan:  Continue Plan of Care


Treatment Plan:  Bed Mobility, Concurrent Therapy, Education, Functional 

Activity Gala, Functional Strength, Group Therapy, Gait, Safety, Therapeutic 

Exercise, Transfers


Treatment Duration:  2019


Frequency:  At least 5 of 7 days/Wk (IRF)


Estimated Hrs Per Day:  1.5 hours per day


Patient and/or Family Agrees t:  Yes





Safety Risks/Education


Patient Education:  Gait Training, Transfer Techniques, Correct Positioning, W/

C Management, Safety Issues


Teaching Recipient:  Patient


Teaching Methods:  Demonstration, Discussion


Response to Teaching:  Reinforcement Needed





Time/GCodes


Time In:  0800


Time Out:  0900


Total Billed Treatment Time:  60


Total Billed Treatment


1 visit


EX 20'


WCH 10'


GT 30'











DALIA ROLAND PT 2019 08:57

## 2019-02-04 NOTE — PHYSICAL THERAPY DAILY NOTE
PT Daily Note-Current


Subjective


Pt sitting in W/C in room upon arrival.  Pt agrees to PT for FIM scoring for 

possible upcoming D/C.





Pain





   Numeric Pain Scale:  5-Moderate Pain


   Location:  Incisional, Left


   Location Body Site:  Knee


   Pain Description:  Ache





Mental Status


Patient Orientation:  Person, Place, Time, Situation





Transfers


Therapy Code Descriptions/Definitions 





Functional [place] Measure:


0=Not Assessed/NA        4=Minimal Assistance


1=Total Assistance        5=Supervision or Setup


2=Maximal Assistance  6=Modified [place]


3=Moderate Assistance 7=Complete [place]








Therapy Quality Codes:


6    Independent with activity with or without an assistive device


5    Patient requires set up or clean up by helper.  Patient completes activity

  by  themselves


4    Supervision or touching assist (CGA). [place] provide cues , steadying 

assist


3    The helper provides less than half the effort to complete the activity


2    The helper provides more than half the effort to complete the activity


1    Dependent.  The helper does all the effort to complete an activity 


7    Patient refused to complete or attempt activity


9    The patient did not perform the activity before the current illness or 

injury


88  Not attempted due to Medical conditions or safety concerns


Transfers (B, C, W/C) (FIM):  6


Scootin


Rollin


Roll Left to Right (QC):  6


Supine to/from Sit:  6


Sit to/from Stand:  6


Sit to Lying (QC):  6


Sit to Stand (QC):  6


Chair/Bed-to-Chair Xfer(QC):  6


Bed to/from Chair:  6


Car Transfer (QC):  6





Weight Bearing


Right Lower Extremity:  Right


Full Weight Bearing





Gait Training


Does the Patient Walk?:  Yes


Gait (FIM):  5


Distance (FIM):  3=150 ft


Distance:  150'


Walk 10 feet (QC):  5


Walk 50 ft with 2 Turns(QC):  5


Walk 150 ft (QC):  5


Walking 10ft/uneven surface-QC:  5


Gait Level of Assist:  5


Gait Persons Needed:  1


Gait Assistive Device:  FWW


Pt has hop to gait pattern.





Wheelchair Training


Does the Pt Use a Wheelchair?:  Yes


Wheelchair (FIM):  6


Wheelchair Distance:  3=150 ft


Distance:  150'


Wheelchair Level of Assist:  6


Wheel 50 ft with 2 turns (QC):  6


Wheel 150 ft (QC):  6


Type of Wheelchair:  Manual





Stair Training


 Stair Training: Handrails/:  2 handrails


Stairs (FIM):  3


#of Steps:  4


1 Step (curb) (QC):  5


4 Steps (QC):  5


12 Steps (QC):  88


Stairs:  Pattern:  Hops


Level of Assist:  5


Pt fatigues after one set of 4 steps but will have ramp to get into house.





Balance


Picking up an Object (QC):  88


Special Test Comments


Pt did not test this during tx due to balance deficit.  Pt will have reacher 

and support of family to help at home.





Treatments


Pt completes transfers including bed mobility and car transfers at [facility].  Pt 

ambulates in hallway and across varying surface using FWW at [facility].  Pt returns 

to room to rest at end of tx with all needs met.





Assessment


Current Status:  Good Progress


Pt has improved with strength and activity tolerance.  Pt has good family 

support and wants to D/C soon.





PT Short Term Goals


Short Term Goals


Time Frame:  2019


Transfers (B,C,W/C) (FIM):  5


Gait (FIM):  1


Gait Distance Comment:  20'


Gait Level of Assist:  4


Gait Assistive Device:  FWW


Wheelchair Distance:  150'





PT Long Term Goals


Long Term Goals


PT Long Term Goals Time Frame:  2019


Transfers (B,C,W/C) (FIM):  6


Sit to Lying (QC):  6


Lying-Sitting on Side/Bed(QC):  6


Sit to Stand (QC):  6


Rollin


Roll Left to Right (QC):  6


Chair/Bed-to-Chair Xfer(QC):  6


Car Transfer (QC):  4


Gait (FIM):  2


Distance:  50'


Walk 10 feet (QC):  4


Walk 10ft-Uneven Surface(QC):  4


Walk 50ft with 2 Turns (QC):  4


Gait Level of Assist:  5


Gait Assistive Device:  FWW


Wheelchair (FIM):  6


Distance:  150'


Wheelchair Level of Assist:  6


Wheel 50 feet with 2 turns (QC:  6


Stairs (FIM):  1


# of Steps:  1


1 Step (curb) (QC):  4


Stairs Level Of Assist:  4





PT Plan


Problem List


Problem List:  Activity Tolerance, Balance, Gait





Treatment/Plan


Treatment Plan:  Continue Plan of Care


Treatment Plan:  Bed Mobility, Concurrent Therapy, Education, Functional 

Activity Gala, Functional Strength, Group Therapy, Gait, Safety, Therapeutic 

Exercise, Transfers


Treatment Duration:  2019


Frequency:  At least 5 of 7 days/Wk (IRF)


Estimated Hrs Per Day:  1.5 hours per day


Patient and/or Family Agrees t:  Yes





Safety Risks/Education


Patient Education:  Gait Training, Transfer Techniques, Correct Positioning, 

Safety Issues


Teaching Recipient:  Patient, Significant Other


Teaching Methods:  Discussion


Response to Teaching:  Verbalize Understanding





Time/GCodes


Time In:  1300


Time Out:  1330


Total Billed Treatment Time:  30


Total Billed Treatment


1, GT (15m) & FA (15m)


G Codes Necessary:  ABRAM Miguel PTA 2019 14:21

## 2019-02-04 NOTE — CARDIOLOGY PROGRESS NOTE
Subjective


Date Seen by Provider:  2019


Time Seen by Provider:  08:52


Subjective/Events-last exam


Patient is in bed, feeling better, still having pain in his leg


Review of Systems


General:  No Chills, No Night Sweats, No Fatigue, No Malaise, No Appetite, No 

Other


HEENT:  No Head Aches, No Visual Changes, No Eye Pain, No Ear Pain, No Dysphasia

, No Sinus Congestion, No Post Nasal Drip, No Sore Throat, No Other


Pulmonary:  No Dyspnea, No Cough, No Pleuritic Chest Pain, No Other


Cardiovascular:  No: Chest Pain, Palpitations, Orthopnea, Paroxysmal Noc. 

Dyspnea, Edema, Lt Headedness, Other





Objective-Cardiology


Exam


Last Set of Vital Signs





Vital Signs








 19





 06:00


 


Temp 97.2


 


Pulse 89


 


Resp 18


 


B/P (MAP) 115/77 (90)


 


Pulse Ox 90


 


O2 Delivery Room Air





Capillary Refill : Less Than 3 Seconds


I&O











Intake and Output 


 


 19





 00:00


 


Intake Total 3050 ml


 


Output Total 2400 ml


 


Balance 650 ml


 


 


 


Intake Oral 3050 ml


 


Output Urine Total 2400 ml








General:  Alert, Oriented X3, Cooperative, No Acute Distress


HEENT:  Atraumatic, PERRLA


Neck:  Supple, No JVD, No Thyromegaly, +2 Carotid Pulse No Bruit, No LAD


Lungs:  Other


Heart:  Normal S1, Normal S2


Abdomen:  Normal Bowel Sounds, Soft, No Tenderness, No Hepatosplenomegaly, No 

Masses


Extremities:  No Clubbing, No Cyanosis, No Edema, Normal Pulses, No Tenderness/

Swelling


Skin:  No Rashes, No Breakdown, No Significant Lesion


Neuro:  Normal Speech, Strength at 5/5 X4 Ext, Normal Tone, Sensation Intact, 

Cranial Nerves 3-12 NL, Reflexes 2+, Other (Left AKA)


Psych/Mental Status:  Mental Status NL, Mood NL





A/P-Cardiology


Admission Diagnosis


Left AKA


Peripheral arterial disease


Coronary artery disease


Hypertension


Hyperlipidemia





Assessment/Plan


Peripheral arterial disease, Buerger's disease, status post left AKA, still 

having pain.  Had extensive disease, seen and followed by Dr. Funes





Coronary artery disease, history of Promus drug-eluting stent placement using 

2.512 mm in the circumflex artery in 2012 by Dr. Reilly after having 

myocardial infarction.  Most recent cardiac catheterization done 2016 

revealed patent stent to circumflex artery with otherwise nonobstructive disease

, had non-ST elevation myocardial infarction on 2017 had total 

occlusion of the obtuse marginal branch successful the plan of resolute 

integrity 2.526 mm with good results.  Total occlusion of the right coronary 

artery with failed attempt for intervention, referred to Dr. Delcid, had 

intervention on the right coronary artery with 3 drug-eluting stent overlapping 

in the right coronary artery.  Patient had non-ST elevation MI on 2018 and underwent cardiac catheterization with Dr. Bacon revealing stent 

thrombosis and occlusion of the distal/mid RCA stents to date successfully with 

PTCA.  Integrilin infusion 12 hour.  He is maintained on Plavix and aspirin at 

this time.





History of congestive heart failure, improved, history of Severe left 

ventricular systolic dysfunction, improved with intervention and maximizing 

medical therapy, ejection fraction 45-50 percent.  Continue to monitor





History of deep venous thrombosis/pulmonary embolism, has been maintained on 

Coumadin in the past, Currently on hold while on aspirin and Plavix





Tobaccoism, stopped smoking in 2017.  Continue to monitor





Hyperlipidemia, continue to monitor lipids





History of gastroesophageal reflux disease.





History of elevated liver enzymes, monitor liver enzymes





History of deep venous thrombosis.





Clinical Quality Measures


DVT/VTE Risk/Contraindication:


Risk Factor Score Per Nursin


RFS Level Per Nursing on Admit:  4+=Very High











NORTH HERNANDEZ MD 2019 08:54

## 2019-02-04 NOTE — CARDIOLOGY PROGRESS NOTE
Subjective


Date Seen by Provider:  2019


Time Seen by Provider:  08:25


Subjective/Events-last exam


Patient with PT, complaining of some phantom pain. Denies any chest pain or 

dyspnea.


Review of Systems


General:  No Night Sweats, No Fatigue, No Malaise


HEENT:  No Visual Changes, No Dysphasia, No Sore Throat


Pulmonary:  No Dyspnea, No Cough


Cardiovascular:  No: Chest Pain, Palpitations, Orthopnea


Gastrointestinal:  No: Nausea, Vomiting


Genitourinary:  No Dysuria, No Frequency


Musculoskeletal:  leg pain (Left phantom pain)





Objective-Cardiology


Exam


Last Set of Vital Signs





Vital Signs








 19





 06:00


 


Temp 97.2


 


Pulse 89


 


Resp 18


 


B/P (MAP) 115/77 (90)


 


Pulse Ox 90


 


O2 Delivery Room Air





Capillary Refill : Less Than 3 Seconds


I&O











Intake and Output 


 


 19





 00:00


 


Intake Total 3050 ml


 


Output Total 2400 ml


 


Balance 650 ml


 


 


 


Intake Oral 3050 ml


 


Output Urine Total 2400 ml








General:  Alert, Oriented X3, Cooperative, No Acute Distress


HEENT:  Atraumatic, PERRLA


Neck:  Supple, No JVD, No Thyromegaly, +2 Carotid Pulse No Bruit, No LAD


Lungs:  Other


Abdomen:  Normal Bowel Sounds, Soft, No Tenderness, No Hepatosplenomegaly, No 

Masses


Extremities:  No Clubbing, No Cyanosis, No Edema, Normal Pulses, No Tenderness/

Swelling


Skin:  No Rashes, No Breakdown, No Significant Lesion


Neuro:  Normal Speech, Strength at 5/5 X4 Ext, Normal Tone, Sensation Intact, 

Cranial Nerves 3-12 NL, Reflexes 2+, Other (Left AKA)


Psych/Mental Status:  Mental Status NL, Mood NL





A/P-Cardiology


Admission Diagnosis


Left AKA


Peripheral arterial disease


Coronary artery disease


Hypertension


Hyperlipidemia





Assessment/Plan


Peripheral arterial disease, Buergers disease, status post left AKA, still 

having pain.  Had extensive disease, seen and followed by Dr. Funes





Coronary artery disease, history of Promus drug-eluting stent placement using 

2.512 mm in the circumflex artery in 2012 by Dr. Reilly after having 

myocardial infarction.  Most recent cardiac catheterization done 2016 

revealed patent stent to circumflex artery with otherwise nonobstructive disease

, had non-ST elevation myocardial infarction on 2017 had total 

occlusion of the obtuse marginal branch successful the plan of resolute 

integrity 2.526 mm with good results.  Total occlusion of the right coronary 

artery with failed attempt for intervention, referred to Dr. Delcid, had 

intervention on the right coronary artery with 3 drug-eluting stent overlapping 

in the right coronary artery.  Patient had non-ST elevation MI on 2018 and underwent cardiac catheterization with Dr. Bacon revealing stent 

thrombosis and occlusion of the distal/mid RCA stents to date successfully with 

PTCA.  Integrilin infusion 12 hour.  He is maintained on Plavix and aspirin at 

this time.





History of congestive heart failure, improved, history of Severe left 

ventricular systolic dysfunction, improved with intervention and maximizing 

medical therapy, ejection fraction 45-50 percent.  Continue to monitor





History of deep venous thrombosis/pulmonary embolism, has been maintained on 

Coumadin in the past, Currently on hold while on aspirin and Plavix





Tobaccoism, stopped smoking in 2017.  Continue to monitor





Hyperlipidemia, continue to monitor lipids





History of gastroesophageal reflux disease.





History of elevated liver enzymes, monitor liver enzymes





History of deep venous thrombosis.





Clinical Quality Measures


DVT/VTE Risk/Contraindication:


Risk Factor Score Per Nursin


RFS Level Per Nursing on Admit:  4+=Very High











KEESHA SCHAFFER 2019 08:28

## 2019-02-04 NOTE — PM&R PROGRESS NOTE
Subjective


HPI/CC On Admission


Date Seen by Provider:  2019


Time Seen by Provider:  08:00


CC:Debility following left AKA





HPI: This is a 48-year-old white male known to me from prior hospital 

admissions his primary care provider is Dr. Champagne who presents following a 

left above-the-knee amputation by Dr. Funes at [facility].  Patient had 

struggled with the left leg for many months with failed vascular procedures and 

wound care patient was unable to maintain the limb so that was amputated in an 

uneventful manner by Dr. Funes.  He had no significant events while at [facility] had no infections and no significant clinical status change.


Patient did have a myocardial infarction 8 weeks ago Dr. Miguel is his regular 

cardiologist and he has been consulted.


I have reviewed all of his home medications prior to [facility] and current 

medications from [facility].  Patient is in so much pain that I am unable 

to obtain any more details so will initiate long-acting OxyContin and provide 

Percocet for pain control.  He did have a bowel movement so will initiate stool 

softener for narcotic bowel prevention.


Subjective/Events-last exam


Patient wants to go home soon


Pain is well controlled on long-acting and breakthrough meds


Will monitor for narcotic bowel


Participating in therapy


Dressing changes per RN


Cough is resolved and smoking cessation was counseled in depth today since he 

was planning on cutting down in quitting anyway


Tolerating nebulizer and will continue that until DC


Reviewing records regarding the actual etiology of the condition required the 

left above-the-knee amputation


Checked meds 


Conferred with Dr. Miguel





Review of Systems


Musculoskeletal:  leg pain





Objective


Exam


Vital Signs





Vital Signs








  Date Time  Temp Pulse Resp B/P (MAP) Pulse Ox O2 Delivery O2 Flow Rate FiO2


 


19 06:00 97.2 89 18 115/77 (90) 90 Room Air  





Capillary Refill : Less Than 3 Seconds


General Appearance:  WD/WN, Anxious, Chronically ill, Moderate Distress


HEENT:  PERRL/EOMI, TMs Normal, Normal ENT Inspection, Pharynx Normal, Moist 

Mucous Membranes


Neck:  Full Range of Motion, Normal Inspection, Non Tender, Supple


Respiratory:  Chest Non Tender, Lungs Clear, Normal Breath Sounds, No Accessory 

Muscle Use, No Respiratory Distress


Cardiovascular:  Regular Rate, Rhythm, No Edema, No Gallop, No JVD, No Murmur


Gastrointestinal:  Normal Bowel Sounds, No Organomegaly, No Pulsatile Mass, Non 

Tender, Soft


Rectal:  Normal Exam, Normal Rectal Tone


Genital/Rectal:  Normal Genital Exam, Normal Rectal Exam, Normal Rectal Tone, 

Normal Vaginal Exam


Back:  Normal Inspection, No CVA Tenderness, No Vertebral Tenderness


Extremity:  Normal Capillary Refill, Normal Inspection, Normal Range of Motion, 

Non Tender, No Calf Tenderness, No Pedal Edema, Other (left AKA dressing intact)


Neurologic/Psychiatric:  Alert, Oriented x3, No Motor/Sensory Deficits, Normal 

Mood/Affect


Skin:  Normal Color, Warm/Dry


Lymphatic:  No Adenopathy





Results/Procedures


Lab


Patient resulted labs reviewed.





Assessment/Plan


Assessment and Plan


Assess & Plan/Chief Complaint


Assessment:


Status post uneventful left knee amputation by Dr. Funes and [facility]


Recent MI 8 weeks ago consulted Dr. Miguel


Smoker counseled cessation


Coarse breath sounds nebulizer treatments have resolved this issue


PVD





Plan:


Maintain intensive therapy


Work on transfers and assistive devices


Monitor labs closely


Nebulized treatments


Pain control


Prevent narcotic bowel





(1) Above knee amputation of left lower extremity


(2) CAD (coronary artery disease)


(3) Myocardial infarct, old


(4) Smoker


(5) PVD (peripheral vascular disease)


(6) COPD (chronic obstructive pulmonary disease)





Clinical Quality Measures


DVT/VTE Risk/Contraindication:


Risk Factor Score Per Nursin


RFS Level Per Nursing on Admit:  4+=Very High











FELISHA MORENO DO 2019 08:17

## 2019-02-04 NOTE — OCCUPATIONAL THER DAILY NOTE
OT Current Status-Daily Note


Subjective


Pt alert, ambulating around room using FWW with wife present in room.





Mental Status/Objective


Patient Orientation:  Person, Place, Time, Situation


Therapy Code Descriptions/Definitions 





Functional Harlan Measure:


0=Not Assessed/NA        4=Minimal Assistance


1=Total Assistance        5=Supervision or Setup


2=Maximal Assistance  6=Modified Harlan


3=Moderate Assistance 7=Complete Harlan





ADL-Treatment


Therapy Code Descriptions/Definitions 





Functional Harlan Measure:


0=Not Assessed/NA        4=Minimal Assistance


1=Total Assistance        5=Supervision or Setup


2=Maximal Assistance  6=Modified Harlan


3=Moderate Assistance 7=Complete Harlan








Therapy Quality Codes:


6    Independent with activity with or without an assistive device


5    Patient requires set up or clean up by helper.  Patient completes activity

  by  themselves


4    Supervision or touching assist (CGA). [place] provide cues , steadying 

assist


3    The helper provides less than half the effort to complete the activity


2    The helper provides more than half the effort to complete the activity


1    Dependent.  The helper does all the effort to complete an activity 


7    Patient refused to complete or attempt activity


9    The patient did not perform the activity before the current illness or 

injury


88  Not attempted due to Medical conditions or safety concerns


Toileting (FIM):  5 (Supervision by wife when completing toileting using FWW.)


Toileting Hygiene (QC):  4


Toilet/Commode Transfer (FIM):  6 (Using FWW, BSC and grabbar pt able to 

complete by self.)


Toilet Transfer (QC):  6





Other Treatment


Pt maneuvered w/c to therapy gym.  Completed arm bike 15 min at 20 villavicencio 

resistance to increase strength and activity tolerance for daily functional 

tasks.  After therapy, pt in w/c at room with visitors present.  Call light/

phone in reach.  Family present in room.





OT Short Term Goals


Short Term Goals


Transfers (B,C,W/C) (FIM):  5


1=Demonstrate adherence to instructed precautions during ADL tasks.


2=Patient will verbalize/demonstrate understanding of assistive devices/

modifications for ADL.


3=Patient will improve strength/tolerance for activity to enable patient to 

perform ADL's.





OT Long Term Goals


Long Term Goals


Time Frame:  2019


Eating (FIM):  7


Eating (QC):  6


Groomin


Oral Hygiene (QC):  6


Bathing(FIM):  6


Shower/Bathe Self (QC):  6


Upper Body Dressing(FIM):  6


Upper Body Dressing (QC):  6


Lower Body Dressing(FIM):  6


Lower Body Dressing (QC):  6


On/Off Footwear (QC):  6


Toileting Hygiene (QC):  6


Toilet/Commode Transfer(FIM):  6


Toilet/Commode Transfer (QC):  6


Shower Transfer(FIM):  5


Additional Goals:  1-Demonstrate ADL Tasks, 2-Verbalize Understanding, 3-

ImproveStrength/Gala


1=Demonstrate adherence to instructed precautions during ADL tasks.


2=Patient will verbalize/demonstrate understanding of assistive devices/

modifications for ADL.


3=Patient will improve strength/tolerance for activity to enable patient to 

perform ADL's.





OT Education/Plan


Discharge Recommendations


Plan/Recommendations:  Continue POC





Treatment Plan/Plan of Care


Patient would benefit from OT for education, treatment and training to promote 

independence in ADL's, mobility, safety and/or upper extremity function for ADL'

s.


Plan of Care:  ADL Retraining, Functional Mobility, Group Exercise/Act as Ind, 

UE Funct Exercise/Act


Treatment Duration:  2019


Frequency:  Modified Program (IRF)


Estimated Hrs Per Day:  1.5 hours per day


Agreement:  Yes


Rehab Potential:  Good





Time/GCodes


Start Time:  12:30


Stop Time:  13:00


Total Time Billed (hr/min):  30


Billed Treatment Time


1 visit-ADL 1 (15 min)  EX 1 (15 min)











TALA ARMENDARIZ 2019 13:10

## 2019-02-04 NOTE — INDIVIDUALIZED PLAN OF CARE
Individualized Plan of Care


Rehab Nursing IPOC Order


Admission Date


Jan 31, 2019 at 17:48


Current Orders





Orders


Admission Order(Inpt,Obs,Sdc) (1/31/19 13:17)


Vital Signs: Routine (Order) 08,16,00 (1/31/19 13:17)


Sequential Compression Device 08,20 (1/31/19 13:17)


-Inpt Rehab Con (1/31/19 13:17)


Rehab Nursing Orders-Ipoc (1/31/19 13:17)


Physical Therapy Rehab Orders (1/31/19 13:17)


Occupational Therapy Rehab Ord (1/31/19 13:17)


Speech Therapy Rehab Orders (1/31/19 13:17)


General/Regular (1/31/19 Dinner)


Intake & Output 06,14,22 (1/31/19 13:17)


Precautions (Aru) (1/31/19 13:17)


Weekly Weight (Lbs) WEEK (1/31/19 13:17)


Rehab-Intensity Of Therapy (1/31/19 13:17)


Acetaminophen  Tablet (Tylenol  Tablet) (1/31/19 13:30)


Ibuprofen  Tablet (Motrin  Tablet) (1/31/19 13:30)


Alprazolam Tablet (Xanax Tablet) (1/31/19 13:30)


Calcium Carbonate Chew Tablet (Antacid C (1/31/19 13:30)


Diphenhydramine Tablet (Benadryl Tablet) (1/31/19 13:30)


Docusate Sodium Capsule (Colace Capsule) (1/31/19 13:30)


Hydrocodone/Apap 5/325 Tablet (Lortab 5 (1/31/19 13:30)


Loperamide Capsule (Imodium Capsule) (1/31/19 13:30)


Melatonin Tablet (Melatonin Tablet) (1/31/19 13:30)


Polyethylene Glycol Powder Pkt (Miralax (1/31/19 21:00)


Ondansetron  Oral Dissolve Tab (Zofran (1/31/19 13:30)


Code/Resuscitation (1/31/19 13:17)


Initiate Admission Nursing Pro .admission (1/31/19 13:17)


Ambulate 08,12,20 (1/31/19 18:32)


Sequential Compression Device 08,20 (1/31/19 18:32)


Dvt/Vte Risk - Notifiy Physici 08 (1/31/19 18:32)


Tramadol Tablet (Ultram Tablet) (1/31/19 18:45)


Carisoprodol Tablet (Soma Tablet) (1/31/19 21:00)


Gemfibrozil Tablet (Lopid Tablet) (2/1/19 07:00)


Gabapentin Capsule/Tablet (Neurontin Cap (1/31/19 21:00)


Metoprolol Tartrate (Ir) Tab (Lopressor (1/31/19 21:00)


Omega 3 Capsule (Fish Oil Capsule) (2/1/19 07:00)


Oxycodone/Acet 10/325mg Tablet (Percocet (2/1/19 09:00)


Oxycodone Extended Release Tab (Oxyconti (2/1/19 09:00)


Albuterol/Ipra Inhalation Soln (Duoneb I (2/1/19 14:00)


Svn Small Volume Nebulizer (2/1/19 08:50)


Cbc With Automated Diff (2/1/19 08:50)


Comprehensive Metabolic Panel (2/1/19 08:50)


Gabapentin Capsule/Tablet (Neurontin Cap (2/1/19 09:45)


Metoprolol Tartrate (Ir) Tab (Lopressor (2/1/19 21:00)


Omega 3 Capsule (Fish Oil Capsule) (2/1/19 12:00)


Carisoprodol Tablet (Soma Tablet) (2/1/19 21:00)


Gemfibrozil Tablet (Lopid Tablet) (2/1/19 16:00)


Oxycodone Immediate Rel Tablet (Oxyir Ta (2/1/19 10:00)


Consult Physician (2/1/19 09:29)


Atorvastatin Tablet (Lipitor Tablet) (2/1/19 21:00)


Aspirin Enteric Coated Tablet (Ecotrin T (2/1/19 09:45)


Clopidogrel Tablet (Plavix Tablet) (2/1/19 09:45)


Gabapentin Capsule/Tablet (Neurontin Cap (2/1/19 13:00)


Allopurinol Tablet (Zyloprim Tablet) (2/1/19 09:49)


Losartan Tablet (Cozaar Tablet) (2/1/19 09:50)


Isosorbide Mononitrate Tablet (Imdur Tab (2/1/19 09:50)


Pantoprazole Tablet (Protonix Tablet) (2/1/19 09:51)


Patient Visit (2/1/19 )


Pt Eval Moderate Complexity (2/1/19 )


Functional Activities, Ea 15 (2/1/19 )


Exercise Therap, Ea 15 Min (2/1/19 )


Gait Training, Ea 15 Min (2/1/19 )


Patient Visit (2/1/19 )


Speech Sound Lang Comp (2/1/19 )


Patient Visit (2/2/19 )


Wheelchair Mgmt/Propulsn 15min (2/2/19 )


Exercise Therap, Ea 15 Min (2/2/19 )


Patient Visit (2/4/19 )


Wheelchair Mgmt/Propulsn 15min (2/4/19 )


Exercise Therap, Ea 15 Min (2/4/19 )


Gait Training, Ea 15 Min (2/4/19 )


Patient Visit (2/4/19 )


Gait Training, Ea 15 Min (2/4/19 )


Functional Activities, Ea 15 (2/4/19 )





Rehab Nursing Orders:  Ongoing Assess. of Function Status





Intensity of Therapy to be met


Patient to be seen:  Min.3h per day/5 of 7d





PT IPOC


Problem List:  Activity Tolerance, Balance, Gait


Treatment Plan:  Continue Plan of Care


Bed Mobility, Concurrent Therapy, Education, Functional Activity Gala, 

Functional Strength, Group Therapy, Gait, Safety, Therapeutic Exercise, 

Transfers


Treatment Duration:  Feb 22, 2019


Frequency:  At least 5 of 7 days/Wk (IRF)


Estimated Hrs Per Day:  1.5 hours per day





OT IPOC


Problems:  Decreased Activ Tolerance, Dependent Transfers, Impaired Self-Care 

Skills


OT Treatment, Training and Edu:  Yes


Plan of Care:  ADL Retraining, Functional Mobility, Group Exercise/Act as Ind, 

UE Funct Exercise/Act


Treatment Duration:  Feb 22, 2019


Frequency:  Modified Program (IRF)


Estimated Hrs Per Day:  1.5 hours per day





ST IPOC


Speech Therapy Treatment Plan:  Discontinue ST


Treatment Duration:  Feb 1, 2019


Frequency:  1 time per week


Estimated Hrs Per Day:  .25 hour per day





/Case Mgmt


/Case Managemen:  Discharge Planning





Dietitian/Nutritionist


Dietitian/Nutritionist to monitor nutritional status and make changes and/or 

recommendations as needed and work with speech pathology on dietary upgrades as 

the occur.





Physician IPOC


Medical Issues being managed closely and that require the 24 hour availability 

of a physician:





Severe pain requires long-acting narcotics along with breakthrough narcotics


Need to prevent narcotic bowel


Smoking cessation with coarse breath sounds requiring nebulizer treatments and 

monitoring for oxygen requirements


Dressing changes to monitor for any cellulitis of the stump site


Medical Issues:  DVT Prophylaxis, Falls Precautions, Fluid/Electrolyte/

Nutrition Balance, Wound Care


Brief Synthesis of Preadmission Screen, Post-Admission Evaluation, and Therapy 


Evaluations:





Patient with a new left above-the-knee amputation requiring close monitoring 

and significant pain control when admitted.


Physical therapy will work for adequate transfers and fall risk prevention


Occupational Therapy will work on transfers in addition to independent ADL 

management


Medical Prognosis:  ggod


Anticipated Length of Stay:  7 days











FELISHA MORENO DO Feb 4, 2019 19:40

## 2019-02-05 VITALS — SYSTOLIC BLOOD PRESSURE: 138 MMHG | DIASTOLIC BLOOD PRESSURE: 74 MMHG

## 2019-02-05 VITALS — DIASTOLIC BLOOD PRESSURE: 80 MMHG | SYSTOLIC BLOOD PRESSURE: 113 MMHG

## 2019-02-05 RX ADMIN — LOSARTAN POTASSIUM SCH MG: 50 TABLET, FILM COATED ORAL at 08:56

## 2019-02-05 RX ADMIN — CLOPIDOGREL BISULFATE SCH MG: 75 TABLET, FILM COATED ORAL at 08:55

## 2019-02-05 RX ADMIN — GABAPENTIN SCH MG: 600 TABLET, FILM COATED ORAL at 08:55

## 2019-02-05 RX ADMIN — ISOSORBIDE MONONITRATE SCH MG: 30 TABLET, EXTENDED RELEASE ORAL at 08:55

## 2019-02-05 RX ADMIN — ASPIRIN SCH MG: 81 TABLET ORAL at 08:55

## 2019-02-05 RX ADMIN — OXYCODONE HYDROCHLORIDE AND ACETAMINOPHEN PRN TAB: 10; 325 TABLET ORAL at 03:40

## 2019-02-05 RX ADMIN — POLYETHYLENE GLYCOL (3350) SCH GM: 17 POWDER, FOR SOLUTION ORAL at 08:56

## 2019-02-05 RX ADMIN — IPRATROPIUM BROMIDE AND ALBUTEROL SULFATE SCH ML: .5; 3 SOLUTION RESPIRATORY (INHALATION) at 07:20

## 2019-02-05 RX ADMIN — ALLOPURINOL SCH MG: 300 TABLET ORAL at 08:55

## 2019-02-05 RX ADMIN — METOPROLOL TARTRATE SCH MG: 25 TABLET, FILM COATED ORAL at 08:56

## 2019-02-05 RX ADMIN — GEMFIBROZIL SCH MG: 600 TABLET ORAL at 06:46

## 2019-02-05 RX ADMIN — PANTOPRAZOLE SCH MG: 20 TABLET, DELAYED RELEASE ORAL at 08:55

## 2019-02-05 RX ADMIN — OMEGA-3 FATTY ACIDS CAP 1000 MG SCH MG: 1000 CAP at 06:46

## 2019-02-05 RX ADMIN — OXYCODONE HYDROCHLORIDE SCH MG: 10 TABLET, FILM COATED, EXTENDED RELEASE ORAL at 08:55

## 2019-02-05 NOTE — D/C HH FACE TO FACE ORDER
D/C  Face to Face Orders


Instructions for Patient


[facility], [phone]


Patient Instructions/FollowUp:  


Dr Champagne in 1 week


Physician to follow Patient:  Dr champagne


Discharge Diet for Home:  Cardiac Diet


Patient Problems:  


Left AKA


CAD


PVD


COPD


Smoker





Patient Data-Allergies,Ht & Wt


Patient Allergies:  


Coded Allergies:  


     Penicillins (Unverified  Allergy, Mild, 6/4/09)


Height (Feet):  5


Height (Inches):  4.00


Weight (Pounds):  203


Weight (Ounces):  1.6





Home Health Need/Face to Face


Date of Face to Face:  Feb 5, 2019


Clinical Findings:  Unsteady gait


I have seen Pt face-to-face:  Yes


Discharged To:  Home


Diagnosis/Conditions:  


Left AKA


CAD


PVD


COPD


Smoker


Patient is Homebound due to:  Aayush fall risk due to instabilty


Homebound Status


   Due to the above stated illness, injury or surgical procedure (medical 

condition or diagnosis) and associated clinical findings, the patient is 

homebound because of his/her inability to leave home except with aid of a 

supportive device and/or person AND leaving the home requires a considerable 

and taxing effort or is medically contraindicated.


Pt req the following assistanc:  Walker, Wheelchair





Home Health Nursing Orders


Home Health Services Order:  Physical Therapy-Evaluate & Treat


Certify Stmt


I certify that this patient is under my care and that I, a nurse practitioner 

or a physician; a assistant working with me, had a face to face encounter that -

meets the physician face to face encounter requirements with this patient as 

dated.











FELISHA MORENO DO Feb 5, 2019 09:00

## 2019-02-05 NOTE — THERAPY TEAM DISCHARGE SUMMARY
Therapy Discharge Summary


Discharge Recommendations


Date of Discharge





Therapy D/C Recommendations:  Home w/ Family Support





Occupational Therapy


Pt admitted to ARU following left AKA. On admission pt required min assist with 

toilet transfer, LE dressing, and bathing. Skilled OT intervention focused on 

ADL training, transfers, strengthening, and safety. Pt made progress with 

therapy and by discharge is completing toilet transfer and grooming with 

modified independence; toileting, shower transfer, and UE dressing with SBA; 

and bathing with min assist. Pt met goals for grooming, toilet transfer, and 

shower transfer. Pt did not meet goals for dressing and bathing as pt requested 

assist from spouse and states she will help him at home. Pt discharged home 

with spouse. D/C ARU OT at this time.


Decreased Activ Tolerance, Dependent Transfers, Impaired Self-Care Skills





PT Long Term Goals


Long Term Goals


PT Long Term Goals Time Frame:  Feb 22, 2019


Transfers (B,C,W/C) (FIM):  6


Roll Left to Right (QC):  6


Sit to Lying (QC):  6


Lying-Sitting on Side/Bed(QC):  6


Sit to Stand (QC):  6


Chair/Bed-to-Chair Xfer(QC):  6


Car Transfer (QC):  4


Gait (FIM):  2


Distance:  50'


Walk 10 feet (QC):  4


Walk 10ft-Uneven Surface(QC):  4


Walk 50ft with 2 Turns (QC):  4


Gait Level of Assist:  5


Gait Assistive Device:  FWW


Wheelchair (FIM):  6


Distance:  150'


Wheelchair Level of Assist:  6


Wheel 50 feet with 2 turns (QC:  6


Stairs (FIM):  1


# of Steps:  1


1 Step (curb) (QC):  4


Stairs Level Of Assist:  4





OT Long Term Goals


Long Term Goals


Time Frame:  Feb 22, 2019


Eating (FIM):  7


Eating (QC):  6


Oral Hygiene (QC):  6


Grooming(FIM):  6


Bathing(FIM):  6


Shower/Bathe Self (QC):  6


Upper Body Dressing(FIM):  6


Upper Body Dressing (QC):  6


Lower Body Dressing(FIM):  6


Lower Body Dressing (QC):  6


On/Off Footwear (QC):  6


Toileting Hygiene (QC):  6


Toilet/Commode Transfer(FIM):  6


Toilet/Commode Transfer (QC):  6


Shower Transfer(FIM):  5


Additional Goals:  1-Demonstrate ADL Tasks, 2-Verbalize Understanding, 3-

ImproveStrength/Gala


1=Demonstrate adherence to instructed precautions during ADL tasks.


2=Patient will verbalize/demonstrate understanding of assistive devices/

modifications for ADL.


3=Patient will improve strength/tolerance for activity to enable patient to 

perform ADL's.











RODRIGUE GRAHAM OT Feb 5, 2019 12:54

## 2019-02-05 NOTE — THERAPY TEAM DISCHARGE SUMMARY
Therapy Discharge Summary


Discharge Recommendations


Date of Discharge





Therapy D/C Recommendations:  Home w/ Family Support





Physical Therapy


Patient came to rehab following a left AKA.  Upon evaluation patient performed 

bed mobility with SBA, supine <-> sit with CGA, sit <-> stand with CGA, 

transfers with CGA, and was able to hop 5' with a rolling walker with CGA.  

Patient has been performing bed mobility and transfer training, balance and 

endurance training, functional strengthening, stair training, gait training, 

and education.  Patient has made good progress and has met all of his long term 

goals.  Now, patient performs bed mobility and transfers with mod I, car 

transfer mod I, ambulates 150' with a rolling walker with SBA (including 50' 

with at least 2 turns of 90 degrees and 10' over an uneven surface), can propel 

a manual wheelchair 150' with mod I, and can go up and down 4 steps using 2 

handrails with SBA.  Patient is being discharged from this facility today and 

will be discharged from PT at this time.





Occupational Therapy


Decreased Activ Tolerance, Dependent Transfers, Impaired Self-Care Skills





PT Long Term Goals


Long Term Goals


PT Long Term Goals Time Frame:  Feb 22, 2019


Transfers (B,C,W/C) (FIM):  6


Roll Left to Right (QC):  6


Sit to Lying (QC):  6


Lying-Sitting on Side/Bed(QC):  6


Sit to Stand (QC):  6


Chair/Bed-to-Chair Xfer(QC):  6


Car Transfer (QC):  4


Gait (FIM):  2


Distance:  50'


Walk 10 feet (QC):  4


Walk 10ft-Uneven Surface(QC):  4


Walk 50ft with 2 Turns (QC):  4


Gait Level of Assist:  5


Gait Assistive Device:  FWW


Wheelchair (FIM):  6


Distance:  150'


Wheelchair Level of Assist:  6


Wheel 50 feet with 2 turns (QC:  6


Stairs (FIM):  1


# of Steps:  1


1 Step (curb) (QC):  4


Stairs Level Of Assist:  4





OT Long Term Goals


Long Term Goals


Time Frame:  Feb 22, 2019


Eating (FIM):  7


Eating (QC):  6


Oral Hygiene (QC):  6


Grooming(FIM):  6


Bathing(FIM):  6


Shower/Bathe Self (QC):  6


Upper Body Dressing(FIM):  6


Upper Body Dressing (QC):  6


Lower Body Dressing(FIM):  6


Lower Body Dressing (QC):  6


On/Off Footwear (QC):  6


Toileting Hygiene (QC):  6


Toilet/Commode Transfer(FIM):  6


Toilet/Commode Transfer (QC):  6


Shower Transfer(FIM):  5


Additional Goals:  1-Demonstrate ADL Tasks, 2-Verbalize Understanding, 3-

ImproveStrength/Gala


1=Demonstrate adherence to instructed precautions during ADL tasks.


2=Patient will verbalize/demonstrate understanding of assistive devices/

modifications for ADL.


3=Patient will improve strength/tolerance for activity to enable patient to 

perform ADL's.











DALIA ROLAND PT Feb 5, 2019 10:09

## 2019-02-05 NOTE — DISCHARGE SUMMARY
Diagnosis/Chief Complaint


Date of Admission


2019 at 17:48


Date of Discharge





Discharge Date:  2019


Discharge Diagnosis


Assessment:


Status post uneventful left knee amputation by Dr. Funes and [facility]


Recent MI 8 weeks ago consulted Dr. Miguel


Smoker counseled cessation


Coarse breath sounds nebulizer treatments have resolved this issue


PVD





Discharge Summary


Discharge Physical Examination


Allergies:  


Coded Allergies:  


     Penicillins (Unverified  Allergy, Mild, 09)


Vitals & I&Os





Vital Signs








  Date Time  Temp Pulse Resp B/P (MAP) Pulse Ox O2 Delivery O2 Flow Rate FiO2


 


19 15:30  100 20 138/74 93 Room Air  


 


19 03:45 97.1       











Hospital Course


Was the Problem List Reviewed?:  Yes


Hospital course: Patient had an uneventful inpatient rehab hospital course 

after he was admitted following an uncomplicated left above-the-knee 

amputation.  Smoking cessation was aggressively counseled due to the fact that 

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 

resulted in the amputation.  Coarse breath sounds were resolved at time of 

discharge with nebulizer treatments.  Pain was controlled with long-acting 

OxyContin with Percocet for breakthrough.  Overall patient was participating in 

all therapies was able to obtain assistive devices in order to be discharged 

home and will have close follow-up with Dr. Champagne and have dressing changes 

daily per nursing instructions.


Labs (last 24 hrs)


Laboratory Tests


19 09:30: 


White Blood Count 11.2H, Red Blood Count 4.55, Hemoglobin 13.3, Hematocrit 41, 

Mean Corpuscular Volume 89, Mean Corpuscular Hemoglobin 29, Mean Corpuscular 

Hemoglobin Concent 33, Red Cell Distribution Width 14.9H, Platelet Count 353, 

Mean Platelet Volume 9.0, Neutrophils (%) (Auto) 66, Lymphocytes (%) (Auto) 20, 

Monocytes (%) (Auto) 10, Eosinophils (%) (Auto) 4, Basophils (%) (Auto) 0, 

Neutrophils # (Auto) 7.4, Lymphocytes # (Auto) 2.3, Monocytes # (Auto) 1.2H, 

Eosinophils # (Auto) 0.4H, Basophils # (Auto) 0.1, Sodium Level 138, Potassium 

Level 4.1, Chloride Level 99, Carbon Dioxide Level 28, Anion Gap 11, Blood Urea 

Nitrogen 9, Creatinine 0.71, Estimat Glomerular Filtration Rate > 60, BUN/

Creatinine Ratio 13, Glucose Level 100, Calcium Level 9.4, Corrected Calcium 9.5

, Total Bilirubin 0.8, Aspartate Amino Transf (AST/SGOT) 18, Alanine 

Aminotransferase (ALT/SGPT) 10, Alkaline Phosphatase 97, Total Protein 6.7, 

Albumin 3.9





Pending Labs


Laboratory Tests


19 09:30: 


White Blood Count 11.2, Red Blood Count 4.55, Hemoglobin 13.3, Hematocrit 41, 

Mean Corpuscular Volume 89, Mean Corpuscular Hemoglobin 29, Mean Corpuscular 

Hemoglobin Concent 33, Red Cell Distribution Width 14.9, Platelet Count 353, 

Mean Platelet Volume 9.0, Neutrophils (%) (Auto) 66, Lymphocytes (%) (Auto) 20, 

Monocytes (%) (Auto) 10, Eosinophils (%) (Auto) 4, Basophils (%) (Auto) 0, 

Neutrophils # (Auto) 7.4, Lymphocytes # (Auto) 2.3, Monocytes # (Auto) 1.2, 

Eosinophils # (Auto) 0.4, Basophils # (Auto) 0.1, Sodium Level 138, Potassium 

Level 4.1, Chloride Level 99, Carbon Dioxide Level 28, Anion Gap 11, Blood Urea 

Nitrogen 9, Creatinine 0.71, Estimat Glomerular Filtration Rate > 60, BUN/

Creatinine Ratio 13, Glucose Level 100, Calcium Level 9.4, Corrected Calcium 9.5

, Total Bilirubin 0.8, Aspartate Amino Transf (AST/SGOT) 18, Alanine 

Aminotransferase (ALT/SGPT) 10, Alkaline Phosphatase 97, Total Protein 6.7, 

Albumin 3.9








Discharge


Home Medications:





Active Scripts


Active


Percocet  mg Tablet (Oxycodone HCl/Acetaminophen) 1 Each Tablet 1 Tab PO 

Q4HR PRN


Oxycontin (Oxycodone HCl) 10 Mg Tab.er.12h 10 Mg PO BID


Reported


Metoprolol Tartrate 25 Mg Tablet 25 Mg PO BID


Oxycodone HCl 10 Mg Tablet 10 Mg PO EVERY 4-6 HOURS PRN


Plavix (Clopidogrel Bisulfate) 75 Mg Tablet 75 Mg PO DAILY


Aspirin EC (Aspirin) 81 Mg Tablet.dr 81 Mg PO DAILY


Gemfibrozil 600 Mg Tablet 600 Mg PO BID


Isosorbide Mononitrate ER (Isosorbide Mononitrate) 30 Mg Tab.er.24h 30 Mg PO 

DAILY


Losartan Potassium 50 Mg Tablet 50 Mg PO DAILY


Allopurinol 100 Mg Tablet 300 Mg PO DAILY


Prilosec Otc (Omeprazole Magnesium) 20 Mg Tablet.dr 20 Mg PO DAILY


Fish Oil 1,000 mg Capsule (Omega 3 Polyunsat Fatty Acids) 1,000 Mg Cap 1,000 Mg 

PO TID


Atorvastatin Calcium 40 Mg Tablet 40 Mg PO HS


Gabapentin 600 Mg Tablet 600 Mg PO QID


Carisoprodol 350 Mg Tablet 350 Mg PO HS





Instructions to patient/family


Please see electronic discharge instructions given to patient.





Diagnosis/Problems


Diagnosis/Problems





(1) Above knee amputation of left lower extremity


(2) CAD (coronary artery disease)


(3) Myocardial infarct, old


(4) Smoker


(5) PVD (peripheral vascular disease)


(6) COPD (chronic obstructive pulmonary disease)





Clinical Quality Measures


DVT/VTE Risk/Contraindication:


Risk Factor Score Per Nursin


RFS Level Per Nursing on Admit:  4+=Very High











FELISHA MORENO DO 2019 09:01

## 2019-02-05 NOTE — CARDIOLOGY PROGRESS NOTE
Subjective


Date Seen by Provider:  2019


Time Seen by Provider:  08:29


Subjective/Events-last exam


patient is sitting in a chair, feeling better, ready to go home.  Denied any 

chest pain or shortness of breath.  No palpitation


Review of Systems


General:  No Chills, No Night Sweats, No Fatigue, No Malaise, No Appetite, No 

Other


HEENT:  No Head Aches, No Visual Changes, No Eye Pain, No Ear Pain, No Dysphasia

, No Sinus Congestion, No Post Nasal Drip, No Sore Throat, No Other


Pulmonary:  No Dyspnea, No Cough, No Pleuritic Chest Pain, No Other


Cardiovascular:  No: Chest Pain, Palpitations, Orthopnea, Paroxysmal Noc. 

Dyspnea, Edema, Lt Headedness, Other





Objective-Cardiology


Exam


Last Set of Vital Signs





Vital Signs








 19





 03:45 07:21


 


Temp 97.1 


 


Pulse 88 


 


Resp 18 


 


B/P (MAP) 113/80 (91) 


 


Pulse Ox 92 


 


O2 Delivery  Room Air





Capillary Refill : Less Than 3 Seconds


I&O











Intake and Output 


 


 19





 00:00


 


Intake Total 3200 ml


 


Output Total 3500 ml


 


Balance -300 ml


 


 


 


Intake Oral 3200 ml


 


Output Urine Total 3500 ml


 


# Bowel Movements 1








General:  Alert, Oriented X3, Cooperative, No Acute Distress


HEENT:  Atraumatic, PERRLA


Neck:  Supple, No JVD, No Thyromegaly, +2 Carotid Pulse No Bruit, No LAD


Lungs:  Other


Heart:  Normal S1, Normal S2


Abdomen:  Normal Bowel Sounds, Soft, No Tenderness, No Hepatosplenomegaly, No 

Masses


Extremities:  No Clubbing, No Cyanosis, No Edema, Normal Pulses, No Tenderness/

Swelling


Skin:  No Rashes, No Breakdown, No Significant Lesion


Neuro:  Normal Speech, Strength at 5/5 X4 Ext, Normal Tone, Sensation Intact, 

Cranial Nerves 3-12 NL, Reflexes 2+, Other (Left AKA)


Psych/Mental Status:  Mental Status NL, Mood NL





A/P-Cardiology


Admission Diagnosis


Left AKA


Peripheral arterial disease


Coronary artery disease


Hypertension


Hyperlipidemia





Assessment/Plan


Peripheral arterial disease, Buerger's disease, status post left AKA,  Had 

extensive disease, seen and followed by Dr. Funes





Coronary artery disease, history of Promus drug-eluting stent placement using 

2.512 mm in the circumflex artery in 2012 by Dr. Reilly after having 

myocardial infarction.  Most recent cardiac catheterization done 2016 

revealed patent stent to circumflex artery with otherwise nonobstructive disease

, had non-ST elevation myocardial infarction on 2017 had total 

occlusion of the obtuse marginal branch successful the plan of resolute 

integrity 2.526 mm with good results.  Total occlusion of the right coronary 

artery with failed attempt for intervention, referred to Dr. Delcid, had 

intervention on the right coronary artery with 3 drug-eluting stent overlapping 

in the right coronary artery.  Patient had non-ST elevation MI on 2018 and underwent cardiac catheterization with Dr. Bacon revealing stent 

thrombosis and occlusion of the distal/mid RCA stents to date successfully with 

PTCA.  Integrilin infusion 12 hour.  He is maintained on Plavix and aspirin at 

this time.





History of congestive heart failure, improved, history of Severe left 

ventricular systolic dysfunction, improved with intervention and maximizing 

medical therapy, ejection fraction 45-50 percent.  Continue to monitor





History of deep venous thrombosis/pulmonary embolism, has been maintained on 

Coumadin in the past, Currently on hold while on aspirin and Plavix





Tobaccoism, stopped smoking in 2017.  Continue to monitor





Hyperlipidemia, continue to monitor lipids





History of gastroesophageal reflux disease.





History of elevated liver enzymes, monitor liver enzymes





History of deep venous thrombosis.





Clinical Quality Measures


DVT/VTE Risk/Contraindication:


Risk Factor Score Per Nursin


RFS Level Per Nursing on Admit:  4+=Very High











NORTH HERNANDEZ MD 2019 08:30

## 2019-02-28 ENCOUNTER — HOSPITAL ENCOUNTER (EMERGENCY)
Dept: HOSPITAL 75 - ER | Age: 49
Discharge: HOME | End: 2019-02-28
Payer: COMMERCIAL

## 2019-02-28 VITALS — BODY MASS INDEX: 32.44 KG/M2 | WEIGHT: 190 LBS | HEIGHT: 64 IN

## 2019-02-28 VITALS — SYSTOLIC BLOOD PRESSURE: 143 MMHG | DIASTOLIC BLOOD PRESSURE: 92 MMHG

## 2019-02-28 DIAGNOSIS — E78.00: ICD-10-CM

## 2019-02-28 DIAGNOSIS — Z79.02: ICD-10-CM

## 2019-02-28 DIAGNOSIS — Z79.82: ICD-10-CM

## 2019-02-28 DIAGNOSIS — I10: ICD-10-CM

## 2019-02-28 DIAGNOSIS — F17.210: ICD-10-CM

## 2019-02-28 DIAGNOSIS — Z86.711: ICD-10-CM

## 2019-02-28 DIAGNOSIS — M10.9: ICD-10-CM

## 2019-02-28 DIAGNOSIS — Z96.652: ICD-10-CM

## 2019-02-28 DIAGNOSIS — Z98.890: ICD-10-CM

## 2019-02-28 DIAGNOSIS — K21.9: ICD-10-CM

## 2019-02-28 DIAGNOSIS — Z48.89: Primary | ICD-10-CM

## 2019-02-28 DIAGNOSIS — Z88.0: ICD-10-CM

## 2019-02-28 DIAGNOSIS — I25.2: ICD-10-CM

## 2019-02-28 DIAGNOSIS — Z95.5: ICD-10-CM

## 2019-02-28 DIAGNOSIS — I25.10: ICD-10-CM

## 2019-02-28 DIAGNOSIS — Z82.49: ICD-10-CM

## 2019-02-28 DIAGNOSIS — Z86.718: ICD-10-CM

## 2019-02-28 PROCEDURE — 99282 EMERGENCY DEPT VISIT SF MDM: CPT

## 2019-02-28 NOTE — ED GENERAL
General


Chief Complaint:  Skin/Wound Problems


Stated Complaint:  STAPLES AND STITCHES REMOVED FROM STUMP


Nursing Triage Note:  


TO ED PER W/C REPORTS THAT HAD BKA  END OF   BY DR OTERO 1/2 OF SUTURES 


REMOVED  HERE TO HAVE REST OF SUTURES REMOVED.


Nursing Sepsis Screen:  No Definite Risk


Source of Information:  Patient


Exam Limitations:  No Limitations





History of Present Illness


Date Seen by Provider:  2019


Time Seen by Provider:  10:10


Initial Comments


This 48-year-old gentleman presents to the emergency room with a left AKA about 

one month ago.  He had some of his sutures and staples removed in the surgeon's 

office last week.  He presents today to have the remaining sutures/staples 

removed.  I contacted his surgeon, Dr. Darrius Carrera, in [place].  His staff 

reports patient was to go to the primary care office to have the remaining 

sutures and staples removed.  Patient states the primary care office advised 

his wife can remove them because she has some medical experience.  His wife has 

not felt comfortable removing them.  Dr. Carrera office staff states it is time 

to have the staples and sutures removed and they are okay with us doing that 

here.  The wound is healing well and appears clean, dry, and intact.





Allergies and Home Medications


Allergies


Coded Allergies:  


     Penicillins (Unverified  Allergy, Mild, 09)





Home Medications


Allopurinol 100 Mg Tablet, 300 MG PO DAILY, (Reported)


Aspirin 81 Mg Tablet.dr, 81 MG PO DAILY, (Reported)


Atorvastatin Calcium 40 Mg Tablet, 40 MG PO HS, (Reported)


Carisoprodol 350 Mg Tablet, 350 MG PO HS, (Reported)


Clopidogrel Bisulfate 75 Mg Tablet, 75 MG PO DAILY, (Reported)


Gabapentin 600 Mg Tablet, 600 MG PO QID, (Reported)


Gemfibrozil 600 Mg Tablet, 600 MG PO BID, (Reported)


Isosorbide Mononitrate 30 Mg Tab.er.24h, 30 MG PO DAILY, (Reported)


Losartan Potassium 50 Mg Tablet, 50 MG PO DAILY, (Reported)


Metoprolol Tartrate 25 Mg Tablet, 25 MG PO BID, (Reported)


Omega 3 Polyunsat Fatty Acids 1,000 Mg Cap, 1,000 MG PO TID, (Reported)


Omeprazole Magnesium 20 Mg Tablet.dr, 20 MG PO DAILY, (Reported)


Oxycodone HCl 10 Mg Tablet, 10 MG PO EVERY 4-6 HOURS PRN for PAIN-SEVERE, (

Reported)


Oxycodone HCl 10 Mg Tab.er.12h, 10 MG PO BID


   Prescribed by: FELISHA MORENO on 19


Oxycodone HCl/Acetaminophen 1 Each Tablet, 1 TAB PO Q4HR PRN for PAIN-MODERATE


   Prescribed by: FELISHA MORENO on 19





Patient Home Medication List


Home Medication List Reviewed:  Yes





Review of Systems


Review of Systems


Constitutional:  no symptoms reported


Musculoskeletal:  see HPI


Skin:  see HPI





Past Medical-Social-Family Hx


Patient Social History


Alcohol Use:  Denies Use


Recreational Drug Use:  No


Smoking Status:  Current Everyday Smoker


Type Used:  Cigarettes


Former Smoker, Quit:  2017


2nd Hand Smoke Exposure:  No


Recent Foreign Travel:  No


Contact w/Someone Who Travel:  No


Recent Infectious Disease Expo:  No


Recent Hopitalizations:  No





Immunizations Up To Date


PED Vaccines UTD:  Yes


Date of Pneumonia Vaccine:  Dec 3, 2012


Date of Influenza Vaccine:  2019





Seasonal Allergies


Seasonal Allergies:  No





Past Medical History


Surgeries:  Yes (CARDIAC CATHS--STENTS X 1. LAST CATH 17 WITH STENT X 1)


Amputation, Cardiac, Coronary Stent, Orthopedic (left AKA)


Respiratory:  Yes


Pulmonary Embolism


Currently Using CPAP:  No


Currently Using BIPAP:  No


Cardiac:  Yes (MI X 2; STENTS X 4; -NSTEMI 17 WITH 1 STENT PLACED)


Coronary Artery Disease, Deep Vein Thrombosis, Heart Attack, High Cholesterol, 

Hypertension


Neurological:  No


Reproductive Disorders:  No


Genitourinary:  No


Gastrointestinal:  Yes


Gastroesophageal Reflux


Musculoskeletal:  Yes (HYDROCODONE + IBUPROFEN DAILY)


Amputee, Chronic Back Pain, Gout


Endocrine:  No


HEENT:  No


Cancer:  No


Psychosocial:  No


Integumentary:  No


Blood Disorders:  Yes (PROTEIN S DEFICIENCY--DVT'S AND P.E.'S AND MI X 2)





Family Medical History





Arthritis


  G8 BROTHER


Blood clots


  19 MOTHER ( of blood clot)


Cardiac disorder


  19 FATHER


Diabetes mellitus


  G8 BROTHER


FH: cancer


  paternal grandmother


FHx: inflammatory bowel disease


  G8 BROTHER





No Family History of:


  FH: sudden cardiac death (SCD)


Heart Disease, Vascular Disease





Physical Exam


Vital Signs





Vital Signs - First Documented








 19





 09:23


 


Temp 97.4


 


Pulse 92


 


Resp 18


 


B/P (MAP) 143/92 (109)


 


Pulse Ox 94


 


O2 Delivery Room Air





Capillary Refill : Less Than 3 Seconds


Height, Weight, BMI


Height: 5'4.00"


Weight: 190lbs. 1.6oz. [id number]; 33.8 BMI


Method:Stated


General Appearance:  No Apparent Distress, WD/WN


Respiratory:  Lungs Clear, Normal Breath Sounds, No Accessory Muscle Use


Cardiovascular:  Regular Rate, Rhythm, No Edema, No Murmur


Extremity:  Other (left AKA is clean, dry, and intact with some sutures and one 

staple remaining.  No signs of infection or inflammation.)


Neurologic/Psychiatric:  Alert, Oriented x3


Skin:  Normal Color, Warm/Dry





Progress/Results/Core Measures


Suspected Sepsis


Recent Fever Within 48 Hours:  No


Infection Criteria Present:  None


New/Unexplained  Altered Menta:  No


Sepsis Screen:  No Definite Risk


SIRS


Temperature:97.4 


Pulse: 92 


Respiratory Rate: 18


 


Blood Pressure 143 /92 


Mean: 109





Results/Orders


Vital Signs/I&O











 19





 09:23


 


Temp 97.4


 


Pulse 92


 


Resp 18


 


B/P (MAP) 143/92 (109)


 


Pulse Ox 94


 


O2 Delivery Room Air





Capillary Refill : Less Than 3 Seconds








Blood Pressure Mean:  109








Progress Note :  


Progress Note


Sutures and staples were removed by nursing staff after cleaning with alcohol.  

Steri-Strips were applied for added support.





Departure


Impression





 Primary Impression:  


 Visit for suture removal


Disposition:  01 HOME, SELF-CARE


Condition:  Improved





Departure-Patient Inst.


Decision time for Depature:  10:20


Referrals:  


RAIZA FARRELL MD (PCP)


Primary Care Physician


Patient Instructions:  Amputation, Above-the-Knee





Add. Discharge Instructions:  


Allow the Steri-Strips to peel off naturally.  Newly trim loose edges off with 

a small pad scissors or fingernail clippers.


Monitor for signs of infection such as increasing redness, increasing swelling, 

puslike drainage, fever, etc.  Return to care promptly if you notice symptoms.











All discharge instructions reviewed with patient and/or family. Voiced 

understanding.











CAROL FERREIRA MD 2019 11:06

## 2019-02-28 NOTE — NUR
STUDENT  FROM [facility] AND INSTRUCTOR REMOVED 1 STAPLE AND 8  SUTURES FROM L STUMP  
NO REDNESS OR SEWELLING TO AREA

## 2019-04-11 ENCOUNTER — HOSPITAL ENCOUNTER (OUTPATIENT)
Dept: HOSPITAL 75 - REHAB | Age: 49
Discharge: HOME | End: 2019-04-11
Attending: NURSE PRACTITIONER
Payer: COMMERCIAL

## 2019-04-11 DIAGNOSIS — Z89.612: Primary | ICD-10-CM

## 2019-10-21 ENCOUNTER — HOSPITAL ENCOUNTER (OUTPATIENT)
Dept: HOSPITAL 75 - ER | Age: 49
Discharge: HOME | End: 2019-10-21
Attending: INTERNAL MEDICINE
Payer: MEDICAID

## 2019-10-21 VITALS — DIASTOLIC BLOOD PRESSURE: 88 MMHG | SYSTOLIC BLOOD PRESSURE: 129 MMHG

## 2019-10-21 VITALS — HEIGHT: 62.99 IN | BODY MASS INDEX: 38.16 KG/M2 | WEIGHT: 215.39 LBS

## 2019-10-21 VITALS — SYSTOLIC BLOOD PRESSURE: 127 MMHG | DIASTOLIC BLOOD PRESSURE: 86 MMHG

## 2019-10-21 VITALS — DIASTOLIC BLOOD PRESSURE: 88 MMHG | SYSTOLIC BLOOD PRESSURE: 139 MMHG

## 2019-10-21 VITALS — SYSTOLIC BLOOD PRESSURE: 131 MMHG | DIASTOLIC BLOOD PRESSURE: 90 MMHG

## 2019-10-21 VITALS — SYSTOLIC BLOOD PRESSURE: 141 MMHG | DIASTOLIC BLOOD PRESSURE: 74 MMHG

## 2019-10-21 VITALS — SYSTOLIC BLOOD PRESSURE: 124 MMHG | DIASTOLIC BLOOD PRESSURE: 81 MMHG

## 2019-10-21 VITALS — SYSTOLIC BLOOD PRESSURE: 120 MMHG | DIASTOLIC BLOOD PRESSURE: 79 MMHG

## 2019-10-21 VITALS — DIASTOLIC BLOOD PRESSURE: 86 MMHG | SYSTOLIC BLOOD PRESSURE: 128 MMHG

## 2019-10-21 VITALS — SYSTOLIC BLOOD PRESSURE: 135 MMHG | DIASTOLIC BLOOD PRESSURE: 89 MMHG

## 2019-10-21 DIAGNOSIS — M54.9: ICD-10-CM

## 2019-10-21 DIAGNOSIS — Z79.82: ICD-10-CM

## 2019-10-21 DIAGNOSIS — Z83.3: ICD-10-CM

## 2019-10-21 DIAGNOSIS — Z86.74: ICD-10-CM

## 2019-10-21 DIAGNOSIS — I82.409: ICD-10-CM

## 2019-10-21 DIAGNOSIS — Z79.891: ICD-10-CM

## 2019-10-21 DIAGNOSIS — Z95.1: ICD-10-CM

## 2019-10-21 DIAGNOSIS — G89.29: ICD-10-CM

## 2019-10-21 DIAGNOSIS — I26.99: ICD-10-CM

## 2019-10-21 DIAGNOSIS — I50.22: ICD-10-CM

## 2019-10-21 DIAGNOSIS — I11.0: ICD-10-CM

## 2019-10-21 DIAGNOSIS — I25.110: Primary | ICD-10-CM

## 2019-10-21 DIAGNOSIS — F17.210: ICD-10-CM

## 2019-10-21 DIAGNOSIS — Z79.899: ICD-10-CM

## 2019-10-21 DIAGNOSIS — Z88.0: ICD-10-CM

## 2019-10-21 DIAGNOSIS — Z82.61: ICD-10-CM

## 2019-10-21 DIAGNOSIS — Z79.01: ICD-10-CM

## 2019-10-21 DIAGNOSIS — E78.00: ICD-10-CM

## 2019-10-21 DIAGNOSIS — M10.9: ICD-10-CM

## 2019-10-21 DIAGNOSIS — Z82.49: ICD-10-CM

## 2019-10-21 DIAGNOSIS — R79.89: ICD-10-CM

## 2019-10-21 LAB
ALBUMIN SERPL-MCNC: 4.1 GM/DL (ref 3.2–4.5)
ALP SERPL-CCNC: 106 U/L (ref 40–136)
ALT SERPL-CCNC: 17 U/L (ref 0–55)
APTT BLD: 30 SEC (ref 24–35)
BASOPHILS # BLD AUTO: 0.1 10^3/UL (ref 0–0.1)
BASOPHILS NFR BLD AUTO: 1 % (ref 0–10)
BILIRUB SERPL-MCNC: 0.3 MG/DL (ref 0.1–1)
BUN/CREAT SERPL: 7
CALCIUM SERPL-MCNC: 9.8 MG/DL (ref 8.5–10.1)
CHLORIDE SERPL-SCNC: 99 MMOL/L (ref 98–107)
CO2 SERPL-SCNC: 31 MMOL/L (ref 21–32)
CREAT SERPL-MCNC: 0.82 MG/DL (ref 0.6–1.3)
EOSINOPHIL # BLD AUTO: 0.3 10^3/UL (ref 0–0.3)
EOSINOPHIL NFR BLD AUTO: 4 % (ref 0–10)
ERYTHROCYTE [DISTWIDTH] IN BLOOD BY AUTOMATED COUNT: 17.7 % (ref 10–14.5)
GFR SERPLBLD BASED ON 1.73 SQ M-ARVRAT: > 60 ML/MIN
GLUCOSE SERPL-MCNC: 132 MG/DL (ref 70–105)
HCT VFR BLD CALC: 52 % (ref 40–54)
HGB BLD-MCNC: 16.6 G/DL (ref 13.3–17.7)
INR PPP: 0.8 (ref 0.8–1.4)
LYMPHOCYTES # BLD AUTO: 2.1 X 10^3 (ref 1–4)
LYMPHOCYTES NFR BLD AUTO: 26 % (ref 12–44)
MAGNESIUM SERPL-MCNC: 2 MG/DL (ref 1.6–2.4)
MANUAL DIFFERENTIAL PERFORMED BLD QL: NO
MCH RBC QN AUTO: 28 PG (ref 25–34)
MCHC RBC AUTO-ENTMCNC: 32 G/DL (ref 32–36)
MCV RBC AUTO: 88 FL (ref 80–99)
MONOCYTES # BLD AUTO: 0.7 X 10^3 (ref 0–1)
MONOCYTES NFR BLD AUTO: 8 % (ref 0–12)
NEUTROPHILS # BLD AUTO: 5 X 10^3 (ref 1.8–7.8)
NEUTROPHILS NFR BLD AUTO: 62 % (ref 42–75)
PLATELET # BLD: 284 10^3/UL (ref 130–400)
PMV BLD AUTO: 9.3 FL (ref 7.4–10.4)
POTASSIUM SERPL-SCNC: 4.1 MMOL/L (ref 3.6–5)
PROT SERPL-MCNC: 7.4 GM/DL (ref 6.4–8.2)
PROTHROMBIN TIME: 11.9 SEC (ref 12.2–14.7)
SODIUM SERPL-SCNC: 140 MMOL/L (ref 135–145)
WBC # BLD AUTO: 8.1 10^3/UL (ref 4.3–11)

## 2019-10-21 PROCEDURE — 83874 ASSAY OF MYOGLOBIN: CPT

## 2019-10-21 PROCEDURE — 36140 INTRO NDL ICATH UPR/LXTR ART: CPT

## 2019-10-21 PROCEDURE — 85025 COMPLETE CBC W/AUTO DIFF WBC: CPT

## 2019-10-21 PROCEDURE — 84484 ASSAY OF TROPONIN QUANT: CPT

## 2019-10-21 PROCEDURE — 85730 THROMBOPLASTIN TIME PARTIAL: CPT

## 2019-10-21 PROCEDURE — 93005 ELECTROCARDIOGRAM TRACING: CPT

## 2019-10-21 PROCEDURE — 85610 PROTHROMBIN TIME: CPT

## 2019-10-21 PROCEDURE — 36415 COLL VENOUS BLD VENIPUNCTURE: CPT

## 2019-10-21 PROCEDURE — 83735 ASSAY OF MAGNESIUM: CPT

## 2019-10-21 PROCEDURE — 93041 RHYTHM ECG TRACING: CPT

## 2019-10-21 PROCEDURE — 80053 COMPREHEN METABOLIC PANEL: CPT

## 2019-10-21 PROCEDURE — 71045 X-RAY EXAM CHEST 1 VIEW: CPT

## 2019-10-21 PROCEDURE — 36221 PLACE CATH THORACIC AORTA: CPT

## 2019-10-21 PROCEDURE — 93458 L HRT ARTERY/VENTRICLE ANGIO: CPT

## 2019-10-21 NOTE — DISCHARGE INST-POST CATH
Discharge Inst-CATH/EP


Problems Reviewed?:  Yes


Post Cardiac Cath/EP D/C Inst


Follow Up/Plan


Appointment with Dr. HERNANDEZ's office in 2-4 weeks


<b>CARDIAC CATH/EP PROCEDURE DISCHARGE INSTRUCTIONS</b>





ACTIVITY





* Go Home directly and rest.


* Limit activity of the leg (or wrist if it was used) for 7 days including 

aerobics, swimming,


   jogging, bicycling, etc.


* Restrict stair-climbing for 7 days if possible, if not, climb up with your 

non-cath leg, then


   bring together on the same step.


* Avoid lifting, pushing, pulling or excessive movement of the affected 

extremity for 7 days.


* Customary sexual activity may be resumed after 2 days-use caution not to use a

position  


   that strains or causes pain to the affected extremity.


* No driving for 24 hours.


* NO SMOKING. 


* Avoid straining for bowel movements for 7 days.


* Gentle walking on level ground is allowed.


* Returning to work will depend on the type of procedure and the results. Your 

doctor will discuss


   this with you.





CALL YOUR DOCTOR FOR ANY OF THE FOLLOWING:





*If bleeding from the puncture site occurs- Apply gentle pressure to site with 

clean cloth and call


   your doctor or EMS.


* If a knot or lump forms under the skin, increases in size, or causes pain.


* If bruising appears to be worsening or moving further down your leg instead of

disappearing.


* Temperature above 101 F.





CARE OF YOUR GROIN INCISION;





* Bruising or purple discoloration of the skin near the puncture site is common.


* You may shower only, no bathtub bathing for 5 days.  Be careful to avoid 

slipping as your


   leg may feel stiff.


* If a closure device was used on your femoral artery, please see the attached 

guide regarding


   care of the device and your leg.


* Leave dressing on FOR 24 hours.





CARE OF YOUR WRIST INCISION;





* Bruising or purple discoloration of the skin near the puncture site is common.


* You may shower.


* DO NOT submerge wrist.


* Leave dressing on FOR 24 hours.











NORTH HERNANDEZ MD             Oct 21, 2019 9:50 am

## 2019-10-21 NOTE — CARDIAC CATH REPORT
Cardiac Cath Report


Physician (s)/Assistant (s)


Physician


NORTH HERNANDEZ MD





Pre-Procedure Diagnosis


Pre-Procedure Diagnosis:  NSTEMI





Post-Procedure Note


Procedure Start Date:  Oct 21, 2019


Name of Procedure:  


Left heart catheterization


Left ventriculogram


Aortic arch angiogram


Findings/Procedure Note


PROCEDURE NOTE:


48 years old gentleman with extensive history of coronary artery disease 

multiple intervention in the past, active smoker, admitted with unstable angina,

responded to nitroglycerin, had mild elevation in troponin level.  Due to his 

extensive history I decided to proceed with cardiac catheterization possible 

PTCA.


After explaining the procedure to the patient, all pros and cons were explained,

all questions were answered.  The patient signed the consent and then he  was 

placed on the cardiac catheterization laboratory. Groin was prepped SL fashion 

local anesthesia was used.  Attempt to place the sheath in the right radial 

artery failed.  Sheath placed in the right femoral artery. Ricky right and 

left catheter were used to access the coronary system. Pigtail was used to 

access the left ventricular cavity. 


Left ventriculogram was done


Aortic arch angiogram was done


At the end of the procedure the sheath was removed. Closure device was used





FINDINGS:





Hemodynamics 


/12, end-diastolic pressure of 12


Aorta 109/67 mean of 84





ANATOMY:


Left Main is free of obstructive disease


Left Anterior Descending has mild disease nonobstructive disease


Left Circumflex has a patent stent in the first obtuse marginal branch with mild

disease nonobstructive disease


Right Coronory Artery has multiple stents in the proximal and mid portion, 

dominant artery with patent stents and mild small vessel disease nonobstructive 

disease


LV Gram was done showing prominent left ventricle with mild diffuse left 

ventricular hypokinesia estimated ejection fraction 45 percent


Aorta evaluation done with aortic arch angiogram which showed normal aortic arch

no dissection or aneurysm, normal origin of the innominate artery, left carotid 

artery and left subclavian artery





CONCLUSION:


1.  Patent stent in the obtuse marginal branch and multiple stents in the right 

coronary artery with small vessel disease nonobstructive disease


2.  Prominent left ventricle with mild diffuse left ventricular hypokinesia 

estimated ejection fraction 45 percent, normal left ventricular end-diastolic 

pressure


3.  Normal aortic arch and great vessels of the neck





DISCUSSION AND RECOMMENDATION:


Chest pain is probably due to small vessel disease, mild troponin leak, not 

considered myocardial infarction.  Patent arteries.  Chronic compensated left 

ventricular systolic dysfunction.  Medical therapy is recommended, educated on 

smoking cessation


Anesthesia Type:  Conscious Sedation


Estimated blood loss (mL):  25 ml


Contrast Amount:  65 ml


Total Radiation Dose:  413 mGy





Post-Procedure Diagnosis


Post-operative diagnosis:  


Unstable angina


Coronary artery disease


Congestive heart failure, chronic compensated left ventricular systolic


dysfunction, ischemic cardiomyopathy


Tobaccoism











NORTH HERNANDEZ MD             Oct 21, 2019 9:47 am

## 2019-10-21 NOTE — CARDIAC PROCEDURE NOTE-CS/ASA
Pre-Procedure Note


Pre-Op Procedure Note


H&P Reviewed


The H&P was reviewed, patient examined and no changes noted.


Date H&P Reviewed:  Oct 21, 2019


Time H&P Reviewed:  08:16





Conscious Sedation Pre-Proced


Time


08:16





ASA Score


3


For ASA 3 and 4: Consider anesthesia and medical clearance. Also, for patients

with a history of failed moderate sedation consider anesthesia.

















Airway 


 


Lungs 


 


Heart 


 


 ASA score


 


 ASA 1: a normal healthy patient


 


 ASA 2:  a patient with a mild systemic disease (mid diabetes, controlled 

hypertension, obesity 


 


x ASA 3:  a patient with a severe systemic disease that limits activity  

(angina, COPD, prior Myocardial infarction)


 


 ASA 4:  a patient with an incapacitating disease that is a constant threat to 

life (CHF, renal failure)


 


 ASA 5:  a moribund patient not expected to survive 24 hrs.  (ruptured aneurysm)


 


 ASA 6:  a declared brain-dead patient whose organs are being harvested.


 


 For emergent operations, add the letter E after the classification











Mallampati Classification


Grade 3





Sedation Plan


Analgesia, Amnesia, Plan communicated to team members, Discussed options with 

patient/fam, Discussed risks with patient/fam


The patient is an appropriate candidate to undergo the planned procedure, 

sedation, and anesthesia.





The patient immediately re-assessed prior to indication.











NORTH HERNANDEZ MD              Oct 21, 2019 08:16

## 2019-10-21 NOTE — DIAGNOSTIC IMAGING REPORT
INDICATION: Chest pain.



Upright portable AP view of the chest is obtained with comparison

made to study of 11/16/2018.



FINDINGS: Heart size and pulmonary vascularity are within normal

limits, and the lungs are clear, bilaterally.



IMPRESSION: Unremarkable chest.



Dictated by: 



  Dictated on workstation # [id number]

## 2019-10-21 NOTE — ED CHEST PAIN
General


Chief Complaint:  Chest Pain


Stated Complaint:  CHEST PAIN


Nursing Triage Note:  


pt to rm 5 by wheelchair with complaint of chest pain that started this am. 


states took nitro 20 min pta. pt has hx of mi and sees dr miguel.


Nursing Sepsis Screen:  No Definite Risk


Source:  patient


Exam Limitations:  no limitations





History of Present Illness


Date Seen by Provider:  Oct 21, 2019


Time Seen by Provider:  07:11


Initial Comments


This 48-year-old white male presents with chest pain that began this morning.  

Patient took nitroglycerin first chest pain prior to arrival. This relieved his 

chest pain. His cardiologist is Dr. Miguel.  Patient had a cardiac cath 

approximately one year ago.


He has had 4 stents in the past.





The patient relates his chest pain and nausea felt like his previous MI.





Past medical history includes multiple episodes of blood clots. He uses Plavix. 





Patient smokes heavily.  He denies alcohol or recreational drugs.





Allergies and Home Medications


Allergies


Coded Allergies:  


     Penicillins (Unverified  Allergy, Mild, 09)





Home Medications


Allopurinol 100 Mg Tablet, 300 MG PO DAILY, (Reported)


Aspirin 81 Mg Tablet.dr, 81 MG PO DAILY, (Reported)


Atorvastatin Calcium 40 Mg Tablet, 40 MG PO HS, (Reported)


Carisoprodol 350 Mg Tablet, 350 MG PO HS, (Reported)


Clopidogrel Bisulfate 75 Mg Tablet, 75 MG PO DAILY, (Reported)


Gabapentin 600 Mg Tablet, 600 MG PO QID, (Reported)


Gemfibrozil 600 Mg Tablet, 600 MG PO BID, (Reported)


Isosorbide Mononitrate 30 Mg Tab.er.24h, 30 MG PO DAILY, (Reported)


Losartan Potassium 50 Mg Tablet, 50 MG PO DAILY, (Reported)


Metoprolol Tartrate 25 Mg Tablet, 25 MG PO BID, (Reported)


Omega 3 Polyunsat Fatty Acids 1,000 Mg Cap, 1,000 MG PO TID, (Reported)


Omeprazole Magnesium 20 Mg Tablet.dr, 20 MG PO DAILY, (Reported)


Oxycodone HCl 10 Mg Tablet, 10 MG PO EVERY 4-6 HOURS PRN for PAIN-SEVERE, 

(Reported)


Oxycodone HCl 10 Mg Tab.er.12h, 10 MG PO BID


   Prescribed by: FELISHA MORENO on 19 0858


Oxycodone HCl/Acetaminophen 1 Each Tablet, 1 TAB PO Q4HR PRN for PAIN-MODERATE


   Prescribed by: FELISHA MORENO on 19 0858





Patient Home Medication List


Home Medication List Reviewed:  Yes





Review of Systems


Review of Systems


Constitutional:  No fever; malaise


EENTM:  No Blurred Vision


Respiratory:  Denies Cough, Denies Shortness of Air


Cardiovascular:  See HPI, Chest Pain


Gastrointestinal:  Nausea


Genitourinary:  No Symptoms Reported


Musculoskeletal:  No back pain


Skin:  No change in color, No rash


Psychiatric/Neurological:  No Symptoms Reported


Endocrine:  No Symptoms Reported


Hematologic/Lymphatic:  No Symptoms Reported





Past Medical-Social-Family Hx


Past Med/Social Hx:  Reviewed Nursing Past Med/Soc Hx


Patient Social History


Alcohol Use:  Denies Use


Recreational Drug Use:  No


Smoking Status:  Current Everyday Smoker


Type Used:  Cigarettes


Former Smoker, Quit:  2017


2nd Hand Smoke Exposure:  No


Recent Foreign Travel:  No


Contact w/Someone Who Travel:  No


Recent Infectious Disease Expo:  No


Recent Hopitalizations:  No


Physical Abuse:  No


Sexual Abuse:  No


Mistreated:  No


Fear:  No





Immunizations Up To Date


PED Vaccines UTD:  Yes


Date of Pneumonia Vaccine:  Dec 3, 2012


Date of Influenza Vaccine:  2019





Seasonal Allergies


Seasonal Allergies:  No





Past Medical History


Surgeries:  Yes (CARDIAC CATHS--STENTS X 1. LAST CATH 17 WITH STENT X 1)


Amputation, Cardiac, Coronary Stent, Orthopedic


Respiratory:  Yes


Pulmonary Embolism


Currently Using CPAP:  No


Currently Using BIPAP:  No


Cardiac:  Yes (MI X 2; STENTS X 4; -NSTEMI 17 WITH 1 STENT PLACED)


Coronary Artery Disease, Deep Vein Thrombosis, Heart Attack, High Cholesterol, 

Hypertension


Neurological:  No


Reproductive Disorders:  No


Genitourinary:  No


Gastrointestinal:  Yes


Gastroesophageal Reflux


Musculoskeletal:  Yes (HYDROCODONE + IBUPROFEN DAILY)


Amputee, Chronic Back Pain, Gout


Endocrine:  No


HEENT:  No


Cancer:  No


Psychosocial:  No


Integumentary:  No


Blood Disorders:  Yes (PROTEIN S DEFICIENCY--DVT'S AND P.E.'S AND MI X 2)





Family Medical History





Arthritis


  G8 BROTHER


Blood clots


  19 MOTHER ( of blood clot)


Cardiac disorder


  19 FATHER


Diabetes mellitus


  G8 BROTHER


FH: cancer


  paternal grandmother


FHx: inflammatory bowel disease


  G8 BROTHER





No Family History of:


  FH: sudden cardiac death (SCD)


Heart Disease, Vascular Disease





Physical Exam


Vital Signs





Vital Signs - First Documented








 10/21/19





 07:00


 


Temp 36.0


 


Pulse 85


 


Resp 12


 


B/P (MAP) 142/108 (119)


 


Pulse Ox 95


 


O2 Delivery Nasal Cannula


 


O2 Flow Rate 3.00





Capillary Refill : Less Than 3 Seconds


Height, Weight, BMI


Height: 5'4.00"


Weight: 190lbs. 1.6oz. 86.831970np; 38.00 BMI


Method:Stated


General Appearance:  No Apparent Distress, WD/WN


HEENT:  Normal ENT Inspection


Respiratory:  Lungs Clear


Cardiovascular:  Regular Rate, Rhythm


Gastrointestinal:  Normal Bowel Sounds


Extremity:  Normal Capillary Refill, Other (status post left above-the-knee 

amputation.)


Neurologic/Psychiatric:  Alert, Oriented x3, No Motor/Sensory Deficits


Skin:  Normal Color, Warm/Dry





Progress/Results/Core Measures


Results/Orders


Lab Results





Laboratory Tests








Test


 10/21/19


07:00 Range/Units


 


 


White Blood Count


 8.1 


 4.3-11.0


10^3/uL


 


Red Blood Count


 5.91 H


 4.35-5.85


10^6/uL


 


Hemoglobin 16.6  13.3-17.7  G/DL


 


Hematocrit 52  40-54  %


 


Mean Corpuscular Volume 88  80-99  FL


 


Mean Corpuscular Hemoglobin 28  25-34  PG


 


Mean Corpuscular Hemoglobin


Concent 32 


 32-36  G/DL





 


Red Cell Distribution Width 17.7 H 10.0-14.5  %


 


Platelet Count


 284 


 130-400


10^3/uL


 


Mean Platelet Volume 9.3  7.4-10.4  FL


 


Neutrophils (%) (Auto) 62  42-75  %


 


Lymphocytes (%) (Auto) 26  12-44  %


 


Monocytes (%) (Auto) 8  0-12  %


 


Eosinophils (%) (Auto) 4  0-10  %


 


Basophils (%) (Auto) 1  0-10  %


 


Neutrophils # (Auto) 5.0  1.8-7.8  X 10^3


 


Lymphocytes # (Auto) 2.1  1.0-4.0  X 10^3


 


Monocytes # (Auto) 0.7  0.0-1.0  X 10^3


 


Eosinophils # (Auto)


 0.3 


 0.0-0.3


10^3/uL


 


Basophils # (Auto)


 0.1 


 0.0-0.1


10^3/uL


 


Prothrombin Time 11.9 L 12.2-14.7  SEC


 


INR Comment 0.8  0.8-1.4  


 


Activated Partial


Thromboplast Time 30 


 24-35  SEC





 


Sodium Level 140  135-145  MMOL/L


 


Potassium Level 4.1  3.6-5.0  MMOL/L


 


Chloride Level 99    MMOL/L


 


Carbon Dioxide Level 31  21-32  MMOL/L


 


Anion Gap 10  5-14  MMOL/L


 


Blood Urea Nitrogen 6 L 7-18  MG/DL


 


Creatinine


 0.82 


 0.60-1.30


MG/DL


 


Estimat Glomerular Filtration


Rate > 60 


  





 


BUN/Creatinine Ratio 7   


 


Glucose Level 132 H   MG/DL


 


Calcium Level 9.8  8.5-10.1  MG/DL


 


Corrected Calcium 9.7  8.5-10.1  MG/DL


 


Magnesium Level 2.0  1.6-2.4  MG/DL


 


Total Bilirubin 0.3  0.1-1.0  MG/DL


 


Aspartate Amino Transf


(AST/SGOT) 18 


 5-34  U/L





 


Alanine Aminotransferase


(ALT/SGPT) 17 


 0-55  U/L





 


Alkaline Phosphatase 106    U/L


 


Myoglobin


 42.9 


 10.0-92.0


NG/ML


 


Troponin I 0.033 H <0.028  NG/ML


 


Total Protein 7.4  6.4-8.2  GM/DL


 


Albumin 4.1  3.2-4.5  GM/DL








My Orders





Orders - COURT, CANDE TAPIA MD


Cbc With Automated Diff (10/21/19 07:09)


Magnesium (10/21/19 07:09)


Chest 1 View, Ap/Pa Only (10/21/19 07:09)


Ekg Tracing (10/21/19 07:09)


Cardiac Profile 1 (10/21/19 07:09)


Comprehensive Metabolic Panel (10/21/19 07:09)


Myoglobin Serum (10/21/19 07:09)


Protime With Inr (10/21/19 07:09)


Partial Thromboplastin Time (10/21/19 07:09)


O2 (10/21/19 07:09)


Monitor-Rhythm Ecg Trace Only (10/21/19 07:09)


Lipid Panel (10/22/19 06:00)


Ed Iv/Invasive Line Start (10/21/19 07:09)


Nitroglycerin 0.4 Mg Btl 25's (Nitrostat (10/21/19 07:15)


Aspirin Chewable Tablet (Baby Aspirin Ch (10/21/19 07:15)


Ns Iv 1000 Ml (Sodium Chloride 0.9%) (10/21/19 07:15)


Nitroglycerin 0.4 Mg Btl 25's (Nitrostat (10/21/19 07:12)


Aspirin Chewable Tablet (Baby Aspirin Ch (10/21/19 07:12)





Medications Given in ED





Current Medications








 Medications  Dose


 Ordered  Sig/Joey


 Route  Start Time


 Stop Time Status Last Admin


Dose Admin


 


 Aspirin  324 mg  ONCE  ONCE


 PO  10/21/19 07:15


 10/21/19 07:16 DC 10/21/19 07:15


324 MG


 


 Nitroglycerin  0.4 mg  UD  PRN


 SL  10/21/19 07:15


    10/21/19 07:16


0.4 MG








Vital Signs/I&O











 10/21/19 10/21/19





 07:00 07:05


 


Temp 36.0 


 


Pulse 85 


 


Resp 12 


 


B/P (MAP) 142/108 (119) 


 


Pulse Ox 95 96


 


O2 Delivery Nasal Cannula Room Air


 


O2 Flow Rate 3.00 3.00














Blood Pressure Mean:                    119











Progress


Progress Note :  


   Time:  07:48


Progress Note


The patient's EKG demonstrated a sinus rhythm with nonspecific ST changes 

laterally.  His troponin was minimally elevated at 0.33.





Patient remained pain-free in the emergency department.  He was given aspirin 

and one additional nitroglycerin.





I discussed the patient's presentation with Dr. Reilly, , and Dr. Moreno.





Dr. Moreno was kind enough to admit patient.  Patient was sent to cardiac 

stepdown.  Dr. Miguel was aware of the elevation of troponin.


Initial ECG Impression Date:  Oct 21, 2019


Initial ECG Impression Time:  07:48


Initial ECG Rate:  70


Initial ECG Rhythm:  Normal Sinus


Initial ECG Intervals:  Normal


Initial ECG Impression:  Nonspecific Changes





Departure


Communication (Admissions)


Time/Spoke to Admitting Phy:  07:54


Dr. Moreno.


Time/Spoke to Consulting Phy:  07:54


Iam Reilly and Myriam





Impression





   Primary Impression:  


   Chest pain


   Qualified Codes:  I25.9 - Chronic ischemic heart disease, unspecified


   Additional Impression:  


   Elevated troponin


Disposition:   ADMITTED AS INPATIENT


Condition:  Improved





Admissions


Decision to Admit Reason:  Admit from ER (General)


Decision to Admit/Date:  Oct 21, 2019


Time/Decision to Admit Time:  07:56





Departure-Patient Inst.


Referrals:  


LORNA WHITE MD (PCP)


Primary Care Physician











CANDE YUN MD             Oct 21, 2019 07:16

## 2019-10-21 NOTE — NUR
SPOKE WITH THE PATIENTS WIFE ABOUT MEDICATIONS. SHE LISTED WHAT HE IS TAKING AND I CALLED 
[facility] TO VERIFY.



[facility] FILLED:

9-30-19 METOPROLOL TARTRATE 50MG BID #60

9-18-19 PLAVIX 75MG DAILY #90

8-29-19 GABAPENTIN 600MG QID #120

8-28-19 IMDUR 30MG DAILY #90

8-28-19 ALLOPURINOL 100MG DAILY #90

5-24-19 LIPITOR 40MG HS #90

4-24-19 SOMA 350MG BID PRN #60 (ONLY TAKES 1 HS, HAD SOME SUPPLY LEFT ON HAND)



OTC MEDS:

FISH OIL BID

PRILOSEC OTC 2 DAILY

ASPIRIN 81MG HS



SHE STATES HE IS SUPPOSED TO HAVE OXYCODONE FOR PAIN BUT DUE TO INSURANCE ISSUES HE HAS NOT 
BEEN ABLE TO GO TO THE  TO GET A NEW SCRIPT AND HAS BEEN OUT FOR AWHILE. [facility] #175 FOR 29 DAYS ON 7-4-19. I DID NOT INCLUDE IT ON 
THE MED REC AT THIS TIME.



WIFE STATES HE NO LONGER TAKING THE GEMFIBROZIL OR LOSARTAN THAT WAS ON HIS CHART FROM 
PREVIOUS VISITS. 



I NOTED THE PAST DUE FILL DATES ON THE MED REC.

## 2019-10-21 NOTE — CONSULTATION-CARDIOLOGY
HPI-Cardiology


Cardiology Consultation


Date of Consultation


10/21/19


Date of Admission





Time Seen by Provider:  08:10


Indication:  chest pain





HPI


48 years old gentleman with extensive coronary artery disease, peripheral 

arterial disease, woke up from sleep with chest pain described it as dull in 

nature in the retrosternal area radiating to both arms, associated with 

diaphoresis, shortness of breath, nausea and vomiting.  EMS and given 

nitroglycerin, currently he is chest pain-free, still an active smoker, no 

similar episodes recently.  No palpitation, syncope or near syncopal episode.  

Has been compliant with his medication





Home Medications & Allergies


Allergies:  


Coded Allergies:  


     Penicillins (Unverified  Allergy, Mild, 09)


Home Medication List Reviewed:  Yes





PMH-Social-Family Hx


Patient Social History


Marital Status:  


Employed/Student:  employed


Alcohol Use:  Denies Use


Recreational Drug Use:  No


Smoking Status:  Current Everyday Smoker


Type Used:  Cigarettes


2nd Hand Smoke Exposure:  No


Recent Foreign Travel:  No


Recent Infectious Disease Expo:  No


Recent Hopitalizations:  No





Immunizations Up To Date


Date of Pneumonia Vaccine:  Dec 3, 2012


Date of Influenza Vaccine:  2019





Past Medical History


Discussed below





Family Medical History


Significant Family History:  Heart Disease, Vascular Disease


Family History:  


Arthritis


  G8 BROTHER


Blood clots


  19 MOTHER ( of blood clot)


Cardiac disorder


  19 FATHER


Diabetes mellitus


  G8 BROTHER


FH: cancer


  paternal grandmother


FHx: inflammatory bowel disease


  G8 BROTHER





No Family History of:


  FH: sudden cardiac death (SCD)





Review of Systems-General


Review of Systems


Constitutional:  see HPI; No fever; malaise


EENTM:  see HPI, no symptoms reported


Respiratory:  see HPI; No cough, No dyspnea on exertion, No hemoptysis, No 

orthopnea, No phlegm; short of breath; No stridor, No wheezing, No other


Cardiovascular:  see HPI, chest pain; No edema, No Hx of Intervention, No 

palpitations, No syncope, No vascular heart diseas, No other


Gastrointestinal:  see HPI, nausea, vomiting


Genitourinary:  no symptoms reported, see HPI


Musculoskeletal:  see HPI; No back pain; other (left AKA)


Skin:  see HPI; No change in color, No rash


Psychiatric/Neurological:  No Symptoms Reported





Reviewed Test Results


Reviewed Test Results


Lab





Laboratory Tests








Test


 10/21/19


07:00 Range/Units


 


 


White Blood Count


 8.1 


 4.3-11.0


10^3/uL


 


Red Blood Count


 5.91 H


 4.35-5.85


10^6/uL


 


Hemoglobin 16.6  13.3-17.7  G/DL


 


Hematocrit 52  40-54  %


 


Mean Corpuscular Volume 88  80-99  FL


 


Mean Corpuscular Hemoglobin 28  25-34  PG


 


Mean Corpuscular Hemoglobin


Concent 32 


 32-36  G/DL





 


Red Cell Distribution Width 17.7 H 10.0-14.5  %


 


Platelet Count


 284 


 130-400


10^3/uL


 


Mean Platelet Volume 9.3  7.4-10.4  FL


 


Neutrophils (%) (Auto) 62  42-75  %


 


Lymphocytes (%) (Auto) 26  12-44  %


 


Monocytes (%) (Auto) 8  0-12  %


 


Eosinophils (%) (Auto) 4  0-10  %


 


Basophils (%) (Auto) 1  0-10  %


 


Neutrophils # (Auto) 5.0  1.8-7.8  X 10^3


 


Lymphocytes # (Auto) 2.1  1.0-4.0  X 10^3


 


Monocytes # (Auto) 0.7  0.0-1.0  X 10^3


 


Eosinophils # (Auto)


 0.3 


 0.0-0.3


10^3/uL


 


Basophils # (Auto)


 0.1 


 0.0-0.1


10^3/uL


 


Prothrombin Time 11.9 L 12.2-14.7  SEC


 


INR Comment 0.8  0.8-1.4  


 


Activated Partial


Thromboplast Time 30 


 24-35  SEC





 


Sodium Level 140  135-145  MMOL/L


 


Potassium Level 4.1  3.6-5.0  MMOL/L


 


Chloride Level 99    MMOL/L


 


Carbon Dioxide Level 31  21-32  MMOL/L


 


Anion Gap 10  5-14  MMOL/L


 


Blood Urea Nitrogen 6 L 7-18  MG/DL


 


Creatinine


 0.82 


 0.60-1.30


MG/DL


 


Estimat Glomerular Filtration


Rate > 60 


  





 


BUN/Creatinine Ratio 7   


 


Glucose Level 132 H   MG/DL


 


Calcium Level 9.8  8.5-10.1  MG/DL


 


Corrected Calcium 9.7  8.5-10.1  MG/DL


 


Magnesium Level 2.0  1.6-2.4  MG/DL


 


Total Bilirubin 0.3  0.1-1.0  MG/DL


 


Aspartate Amino Transf


(AST/SGOT) 18 


 5-34  U/L





 


Alanine Aminotransferase


(ALT/SGPT) 17 


 0-55  U/L





 


Alkaline Phosphatase 106    U/L


 


Myoglobin


 42.9 


 10.0-92.0


NG/ML


 


Troponin I 0.033 H <0.028  NG/ML


 


Total Protein 7.4  6.4-8.2  GM/DL


 


Albumin 4.1  3.2-4.5  GM/DL











Physical Exam


Physical Exam


Vital Signs





Vital Signs - First Documented








 10/21/19





 07:00


 


Temp 36.0


 


Pulse 85


 


Resp 12


 


B/P (MAP) 142/108 (119)


 


Pulse Ox 95


 


O2 Delivery Nasal Cannula


 


O2 Flow Rate 3.00





Capillary Refill : Less Than 3 Seconds


Height, Weight, BMI


Height: 5'4.00"


Weight: 190lbs. 1.6oz. 86.946260yo; 38.00 BMI


Method:Stated


General Appearance:  No Apparent Distress, WD/WN


Eyes:  Bilateral Eye Normal Inspection, Bilateral Eye PERRL, Bilateral Eye EOMI


HEENT:  Normal ENT Inspection


Neck:  Full Range of Motion, Normal Inspection, Non Tender, Supple, Carotid 

Bruit


Respiratory:  Chest Non Tender, Lungs Clear, Normal Breath Sounds, No Accessory 

Muscle Use, No Respiratory Distress


Cardiovascular:  Regular Rate, Rhythm, No Edema, No Gallop, No JVD, No Murmur, 

Normal Peripheral Pulses


Gastrointestinal:  Normal Bowel Sounds


Back:  Normal Inspection, No CVA Tenderness, No Vertebral Tenderness


Extremity:  Normal Capillary Refill, No Pedal Edema, Other (status post left 

above-the-knee amputation.)


Neurologic/Psychiatric:  Alert, Oriented x3, No Motor/Sensory Deficits


Skin:  Normal Color, Warm/Dry


Lymphatic:  No Adenopathy





A/P-Cardiology


Admission Diagnosis


Non-ST elevation myocardial infarction


Coronary artery disease


Peripheral arterial disease


Tobaccoism





Assessment/Plan


Unstable angina, non-ST elevation myocardial infarction, extensive cardiac 

history as described below, planning to proceed with cardiac catheterization 

possible PTCA





Coronary artery disease, history of Promus drug-eluting stent placement using 

2.512 mm in the circumflex artery in 2012 by Dr. Reilly after having 

myocardial infarction.  Cardiac catheterization done 2016 revealed 

patent stent to circumflex artery with otherwise nonobstructive disease, had 

non-ST elevation myocardial infarction on 2017 had total occlusion 

of the obtuse marginal branch successful deployment of resolute integrity 2.526

mm with good results.  Total occlusion of the right coronary artery with failed 

attempt for intervention, referred to Dr. Delcid, had intervention on the right 

coronary artery with 3 drug-eluting stent overlapping in the right coronary 

artery.  Patient had non-ST elevation MI on 2018 and underwent 

cardiac catheterization with Dr. Bacon revealing stent thrombosis and occlusion

of the distal/mid RCA stents to date successfully with PTCA.  Integrilin 

infusion 12 hour.  He is maintained on Plavix and aspirin at this time.





Peripheral arterial disease, Buerger's disease, status post left AKA,  Had 

extensive disease, seen and followed by Dr. Funes





History of congestive heart failure, improved, history of Severe left 

ventricular systolic dysfunction, improved with intervention and maximizing 

medical therapy, ejection fraction 45-50 percent.  Continue to monitor





History of deep venous thrombosis/pulmonary embolism, has been maintained on 

Coumadin in the past





Tobaccoism, still an active smoker, educated in length about smoking cessation





Hyperlipidemia, continue to monitor lipids





History of gastroesophageal reflux disease.





History of elevated liver enzymes, monitor liver enzymes





Clinical Quality Measures


AMI/AHF:


ASA po Prior to arrival:  Yes (81)











NORTH HERNANDEZ MD              Oct 21, 2019 08:15

## 2019-11-27 ENCOUNTER — HOSPITAL ENCOUNTER (OUTPATIENT)
Dept: HOSPITAL 75 - CARD | Age: 49
End: 2019-11-27
Attending: PHYSICIAN ASSISTANT
Payer: MEDICAID

## 2019-11-27 DIAGNOSIS — I10: ICD-10-CM

## 2019-11-27 DIAGNOSIS — R06.00: ICD-10-CM

## 2019-11-27 DIAGNOSIS — R07.89: ICD-10-CM

## 2019-11-27 DIAGNOSIS — I82.409: ICD-10-CM

## 2019-11-27 DIAGNOSIS — I25.10: Primary | ICD-10-CM

## 2019-11-27 PROCEDURE — 93306 TTE W/DOPPLER COMPLETE: CPT

## 2020-02-14 NOTE — XMS REPORT
Encounter Summary

 Created on: 2018



Maulik Hubern CARMEN

External Reference #: [id number]

: 1970

Sex: Male



Demographics







 Address  [address] [place]  [address]

 

 Home Phone  [phone]

 

 Preferred Language  English

 

 Marital Status  Unknown

 

 Shinto Affiliation  NON

 

 Race  White

 

 Ethnic Group  Not  or 





Author







 Author  [facility]

 

 Organization  [facility]

 

 Address  Unknown

 

 Phone  Unavailable







Support







 Name  Relationship  Address  Phone

 

 Steffanie Huber  ECON  Unknown  [phone]







Care Team Providers







 Care Team Member Name  Role  Phone

 

 Neftali Bacon MD  21  [phone]

 

 Mahesh Champagne MD  PCP  [phone]







Encounter Details







    



  Date   Type   Department   Care Team   Description

 

    



  2017   Orders Only   [facility]   Shanell Hendrickson, RN   
Chronic anticoagulation



    [address]    (Primary Dx)



    [address]  



    [place] [address]  



    [phone]  







Social History







    



  Tobacco Use   Types   Packs/Day   Years Used   Date

 

    



  Current Every Day Smoker    









   



  Alcohol Use   Drinks/Week   oz/Week   Comments

 

   



  No   









 



  Sex Assigned at Birth   Date Recorded

 

 



  Not on file 



as of this encounter



Plan of Treatment





Not on fileas of this encounter



Visit Diagnoses











  Diagnosis

 





  Chronic anticoagulation - Primary

 





  Long-term (current) use of anticoagulants February 14, 2020      Shawn Kiran MD  [address] [facility] [address] [place] [address]           [facility]  [address] [place] [address]  Phone: [phone]  Fax: [phone]          Patient: Haley Saravia   MR Number: [id number]   YOB: 1976   Date of Visit: 2/14/2020       Dear Dr. Shawn Kiran:    Thank you for referring Haley Saravia to me for evaluation. Attached you will find relevant portions of my assessment and plan of care.    If you have questions, please do not hesitate to call me. I look forward to following Haley Saravia along with you.    Sincerely,    Kosta Henning MD    Enclosure  CC:  No Recipients    If you would like to receive this communication electronically, please contact [email or web address] or [phone] to request more information on [employer] access.    For providers and/or their staff who would like to refer a patient to [facility], please contact us through our one-stop-shop provider referral line, [facility], at [phone].    If you feel you have received this communication in error or would no longer like to receive these types of communications, please e-mail [email or web address]

## 2020-04-09 ENCOUNTER — HOSPITAL ENCOUNTER (INPATIENT)
Dept: HOSPITAL 75 - ER | Age: 50
LOS: 2 days | Discharge: HOME | DRG: 189 | End: 2020-04-11
Attending: INTERNAL MEDICINE | Admitting: INTERNAL MEDICINE
Payer: MEDICAID

## 2020-04-09 VITALS — HEIGHT: 62.01 IN | BODY MASS INDEX: 37.17 KG/M2 | WEIGHT: 204.59 LBS

## 2020-04-09 DIAGNOSIS — D68.59: ICD-10-CM

## 2020-04-09 DIAGNOSIS — I25.10: ICD-10-CM

## 2020-04-09 DIAGNOSIS — I73.1: ICD-10-CM

## 2020-04-09 DIAGNOSIS — J96.21: Primary | ICD-10-CM

## 2020-04-09 DIAGNOSIS — I50.22: ICD-10-CM

## 2020-04-09 DIAGNOSIS — M10.9: ICD-10-CM

## 2020-04-09 DIAGNOSIS — J44.1: ICD-10-CM

## 2020-04-09 DIAGNOSIS — Z95.5: ICD-10-CM

## 2020-04-09 DIAGNOSIS — I73.9: ICD-10-CM

## 2020-04-09 DIAGNOSIS — E78.5: ICD-10-CM

## 2020-04-09 DIAGNOSIS — E66.9: ICD-10-CM

## 2020-04-09 DIAGNOSIS — Z86.718: ICD-10-CM

## 2020-04-09 DIAGNOSIS — K21.9: ICD-10-CM

## 2020-04-09 DIAGNOSIS — I11.0: ICD-10-CM

## 2020-04-09 DIAGNOSIS — I25.2: ICD-10-CM

## 2020-04-09 DIAGNOSIS — Z86.711: ICD-10-CM

## 2020-04-09 DIAGNOSIS — J96.22: ICD-10-CM

## 2020-04-09 DIAGNOSIS — M54.9: ICD-10-CM

## 2020-04-09 DIAGNOSIS — Z95.820: ICD-10-CM

## 2020-04-09 DIAGNOSIS — Z89.612: ICD-10-CM

## 2020-04-09 DIAGNOSIS — F17.210: ICD-10-CM

## 2020-04-09 LAB
ALBUMIN SERPL-MCNC: 4.3 GM/DL (ref 3.2–4.5)
ALP SERPL-CCNC: 111 U/L (ref 40–136)
ALT SERPL-CCNC: 12 U/L (ref 0–55)
APTT BLD: 31 SEC (ref 24–35)
APTT PPP: YELLOW S
ARTERIAL PATENCY WRIST A: POSITIVE
BACTERIA #/AREA URNS HPF: (no result) /HPF
BARBITURATES UR QL: NEGATIVE
BASE EXCESS STD BLDA CALC-SCNC: 6.4 MMOL/L (ref -2.5–2.5)
BASOPHILS # BLD AUTO: 0 10^3/UL (ref 0–0.1)
BASOPHILS NFR BLD AUTO: 0 % (ref 0–10)
BDY SITE: (no result)
BENZODIAZ UR QL SCN: NEGATIVE
BILIRUB SERPL-MCNC: 0.4 MG/DL (ref 0.1–1)
BILIRUB UR QL STRIP: NEGATIVE
BODY TEMPERATURE: 37.2
BUN/CREAT SERPL: 6
CALCIUM SERPL-MCNC: 9.4 MG/DL (ref 8.5–10.1)
CHLORIDE SERPL-SCNC: 96 MMOL/L (ref 98–107)
CO2 BLDA CALC-SCNC: 33.6 MMOL/L (ref 21–31)
CO2 SERPL-SCNC: 30 MMOL/L (ref 21–32)
COCAINE UR QL: NEGATIVE
CREAT SERPL-MCNC: 0.9 MG/DL (ref 0.6–1.3)
EOSINOPHIL # BLD AUTO: 0.2 10^3/UL (ref 0–0.3)
EOSINOPHIL NFR BLD AUTO: 2 % (ref 0–10)
ERYTHROCYTE [DISTWIDTH] IN BLOOD BY AUTOMATED COUNT: 16 % (ref 10–14.5)
FIBRINOGEN PPP-MCNC: CLEAR MG/DL
GFR SERPLBLD BASED ON 1.73 SQ M-ARVRAT: > 60 ML/MIN
GLUCOSE SERPL-MCNC: 139 MG/DL (ref 70–105)
GLUCOSE UR STRIP-MCNC: NEGATIVE MG/DL
HCT VFR BLD CALC: 50 % (ref 40–54)
HGB BLD-MCNC: 15.9 G/DL (ref 13.3–17.7)
INHALED O2 FLOW RATE: 2 L/MIN
INR PPP: 0.9 (ref 0.8–1.4)
KETONES UR QL STRIP: NEGATIVE
LEUKOCYTE ESTERASE UR QL STRIP: NEGATIVE
LYMPHOCYTES # BLD AUTO: 1.5 X 10^3 (ref 1–4)
LYMPHOCYTES NFR BLD AUTO: 13 % (ref 12–44)
MAGNESIUM SERPL-MCNC: 1.9 MG/DL (ref 1.6–2.4)
MANUAL DIFFERENTIAL PERFORMED BLD QL: NO
MCH RBC QN AUTO: 28 PG (ref 25–34)
MCHC RBC AUTO-ENTMCNC: 32 G/DL (ref 32–36)
MCV RBC AUTO: 89 FL (ref 80–99)
METHADONE UR QL SCN: NEGATIVE
METHAMPHETAMINE SCREEN URINE S: NEGATIVE
MONOCYTES # BLD AUTO: 0.7 X 10^3 (ref 0–1)
MONOCYTES NFR BLD AUTO: 6 % (ref 0–12)
NEUTROPHILS # BLD AUTO: 9 X 10^3 (ref 1.8–7.8)
NEUTROPHILS NFR BLD AUTO: 79 % (ref 42–75)
NITRITE UR QL STRIP: NEGATIVE
OPIATES UR QL SCN: NEGATIVE
OXYCODONE UR QL: NEGATIVE
PCO2 BLDA: 59 MMHG (ref 35–45)
PH BLDA: 7.35 [PH] (ref 7.37–7.43)
PH UR STRIP: 5 [PH] (ref 5–9)
PLATELET # BLD: 240 10^3/UL (ref 130–400)
PMV BLD AUTO: 9.3 FL (ref 7.4–10.4)
PO2 BLDA: 77 MMHG (ref 79–93)
POTASSIUM SERPL-SCNC: 3.5 MMOL/L (ref 3.6–5)
PROPOXYPH UR QL: NEGATIVE
PROT SERPL-MCNC: 7.6 GM/DL (ref 6.4–8.2)
PROT UR QL STRIP: NEGATIVE
PROTHROMBIN TIME: 12.7 SEC (ref 12.2–14.7)
RBC #/AREA URNS HPF: (no result) /HPF
SAO2 % BLDA FROM PO2: 96 % (ref 94–100)
SODIUM SERPL-SCNC: 138 MMOL/L (ref 135–145)
SP GR UR STRIP: >=1.03 (ref 1.02–1.02)
TRICYCLICS UR QL SCN: NEGATIVE
VENTILATION MODE VENT: NO
WBC # BLD AUTO: 11.4 10^3/UL (ref 4.3–11)
WBC #/AREA URNS HPF: (no result) /HPF

## 2020-04-09 PROCEDURE — 36415 COLL VENOUS BLD VENIPUNCTURE: CPT

## 2020-04-09 PROCEDURE — 85007 BL SMEAR W/DIFF WBC COUNT: CPT

## 2020-04-09 PROCEDURE — 83874 ASSAY OF MYOGLOBIN: CPT

## 2020-04-09 PROCEDURE — 81000 URINALYSIS NONAUTO W/SCOPE: CPT

## 2020-04-09 PROCEDURE — 36600 WITHDRAWAL OF ARTERIAL BLOOD: CPT

## 2020-04-09 PROCEDURE — 94660 CPAP INITIATION&MGMT: CPT

## 2020-04-09 PROCEDURE — 83605 ASSAY OF LACTIC ACID: CPT

## 2020-04-09 PROCEDURE — 71045 X-RAY EXAM CHEST 1 VIEW: CPT

## 2020-04-09 PROCEDURE — 71275 CT ANGIOGRAPHY CHEST: CPT

## 2020-04-09 PROCEDURE — 83880 ASSAY OF NATRIURETIC PEPTIDE: CPT

## 2020-04-09 PROCEDURE — 85025 COMPLETE CBC W/AUTO DIFF WBC: CPT

## 2020-04-09 PROCEDURE — 84484 ASSAY OF TROPONIN QUANT: CPT

## 2020-04-09 PROCEDURE — 87088 URINE BACTERIA CULTURE: CPT

## 2020-04-09 PROCEDURE — 80306 DRUG TEST PRSMV INSTRMNT: CPT

## 2020-04-09 PROCEDURE — 94761 N-INVAS EAR/PLS OXIMETRY MLT: CPT

## 2020-04-09 PROCEDURE — 80061 LIPID PANEL: CPT

## 2020-04-09 PROCEDURE — 85730 THROMBOPLASTIN TIME PARTIAL: CPT

## 2020-04-09 PROCEDURE — 82805 BLOOD GASES W/O2 SATURATION: CPT

## 2020-04-09 PROCEDURE — 93005 ELECTROCARDIOGRAM TRACING: CPT

## 2020-04-09 PROCEDURE — 85027 COMPLETE CBC AUTOMATED: CPT

## 2020-04-09 PROCEDURE — 83735 ASSAY OF MAGNESIUM: CPT

## 2020-04-09 PROCEDURE — 85379 FIBRIN DEGRADATION QUANT: CPT

## 2020-04-09 PROCEDURE — 93041 RHYTHM ECG TRACING: CPT

## 2020-04-09 PROCEDURE — 94640 AIRWAY INHALATION TREATMENT: CPT

## 2020-04-09 PROCEDURE — 85610 PROTHROMBIN TIME: CPT

## 2020-04-09 PROCEDURE — 80053 COMPREHEN METABOLIC PANEL: CPT

## 2020-04-09 PROCEDURE — 87040 BLOOD CULTURE FOR BACTERIA: CPT

## 2020-04-09 NOTE — ED GI
General


Chief Complaint:  Abdominal/GI Problems


Stated Complaint:  VOMITING


Source of Information:  Patient


Exam Limitations:  No Limitations





History of Present Illness


Date Seen by Provider:  2020


Time Seen by Provider:  20:20


Initial Comments


Patient presents ER by private conveyance because he thought he might of had a 

small heart attack. He says about an hour prior to arrival he had 15 minutes of 

sweats, nausea vomiting without fever chills or new cough. He has a chronic 

cough because he smokes between 3 and 4 packs of cigarettes per day. He denies 

using any breathing treatments or history of COPD. He does not follow with a 

primary care doctor but he does follow with Dr. Miguel because he has history of 

Buerger's disease and has had stents in his bilateral lower extremities as well 

as coronaries. He denies any chest pain. He said the symptoms went away after 15

minutes is not having any symptoms presently. He has a history of a left leg 

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 He is also 

followed by specialist at [facility]. He denies a history of diabetes.

He is not certain if he takes statins or not. He says his wife handles his 

medications.


Discussed the case with his wife Steffanie over the telephone. She relates that he

tried to take something for his pain but was unsuccessful because of his nausea 

and vomiting.


She relates the med list of Plavix, metoprolol 50 mg twice a day, allopurinol 

300 mg daily, aspirin 81 mg daily, Imdur 30 mg daily, Lipitor, gabapentin 600 mg

4 times a day, Protonix 40 mg daily.


Prior to coming to the ER they called the on-call cardiologist Dr. Reilly he 

recommended come to the ER to be worked up.





Allergies and Home Medications


Allergies


Coded Allergies:  


     Penicillins (Unverified  Allergy, Mild, 09)





Home Medications


Allopurinol 100 Mg Tablet, 100 MG PO DAILY, (Reported)


Aspirin 81 Mg Tablet., 81 MG PO HS, (Reported)


Atorvastatin Calcium 40 Mg Tablet, 40 MG PO HS, (Reported)


   LAST FILLED #90 19 


Carisoprodol 350 Mg Tablet, 350 MG PO HS, (Reported)


Clopidogrel Bisulfate 75 Mg Tablet, 75 MG PO DAILY, (Reported)


Gabapentin 600 Mg Tablet, 600 MG PO QID, (Reported)


   LAST FILLED #120 19 


Isosorbide Mononitrate 30 Mg Tab.er.24h, 30 MG PO DAILY, (Reported)


Metoprolol Tartrate 50 Mg Tablet, 50 MG PO BID, (Reported)


Omega 3 Polyunsat Fatty Acids 1,000 Mg Cap, 1,000 MG PO BID, (Reported)


Omeprazole Magnesium 20 Mg Tablet.dr, 40 MG PO DAILY, (Reported)


   TAKES 2 (20MG) TABLETS 


Varenicline Tartrate 1 Each Tab.ds.pk, 1 EACH PO BID


   Prescribed by: NORTH MIGUEL on 10/21/19 0974





Patient Home Medication List


Home Medication List Reviewed:  Yes





Review of Systems


Review of Systems


Constitutional:  No chills, No fever, No malaise


EENTM:  No Blurred Vision, No Double Vision


Respiratory:  Cough (chronic only nonproductive); Denies Shortness of Air, 

Denies Wheezing


Cardiovascular:  Denies Chest Pain, Denies Lightheadedness


Gastrointestinal:  Denies Abdomen Distended, Denies Abdominal Pain, Denies Blood

Streaked Stools, Denies Constipated, Denies Diarrhea; Nausea; Denies Poor 

Appetite, Denies Poor Fluid Intake; Vomiting


Genitourinary:  Denies Burning, Denies Discharge


Musculoskeletal:  No back pain, No joint pain


Skin:  No pruritus, No rash


Psychiatric/Neurological:  Denies Headache, Denies Numbness





All Other Systems Reviewed


Negative Unless Noted:  Yes





Past Medical-Social-Family Hx


Patient Social History


Alcohol Use:  Denies Use


Recreational Drug Use:  No


Smoking Status:  Current Everyday Smoker


Type Used:  Cigarettes (3 pack per day)


2nd Hand Smoke Exposure:  No


Recent Foreign Travel:  No


Contact w/Someone Who Travel:  No


Recent Hopitalizations:  No





Immunizations Up To Date


PED Vaccines UTD:  Yes


Date of Pneumonia Vaccine:  Dec 3, 2012


Date of Influenza Vaccine:  2019





Seasonal Allergies


Seasonal Allergies:  No





Past Medical History


Surgeries:  Yes (CARDIAC CATHS--STENTS X 1. LAST CATH 17 WITH STENT X 1)


Amputation, Cardiac, Coronary Stent, Orthopedic


Respiratory:  Yes


Pulmonary Embolism


Currently Using CPAP:  No


Currently Using BIPAP:  No


Cardiac:  Yes (MI X 2; STENTS X 4; -NSTEMI 17 WITH 1 STENT PLACED)


Coronary Artery Disease, Deep Vein Thrombosis, Heart Attack, High Cholesterol, 

Hypertension


Neurological:  No


Reproductive Disorders:  No


Genitourinary:  No


Gastrointestinal:  Yes


Gastroesophageal Reflux


Musculoskeletal:  Yes (HYDROCODONE + IBUPROFEN DAILY)


Amputee, Chronic Back Pain, Gout


Endocrine:  No


HEENT:  No


Cancer:  No


Psychosocial:  No


Integumentary:  No


Blood Disorders:  Yes (PROTEIN S DEFICIENCY--DVT'S AND P.E.'S AND MI X 2)





Family Medical History





Arthritis


  G8 BROTHER


Blood clots


  19 MOTHER ( of blood clot)


Cardiac disorder


  19 FATHER


Diabetes mellitus


  G8 BROTHER


FH: cancer


  paternal grandmother


FHx: inflammatory bowel disease


  G8 BROTHER





No Family History of:


  FH: sudden cardiac death (SCD)


Heart Disease, Vascular Disease





Physical Exam


Vital Signs





Vital Signs - First Documented








 20





 20:19


 


Temp 37.2


 


Pulse 101


 


Resp 20


 


B/P (MAP) 152/92 (112)


 


Pulse Ox 96


 


O2 Delivery Nasal Cannula


 


O2 Flow Rate 2.00





Capillary Refill :


Height/Weight/BMI


Height: 5'4.00"


Weight: 190lbs. 1.6oz. [id number]; 38.00 BMI


Method:Stated


General Appearance:  WD/WN, no apparent distress


HEENT:  PERRL/EOMI, TMs normal, pharynx normal


Neck:  full range of motion, normal inspection


Respiratory:  no accessory muscle use, respiratory distress (oxygen sats of 85% 

on room air), decreased breath sounds, wheezing (bilateral moderate to 

extensive)


Cardiovascular:  normal peripheral pulses, regular rate, rhythm


Gastrointestinal:  normal bowel sounds, non tender, soft


Extremities:  normal range of motion, normal capillary refill, other (left leg 

AKA)


Neurologic/Psychiatric:  alert, oriented x 3


Skin:  normal color, warm/dry





Focused Exam


Sepsis Stage:  Ruled Out


Reason for ruling out sepsis:  no productive cough, fever nor evidence of infec

tion on chest x-ray or labs


Lactate Level


20 20:25: Lactic Acid Level 1.29





Lactic Acid Level





Laboratory Tests








Test


 20


20:25


 


Lactic Acid Level


 1.29 MMOL/L


(0.50-2.00)











Progress/Results/Core Measures


Results/Orders


Lab Results





Laboratory Tests








Test


 20


20:25 20


20:43 20


21:01 Range/Units


 


 


White Blood Count


 11.4 H


 


 


 4.3-11.0


10^3/uL


 


Red Blood Count


 5.60 


 


 


 4.35-5.85


10^6/uL


 


Hemoglobin 15.9    13.3-17.7  G/DL


 


Hematocrit 50    40-54  %


 


Mean Corpuscular Volume 89    80-99  FL


 


Mean Corpuscular Hemoglobin 28    25-34  PG


 


Mean Corpuscular Hemoglobin


Concent 32 


 


 


 32-36  G/DL





 


Red Cell Distribution Width 16.0 H   10.0-14.5  %


 


Platelet Count


 240 


 


 


 130-400


10^3/uL


 


Mean Platelet Volume 9.3    7.4-10.4  FL


 


Neutrophils (%) (Auto) 79 H   42-75  %


 


Lymphocytes (%) (Auto) 13    12-44  %


 


Monocytes (%) (Auto) 6    0-12  %


 


Eosinophils (%) (Auto) 2    0-10  %


 


Basophils (%) (Auto) 0    0-10  %


 


Neutrophils # (Auto) 9.0 H   1.8-7.8  X 10^3


 


Lymphocytes # (Auto) 1.5    1.0-4.0  X 10^3


 


Monocytes # (Auto) 0.7    0.0-1.0  X 10^3


 


Eosinophils # (Auto)


 0.2 


 


 


 0.0-0.3


10^3/uL


 


Basophils # (Auto)


 0.0 


 


 


 0.0-0.1


10^3/uL


 


Prothrombin Time 12.7    12.2-14.7  SEC


 


INR Comment 0.9    0.8-1.4  


 


Activated Partial


Thromboplast Time 31 


 


 


 24-35  SEC





 


D-Dimer


 0.50 H


 


 


 0.00-0.49


UG/ML


 


Sodium Level 138    135-145  MMOL/L


 


Potassium Level 3.5 L   3.6-5.0  MMOL/L


 


Chloride Level 96 L     MMOL/L


 


Carbon Dioxide Level 30    21-32  MMOL/L


 


Anion Gap 12    5-14  MMOL/L


 


Blood Urea Nitrogen 5 L   7-18  MG/DL


 


Creatinine


 0.90 


 


 


 0.60-1.30


MG/DL


 


Estimat Glomerular Filtration


Rate > 60 


 


 


  





 


BUN/Creatinine Ratio 6     


 


Glucose Level 139 H     MG/DL


 


Lactic Acid Level


 1.29 


 


 


 0.50-2.00


MMOL/L


 


Calcium Level 9.4    8.5-10.1  MG/DL


 


Corrected Calcium 9.2    8.5-10.1  MG/DL


 


Magnesium Level 1.9    1.6-2.4  MG/DL


 


Total Bilirubin 0.4    0.1-1.0  MG/DL


 


Aspartate Amino Transf


(AST/SGOT) 18 


 


 


 5-34  U/L





 


Alanine Aminotransferase


(ALT/SGPT) 12 


 


 


 0-55  U/L





 


Alkaline Phosphatase 111      U/L


 


Myoglobin


 46.6 


 


 


 10.0-92.0


NG/ML


 


Troponin I < 0.028    <0.028  NG/ML


 


B-Type Natriuretic Peptide 37.9    <100.0  PG/ML


 


Total Protein 7.6    6.4-8.2  GM/DL


 


Albumin 4.3    3.2-4.5  GM/DL


 


Blood Gas Puncture Site  LEFT RADIAL    


 


Blood Gas Patient Temperature  37.2    


 


Arterial Blood pH  7.35 L  7.37-7.43  


 


Arterial Blood Partial


Pressure CO2 


 59 H


 


 35-45  MMHG





 


Arterial Blood Partial


Pressure O2 


 77 L


 


 79-93  MMHG





 


Arterial Blood HCO3  32 H  23-27  MMOL/L


 


Arterial Blood Total CO2


 


 33.6 H


 


 21.0-31.0


MMOL/L


 


Arterial Blood Oxygen


Saturation 


 96 


 


   %





 


Arterial Blood Base Excess


 


 6.4 H


 


 -2.5-2.5


MMOL/L


 


Servando Test  POSITIVE    


 


Blood Gas Ventilator Setting  NO    


 


Blood Gas Inspired Oxygen  2    


 


Urine Color   YELLOW   


 


Urine Clarity   CLEAR   


 


Urine pH   5.0  5-9  


 


Urine Specific Gravity   >=1.030  1.016-1.022  


 


Urine Protein   NEGATIVE  NEGATIVE  


 


Urine Glucose (UA)   NEGATIVE  NEGATIVE  


 


Urine Ketones   NEGATIVE  NEGATIVE  


 


Urine Nitrite   NEGATIVE  NEGATIVE  


 


Urine Bilirubin   NEGATIVE  NEGATIVE  


 


Urine Urobilinogen   0.2  < = 1.0  MG/DL


 


Urine Leukocyte Esterase   NEGATIVE  NEGATIVE  


 


Urine RBC (Auto)   NEGATIVE  NEGATIVE  


 


Urine RBC   NONE   /HPF


 


Urine WBC   NONE   /HPF


 


Urine Crystals   NONE   /LPF


 


Urine Bacteria   TRACE   /HPF


 


Urine Casts   NONE   /LPF


 


Urine Mucus   MODERATE H  /LPF


 


Urine Culture Indicated


 


 


 CULTURE


PENDING  





 


Urine Opiates Screen   NEGATIVE  NEGATIVE  


 


Urine Oxycodone Screen   NEGATIVE  NEGATIVE  


 


Urine Methadone Screen   NEGATIVE  NEGATIVE  


 


Urine Propoxyphene Screen   NEGATIVE  NEGATIVE  


 


Urine Barbiturates Screen   NEGATIVE  NEGATIVE  


 


Ur Tricyclic Antidepressants


Screen 


 


 NEGATIVE 


 NEGATIVE  





 


Urine Phencyclidine Screen   NEGATIVE  NEGATIVE  


 


Urine Amphetamines Screen   NEGATIVE  NEGATIVE  


 


Urine Methamphetamines Screen   NEGATIVE  NEGATIVE  


 


Urine Benzodiazepines Screen   NEGATIVE  NEGATIVE  


 


Urine Cocaine Screen   NEGATIVE  NEGATIVE  


 


Urine Cannabinoids Screen   NEGATIVE  NEGATIVE  








My Orders





Orders - TREVA CASTILLO


Cbc With Automated Diff (20 20:26)


Magnesium (20 20:26)


Chest 1 View, Ap/Pa Only (20 20:26)


Ekg Tracing (20 20:26)


Comprehensive Metabolic Panel (20 20:26)


Myoglobin Serum (20:)


Protime With Inr (20:)


Partial Thromboplastin Time (20:)


O2 (20:)


Monitor-Rhythm Ecg Trace Only (20)


Lipid Panel (4/10/20 06:00)


Ed Iv/Invasive Line Start (20:)


Fibrin Degradation Products (20)


Troponin I (20:)


Aspirin Chewable Tablet (Baby Aspirin Ch (20 20:30)


Blood Culture (20:)


Sputum Culture (20:)


Urinalysis (20)


Urine Culture (20:)


Ed Iv/Invasive Line Start (20:)


Ed Iv/Invasive Line Start (20:)


Vital Signs Adult Sepsis Patie Q15M (20:)


Remove Rings In Anticipation O (20:)


Lactic Acid Analyzer (20:)


Ns Iv 1000 Ml (Sodium Chloride 0.9%) (20 20:26)


Albuterol/Ipra Inhalation Soln (Duoneb I (20 20:30)


Svn Small Volume Nebulizer (20:26)


Aspirin Chewable Tablet (Baby Aspirin Ch (20 20:24)


Ns Iv 1000 Ml (Sodium Chloride 0.9%) (20 20:25)


Arterial Blood Gas (20 20:44)


Drug Screen Stat (Urine) (20 21:09)


Ed Iv/Invasive Line Start (20 21:20)


Ns Iv 1000 Ml (Sodium Chloride 0.9%) (20 21:20)


BNP (20 21:21)


Ct Angio Chest W (20 21:23)


Methylprednisolone Sod Succ (Solu-Medrol (20 21:45)





Medications Given in ED





Current Medications








 Medications  Dose


 Ordered  Sig/Joey


 Route  Start Time


 Stop Time Status Last Admin


Dose Admin


 


 Albuterol/


 Ipratropium  3 ml  ONCE  ONCE


 INH  20 20:30


 20 20:31 DC 20 20:41


3 ML


 


 Aspirin  324 mg  ONCE  ONCE


 PO  20 20:30


 20 20:31 DC 20 20:33


324 MG


 


 Methylprednisolone


 Sodium Succinate  125 mg  ONCE  ONCE


 IVP  20 21:45


 20 21:46 DC 20 21:40


125 MG








Vital Signs/I&O











 20





 20:19 20:42


 


Temp 37.2 


 


Pulse 101 


 


Resp 20 


 


B/P (MAP) 152/92 (112) 


 


Pulse Ox 96 99


 


O2 Delivery Nasal Cannula Nasal Cannula


 


O2 Flow Rate 2.00 2.00











Progress


Progress Note #1:  


   Time:  20:40


Progress Note


Several fingers were tried on both hands and we get consistent readings in the 

mid 80s on room air. Suspect this could represent some kind of inflammatory 

angiopathy versus he's actually just hypoxic considering he has extensive 

wheezing and a long-standing smoking history. We'll get an ABG chest x-ray 

septic workup even though is not having fever he is having tachycardia and 

hypoxia as well as moderate increased work of breathing with a respiratory rate 

of 20. COPD exacerbations also a very good possibility. Heart attack, vasospasm 

etc. could be an explanation for for his symptoms. We'll also obtain labs we can

examine his get although his abdomen is soft, nontender and he is a symptomatic.

We'll give him 3 and a 24 mg of aspirin and a liter of fluids. We'll get a d-

dimer looking for clots. While he is on Plavix he is not on blood thinners. 

Based on the patient's admission he appears to live a rather sedentary lifestyle

smoking cigarettes. We did admonish him that smoking cigarettes with a history 

of CAROL ANN will greatly increase his risk for complications including heart attacks,

death, loss of circulation to his extremities, blindness etc. Even with a 

negative troponin his heart score would be for points despite not having chest 

pain. He would be difficult to recommend an outpatient workup without 

consultation with cardiology first.


On 2 L by nasal cannula his heart rate came down from 110 to100 and his oxygen 

sats went up to 96%. DuoNeb has been ordered. BNP ordered.





Cardiac catheterization by Dr. Miguel 2019:


Date and stent in the obtuse marginal branch and multiple stents in the right 

coronary with small vessel nonobstructive disease.


EF of 45%.


Normal aortic arch and great vessels of the neck.


Progress Note #2:  


   Time:  20:55


Progress Note


Patient does have some respiratory acidosis and hypoxia. He's sounds quite a bit

better and even feels subjectively better after a single DuoNeb was still has 

some wheezing. We are going to initiate BiPAP 10/5.


Progress Note #3:  


   Time:  21:32


Progress Note


Patient was initiated at 12/5 BiPAP and 30% FiO2 and was tolerating it well with

a sat of 97%. Just now nursing informs me that he was having claustrophobia and 

does not want to mask any more. His initial troponin is negative we will need 

troponin repeated. He appears to be having COPD exacerbation. We can do some 

more breathing treatments on an observation stay and see if we can get away 

without needing further noninvasive positive pressure ventilation.


D-dimer is mildly elevated at 0.5 so we're going to get a CT angiogram of the 

chest to rule out pulmonary embolism.


125 mg Solu-Medrol.


Initial ECG Impression Date:  2020


Initial ECG Impression Time:  20:24


Initial ECG Rate:  99


Initial ECG Rhythm:  Normal Sinus


Initial ECG Intervals:  Normal


Initial ECG Impression:  Normal


Comment


Normal sinus rhythm without clinically relevant ST elevation or depression.





Diagnostic Imaging





   Diagonstic Imaging:  Xray


   Plain Films/CT/US/NM/MRI:  chest (1v)


Comments


NAME:   NIKO MARTE


[facility] REC#:   [id number]


ACCOUNT#:   [id number]


PT STATUS:   REG ER


:   1970


PHYSICIAN:   TREVA CASTILLO MD


ADMIT DATE:   20/ER


                                   ***Draft***


Date of Exam:20





CHEST 1 VIEW, AP/PA ONLY





INDICATION: Shortness of breath.





COMPARISON: 10/21/2019.





EXAMINATION: Portable chest. 





FINDINGS: The lungs are well-aerated and clear. There is no


air-trapping. The heart is not enlarged. No pulmonary edema. No


hilar adenopathy. No pneumothorax or pleural effusion. No bony


abnormality.





IMPRESSION: Normal portable chest. 





  Dictated on workstation # [id number]





Dict:   20


Trans:   20


[facility] [id number]





Interpreted by:     NEGRITA GAGNON MD


Electronically signed by:


   Reviewed:  Reviewed by Me








   Diagonstic Imaging:  CT (angio)


   Plain Films/CT/US/NM/MRI:  chest


Comments


No pulmonary embolisms noted. No infiltrates.


No PE or aortic dissection.


Coronary atherosclerosis.


Mild bronchial wall thickening, possible bronchitis.


   Reviewed:  Reviewed [place] Study, Reviewed by Me





Departure


Communication (Admissions)


Time/Spoke to Admitting Phy:  22:40


Discussed the case with Dr. Pryor and she agrees with plan. We discussed that

it does not appear to be infectious and so we were not going to do a testing for

COVID-19 at this time.


Time/Spoke to Consulting Phy:  22:20


Discussed the case with Dr. Reilly and he agrees with continuing home 

medications and repeating a troponin in the morning.





Impression





   Primary Impression:  


   COPD with exacerbation


   Additional Impression:  


   Acute respiratory failure with hypoxia and hypercapnia


Disposition:   ADMITTED AS INPATIENT


Condition:  Stable





Admissions


Decision to Admit Reason:  Admit from ER (General)


Decision to Admit/Date:  2020


Time/Decision to Admit Time:  21:20





Departure-Patient Inst.


Referrals:  


NO,LOCAL PHYSICIAN (PCP/Family)


Primary Care Physician











TREVA CASTILLO                  2020 20:37

## 2020-04-09 NOTE — XMS REPORT
Continuity of Care Document

                             Created on: 2020



NIKO MARTE

External Reference #: [id number]

: 1970

Sex: Male



Demographics





                          Address                   [address] [place]  [address]

 

                          Home Phone                [phone] x

 

                          Preferred Language        Unknown

 

                          Marital Status            Unknown

 

                          Confucianism Affiliation     Unknown

 

                          Race                      Unknown

 

                          Ethnic Group              Unknown





Author





                          Organization              Unknown

 

                          Address                   Unknown

 

                          Phone                     Unavailable



              



Allergies

      



             Active           Description           Code           Type         

  Severity   

                Reaction           Onset           Reported/Identified          

 

Relationship to Patient                 Clinical Status        

 

             Yes           Penicillins           [id number]           Drug Aller

gy           

Mild           N/A                        2009                            

  

  

 

             Yes           Penicillins                        Drug Allergy      

              

                                       2009                               

  

 

             Yes           Penicillins                        Drug Allergy      

     N/A      

             N/A                        2009                              

   



                      



Medications

      



There is no data.                  



Problems

      



             Date Dx Coded           Attending           Type           Code    

       

Diagnosis                               Diagnosed By        

 

                1226           SOILA CAST           Ot        

      Z89.612      

                          ACQUIRED ABSENCE OF LEFT LEG ABOVE KNEE               

     

 

           2006                       Ot           415.19                 

           

   

 

           2006                       Ot           V58.61                 

           

   

 

           2006                       Ot           V58.83                 

           

   

 

           2007                       Ot           V58.61                 

           

   

 

           2007                       Ot           V58.83                 

           

   

 

           2007                       Ot           V58.61                 

           

   

 

           2007                       Ot           V58.83                 

           

   

 

           2007                       Ot           V58.61                 

           

   

 

           2007                       Ot           V58.83                 

           

   

 

           02/10/2008                       Ot           V58.61                 

           

   

 

           02/10/2008                       Ot           V58.83                 

           

   

 

           2008                       Ot           V58.61                 

           

   

 

           2008                       Ot           V58.83                 

           

   

 

           2008                       Ot           V58.61                 

           

   

 

           2008                       Ot           V58.83                 

           

   

 

             10/01/2008           LILLIAN LANDRY DO                        214.9 

          

LIPOMA UNSPECIFIED SITE                          

 

                10/01/2008           NEENA VELAZCO                      

     214.9          

                          LIPOMA UNSPECIFIED SITE                    

 

             10/01/2008           CHARLY ESCALONA                        21

4.9           

LIPOMA UNSPECIFIED SITE                          

 

             10/09/2008           LILLIAN LANDRY DO                        729.5 

          PAIN

IN LIMB                                          

 

             10/09/2008           LILLIAN LANDRY DO                        782.3 

          

EDEMA                                            

 

                10/09/2008           NEENA VELAZCO                      

     729.5          

                          PAIN IN LIMB                       

 

                10/09/2008           NEENA VELAZCO                      

     782.3          

                          EDEMA                              

 

             10/09/2008           CHARLY ESCALONA                        72

9.5           

PAIN IN LIMB                                     

 

             10/09/2008           CHARLY ESCALONA                        78

2.3           

EDEMA                                            

 

           2008                       Ot           V58.61                 

           

   

 

           2008                       Ot           V58.83                 

           

   

 

           2009                       Ot           V12.51                 

           

   

 

           2009                       Ot           V58.61                 

           

   

 

           2009                       Ot           V58.83                 

           

   

 

             2009           LILLIAN LANDRY DO K                        487.8 

          

INFLUENZA, WITH OTHER MANIFESTATIONS                    

 

                2009           NEENA VELAZCO                      

     487.8          

                          INFLUENZA, WITH OTHER MANIFESTATIONS                  

  

 

             2009           CHARLY ESCALONA                        48

7.8           

INFLUENZA, WITH OTHER MANIFESTATIONS                    

 

             2010                        Ot           V12.51           HX-

VENOUS 

THROMBOSIS EMBOLISM                              

 

             2010                        Ot           V58.61           

ANTICOAGULANTS,LT,CURRENT USE                    

 

             2010                        Ot           V58.83           ENC

OUNTER FOR 

THERAPEUTIC DRUG MONITORIN                       

 

             2010           LILLIAN LANDRY DO K                        787.01

           

NAUSEA WITH VOMITING                             

 

             2010           LILLIAN LANDRY DO K                        787.91

           

DIARRHEA                                         

 

                2010           NEENA VELAZCO R                      

     787.01         

                          NAUSEA WITH VOMITING                    

 

                2010           NEENA VELAZCO                      

     787.91         

                          DIARRHEA                           

 

                2010           CHARLY ESCALONA                       

    787.01          

                          NAUSEA WITH VOMITING                    

 

                2010           CHARLY ESCALONA                       

    787.91          

                          DIARRHEA                           

 

             2010           LILLIAN LANDRY DO K                        784.0 

          

headache                                         

 

                2010           NEENA VELAZCO R                      

     784.0          

                          headache                           

 

             2010           CHARLY ESCALONA                        78

4.0           

headache                                         

 

             2010           LILLIAN LANDRY DO K                        462   

        

PHARYNGITIS ACUTE                                

 

             2010           NEENA VELAZCO                        4

62           

PHARYNGITIS ACUTE                                

 

             2010           CHARLY ESCALONA                        46

2           

PHARYNGITIS ACUTE                                

 

             2010                        Ot           V12.51           HX-

VENOUS 

THROMBOSIS EMBOLISM                              

 

             2010                        Ot           V58.61           

ANTICOAGULANTS,LT,CURRENT USE                    

 

             2010                        Ot           V58.83           ENC

OUNTER FOR 

THERAPEUTIC DRUG MONITORIN                       

 

             2010                        Ot           V12.51           HX-

VENOUS 

THROMBOSIS EMBOLISM                              

 

             2010                        Ot           V58.61           

ANTICOAGULANTS,LT,CURRENT USE                    

 

             2010                        Ot           V58.83           ENC

OUNTER FOR 

THERAPEUTIC DRUG MONITORIN                       

 

             09/15/2010           LILLIAN LANDRY DO                        401.1 

          

HYPERTENSION, BENIGN ESSENTIAL                    

 

                09/15/2010           NEENA VELAZCO R                      

     401.1          

                          HYPERTENSION, BENIGN ESSENTIAL                    

 

             09/15/2010           CHARLY ESCALONA                        40

1.1           

HYPERTENSION, BENIGN ESSENTIAL                    

 

             2010           FRANTZ ALFARO LILLIAN K                        272.2 

          

HYPERLIPIDEMIA, MIXED                            

 

             2010           FRANTZ ALFARO LILLIAN K                        786.2 

          

COUGH                                            

 

                2010           NEENA VELAZCO R                      

     272.2          

                          HYPERLIPIDEMIA, MIXED                    

 

                2010           NEENA VELAZCO R                      

     786.2          

                          COUGH                              

 

             2010           CHARLY ESCALONA                        27

2.2           

HYPERLIPIDEMIA, MIXED                            

 

             2010           CHARLY ESCALONA                        78

6.2           

COUGH                                            

 

             2010                        Ot           V12.51           HX-

VENOUS 

THROMBOSIS EMBOLISM                              

 

             2010                        Ot           V58.61           

ANTICOAGULANTS,LT,CURRENT USE                    

 

             2010                        Ot           V58.83           ENC

OUNTER FOR 

THERAPEUTIC DRUG MONITORIN                       

 

           2011                       Ot           786.2           COUGH  

           

      

 

             2011                        Ot           V12.51           HX-

VENOUS 

THROMBOSIS EMBOLISM                              

 

             2011                        Ot           V58.61           

ANTICOAGULANTS,LT,CURRENT USE                    

 

             2011                        Ot           V58.83           ENC

OUNTER FOR 

THERAPEUTIC DRUG MONITORIN                       

 

             2011           FRANTZ ALFARO LILLIAN K                        724.2 

          BACK

PAIN, LOWER                                      

 

                2011           NEENA VELAZCO R                      

     724.2          

                          BACK PAIN, LOWER                    

 

             2011           CHARLY ESCALONA                        72

4.2           

BACK PAIN, LOWER                                 

 

           2011                       Ot           724.2           LUMBAGO

           

        

 

             2011                        Ot           V12.51           HX-

VENOUS 

THROMBOSIS EMBOLISM                              

 

             2011                        Ot           V58.61           

ANTICOAGULANTS,LT,CURRENT USE                    

 

             2011                        Ot           034.0           STRE

P SORE THROAT 

                                                 

 

             2011                        Ot           780.60           FEV

ER, 

UNSPECIFIED                                      

 

             2011                        Ot           787.03           VOM

ITING ALONE   

                                                 

 

             10/24/2011                        Ot           V12.51           HX-

VENOUS 

THROMBOSIS EMBOLISM                              

 

             10/24/2011                        Ot           V58.61           

ANTICOAGULANTS,LT,CURRENT USE                    

 

             10/24/2011                        Ot           V58.83           ENC

OUNTER FOR 

THERAPEUTIC DRUG MONITORIN                       

 

             2012                        Ot           V12.51           HX-

VENOUS 

THROMBOSIS EMBOLISM                              

 

             2012                        Ot           V58.61           

ANTICOAGULANTS,LT,CURRENT USE                    

 

             2012                        Ot           V58.83           ENC

OUNTER FOR 

THERAPEUTIC DRUG MONITORIN                       

 

             2012                        Ot           V12.51           HX-

VENOUS 

THROMBOSIS EMBOLISM                              

 

             2012                        Ot           V58.61           

ANTICOAGULANTS,LT,CURRENT USE                    

 

             2012                        Ot           V58.83           ENC

OUNTER FOR 

THERAPEUTIC DRUG MONITORIN                       

 

             2012                        Ot           784.7           EPIS

TAXIS         

                                                 

 

             2012                        Ot           V12.51           HX-

VENOUS 

THROMBOSIS EMBOLISM                              

 

             2012                        Ot           V58.61           

ANTICOAGULANTS,LT,CURRENT USE                    

 

             2012                        Ot           V58.83           ENC

OUNTER FOR 

THERAPEUTIC DRUG MONITORIN                       

 

             2012                        Ot           272.4           HYPE

RLIPIDEMIA 

NEC/NOS                                          

 

             2012                        Ot           305.1           TOBA

CCO USE 

DISORDER                                         

 

             2012                        Ot           401.9           HYPE

RTENSION NOS  

                                                 

 

             2012                        Ot           410.31           AC 

MYOCARD 

INFARCT INFRPSTERIOR WALL,INI                    

 

             2012                        Ot           414.01           COR

ONARY 

ATHEROSCLEROSIS OF NATIVE CORON                    

 

             2012                        Ot           428.0           ALDAIR

ESTIVE HEART 

FAILURE NOS                                      

 

             2012                        Ot           428.21           ACU

TE SYSTOLIC 

HEART FAILURE                                    

 

             2012                        Ot           530.81           ESO

PHAGEAL REFLUX

                                                 

 

             2012                        Ot           V12.51           HX-

VENOUS 

THROMBOSIS EMBOLISM                              

 

             2012                        Ot           V12.55           PER

JOCELIN HISTORY 

OF PULMONARY EMBOLISM                            

 

             2012                        Ot           V58.61           

ANTICOAGULANTS,LT,CURRENT USE                    

 

             2013                        Ot           272.4           HYPE

RLIPIDEMIA 

NEC/NOS                                          

 

             2013                        Ot           300.00           ANX

IETY STATE NOS

                                                 

 

             2013                        Ot           401.9           HYPE

RTENSION NOS  

                                                 

 

             2013                        Ot           412           OLD MY

OCARDIAL 

INFARCT                                          

 

             2013                        Ot           414.01           COR

ONARY 

ATHEROSCLEROSIS OF NATIVE CORON                    

 

             2013                        Ot           786.50           ELMA

ST PAIN NOS   

                                                 

 

             2013                        Ot           V12.51           HX-

VENOUS 

THROMBOSIS EMBOLISM                              

 

             2013                        Ot           V15.81           HX 

OF PAST 

NONCOMPLIANCE                                    

 

             2013                        Ot           V45.82           PER

CUTANEOUS 

TRANSLUM CORON ANGIOPLASTY                       

 

             2013                        Ot           V58.61           

ANTICOAGULANTS,LT,CURRENT USE                    

 

             2013                        Ot           V58.63           SHELBIE

G-

TERM(CURRENT)USE OF ANTIPLATELET/AN                    

 

             2013                        Ot           V58.66           SHELBIE

G-TERM 

(CURRENT) USE OF ASPIRIN                         

 

             2013                        Ot           V58.69           OTH

 

MED,LT,CURRENT USE                               

 

             03/10/2013                        Ot           V12.51           HX-

VENOUS 

THROMBOSIS EMBOLISM                              

 

             03/10/2013                        Ot           V58.61           

ANTICOAGULANTS,LT,CURRENT USE                    

 

             03/10/2013                        Ot           V58.83           ENC

OUNTER FOR 

THERAPEUTIC DRUG MONITORIN                       

 

             2013           LILLIAN LANDRY DO K                        461.9 

          

SINUSITIS ACUTE                                  

 

                2013           NEENA VELAZCO                      

     461.9          

                          SINUSITIS ACUTE                    

 

             2013           CHARLY ESCALONA                        46

1.9           

SINUSITIS ACUTE                                  

 

             04/10/2013                        Ot           719.47           MATTI

NT PAIN-ANKLE 

                                                 

 

             04/10/2013                        Ot           728.71           SARTHAK

NTAR 

FIBROMATOSIS                                     

 

                2013           CAROL FERREIRA MD           Ot      

        719.47     

                          JOINT PAIN-ANKLE                    

 

                2013           CAROL FERREIRA MD           Ot      

        845.00     

                          SPRAIN OF ANKLE NOS                    

 

                2013           CAROL FERREIRA MD           Ot      

        E000.8     

                          OTHER EXTERNAL CAUSE STATUS                    

 

                2013           CAROL FERREIRA MD           Ot      

        E849.0     

                          ACCIDENT IN HOME                    

 

                2013           CAROL FERREIRA MD           Ot      

        E888.9     

                          FALL NOS                           

 

             2013                        Ot           V12.51           HX-

VENOUS 

THROMBOSIS EMBOLISM                              

 

             2013                        Ot           V58.61           

ANTICOAGULANTS,LT,CURRENT USE                    

 

             2013                        Ot           V58.83           ENC

OUNTER FOR 

THERAPEUTIC DRUG MONITORIN                       

 

             2013           NORTH HERNANDEZ MD           Ot           V12.

51           

HX-VENOUS THROMBOSIS EMBOLISM                    

 

             2013           NORTH HERNANDEZ MD           Ot           V58.

61           

ANTICOAGULANTS,LT,CURRENT USE                    

 

             2013           NORTH HERNANDEZ MD           Ot           V58.

83           

ENCOUNTER FOR THERAPEUTIC DRUG MONITORIN                    

 

             2014           NORTH HERNANDEZ MD           Ot           V12.

51           

HX-VENOUS THROMBOSIS EMBOLISM                    

 

             2014           NORTH HERNANDEZ MD           Ot           V58.

61           

ANTICOAGULANTS,LT,CURRENT USE                    

 

             2014           NORTH HERNANDEZ MD           Ot           V58.

83           

ENCOUNTER FOR THERAPEUTIC DRUG MONITORIN                    

 

             2014           NEENA VELAZCO R                        4

62           

ACUTE PHARYNGITIS                                

 

                2014           NEENA VELAZCO                      

     786.2          

                          COUGH                              

 

             2014           CHARLY ESCALONA                        46

2           

ACUTE PHARYNGITIS                                

 

             2014           CHARLY ESCALONA                        78

6.2           

COUGH                                            

 

             2014           NORTH HERNANDEZ MD           Ot           V12.

51           

HX-VENOUS THROMBOSIS EMBOLISM                    

 

             2014           NORTH HERNANDEZ MD           Ot           V58.

61           

ANTICOAGULANTS,LT,CURRENT USE                    

 

             2014           NORTH HERNANDEZ MD           Ot           V58.

83           

ENCOUNTER FOR THERAPEUTIC DRUG MONITORIN                    

 

             2015                        Ot           274.01           ACU

TE GOUTY 

ARTHROPATHY                                      

 

             2015                        Ot           729.5           PAIN

 IN LIMB      

                                                 

 

             2015                        Ot           V58.61           

ANTICOAGULANTS,LT,CURRENT USE                    

 

             2015                        Ot           V58.63           SHELBIE

G-

TERM(CURRENT)USE OF ANTIPLATELET/AN                    

 

                2015           CHARLY ESCALONA                       

    274.02          

                          CHRONIC GOUTY ARTHROPATHY WITHOUT MENTION OF TOPHUS (T

OPHI)                    

 

                2015           CHARLY ESCALONA                       

    719.47          

                          PAIN IN JOINT INVOLVING ANKLE AND FOOT                

    

 

             2015           CHARLY ESCALONA                        30

5.1           

TOBACCO ABUSE                                    

 

                2015           CHARLY ESCALONA                       

    414.00          

                          CAD                                

 

                2015           CHARLY ESCALONA                       

    716.90          

                          ARTHRITIS/ ARTHROPATHY, UNSPECIFIED                   

 

 

                2015           CHARLY ESCALONA                       

    V65.42          

                          TOBACCO COUNSELING                    

 

           2015                       Ot           272.1                  

           

  

 

           2015                       Ot           414.00                 

           

   

 

           2015                       Ot           786.50                 

           

   

 

           2015                       Ot           397.0                  

           

  

 

           2015                       Ot           414.00                 

           

   

 

           2015                       Ot           424.0                  

           

  

 

           2015                       Ot           786.50                 

           

   

 

           2015                       Ot           272.4                  

           

  

 

           2015                       Ot           401.9                  

           

  

 

           2015                       Ot           414.01                 

           

   

 

           2015                       Ot           401.9                  

           

  

 

           2015                       Ot           414.01                 

           

   

 

           2015                       Ot           428.0                  

           

  

 

           2015                       Ot           719.40                 

           

   

 

           2015                       Ot           782.3                  

           

  

 

           2015                       Ot           V58.61                 

           

   

 

             2015           NORTH HERNANDEZ MD           Ot           V12.

51           

                                                 

 

             2015           NORTH HERNANDEZ MD           Ot           V58.

61           

                                                 

 

             2015           NORTH HERNANDEZ MD           Ot           V58.

83           

                                                 

 

           2015                       Ot           272.4                  

           

  

 

           2015                       Ot           401.9                  

           

  

 

           2015                       Ot           414.01                 

           

   

 

           2015                       Ot           401.9                  

           

  

 

           2015                       Ot           414.01                 

           

   

 

           2015                       Ot           428.0                  

           

  

 

           2015                       Ot           719.40                 

           

   

 

           2015                       Ot           782.3                  

           

  

 

           2015                       Ot           V58.61                 

           

   

 

             2015           NORTH HERNANDEZ MD           Ot           V12.

51           

                                                 

 

             2015           NORTH HERNANDEZ MD           Ot           V58.

61           

                                                 

 

             2015           NORTH HERNANDEZ MD           Ot           V58.

83           

                                                 

 

             2015           NORTH HERNANDEZ MD           Ot           V12.

51           

HX-VENOUS THROMBOSIS EMBOLISM                    

 

             2015           NORTH HERNANDEZ MD           Ot           V58.

61           

ANTICOAGULANTS,LT,CURRENT USE                    

 

             2015           NORTH HERNANDEZ MD           Ot           V58.

83           

ENCOUNTER FOR THERAPEUTIC DRUG MONITORIN                    

 

           2015                       Ot           272.1                  

           

  

 

           2015                       Ot           414.00                 

           

   

 

           2015                       Ot           786.50                 

           

   

 

           2015                       Ot           397.0                  

           

  

 

           2015                       Ot           414.00                 

           

   

 

           2015                       Ot           424.0                  

           

  

 

           2015                       Ot           786.50                 

           

   

 

           2015                       Ot           272.4                  

           

  

 

           2015                       Ot           401.9                  

           

  

 

           2015                       Ot           414.01                 

           

   

 

           2015                       Ot           401.9                  

           

  

 

           2015                       Ot           414.01                 

           

   

 

           2015                       Ot           428.0                  

           

  

 

           2015                       Ot           719.40                 

           

   

 

           2015                       Ot           782.3                  

           

  

 

           2015                       Ot           V58.61                 

           

   

 

             2015           NORTH HERNANDEZ MD           Ot           V12.

51           

                                                 

 

             2015           NORTH HERNANDEZ MD           Ot           V58.

61           

                                                 

 

             2015           NORTH HERNANDEZ MD           Ot           V58.

83           

                                                 

 

           2015                       Ot           272.4                  

           

  

 

           2015                       Ot           401.9                  

           

  

 

           2015                       Ot           414.01                 

           

   

 

           2015                       Ot           272.4                  

           

  

 

           2015                       Ot           401.9                  

           

  

 

           2015                       Ot           414.01                 

           

   

 

           2015                       Ot           401.9                  

           

  

 

           2015                       Ot           414.01                 

           

   

 

           2015                       Ot           428.0                  

           

  

 

           2015                       Ot           719.40                 

           

   

 

           2015                       Ot           782.3                  

           

  

 

           2015                       Ot           V58.61                 

           

   

 

           2015                       Ot           272.1                  

           

  

 

           2015                       Ot           414.00                 

           

   

 

           2015                       Ot           786.50                 

           

   

 

           2015                       Ot           397.0                  

           

  

 

           2015                       Ot           414.00                 

           

   

 

           2015                       Ot           424.0                  

           

  

 

           2015                       Ot           786.50                 

           

   

 

           2015                       Ot           272.4                  

           

  

 

           2015                       Ot           401.9                  

           

  

 

           2015                       Ot           414.01                 

           

   

 

           2015                       Ot           401.9                  

           

  

 

           2015                       Ot           414.01                 

           

   

 

           2015                       Ot           428.0                  

           

  

 

           2015                       Ot           719.40                 

           

   

 

           2015                       Ot           782.3                  

           

  

 

           2015                       Ot           V58.61                 

           

   

 

             2015           NORTH HERNANDEZ MD           Ot           V12.

51           

                                                 

 

             2015           NORTH HERNANDEZ MD           Ot           V58.

61           

                                                 

 

             2015           NORTH HERNANDEZ MD           Ot           V58.

83           

                                                 

 

             2015           COLE CONTRERAS MD           Ot           274

.9           

GOUT NOS                                         

 

             2015           COLE CONTRERAS MD           Ot           729

.5           

PAIN IN LIMB                                     

 

           2015                       Ot           272.1                  

           

  

 

           2015                       Ot           414.00                 

           

   

 

           2015                       Ot           786.50                 

           

   

 

           2015                       Ot           397.0                  

           

  

 

           2015                       Ot           414.00                 

           

   

 

           2015                       Ot           424.0                  

           

  

 

           2015                       Ot           786.50                 

           

   

 

           2015                       Ot           272.4                  

           

  

 

           2015                       Ot           401.9                  

           

  

 

           2015                       Ot           414.01                 

           

   

 

           2015                       Ot           401.9                  

           

  

 

           2015                       Ot           414.01                 

           

   

 

           2015                       Ot           428.0                  

           

  

 

           2015                       Ot           719.40                 

           

   

 

           2015                       Ot           782.3                  

           

  

 

           2015                       Ot           V58.61                 

           

   

 

             2015           NORTH HERNANDEZ MD           Ot           V12.

51           

                                                 

 

             2015           NORTH HERNANDEZ MD           Ot           V58.

61           

                                                 

 

             2015           NORTH HERNANDEZ MD           Ot           V58.

83           

                                                 

 

             08/10/2015           NORTH HERNANDEZ MD           Ot           V12.

51           

                                                 

 

             08/10/2015           NORTH HERNANDEZ MD           Ot           V58.

61           

                                                 

 

             08/10/2015           NORTH HERNANDEZ MD           Ot           V58.

83           

                                                 

 

             2015           NORTH HERNANDEZ MD           Ot           V12.

51           

                                                 

 

             2015           NORTH HERNANDEZ MD           Ot           V58.

61           

                                                 

 

             2015           NROTH HERNANDEZ MD           Ot           V58.

83           

                                                 

 

             2015           NORTH HERNANDEZ MD           Ot           V12.

51           

HX-VENOUS THROMBOSIS EMBOLISM                    

 

             2015           NORTH HERNANDEZ MD           Ot           V58.

61           

ANTICOAGULANTS,LT,CURRENT USE                    

 

             2015           NORTH HERNANDEZ MD           Ot           V58.

83           

ENCOUNTER FOR THERAPEUTIC DRUG MONITORIN                    

 

           2015                       Ot           270.4                  

           

  

 

           2015                       Ot           305.1                  

           

  

 

           2015                       Ot           536.8                  

           

  

 

           2015                       Ot           729.81                 

           

   

 

           2015                       Ot           V12.51                 

           

   

 

           2015                       Ot           V58.61                 

           

   

 

           2015                       Ot           V58.69                 

           

   

 

           2015                       Ot           270.4                  

           

  

 

           2015                       Ot           305.1                  

           

  

 

           2015                       Ot           536.8                  

           

  

 

           2015                       Ot           V12.51                 

           

   

 

           2015                       Ot           V58.61                 

           

   

 

           2015                       Ot           V58.69                 

           

   

 

           2015                       Ot           272.4                  

           

  

 

           2015                       Ot           729.5                  

           

  

 

           2015                       Ot           780.79                 

           

   

 

           2015                       Ot           270.4                  

           

  

 

           2015                       Ot           305.1                  

           

  

 

           2015                       Ot           536.8                  

           

  

 

           2015                       Ot           729.5                  

           

  

 

           2015                       Ot           V12.51                 

           

   

 

           2015                       Ot           V58.69                 

           

   

 

           2015                       Ot           270.4                  

           

  

 

           2015                       Ot           305.1                  

           

  

 

           2015                       Ot           536.8                  

           

  

 

           2015                       Ot           V12.51                 

           

   

 

           2015                       Ot           V58.69                 

           

   

 

           2015                       Ot           786.09                 

           

   

 

           2015                       Ot           786.50                 

           

   

 

           2015                       Ot           786.09                 

           

   

 

           2015                       Ot           786.50                 

           

   

 

           2015                       Ot           270.4                  

           

  

 

           2015                       Ot           305.1                  

           

  

 

           2015                       Ot           533.90                 

           

   

 

           2015                       Ot           V12.51                 

           

   

 

           2015                       Ot           272.4                  

           

  

 

           2015                       Ot           401.9                  

           

  

 

           2015                       Ot           414.00                 

           

   

 

           2015                       Ot           272.4                  

           

  

 

           2015                       Ot           356.9                  

           

  

 

           2015                       Ot           272.4                  

           

  

 

           2015                       Ot           729.5                  

           

  

 

           2015                       Ot           V58.61                 

           

   

 

           2015                       Ot           272.1                  

           

  

 

           2015                       Ot           414.00                 

           

   

 

           2015                       Ot           786.50                 

           

   

 

           2015                       Ot           397.0                  

           

  

 

           2015                       Ot           414.00                 

           

   

 

           2015                       Ot           424.0                  

           

  

 

           2015                       Ot           786.50                 

           

   

 

           2015                       Ot           272.4                  

           

  

 

           2015                       Ot           401.9                  

           

  

 

           2015                       Ot           414.01                 

           

   

 

           2015                       Ot           401.9                  

           

  

 

           2015                       Ot           414.01                 

           

   

 

           2015                       Ot           428.0                  

           

  

 

           2015                       Ot           719.40                 

           

   

 

           2015                       Ot           782.3                  

           

  

 

           2015                       Ot           V58.61                 

           

   

 

           2015                       Ot           272.1                  

           

  

 

           2015                       Ot           414.00                 

           

   

 

           2015                       Ot           786.50                 

           

   

 

           2015                       Ot           397.0                  

           

  

 

           2015                       Ot           414.00                 

           

   

 

           2015                       Ot           424.0                  

           

  

 

           2015                       Ot           786.50                 

           

   

 

           2015                       Ot           272.4                  

           

  

 

           2015                       Ot           401.9                  

           

  

 

           2015                       Ot           414.01                 

           

   

 

           2015                       Ot           401.9                  

           

  

 

           2015                       Ot           414.01                 

           

   

 

           2015                       Ot           428.0                  

           

  

 

           2015                       Ot           719.40                 

           

   

 

           2015                       Ot           782.3                  

           

  

 

           2015                       Ot           V58.61                 

           

   

 

             2016                        Ot           414.00           [facility]

ON ATHEROSCLER

NOS TYPE VESSEL, NATIV                           

 

             2016                        Ot           786.50           [facility]

ST PAIN NOS   

                                                 

 

             2016                        Ot           397.0           TR[facility]

USPID VALVE 

DISEASE                                          

 

             2016                        Ot           414.00           [facility]

ON ATHEROSCLER

NOS TYPE VESSEL, NATIV                           

 

             2016                        Ot           424.0           [facility]

AL VALVE 

DISORDER                                         

 

             2016                        Ot           786.50           [facility]

ST PAIN NOS   

                                                 

 

             2016                        Ot           272.4           HYPE

RLIPIDEMIA 

NEC/NOS                                          

 

             2016                        Ot           401.9           [facility]

RTENSION NOS  

                                                 

 

             2016                        Ot           414.01           [facility]

ONARY 

ATHEROSCLEROSIS OF NATIVE CORON                    

 

             2016                        Ot           401.9           [facility]

RTENSION NOS  

                                                 

 

             2016                        Ot           414.01           [facility]

ONARY 

ATHEROSCLEROSIS OF NATIVE CORON                    

 

             2016                        Ot           428.0           [facility]

ESTIVE HEART 

FAILURE NOS                                      

 

             2016                        Ot           719.40           MATTI

NT PAIN-UNSPEC

                                                 

 

           2016                       Ot           782.3           EDEMA  

           

      

 

             2016                        Ot           V58.61           

ANTICOAGULANTS,LT,CURRENT USE                    

 

                2016           RADHA ARELLANO APRN           Ot           

   F17.210         

                          NICOTINE DEPENDENCE, CIGARETTES, UNCOMPL              

      

 

                2016           RADHA ARELLANO APRN           Ot           

   M25.511         

                          PAIN IN RIGHT SHOULDER                    

 

                2016           NORTH HERNANDEZ MD           Ot            

  I82.403          

                          ACUTE EMBOLISM AND THOMBOS UNSP DEEP VEI              

      

 

             2016           NORTH HERNANDEZ MD           Ot           Z79.

01           

LONG TERM (CURRENT) USE OF ANTICOAGULANT                    

 

                08/15/2016           NORTH HERNANDEZ MD           Ot            

  I82.403          

                          ACUTE EMBOLISM AND THOMBOS UNSP DEEP VEI              

      

 

             08/15/2016           NORTH HERNANDEZ MD           Ot           Z79.

01           

LONG TERM (CURRENT) USE OF ANTICOAGULANT                    

 

                2016           NORTH HERNANDEZ MD           Ot            

  I82.403          

                          ACUTE EMBOLISM AND THOMBOS UNSP DEEP VEI              

      

 

             2016           NORTH HERNANDEZ MD           Ot           Z79.

01           

LONG TERM (CURRENT) USE OF ANTICOAGULANT                    

 

             10/07/2016                        Ot           414.00           [facility]

ON ATHEROSCLER

NOS TYPE VESSEL, NATIV                           

 

             10/07/2016                        Ot           786.50           [facility]

ST PAIN NOS   

                                                 

 

             10/07/2016                        Ot           397.0           TR[facility]

USPID VALVE 

DISEASE                                          

 

             10/07/2016                        Ot           414.00           [facility]

ON ATHEROSCLER

NOS TYPE VESSEL, NATIV                           

 

             10/07/2016                        Ot           424.0           [facility]

AL VALVE 

DISORDER                                         

 

             10/07/2016                        Ot           786.50           [facility]

ST PAIN NOS   

                                                 

 

             10/07/2016                        Ot           272.4           HYPE

RLIPIDEMIA 

NEC/NOS                                          

 

             10/07/2016                        Ot           401.9           HYPE

RTENSION NOS  

                                                 

 

             10/07/2016                        Ot           414.01           [facility]

ONARY 

ATHEROSCLEROSIS OF NATIVE CORON                    

 

             10/07/2016                        Ot           401.9           H[facility]

RTENSION NOS  

                                                 

 

             10/07/2016                        Ot           414.01           COR

ONARY 

ATHEROSCLEROSIS OF NATIVE CORON                    

 

             10/07/2016                        Ot           428.0           [facility]

ESTIVE HEART 

FAILURE NOS                                      

 

             10/07/2016                        Ot           719.40           [facility]

NT PAIN-UNSPEC

                                                 

 

           10/07/2016                       Ot           782.3           [facility]  

           

      

 

             10/07/2016                        Ot           V58.61           

ANTICOAGULANTS,LT,CURRENT USE                    

 

                10/10/2016           NORTH HERNANDEZ MD           Ot            

  I82.403          

                          ACUTE EMBOLISM AND THOMBOS UNSP DEEP VEI              

      

 

             10/10/2016           NORTH HRENANDEZ MD           Ot           Z79.

01           

LONG TERM (CURRENT) USE OF ANTICOAGULANT                    

 

                2016           NORTH HERNANDEZ MD           Ot            

  I82.403          

                          ACUTE EMBOLISM AND THOMBOS UNSP DEEP VEI              

      

 

             2016           NORTH HERNANDEZ MD           Ot           Z79.

01           

LONG TERM (CURRENT) USE OF ANTICOAGULANT                    

 

             2016                        Ot           414.00           COR

ON ATHEROSCLER

NOS TYPE VESSEL, NATIV                           

 

             2016                        Ot           786.50           [facility]

ST PAIN NOS   

                                                 

 

             2016                        Ot           397.0           TR[facility]

USPID VALVE 

DISEASE                                          

 

             2016                        Ot           414.00           [facility]

ON ATHEROSCLER

NOS TYPE VESSEL, NATIV                           

 

             2016                        Ot           424.0           [facility]

AL VALVE 

DISORDER                                         

 

             2016                        Ot           786.50           [facility]

ST PAIN NOS   

                                                 

 

             2016                        Ot           272.4           [facility]

RLIPIDEMIA 

NEC/NOS                                          

 

             2016                        Ot           401.9           [facility]

RTENSION NOS  

                                                 

 

             2016                        Ot           414.01           [facility]

ONARY 

ATHEROSCLEROSIS OF NATIVE CORON                    

 

             2016                        Ot           401.9           [facility]

RTENSION NOS  

                                                 

 

             2016                        Ot           414.01           [facility]

ONARY 

ATHEROSCLEROSIS OF NATIVE CORON                    

 

             2016                        Ot           428.0           [facility]

ESTIVE HEART 

FAILURE NOS                                      

 

             2016                        Ot           719.40           [facility]

NT PAIN-UNSPEC

                                                 

 

           2016                       Ot           782.3           [facility]  

           

      

 

             2016                        Ot           V58.61           

ANTICOAGULANTS,LT,CURRENT USE                    

 

                2016           NORTH HERNANDEZ MD           Ot            

  I82.403          

                          ACUTE EMBOLISM AND THOMBOS UNSP DEEP VEI              

      

 

             2016           NORTH HERNANDEZ MD           Ot           Z79.

01           

LONG TERM (CURRENT) USE OF ANTICOAGULANT                    

 

             2016           NORTH HERNANDEZ MD           Ot           I11.

0           

HYPERTENSIVE HEART DISEASE WITH HEART FA                    

 

             2016           NORTH HERNANDEZ MD           Ot           I25.

10           

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                     

 

             2016           NORTH HERNANDEZ MD           Ot           I26.

99           

OTHER PULMONARY EMBOLISM WITHOUT ACUTE C                    

 

             2016           NORTH HERNANDEZ MD           Ot           I50.

9           

HEART FAILURE, UNSPECIFIED                       

 

                2016           NORTH HERNANDEZ MD           Ot            

  I82.409          

                          ACUTE EMBOLISM AND THOMBOS UNSP DEEP VN               

      

 

             2016           NORTH HERNANDEZ MD           Ot           R07.

9           

CHEST PAIN, UNSPECIFIED                          

 

             2016                        Ot           414.00           [facility]

ON ATHEROSCLER

NOS TYPE VESSEL, NATIV                           

 

             2016                        Ot           786.50           [facility]

ST PAIN NOS   

                                                 

 

             2016                        Ot           397.0           [facility]

USPID VALVE 

DISEASE                                          

 

             2016                        Ot           414.00           [facility]

ON ATHEROSCLER

NOS TYPE VESSEL, NATIV                           

 

             2016                        Ot           424.0           [facility]

AL VALVE 

DISORDER                                         

 

             2016                        Ot           786.50           [facility]

ST PAIN NOS   

                                                 

 

             2016                        Ot           272.4           [facility]

RLIPIDEMIA 

NEC/NOS                                          

 

             2016                        Ot           401.9           [facility]

RTENSION NOS  

                                                 

 

             2016                        Ot           414.01           [facility]

ONARY 

ATHEROSCLEROSIS OF NATIVE CORON                    

 

             2016                        Ot           401.9           [facility]

RTENSION NOS  

                                                 

 

             2016                        Ot           414.01           [facility]

ONARY 

ATHEROSCLEROSIS OF NATIVE CORON                    

 

             2016                        Ot           428.0           [facility]

ESTIVE HEART 

FAILURE NOS                                      

 

             2016                        Ot           719.40           [facility]

NT PAIN-UNSPEC

                                                 

 

           2016                       Ot           782.3           [facility]  

           

      

 

             2016                        Ot           V58.61           

ANTICOAGULANTS,LT,CURRENT USE                    

 

                2016           NORTH HERNANDEZ MD           Ot            

  I82.403          

                          ACUTE EMBOLISM AND THOMBOS UNSP DEEP VEI              

      

 

             2016           NORTH HERNANDEZ MD           Ot           Z79.

01           

LONG TERM (CURRENT) USE OF ANTICOAGULANT                    

 

             2016           NORTH HERNANDEZ MD           Ot           I11.

0           

HYPERTENSIVE HEART DISEASE WITH HEART FA                    

 

             2016           NORTH HERNANDEZ MD           Ot           I25.

10           

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                     

 

             2016           NORTH HERNANDEZ MD           Ot           I26.

99           

OTHER PULMONARY EMBOLISM WITHOUT ACUTE C                    

 

             2016           NORTH HERNANDEZ MD           Ot           I50.

9           

HEART FAILURE, UNSPECIFIED                       

 

                2016           NORTH HERNANDEZ MD           Ot            

  I82.409          

                          ACUTE EMBOLISM AND THOMBOS UNSP DEEP VN               

      

 

             2016           NORTH HERNANDEZ MD           Ot           R07.

9           

CHEST PAIN, UNSPECIFIED                          

 

             2016           NORTH HERNANDEZ MD           Ot           I25.

10           

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                     

 

             2016           NORTH HERNANDEZ MD           Ot           I50.

9           

HEART FAILURE, UNSPECIFIED                       

 

             2016           NORTH HERNANDEZ MD           Ot           R07.

9           

CHEST PAIN, UNSPECIFIED                          

 

             2016           NORTH HERNANDEZ MD           Ot           I25.

10           

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                     

 

             2016           NORTH HERNANDEZ MD           Ot           I50.

9           

HEART FAILURE, UNSPECIFIED                       

 

             2016           NORTH HERNANDEZ MD           Ot           R07.

9           

CHEST PAIN, UNSPECIFIED                          

 

             2016                        Ot           414.00           [facility]

ON ATHEROSCLER

NOS TYPE VESSEL, NATIV                           

 

             2016                        Ot           786.50           [facility]

ST PAIN NOS   

                                                 

 

             2016                        Ot           397.0           [facility]

USPID VALVE 

DISEASE                                          

 

             2016                        Ot           414.00           [facility]

ON ATHEROSCLER

NOS TYPE VESSEL, NATIV                           

 

             2016                        Ot           424.0           [facility]

AL VALVE 

DISORDER                                         

 

             2016                        Ot           786.50           [facility]

ST PAIN NOS   

                                                 

 

             2016                        Ot           272.4           [facility]

RLIPIDEMIA 

NEC/NOS                                          

 

             2016                        Ot           401.9           [facility]

RTENSION NOS  

                                                 

 

             2016                        Ot           414.01           [facility]

ONARY 

ATHEROSCLEROSIS OF NATIVE CORON                    

 

             2016                        Ot           401.9           [facility]

RTENSION NOS  

                                                 

 

             2016                        Ot           414.01           [facility]

ONARY 

ATHEROSCLEROSIS OF NATIVE CORON                    

 

             2016                        Ot           428.0           [facility]

ESTIVE HEART 

FAILURE NOS                                      

 

             2016                        Ot           719.40           [facility]

NT PAIN-UNSPEC

                                                 

 

           2016                       Ot           782.3           [facility]  

           

      

 

             2016                        Ot           V58.61           

ANTICOAGULANTS,LT,CURRENT USE                    

 

                2016           NORTH HERNANDEZ MD           Ot            

  I82.403          

                          ACUTE EMBOLISM AND THOMBOS UNSP DEEP VEI              

      

 

             2016           NORTH HERNANDEZ MD           Ot           Z79.

01           

LONG TERM (CURRENT) USE OF ANTICOAGULANT                    

 

             2016           NORTH HERNANDEZ MD           Ot           I11.

0           

HYPERTENSIVE HEART DISEASE WITH HEART FA                    

 

             2016           NORTH HERNANDEZ MD           Ot           I25.

10           

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                     

 

             2016           NORTH HERNANDEZ MD           Ot           I26.

99           

OTHER PULMONARY EMBOLISM WITHOUT ACUTE C                    

 

             2016           NORTH HERNANDEZ MD           Ot           I50.

9           

HEART FAILURE, UNSPECIFIED                       

 

                2016           NORTH HERNANDEZ MD           Ot            

  I82.409          

                          ACUTE EMBOLISM AND THOMBOS UNSP DEEP VN               

      

 

             2016           NORTH HERNANDEZ MD           Ot           R07.

9           

CHEST PAIN, UNSPECIFIED                          

 

             2016           NORTH HERNANDEZ MD           Ot           I25.

10           

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                     

 

             2016           NORTH HERNANDEZ MD           Ot           I50.

9           

HEART FAILURE, UNSPECIFIED                       

 

             2016           NORTH HERNANDEZ MD           Ot           R07.

9           

CHEST PAIN, UNSPECIFIED                          

 

             2016           NORTH HERNANDEZ MD           Ot           I11.

0           

HYPERTENSIVE HEART DISEASE WITH HEART FA                    

 

             2016           NORTH HERNANDEZ MD           Ot           I25.

10           

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                     

 

             2016           NORTH HERNANDEZ MD           Ot           I26.

99           

OTHER PULMONARY EMBOLISM WITHOUT ACUTE C                    

 

             2016           NORTH HERNANDEZ MD           Ot           I50.

9           

HEART FAILURE, UNSPECIFIED                       

 

                2016           NORTH HERNANDEZ MD           Ot            

  I82.409          

                          ACUTE EMBOLISM AND THOMBOS UNSP DEEP VN               

      

 

             2016           NORTH HERNANDEZ MD           Ot           R07.

9           

CHEST PAIN, UNSPECIFIED                          

 

             2016           NORTH HERNANDEZ MD           Ot           E78.

5           

HYPERLIPIDEMIA, UNSPECIFIED                      

 

                2016           NORTH HERNANDEZ MD           Ot            

  F17.210          

                          NICOTINE DEPENDENCE, CIGARETTES, UNCOMPL              

      

 

             2016           NORTH HERNANDEZ MD           Ot           I10 

          

ESSENTIAL (PRIMARY) HYPERTENSION                    

 

             2016           NORTH HERNANDEZ MD           Ot           I25.

10           

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                     

 

             2016           NORTH HERNANDEZ MD           Ot           R07.

89           

OTHER CHEST PAIN                                 

 

             2016           NORTH HERNANDEZ MD           Ot           R94.

39           

ABNORMAL RESULT OF OTHER CARDIOVASCULAR                     

 

             2016           NORTH HERNANDEZ MD           Ot           Z79.

01           

LONG TERM (CURRENT) USE OF ANTICOAGULANT                    

 

                2016           NORTH HERNANDEZ MD           Ot            

  Z79.899          

                          OTHER LONG TERM (CURRENT) DRUG THERAPY                

    

 

                2016           NORTH HERNANDEZ MD           Ot            

  Z86.718          

                          PERSONAL HISTORY OF OTHER VENOUS THROMBO              

      

 

             2016           NORTH HERNANDEZ MD           Ot           Z95.

5           

PRESENCE OF CORONARY ANGIOPLASTY IMPLANT                    

 

             2016           NORTH HERNANDEZ MD           Ot           I25.

10           

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                     

 

             2016           NORTH HERNANDEZ MD           Ot           I50.

9           

HEART FAILURE, UNSPECIFIED                       

 

             2016           NORTH HERNANDEZ MD           Ot           R07.

9           

CHEST PAIN, UNSPECIFIED                          

 

             2016           NORTH HERNANDEZ MD           Ot           I25.

10           

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                     

 

             2016           NORTH HERNANDEZ MD           Ot           I50.

9           

HEART FAILURE, UNSPECIFIED                       

 

             2016           NORTH HERNANDEZ MD           Ot           R07.

9           

CHEST PAIN, UNSPECIFIED                          

 

                2017           NORTH HERNANDEZ MD           Ot            

  I82.403          

                          ACUTE EMBOLISM AND THOMBOS UNSP DEEP VEI              

      

 

             2017           NORTH HERNANDEZ MD           Ot           Z79.

01           

LONG TERM (CURRENT) USE OF ANTICOAGULANT                    

 

                2017           NORTH HERNANDEZ MD           Ot            

  I82.403          

                          ACUTE EMBOLISM AND THOMBOS UNSP DEEP VEI              

      

 

             2017           NORTH HERNANDEZ MD           Ot           Z79.

01           

LONG TERM (CURRENT) USE OF ANTICOAGULANT                    

 

                2017           NORTH HERNANDEZ MD           Ot            

  I82.403          

                          ACUTE EMBOLISM AND THOMBOS UNSP DEEP VEI              

      

 

             2017           NORTH HERNANDEZ MD           Ot           Z79.

01           

LONG TERM (CURRENT) USE OF ANTICOAGULANT                    

 

                2017           NORTH HERNANDEZ MD           Ot            

  I82.403          

                          ACUTE EMBOLISM AND THOMBOS UNSP DEEP VEI              

      

 

             2017           NORTH HERNANDEZ MD           Ot           Z79.

01           

LONG TERM (CURRENT) USE OF ANTICOAGULANT                    

 

                2017           NORTH HERNANDEZ MD           Ot            

  I82.403          

                          ACUTE EMBOLISM AND THOMBOS UNSP DEEP VEI              

      

 

             2017           NORTH HERNANDEZ MD           Ot           Z79.

01           

LONG TERM (CURRENT) USE OF ANTICOAGULANT                    

 

                02/15/2017           NORTH HERNANDEZ MD           Ot            

  I82.403          

                          ACUTE EMBOLISM AND THOMBOS UNSP DEEP VEI              

      

 

             02/15/2017           NORTH HERNANDEZ MD           Ot           Z79.

01           

LONG TERM (CURRENT) USE OF ANTICOAGULANT                    

 

                2017           NORTH HERNANDEZ MD           Ot            

  I82.403          

                          ACUTE EMBOLISM AND THOMBOS UNSP DEEP VEI              

      

 

             2017           NORTH HERNANDEZ MD           Ot           Z79.

01           

LONG TERM (CURRENT) USE OF ANTICOAGULANT                    

 

                2017           NORTH HERNANDEZ MD           Ot            

  I82.403          

                          ACUTE EMBOLISM AND THOMBOS UNSP DEEP VEI              

      

 

             2017           NORTH HERNANDEZ MD           Ot           Z79.

01           

LONG TERM (CURRENT) USE OF ANTICOAGULANT                    

 

                2017           NORTH HERNANDEZ MD           Ot            

  I82.403          

                          ACUTE EMBOLISM AND THOMBOS UNSP DEEP VEI              

      

 

             2017           NORTH HERNANDEZ MD           Ot           Z79.

01           

LONG TERM (CURRENT) USE OF ANTICOAGULANT                    

 

             2017                        Ot           414.00           [facility]

ON ATHEROSCLER

NOS TYPE VESSEL, NATIV                           

 

             2017                        Ot           786.50           [facility]

ST PAIN NOS   

                                                 

 

             2017                        Ot           397.0           [facility]

USPID VALVE 

DISEASE                                          

 

             2017                        Ot           414.00           [facility]

ON ATHEROSCLER

NOS TYPE VESSEL, NATIV                           

 

             2017                        Ot           424.0           [facility]

AL VALVE 

DISORDER                                         

 

             2017                        Ot           786.50           [facility]

ST PAIN NOS   

                                                 

 

             2017                        Ot           272.4           [facility]

RLIPIDEMIA 

NEC/NOS                                          

 

             2017                        Ot           401.9           [facility]

RTENSION NOS  

                                                 

 

             2017                        Ot           414.01           [facility]

ONARY 

ATHEROSCLEROSIS OF NATIVE CORON                    

 

             2017                        Ot           401.9           [facility]

RTENSION NOS  

                                                 

 

             2017                        Ot           414.01           [facility]

ONARY 

ATHEROSCLEROSIS OF NATIVE CORON                    

 

             2017                        Ot           428.0           [facility]

ESTIVE HEART 

FAILURE NOS                                      

 

             2017                        Ot           719.40           [facility]

NT PAIN-UNSPEC

                                                 

 

           2017                       Ot           782.3           [facility]  

           

      

 

             2017                        Ot           V58.61           

ANTICOAGULANTS,LT,CURRENT USE                    

 

             2017           NORTH HERNANDEZ MD           Ot           I11.

0           

HYPERTENSIVE HEART DISEASE WITH HEART FA                    

 

             2017           NORTH HERNANDEZ MD           Ot           I25.

10           

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                     

 

             2017           NORTH HERNANDEZ MD           Ot           I26.

99           

OTHER PULMONARY EMBOLISM WITHOUT ACUTE C                    

 

             2017           NORTH HERNANDEZ MD           Ot           I50.

9           

HEART FAILURE, UNSPECIFIED                       

 

                2017           NORTH HERNANDEZ MD           Ot            

  I82.409          

                          ACUTE EMBOLISM AND THOMBOS UNSP DEEP VN               

      

 

             2017           NORTH HERNANDEZ MD           Ot           R07.

9           

CHEST PAIN, UNSPECIFIED                          

 

             2017           NORTH HERNANDEZ MD           Ot           I25.

10           

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                     

 

             2017           NORTH HERNANDEZ MD           Ot           I50.

9           

HEART FAILURE, UNSPECIFIED                       

 

             2017           NORTH HERNANDEZ MD           Ot           R07.

9           

CHEST PAIN, UNSPECIFIED                          

 

                2017           DAMARIS WYNN           Ot         

     F17.210       

                          NICOTINE DEPENDENCE, CIGARETTES, UNCOMPL              

      

 

                2017           DAMARIS WYNN           Ot         

     R10.10        

                          UPPER ABDOMINAL PAIN, UNSPECIFIED                    

 

                2017           DAMARIS WYNN           Ot         

     R10.13        

                          EPIGASTRIC PAIN                    

 

                2017           DAMARIS WYNN           Ot         

     Z79.01        

                          LONG TERM (CURRENT) USE OF ANTICOAGULANT              

      

 

                2017           DAMARIS WYNN           Ot         

     Z79.82        

                          LONG TERM (CURRENT) USE OF ASPIRIN                    

 

                2017           ASHIA VILLA DAMARIS BIRDIE           Ot         

     Z79.899       

                          OTHER LONG TERM (CURRENT) DRUG THERAPY                

    

 

                2017           ASHIA VILLA DAMARIS BIRDIE           Ot         

     Z95.5         

                          PRESENCE OF CORONARY ANGIOPLASTY IMPLANT              

      

 

                2017           ASHIA VILLA DAMARIS BIRDIE           Ot         

     F17.210       

                          NICOTINE DEPENDENCE, CIGARETTES, UNCOMPL              

      

 

                2017           ASHIA VILLA DAMARIS BIRDIE           Ot         

     R10.10        

                          UPPER ABDOMINAL PAIN, UNSPECIFIED                    

 

                2017           ASHIA VILLA DAMARIS BIRDIE           Ot         

     R10.13        

                          EPIGASTRIC PAIN                    

 

                2017           ASHIA VILLA DAMARIS BIRDIE           Ot         

     Z79.01        

                          LONG TERM (CURRENT) USE OF ANTICOAGULANT              

      

 

                2017           ASHIA VILLA DAMARIS BIRDIE           Ot         

     Z79.82        

                          LONG TERM (CURRENT) USE OF ASPIRIN                    

 

                2017           ASHIA VILLA DAMARIS BIRDIE           Ot         

     Z79.899       

                          OTHER LONG TERM (CURRENT) DRUG THERAPY                

    

 

                2017           ASHIA VILLA DAMARIS BIRDIE           Ot         

     Z95.5         

                          PRESENCE OF CORONARY ANGIOPLASTY IMPLANT              

      

 

                2017           NORTH HERNANDEZ MD           Ot            

  I82.403          

                          ACUTE EMBOLISM AND THOMBOS UNSP DEEP VEI              

      

 

             2017           NORTH HERNANDEZ MD           Ot           Z79.

01           

LONG TERM (CURRENT) USE OF ANTICOAGULANT                    

 

             2017                        Ot           414.00           [facility]

ON ATHEROSCLER

NOS TYPE VESSEL, NATIV                           

 

             2017                        Ot           786.50           [facility]

ST PAIN NOS   

                                                 

 

             2017                        Ot           397.0           [facility]

USPID VALVE 

DISEASE                                          

 

             2017                        Ot           414.00           [facility]

ON ATHEROSCLER

NOS TYPE VESSEL, NATIV                           

 

             2017                        Ot           424.0           [facility]

AL VALVE 

DISORDER                                         

 

             2017                        Ot           786.50           [facility]

ST PAIN NOS   

                                                 

 

             2017                        Ot           272.4           [facility]

RLIPIDEMIA 

NEC/NOS                                          

 

             2017                        Ot           401.9           [facility]

RTENSION NOS  

                                                 

 

             2017                        Ot           414.01           [facility]

ONARY 

ATHEROSCLEROSIS OF NATIVE CORON                    

 

             2017                        Ot           401.9           [facility]

RTENSION NOS  

                                                 

 

             2017                        Ot           414.01           [facility]

ONARY 

ATHEROSCLEROSIS OF NATIVE CORON                    

 

             2017                        Ot           428.0           [facility]

ESTIVE HEART 

FAILURE NOS                                      

 

             2017                        Ot           719.40           [facility]

NT PAIN-UNSPEC

                                                 

 

           2017                       Ot           782.3           [facility]  

           

      

 

             2017                        Ot           V58.61           

ANTICOAGULANTS,LT,CURRENT USE                    

 

             2017           NORTH HERNANDEZ MD           Ot           I11.

0           

HYPERTENSIVE HEART DISEASE WITH HEART FA                    

 

             2017           NORTH HERNANDEZ MD           Ot           I25.

10           

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                     

 

             2017           NORTH HERNANDEZ MD           Ot           I26.

99           

OTHER PULMONARY EMBOLISM WITHOUT ACUTE C                    

 

             2017           NORTH HERNANDEZ MD           Ot           I50.

9           

HEART FAILURE, UNSPECIFIED                       

 

             2017           NORTH HERNANDEZ MD           Ot           R07.

9           

CHEST PAIN, UNSPECIFIED                          

 

             2017           NORTH HERNANDEZ MD           Ot           I25.

10           

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                     

 

             2017           NORTH HERNANDEZ MD           Ot           I50.

9           

HEART FAILURE, UNSPECIFIED                       

 

             2017           NORTH HERNANDEZ MD           Ot           R07.

9           

CHEST PAIN, UNSPECIFIED                          

 

                2017           NORTH HERNANDEZ MD           Ot            

  I82.403          

                          ACUTE EMBOLISM AND THOMBOS UNSP DEEP VEI              

      

 

             2017           NORTH HERNANDEZ MD           Ot           Z79.

01           

LONG TERM (CURRENT) USE OF ANTICOAGULANT                    

 

                2017           NORTH HERNANDEZ MD           Ot            

  I82.403          

                          ACUTE EMBOLISM AND THOMBOS UNSP DEEP VEI              

      

 

             2017           NORTH HERNANDEZ MD           Ot           Z79.

01           

LONG TERM (CURRENT) USE OF ANTICOAGULANT                    

 

                2017           NORTH HERNANDEZ MD           Ot            

  I82.403          

                          ACUTE EMBOLISM AND THOMBOS UNSP DEEP VEI              

      

 

             2017           NORTH HERNANDEZ MD           Ot           Z79.

01           

LONG TERM (CURRENT) USE OF ANTICOAGULANT                    

 

             2017           NORTH HERNANDEZ MD           Ot           I26.

99           

OTHER PULMONARY EMBOLISM WITHOUT ACUTE C                    

 

             2017           NORTH HERNANDEZ MD           Ot           Z51.

81           

ENCOUNTER FOR THERAPEUTIC DRUG LEVEL MON                    

 

                2017           DAMARIS WYNN           Ot         

     F17.200       

                          NICOTINE DEPENDENCE, UNSPECIFIED, UNCOMP              

      

 

                2017           DAMARIS WYNN           Ot         

     I25.2         

                          OLD MYOCARDIAL INFARCTION                    

 

                2017           DAMARIS WYNN           Ot         

     K21.9         

                          GASTRO-ESOPHAGEAL REFLUX DISEASE WITHOUT              

      

 

                2017           DAMARIS WYNN           Ot         

     L50.9         

                          URTICARIA, UNSPECIFIED                    

 

                2017           DAMARIS WYNN           Ot         

     M79.601       

                          PAIN IN RIGHT ARM                    

 

                2017           DAMARIS WYNN Ot         

     Z79.01        

                          LONG TERM (CURRENT) USE OF ANTICOAGULANT              

      

 

                2017           DAMARIS WYNN           Ot         

     Z79.82        

                          LONG TERM (CURRENT) USE OF ASPIRIN                    

 

                2017           DAMARIS WYNN           Ot         

     Z82.49        

                          FAMILY HX OF ISCHEM HEART DIS AND OTH DI              

      

 

                2017           DAMARIS WYNN           Ot         

     Z95.5         

                          PRESENCE OF CORONARY ANGIOPLASTY IMPLANT              

      

 

             2017           NORTH HERNANDEZ MD           Ot           I26.

99           

OTHER PULMONARY EMBOLISM WITHOUT ACUTE C                    

 

             2017           NORTH HERNANDEZ MD           Ot           Z51.

81           

ENCOUNTER FOR THERAPEUTIC DRUG LEVEL MON                    

 

             2017           NORTH HERNANDEZ MD           Ot           I26.

99           

OTHER PULMONARY EMBOLISM WITHOUT ACUTE C                    

 

             2017           NORTH HERNANDEZ MD           Ot           Z51.

81           

ENCOUNTER FOR THERAPEUTIC DRUG LEVEL MON                    

 

             09/15/2017           NORTH HERNANDEZ MD           Ot           I26.

99           

OTHER PULMONARY EMBOLISM WITHOUT ACUTE C                    

 

             09/15/2017           NORTH HERNANDEZ MD           Ot           Z51.

81           

ENCOUNTER FOR THERAPEUTIC DRUG LEVEL MON                    

 

                09/15/2017           NORTH HERNANDEZ MD           Ot            

  Z79.899          

                          OTHER LONG TERM (CURRENT) DRUG THERAPY                

    

 

             2017           NORTH HERNANDEZ MD           Ot           I26.

99           

OTHER PULMONARY EMBOLISM WITHOUT ACUTE C                    

 

             2017           NORTH HERNANDEZ MD           Ot           Z51.

81           

ENCOUNTER FOR THERAPEUTIC DRUG LEVEL 2017           NORTH HERNANDEZ MD           Ot            

  Z79.899          

                          OTHER LONG TERM (CURRENT) DRUG THERAPY                

    

 

             10/06/2017           NORTH HERNANDEZ MD           Ot           I26.

99           

OTHER PULMONARY EMBOLISM WITHOUT ACUTE C                    

 

             10/06/2017           NORTH HERNANDEZ MD           Ot           Z51.

81           

ENCOUNTER FOR THERAPEUTIC DRUG LEVEL MON                    

 

                10/06/2017           NORTH HERNANDEZ MD           Ot            

  Z79.899          

                          OTHER LONG TERM (CURRENT) DRUG THERAPY                

    

 

             2017           FELISHA MORENO DO           Ot           D68.59

           

OTHER PRIMARY THROMBOPHILIA                      

 

             2017           FELISHA MORENO DO           Ot           E78.1 

          

PURE HYPERGLYCERIDEMIA                           

 

             2017           FELISHA MORENO DO           Ot           E78.5 

          

HYPERLIPIDEMIA, UNSPECIFIED                      

 

             2017           FELISHA MORENO DO           Ot           F17.21

0           

NICOTINE DEPENDENCE, CIGARETTES, UNCOMPL                    

 

             2017           MORNEO DO, FELISHA           Ot           I10   

        

ESSENTIAL (PRIMARY) HYPERTENSION                    

 

             2017           TIFFANIE ALFARO FELISHA           Ot           I21.4 

          

NON-ST ELEVATION (NSTEMI) MYOCARDIAL INF                    

 

             2017           TIFFANIE ALFARO FELISHA           Ot           I25.10

           

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                     

 

             2017           TIFFANIE ALFARO FELISHA           Ot           I25.82

           

CHRONIC TOTAL OCCLUSION OF CORONARY LORI                    

 

             2017           TIFFANIE ALFARO FELISHA           Ot           I50.20

           

UNSPECIFIED SYSTOLIC (CONGESTIVE) HEART                     

 

             2017           TIFFANIE ALFARO FELISHA           Ot           K21.9 

          

GASTRO-ESOPHAGEAL REFLUX DISEASE WITHOUT                    

 

             2017           TIFFANIE ALFARO FELISHA           Ot           R06.2 

          

WHEEZING                                         

 

             2017           TIFFANIE ALFARO FELISHA           Ot           Z79.01

           

LONG TERM (CURRENT) USE OF ANTICOAGULANT                    

 

             2017           MICHAEL MORENO DOI           Ot           Z82.49

           

FAMILY HX OF ISCHEM HEART DIS AND OTH DI                    

 

             2017           MICHAEL MORENO DOI           Ot           Z95.5 

          

PRESENCE OF CORONARY ANGIOPLASTY IMPLANT                    

 

             2017           MICHAEL MORENO DOI           Ot           D68.59

           

OTHER PRIMARY THROMBOPHILIA                      

 

             2017           TIFFANIE ALFARO FELISHA           Ot           E78.1 

          

PURE HYPERGLYCERIDEMIA                           

 

             2017           TIFFANIE ALFARO FELISHA           Ot           E78.5 

          

HYPERLIPIDEMIA, UNSPECIFIED                      

 

             2017           TIFFANIE ALFARO FELISHA           Ot           F17.21

0           

NICOTINE DEPENDENCE, CIGARETTES, UNCOMPL                    

 

             2017           TIFFANIE ALFARO FELISHA           Ot           I10   

        

ESSENTIAL (PRIMARY) HYPERTENSION                    

 

             2017           TIFFANIE ALFARO FELISHA           Ot           I21.4 

          

NON-ST ELEVATION (NSTEMI) MYOCARDIAL INF                    

 

             2017           TIFFANIE ALFARO FELISHA           Ot           I25.10

           

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                     

 

             2017           TIFFANIE ALFARO FELISHA           Ot           I25.82

           

CHRONIC TOTAL OCCLUSION OF CORONARY LORI                    

 

             2017           TIFFANIE ALFARO FELISHA           Ot           I50.20

           

UNSPECIFIED SYSTOLIC (CONGESTIVE) HEART                     

 

             2017           TIFFANIE ALFARO FELISHA           Ot           K21.9 

          

GASTRO-ESOPHAGEAL REFLUX DISEASE WITHOUT                    

 

             2017           TIFFANIE ALFARO FELISHA           Ot           R06.2 

          

WHEEZING                                         

 

             2017           TIFFANIE ALFARO FELISHA           Ot           Z79.01

           

LONG TERM (CURRENT) USE OF ANTICOAGULANT                    

 

             2017           TIFFANIE ALFARO FELISHA           Ot           Z82.49

           

FAMILY HX OF ISCHEM HEART DIS AND OTH DI                    

 

             2017           FELISHA MORENO DO           Ot           Z95.5 

          

PRESENCE OF CORONARY ANGIOPLASTY IMPLANT                    

 

             2017           FELISHA MORENO DO           Ot           D68.59

           

OTHER PRIMARY THROMBOPHILIA                      

 

             2017           FELISHA MORENO DO           Ot           E78.1 

          

PURE HYPERGLYCERIDEMIA                           

 

             2017           FELISHA MORENO DO           Ot           E78.5 

          

HYPERLIPIDEMIA, UNSPECIFIED                      

 

             2017           FELISHA MORENO DO           Ot           F17.21

0           

NICOTINE DEPENDENCE, CIGARETTES, UNCOMPL                    

 

             2017           FELISHA MORENO DO           Ot           I10   

        

ESSENTIAL (PRIMARY) HYPERTENSION                    

 

             2017           FELISHA MORENO DO           Ot           I21.4 

          

NON-ST ELEVATION (NSTEMI) MYOCARDIAL INF                    

 

             2017           FELISHA MORENO DO           Ot           I25.10

           

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                     

 

             2017           FELISHA MORENO DO           Ot           I25.82

           

CHRONIC TOTAL OCCLUSION OF CORONARY LORI                    

 

             2017           FELISHA MORENO DO           Ot           I50.20

           

UNSPECIFIED SYSTOLIC (CONGESTIVE) HEART                     

 

             2017           FELISHA MORENO DO           Ot           K21.9 

          

GASTRO-ESOPHAGEAL REFLUX DISEASE WITHOUT                    

 

             2017           FELISHA MORENO DO           Ot           R06.2 

          

WHEEZING                                         

 

             2017           FELISHA MORENO DO           Ot           Z79.01

           

LONG TERM (CURRENT) USE OF ANTICOAGULANT                    

 

             2017           FELISHA MORENO DO           Ot           Z82.49

           

FAMILY HX OF ISCHEM HEART DIS AND OTH DI                    

 

             2017           FELISHA MORENO DO           Ot           Z95.5 

          

PRESENCE OF CORONARY ANGIOPLASTY IMPLANT                    

 

             2017                        Ot           414.00           [facility]

ON ATHEROSCLER

NOS TYPE VESSEL, NATIV                           

 

             2017                        Ot           786.50           [facility]

ST PAIN NOS   

                                                 

 

             2017                        Ot           397.0           [facility]

USPID VALVE 

DISEASE                                          

 

             2017                        Ot           414.00           [facility]

ON ATHEROSCLER

NOS TYPE VESSEL, NATIV                           

 

             2017                        Ot           424.0           [facility]

AL VALVE 

DISORDER                                         

 

             2017                        Ot           786.50           [facility]

ST PAIN NOS   

                                                 

 

             2017                        Ot           272.4           [facility]

RLIPIDEMIA 

NEC/NOS                                          

 

             2017                        Ot           401.9           [facility]

RTENSION NOS  

                                                 

 

             2017                        Ot           414.01           [facility]

ONARY 

ATHEROSCLEROSIS OF NATIVE CORON                    

 

             2017                        Ot           401.9           [facility]

RTENSION NOS  

                                                 

 

             2017                        Ot           414.01           [facility]

ONARY 

ATHEROSCLEROSIS OF NATIVE CORON                    

 

             2017                        Ot           428.0           [facility]

ESTIVE HEART 

FAILURE NOS                                      

 

             2017                        Ot           719.40           [facility]

NT PAIN-UNSPEC

                                                 

 

           2017                       Ot           782.3           [facility]  

           

      

 

             2017                        Ot           V58.61           

ANTICOAGULANTS,LT,CURRENT USE                    

 

             2017           NORTH HERNANDEZ MD           Ot           I11.

0           

HYPERTENSIVE HEART DISEASE WITH HEART FA                    

 

             2017           NORTH HERNANDEZ MD           Ot           I25.

10           

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                     

 

             2017           NORTH HERNANDEZ MD           Ot           I26.

99           

OTHER PULMONARY EMBOLISM WITHOUT ACUTE C                    

 

             2017           NORTH HERNANDEZ MD           Ot           I50.

9           

HEART FAILURE, UNSPECIFIED                       

 

             2017           NORTH HERNANDEZ MD           Ot           R07.

9           

CHEST PAIN, UNSPECIFIED                          

 

             2017           NORTH HERNANDEZ MD           Ot           I25.

10           

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                     

 

             2017           NORTH HERNANDEZ MD           Ot           I50.

9           

HEART FAILURE, UNSPECIFIED                       

 

             2017           NORTH HERNANDEZ MD           Ot           R07.

9           

CHEST PAIN, UNSPECIFIED                          

 

                2017           NORTH HERNANDEZ MD           Ot            

  I82.403          

                          ACUTE EMBOLISM AND THOMBOS UNSP DEEP VEI              

      

 

             2017           NORTH HERNANDEZ MD           Ot           Z79.

01           

LONG TERM (CURRENT) USE OF ANTICOAGULANT                    

 

             2017           NORTH HERNANDEZ MD           Ot           I26.

99           

OTHER PULMONARY EMBOLISM WITHOUT ACUTE C                    

 

             2017           NORTH HERNANDEZ MD           Ot           Z51.

81           

ENCOUNTER FOR THERAPEUTIC DRUG LEVEL MON                    

 

             2017           NORTH HERNANDEZ MD           Ot           I26.

99           

OTHER PULMONARY EMBOLISM WITHOUT ACUTE C                    

 

             2017           NORTH HERNANDEZ MD           Ot           Z51.

81           

ENCOUNTER FOR THERAPEUTIC DRUG LEVEL MON                    

 

                2017           NORTH HERNANDEZ MD           Ot            

  Z79.899          

                          OTHER LONG TERM (CURRENT) DRUG THERAPY                

    

 

                2017           DAMARIS WYNN           Ot         

     E78.00        

                          PURE HYPERCHOLESTEROLEMIA, UNSPECIFIED                

    

 

             2017           DAMARIS WYNN           Ot           I

10           

ESSENTIAL (PRIMARY) HYPERTENSION                    

 

                2017           DAMARIS WYNN           Ot         

     I25.2         

                          OLD MYOCARDIAL INFARCTION                    

 

                2017           DAMARIS WYNN           Ot         

     K21.9         

                          GASTRO-ESOPHAGEAL REFLUX DISEASE WITHOUT              

      

 

                2017           DAMARIS WYNN           Ot         

     R22.2         

                          LOCALIZED SWELLING, MASS AND LUMP, TRUNK              

      

 

                2017           DAMARIS WYNN           Ot         

     S20.212A      

                          CONTUSION OF LEFT FRONT WALL OF THORAX,               

      

 

                2017           DAMARIS WYNN           Ot         

     X58.XXXA      

                          EXPOSURE TO OTHER SPECIFIED FACTORS, INI              

      

 

                2017           DAMARIS WYNN           Ot         

     Z79.01        

                          LONG TERM (CURRENT) USE OF ANTICOAGULANT              

      

 

                2017           DAMARIS WYNN           Ot         

     Z79.82        

                          LONG TERM (CURRENT) USE OF ASPIRIN                    

 

                2017           DAMARIS WYNN           Ot         

     Z82.49        

                          FAMILY HX OF ISCHEM HEART DIS AND OTH DI              

      

 

                2017           DAMARIS WYNN           Ot         

     Z95.5         

                          PRESENCE OF CORONARY ANGIOPLASTY IMPLANT              

      

 

             2017           NORTH HERNANDEZ MD           Ot           I26.

99           

OTHER PULMONARY EMBOLISM WITHOUT ACUTE C                    

 

             2017           NORTH HERNANDEZ MD           Ot           Z79.

01           

LONG TERM (CURRENT) USE OF ANTICOAGULANT                    

 

             2017           NORTH HERNANDEZ MD           Ot           I50.

9           

HEART FAILURE, UNSPECIFIED                       

 

             12/15/2017                        Ot           414.00           [facility]

ON ATHEROSCLER

NOS TYPE VESSEL, NATIV                           

 

             12/15/2017                        Ot           786.50           [facility]

ST PAIN NOS   

                                                 

 

             12/15/2017                        Ot           397.0           [facility]

USPID VALVE 

DISEASE                                          

 

             12/15/2017                        Ot           414.00           [facility]

ON ATHEROSCLER

NOS TYPE VESSEL, NATIV                           

 

             12/15/2017                        Ot           424.0           [facility]

AL VALVE 

DISORDER                                         

 

             12/15/2017                        Ot           786.50           [facility]

ST PAIN NOS   

                                                 

 

             12/15/2017                        Ot           272.4           H[facility]

RLIPIDEMIA 

NEC/NOS                                          

 

             12/15/2017                        Ot           401.9           [facility]

RTENSION NOS  

                                                 

 

             12/15/2017                        Ot           414.01           COR

ONARY 

ATHEROSCLEROSIS OF NATIVE CORON                    

 

             12/15/2017                        Ot           401.9           [facility]

RTENSION NOS  

                                                 

 

             12/15/2017                        Ot           414.01           COR

ONARY 

ATHEROSCLEROSIS OF NATIVE CORON                    

 

             12/15/2017                        Ot           428.0           [facility]

ESTIVE HEART 

FAILURE NOS                                      

 

             12/15/2017                        Ot           719.40           [facility]

NT PAIN-UNSPEC

                                                 

 

           12/15/2017                       Ot           782.3           [facility]  

           

      

 

             12/15/2017                        Ot           V58.61           

ANTICOAGULANTS,LT,CURRENT USE                    

 

             12/15/2017           NORTH HERNANDEZ MD           Ot           I11.

0           

HYPERTENSIVE HEART DISEASE WITH HEART FA                    

 

             12/15/2017           NORTH HERNANDEZ MD           Ot           I25.

10           

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                     

 

             12/15/2017           NORTH HERNANDEZ MD           Ot           I26.

99           

OTHER PULMONARY EMBOLISM WITHOUT ACUTE C                    

 

             12/15/2017           NORTH HERNANDEZ MD           Ot           I50.

9           

HEART FAILURE, UNSPECIFIED                       

 

             12/15/2017           NORTH HERNANDEZ MD           Ot           R07.

9           

CHEST PAIN, UNSPECIFIED                          

 

             12/15/2017           NORTH HERNANDEZ MD           Ot           I25.

10           

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                     

 

             12/15/2017           NORTH HERNANDEZ MD           Ot           I50.

9           

HEART FAILURE, UNSPECIFIED                       

 

             12/15/2017           NORTH HERNANDEZ MD           Ot           R07.

9           

CHEST PAIN, UNSPECIFIED                          

 

                12/15/2017           NORTH HERNANDEZ MD           Ot            

  I82.403          

                          ACUTE EMBOLISM AND THOMBOS UNSP DEEP VEI              

      

 

             12/15/2017           NORTH HERNANDEZ MD           Ot           Z79.

01           

LONG TERM (CURRENT) USE OF ANTICOAGULANT                    

 

             12/15/2017           NORTH HERNANDEZ MD           Ot           I26.

99           

OTHER PULMONARY EMBOLISM WITHOUT ACUTE C                    

 

             12/15/2017           NORTH HERNANDEZ MD           Ot           Z51.

81           

ENCOUNTER FOR THERAPEUTIC DRUG LEVEL MON                    

 

             12/15/2017           NORTH HERNANDEZ MD           Ot           I26.

99           

OTHER PULMONARY EMBOLISM WITHOUT ACUTE C                    

 

             12/15/2017           NORTH HERNANDEZ MD           Ot           Z79.

01           

LONG TERM (CURRENT) USE OF ANTICOAGULANT                    

 

             12/15/2017           NORTH HERNANDEZ MD           Ot           I50.

9           

HEART FAILURE, UNSPECIFIED                       

 

             2017           NORTH HERNANDEZ MD           Ot           I26.

99           

OTHER PULMONARY EMBOLISM WITHOUT ACUTE C                    

 

             2017           NORTH HERNANDEZ MD           Ot           Z79.

01           

LONG TERM (CURRENT) USE OF ANTICOAGULANT                    

 

             2017           CANDE PIKE MD           Ot           I25.11

9           

ATHSCL HEART DISEASE OF NATIVE COR ART W                    

 

             2017           CANDE PIKE MD           Ot           Z01.81

2           

ENCOUNTER FOR PREPROCEDURAL LABORATORY E                    

 

             2017           NORTH HERNANDEZ MD           Ot           I26.

99           

OTHER PULMONARY EMBOLISM WITHOUT ACUTE C                    

 

             2017           NORTH HERNANDEZ MD           Ot           Z79.

01           

LONG TERM (CURRENT) USE OF ANTICOAGULANT                    

 

             2018           NORTH HERNANDEZ MD           Ot           I26.

99           

OTHER PULMONARY EMBOLISM WITHOUT ACUTE C                    

 

             2018           NORTH HERNANDEZ MD           Ot           Z79.

01           

LONG TERM (CURRENT) USE OF ANTICOAGULANT                    

 

             2018           NORTH HERNANDEZ MD           Ot           I26.

99           

OTHER PULMONARY EMBOLISM WITHOUT ACUTE C                    

 

             2018           NORTH HERNANDEZ MD           Ot           Z79.

01           

LONG TERM (CURRENT) USE OF ANTICOAGULANT                    

 

             2018           NORTH HERNANDEZ MD           Ot           I10 

          

ESSENTIAL (PRIMARY) HYPERTENSION                    

 

             2018           NORTH HERNANDEZ MD           Ot           I25.

10           

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                     

 

             2018           NORTH HERNANDEZ MD           Ot           R06.

00           

DYSPNEA, UNSPECIFIED                             

 

             2018           NORTH HERNANDEZ MD           Ot           R07.

89           

OTHER CHEST PAIN                                 

 

             2018           NORTH HERNANDEZ MD           Ot           I50.

9           

HEART FAILURE, UNSPECIFIED                       

 

                2018           RADHA ARELLANO           Ot           

   E78.00          

                          PURE HYPERCHOLESTEROLEMIA, UNSPECIFIED                

    

 

             2018           RADHA ARELLANO           Ot           I10

           

ESSENTIAL (PRIMARY) HYPERTENSION                    

 

             2018           RADHA ARELLANO           Ot           I20

.9           

ANGINA PECTORIS, UNSPECIFIED                     

 

             2018           RADHA ARELLANO           Ot           I25

.2           

OLD MYOCARDIAL INFARCTION                        

 

             2018           RADHA ARELLANO           Ot           K21

.9           

GASTRO-ESOPHAGEAL REFLUX DISEASE WITHOUT                    

 

             2018           RADHA ARELLANO           Ot           M10

.9           

GOUT, UNSPECIFIED                                

 

                2018           RADHA ARELLANO           Ot           

   M79.672         

                          PAIN IN LEFT FOOT                    

 

                2018           RADHA ARELLANO           Ot           

   Z79.01          

                          LONG TERM (CURRENT) USE OF ANTICOAGULANT              

      

 

                2018           RADHA ARELLANO           Ot           

   Z79.02          

                          LONG TERM (CURRENT) USE OF ANTITHROMBOTI              

      

 

                2018           RADHA ARELLANO           Ot           

   Z79.82          

                          LONG TERM (CURRENT) USE OF ASPIRIN                    

 

                2018           RADHA ARELLANO           Ot           

   Z82.49          

                          FAMILY HX OF ISCHEM HEART DIS AND OTH DI              

      

 

                2018           RADHA ARELLANO           Ot           

   Z86.718         

                          PERSONAL HISTORY OF OTHER VENOUS THROMBO              

      

 

             2018           RADHA ARELLANO           Ot           Z95

.5           

PRESENCE OF CORONARY ANGIOPLASTY IMPLANT                    

 

                2018           RADHA ARELLANO           Ot           

   E78.00          

                          PURE HYPERCHOLESTEROLEMIA, UNSPECIFIED                

    

 

             2018           RADHA ARELLANO           Ot           I10

           

ESSENTIAL (PRIMARY) HYPERTENSION                    

 

             2018           RADHA ARELLANO           Ot           I20

.9           

ANGINA PECTORIS, UNSPECIFIED                     

 

             2018           RADHA ARELLANO           Ot           I25

.2           

OLD MYOCARDIAL INFARCTION                        

 

             2018           RADHA ARELLANO           Ot           K21

.9           

GASTRO-ESOPHAGEAL REFLUX DISEASE WITHOUT                    

 

             2018           RADHA ARELLANO           Ot           M10

.9           

GOUT, UNSPECIFIED                                

 

                2018           RADHA ARELLANO           Ot           

   M79.672         

                          PAIN IN LEFT FOOT                    

 

                2018           RADHA ARELLANO           Ot           

   Z79.01          

                          LONG TERM (CURRENT) USE OF ANTICOAGULANT              

      

 

                2018           RADHA ARELLANO           Ot           

   Z79.02          

                          LONG TERM (CURRENT) USE OF ANTITHROMBOTI              

      

 

                2018           RADHA ARELLANO           Ot           

   Z79.82          

                          LONG TERM (CURRENT) USE OF ASPIRIN                    

 

                2018           RADHA ARELLANO           Ot           

   Z82.49          

                          FAMILY HX OF ISCHEM HEART DIS AND OTH DI              

      

 

                2018           RADHA ARELLANO           Ot           

   Z86.718         

                          PERSONAL HISTORY OF OTHER VENOUS THROMBO              

      

 

             2018           RADHA ARELLANO           Ot           Z95

.5           

PRESENCE OF CORONARY ANGIOPLASTY IMPLANT                    

 

             2018           NORTH HERNANDEZ MD           Ot           I10 

          

ESSENTIAL (PRIMARY) HYPERTENSION                    

 

             2018           NORTH HERNANDEZ MD           Ot           I25.

10           

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                     

 

             2018           NORTH HERNANDEZ MD           Ot           R06.

00           

DYSPNEA, UNSPECIFIED                             

 

             2018           NORTH HERNANDEZ MD           Ot           R07.

89           

OTHER CHEST PAIN                                 

 

             2018           CANDE HERNANDEZ MD           Ot           E78.

00           

PURE HYPERCHOLESTEROLEMIA, UNSPECIFIED                    

 

             2018           CANDE HERNANDEZ MD           Ot           E78.

5           

HYPERLIPIDEMIA, UNSPECIFIED                      

 

             2018           CANDE HERNANDEZ MD           Ot           I11.

0           

HYPERTENSIVE HEART DISEASE WITH HEART FA                    

 

             2018           CANDE HERNANDEZ MD           Ot           I25.

10           

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                     

 

             2018           CANDE HERNANDEZ MD           Ot           I25.

2           

OLD MYOCARDIAL INFARCTION                        

 

             2018           CANDE HERNANDEZ MD           Ot           I50.

9           

HEART FAILURE, UNSPECIFIED                       

 

             2018           CANDE HERNANDEZ MD           Ot           K21.

9           

GASTRO-ESOPHAGEAL REFLUX DISEASE WITHOUT                    

 

                2018           CANDE HERNANDEZ MD           Ot            

  L03.116          

                          CELLULITIS OF LEFT LOWER LIMB                    

 

             2018           CANDE HERNANDEZ MD           Ot           M10.

9           

GOUT, UNSPECIFIED                                

 

                2018           CANDE HERNANDEZ MD           Ot            

  Z86.718          

                          PERSONAL HISTORY OF OTHER VENOUS THROMBO              

      

 

                2018           CANDE HERNANDEZ MD           Ot            

  Z87.891          

                          PERSONAL HISTORY OF NICOTINE DEPENDENCE               

     

 

             2018           CANDE HERNANDEZ MD           Ot           Z95.

5           

PRESENCE OF CORONARY ANGIOPLASTY IMPLANT                    

 

                2018           MARY GREEN, NORTH WHITTAKER           Ot            

  M79.605          

                          PAIN IN LEFT LEG                    

 

             2018                        Ot           414.00           [facility]

ON ATHEROSCLER

NOS TYPE VESSEL, NATIV                           

 

             2018                        Ot           786.50           [facility]

ST PAIN NOS   

                                                 

 

             2018                        Ot           397.0           [facility]

USPID VALVE 

DISEASE                                          

 

             2018                        Ot           414.00           [facility]

ON ATHEROSCLER

NOS TYPE VESSEL, NATIV                           

 

             2018                        Ot           424.0           [facility]

AL VALVE 

DISORDER                                         

 

             2018                        Ot           786.50           [facility]

ST PAIN NOS   

                                                 

 

             2018                        Ot           272.4           [facility]

RLIPIDEMIA 

NEC/NOS                                          

 

             2018                        Ot           401.9           [facility]

RTENSION NOS  

                                                 

 

             2018                        Ot           414.01           [facility]

ONARY 

ATHEROSCLEROSIS OF NATIVE CORON                    

 

             2018                        Ot           401.9           [facility]

RTENSION NOS  

                                                 

 

             2018                        Ot           414.01           [facility]

ONARY 

ATHEROSCLEROSIS OF NATIVE CORON                    

 

             2018                        Ot           428.0           [facility]

ESTIVE HEART 

FAILURE NOS                                      

 

             2018                        Ot           719.40           [facility]

NT PAIN-UNSPEC

                                                 

 

           2018                       Ot           782.3           [facility]  

           

      

 

             2018                        Ot           V58.61           

ANTICOAGULANTS,LT,CURRENT USE                    

 

             2018           NORTH HERNANDEZ MD           Ot           I11.

0           

HYPERTENSIVE HEART DISEASE WITH HEART FA                    

 

             2018           NORTH HERNANDEZ MD           Ot           I25.

10           

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                     

 

             2018           NORTH HERNANDEZ MD           Ot           I26.

99           

OTHER PULMONARY EMBOLISM WITHOUT ACUTE C                    

 

             2018           NORTH HERNANDEZ MD           Ot           I50.

9           

HEART FAILURE, UNSPECIFIED                       

 

             2018           NORTH HERNANDEZ MD           Ot           R07.

9           

CHEST PAIN, UNSPECIFIED                          

 

             2018           NORTH HERNANDEZ MD           Ot           I25.

10           

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                     

 

             2018           NORTH HERNANDEZ MD           Ot           I50.

9           

HEART FAILURE, UNSPECIFIED                       

 

             2018           NORTH HERNANDEZ MD           Ot           R07.

9           

CHEST PAIN, UNSPECIFIED                          

 

                2018           NORTH HERNANDEZ MD           Ot            

  I82.403          

                          ACUTE EMBOLISM AND THOMBOS UNSP DEEP VEI              

      

 

             2018           NORTH HERNANDEZ MD           Ot           Z79.

01           

LONG TERM (CURRENT) USE OF ANTICOAGULANT                    

 

             2018           NORTH HERNANDEZ MD           Ot           I26.

99           

OTHER PULMONARY EMBOLISM WITHOUT ACUTE C                    

 

             2018           NORTH HERNANDEZ MD           Ot           Z51.

81           

ENCOUNTER FOR THERAPEUTIC DRUG LEVEL MON                    

 

             2018           NORTH HERNANDEZ MD           Ot           I50.

9           

HEART FAILURE, UNSPECIFIED                       

 

             2018           NORTH HERNANDEZ MD           Ot           I10 

          

ESSENTIAL (PRIMARY) HYPERTENSION                    

 

             2018           NORTH HERNANDZE MD           Ot           I25.

10           

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                     

 

             2018           NORTH HERNANDEZ MD           Ot           R06.

00           

DYSPNEA, UNSPECIFIED                             

 

             2018           NORTH HERNANDEZ MD           Ot           R07.

89           

OTHER CHEST PAIN                                 

 

             2018           CANDE PIKE MD           Ot           I25.11

9           

ATHSCL HEART DISEASE OF NATIVE COR ART W                    

 

             2018           CANDE PIKE MD           Ot           Z01.81

2           

ENCOUNTER FOR PREPROCEDURAL LABORATORY E                    

 

             2018           NORTH HERNANDEZ MD           Ot           I26.

99           

OTHER PULMONARY EMBOLISM WITHOUT ACUTE C                    

 

             2018           NORTH HERNANDEZ MD           Ot           Z79.

01           

LONG TERM (CURRENT) USE OF ANTICOAGULANT                    

 

                2018           NORTH HERNANDEZ MD           Ot            

  M79.605          

                          PAIN IN LEFT LEG                    

 

             2018           NORTH HERNANDEZ MD           Ot           I26.

99           

OTHER PULMONARY EMBOLISM WITHOUT ACUTE C                    

 

             2018           NORTH HERNANDEZ MD           Ot           Z79.

01           

LONG TERM (CURRENT) USE OF ANTICOAGULANT                    

 

                2018           NORTH HERNANDEZ MD           Ot            

  M79.605          

                          PAIN IN LEFT LEG                    

 

                2018           NORTH HERNANDEZ MD           Ot            

  F17.200          

                          NICOTINE DEPENDENCE, UNSPECIFIED, UNCOMP              

      

 

             2018           NORTH HERNANDEZ MD           Ot           I10 

          

ESSENTIAL (PRIMARY) HYPERTENSION                    

 

             2018           NORTH HERNANDEZ MD           Ot           I25.

10           

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                     

 

             2018           NORTH HERNANDEZ MD           Ot           I26.

99           

OTHER PULMONARY EMBOLISM WITHOUT ACUTE C                    

 

             2018           NORTH HERNANDEZ MD           Ot           I70.

0           

ATHEROSCLEROSIS OF AORTA                         

 

             2018           NORTH HERNANDEZ MD           Ot           I70.

8           

ATHEROSCLEROSIS OF OTHER ARTERIES                    

 

                2018           NORTH HERNANDEZ MD           Ot            

  I82.409          

                          ACUTE EMBOLISM AND THOMBOS UNSP DEEP VN               

      

 

                2018           NORTH HERNANDEZ MD           Ot            

  M89.572          

                          OSTEOLYSIS, LEFT ANKLE AND FOOT                    

 

             2018           NORTH HERNANDEZ MD           Ot           R06.

00           

DYSPNEA, UNSPECIFIED                             

 

             2018           GONSALO SOMERS MD           Ot           G63

           

POLYNEUROPATHY IN DISEASES CLASSIFIED EL                    

 

                2018           GONSALO SOMERS MD           Ot           

   I75.022         

                          ATHEROEMBOLISM OF LEFT LOWER EXTREMITY                

    

 

                2018           GONSALO SOMERS MD           Ot           

   M79.662         

                          PAIN IN LEFT LOWER LEG                    

 

                2018           NORTH HERNANDEZ MD           Ot            

  F17.200          

                          NICOTINE DEPENDENCE, UNSPECIFIED, UNCOMP              

      

 

             2018           NORTH HERNANDEZ MD           Ot           I10 

          

ESSENTIAL (PRIMARY) HYPERTENSION                    

 

             2018           NORTH HERNANDEZ MD           Ot           I25.

10           

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                     

 

             2018           NORTH HERNANDEZ MD           Ot           I26.

99           

OTHER PULMONARY EMBOLISM WITHOUT ACUTE C                    

 

             2018           NORTH HERNANDEZ MD           Ot           I70.

0           

ATHEROSCLEROSIS OF AORTA                         

 

             2018           NORTH HERNANDEZ MD           Ot           I70.

8           

ATHEROSCLEROSIS OF OTHER ARTERIES                    

 

                2018           NORTH HERNANDEZ MD           Ot            

  I82.409          

                          ACUTE EMBOLISM AND THOMBOS UNSP DEEP VN               

      

 

                2018           NORTH HERNANDEZ MD           Ot            

  M89.572          

                          OSTEOLYSIS, LEFT ANKLE AND FOOT                    

 

             2018           NORTH HERNANDEZ MD           Ot           R06.

00           

DYSPNEA, UNSPECIFIED                             

 

             2018           NORTH HERNANDEZ MD           Ot           I10 

          

ESSENTIAL (PRIMARY) HYPERTENSION                    

 

             2018           NORTH HERNANDEZ MD           Ot           I21.

3           

ST ELEVATION (STEMI) MYOCARDIAL INFARCTI                    

 

             2018           NORTH HERNANDEZ MD           Ot           I25.

10           

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                     

 

             2018           NORTH HERNANDEZ MD           Ot           K27.

9           

PEPTIC ULC, SITE UNSP, UNSP AS AC OR CHR                    

 

             2018           NORTH HERNANDEZ MD           Ot           R06.

02           

SHORTNESS OF BREATH                              

 

             2018           NORTH HERNANDEZ MD           Ot           R07.

89           

OTHER CHEST PAIN                                 

 

                2018           RUTH GREEN, KANDACE WHITTAKER           Ot          

    M79.89         

                          OTHER SPECIFIED SOFT TISSUE DISORDERS                 

   

 

             2018           NORTH HERNANDEZ MD           Ot           I50.

9           

HEART FAILURE, UNSPECIFIED                       

 

                2018           NORTH HERNANDEZ MD           Ot            

  F17.200          

                          NICOTINE DEPENDENCE, UNSPECIFIED, UNCOMP              

      

 

             2018           NORTH HERNANDEZ MD           Ot           I10 

          

ESSENTIAL (PRIMARY) HYPERTENSION                    

 

             2018           NORTH HERNANDEZ MD           Ot           I25.

10           

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                     

 

             2018           NORTH HERNANDEZ MD           Ot           I26.

99           

OTHER PULMONARY EMBOLISM WITHOUT ACUTE C                    

 

             2018           NORTH HERNANDEZ MD           Ot           I70.

0           

ATHEROSCLEROSIS OF AORTA                         

 

             2018           NORTH HERNANDEZ MD           Ot           I70.

8           

ATHEROSCLEROSIS OF OTHER ARTERIES                    

 

                2018           NORTH HERNANDEZ MD           Ot            

  I82.409          

                          ACUTE EMBOLISM AND THOMBOS UNSP DEEP VN               

      

 

                2018           NORTH HERNANDEZ MD           Ot            

  M89.572          

                          OSTEOLYSIS, LEFT ANKLE AND FOOT                    

 

             2018           NORTH HERNANDEZ MD           Ot           R06.

00           

DYSPNEA, UNSPECIFIED                             

 

             2018           NORTH HERNADNEZ MD           Ot           I50.

9           

HEART FAILURE, UNSPECIFIED                       

 

             2018           GONSALO SOMERS MD           Ot           G63

           

POLYNEUROPATHY IN DISEASES CLASSIFIED EL                    

 

                2018           GONSALO SOMERS MD           Ot           

   I75.022         

                          ATHEROEMBOLISM OF LEFT LOWER EXTREMITY                

    

 

                2018           GONSALO SOMERS MD           Ot           

   M79.662         

                          PAIN IN LEFT LOWER LEG                    

 

                2018           RUTH GREEN, KANDACE WHITTAKER           Ot          

    M79.89         

                          OTHER SPECIFIED SOFT TISSUE DISORDERS                 

   

 

             2018           NORTH HERNANDEZ MD           Ot           I10 

          

ESSENTIAL (PRIMARY) HYPERTENSION                    

 

             2018           NORTH HERNANDEZ MD           Ot           I21.

3           

ST ELEVATION (STEMI) MYOCARDIAL INFARCTI                    

 

             2018           NORTH HERNANDEZ MD           Ot           I25.

10           

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                     

 

             2018           NORTH HERNANDEZ MD           Ot           K27.

9           

PEPTIC ULC, SITE UNSP, UNSP AS AC OR CHR                    

 

             2018           NORTH HERNANDEZ MD           Ot           R06.

02           

SHORTNESS OF BREATH                              

 

             2018           NORTH HERNANDEZ MD           Ot           R07.

89           

OTHER CHEST PAIN                                 

 

                2018           NORTH HERNANDEZ MD           Ot            

  F17.200          

                          NICOTINE DEPENDENCE, UNSPECIFIED, UNCOMP              

      

 

             2018           NORTH HERNANDEZ MD           Ot           I10 

          

ESSENTIAL (PRIMARY) HYPERTENSION                    

 

             2018           NORTH HERNANDEZ MD           Ot           I25.

10           

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                     

 

             2018           NORTH HERNANDEZ MD           Ot           I26.

99           

OTHER PULMONARY EMBOLISM WITHOUT ACUTE C                    

 

             2018           NORTH HERNANDEZ MD           Ot           I70.

0           

ATHEROSCLEROSIS OF AORTA                         

 

             2018           NORTH HERNANDEZ MD           Ot           I70.

8           

ATHEROSCLEROSIS OF OTHER ARTERIES                    

 

                2018           NORTH HERNANDEZ MD           Ot            

  I82.402          

                          ACUTE EMBOLISM AND THOMBOS UNSP DEEP VEI              

      

 

                2018           NORTH HERNANDEZ MD           Ot            

  M89.572          

                          OSTEOLYSIS, LEFT ANKLE AND FOOT                    

 

             2018           NORTH HERNANDEZ MD           Ot           R06.

00           

DYSPNEA, UNSPECIFIED                             

 

                2018           NORTH HERNANDEZ MD           Ot            

  F17.200          

                          NICOTINE DEPENDENCE, UNSPECIFIED, UNCOMP              

      

 

             2018           NORTH HERNANDEZ MD           Ot           I10 

          

ESSENTIAL (PRIMARY) HYPERTENSION                    

 

             2018           NORTH HERNANDEZ MD           Ot           I25.

10           

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                     

 

             2018           NORTH HERNANDEZ MD           Ot           I26.

99           

OTHER PULMONARY EMBOLISM WITHOUT ACUTE C                    

 

             2018           NORTH HERNANDEZ MD           Ot           I70.

0           

ATHEROSCLEROSIS OF AORTA                         

 

             2018           NORTH HERNANDEZ MD           Ot           I70.

8           

ATHEROSCLEROSIS OF OTHER ARTERIES                    

 

                2018           NORTH HERNANDEZ MD           Ot            

  I82.402          

                          ACUTE EMBOLISM AND THOMBOS UNSP DEEP VEI              

      

 

                2018           NORTH HERNANDEZ MD           Ot            

  M89.572          

                          OSTEOLYSIS, LEFT ANKLE AND FOOT                    

 

             2018           NORTH HERNANDEZ MD           Ot           R06.

00           

DYSPNEA, UNSPECIFIED                             

 

             2018           NORTH HERNANDEZ MD           Ot           I26.

99           

OTHER PULMONARY EMBOLISM WITHOUT ACUTE C                    

 

             2018           NORTH HERNANDEZ MD           Ot           Z79.

01           

LONG TERM (CURRENT) USE OF ANTICOAGULANT                    

 

             2018           NORTH HERNANDEZ MD           Ot           I26.

99           

OTHER PULMONARY EMBOLISM WITHOUT ACUTE C                    

 

             2018           NORTH HERNANDEZ MD           Ot           Z79.

01           

LONG TERM (CURRENT) USE OF ANTICOAGULANT                    

 

             2018           NORTH HERNANDEZ MD           Ot           I26.

99           

OTHER PULMONARY EMBOLISM WITHOUT ACUTE C                    

 

             2018           NORTH HERNANDEZ MD           Ot           Z79.

01           

LONG TERM (CURRENT) USE OF ANTICOAGULANT                    

 

             2018           NORTH HERNANDEZ MD           Ot           I26.

99           

OTHER PULMONARY EMBOLISM WITHOUT ACUTE C                    

 

             2018           NORTH HERNANDEZ MD           Ot           Z79.

01           

LONG TERM (CURRENT) USE OF ANTICOAGULANT                    

 

             2018           NORTH HERNANDEZ MD           Ot           I26.

99           

OTHER PULMONARY EMBOLISM WITHOUT ACUTE C                    

 

             2018           NORTH HERNANDEZ MD           Ot           Z79.

01           

LONG TERM (CURRENT) USE OF ANTICOAGULANT                    

 

             2018           NORTH HERNANDEZ MD           Ot           I26.

99           

OTHER PULMONARY EMBOLISM WITHOUT ACUTE C                    

 

             2018           NORTH HERNANDEZ MD           Ot           Z79.

01           

LONG TERM (CURRENT) USE OF ANTICOAGULANT                    

 

             2018           NORTH HERNANDEZ MD           Ot           I26.

99           

OTHER PULMONARY EMBOLISM WITHOUT ACUTE C                    

 

             2018           NORTH HERNANDEZ MD           Ot           Z79.

01           

LONG TERM (CURRENT) USE OF ANTICOAGULANT                    

 

             2018           NORTH HERNANDEZ MD           Ot           I26.

99           

OTHER PULMONARY EMBOLISM WITHOUT ACUTE C                    

 

             2018           NORTH HERNANDEZ MD           Ot           Z79.

01           

LONG TERM (CURRENT) USE OF ANTICOAGULANT                    

 

             10/03/2018           NORTH HERNANDEZ MD           Ot           I26.

99           

OTHER PULMONARY EMBOLISM WITHOUT ACUTE C                    

 

             10/03/2018           NORTH HERNANDEZ MD           Ot           Z79.

01           

LONG TERM (CURRENT) USE OF ANTICOAGULANT                    

 

             2018           NORTH HERNANDEZ MD           Ot           I11.

0           

HYPERTENSIVE HEART DISEASE WITH HEART FA                    

 

             2018           NORTH HERNANDEZ MD           Ot           I25.

10           

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                     

 

             2018           NORTH HERNANDEZ MD           Ot           I26.

99           

OTHER PULMONARY EMBOLISM WITHOUT ACUTE C                    

 

             2018           NORTH HERNANDEZ MD           Ot           I50.

9           

HEART FAILURE, UNSPECIFIED                       

 

             2018           NORTH HERNANDEZ MD           Ot           R07.

9           

CHEST PAIN, UNSPECIFIED                          

 

             2018           NORTH HERNANDEZ MD           Ot           I25.

10           

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                     

 

             2018           NORTH HERNANDEZ MD           Ot           I50.

9           

HEART FAILURE, UNSPECIFIED                       

 

             2018           NORTH HERNNADEZ MD           Ot           R07.

9           

CHEST PAIN, UNSPECIFIED                          

 

                2018           NORTH HERNANDEZ MD           Ot            

  I82.403          

                          ACUTE EMBOLISM AND THOMBOS UNSP DEEP VEI              

      

 

             2018           NORTH HERNANDEZ MD           Ot           Z79.

01           

LONG TERM (CURRENT) USE OF ANTICOAGULANT                    

 

             2018           NORTH HERNANDEZ MD           Ot           I26.

99           

OTHER PULMONARY EMBOLISM WITHOUT ACUTE C                    

 

             2018           NORTH HERNANDEZ MD           Ot           Z51.

81           

ENCOUNTER FOR THERAPEUTIC DRUG LEVEL MON                    

 

             2018           NORTH HERNANDEZ MD           Ot           I10 

          

ESSENTIAL (PRIMARY) HYPERTENSION                    

 

             2018           NORTH HERNANDEZ MD, Ot           I25.

10           

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                     

 

             2018           NORTH HERNANDEZ MD           Ot           R06.

00           

DYSPNEA, UNSPECIFIED                             

 

             2018           NORTH HERNANDEZ MD           Ot           R07.

89           

OTHER CHEST PAIN                                 

 

             2018           CANDE PIKE MD           Ot           I25.11

9           

ATHSCL HEART DISEASE OF NATIVE COR ART W                    

 

             2018           CANDE PIKE MD           Ot           Z01.81

2           

ENCOUNTER FOR PREPROCEDURAL LABORATORY E                    

 

                2018           NORTH HERNANDEZ MD           Ot            

  M79.605          

                          PAIN IN LEFT LEG                    

 

             2018           GONSALO SOMERS MD           Ot           G63

           

POLYNEUROPATHY IN DISEASES CLASSIFIED EL                    

 

                2018           GONSALO SOMERS MD           Ot           

   I75.022         

                          ATHEROEMBOLISM OF LEFT LOWER EXTREMITY                

    

 

                2018           GONSALO SOMERS MD           Ot           

   M79.662         

                          PAIN IN LEFT LOWER LEG                    

 

                2018           NORTH HERNANDEZ MD           Ot            

  F17.200          

                          NICOTINE DEPENDENCE, UNSPECIFIED, UNCOMP              

      

 

             2018           NORTH HERNANDEZ MD           Ot           I10 

          

ESSENTIAL (PRIMARY) HYPERTENSION                    

 

             2018           NORTH HERNANDEZ MD           Ot           I25.

10           

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                     

 

             2018           NORTH HERNANDEZ MD           Ot           I26.

99           

OTHER PULMONARY EMBOLISM WITHOUT ACUTE C                    

 

             2018           NORTH HERNANDEZ MD           Ot           I70.

0           

ATHEROSCLEROSIS OF AORTA                         

 

             2018           NORTH HERNANDEZ MD           Ot           I70.

8           

ATHEROSCLEROSIS OF OTHER ARTERIES                    

 

                2018           NORTH HERNANDEZ MD           Ot            

  I82.402          

                          ACUTE EMBOLISM AND THOMBOS UNSP DEEP VEI              

      

 

                2018           NORTH HERNANDEZ MD           Ot            

  M89.572          

                          OSTEOLYSIS, LEFT ANKLE AND FOOT                    

 

             2018           NORTH HERNANDEZ MD           Ot           R06.

00           

DYSPNEA, UNSPECIFIED                             

 

                2018           KANDACE MIRANDA MD           Ot          

    M79.89         

                          OTHER SPECIFIED SOFT TISSUE DISORDERS                 

   

 

             2018           NORTH HERNANDEZ MD           Ot           I10 

          

ESSENTIAL (PRIMARY) HYPERTENSION                    

 

             2018           NORTH HERNANDEZ MD           Ot           I21.

3           

ST ELEVATION (STEMI) MYOCARDIAL INFARCTI                    

 

             2018           NORTH HERNANDEZ MD           Ot           I25.

10           

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                     

 

             2018           NORTH HERNANDEZ MD           Ot           K27.

9           

PEPTIC ULC, SITE UNSP, UNSP AS AC OR CHR                    

 

             2018           NORTH HERNANDEZ MD           Ot           R06.

02           

SHORTNESS OF BREATH                              

 

             2018           NORTH HERNANDEZ MD           Ot           R07.

89           

OTHER CHEST PAIN                                 

 

             2018           NORTH HERNANDEZ MD           Ot           I50.

9           

HEART FAILURE, UNSPECIFIED                       

 

             2018           NORTH HERNANDEZ MD           Ot           I26.

99           

OTHER PULMONARY EMBOLISM WITHOUT ACUTE C                    

 

             2018           NORTH HERNANDEZ MD           Ot           Z79.

01           

LONG TERM (CURRENT) USE OF ANTICOAGULANT                    

 

                2018           COLE CONTRERAS MD           Ot           

   E78.00          

                          PURE HYPERCHOLESTEROLEMIA, UNSPECIFIED                

    

 

                2018           COLE CONTRERAS MD           Ot           

   F17.210         

                          NICOTINE DEPENDENCE, CIGARETTES, UNCOMPL              

      

 

             2018           COLE CONTRERAS MD           Ot           I10

           

ESSENTIAL (PRIMARY) HYPERTENSION                    

 

             2018           COLE CONTRERAS MD           Ot           I21

.4           

NON-ST ELEVATION (NSTEMI) MYOCARDIAL INF                    

 

                2018           COLE CONTRERAS MD           Ot           

   I25.10          

                          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               

      

 

             2018           COLE CONTRERAS MD           Ot           I25

.2           

OLD MYOCARDIAL INFARCTION                        

 

             2018           COLE CONTRERAS MD           Ot           I73

.9           

PERIPHERAL VASCULAR DISEASE, UNSPECIFIED                    

 

             2018           COLE CONTRERAS MD           Ot           K21

.9           

GASTRO-ESOPHAGEAL REFLUX DISEASE WITHOUT                    

 

             2018           COLE CONTRERAS MD           Ot           M10

.9           

GOUT, UNSPECIFIED                                

 

                2018           COLE CONTRERAS MD           Ot           

   Z79.01          

                          LONG TERM (CURRENT) USE OF ANTICOAGULANT              

      

 

                2018           COLE CONTRERAS MD           Ot           

   Z79.899         

                          OTHER LONG TERM (CURRENT) DRUG THERAPY                

    

 

                2018           COLE CONTRERAS MD           Ot           

   Z86.711         

                          PERSONAL HISTORY OF PULMONARY EMBOLISM                

    

 

                2018           COLE CONTRERAS MD           Ot           

   Z86.718         

                          PERSONAL HISTORY OF OTHER VENOUS THROMBO              

      

 

             2018           COLE CONTRERAS MD           Ot           Z95

.5           

PRESENCE OF CORONARY ANGIOPLASTY IMPLANT                    

 

                2018           COLE CONTRERAS MD           Ot           

   E78.00          

                          PURE HYPERCHOLESTEROLEMIA, UNSPECIFIED                

    

 

                2018           COLE CONTRERAS MD           Ot           

   F17.210         

                          NICOTINE DEPENDENCE, CIGARETTES, UNCOMPL              

      

 

             2018           COLE CONTRERAS MD           Ot           I10

           

ESSENTIAL (PRIMARY) HYPERTENSION                    

 

             2018           COLE CONTRERAS MD           Ot           I21

.4           

NON-ST ELEVATION (NSTEMI) MYOCARDIAL INF                    

 

                2018           COLE CONTRERAS MD           Ot           

   I25.10          

                          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               

      

 

             2018           COLE CONTRERAS MD           Ot           I25

.2           

OLD MYOCARDIAL INFARCTION                        

 

             2018           COLE CONTRERAS MD           Ot           I73

.9           

PERIPHERAL VASCULAR DISEASE, UNSPECIFIED                    

 

             2018           COLE CONTRERAS MD, Ot           K21

.9           

GASTRO-ESOPHAGEAL REFLUX DISEASE WITHOUT                    

 

             2018           COLE CONTRERAS MD           Ot           M10

.9           

GOUT, UNSPECIFIED                                

 

                2018           COLE CONTRERAS MD           Ot           

   Z79.01          

                          LONG TERM (CURRENT) USE OF ANTICOAGULANT              

      

 

                2018           COLE CONTRERAS MD           Ot           

   Z79.899         

                          OTHER LONG TERM (CURRENT) DRUG THERAPY                

    

 

                2018           COLE CONTRERAS MD           Ot           

   Z86.711         

                          PERSONAL HISTORY OF PULMONARY EMBOLISM                

    

 

                2018           COLE CONTRERAS MD           Ot           

   Z86.718         

                          PERSONAL HISTORY OF OTHER VENOUS THROMBO              

      

 

             2018           COLE CONTRERAS MD           Ot           Z95

.5           

PRESENCE OF CORONARY ANGIOPLASTY IMPLANT                    

 

             2019           TIFFANIE ALFARO FELISHA           Ot           E78.5 

          

HYPERLIPIDEMIA, UNSPECIFIED                      

 

             2019           MORENO DO, FELISHA           Ot           I11.0 

          

HYPERTENSIVE HEART DISEASE WITH HEART FA                    

 

             2019           TIFFANIE ALFARO FELISHA           Ot           I25.10

           

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                     

 

             2019           TIFFANIE ALFARO FELISHA           Ot           I25.2 

          OLD

MYOCARDIAL INFARCTION                            

 

             2019           TIFFANIE ALFARO FELISHA           Ot           I50.22

           

CHRONIC SYSTOLIC (CONGESTIVE) HEART FAIL                    

 

             2019           TIFFANIE ALFARO FELISHA           Ot           I73.1 

          

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                    

 

             2019           TIFFANIE ALFARO FELISHA           Ot           J44.9 

          

CHRONIC OBSTRUCTIVE PULMONARY DISEASE, U                    

 

             2019           TIFFANIE ALFARO FELISHA           Ot           K21.9 

          

GASTRO-ESOPHAGEAL REFLUX DISEASE WITHOUT                    

 

             2019           TIFFANIE ALFARO FELISHA           Ot           M10.9 

          

GOUT, UNSPECIFIED                                

 

             2019           TIFFANIE ALFARO FELISHA           Ot           M54.9 

          

DORSALGIA, UNSPECIFIED                           

 

             2019           TIFFANIE ALFARO FELSIHA           Ot           Z47.81

           

ENCOUNTER FOR ORTHOPEDIC AFTERCARE FOLLO                    

 

             2019           TIFFANIE ALFARO FELISHA           Ot           Z86.71

1           

PERSONAL HISTORY OF PULMONARY EMBOLISM                    

 

             2019           TIFFANIE ALFARO FELISHA           Ot           Z86.71

8           

PERSONAL HISTORY OF OTHER VENOUS THROMBO                    

 

             2019           TIFFANIE ALFARO FELISHA           Ot           Z87.89

1           

PERSONAL HISTORY OF NICOTINE DEPENDENCE                    

 

             2019           TIFFANIE ALFARO FELISHA           Ot           Z89.61

2           

ACQUIRED ABSENCE OF LEFT LEG ABOVE KNEE                    

 

             2019           TIFFANIE ALFARO FELISHA           Ot           Z95.5 

          

PRESENCE OF CORONARY ANGIOPLASTY IMPLANT                    

 

                2019           CAROL FERREIRA MD           Ot      

        E78.00     

                          PURE HYPERCHOLESTEROLEMIA, UNSPECIFIED                

    

 

                2019           CAROL FERREIRA MD           Ot      

        F17.210    

                          NICOTINE DEPENDENCE, CIGARETTES, UNCOMPL              

      

 

                2019           CAROL FERREIRA MD           Ot      

        I10        

                          ESSENTIAL (PRIMARY) HYPERTENSION                    

 

                2019           CAROL FERREIRA MD           Ot      

        I25.10     

                          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               

      

 

                2019           CAROL FERREIRA MD, Ot      

        I25.2      

                          OLD MYOCARDIAL INFARCTION                    

 

                2019           CAROL FERREIRA MD, Ot      

        K21.9      

                          GASTRO-ESOPHAGEAL REFLUX DISEASE WITHOUT              

      

 

                2019           CAROL FERREIRA MD, Ot      

        M10.9      

                          GOUT, UNSPECIFIED                    

 

                2019           CAROL FERREIRA MD, Ot      

        Z48.89     

                          ENCOUNTER FOR OTHER SPECIFIED SURGICAL A              

      

 

                2019           CAROL FERREIRA MD, Ot      

        Z79.02     

                          LONG TERM (CURRENT) USE OF ANTITHROMBOTI              

      

 

                2019           CAROL FERREIRA MD, Ot      

        Z79.82     

                          LONG TERM (CURRENT) USE OF ASPIRIN                    

 

                2019           CAROL FERREIRA MD, Ot      

        Z82.49     

                          FAMILY HX OF ISCHEM HEART DIS AND OTH DI              

      

 

                2019           CAROL FERREIRA MD, Ot      

        Z86.711    

                          PERSONAL HISTORY OF PULMONARY EMBOLISM                

    

 

                2019           CAROL FERREIRA MD, Ot      

        Z86.718    

                          PERSONAL HISTORY OF OTHER VENOUS THROMBO              

      

 

                2019           CAROL FERREIRA MD, Ot      

        Z88.0      

                          ALLERGY STATUS TO PENICILLIN                    

 

                2019           CAROL FERREIRA MD, Ot      

        Z95.5      

                          PRESENCE OF CORONARY ANGIOPLASTY IMPLANT              

      

 

                2019           CAROL FERREIRA MD, Ot      

        Z96.652    

                          PRESENCE OF LEFT ARTIFICIAL KNEE JOINT                

    

 

                2019           CAROL FERREIRA MD, Ot      

        Z98.890    

                          OTHER SPECIFIED POSTPROCEDURAL STATES                 

   

 

                2019           CAROL FERREIRA MD, Ot      

        E78.00     

                          PURE HYPERCHOLESTEROLEMIA, UNSPECIFIED                

    

 

                2019           CAROL FERREIRA MD           Ot      

        F17.210    

                          NICOTINE DEPENDENCE, CIGARETTES, UNCOMPL              

      

 

                2019           CAROL FERREIRA MD, Ot      

        I10        

                          ESSENTIAL (PRIMARY) HYPERTENSION                    

 

                2019           CAROL FERREIRA MD, Ot      

        I25.10     

                          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               

      

 

                2019           CAROL FERREIRA MD, Ot      

        I25.2      

                          OLD MYOCARDIAL INFARCTION                    

 

                2019           CAROL FERREIRA MD, Ot      

        K21.9      

                          GASTRO-ESOPHAGEAL REFLUX DISEASE WITHOUT              

      

 

                2019           CAROL FERREIRA MD, Ot      

        M10.9      

                          GOUT, UNSPECIFIED                    

 

                2019           CAROL FERREIRA MD           Ot      

        Z48.89     

                          ENCOUNTER FOR OTHER SPECIFIED SURGICAL A              

      

 

                2019           CAROL FERREIRA MD           Ot      

        Z79.02     

                          LONG TERM (CURRENT) USE OF ANTITHROMBOTI              

      

 

                2019           CAROL FERREIRA MD           Ot      

        Z79.82     

                          LONG TERM (CURRENT) USE OF ASPIRIN                    

 

                2019           CAROL FERREIRA MD           Ot      

        Z82.49     

                          FAMILY HX OF ISCHEM HEART DIS AND OTH DI              

      

 

                2019           CAROL FERREIRA MD, Ot      

        Z86.711    

                          PERSONAL HISTORY OF PULMONARY EMBOLISM                

    

 

                2019           CAROL FERREIRA MD, Ot      

        Z86.718    

                          PERSONAL HISTORY OF OTHER VENOUS THROMBO              

      

 

                2019           CAROL FERREIRA MD           Ot      

        Z88.0      

                          ALLERGY STATUS TO PENICILLIN                    

 

                2019           CAROL FERREIRA MD           Ot      

        Z95.5      

                          PRESENCE OF CORONARY ANGIOPLASTY IMPLANT              

      

 

                2019           CAROL FERREIRA MD           Ot      

        Z96.652    

                          PRESENCE OF LEFT ARTIFICIAL KNEE JOINT                

    

 

                2019           CAROL FERREIRA MD           Ot      

        Z98.890    

                          OTHER SPECIFIED POSTPROCEDURAL STATES                 

   

 

                2019           SOILA CAST           Ot        

      Z89.612      

                          ACQUIRED ABSENCE OF LEFT LEG ABOVE KNEE               

     

 

                2019           SOILA CAST           Ot        

      Z89.612      

                          ACQUIRED ABSENCE OF LEFT LEG ABOVE KNEE               

     

 

                2019           SOILA CAST           Ot        

      Z89.612      

                          ACQUIRED ABSENCE OF LEFT LEG ABOVE KNEE               

     

 

           2019                       Ot           270.4                  

           

   

 

           2019                       Ot           305.1                  

           

   

 

           2019                       Ot           536.8                  

           

   

 

           2019                       Ot           729.81                 

           

    

 

           2019                       Ot           V12.51                 

           

    

 

           2019                       Ot           V58.61                 

           

    

 

           2019                       Ot           V58.69                 

           

    

 

           2019                       Ot           270.4                  

           

   

 

           2019                       Ot           305.1                  

           

   

 

           2019                       Ot           536.8                  

           

   

 

           2019                       Ot           V12.51                 

           

    

 

           2019                       Ot           V58.61                 

           

    

 

           2019                       Ot           V58.69                 

           

    

 

           2019                       Ot           272.4                  

           

   

 

           2019                       Ot           729.5                  

           

   

 

           2019                       Ot           780.79                 

           

    

 

           2019                       Ot           270.4                  

           

   

 

           2019                       Ot           305.1                  

           

   

 

           2019                       Ot           536.8                  

           

   

 

           2019                       Ot           729.5                  

           

   

 

           2019                       Ot           V12.51                 

           

    

 

           2019                       Ot           V58.69                 

           

    

 

           2019                       Ot           270.4                  

           

   

 

           2019                       Ot           305.1                  

           

   

 

           2019                       Ot           536.8                  

           

   

 

           2019                       Ot           V12.51                 

           

    

 

           2019                       Ot           V58.69                 

           

    

 

           2019                       Ot           786.09                 

           

    

 

           2019                       Ot           786.50                 

           

    

 

           2019                       Ot           786.09                 

           

    

 

           2019                       Ot           786.50                 

           

    

 

           2019                       Ot           270.4                  

           

   

 

           2019                       Ot           305.1                  

           

   

 

           2019                       Ot           533.90                 

           

    

 

           2019                       Ot           V12.51                 

           

    

 

           2019                       Ot           272.4                  

           

   

 

           2019                       Ot           401.9                  

           

   

 

           2019                       Ot           414.00                 

           

    

 

           2019                       Ot           272.4                  

           

   

 

           2019                       Ot           356.9                  

           

   

 

           2019                       Ot           272.4                  

           

   

 

           2019                       Ot           729.5                  

           

   

 

           2019                       Ot           V58.61                 

           

    

 

             2019                        Ot           272.1           [facility]

 

HYPERGLYCERIDEMIA                                

 

             2019                        Ot           414.00           [facility]

ON ATHEROSCLER

 NOS TYPE VESSEL, NATIV                          

 

             2019                        Ot           786.50           [facility]

ST PAIN NOS   

                                                 

 

             2019                        Ot           397.0           [facility]

USPID VALVE 

DISEASE                                          

 

             2019                        Ot           414.00           [facility]

ON ATHEROSCLER

 NOS TYPE VESSEL, NATIV                          

 

             2019                        Ot           424.0           [facility]

AL VALVE 

DISORDER                                         

 

             2019                        Ot           786.50           [facility]

ST PAIN NOS   

                                                 

 

             2019                        Ot           272.4           [facility]

RLIPIDEMIA 

NEC/NOS                                          

 

             2019                        Ot           401.9           [facility]

RTENSION NOS  

                                                 

 

             2019                        Ot           414.01           COR

ONARY 

ATHEROSCLEROSIS OF NATIVE CORON                    

 

             2019                        Ot           401.9           [facility]

RTENSION NOS  

                                                 

 

             2019                        Ot           414.01           COR

ONARY 

ATHEROSCLEROSIS OF NATIVE CORON                    

 

             2019                        Ot           428.0           [facility]

ESTIVE HEART 

FAILURE NOS                                      

 

             2019                        Ot           719.40           MATTI

NT PAIN-UNSPEC

                                                 

 

           2019                       Ot           782.3           EDEMA  

           

       

 

             2019                        Ot           V58.61           

ANTICOAGULANTS,LT,CURRENT USE                    

 

             2019           NORTH HERNANDEZ MD           Ot           I11.

0           

HYPERTENSIVE HEART DISEASE WITH HEART FA                    

 

             2019           NORTH HERNANDEZ MD           Ot           I25.

10           

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                     

 

             2019           NORTH HERNANDEZ MD           Ot           I26.

99           

OTHER PULMONARY EMBOLISM WITHOUT ACUTE C                    

 

             2019           NORTH HERNANDEZ MD           Ot           I50.

9           

HEART FAILURE, UNSPECIFIED                       

 

             2019           NORTH HERNANDEZ MD           Ot           R07.

9           

CHEST PAIN, UNSPECIFIED                          

 

             2019           NORTH HERNANDEZ MD           Ot           I25.

10           

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                     

 

             2019           NORTH HERNANDEZ MD, Ot           I50.

9           

HEART FAILURE, UNSPECIFIED                       

 

             2019           NORTH HERNANDEZ MD           Ot           R07.

9           

CHEST PAIN, UNSPECIFIED                          

 

                2019           NORTH HERNANDEZ MD           Ot            

  I82.403          

                          ACUTE EMBOLISM AND THOMBOS UNSP DEEP VEI              

      

 

             2019           NORTH HERNANDEZ MD           Ot           Z79.

01           

LONG TERM (CURRENT) USE OF ANTICOAGULANT                    

 

             2019           NORTH HERNANDEZ MD, Ot           I26.

99           

OTHER PULMONARY EMBOLISM WITHOUT ACUTE C                    

 

             2019           NORTH HERNANDEZ MD           Ot           Z51.

81           

ENCOUNTER FOR THERAPEUTIC DRUG LEVEL MON                    

 

             2019           NORTH HERNANDEZ MD           Ot           I10 

          

ESSENTIAL (PRIMARY) HYPERTENSION                    

 

             2019           NORTH HERNANDEZ MD           Ot           I25.

10           

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                     

 

             2019           NORTH HERNANDEZ MD           Ot           R06.

00           

DYSPNEA, UNSPECIFIED                             

 

             2019           NORTH HERNANDEZ MD           Ot           R07.

89           

OTHER CHEST PAIN                                 

 

             2019           CANDE PIKE MD           Ot           I25.11

9           

ATHSCL HEART DISEASE OF NATIVE COR ART W                    

 

             2019           CANDE PIKE MD           Ot           Z01.81

2           

ENCOUNTER FOR PREPROCEDURAL LABORATORY E                    

 

                2019           NORTH HERNANDEZ MD           Ot            

  M79.605          

                          PAIN IN LEFT LEG                    

 

             2019           GONSALO SOMERS MD           Ot           G63

           

POLYNEUROPATHY IN DISEASES CLASSIFIED EL                    

 

                2019           GONSALO SOMERS MD           Ot           

   I75.022         

                          ATHEROEMBOLISM OF LEFT LOWER EXTREMITY                

    

 

                2019           YONIS GREEN, GONSALO MERCADO           Ot           

   M79.662         

                          PAIN IN LEFT LOWER LEG                    

 

                2019           NORTH HERNANDEZ MD           Ot            

  F17.200          

                          NICOTINE DEPENDENCE, UNSPECIFIED, UNCOMP              

      

 

             2019           NORTH HERNANDEZ MD           Ot           I10 

          

ESSENTIAL (PRIMARY) HYPERTENSION                    

 

             2019           NORTH HERNANDEZ MD           Ot           I25.

10           

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                     

 

             2019           NORTH HERNANDEZ MD           Ot           I26.

99           

OTHER PULMONARY EMBOLISM WITHOUT ACUTE C                    

 

             2019           NORTH HERNANDEZ MD           Ot           I70.

0           

ATHEROSCLEROSIS OF AORTA                         

 

             2019           NORTH HERNANDEZ MD           Ot           I70.

8           

ATHEROSCLEROSIS OF OTHER ARTERIES                    

 

                2019           NORTH HERNANDEZ MD           Ot            

  I82.402          

                          ACUTE EMBOLISM AND THOMBOS UNSP DEEP VEI              

      

 

                2019           NORTH HERNANDEZ MD           Ot            

  M89.572          

                          OSTEOLYSIS, LEFT ANKLE AND FOOT                    

 

             2019           NORTH HERNANDEZ MD           Ot           R06.

00           

DYSPNEA, UNSPECIFIED                             

 

                2019           KANDACE MIRANDA MD           Ot          

    M79.89         

                          OTHER SPECIFIED SOFT TISSUE DISORDERS                 

   

 

             2019           NORTH HERNANDEZ MD           Ot           I10 

          

ESSENTIAL (PRIMARY) HYPERTENSION                    

 

             2019           NORTH HERNANDEZ MD           Ot           I21.

3           

ST ELEVATION (STEMI) MYOCARDIAL INFARCTI                    

 

             2019           NORTH HERNANDEZ MD           Ot           I25.

10           

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                     

 

             2019           NORTH HERNANDEZ MD           Ot           K27.

9           

PEPTIC ULC, SITE UNSP, UNSP AS AC OR CHR                    

 

             2019           NORTH HERNANDEZ MD           Ot           R06.

02           

SHORTNESS OF BREATH                              

 

             2019           NORTH HERNANDEZ MD           Ot           R07.

89           

OTHER CHEST PAIN                                 

 

             2019           NORTH HERNANDEZ MD           Ot           I50.

9           

HEART FAILURE, UNSPECIFIED                       

 

             2019           NORTH HERNANDEZ MD           Ot           I26.

99           

OTHER PULMONARY EMBOLISM WITHOUT ACUTE C                    

 

             2019           NORTH HERNANDEZ MD           Ot           Z79.

01           

LONG TERM (CURRENT) USE OF ANTICOAGULANT                    

 

                2019           SOILA CAST           Ot        

      Z89.612      

                          ACQUIRED ABSENCE OF LEFT LEG ABOVE KNEE               

     

 

           2019                       Ot           270.4                  

           

   

 

           2019                       Ot           305.1                  

           

   

 

           2019                       Ot           536.8                  

           

   

 

           2019                       Ot           729.81                 

           

    

 

           2019                       Ot           V12.51                 

           

    

 

           2019                       Ot           V58.61                 

           

    

 

           2019                       Ot           V58.69                 

           

    

 

           2019                       Ot           270.4                  

           

   

 

           2019                       Ot           305.1                  

           

   

 

           2019                       Ot           536.8                  

           

   

 

           2019                       Ot           V12.51                 

           

    

 

           2019                       Ot           V58.61                 

           

    

 

           2019                       Ot           V58.69                 

           

    

 

           2019                       Ot           272.4                  

           

   

 

           2019                       Ot           729.5                  

           

   

 

           2019                       Ot           780.79                 

           

    

 

           2019                       Ot           270.4                  

           

   

 

           2019                       Ot           305.1                  

           

   

 

           2019                       Ot           536.8                  

           

   

 

           2019                       Ot           729.5                  

           

   

 

           2019                       Ot           V12.51                 

           

    

 

           2019                       Ot           V58.69                 

           

    

 

           2019                       Ot           270.4                  

           

   

 

           2019                       Ot           305.1                  

           

   

 

           2019                       Ot           536.8                  

           

   

 

           2019                       Ot           V12.51                 

           

    

 

           2019                       Ot           V58.69                 

           

    

 

           2019                       Ot           786.09                 

           

    

 

           2019                       Ot           786.50                 

           

    

 

           2019                       Ot           786.09                 

           

    

 

           2019                       Ot           786.50                 

           

    

 

           2019                       Ot           270.4                  

           

   

 

           2019                       Ot           305.1                  

           

   

 

           2019                       Ot           533.90                 

           

    

 

           2019                       Ot           V12.51                 

           

    

 

           2019                       Ot           272.4                  

           

   

 

           2019                       Ot           401.9                  

           

   

 

           2019                       Ot           414.00                 

           

    

 

           2019                       Ot           272.4                  

           

   

 

           2019                       Ot           356.9                  

           

   

 

           2019                       Ot           272.4                  

           

   

 

           2019                       Ot           729.5                  

           

   

 

           2019                       Ot           V58.61                 

           

    

 

             2019                        Ot           272.1           [facility]

 

HYPERGLYCERIDEMIA                                

 

             2019                        Ot           414.00           [facility]

ON ATHEROSCLER

 NOS TYPE VESSEL, NATIV                          

 

             2019                        Ot           786.50           [facility]

ST PAIN NOS   

                                                 

 

             2019                        Ot           397.0           [facility]

USPID VALVE 

DISEASE                                          

 

             2019                        Ot           414.00           [facility]

ON ATHEROSCLER

 NOS TYPE VESSEL, NATIV                          

 

             2019                        Ot           424.0           [facility]

AL VALVE 

DISORDER                                         

 

             2019                        Ot           786.50           [facility]

ST PAIN NOS   

                                                 

 

             2019                        Ot           272.4           [facility]

RLIPIDEMIA 

NEC/NOS                                          

 

             2019                        Ot           401.9           [facility]

RTENSION NOS  

                                                 

 

             2019                        Ot           414.01           [facility]

ONARY 

ATHEROSCLEROSIS OF NATIVE CORON                    

 

             2019                        Ot           401.9           [facility]

RTENSION NOS  

                                                 

 

             2019                        Ot           414.01           COR

ONARY 

ATHEROSCLEROSIS OF NATIVE CORON                    

 

             2019                        Ot           428.0           [facility]

ESTIVE HEART 

FAILURE NOS                                      

 

             2019                        Ot           719.40           [facility]

NT PAIN-UNSPEC

                                                 

 

           2019                       Ot           782.3           [facility]  

           

       

 

             2019                        Ot           V58.61           

ANTICOAGULANTS,LT,CURRENT USE                    

 

             2019           ONRTH HERNANDEZ MD           Ot           I11.

0           

HYPERTENSIVE HEART DISEASE WITH HEART FA                    

 

             2019           NORTH HERNANDEZ MD           Ot           I25.

10           

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                     

 

             2019           NORTH HERNANDEZ MD           Ot           I26.

99           

OTHER PULMONARY EMBOLISM WITHOUT ACUTE C                    

 

             2019           NORTH HERNANDEZ MD           Ot           I50.

9           

HEART FAILURE, UNSPECIFIED                       

 

             2019           NORTH HERNANDEZ MD           Ot           R07.

9           

CHEST PAIN, UNSPECIFIED                          

 

             2019           NORTH HERNANDEZ MD           Ot           I25.

10           

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                     

 

             2019           NORTH HERNANDEZ MD           Ot           I50.

9           

HEART FAILURE, UNSPECIFIED                       

 

             2019           NORTH HERNANDEZ MD           Ot           R07.

9           

CHEST PAIN, UNSPECIFIED                          

 

                2019           NORTH HERNANDEZ MD           Ot            

  I82.403          

                          ACUTE EMBOLISM AND THOMBOS UNSP DEEP VEI              

      

 

             2019           NORTH HERNANDEZ MD           Ot           Z79.

01           

LONG TERM (CURRENT) USE OF ANTICOAGULANT                    

 

             2019           NORTH HERNANDEZ MD           Ot           I26.

99           

OTHER PULMONARY EMBOLISM WITHOUT ACUTE C                    

 

             2019           NORTH HERNANDEZ MD           Ot           Z51.

81           

ENCOUNTER FOR THERAPEUTIC DRUG LEVEL MON                    

 

             2019           NORTH HERNANDEZ MD           Ot           I10 

          

ESSENTIAL (PRIMARY) HYPERTENSION                    

 

             2019           NORTH HERNANDEZ MD           Ot           I25.

10           

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                     

 

             2019           NORTH HERNANDEZ MD           Ot           R06.

00           

DYSPNEA, UNSPECIFIED                             

 

             2019           NORTH HERNANDEZ MD           Ot           R07.

89           

OTHER CHEST PAIN                                 

 

             2019           CANDE PIKE MD           Ot           I25.11

9           

ATHSCL HEART DISEASE OF NATIVE COR ART W                    

 

             2019           CANDE PIKE MD           Ot           Z01.81

2           

ENCOUNTER FOR PREPROCEDURAL LABORATORY E                    

 

                2019           NORTH HERNANDEZ MD           Ot            

  M79.605          

                          PAIN IN LEFT LEG                    

 

             2019           GONSALO SOMERS MD           Ot           G63

           

POLYNEUROPATHY IN DISEASES CLASSIFIED EL                    

 

                2019           GONSALO SOMERS MD           Ot           

   I75.022         

                          ATHEROEMBOLISM OF LEFT LOWER EXTREMITY                

    

 

                2019           GONSALO SOMERS MD           Ot           

   M79.662         

                          PAIN IN LEFT LOWER LEG                    

 

                2019           NORTH HERNANDEZ MD           Ot            

  F17.200          

                          NICOTINE DEPENDENCE, UNSPECIFIED, UNCOMP              

      

 

             2019           NORTH HERNANDEZ MD           Ot           I10 

          

ESSENTIAL (PRIMARY) HYPERTENSION                    

 

             2019           NORTH HERNANDEZ MD           Ot           I25.

10           

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                     

 

             2019           NORTH HERNANDEZ MD           Ot           I26.

99           

OTHER PULMONARY EMBOLISM WITHOUT ACUTE C                    

 

             2019           NORTH HERNANDEZ MD           Ot           I70.

0           

ATHEROSCLEROSIS OF AORTA                         

 

             2019           NORTH HERNANDEZ MD           Ot           I70.

8           

ATHEROSCLEROSIS OF OTHER ARTERIES                    

 

                2019           NORTH HERNANDEZ MD           Ot            

  I82.402          

                          ACUTE EMBOLISM AND THOMBOS UNSP DEEP VEI              

      

 

                2019           NORTH HERNANDEZ MD           Ot            

  M89.572          

                          OSTEOLYSIS, LEFT ANKLE AND FOOT                    

 

             2019           NORTH HERNANDEZ MD           Ot           R06.

00           

DYSPNEA, UNSPECIFIED                             

 

                2019           RUTH GREEN, KANDACE WHITTAKER           Ot          

    M79.89         

                          OTHER SPECIFIED SOFT TISSUE DISORDERS                 

   

 

             2019           NORTH HERNANDEZ MD           Ot           I10 

          

ESSENTIAL (PRIMARY) HYPERTENSION                    

 

             2019           NORTH HERNANDEZ MD           Ot           I21.

3           

ST ELEVATION (STEMI) MYOCARDIAL INFARCTI                    

 

             2019           NORTH HERNANDEZ MD           Ot           I25.

10           

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                     

 

             2019           NORTH HERNANDEZ MD           Ot           K27.

9           

PEPTIC ULC, SITE UNSP, UNSP AS AC OR CHR                    

 

             2019           NORTH HERNANDEZ MD           Ot           R06.

02           

SHORTNESS OF BREATH                              

 

             2019           NORTH HERNANDEZ MD           Ot           R07.

89           

OTHER CHEST PAIN                                 

 

             2019           NORTH HERNANDEZ MD           Ot           I50.

9           

HEART FAILURE, UNSPECIFIED                       

 

             2019           NORTH HERNANDEZ MD           Ot           I26.

99           

OTHER PULMONARY EMBOLISM WITHOUT ACUTE C                    

 

             2019           NORTH HERNANDEZ MD           Ot           Z79.

01           

LONG TERM (CURRENT) USE OF ANTICOAGULANT                    

 

                2019           SERENE SOILA PACKER APRN           Ot        

      Z89.612      

                          ACQUIRED ABSENCE OF LEFT LEG ABOVE KNEE               

     

 

                2019           SERENE SOILA PACKER APRN           Ot        

      Z89.612      

                          ACQUIRED ABSENCE OF LEFT LEG ABOVE KNEE               

     

 

                2019           SOILA CAST APRN           Ot        

      Z89.612      

                          ACQUIRED ABSENCE OF LEFT LEG ABOVE KNEE               

     

 

             10/21/2019           NORTH HERNANDEZ MD           Ot           I11.

0           

HYPERTENSIVE HEART DISEASE WITH HEART FA                    

 

             10/21/2019           NORTH HERNANDEZ MD           Ot           I25.

10           

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                     

 

             10/21/2019           NORTH HERNANDEZ MD           Ot           I26.

99           

OTHER PULMONARY EMBOLISM WITHOUT ACUTE C                    

 

             10/21/2019           NORTH HERNANDEZ MD           Ot           I50.

9           

HEART FAILURE, UNSPECIFIED                       

 

             10/21/2019           NORTH HERNANDEZ MD           Ot           R07.

9           

CHEST PAIN, UNSPECIFIED                          

 

             10/21/2019           NORTH HERNANDEZ MD           Ot           I25.

10           

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                     

 

             10/21/2019           NORTH HERNANDEZ MD           Ot           I50.

9           

HEART FAILURE, UNSPECIFIED                       

 

             10/21/2019           NORTH HERNANDEZ MD           Ot           R07.

9           

CHEST PAIN, UNSPECIFIED                          

 

                10/21/2019           NORTH HERNANDEZ MD           Ot            

  I82.403          

                          ACUTE EMBOLISM AND THOMBOS UNSP DEEP VEI              

      

 

             10/21/2019           NORTH HERNANDEZ MD           Ot           Z79.

01           

LONG TERM (CURRENT) USE OF ANTICOAGULANT                    

 

             10/21/2019           NORTH HERNANDEZ MD           Ot           I26.

99           

OTHER PULMONARY EMBOLISM WITHOUT ACUTE C                    

 

             10/21/2019           NORTH HERNANDEZ MD           Ot           Z51.

81           

ENCOUNTER FOR THERAPEUTIC DRUG LEVEL MON                    

 

             10/21/2019           NORTH HERNANDEZ MD           Ot           I10 

          

ESSENTIAL (PRIMARY) HYPERTENSION                    

 

             10/21/2019           NORTH HERNANDEZ MD           Ot           I25.

10           

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                     

 

             10/21/2019           NORTH HERNANDEZ MD           Ot           R06.

00           

DYSPNEA, UNSPECIFIED                             

 

             10/21/2019           NORTH HERNANDEZ MD           Ot           R07.

89           

OTHER CHEST PAIN                                 

 

             10/21/2019           CANDE PIKE MD           Ot           I25.11

9           

ATHSCL HEART DISEASE OF NATIVE COR ART W                    

 

             10/21/2019           CANDE PIKE MD           Ot           Z01.81

2           

ENCOUNTER FOR PREPROCEDURAL LABORATORY E                    

 

                10/21/2019           NORTH HERNANDEZ MD           Ot            

  M79.605          

                          PAIN IN LEFT LEG                    

 

             10/21/2019           GONSALO SOMERS MD           Ot           G63

           

POLYNEUROPATHY IN DISEASES CLASSIFIED EL                    

 

                10/21/2019           GONSALO SOMERS MD           Ot           

   I75.022         

                          ATHEROEMBOLISM OF LEFT LOWER EXTREMITY                

    

 

                10/21/2019           GONSALO SOMERS MD, Ot           

   M79.662         

                          PAIN IN LEFT LOWER LEG                    

 

                10/21/2019           NORTH HERNANDEZ MD           Ot            

  F17.200          

                          NICOTINE DEPENDENCE, UNSPECIFIED, UNCOMP              

      

 

             10/21/2019           NORTH HERNANDEZ MD           Ot           I10 

          

ESSENTIAL (PRIMARY) HYPERTENSION                    

 

             10/21/2019           NORTH HERNANDEZ MD           Ot           I25.

10           

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                     

 

             10/21/2019           NORTH HERNANDEZ MD           Ot           I26.

99           

OTHER PULMONARY EMBOLISM WITHOUT ACUTE C                    

 

             10/21/2019           NORTH HERNANDEZ MD           Ot           I70.

0           

ATHEROSCLEROSIS OF AORTA                         

 

             10/21/2019           NORTH HERNANDEZ MD           Ot           I70.

8           

ATHEROSCLEROSIS OF OTHER ARTERIES                    

 

                10/21/2019           NORTH HERNANDEZ MD           Ot            

  I82.402          

                          ACUTE EMBOLISM AND THOMBOS UNSP DEEP VEI              

      

 

                10/21/2019           NORTH HERNANDEZ MD           Ot            

  M89.572          

                          OSTEOLYSIS, LEFT ANKLE AND FOOT                    

 

             10/21/2019           NORTH HERNANDEZ MD           Ot           R06.

00           

DYSPNEA, UNSPECIFIED                             

 

                10/21/2019           RUTH GREEN, KANDACE WHITTAKER           Ot          

    M79.89         

                          OTHER SPECIFIED SOFT TISSUE DISORDERS                 

   

 

             10/21/2019           NORTH HERNANDEZ MD           Ot           I10 

          

ESSENTIAL (PRIMARY) HYPERTENSION                    

 

             10/21/2019           NORTH HERNANDEZ MD           Ot           I21.

3           

ST ELEVATION (STEMI) MYOCARDIAL INFARCTI                    

 

             10/21/2019           NORTH HERNANDEZ MD           Ot           I25.

10           

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                     

 

             10/21/2019           NORTH HERNANDEZ MD           Ot           K27.

9           

PEPTIC ULC, SITE UNSP, UNSP AS AC OR CHR                    

 

             10/21/2019           NORTH HERNANDEZ MD           Ot           R06.

02           

SHORTNESS OF BREATH                              

 

             10/21/2019           NORTH HERNANDEZ MD           Ot           R07.

89           

OTHER CHEST PAIN                                 

 

             10/21/2019           NORTH HERNANDEZ MD           Ot           I50.

9           

HEART FAILURE, UNSPECIFIED                       

 

             10/21/2019           NORTH HERNANDEZ MD           Ot           I26.

99           

OTHER PULMONARY EMBOLISM WITHOUT ACUTE C                    

 

             10/21/2019           NORTH HERNANDEZ MD           Ot           Z79.

01           

LONG TERM (CURRENT) USE OF ANTICOAGULANT                    

 

             10/21/2019           NORTH HERNANDEZ MD           Ot           E78.

00           

PURE HYPERCHOLESTEROLEMIA, UNSPECIFIED                    

 

                10/21/2019           NORTH HERNANDEZ MD           Ot            

  F17.210          

                          NICOTINE DEPENDENCE, CIGARETTES, UNCOMPL              

      

 

             10/21/2019           NORTH HERNANDEZ MD           Ot           G89.

29           

OTHER CHRONIC PAIN                               

 

             10/21/2019           NORTH HERNANDEZ MD           Ot           I11.

0           

HYPERTENSIVE HEART DISEASE WITH HEART FA                    

 

                10/21/2019           NORTH HERNANDEZ MD           Ot            

  I25.110          

                          ATHSCL HEART DISEASE OF NATIVE COR ART W              

      

 

             10/21/2019           NORTH HERNANDEZ MD           Ot           I26.

99           

OTHER PULMONARY EMBOLISM WITHOUT ACUTE C                    

 

             10/21/2019           NORTH HERNANDEZ MD           Ot           I50.

22           

CHRONIC SYSTOLIC (CONGESTIVE) HEART FAIL                    

 

                10/21/2019           NORTH HERNANDEZ MD           Ot            

  I82.409          

                          ACUTE EMBOLISM AND THOMBOS UNSP DEEP VN               

      

 

             10/21/2019           NORTH HERNANDEZ MD           Ot           M10.

9           

GOUT, UNSPECIFIED                                

 

             10/21/2019           NORTH HERNANDEZ MD           Ot           M54.

9           

DORSALGIA, UNSPECIFIED                           

 

             10/21/2019           NORTH HERNANDEZ MD           Ot           R79.

89           

OTHER SPECIFIED ABNORMAL FINDINGS OF BLO                    

 

             10/21/2019           NORTH HERNANDEZ MD           Ot           Z79.

01           

LONG TERM (CURRENT) USE OF ANTICOAGULANT                    

 

             10/21/2019           NORTH HERNANDEZ MD           Ot           Z79.

82           

LONG TERM (CURRENT) USE OF ASPIRIN                    

 

                10/21/2019           NORTH HERNANDEZ MD           Ot            

  Z79.891          

                          LONG TERM (CURRENT) USE OF OPIATE ANALGE              

      

 

                10/21/2019           NORTH HERNANDEZ MD           Ot            

  Z79.899          

                          OTHER LONG TERM (CURRENT) DRUG THERAPY                

    

 

             10/21/2019           NORTH HERNANDEZ MD           Ot           Z82.

49           

FAMILY HX OF ISCHEM HEART DIS AND OTH DI                    

 

             10/21/2019           NORTH HERNANDEZ MD           Ot           Z82.

61           

FAMILY HISTORY OF ARTHRITIS                      

 

             10/21/2019           NORTH HERNANDEZ MD           Ot           Z83.

3           

FAMILY HISTORY OF DIABETES MELLITUS                    

 

             10/21/2019           NORTH HERNANDEZ MD           Ot           Z86.

74           

PERSONAL HISTORY OF SUDDEN CARDIAC ARRES                    

 

             10/21/2019           NORTH HERNANDEZ MD           Ot           Z88.

0           

ALLERGY STATUS TO PENICILLIN                     

 

             10/21/2019           NORTH HERNANDEZ MD           Ot           Z95.

1           

PRESENCE OF AORTOCORONARY BYPASS GRAFT                    

 

             10/23/2019           NORTH HERNANDEZ MD           Ot           E78.

00           

PURE HYPERCHOLESTEROLEMIA, UNSPECIFIED                    

 

                10/23/2019           NORTH HERNANDEZ MD           Ot            

  F17.210          

                          NICOTINE DEPENDENCE, CIGARETTES, UNCOMPL              

      

 

             10/23/2019           NORTH HERNANDEZ MD           Ot           G89.

29           

OTHER CHRONIC PAIN                               

 

             10/23/2019           NORTH HERNANDEZ MD           Ot           I11.

0           

HYPERTENSIVE HEART DISEASE WITH HEART FA                    

 

                10/23/2019           NORTH HERNANDEZ MD           Ot            

  I25.110          

                          ATHSCL HEART DISEASE OF NATIVE COR ART W              

      

 

             10/23/2019           NORTH HERNANDEZ MD           Ot           I26.

99           

OTHER PULMONARY EMBOLISM WITHOUT ACUTE C                    

 

             10/23/2019           NORTH HERNANDEZ MD           Ot           I50.

22           

CHRONIC SYSTOLIC (CONGESTIVE) HEART FAIL                    

 

                10/23/2019           NORTH HERNANDEZ MD           Ot            

  I82.409          

                          ACUTE EMBOLISM AND THOMBOS UNSP DEEP VN               

      

 

             10/23/2019           NORTH HERNANDEZ MD           Ot           M10.

9           

GOUT, UNSPECIFIED                                

 

             10/23/2019           NORTH HERNANDEZ MD           Ot           M54.

9           

DORSALGIA, UNSPECIFIED                           

 

             10/23/2019           NORTH HERNANDEZ MD           Ot           R79.

89           

OTHER SPECIFIED ABNORMAL FINDINGS OF BLO                    

 

             10/23/2019           NORTH HERNANDEZ MD           Ot           Z79.

01           

LONG TERM (CURRENT) USE OF ANTICOAGULANT                    

 

             10/23/2019           NORTH HERNANDEZ MD           Ot           Z79.

82           

LONG TERM (CURRENT) USE OF ASPIRIN                    

 

                10/23/2019           NORTH HERNANDEZ MD           Ot            

  Z79.891          

                          LONG TERM (CURRENT) USE OF OPIATE ANALGE              

      

 

                10/23/2019           NORTH HERNANDEZ MD           Ot            

  Z79.899          

                          OTHER LONG TERM (CURRENT) DRUG THERAPY                

    

 

             10/23/2019           NORTH HERNANDEZ MD           Ot           Z82.

49           

FAMILY HX OF ISCHEM HEART DIS AND OTH DI                    

 

             10/23/2019           NORTH HERNANDEZ MD           Ot           Z82.

61           

FAMILY HISTORY OF ARTHRITIS                      

 

             10/23/2019           NORTH HERNANDEZ MD           Ot           Z83.

3           

FAMILY HISTORY OF DIABETES MELLITUS                    

 

             10/23/2019           NORTH HERNANDEZ MD           Ot           Z86.

74           

PERSONAL HISTORY OF SUDDEN CARDIAC ARRES                    

 

             10/23/2019           NORTH HERNANDEZ MD           Ot           Z88.

0           

ALLERGY STATUS TO PENICILLIN                     

 

             10/23/2019           NORTH HERNANDEZ MD           Ot           Z95.

1           

PRESENCE OF AORTOCORONARY BYPASS GRAFT                    

 

             2019           NORTH HERNANDEZ MD           Ot           I11.

0           

HYPERTENSIVE HEART DISEASE WITH HEART FA                    

 

             2019           NORTH HERNANDEZ MD           Ot           I25.

10           

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                     

 

             2019           NORTH HERNANDEZ MD           Ot           I26.

99           

OTHER PULMONARY EMBOLISM WITHOUT ACUTE C                    

 

             2019           NORTH HERNANDEZ MD           Ot           I50.

9           

HEART FAILURE, UNSPECIFIED                       

 

             2019           NORTH HERNANDEZ MD           Ot           R07.

9           

CHEST PAIN, UNSPECIFIED                          

 

             2019           NORTH HERNANDEZ MD           Ot           I25.

10           

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                     

 

             2019           NORTH HERNANDEZ MD           Ot           I50.

9           

HEART FAILURE, UNSPECIFIED                       

 

             2019           NORTH HERNANDEZ MD           Ot           R07.

9           

CHEST PAIN, UNSPECIFIED                          

 

                2019           NORTH HERNANDEZ MD           Ot            

  I82.403          

                          ACUTE EMBOLISM AND THOMBOS UNSP DEEP VEI              

      

 

             2019           NORTH HERNANDEZ MD           Ot           Z79.

01           

LONG TERM (CURRENT) USE OF ANTICOAGULANT                    

 

             2019           NORTH HERNANDEZ MD           Ot           I26.

99           

OTHER PULMONARY EMBOLISM WITHOUT ACUTE C                    

 

             2019           NORTH HERNANDEZ MD           Ot           Z51.

81           

ENCOUNTER FOR THERAPEUTIC DRUG LEVEL MON                    

 

             2019           NORTH HERNANDEZ MD           Ot           I10 

          

ESSENTIAL (PRIMARY) HYPERTENSION                    

 

             2019           NORTH HERNANDEZ MD           Ot           I25.

10           

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                     

 

             2019           NORTH HERNANDEZ MD           Ot           R06.

00           

DYSPNEA, UNSPECIFIED                             

 

             2019           NORTH HERNANDEZ MD           Ot           R07.

89           

OTHER CHEST PAIN                                 

 

             2019           CANDE PIKE MD           Ot           I25.11

9           

ATHSCL HEART DISEASE OF NATIVE COR ART W                    

 

             2019           GLENDY GREEN, CANDE DEWEY           Ot           Z01.81

2           

ENCOUNTER FOR PREPROCEDURAL LABORATORY E                    

 

                2019           NORTH HERNANDEZ MD           Ot            

  M79.605          

                          PAIN IN LEFT LEG                    

 

             2019           GONSALO SOMERS MD           Ot           G63

           

POLYNEUROPATHY IN DISEASES CLASSIFIED EL                    

 

                2019           GONSALO SOMERS MD           Ot           

   I75.022         

                          ATHEROEMBOLISM OF LEFT LOWER EXTREMITY                

    

 

                2019           GONSALO SOMERS MD           Ot           

   M79.662         

                          PAIN IN LEFT LOWER LEG                    

 

                2019           NORTH HERNANDEZ MD           Ot            

  F17.200          

                          NICOTINE DEPENDENCE, UNSPECIFIED, UNCOMP              

      

 

             2019           NORTH HERNANDEZ MD           Ot           I10 

          

ESSENTIAL (PRIMARY) HYPERTENSION                    

 

             2019           NORTH HERNANDEZ MD           Ot           I25.

10           

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                     

 

             2019           NORTH HERNANDEZ MD           Ot           I26.

99           

OTHER PULMONARY EMBOLISM WITHOUT ACUTE C                    

 

             2019           NORTH HERNANDEZ MD           Ot           I70.

0           

ATHEROSCLEROSIS OF AORTA                         

 

             2019           NORTH HERNANDEZ MD           Ot           I70.

8           

ATHEROSCLEROSIS OF OTHER ARTERIES                    

 

                2019           NORTH HERNANDEZ MD           Ot            

  I82.402          

                          ACUTE EMBOLISM AND THOMBOS UNSP DEEP VEI              

      

 

                2019           NORTH HERNANDEZ MD           Ot            

  M89.572          

                          OSTEOLYSIS, LEFT ANKLE AND FOOT                    

 

             2019           NORTH HERNANDEZ MD           Ot           R06.

00           

DYSPNEA, UNSPECIFIED                             

 

                2019           KANDACE MIRANDA MD           Ot          

    M79.89         

                          OTHER SPECIFIED SOFT TISSUE DISORDERS                 

   

 

             2019           NORTH HERNANDEZ MD           Ot           I10 

          

ESSENTIAL (PRIMARY) HYPERTENSION                    

 

             2019           NORTH HERNANDEZ MD           Ot           I21.

3           

ST ELEVATION (STEMI) MYOCARDIAL INFARCTI                    

 

             2019           NORTH HERNANDEZ MD           Ot           I25.

10           

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                     

 

             2019           NORTH HERNANDEZ MD           Ot           K27.

9           

PEPTIC ULC, SITE UNSP, UNSP AS AC OR CHR                    

 

             2019           NORTH HERNANDEZ MD           Ot           R06.

02           

SHORTNESS OF BREATH                              

 

             2019           NORTH HERNANDEZ MD           Ot           R07.

89           

OTHER CHEST PAIN                                 

 

             2019           NORTH HERNANDEZ MD           Ot           I50.

9           

HEART FAILURE, UNSPECIFIED                       

 

             2019           NORTH HERNANDEZ MD           Ot           I26.

99           

OTHER PULMONARY EMBOLISM WITHOUT ACUTE C                    

 

             2019           NORTH HERNANDEZ MD           Ot           Z79.

01           

LONG TERM (CURRENT) USE OF ANTICOAGULANT                    

 

                2019           KEESHA LAZO           Ot

              I10  

                          ESSENTIAL (PRIMARY) HYPERTENSION                    

 

                2019           KEESHA LAZO           Ot

              

I25.10                    ATHSCL HEART DISEASE OF NATIVE CORONARY               

      

 

                2019           KEESHA LAZO           Ot

              

I82.409                   ACUTE EMBOLISM AND THOMBOS UNSP DEEP VN               

      

 

                2019           KEESHA LAZO           Ot

              

R06.00                    DYSPNEA, UNSPECIFIED                    

 

                2019           KEESHA LAZO           Ot

              

R07.89                    OTHER CHEST PAIN                    

 

                2019           KEESHA LAZO           Ot

              I10  

                          ESSENTIAL (PRIMARY) HYPERTENSION                    

 

                2019           KEESHA LAZO           Ot

              

I25.10                    ATHSCL HEART DISEASE OF NATIVE CORONARY               

      

 

                2019           KEESHA LAZO           Ot

              

I82.409                   ACUTE EMBOLISM AND THOMBOS UNSP DEEP VN               

      

 

                2019           KEESHA LAZO           Ot

              

R06.00                    DYSPNEA, UNSPECIFIED                    

 

                2019           KEESHA LAZO           Ot

              

R07.89                    OTHER CHEST PAIN                    



                                                                                
                                                                                
                                                                                
                                                                                
                                                                                
                                                                                
                                                                                
                                                                                
                                                                                
                                                                                
                                                                                
                                                                                
                                                                                
                                                                                
                                                                                
                                                                                
                                                                                
                                                                                
                                                                                
                                                                                
                                                                                
                                                                                
                                                                                
                                                                                
                                                                                
                                                                                
                                                                                
                                                                                
                                                                                
                                                                                
                                                                                
                                                                                
                                                                                
                                                                                
        



Procedures

      



                Code            Description           Performed By           Per

formed On        

 

                                      00.40                                 PROC

EDURE ON SINGLE VESSEL    

                                                    2012        

 

                                      00.45                                 INSE

RTION OF ONE VASCULAR 

STENT                                               2012        

 

                                      00.66                                 PERC

UTANEOUS TRANSLUMINAL 

CORONARY ANGIO                                               2012        

 

                                      36.07                                 INSR

T OF DRUG-ELUTING CORON 

ARTERY STENT                                               2012        

 

                                      37.22                                 LEFT

 HEART CARDIAC CATH       

                                                    2012        

 

                                      88.53                                 LT H

EART ANGIOCARDIOGRAM      

                                                    2012        

 

                                      88.56                                 MARISA

ELIUD ARTERIOGR-2 CATH      

                                                    2012        

 

                                      CARDIOLOG                                 

NROTH HERNANDEZ             

                                                    2014        

 

                                      [id number]                                 DI

LATION OF 1 COR ART WITH 

DRUG-ELUT INT                                               2017        

 

                                      [id number]                                 ME

ASURE OF CARDIAC SAMPL   

PRESSURE, L H                                               2017        

 

                                      [id number]                                 [place]

UOROSCOPY OF MULT COR ART 

USING L OSM                                                2017        

 

                                      [id number]                                 [place]

UOROSCOPY OF LEFT HEART 

USING LOW OSMO                                               2017        



                                        



Results

      



                    Test                Result              Range        

 

                                        Complete urinalysis with reflex to cultu

re - 16 10:52         

 

                    Urine color determination           YELLOW              NRG 

       

 

                    Urine clarity determination           CLEAR               NR

G        

 

                    Urine pH measurement by test strip           8              

     5-9        

 

                    Specific gravity of urine by test strip           1.010     

          1.016-1.022  

      

 

                          Urine protein assay by test strip, semi-quantitative  

         NEGATIVE         

                                        NEGATIVE        

 

                    Urine glucose detection by automated test strip           NE

GATIVE            

NEGATIVE        

 

                          Erythrocytes detection in urine sediment by light micr

oscopy           NEGATIVE 

                                        NEGATIVE        

 

                    Urine ketones detection by automated test strip           NE

GATIVE            

NEGATIVE        

 

                    Urine nitrite detection by test strip           NEGATIVE    

        NEGATIVE    

    

 

                    Urine total bilirubin detection by test strip           NEGA

TIVE            

NEGATIVE        

 

                          Urine urobilinogen measurement by automated test strip

 (mass/volume)           

NORMAL                                  NORMAL        

 

                    Urine leukocyte esterase detection by dipstick           NEG

ATIVE            

NEGATIVE        

 

                                        Automated urine sediment erythrocyte cou

nt by microscopy (number/high power 

field)                    NONE                      NRG        

 

                                        Automated urine sediment leukocyte count

 by microscopy (number/high power field)

                          NONE                      NRG        

 

                          Bacteria detection in urine sediment by light microsco

py           NEGATIVE     

                                        NRG        

 

                          Crystals detection in urine sediment by light microsco

py           NONE         

                                        NRG        

 

                    Casts detection in urine sediment by light microscopy       

    NONE                

NRG        

 

                          Mucus detection in urine sediment by light microscopy 

          NEGATIVE        

                                        NRG        

 

                    Complete urinalysis with reflex to culture           NO     

             NRG        

 

                                        Automated blood complete blood count (he

mogram) panel - 16 11:00         

 

                          Blood leukocytes automated count (number/volume)      

     9.9 10*3/uL          

                                        4.3-11.0        

 

                          Blood erythrocytes automated count (number/volume)    

       5.46 10*6/uL       

                                        4.35-5.85        

 

                    Venous blood hemoglobin measurement (mass/volume)           

15.7 g/dL           

13.3-17.7        

 

                    Blood hematocrit (volume fraction)           47 %           

     40-54        

 

                    Automated erythrocyte mean corpuscular volume           86 [

foz_us]           

80-99        

 

                                        Automated erythrocyte mean corpuscular h

emoglobin (mass per erythrocyte)        

                          29 pg                     25-34        

 

                                        Automated erythrocyte mean corpuscular h

emoglobin concentration measurement 

(mass/volume)             33 g/dL                   32-36        

 

                    Automated erythrocyte distribution width ratio           13.

9 %              10.0-

14.5        

 

                    Automated blood platelet count (count/volume)           282 

10*3/uL           

130-400        

 

                          Automated blood platelet mean volume measurement      

     9.0 [foz_us]         

                                        7.4-10.4        

 

                                        PT panel in platelet poor plasma by coag

ulation assay - 16 11:00         

 

                          Prothrombin time (PT) in platelet poor plasma by coagu

lation assay           

22.5 s                                  12.2-14.7        

 

                          INR in platelet poor plasma or blood by coagulation as

say           2.0         

                                        0.8-1.4        

 

                                        Activated partial thromboplastin time (a

PTT) in platelet poor plasma 

bycoagulation assay - 16 11:00         

 

                                        Activated partial thromboplastin time (a

PTT) in platelet poor plasma 

bycoagulation assay           45 s                      24-35        

 

                                        Comprehensive metabolic panel - 16

 11:00         

 

                          Serum or plasma sodium measurement (moles/volume)     

      140 mmol/L          

                                        135-145        

 

                          Serum or plasma potassium measurement (moles/volume)  

         4.1 mmol/L       

                                        3.6-5.0        

 

                          Serum or plasma chloride measurement (moles/volume)   

        103 mmol/L        

                                                

 

                    Carbon dioxide           27 mmol/L           21-32        

 

                          Serum or plasma anion gap determination (moles/volume)

           10 mmol/L      

                                        5-14        

 

                          Serum or plasma urea nitrogen measurement (mass/volume

)           9 mg/dL       

                                        7-18        

 

                          Serum or plasma creatinine measurement (mass/volume)  

         0.79 mg/dL       

                                        0.60-1.30        

 

                    Serum or plasma urea nitrogen/creatinine mass ratio         

  11                  NRG 

       

 

                                        Serum or plasma creatinine measurement w

ith calculation of estimated glomerular 

filtration rate           >                         NRG        

 

                    Serum or plasma glucose measurement (mass/volume)           

96 mg/dL            

        

 

                    Serum or plasma calcium measurement (mass/volume)           

9.5 mg/dL           

8.5-10.1        

 

                          Serum or plasma total bilirubin measurement (mass/volu

me)           0.8 mg/dL   

                                        0.1-1.0        

 

                                        Serum or plasma alkaline phosphatase angelo

surement (enzymatic activity/volume)    

                          79 U/L                            

 

                                        Serum or plasma aspartate aminotransfera

se measurement (enzymatic 

activity/volume)           26 U/L                    5-34        

 

                                        Serum or plasma alanine aminotransferase

 measurement (enzymatic activity/volume)

                          27 U/L                    0-55        

 

                    Serum or plasma protein measurement (mass/volume)           

7.3 g/dL            

6.4-8.2        

 

                    Serum or plasma albumin measurement (mass/volume)           

4.4 g/dL            

3.2-4.5        

 

                                        Lipid 1996 panel - 16 11:00       

  

 

                          Serum or plasma triglyceride measurement (mass/volume)

           278 mg/dL      

                                        <150        

 

                          Serum or plasma cholesterol measurement (mass/volume) 

          241 mg/dL       

                                        < 200        

 

                          Serum or plasma cholesterol in HDL measurement (mass/v

olume)           34 mg/dL 

                                        40-60        

 

                          Cholesterol in LDL [mass/volume] in serum or plasma by

 direct assay           

165 mg/dL                               1-129        

 

                          Serum or plasma cholesterol in VLDL measurement (mass/

volume)           56 mg/dL

                                        5-40        

 

                                        Methicillin resistant Staphylococcus aur

eus (MRSA) screening culture - 16 

11:00         

 

                          Methicillin resistant Staphylococcus aureus (MRSA) scr

eening culture           

NEG                                     NRG        

 

                                        PT panel in platelet poor plasma by coag

ulation assay - 17 14:21         

 

                          Prothrombin time (PT) in platelet poor plasma by coagu

lation assay           

27.0 s                                  12.2-14.7        

 

                          INR in platelet poor plasma or blood by coagulation as

say           2.5         

                                        0.8-1.4        

 

                                        Complete blood count (CBC) with automate

d white blood cell (WBC) differential - 

17 11:03         

 

                          Blood leukocytes automated count (number/volume)      

     8.9 10*3/uL          

                                        4.3-11.0        

 

                          Blood erythrocytes automated count (number/volume)    

       4.97 10*6/uL       

                                        4.35-5.85        

 

                    Venous blood hemoglobin measurement (mass/volume)           

14.6 g/dL           

13.3-17.7        

 

                    Blood hematocrit (volume fraction)           43 %           

     40-54        

 

                    Automated erythrocyte mean corpuscular volume           86 [

foz_us]           

80-99        

 

                                        Automated erythrocyte mean corpuscular h

emoglobin (mass per erythrocyte)        

                          29 pg                     25-34        

 

                                        Automated erythrocyte mean corpuscular h

emoglobin concentration measurement 

(mass/volume)             34 g/dL                   32-36        

 

                    Automated erythrocyte distribution width ratio           13.

7 %              10.0-

14.5        

 

                    Automated blood platelet count (count/volume)           285 

10*3/uL           

130-400        

 

                          Automated blood platelet mean volume measurement      

     9.7 [foz_us]         

                                        7.4-10.4        

 

                    Automated blood neutrophils/100 leukocytes           44 %   

             42-75       

 

 

                    Automated blood lymphocytes/100 leukocytes           40 %   

             12-44       

 

 

                    Blood monocytes/100 leukocytes           11 %               

 0-12        

 

                    Automated blood eosinophils/100 leukocytes           4 %    

             0-10        

 

                    Automated blood basophils/100 leukocytes           1 %      

           0-10        

 

                    Blood neutrophils automated count (number/volume)           

3.9 10*3            

1.8-7.8        

 

                    Blood lymphocytes automated count (number/volume)           

3.5 10*3            

1.0-4.0        

 

                    Blood monocytes automated count (number/volume)           1.

0 10*3            

0.0-1.0        

 

                    Automated eosinophil count           0.4 10*3/uL           0

.0-0.3        

 

                    Automated blood basophil count (count/volume)           0.1 

10*3/uL           

0.0-0.1        

 

                                        Comprehensive metabolic panel - 17

 11:03         

 

                          Serum or plasma sodium measurement (moles/volume)     

      140 mmol/L          

                                        135-145        

 

                          Serum or plasma potassium measurement (moles/volume)  

         4.5 mmol/L       

                                        3.6-5.0        

 

                          Serum or plasma chloride measurement (moles/volume)   

        105 mmol/L        

                                                

 

                    Carbon dioxide           27 mmol/L           21-32        

 

                          Serum or plasma anion gap determination (moles/volume)

           8 mmol/L       

                                        5-14        

 

                          Serum or plasma urea nitrogen measurement (mass/volume

)           8 mg/dL       

                                        7-18        

 

                          Serum or plasma creatinine measurement (mass/volume)  

         0.76 mg/dL       

                                        0.60-1.30        

 

                    Serum or plasma urea nitrogen/creatinine mass ratio         

  11                  NRG 

       

 

                                        Serum or plasma creatinine measurement w

ith calculation of estimated glomerular 

filtration rate           >                         NRG        

 

                    Serum or plasma glucose measurement (mass/volume)           

91 mg/dL            

        

 

                    Serum or plasma calcium measurement (mass/volume)           

8.9 mg/dL           

8.5-10.1        

 

                          Serum or plasma total bilirubin measurement (mass/volu

me)           0.3 mg/dL   

                                        0.1-1.0        

 

                                        Serum or plasma alkaline phosphatase angelo

surement (enzymatic activity/volume)    

                          61 U/L                            

 

                                        Serum or plasma aspartate aminotransfera

se measurement (enzymatic 

activity/volume)           24 U/L                    5-34        

 

                                        Serum or plasma alanine aminotransferase

 measurement (enzymatic activity/volume)

                          18 U/L                    0-55        

 

                    Serum or plasma protein measurement (mass/volume)           

6.4 g/dL            

6.4-8.2        

 

                    Serum or plasma albumin measurement (mass/volume)           

3.9 g/dL            

3.2-4.5        

 

                                        Lipase - 17 11:03         

 

                    Lipase              14 U/L              8-78        

 

                                        PT panel in platelet poor plasma by coag

ulation assay - 08/15/17 11:03         

 

                          Prothrombin time (PT) in platelet poor plasma by coagu

lation assay           

25.0 s                                  12.2-14.7        

 

                          INR in platelet poor plasma or blood by coagulation as

say           2.3         

                                        0.8-1.4        

 

                                        PT panel in platelet poor plasma by coag

ulation assay - 17 11:00         

 

                          Prothrombin time (PT) in platelet poor plasma by coagu

lation assay           

20.4 s                                  12.2-14.7        

 

                          INR in platelet poor plasma or blood by coagulation as

say           1.7         

                                        0.8-1.4        

 

                                        PT panel in platelet poor plasma by coag

ulation assay - 17 08:15         

 

                          Prothrombin time (PT) in platelet poor plasma by coagu

lation assay           

28.3 s                                  12.2-14.7        

 

                          INR in platelet poor plasma or blood by coagulation as

say           2.7         

                                        0.8-1.4        

 

                                        Complete blood count (CBC) with automate

d white blood cell (WBC) differential - 

17 15:15         

 

                          Blood leukocytes automated count (number/volume)      

     13.4 10*3/uL         

                                        4.3-11.0        

 

                          Blood erythrocytes automated count (number/volume)    

       5.18 10*6/uL       

                                        4.35-5.85        

 

                    Venous blood hemoglobin measurement (mass/volume)           

15.5 g/dL           

13.3-17.7        

 

                    Blood hematocrit (volume fraction)           44 %           

     40-54        

 

                    Automated erythrocyte mean corpuscular volume           86 [

foz_us]           

80-99        

 

                                        Automated erythrocyte mean corpuscular h

emoglobin (mass per erythrocyte)        

                          30 pg                     25-34        

 

                                        Automated erythrocyte mean corpuscular h

emoglobin concentration measurement 

(mass/volume)             35 g/dL                   32-36        

 

                    Automated erythrocyte distribution width ratio           13.

7 %              10.0-

14.5        

 

                    Automated blood platelet count (count/volume)           336 

10*3/uL           

130-400        

 

                          Automated blood platelet mean volume measurement      

     9.0 [foz_us]         

                                        7.4-10.4        

 

                    Automated blood neutrophils/100 leukocytes           46 %   

             42-75       

 

 

                    Automated blood lymphocytes/100 leukocytes           39 %   

             12-44       

 

 

                    Blood monocytes/100 leukocytes           11 %               

 0-12        

 

                    Automated blood eosinophils/100 leukocytes           3 %    

             0-10        

 

                    Automated blood basophils/100 leukocytes           1 %      

           0-10        

 

                    Blood neutrophils automated count (number/volume)           

6.1 10*3            

1.8-7.8        

 

                    Blood lymphocytes automated count (number/volume)           

5.2 10*3            

1.0-4.0        

 

                    Blood monocytes automated count (number/volume)           1.

5 10*3            

0.0-1.0        

 

                    Automated eosinophil count           0.4 10*3/uL           0

.0-0.3        

 

                    Automated blood basophil count (count/volume)           0.1 

10*3/uL           

0.0-0.1        

 

                                        Comprehensive metabolic panel - 17

 15:15         

 

                          Serum or plasma sodium measurement (moles/volume)     

      140 mmol/L          

                                        135-145        

 

                          Serum or plasma potassium measurement (moles/volume)  

         3.5 mmol/L       

                                        3.6-5.0        

 

                          Serum or plasma chloride measurement (moles/volume)   

        102 mmol/L        

                                                

 

                    Carbon dioxide           27 mmol/L           21-32        

 

                          Serum or plasma anion gap determination (moles/volume)

           11 mmol/L      

                                        5-14        

 

                          Serum or plasma urea nitrogen measurement (mass/volume

)           9 mg/dL       

                                        7-18        

 

                          Serum or plasma creatinine measurement (mass/volume)  

         0.82 mg/dL       

                                        0.60-1.30        

 

                    Serum or plasma urea nitrogen/creatinine mass ratio         

  11                  NRG 

       

 

                                        Serum or plasma creatinine measurement w

ith calculation of estimated glomerular 

filtration rate           >                         NRG        

 

                    Serum or plasma glucose measurement (mass/volume)           

102 mg/dL           

        

 

                    Serum or plasma calcium measurement (mass/volume)           

9.4 mg/dL           

8.5-10.1        

 

                          Serum or plasma total bilirubin measurement (mass/volu

me)           0.4 mg/dL   

                                        0.1-1.0        

 

                                        Serum or plasma alkaline phosphatase angelo

surement (enzymatic activity/volume)    

                          78 U/L                            

 

                                        Serum or plasma aspartate aminotransfera

se measurement (enzymatic 

activity/volume)           17 U/L                    5-34        

 

                                        Serum or plasma alanine aminotransferase

 measurement (enzymatic activity/volume)

                          17 U/L                    0-55        

 

                    Serum or plasma protein measurement (mass/volume)           

7.6 g/dL            

6.4-8.2        

 

                    Serum or plasma albumin measurement (mass/volume)           

4.3 g/dL            

3.2-4.5        

 

                                        Magnesium - 17 15:15         

 

                    Magnesium           2.1 mg/dL           1.8-2.4        

 

                                        PT panel in platelet poor plasma by coag

ulation assay - 18 14:16         

 

                          Prothrombin time (PT) in platelet poor plasma by coagu

lation assay           

13.8 s                                  12.2-14.7        

 

                          INR in platelet poor plasma or blood by coagulation as

say           1.1         

                                        0.8-1.4        

 

                                        Activated partial thromboplastin time (a

PTT) in platelet poor plasma 

bycoagulation assay - 18 14:16         

 

                                        Activated partial thromboplastin time (a

PTT) in platelet poor plasma 

bycoagulation assay           32 s                      24-35        

 

                                        Serum or plasma troponin i.cardiac measu

rement (mass/volume) - 17 15:15   

      

 

                          Serum or plasma troponin i.cardiac measurement (mass/v

olume)           < ng/mL  

                                        <0.30        

 

                                        Fibrin D-dimer FEU measurement in platel

et poor plasma (mass/volume) - 17 

15:15         

 

                          Fibrin D-dimer FEU measurement in platelet poor plasma

 (mass/volume)           

0.28 ug/mL                              0.00-0.49        

 

                                        Myoglobin, serum - 17 15:15       

  

 

                    Myoglobin, serum           31.5 ng/mL           10.0-92.0   

     

 

                                        Serum or plasma troponin i.cardiac measu

rement (mass/volume) - 17 21:45   

      

 

                          Serum or plasma troponin i.cardiac measurement (mass/v

olume)           0.57 

ng/mL                                   <0.30        

 

                                        Complete blood count (CBC) with automate

d white blood cell (WBC) differential - 

17 05:24         

 

                          Blood leukocytes automated count (number/volume)      

     15.1 10*3/uL         

                                        4.3-11.0        

 

                          Blood erythrocytes automated count (number/volume)    

       4.61 10*6/uL       

                                        4.35-5.85        

 

                    Venous blood hemoglobin measurement (mass/volume)           

13.3 g/dL           

13.3-17.7        

 

                    Blood hematocrit (volume fraction)           40 %           

     40-54        

 

                    Automated erythrocyte mean corpuscular volume           87 [

foz_us]           

80-99        

 

                                        Automated erythrocyte mean corpuscular h

emoglobin (mass per erythrocyte)        

                          29 pg                     25-34        

 

                                        Automated erythrocyte mean corpuscular h

emoglobin concentration measurement 

(mass/volume)             33 g/dL                   32-36        

 

                    Automated erythrocyte distribution width ratio           13.

8 %              10.0-

14.5        

 

                    Automated blood platelet count (count/volume)           278 

10*3/uL           

130-400        

 

                          Automated blood platelet mean volume measurement      

     9.5 [foz_us]         

                                        7.4-10.4        

 

                    Automated blood neutrophils/100 leukocytes           77 %   

             42-75       

 

 

                    Automated blood lymphocytes/100 leukocytes           14 %   

             12-44       

 

 

                    Blood monocytes/100 leukocytes           8 %                

 0-12        

 

                    Automated blood eosinophils/100 leukocytes           0 %    

             0-10        

 

                    Automated blood basophils/100 leukocytes           0 %      

           0-10        

 

                    Blood neutrophils automated count (number/volume)           

11.7 10*3           

1.8-7.8        

 

                    Blood lymphocytes automated count (number/volume)           

2.1 10*3            

1.0-4.0        

 

                    Blood monocytes automated count (number/volume)           1.

2 10*3            

0.0-1.0        

 

                    Automated eosinophil count           0.0 10*3/uL           0

.0-0.3        

 

                    Automated blood basophil count (count/volume)           0.0 

10*3/uL           

0.0-0.1        

 

                                        Comprehensive metabolic panel - 17

 05:24         

 

                          Serum or plasma sodium measurement (moles/volume)     

      137 mmol/L          

                                        135-145        

 

                          Serum or plasma potassium measurement (moles/volume)  

         3.8 mmol/L       

                                        3.6-5.0        

 

                          Serum or plasma chloride measurement (moles/volume)   

        103 mmol/L        

                                                

 

                    Carbon dioxide           25 mmol/L           21-32        

 

                          Serum or plasma anion gap determination (moles/volume)

           9 mmol/L       

                                        5-14        

 

                          Serum or plasma urea nitrogen measurement (mass/volume

)           8 mg/dL       

                                        7-18        

 

                          Serum or plasma creatinine measurement (mass/volume)  

         0.67 mg/dL       

                                        0.60-1.30        

 

                    Serum or plasma urea nitrogen/creatinine mass ratio         

  12                  NRG 

       

 

                                        Serum or plasma creatinine measurement w

ith calculation of estimated glomerular 

filtration rate           >                         NRG        

 

                    Serum or plasma glucose measurement (mass/volume)           

123 mg/dL           

        

 

                    Serum or plasma calcium measurement (mass/volume)           

8.7 mg/dL           

8.5-10.1        

 

                          Serum or plasma total bilirubin measurement (mass/volu

me)           0.7 mg/dL   

                                        0.1-1.0        

 

                                        Serum or plasma alkaline phosphatase angelo

surement (enzymatic activity/volume)    

                          64 U/L                            

 

                                        Serum or plasma aspartate aminotransfera

se measurement (enzymatic 

activity/volume)           95 U/L                    5-34        

 

                                        Serum or plasma alanine aminotransferase

 measurement (enzymatic activity/volume)

                          26 U/L                    0-55        

 

                    Serum or plasma protein measurement (mass/volume)           

6.3 g/dL            

6.4-8.2        

 

                    Serum or plasma albumin measurement (mass/volume)           

3.7 g/dL            

3.2-4.5        

 

                                        Lipid 1996 panel - 17 05:24       

  

 

                          Serum or plasma triglyceride measurement (mass/volume)

           185 mg/dL      

                                        <150        

 

                          Serum or plasma cholesterol measurement (mass/volume) 

          192 mg/dL       

                                        < 200        

 

                          Serum or plasma cholesterol in HDL measurement (mass/v

olume)           34 mg/dL 

                                        40-60        

 

                          Cholesterol in LDL [mass/volume] in serum or plasma by

 direct assay           

143 mg/dL                               1-129        

 

                          Serum or plasma cholesterol in VLDL measurement (mass/

volume)           37 mg/dL

                                        5-40        

 

                                        Blood manual differential performed dete

ction - 17 05:24         

 

                    Blood monocytes/100 leukocytes           14 %               

 NRG        

 

                    Manual blood segmented neutrophils/100 leukocytes           

73 %                NRG  

      

 

                    Blood band neutrophils/100 leukocytes           0 %         

        NRG        

 

                    Manual blood lymphocytes/100 leukocytes           13 %      

          NRG        

 

                    Blood erythrocyte morphology finding identification         

  NORMAL              

NRG        

 

                                        Automated blood complete blood count (he

mogram) panel - 17 03:08         

 

                          Blood leukocytes automated count (number/volume)      

     11.1 10*3/uL         

                                        4.3-11.0        

 

                          Blood erythrocytes automated count (number/volume)    

       4.39 10*6/uL       

                                        4.35-5.85        

 

                    Venous blood hemoglobin measurement (mass/volume)           

12.8 g/dL           

13.3-17.7        

 

                    Blood hematocrit (volume fraction)           39 %           

     40-54        

 

                    Automated erythrocyte mean corpuscular volume           88 [

foz_us]           

80-99        

 

                                        Automated erythrocyte mean corpuscular h

emoglobin (mass per erythrocyte)        

                          29 pg                     25-34        

 

                                        Automated erythrocyte mean corpuscular h

emoglobin concentration measurement 

(mass/volume)             33 g/dL                   32-36        

 

                    Automated erythrocyte distribution width ratio           14.

0 %              10.0-

14.5        

 

                    Automated blood platelet count (count/volume)           252 

10*3/uL           

130-400        

 

                          Automated blood platelet mean volume measurement      

     9.6 [foz_us]         

                                        7.4-10.4        

 

                                        Whole blood basic metabolic panel -  03:08         

 

                          Serum or plasma sodium measurement (moles/volume)     

      139 mmol/L          

                                        135-145        

 

                          Serum or plasma potassium measurement (moles/volume)  

         4.0 mmol/L       

                                        3.6-5.0        

 

                          Serum or plasma chloride measurement (moles/volume)   

        104 mmol/L        

                                                

 

                    Carbon dioxide           27 mmol/L           21-32        

 

                          Serum or plasma anion gap determination (moles/volume)

           8 mmol/L       

                                        5-14        

 

                          Serum or plasma urea nitrogen measurement (mass/volume

)           9 mg/dL       

                                        7-18        

 

                          Serum or plasma creatinine measurement (mass/volume)  

         0.74 mg/dL       

                                        0.60-1.30        

 

                    Serum or plasma urea nitrogen/creatinine mass ratio         

  12                  NRG 

       

 

                                        Serum or plasma creatinine measurement w

ith calculation of estimated glomerular 

filtration rate           >                         NRG        

 

                    Serum or plasma glucose measurement (mass/volume)           

101 mg/dL           

        

 

                    Serum or plasma calcium measurement (mass/volume)           

8.8 mg/dL           

8.5-10.1        

 

                                        PT panel in platelet poor plasma by coag

ulation assay - 17 10:30         

 

                          Prothrombin time (PT) in platelet poor plasma by coagu

lation assay           

14.2 s                                  12.2-14.7        

 

                          INR in platelet poor plasma or blood by coagulation as

say           1.1         

                                        0.8-1.4        

 

                                        PT panel in platelet poor plasma by coag

ulation assay - 17 04:45         

 

                          Prothrombin time (PT) in platelet poor plasma by coagu

lation assay           

15.5 s                                  12.2-14.7        

 

                          INR in platelet poor plasma or blood by coagulation as

say           1.2         

                                        0.8-1.4        

 

                                        Automated blood complete blood count (he

mogram) panel - 12/15/17 09:29         

 

                          Blood leukocytes automated count (number/volume)      

     7.4 10*3/uL          

                                        4.3-11.0        

 

                          Blood erythrocytes automated count (number/volume)    

       4.80 10*6/uL       

                                        4.35-5.85        

 

                    Venous blood hemoglobin measurement (mass/volume)           

13.7 g/dL           

13.3-17.7        

 

                    Blood hematocrit (volume fraction)           42 %           

     40-54        

 

                    Automated erythrocyte mean corpuscular volume           87 [

foz_us]           

80-99        

 

                                        Automated erythrocyte mean corpuscular h

emoglobin (mass per erythrocyte)        

                          29 pg                     25-34        

 

                                        Automated erythrocyte mean corpuscular h

emoglobin concentration measurement 

(mass/volume)             33 g/dL                   32-36        

 

                    Automated erythrocyte distribution width ratio           13.

9 %              10.0-

14.5        

 

                    Automated blood platelet count (count/volume)           291 

10*3/uL           

130-400        

 

                          Automated blood platelet mean volume measurement      

     9.0 [foz_us]         

                                        7.4-10.4        

 

                                        Whole blood basic metabolic panel -  09:29         

 

                          Serum or plasma sodium measurement (moles/volume)     

      142 mmol/L          

                                        135-145        

 

                          Serum or plasma potassium measurement (moles/volume)  

         4.5 mmol/L       

                                        3.6-5.0        

 

                          Serum or plasma chloride measurement (moles/volume)   

        105 mmol/L        

                                                

 

                    Carbon dioxide           27 mmol/L           21-32        

 

                          Serum or plasma anion gap determination (moles/volume)

           10 mmol/L      

                                        5-14        

 

                          Serum or plasma urea nitrogen measurement (mass/volume

)           16 mg/dL      

                                        7-18        

 

                          Serum or plasma creatinine measurement (mass/volume)  

         0.74 mg/dL       

                                        0.60-1.30        

 

                    Serum or plasma urea nitrogen/creatinine mass ratio         

  22                  NRG 

       

 

                                        Serum or plasma creatinine measurement w

ith calculation of estimated glomerular 

filtration rate           >                         NRG        

 

                    Serum or plasma glucose measurement (mass/volume)           

100 mg/dL           

        

 

                          Serum or plasma calcium measurement (mass/volume)     

      10.0 mg/dL          

                                        8.5-10.1        

 

                                        Complete blood count (CBC) with automate

d white blood cell (WBC) differential - 

18 12:45         

 

                          Blood leukocytes automated count (number/volume)      

     18.3 10*3/uL         

                                        4.3-11.0        

 

                          Blood erythrocytes automated count (number/volume)    

       4.57 10*6/uL       

                                        4.35-5.85        

 

                    Venous blood hemoglobin measurement (mass/volume)           

13.5 g/dL           

13.3-17.7        

 

                    Blood hematocrit (volume fraction)           40 %           

     40-54        

 

                    Automated erythrocyte mean corpuscular volume           88 [

foz_us]           

80-99        

 

                                        Automated erythrocyte mean corpuscular h

emoglobin (mass per erythrocyte)        

                          30 pg                     25-34        

 

                                        Automated erythrocyte mean corpuscular h

emoglobin concentration measurement 

(mass/volume)             34 g/dL                   32-36        

 

                    Automated erythrocyte distribution width ratio           14.

5 %              10.0-

14.5        

 

                    Automated blood platelet count (count/volume)           357 

10*3/uL           

130-400        

 

                          Automated blood platelet mean volume measurement      

     8.8 [foz_us]         

                                        7.4-10.4        

 

                    Automated blood neutrophils/100 leukocytes           86 %   

             42-75       

 

 

                    Automated blood lymphocytes/100 leukocytes           8 %    

             12-44        

 

                    Blood monocytes/100 leukocytes           6 %                

 0-12        

 

                    Automated blood eosinophils/100 leukocytes           1 %    

             0-10        

 

                    Automated blood basophils/100 leukocytes           0 %      

           0-10        

 

                    Blood neutrophils automated count (number/volume)           

15.7 10*3           

1.8-7.8        

 

                    Blood lymphocytes automated count (number/volume)           

1.4 10*3            

1.0-4.0        

 

                    Blood monocytes automated count (number/volume)           1.

1 10*3            

0.0-1.0        

 

                    Automated eosinophil count           0.2 10*3/uL           0

.0-0.3        

 

                    Automated blood basophil count (count/volume)           0.0 

10*3/uL           

0.0-0.1        

 

                                        PT panel in platelet poor plasma by coag

ulation assay - 18 12:45         

 

                          Prothrombin time (PT) in platelet poor plasma by coagu

lation assay           

32.8 s                                  12.2-14.7        

 

                          INR in platelet poor plasma or blood by coagulation as

say           3.2         

                                        0.8-1.4        

 

                                        Whole blood basic metabolic panel -  12:45         

 

                          Serum or plasma sodium measurement (moles/volume)     

      142 mmol/L          

                                        135-145        

 

                          Serum or plasma potassium measurement (moles/volume)  

         3.6 mmol/L       

                                        3.6-5.0        

 

                          Serum or plasma chloride measurement (moles/volume)   

        99 mmol/L         

                                                

 

                    Carbon dioxide           28 mmol/L           21-32        

 

                          Serum or plasma anion gap determination (moles/volume)

           15 mmol/L      

                                        5-14        

 

                          Serum or plasma urea nitrogen measurement (mass/volume

)           10 mg/dL      

                                        7-18        

 

                          Serum or plasma creatinine measurement (mass/volume)  

         0.89 mg/dL       

                                        0.60-1.30        

 

                    Serum or plasma urea nitrogen/creatinine mass ratio         

  11                  NRG 

       

 

                                        Serum or plasma creatinine measurement w

ith calculation of estimated glomerular 

filtration rate           >                         NRG        

 

                    Serum or plasma glucose measurement (mass/volume)           

150 mg/dL           

        

 

                    Serum or plasma calcium measurement (mass/volume)           

9.6 mg/dL           

8.5-10.1        

 

                                        Serum or plasma uric acid measurement (m

ass/volume) - 18 12:45         

 

                          Serum or plasma uric acid measurement (mass/volume)   

        5.3 mg/dL         

                                        2.6-7.2        

 

                                        Blood manual differential performed dete

ction - 18 12:45         

 

                    Blood monocytes/100 leukocytes           3 %                

 NRG        

 

                    Manual blood segmented neutrophils/100 leukocytes           

88 %                NRG  

      

 

                    Blood band neutrophils/100 leukocytes           4 %         

        NRG        

 

                    Manual blood lymphocytes/100 leukocytes           4 %       

          NRG        

 

                    Manual eosinophils/100 leukocytes in nose           1 %     

            NRG        

 

                    Manual blood basophils/100 leukocytes           0 %         

        NRG        

 

                    Blood erythrocyte morphology finding identification         

  NORMAL              

NRG        

 

                                        Serum or plasma troponin i.cardiac measu

rement (mass/volume) - 18 12:45   

      

 

                          Serum or plasma troponin i.cardiac measurement (mass/v

olume)           < ng/mL  

                                        <0.30        

 

                                        Serum or plasma C reactive protein measu

rement (mass/volume) - 18 12:45   

      

 

                          Serum or plasma C reactive protein measurement (mass/v

olume)           0.80 

mg/dL                                   0.00-0.50        

 

                                        Erythrocyte sedimentation rate by sujey

gren method - 18 12:45         

 

                    Erythrocyte sedimentation rate by westergren method         

  13 mm               0-

15        

 

                                        Bacterial blood culture - 18 23:30

         

 

                    Bacterial blood culture           NG                  NRG   

     

 

                                        Complete blood count (CBC) with automate

d white blood cell (WBC) differential - 

18 23:50         

 

                          Blood leukocytes automated count (number/volume)      

     12.6 10*3/uL         

                                        4.3-11.0        

 

                          Blood erythrocytes automated count (number/volume)    

       3.67 10*6/uL       

                                        4.35-5.85        

 

                    Venous blood hemoglobin measurement (mass/volume)           

10.6 g/dL           

13.3-17.7        

 

                    Blood hematocrit (volume fraction)           32 %           

     40-54        

 

                    Automated erythrocyte mean corpuscular volume           87 [

foz_us]           

80-99        

 

                                        Automated erythrocyte mean corpuscular h

emoglobin (mass per erythrocyte)        

                          29 pg                     25-34        

 

                                        Automated erythrocyte mean corpuscular h

emoglobin concentration measurement 

(mass/volume)             33 g/dL                   32-36        

 

                    Automated erythrocyte distribution width ratio           13.

8 %              10.0-

14.5        

 

                    Automated blood platelet count (count/volume)           514 

10*3/uL           

130-400        

 

                          Automated blood platelet mean volume measurement      

     9.1 [foz_us]         

                                        7.4-10.4        

 

                    Automated blood neutrophils/100 leukocytes           79 %   

             42-75       

 

 

                    Automated blood lymphocytes/100 leukocytes           10 %   

             12-44       

 

 

                    Blood monocytes/100 leukocytes           10 %               

 0-12        

 

                    Automated blood eosinophils/100 leukocytes           2 %    

             0-10        

 

                    Automated blood basophils/100 leukocytes           0 %      

           0-10        

 

                    Blood neutrophils automated count (number/volume)           

9.9 10*3            

1.8-7.8        

 

                    Blood lymphocytes automated count (number/volume)           

1.2 10*3            

1.0-4.0        

 

                    Blood monocytes automated count (number/volume)           1.

2 10*3            

0.0-1.0        

 

                    Automated eosinophil count           0.2 10*3/uL           0

.0-0.3        

 

                    Automated blood basophil count (count/volume)           0.0 

10*3/uL           

0.0-0.1        

 

                                        Blood lactic acid measurement (moles/vol

ume) - 18 23:50         

 

                    Blood lactic acid measurement (moles/volume)           0.81 

mmol/L           

0.50-2.00        

 

                                        PT panel in platelet poor plasma by coag

ulation assay - 18 23:50         

 

                          Prothrombin time (PT) in platelet poor plasma by coagu

lation assay           

34.2 s                                  12.2-14.7        

 

                          INR in platelet poor plasma or blood by coagulation as

say           3.4         

                                        0.8-1.4        

 

                                        Activated partial thromboplastin time (a

PTT) in platelet poor plasma 

bycoagulation assay - 18 23:50         

 

                                        Activated partial thromboplastin time (a

PTT) in platelet poor plasma 

bycoagulation assay           124 s                     24-35        

 

                                        Comprehensive metabolic panel - 18

 23:50         

 

                          Serum or plasma sodium measurement (moles/volume)     

      137 mmol/L          

                                        135-145        

 

                          Serum or plasma potassium measurement (moles/volume)  

         3.8 mmol/L       

                                        3.6-5.0        

 

                          Serum or plasma chloride measurement (moles/volume)   

        95 mmol/L         

                                                

 

                    Carbon dioxide           29 mmol/L           21-32        

 

                          Serum or plasma anion gap determination (moles/volume)

           13 mmol/L      

                                        5-14        

 

                          Serum or plasma urea nitrogen measurement (mass/volume

)           9 mg/dL       

                                        7-18        

 

                          Serum or plasma creatinine measurement (mass/volume)  

         0.76 mg/dL       

                                        0.60-1.30        

 

                    Serum or plasma urea nitrogen/creatinine mass ratio         

  12                  NRG 

       

 

                                        Serum or plasma creatinine measurement w

ith calculation of estimated glomerular 

filtration rate           >                         NRG        

 

                    Serum or plasma glucose measurement (mass/volume)           

115 mg/dL           

        

 

                    Serum or plasma calcium measurement (mass/volume)           

9.6 mg/dL           

8.5-10.1        

 

                          Serum or plasma total bilirubin measurement (mass/volu

me)           0.4 mg/dL   

                                        0.1-1.0        

 

                                        Serum or plasma alkaline phosphatase angelo

surement (enzymatic activity/volume)    

                          80 U/L                            

 

                                        Serum or plasma aspartate aminotransfera

se measurement (enzymatic 

activity/volume)           21 U/L                    5-34        

 

                                        Serum or plasma alanine aminotransferase

 measurement (enzymatic activity/volume)

                          19 U/L                    0-55        

 

                    Serum or plasma protein measurement (mass/volume)           

7.8 g/dL            

6.4-8.2        

 

                    Serum or plasma albumin measurement (mass/volume)           

3.5 g/dL            

3.2-4.5        

 

                                        Serum or plasma C reactive protein measu

rement (mass/volume) - 18 23:50   

      

 

                          Serum or plasma C reactive protein measurement (mass/v

olume)           34.60 

mg/dL                                   0.00-0.50        

 

                                        Erythrocyte sedimentation rate by sujey

gren method - 18 23:50         

 

                    Erythrocyte sedimentation rate by westergren method         

  > mm                0-

15        

 

                                        Bacterial blood culture - 18 23:50

         

 

                    Bacterial blood culture           NG                  NRG   

     

 

                                        PT panel in platelet poor plasma by coag

ulation assay - 18 05:26         

 

                          Prothrombin time (PT) in platelet poor plasma by coagu

lation assay           

37.3 s                                  12.2-14.7        

 

                          INR in platelet poor plasma or blood by coagulation as

say           3.8         

                                        0.8-1.4        

 

                                        Activated partial thromboplastin time (a

PTT) in platelet poor plasma 

bycoagulation assay - 18 05:26         

 

                                        Activated partial thromboplastin time (a

PTT) in platelet poor plasma 

bycoagulation assay           116 s                     24-35        

 

                                        Complete blood count (CBC) with automate

d white blood cell (WBC) differential - 

18 05:26         

 

                          Blood leukocytes automated count (number/volume)      

     11.4 10*3/uL         

                                        4.3-11.0        

 

                          Blood erythrocytes automated count (number/volume)    

       3.34 10*6/uL       

                                        4.35-5.85        

 

                    Venous blood hemoglobin measurement (mass/volume)           

9.7 g/dL            

13.3-17.7        

 

                    Blood hematocrit (volume fraction)           29 %           

     40-54        

 

                    Automated erythrocyte mean corpuscular volume           88 [

foz_us]           

80-99        

 

                                        Automated erythrocyte mean corpuscular h

emoglobin (mass per erythrocyte)        

                          29 pg                     25-34        

 

                                        Automated erythrocyte mean corpuscular h

emoglobin concentration measurement 

(mass/volume)             33 g/dL                   32-36        

 

                    Automated erythrocyte distribution width ratio           13.

7 %              10.0-

14.5        

 

                    Automated blood platelet count (count/volume)           487 

10*3/uL           

130-400        

 

                          Automated blood platelet mean volume measurement      

     9.6 [foz_us]         

                                        7.4-10.4        

 

                    Automated blood neutrophils/100 leukocytes           75 %   

             42-75       

 

 

                    Automated blood lymphocytes/100 leukocytes           10 %   

             12-44       

 

 

                    Blood monocytes/100 leukocytes           12 %               

 0-12        

 

                    Automated blood eosinophils/100 leukocytes           3 %    

             0-10        

 

                    Automated blood basophils/100 leukocytes           0 %      

           0-10        

 

                    Blood neutrophils automated count (number/volume)           

8.5 10*3            

1.8-7.8        

 

                    Blood lymphocytes automated count (number/volume)           

1.2 10*3            

1.0-4.0        

 

                    Blood monocytes automated count (number/volume)           1.

4 10*3            

0.0-1.0        

 

                    Automated eosinophil count           0.3 10*3/uL           0

.0-0.3        

 

                    Automated blood basophil count (count/volume)           0.0 

10*3/uL           

0.0-0.1        

 

                                        Comprehensive metabolic panel - 18

 05:26         

 

                          Serum or plasma sodium measurement (moles/volume)     

      141 mmol/L          

                                        135-145        

 

                          Serum or plasma potassium measurement (moles/volume)  

         3.4 mmol/L       

                                        3.6-5.0        

 

                          Serum or plasma chloride measurement (moles/volume)   

        98 mmol/L         

                                                

 

                    Carbon dioxide           29 mmol/L           21-32        

 

                          Serum or plasma anion gap determination (moles/volume)

           14 mmol/L      

                                        5-14        

 

                          Serum or plasma urea nitrogen measurement (mass/volume

)           8 mg/dL       

                                        7-18        

 

                          Serum or plasma creatinine measurement (mass/volume)  

         0.68 mg/dL       

                                        0.60-1.30        

 

                    Serum or plasma urea nitrogen/creatinine mass ratio         

  12                  NRG 

       

 

                                        Serum or plasma creatinine measurement w

ith calculation of estimated glomerular 

filtration rate           >                         NRG        

 

                    Serum or plasma glucose measurement (mass/volume)           

92 mg/dL            

        

 

                    Serum or plasma calcium measurement (mass/volume)           

9.3 mg/dL           

8.5-10.1        

 

                          Serum or plasma total bilirubin measurement (mass/volu

me)           0.4 mg/dL   

                                        0.1-1.0        

 

                                        Serum or plasma alkaline phosphatase angelo

surement (enzymatic activity/volume)    

                          78 U/L                            

 

                                        Serum or plasma aspartate aminotransfera

se measurement (enzymatic 

activity/volume)           17 U/L                    5-34        

 

                                        Serum or plasma alanine aminotransferase

 measurement (enzymatic activity/volume)

                          17 U/L                    0-55        

 

                    Serum or plasma protein measurement (mass/volume)           

7.0 g/dL            

6.4-8.2        

 

                    Serum or plasma albumin measurement (mass/volume)           

3.2 g/dL            

3.2-4.5        

 

                                        Complete blood count (CBC) with automate

d white blood cell (WBC) differential - 

18 07:07         

 

                          Blood leukocytes automated count (number/volume)      

     10.4 10*3/uL         

                                        4.3-11.0        

 

                          Blood erythrocytes automated count (number/volume)    

       3.93 10*6/uL       

                                        4.35-5.85        

 

                    Venous blood hemoglobin measurement (mass/volume)           

11.3 g/dL           

13.3-17.7        

 

                    Blood hematocrit (volume fraction)           34 %           

     40-54        

 

                    Automated erythrocyte mean corpuscular volume           87 [

z_us]           

80-99        

 

                                        Automated erythrocyte mean corpuscular h

emoglobin (mass per erythrocyte)        

                          29 pg                     25-34        

 

                                        Automated erythrocyte mean corpuscular h

emoglobin concentration measurement 

(mass/volume)             33 g/dL                   32-36        

 

                    Automated erythrocyte distribution width ratio           14.

0 %              10.0-

14.5        

 

                    Automated blood platelet count (count/volume)           570 

10*3/uL           

130-400        

 

                          Automated blood platelet mean volume measurement      

     8.9 [foz_us]         

                                        7.4-10.4        

 

                    Automated blood neutrophils/100 leukocytes           84 %   

             42-75       

 

 

                    Automated blood lymphocytes/100 leukocytes           9 %    

             12-44        

 

                    Blood monocytes/100 leukocytes           5 %                

 0-12        

 

                    Automated blood eosinophils/100 leukocytes           1 %    

             0-10        

 

                    Automated blood basophils/100 leukocytes           0 %      

           0-10        

 

                    Blood neutrophils automated count (number/volume)           

8.7 10*3            

1.8-7.8        

 

                    Blood lymphocytes automated count (number/volume)           

1.0 10*3            

1.0-4.0        

 

                    Blood monocytes automated count (number/volume)           0.

5 10*3            

0.0-1.0        

 

                    Automated eosinophil count           0.2 10*3/uL           0

.0-0.3        

 

                    Automated blood basophil count (count/volume)           0.0 

10*3/uL           

0.0-0.1        

 

                                        PT panel in platelet poor plasma by coag

ulation assay - 18 07:07         

 

                          Prothrombin time (PT) in platelet poor plasma by coagu

lation assay           

32.3 s                                  12.2-14.7        

 

                          INR in platelet poor plasma or blood by coagulation as

say           3.2         

                                        0.8-1.4        

 

                                        Comprehensive metabolic panel - 18

 07:07         

 

                          Serum or plasma sodium measurement (moles/volume)     

      141 mmol/L          

                                        135-145        

 

                          Serum or plasma potassium measurement (moles/volume)  

         3.6 mmol/L       

                                        3.6-5.0        

 

                          Serum or plasma chloride measurement (moles/volume)   

        99 mmol/L         

                                                

 

                    Carbon dioxide           29 mmol/L           21-32        

 

                          Serum or plasma anion gap determination (moles/volume)

           13 mmol/L      

                                        5-14        

 

                          Serum or plasma urea nitrogen measurement (mass/volume

)           7 mg/dL       

                                        7-18        

 

                          Serum or plasma creatinine measurement (mass/volume)  

         0.71 mg/dL       

                                        0.60-1.30        

 

                    Serum or plasma urea nitrogen/creatinine mass ratio         

  10                  NRG 

       

 

                                        Serum or plasma creatinine measurement w

ith calculation of estimated glomerular 

filtration rate           >                         NRG        

 

                    Serum or plasma glucose measurement (mass/volume)           

139 mg/dL           

        

 

                    Serum or plasma calcium measurement (mass/volume)           

9.8 mg/dL           

8.5-10.1        

 

                          Serum or plasma total bilirubin measurement (mass/volu

me)           0.4 mg/dL   

                                        0.1-1.0        

 

                                        Serum or plasma alkaline phosphatase angelo

surement (enzymatic activity/volume)    

                          90 U/L                            

 

                                        Serum or plasma aspartate aminotransfera

se measurement (enzymatic 

activity/volume)           22 U/L                    5-34        

 

                                        Serum or plasma alanine aminotransferase

 measurement (enzymatic activity/volume)

                          21 U/L                    0-55        

 

                    Serum or plasma protein measurement (mass/volume)           

7.6 g/dL            

6.4-8.2        

 

                    Serum or plasma albumin measurement (mass/volume)           

3.5 g/dL            

3.2-4.5        

 

                                        Vancomycin trough - 18 07:07      

   

 

                    Vancomycin trough           13.6 ug/mL           10.0-20.0  

      

 

                                        Complete blood count (CBC) with automate

d white blood cell (WBC) differential - 

18 05:20         

 

                          Blood leukocytes automated count (number/volume)      

     10.5 10*3/uL         

                                        4.3-11.0        

 

                          Blood erythrocytes automated count (number/volume)    

       3.74 10*6/uL       

                                        4.35-5.85        

 

                    Venous blood hemoglobin measurement (mass/volume)           

10.8 g/dL           

13.3-17.7        

 

                    Blood hematocrit (volume fraction)           33 %           

     40-54        

 

                    Automated erythrocyte mean corpuscular volume           88 [

foz_us]           

80-99        

 

                                        Automated erythrocyte mean corpuscular h

emoglobin (mass per erythrocyte)        

                          29 pg                     25-34        

 

                                        Automated erythrocyte mean corpuscular h

emoglobin concentration measurement 

(mass/volume)             33 g/dL                   32-36        

 

                    Automated erythrocyte distribution width ratio           14.

2 %              10.0-

14.5        

 

                    Automated blood platelet count (count/volume)           631 

10*3/uL           

130-400        

 

                          Automated blood platelet mean volume measurement      

     8.9 [foz_us]         

                                        7.4-10.4        

 

                    Automated blood neutrophils/100 leukocytes           77 %   

             42-75       

 

 

                    Automated blood lymphocytes/100 leukocytes           12 %   

             12-44       

 

 

                    Blood monocytes/100 leukocytes           9 %                

 0-12        

 

                    Automated blood eosinophils/100 leukocytes           2 %    

             0-10        

 

                    Automated blood basophils/100 leukocytes           0 %      

           0-10        

 

                    Blood neutrophils automated count (number/volume)           

8.1 10*3            

1.8-7.8        

 

                    Blood lymphocytes automated count (number/volume)           

1.3 10*3            

1.0-4.0        

 

                    Blood monocytes automated count (number/volume)           1.

0 10*3            

0.0-1.0        

 

                    Automated eosinophil count           0.2 10*3/uL           0

.0-0.3        

 

                    Automated blood basophil count (count/volume)           0.0 

10*3/uL           

0.0-0.1        

 

                                        Comprehensive metabolic panel - 18

 05:20         

 

                          Serum or plasma sodium measurement (moles/volume)     

      141 mmol/L          

                                        135-145        

 

                          Serum or plasma potassium measurement (moles/volume)  

         3.6 mmol/L       

                                        3.6-5.0        

 

                          Serum or plasma chloride measurement (moles/volume)   

        98 mmol/L         

                                                

 

                    Carbon dioxide           30 mmol/L           21-32        

 

                          Serum or plasma anion gap determination (moles/volume)

           13 mmol/L      

                                        5-14        

 

                          Serum or plasma urea nitrogen measurement (mass/volume

)           7 mg/dL       

                                        7-18        

 

                          Serum or plasma creatinine measurement (mass/volume)  

         0.70 mg/dL       

                                        0.60-1.30        

 

                    Serum or plasma urea nitrogen/creatinine mass ratio         

  10                  NRG 

       

 

                                        Serum or plasma creatinine measurement w

ith calculation of estimated glomerular 

filtration rate           >                         NRG        

 

                    Serum or plasma glucose measurement (mass/volume)           

109 mg/dL           

        

 

                    Serum or plasma calcium measurement (mass/volume)           

9.6 mg/dL           

8.5-10.1        

 

                          Serum or plasma total bilirubin measurement (mass/volu

me)           0.4 mg/dL   

                                        0.1-1.0        

 

                                        Serum or plasma alkaline phosphatase angelo

surement (enzymatic activity/volume)    

                          78 U/L                            

 

                                        Serum or plasma aspartate aminotransfera

se measurement (enzymatic 

activity/volume)           24 U/L                    5-34        

 

                                        Serum or plasma alanine aminotransferase

 measurement (enzymatic activity/volume)

                          24 U/L                    0-55        

 

                    Serum or plasma protein measurement (mass/volume)           

7.5 g/dL            

6.4-8.2        

 

                    Serum or plasma albumin measurement (mass/volume)           

3.3 g/dL            

3.2-4.5        

 

                                        PT panel in platelet poor plasma by coag

ulation assay - 18 05:20         

 

                          Prothrombin time (PT) in platelet poor plasma by coagu

lation assay           

29.6 s                                  12.2-14.7        

 

                          INR in platelet poor plasma or blood by coagulation as

say           2.8         

                                        0.8-1.4        

 

                                        Automated blood complete blood count (he

mogram) panel - 18 11:06         

 

                          Blood leukocytes automated count (number/volume)      

     14.3 10*3/uL         

                                        4.3-11.0        

 

                          Blood erythrocytes automated count (number/volume)    

       5.13 10*6/uL       

                                        4.35-5.85        

 

                    Venous blood hemoglobin measurement (mass/volume)           

14.2 g/dL           

13.3-17.7        

 

                    Blood hematocrit (volume fraction)           44 %           

     40-54        

 

                    Automated erythrocyte mean corpuscular volume           85 [

foz_us]           

80-99        

 

                                        Automated erythrocyte mean corpuscular h

emoglobin (mass per erythrocyte)        

                          28 pg                     25-34        

 

                                        Automated erythrocyte mean corpuscular h

emoglobin concentration measurement 

(mass/volume)             33 g/dL                   32-36        

 

                    Automated erythrocyte distribution width ratio           16.

2 %              10.0-

14.5        

 

                    Automated blood platelet count (count/volume)           391 

10*3/uL           

130-400        

 

                          Automated blood platelet mean volume measurement      

     8.6 [foz_us]         

                                        7.4-10.4        

 

                                        Serum or plasma uric acid measurement (m

ass/volume) - 18 11:06         

 

                          Serum or plasma uric acid measurement (mass/volume)   

        3.4 mg/dL         

                                        2.6-7.2        

 

                                        Serum or plasma C reactive protein measu

rement (mass/volume) - 18 11:06   

      

 

                          Serum or plasma C reactive protein measurement (mass/v

olume)           0.31 

mg/dL                                   0.00-0.50        

 

                                        Complete blood count (CBC) with automate

d white blood cell (WBC) differential - 

18 14:16         

 

                          Blood leukocytes automated count (number/volume)      

     10.7 10*3/uL         

                                        4.3-11.0        

 

                          Blood erythrocytes automated count (number/volume)    

       5.04 10*6/uL       

                                        4.35-5.85        

 

                    Venous blood hemoglobin measurement (mass/volume)           

14.9 g/dL           

13.3-17.7        

 

                    Blood hematocrit (volume fraction)           44 %           

     40-54        

 

                    Automated erythrocyte mean corpuscular volume           88 [

foz_us]           

80-99        

 

                                        Automated erythrocyte mean corpuscular h

emoglobin (mass per erythrocyte)        

                          30 pg                     25-34        

 

                                        Automated erythrocyte mean corpuscular h

emoglobin concentration measurement 

(mass/volume)             34 g/dL                   32-36        

 

                    Automated erythrocyte distribution width ratio           14.

0 %              10.0-

14.5        

 

                    Automated blood platelet count (count/volume)           362 

10*3/uL           

130-400        

 

                          Automated blood platelet mean volume measurement      

     8.7 [foz_us]         

                                        7.4-10.4        

 

                    Automated blood neutrophils/100 leukocytes           57 %   

             42-75       

 

 

                    Automated blood lymphocytes/100 leukocytes           30 %   

             12-44       

 

 

                    Blood monocytes/100 leukocytes           11 %               

 0-12        

 

                    Automated blood eosinophils/100 leukocytes           3 %    

             0-10        

 

                    Automated blood basophils/100 leukocytes           0 %      

           0-10        

 

                    Blood neutrophils automated count (number/volume)           

6.1 10*3            

1.8-7.8        

 

                    Blood lymphocytes automated count (number/volume)           

3.2 10*3            

1.0-4.0        

 

                    Blood monocytes automated count (number/volume)           1.

2 10*3            

0.0-1.0        

 

                    Automated eosinophil count           0.3 10*3/uL           0

.0-0.3        

 

                    Automated blood basophil count (count/volume)           0.0 

10*3/uL           

0.0-0.1        

 

                                        Comprehensive metabolic panel - 18

 14:16         

 

                          Serum or plasma sodium measurement (moles/volume)     

      139 mmol/L          

                                        135-145        

 

                          Serum or plasma potassium measurement (moles/volume)  

         3.5 mmol/L       

                                        3.6-5.0        

 

                          Serum or plasma chloride measurement (moles/volume)   

        100 mmol/L        

                                                

 

                    Carbon dioxide           29 mmol/L           21-32        

 

                          Serum or plasma anion gap determination (moles/volume)

           10 mmol/L      

                                        5-14        

 

                          Serum or plasma urea nitrogen measurement (mass/volume

)           8 mg/dL       

                                        7-18        

 

                          Serum or plasma creatinine measurement (mass/volume)  

         0.80 mg/dL       

                                        0.60-1.30        

 

                    Serum or plasma urea nitrogen/creatinine mass ratio         

  10                  NRG 

       

 

                                        Serum or plasma creatinine measurement w

ith calculation of estimated glomerular 

filtration rate           >                         NRG        

 

                    Serum or plasma glucose measurement (mass/volume)           

120 mg/dL           

        

 

                    Serum or plasma calcium measurement (mass/volume)           

9.8 mg/dL           

8.5-10.1        

 

                          Serum or plasma total bilirubin measurement (mass/volu

me)           0.4 mg/dL   

                                        0.1-1.0        

 

                                        Serum or plasma alkaline phosphatase angelo

surement (enzymatic activity/volume)    

                          96 U/L                            

 

                                        Serum or plasma aspartate aminotransfera

se measurement (enzymatic 

activity/volume)           32 U/L                    5-34        

 

                                        Serum or plasma alanine aminotransferase

 measurement (enzymatic activity/volume)

                          32 U/L                    0-55        

 

                    Serum or plasma protein measurement (mass/volume)           

7.5 g/dL            

6.4-8.2        

 

                    Serum or plasma albumin measurement (mass/volume)           

4.5 g/dL            

3.2-4.5        

 

                    CALCIUM CORRECTED           9.4 mg/dL           8.5-10.1    

    

 

                                        Magnesium - 18 14:16         

 

                    Magnesium           2.1 mg/dL           1.8-2.4        

 

                                        Myoglobin, serum - 18 14:16       

  

 

                    Myoglobin, serum           21.2 ng/mL           10.0-92.0   

     

 

                                        Serum or plasma troponin i.cardiac measu

rement (mass/volume) - 18 14:16   

      

 

                          Serum or plasma troponin i.cardiac measurement (mass/v

olume)           < ng/mL  

                                        <0.30        

 

                                        Lipase - 18 14:16         

 

                    Lipase              11 U/L              8-78        

 

                                        Myoglobin, serum - 18 14:16       

  

 

                    Myoglobin, serum           21.2 ng/mL           10.0-92.0   

     

 

                                        Lipase - 18 14:16         

 

                    Lipase              11 U/L              8-78        

 

                                        Serum or plasma troponin i.cardiac measu

rement (mass/volume) - 18 16:34   

      

 

                          Serum or plasma troponin i.cardiac measurement (mass/v

olume)           < ng/mL  

                                        <0.30        

 

                                        Myoglobin, serum - 18 16:34       

  

 

                    Myoglobin, serum           238.6 ng/mL           10.0-92.0  

      

 

                                        Serum or plasma creatine kinase measurem

ent (enzymatic activity/volume) - 

18 19:34         

 

                                        Serum or plasma creatine kinase measurem

ent (enzymatic activity/volume)         

                          206 U/L                           

 

                                        Serum or plasma troponin i.cardiac measu

rement (mass/volume) - 18 19:34   

      

 

                          Serum or plasma troponin i.cardiac measurement (mass/v

olume)           0.48 

ng/mL                                   <0.30        

 

                                        Myoglobin, serum - 18 19:34       

  

 

                    Myoglobin, serum           459.1 ng/mL           10.0-92.0  

      

 

                                        Activated partial thromboplastin time (a

PTT) in platelet poor plasma 

bycoagulation assay - 18 22:27         

 

                                        Activated partial thromboplastin time (a

PTT) in platelet poor plasma 

bycoagulation assay           137 s                     24-35        

 

                                        Complete blood count (CBC) with automate

d white blood cell (WBC) differential - 

18 02:55         

 

                          Blood leukocytes automated count (number/volume)      

     11.0 10*3/uL         

                                        4.3-11.0        

 

                          Blood erythrocytes automated count (number/volume)    

       4.60 10*6/uL       

                                        4.35-5.85        

 

                    Venous blood hemoglobin measurement (mass/volume)           

13.4 g/dL           

13.3-17.7        

 

                    Blood hematocrit (volume fraction)           41 %           

     40-54        

 

                    Automated erythrocyte mean corpuscular volume           88 [

foz_us]           

80-99        

 

                                        Automated erythrocyte mean corpuscular h

emoglobin (mass per erythrocyte)        

                          29 pg                     25-34        

 

                                        Automated erythrocyte mean corpuscular h

emoglobin concentration measurement 

(mass/volume)             33 g/dL                   32-36        

 

                    Automated erythrocyte distribution width ratio           13.

7 %              10.0-

14.5        

 

                    Automated blood platelet count (count/volume)           303 

10*3/uL           

130-400        

 

                          Automated blood platelet mean volume measurement      

     9.4 [foz_us]         

                                        7.4-10.4        

 

                    Automated blood neutrophils/100 leukocytes           71 %   

             42-75       

 

 

                    Automated blood lymphocytes/100 leukocytes           19 %   

             12-44       

 

 

                    Blood monocytes/100 leukocytes           8 %                

 0-12        

 

                    Automated blood eosinophils/100 leukocytes           2 %    

             0-10        

 

                    Automated blood basophils/100 leukocytes           0 %      

           0-10        

 

                    Blood neutrophils automated count (number/volume)           

7.8 10*3            

1.8-7.8        

 

                    Blood lymphocytes automated count (number/volume)           

2.1 10*3            

1.0-4.0        

 

                    Blood monocytes automated count (number/volume)           0.

9 10*3            

0.0-1.0        

 

                    Automated eosinophil count           0.2 10*3/uL           0

.0-0.3        

 

                    Automated blood basophil count (count/volume)           0.0 

10*3/uL           

0.0-0.1        

 

                                        Activated partial thromboplastin time (a

PTT) in platelet poor plasma 

bycoagulation assay - 18 02:55         

 

                                        Activated partial thromboplastin time (a

PTT) in platelet poor plasma 

bycoagulation assay           48 s                      24-35        

 

                                        Comprehensive metabolic panel - 18

 02:55         

 

                          Serum or plasma sodium measurement (moles/volume)     

      140 mmol/L          

                                        135-145        

 

                          Serum or plasma potassium measurement (moles/volume)  

         3.9 mmol/L       

                                        3.6-5.0        

 

                          Serum or plasma chloride measurement (moles/volume)   

        102 mmol/L        

                                                

 

                    Carbon dioxide           28 mmol/L           21-32        

 

                          Serum or plasma anion gap determination (moles/volume)

           10 mmol/L      

                                        5-14        

 

                          Serum or plasma urea nitrogen measurement (mass/volume

)           8 mg/dL       

                                        7-18        

 

                          Serum or plasma creatinine measurement (mass/volume)  

         0.76 mg/dL       

                                        0.60-1.30        

 

                    Serum or plasma urea nitrogen/creatinine mass ratio         

  11                  NRG 

       

 

                                        Serum or plasma creatinine measurement w

ith calculation of estimated glomerular 

filtration rate           >                         NRG        

 

                    Serum or plasma glucose measurement (mass/volume)           

123 mg/dL           

        

 

                    Serum or plasma calcium measurement (mass/volume)           

9.3 mg/dL           

8.5-10.1        

 

                          Serum or plasma total bilirubin measurement (mass/volu

me)           0.6 mg/dL   

                                        0.1-1.0        

 

                                        Serum or plasma alkaline phosphatase angelo

surement (enzymatic activity/volume)    

                          86 U/L                            

 

                                        Serum or plasma aspartate aminotransfera

se measurement (enzymatic 

activity/volume)           59 U/L                    5-34        

 

                                        Serum or plasma alanine aminotransferase

 measurement (enzymatic activity/volume)

                          32 U/L                    0-55        

 

                    Serum or plasma protein measurement (mass/volume)           

6.6 g/dL            

6.4-8.2        

 

                    Serum or plasma albumin measurement (mass/volume)           

4.0 g/dL            

3.2-4.5        

 

                    CALCIUM CORRECTED           9.3 mg/dL           8.5-10.1    

    

 

                                        Lipid 1996 panel - 18 02:55       

  

 

                          Serum or plasma triglyceride measurement (mass/volume)

           104 mg/dL      

                                        <150        

 

                          Serum or plasma cholesterol measurement (mass/volume) 

          128 mg/dL       

                                        < 200        

 

                          Serum or plasma cholesterol in HDL measurement (mass/v

olume)           28 mg/dL 

                                        40-60        

 

                          Cholesterol in LDL [mass/volume] in serum or plasma by

 direct assay           87

 mg/dL                                  1-129        

 

                          Serum or plasma cholesterol in VLDL measurement (mass/

volume)           21 mg/dL

                                        5-40        

 

                                        Serum or plasma troponin i.cardiac measu

rement (mass/volume) - 18 02:55   

      

 

                          Serum or plasma troponin i.cardiac measurement (mass/v

olume)           3.39 

ng/mL                                   <0.30        

 

                                        Myoglobin, serum - 18 02:55       

  

 

                    Myoglobin, serum           175.2 ng/mL           10.0-92.0  

      

 

                                        Activated partial thromboplastin time (a

PTT) in platelet poor plasma 

bycoagulation assay - 18 07:55         

 

                                        Activated partial thromboplastin time (a

PTT) in platelet poor plasma 

bycoagulation assay           46 s                      24-35        

 

                                        Automated blood complete blood count (he

mogram) panel - 18 03:05         

 

                          Blood leukocytes automated count (number/volume)      

     8.8 10*3/uL          

                                        4.3-11.0        

 

                          Blood erythrocytes automated count (number/volume)    

       4.45 10*6/uL       

                                        4.35-5.85        

 

                    Venous blood hemoglobin measurement (mass/volume)           

12.8 g/dL           

13.3-17.7        

 

                    Blood hematocrit (volume fraction)           40 %           

     40-54        

 

                    Automated erythrocyte mean corpuscular volume           89 [

foz_us]           

80-99        

 

                                        Automated erythrocyte mean corpuscular h

emoglobin (mass per erythrocyte)        

                          29 pg                     25-34        

 

                                        Automated erythrocyte mean corpuscular h

emoglobin concentration measurement 

(mass/volume)             32 g/dL                   32-36        

 

                    Automated erythrocyte distribution width ratio           13.

9 %              10.0-

14.5        

 

                    Automated blood platelet count (count/volume)           297 

10*3/uL           

130-400        

 

                          Automated blood platelet mean volume measurement      

     9.4 [foz_us]         

                                        7.4-10.4        

 

                                        Whole blood basic metabolic panel -  03:05         

 

                          Serum or plasma sodium measurement (moles/volume)     

      141 mmol/L          

                                        135-145        

 

                          Serum or plasma potassium measurement (moles/volume)  

         3.9 mmol/L       

                                        3.6-5.0        

 

                          Serum or plasma chloride measurement (moles/volume)   

        105 mmol/L        

                                                

 

                    Carbon dioxide           27 mmol/L           21-32        

 

                          Serum or plasma anion gap determination (moles/volume)

           9 mmol/L       

                                        5-14        

 

                          Serum or plasma urea nitrogen measurement (mass/volume

)           6 mg/dL       

                                        7-18        

 

                          Serum or plasma creatinine measurement (mass/volume)  

         0.76 mg/dL       

                                        0.60-1.30        

 

                    Serum or plasma urea nitrogen/creatinine mass ratio         

  8                   NRG  

      

 

                                        Serum or plasma creatinine measurement w

ith calculation of estimated glomerular 

filtration rate           >                         NRG        

 

                    Serum or plasma glucose measurement (mass/volume)           

101 mg/dL           

        

 

                    Serum or plasma calcium measurement (mass/volume)           

8.9 mg/dL           

8.5-10.1        

 

                                        Complete blood count (CBC) with automate

d white blood cell (WBC) differential - 

19 09:30         

 

                          Blood leukocytes automated count (number/volume)      

     11.2 10*3/uL         

                                        4.3-11.0        

 

                          Blood erythrocytes automated count (number/volume)    

       4.55 10*6/uL       

                                        4.35-5.85        

 

                    Venous blood hemoglobin measurement (mass/volume)           

13.3 g/dL           

13.3-17.7        

 

                    Blood hematocrit (volume fraction)           41 %           

     40-54        

 

                    Automated erythrocyte mean corpuscular volume           89 [

foz_us]           

80-99        

 

                                        Automated erythrocyte mean corpuscular h

emoglobin (mass per erythrocyte)        

                          29 pg                     25-34        

 

                                        Automated erythrocyte mean corpuscular h

emoglobin concentration measurement 

(mass/volume)             33 g/dL                   32-36        

 

                    Automated erythrocyte distribution width ratio           14.

9 %              10.0-

14.5        

 

                    Automated blood platelet count (count/volume)           353 

10*3/uL           

130-400        

 

                          Automated blood platelet mean volume measurement      

     9.0 [foz_us]         

                                        7.4-10.4        

 

                    Automated blood neutrophils/100 leukocytes           66 %   

             42-75       

 

 

                    Automated blood lymphocytes/100 leukocytes           20 %   

             12-44       

 

 

                    Blood monocytes/100 leukocytes           10 %               

 0-12        

 

                    Automated blood eosinophils/100 leukocytes           4 %    

             0-10        

 

                    Automated blood basophils/100 leukocytes           0 %      

           0-10        

 

                    Blood neutrophils automated count (number/volume)           

7.4 10*3            

1.8-7.8        

 

                    Blood lymphocytes automated count (number/volume)           

2.3 10*3            

1.0-4.0        

 

                    Blood monocytes automated count (number/volume)           1.

2 10*3            

0.0-1.0        

 

                    Automated eosinophil count           0.4 10*3/uL           0

.0-0.3        

 

                    Automated blood basophil count (count/volume)           0.1 

10*3/uL           

0.0-0.1        

 

                                        Comprehensive metabolic panel - 19

 09:30         

 

                          Serum or plasma sodium measurement (moles/volume)     

      138 mmol/L          

                                        135-145        

 

                          Serum or plasma potassium measurement (moles/volume)  

         4.1 mmol/L       

                                        3.6-5.0        

 

                          Serum or plasma chloride measurement (moles/volume)   

        99 mmol/L         

                                                

 

                    Carbon dioxide           28 mmol/L           21-32        

 

                          Serum or plasma anion gap determination (moles/volume)

           11 mmol/L      

                                        5-14        

 

                          Serum or plasma urea nitrogen measurement (mass/volume

)           9 mg/dL       

                                        7-18        

 

                          Serum or plasma creatinine measurement (mass/volume)  

         0.71 mg/dL       

                                        0.60-1.30        

 

                    Serum or plasma urea nitrogen/creatinine mass ratio         

  13                  NRG 

       

 

                                        Serum or plasma creatinine measurement w

ith calculation of estimated glomerular 

filtration rate           >                         NRG        

 

                    Serum or plasma glucose measurement (mass/volume)           

100 mg/dL           

        

 

                    Serum or plasma calcium measurement (mass/volume)           

9.4 mg/dL           

8.5-10.1        

 

                          Serum or plasma total bilirubin measurement (mass/volu

me)           0.8 mg/dL   

                                        0.1-1.0        

 

                                        Serum or plasma alkaline phosphatase angelo

surement (enzymatic activity/volume)    

                          97 U/L                            

 

                                        Serum or plasma aspartate aminotransfera

se measurement (enzymatic 

activity/volume)           18 U/L                    5-34        

 

                                        Serum or plasma alanine aminotransferase

 measurement (enzymatic activity/volume)

                          10 U/L                    0-55        

 

                    Serum or plasma protein measurement (mass/volume)           

6.7 g/dL            

6.4-8.2        

 

                    Serum or plasma albumin measurement (mass/volume)           

3.9 g/dL            

3.2-4.5        

 

                    CALCIUM CORRECTED           9.5 mg/dL           8.5-10.1    

    

 

                                        Complete blood count (CBC) with automate

d white blood cell (WBC) differential - 

10/21/19 07:00         

 

                          Blood leukocytes automated count (number/volume)      

     8.1 10*3/uL          

                                        4.3-11.0        

 

                          Blood erythrocytes automated count (number/volume)    

       5.91 10*6/uL       

                                        4.35-5.85        

 

                    Venous blood hemoglobin measurement (mass/volume)           

16.6 g/dL           

13.3-17.7        

 

                    Blood hematocrit (volume fraction)           52 %           

     40-54        

 

                    Automated erythrocyte mean corpuscular volume           88 [

foz_us]           

80-99        

 

                                        Automated erythrocyte mean corpuscular h

emoglobin (mass per erythrocyte)        

                          28 pg                     25-34        

 

                                        Automated erythrocyte mean corpuscular h

emoglobin concentration measurement 

(mass/volume)             32 g/dL                   32-36        

 

                    Automated erythrocyte distribution width ratio           17.

7 %              10.0-

14.5        

 

                    Automated blood platelet count (count/volume)           284 

10*3/uL           

130-400        

 

                          Automated blood platelet mean volume measurement      

     9.3 [foz_us]         

                                        7.4-10.4        

 

                    Automated blood neutrophils/100 leukocytes           62 %   

             42-75       

 

 

                    Automated blood lymphocytes/100 leukocytes           26 %   

             12-44       

 

 

                    Blood monocytes/100 leukocytes           8 %                

 0-12        

 

                    Automated blood eosinophils/100 leukocytes           4 %    

             0-10        

 

                    Automated blood basophils/100 leukocytes           1 %      

           0-10        

 

                    Blood neutrophils automated count (number/volume)           

5.0 10*3            

1.8-7.8        

 

                    Blood lymphocytes automated count (number/volume)           

2.1 10*3            

1.0-4.0        

 

                    Blood monocytes automated count (number/volume)           0.

7 10*3            

0.0-1.0        

 

                    Automated eosinophil count           0.3 10*3/uL           0

.0-0.3        

 

                    Automated blood basophil count (count/volume)           0.1 

10*3/uL           

0.0-0.1        

 

                                        PT panel in platelet poor plasma by coag

ulation assay - 10/21/19 07:00         

 

                          Prothrombin time (PT) in platelet poor plasma by coagu

lation assay           

11.9 s                                  12.2-14.7        

 

                          INR in platelet poor plasma or blood by coagulation as

say           0.8         

                                        0.8-1.4        

 

                                        Activated partial thromboplastin time (a

PTT) in platelet poor plasma 

bycoagulation assay - 10/21/19 07:00         

 

                                        Activated partial thromboplastin time (a

PTT) in platelet poor plasma 

bycoagulation assay           30 s                      24-35        

 

                                        Comprehensive metabolic panel - 10/21/19

 07:00         

 

                          Serum or plasma sodium measurement (moles/volume)     

      140 mmol/L          

                                        135-145        

 

                          Serum or plasma potassium measurement (moles/volume)  

         4.1 mmol/L       

                                        3.6-5.0        

 

                          Serum or plasma chloride measurement (moles/volume)   

        99 mmol/L         

                                                

 

                    Carbon dioxide           31 mmol/L           21-32        

 

                          Serum or plasma anion gap determination (moles/volume)

           10 mmol/L      

                                        5-14        

 

                          Serum or plasma urea nitrogen measurement (mass/volume

)           6 mg/dL       

                                        7-18        

 

                          Serum or plasma creatinine measurement (mass/volume)  

         0.82 mg/dL       

                                        0.60-1.30        

 

                    Serum or plasma urea nitrogen/creatinine mass ratio         

  7                   NRG  

      

 

                                        Serum or plasma creatinine measurement w

ith calculation of estimated glomerular 

filtration rate           >                         NRG        

 

                    Serum or plasma glucose measurement (mass/volume)           

132 mg/dL           

        

 

                    Serum or plasma calcium measurement (mass/volume)           

9.8 mg/dL           

8.5-10.1        

 

                          Serum or plasma total bilirubin measurement (mass/volu

me)           0.3 mg/dL   

                                        0.1-1.0        

 

                                        Serum or plasma alkaline phosphatase angelo

surement (enzymatic activity/volume)    

                          106 U/L                           

 

                                        Serum or plasma aspartate aminotransfera

se measurement (enzymatic 

activity/volume)           18 U/L                    5-34        

 

                                        Serum or plasma alanine aminotransferase

 measurement (enzymatic activity/volume)

                          17 U/L                    0-55        

 

                    Serum or plasma protein measurement (mass/volume)           

7.4 g/dL            

6.4-8.2        

 

                    Serum or plasma albumin measurement (mass/volume)           

4.1 g/dL            

3.2-4.5        

 

                    CALCIUM CORRECTED           9.7 mg/dL           8.5-10.1    

    

 

                                        Magnesium - 10/21/19 07:00         

 

                    Magnesium           2.0 mg/dL           1.6-2.4        

 

                                        Serum or plasma troponin i.cardiac measu

rement (mass/volume) - 10/21/19 07:00   

      

 

                          Serum or plasma troponin i.cardiac measurement (mass/v

olume)           0.033 

ng/mL                                   <0.028        

 

                                        Myoglobin, serum - 10/21/19 07:00       

  

 

                    Myoglobin, serum           42.9 ng/mL           10.0-92.0   

     

 

                                        PDM - ATS (PROFILE 8 WITH CONFIRMATION) 

- 19 09:47         

 

                    Creatinine           53.3 mg/dL           > or = 20.0       

 

 

                    pH                  6.6                 4.5-9.0        

 

                    Oxidant             NEGATIVE mcg/mL           <200        

 

                    Amphetamines           NEGATIVE ng/mL           <500        

 

                    medMATCH Amphetamines           CONSISTENT            NRG   

     

 

                    Benzodiazepines           NEGATIVE ng/mL           <100     

   

 

                    medMATCH Benzodiazepines           CONSISTENT            NRG

        

 

                    Marijuana Metabolite           NEGATIVE ng/mL           <20 

       

 

                    medMATCH Marijuana Metab           CONSISTENT            NRG

        

 

                    Cocaine Metabolite           NEGATIVE ng/mL           <150  

      

 

                    medMATCH Cocaine Metab           CONSISTENT            NRG  

      

 

                    Opiates             NEGATIVE ng/mL           <100        

 

                    medMATCH Opiates           CONSISTENT            NRG        

 

                    Oxycodone           NEGATIVE ng/mL           <100        

 

                    medMATCH Oxycodone           CONSISTENT            NRG      

  

 

                    COMMENT                                 NRG        

 

                    Buprenorphine           NEGATIVE ng/mL           <5        

 

                    MDMA                NEGATIVE ng/mL           <500        

 

                    medMATCH MDMA           CONSISTENT            NRG        

 

                    Alcohol Metabolites           NEGATIVE ng/mL           <500 

       

 

                    medMATCH Alcohol Metab           CONSISTENT            NRG  

      

 

                    6 Acetylmorphine           NEGATIVE ng/mL           <10     

   

 

                    medMATCH 6 Acetylmorphine           CONSISTENT            NR

G        

 

                    medMATCH Buprenorphine           CONSISTENT            NRG  

      

 

                                        PDM - ATS (PROFILE 8 WITH CONFIRMATION) 

- 19 10:49         

 

                    Creatinine           59.7 mg/dL           > or = 20.0       

 

 

                    pH                  7.9                 4.5-9.0        

 

                    Oxidant             NEGATIVE mcg/mL           <200        

 

                    Amphetamines           NEGATIVE ng/mL           <500        

 

                    medMATCH Amphetamines           CONSISTENT            NRG   

     

 

                    Benzodiazepines           NEGATIVE ng/mL           <100     

   

 

                    medMATCH Benzodiazepines           CONSISTENT            NRG

        

 

                    Marijuana Metabolite           NEGATIVE ng/mL           <20 

       

 

                    medMATCH Marijuana Metab           CONSISTENT            NRG

        

 

                    Cocaine Metabolite           NEGATIVE ng/mL           <150  

      

 

                    medMATCH Cocaine Metab           CONSISTENT            NRG  

      

 

                    Opiates             POSITIVE ng/mL           <100        

 

                    Oxycodone           NEGATIVE ng/mL           <100        

 

                    medMATCH Oxycodone           CONSISTENT            NRG      

  

 

                    COMMENT                                 NRG        

 

                    Buprenorphine           NEGATIVE ng/mL           <5        

 

                    MDMA                NEGATIVE ng/mL           <500        

 

                    medMATCH MDMA           CONSISTENT            NRG        

 

                    Alcohol Metabolites           NEGATIVE ng/mL           <500 

       

 

                    medMATCH Alcohol Metab           CONSISTENT            NRG  

      

 

                    6 Acetylmorphine           NEGATIVE ng/mL           <10     

   

 

                    medMATCH 6 Acetylmorphine           CONSISTENT            NR

G        

 

                      Codeine           NEGATIVE ng/mL           <50        

 

                    medMATCH Codeine           CONSISTENT            NRG        

 

                      Hydrocodone           720 ng/mL           <50        

 

                    medMATCH Hydrocodone           INCONSISTENT            NRG  

      

 

                      Hydromorphone           622 ng/mL           <50        

 

                    medMATCH Hydromorphone           INCONSISTENT            NRG

        

 

                      Morphine           NEGATIVE ng/mL           <50        

 

                    medMATCH Morphine           CONSISTENT            NRG       

 

 

                      Norhydrocodone           766 ng/mL           <50        

 

                    medMATCH Norhydrocodone           See Note            NRG   

     

 

                    medMATCH Buprenorphine           CONSISTENT            NRG  

      



                                                                                
                                                                                
                                                  



Encounters

      



                ACCT No.           Visit Date/Time           Discharge          

 Status         

             Pt. Type           Provider           Facility           Loc./Unit 

          

Complaint        

 

                [id number]           2015 09:47:00           2015 23:59:

59           CLS

                Outpatient           CHARLY ESCALONA                       

         

                                                 

 

                [id number]           2014 12:54:00           2014 23:59:

59           CLS

                Outpatient           NEENA VELAZCO                      

         

                                                 

 

                [id number]           2013 09:24:00           2013 23:59:

59           CLS

                Outpatient           LILLIAN LANDRY DO                           

         

                                                 

 

                [id number]           2019 09:40:00           2019 23:59:5

9           [facility] 

                Outpatient           JULISA BRIZUELA MD                        

   [facility]                                   

 

             [id number]           2019 11:40:00                              

       Document

 Registration                                                                   

 

 

             [id number]           2019 08:40:00                              

       Document

 Registration                                                                   

 

 

                    [id number]           2019 11:28:00            23:59:59        

                CLS             Outpatient           WILLI LAZO           

[facility]           CARD                      CAD, CHEST 

PAIN 

SYNDROME        

 

                    [id number]           10/21/2019 08:11:00           10/21/2

019 14:40:00        

                DIS             Outpatient           NORTH HERNANDEZ MD         

  [facility]           CATH                      CHEST PAIN        

 

                    [id number]           2019 10:47:00            23:59:59        

                CLS             Outpatient           SOILA CAST     

      [facility] 

[facility]           REHAB                     S/P LEFT AKA   

     

 

                    [id number]           2019 09:12:00            23:59:59        

                CLS             Emergency           HANNA GREEN, CAROL PORTILLO    

       [facility] 

[facility]           ER                        STAPLES AND STI

TCHES REMOVED

 FROM STUMP        

 

                    [id number]           2019 17:48:00            10:25:00        

                DIS             Inpatient           FELISHA MORENO DO

[facility]           IRF                       LEFT AKA        

 

                    [id number]           2018 17:35:00            13:04:00        

                DIS             Inpatient           COLE CONTRERAS MD         

  [facility]           ICU                       CHEST PAIN,ELEVATED ERIC

GLOBIN      

  

 

                    [id number]           10/01/2018 00:54:00           10/01/2

018 23:59:59        

                CLS             Preadmit           NORTH HERNANDEZ MD           

[facility]           LAB                       I26.99        

 

                    [id number]           2018 12:38:00           

018 00:01:00        

                DIS             Outpatient           NORTH HERNANDEZ MD         

  [facility]           LAB                       I26.99        

 

                    [id number]           2018 00:17:00            23:59:59        

                CLS             Outpatient           NORTH HERNANDEZ MD         

  [facility]           LAB                       I26.99        

 

                    [id number]           2018 09:38:00            00:01:00        

                DIS             Outpatient           NORTH HERNANDEZ MD         

  [facility]           LAB                       I26.99        

 

                    [id number]           2018 11:00:00            23:59:59        

                CLS             Preadmit           NORTH HERNANDEZ MD           

[facility]           CR                        CHF        

 

                    [id number]           01/10/2018 11:37:00           

018 00:01:00        

                DIS             Outpatient           NORTH HERNANDEZ MD         

  [facility]           CR                        CHF        

 

                    [id number]           2018 10:36:00           

018 23:59:59        

                CLS             Outpatient           NORTH HERNANDEZ MD         

  [facility]           LAB                       I25.10        

 

                    [id number]           2018 10:27:00           

018 23:59:59        

                CLS             Outpatient           KANDACE MIRANDA MD       

    [facility]           RAD                       M25.572        

 

                    [id number]           2018 15:13:00           

018 23:59:59        

                CLS             Outpatient           NORTH HERNANDEZ MD         

  [facility]           RAD                       ASDF        

 

                    [id number]           2018 11:45:00           

018 23:59:59        

                CLS             Outpatient           GONSALO SOMERS MD        

   [facility]           WOUNDCARE                          

 

                    [id number]           2018 00:55:00           

018 13:00:00        

                DIS             Inpatient           CANDE HERNANDEZ MD          

 [facility]           4TH                       CELLULITIS L LEG       

 

 

                    [id number]           2018 09:08:00           

018 23:59:59        

                CLS             Outpatient           NORTH HERNANDEZ MD         

  [facility]           RAD                       LEFT LEG PAIN        

 

                    [id number]           2018 11:43:00           

018 15:31:00        

                DIS             Emergency           RADHA ARELLANO APRN         

  [facility]           ER                        L LEG PAIN/SWELLING/HX 

BLOOD CLOTS  

      

 

                    [id number]           2018 10:30:00           

018 23:59:59        

                CLS             Outpatient           NORTH HERNANDEZ MD         

  [facility]           CARD                      CAD, CHEST PAIN SYNDROM

E        

 

                    [id number]           10/01/2017 07:00:00           

017 00:01:00        

                DIS             Outpatient           NORTH HERNANDEZ MD         

  [facility]           LAB                       I26.99        

 

                    [id number]           12/15/2017 09:16:00           12/15/2

017 23:59:59        

                CLS             Outpatient           CANDE PIKE MD           

[facility]           LAB                       Z01.812 I25.119        

 

                    [id number]           2017 11:24:00            11:53:00        

                DIS             Emergency           DAMARIS WYNN       

    [facility]           ER                        LUMP ON CHEST        

 

                    [id number]           2017 17:12:00           

017 14:45:00        

                DIS             Inpatient           MORENO DO, FELISHAJESENIA VERGARA

[facility]           ICU                       CHEST PAIN,N/V        

 

                    [id number]           2017 10:49:00           

017 00:01:00        

                DIS             Outpatient           NORTH HERNANDEZ MD         

  [facility]           LAB                       I26.99        

 

                    [id number]           2017 15:07:00           

017 17:28:00        

                DIS             Emergency           DAMARIS WYNN       

    [facility]           ER                        SWELLING IN R ARM AFTE

R FLU SHOT   

     

 

                    [id number]           08/15/2017 10:55:00           08/15/2

017 23:59:59        

                CLS             Outpatient           NORTH HERNANDEZ MD         

  [facility]           LAB                       I26.99,Z51.81        

 

                    [id number]           2017 00:08:00            23:59:59        

                CLS             Preadmit           NORTH HERNANDEZ MD           

[facility]           LAB                       DVT        

 

                    [id number]           2017 11:24:00           

017 00:01:00        

                DIS             Outpatient           NORTH HERNANDEZ MD         

  [facility]           LAB                       DVT        

 

                    [id number]           2017 17:21:00           

017 19:18:00        

                DIS             Emergency           DAMARIS WYNN       

    [facility]           ER                        LOWER CHEST PAIN      

  

 

                    [id number]           2017 08:17:00           

017 00:01:00        

                DIS             Outpatient           NORTH HERNANDEZ MD         

  [facility]           LAB                       DVT        

 

                    [id number]           2016 13:09:00           

017 00:01:00        

                DIS             Outpatient           NORTH HERNANDEZ MD         

  [facility]           LAB                       DVT        

 

                    [id number]           2016 10:25:00            19:55:00        

                DIS             Outpatient           NORTH HERNANDEZ MD         

  [facility]           CATH                      ABN STRESS,CP,CAD      

  

 

                    [id number]           2016 07:31:00            23:59:59        

                CLS             Outpatient           NORTH HERNANDEZ MD         

  [facility]           CARD                      CAD, CHF, CHEST PAIN SY

NDROME, 

DVT, HTN, PE        

 

                    [id number]           2016 11:12:00            23:59:59        

                CLS             Outpatient           NORTH HERNANDEZ MD         

  [facility]           CARD                      CAD, CHF, CHEST PAIN SY

NDROME, 

DVT, HTN, PE        

 

                    [id number]           2016 13:12:00            00:01:00        

                DIS             Outpatient           NORTH HERNANDEZ MD         

  [facility]           LAB                       DVT        

 

                    [id number]           2016 14:55:00            17:15:00        

                DIS             Emergency           RADHA ARELLANO         

  [facility]           ER                        R ARM PAIN        

 

                    [id number]           2015 09:29:00           

015 00:01:00        

                DIS             Outpatient           NORTH HERNANDEZ MD         

  [facility]           LAB                       ANTICOAG THERAPY,HX PE 

       

 

                    [id number]           2015 08:34:00           

015 10:54:00        

                DIS             Emergency           COLE CONTRERAS MD         

  [facility]           ER                        RIGHT FOOT PAIN/COUGH  

      

 

                    [id number]           2015 11:05:00           

015 00:01:00        

                DIS             Outpatient           NORTH HERNANDEZ MD         

  [facility]           LAB                       ANTICOAG THERAPY,HX PE 

       

 

                    [id number]           10/07/2014 14:51:00           

014 00:01:00        

                DIS             Outpatient           NORTH HERNANDEZ MD         

  [facility]           LAB                       ANTICOAG THERAPY,HX PE 

       

 

                    [id number]           2014 18:10:00           

014 00:01:00        

                DIS             Outpatient           NORTH HERNANDEZ MD         

  [facility]           LAB                       ANTICOAG THERAPY,HX PE 

       

 

                    [id number]           2013 15:59:00           

013 00:01:00        

                DIS             Outpatient           MARY GREEN, NORTH WHITTAKER         

  [facility]           LAB                       ANTICOAG THERAPY,HX PE 

       

 

                    [id number]           2013 18:23:00           

013 19:22:00        

                DIS             Emergency           HANNA GREEN, CAROL PORTILLO    

       [facility] 

[facility]           ER                        LEFT ANKLE PAIN

        

 

             [id number]           2015 09:36:00                         

            

Document Registration                                                           

         

 

             [id number]           2015 09:36:00                         

            

Document Registration                                                           

         

 

             [id number]           2015 09:36:00                         

            

Document Registration                                                           

         

 

             [id number]           2015 09:36:00                         

            

Document Registration                                                           

         

 

             [id number]           2015 09:36:00                         

            

Document Registration                                                           

         

 

             [id number]           2015 09:36:00                         

            

Document Registration                                                           

         

 

             [id number]           2015 09:36:00                         

            

Document Registration                                                           

         

 

             [id number]           2015 09:36:00                         

            

Document Registration                                                           

         

 

             [id number]           2015 09:36:00                         

            

Document Registration                                                           

         

 

             [id number]           2015 09:36:00                         

            

Document Registration                                                           

         

 

             [id number]           2015 09:36:00                         

            

Document Registration                                                           

         

 

             [id number]           2015 09:36:00                         

            

Document Registration                                                           

         

 

             [id number]           2015 10:38:00                         

            

Document Registration                                                           

         

 

             [id number]           2015 10:38:00                         

            

Document Registration                                                           

         

 

             [id number]           2015 10:37:00                         

            

Document Registration                                                           

         

 

             [id number]           2015 10:37:00                         

            

Document Registration                                                           

         

 

             [id number]           2015 21:35:00                         

            

Document Registration                                                           

         

 

             [id number]           2013 16:55:00                         

            

Document Registration                                                           

         

 

             [id number]           2013 08:29:00                         

            

Document Registration                                                           

         

 

             [id number]           2013 10:36:00                         

            

Document Registration                                                           

         

 

             [id number]           2013 07:36:00                         

            

Document Registration                                                           

         

 

             [id number]           2013 10:00:00                         

            

Document Registration                                                           

         

 

             [id number]           2013 13:04:00                         

            

Document Registration                                                           

         

 

             [id number]           2012 16:16:00                         

            

Document Registration                                                           

         

 

             [id number]           2012 21:41:00                         

            

Document Registration                                                           

         

 

             [id number]           2012 12:15:00                         

            

Document Registration                                                           

         

 

             [id number]           2012 13:43:00                         

            

Document Registration                                                           

         

 

             [id number]           10/10/2011 10:15:00                         

            

Document Registration                                                           

         

 

             [id number]           2011 06:51:00                         

            

Document Registration                                                           

         

 

             [id number]           2011 08:46:00                         

            

Document Registration                                                           

         

 

             [id number]           2011 10:21:00                         

            

Document Registration                                                           

         

 

             [id number]           2011 14:37:00                         

            

Document Registration                                                           

         

 

             [id number]           2010 09:40:00                         

            

Document Registration                                                           

         

 

             [id number]           2010 17:02:00                         

            

Document Registration                                                           

         

 

             [id number]           2010 09:19:00                         

            

Document Registration                                                           

         

 

             [id number]           12/10/2009 08:28:00                         

            

Document Registration                                                           

         

 

             [id number]           2009 13:16:00                         

            

Document Registration                                                           

         

 

             [id number]           10/15/2008 08:46:00                         

            

Document Registration                                                           

         

 

             [id number]           09/15/2008 12:11:00                         

            

Document Registration                                                           

         

 

             [id number]           2008 14:03:00                         

            

Document Registration                                                           

         

 

             [id number]           05/10/2008 08:26:00                         

            

Document Registration                                                           

         

 

             [id number]           2008 09:23:00                         

            

Document Registration                                                           

         

 

             [id number]           2008 11:12:00                         

            

Document Registration                                                           

         

 

             [id number]           2008 12:14:00                         

            

Document Registration                                                           

         

 

             [id number]           2007 10:33:00                         

            

Document Registration                                                           

         

 

             [id number]           05/15/2007 14:25:00                         

            

Document Registration                                                           

         

 

             [id number]           2007 08:46:00                         

            

Document Registration                                                           

         

 

             [id number]           2007 14:49:00                         

            

Document Registration                                                           

         

 

             [id number]           2007 07:21:00                         

            

Document Registration                                                           

         

 

             [id number]           2006 08:00:00                         

            

Document Registration                                                           

         

 

             [id number]           2006 08:52:00                         

            

Document Registration                                                           

         

 

             [id number]           10/25/2006 16:48:00                         

            

Document Registration                                                           

         

 

             [id number]           2006 11:44:00                         

            

Document Registration                                                           

         

 

             [id number]           07/10/2006 09:32:00                         

            

Document Registration                                                           

         

 

             [id number]           2006 08:15:00                         

            

Document Registration                                                           

         

 

             [id number]           2006 11:33:00                         

            

Document Registration                                                           

         

 

             [id number]           2006 11:41:00                         

            

Document Registration

## 2020-04-09 NOTE — XMS REPORT
[facility]

                             Created on: 2020



Francisco Huber

External Reference #: [id number]

: 1970

Sex: Male



Demographics





                          Address                   [address] [place]  [address]

 

                          Preferred Language        Unknown

 

                          Marital Status            Unknown

 

                          Congregation Affiliation     Unknown

 

                          Race                      Unknown

 

                          Ethnic Group              Unknown





Author





                          Author                    Francisco Ruiz

 

                          Organization              [facility]

 

                          Address                   [address] [place]  [address]



 

                          Phone                     [phone]







Care Team Providers





                    Care Team Member Name Role                Phone

 

                    NEENA Ruiz   Unavailable         [phone]







PROBLEMS





          Type      Condition ICD9-CM Code PZJ40-VI Code Onset Dates Condition S

tatus SNOMED 

Code

 

          Problem   Obstructive sleep apnea syndrome           G47.33           

   Active    41401435

 

          Problem   Chronic pain           G89.29              Active    8756243

1

 

          Problem   Other acariasis           B88.0               Active    1872

09244

 

          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

3007

 

          Problem   Tobacco use disorder           F17.200             Active   

 506400060

 

          Problem   Obstructive sleep apnea syndrome           G47.33           

   Active    37689087







ALLERGIES

No Information



ENCOUNTERS





                Encounter       Location        Date            Diagnosis

 

                    [place] [id number] [address] 23 

Dec, 2019                                

 

                    [place] [address] [address] 18 

Dec, 2019                               Chronic pain G89.29

 

                    [place] [address] [address] 16 

Dec, 2019                                

 

                    [facility] [address] [address] 09 

Dec, 2019                               Chronic pain G89.29

 

                    [facility] [address] [address] 06 

Dec, 2019                                

 

                    [place] [address] [address] 04 

Dec, 2019                                

 

                    [facility] [address] [address] 04 

Dec, 2019                               Chronic pain G89.29 ; Encounter for toba

cco use cessation counseling 

Z71.6 ; Tobacco use disorder F17.200 and Thumb pain, left M79.645

 

                    [facility] [address] [address] 10 

Oct, 2019                                

 

                    [facility] [address] [address] 02 

Oct, 2019                               Tobacco use disorder F17.200

 

                    [facility] [address] [address] 02 

Oct, 2019                               Obstructive sleep apnea syndrome G47.33 

; Buerger disease I73.1 ; 

Tobacco use disorder F17.200 and Encounter for immunization Z23

 

                    [facility] [address] [address] 06 

Oct, 2015                               Other acariasis B88.0

 

                    [facility] [address] [address]                                 

 

                    [facility] [address] [address] 14 

2015                                

 

                    [facility] [address] [address]                                 

 

                    [facility] [address] [address] 27 

Mar, 2015                                

 

                    [facility] [address] [address] 27 

Mar, 2015                                

 

                    [facility] [address] [address] 16 

Mar, 2015                                

 

                    [facility] [address] [address] 16 

Mar, 2015                                

 

                    [facility] [address] [address] 11 

Mar, 2015                                

 

                    [facility] [address] [address] 11 

Mar, 2015                                

 

                    [facility] [address] [address]                                 

 

                    [facility] [address] [address]                                 

 

                    [facility] [address] [facility] [place] [address] 27 

Sep, 2014                                

 

                    [facility] [address] [facility] [place] [address] 27 

Sep, 2014                                

 

                    [facility] [address] [facility] [place] [address]                                 

 

                    [facility] [address] [facility] [place] [address]                                 

 

                    [facility] [address] [facility] [place] [address]                                 

 

                    [facility] [address] [facility] [place] [address]                                 

 

                    [facility] [address] [facility] [place] [address]                                 

 

                    [facility] [address] [facility] [place] [address] 10 

Apr, 2013                                

 

                    [facility] [address] [facility] [place] [address]                                 

 

                    [facility] [address] [facility] [place] [address]                                 

 

                    [facility] [address] [facility] [place] [address] 19 

Oct, 2011                                

 

                    [facility] [address] [facility] [place] [address] 19 

Oct, 2011                                

 

                    [facility] [address] [facility] [place] [address] 12 

Aug, 2011                                

 

                    [facility] [address] [facility] [place] [address] 15 

Sep, 2010                                

 

                    [facility] [address] [facility] [place] [address] 13 

Aug, 2010                                

 

                    [facility] [address] [facility] [place] [address] 18 

Mar, 2010                                

 

                    [facility] [address] [facility] [place] [address]                                 







IMMUNIZATIONS

No Known Immunizations



SOCIAL HISTORY

Never Assessed



REASON FOR VISIT





PLAN OF CARE





VITAL SIGNS





                    Height              63 in               2014

 

                    Weight              210.6 lbs           2014

 

                    Temperature         97.8 degrees Fahrenheit 2014

 

                    Heart Rate          84 bpm              2014

 

                    Respiratory Rate    18                  2014

 

                    Blood pressure systolic 130 mmHg            2014

 

                    Blood pressure diastolic 90 mmHg             2014







MEDICATIONS

Unknown Medications



RESULTS

No Results



PROCEDURES

No Known procedures



INSTRUCTIONS





MEDICATIONS ADMINISTERED

No Known Medications



MEDICAL (GENERAL) HISTORY





                    Type                Description         Date

 

                    Medical History     MI                   

 

                    Medical History     stent x 1            

 

                    Medical History     hypertension         

 

                    Medical History     hyperlipidemia       

 

                    Medical History     gout                 

 

                    Medical History     Buerger's disease    

 

                    Medical History     left leg removed  in january  

 

                    Medical History     3 heart attacks 5 stints  

 

                    Surgical History    cardiac stent       

 

                    Surgical History    left leg removed in january  

 

                    Hospitalization History surgery              

 

                    Hospitalization History pulmonary emboli     

 

                    Hospitalization History [place]                   

 

                    Hospitalization History via [facility] for heart attacks 

 

                    Hospitalization History  for the stints   

 

                    Hospitalization History Had heart attack, heart caths.

## 2020-04-09 NOTE — XMS REPORT
Clinical Summary

                             Created on: 2020



Francisco Huber

External Reference #: [id number]

: 1970

Sex: Male



Demographics





                          Address                   [address] [place]  [address]

 

                          Home Phone                [phone]

 

                          Preferred Language        English

 

                          Marital Status            Unknown

 

                          Jewish Affiliation     NON

 

                          Race                      White

 

                          Ethnic Group              Not  or 





Author





                          Author                    [facility]

 

                          Organization              [facility]

 

                          Address                   Unknown

 

                          Phone                     Unavailable







Support





                Name            Relationship    Address         Phone

 

                Steffanie Huber  ECON            Unknown         [phone]







Care Team Providers





                    Care Team Member Name Role                Phone

 

                    Neftali Bacon MD 21                  [phone]

 

                    Mahesh Champagne MD PCP                 [phone]







Source Comments

Some departments are not documenting in the electronic medical record.  If you d
o not see the information that you expected, contact Release of Information in [facility] Health Information Management department at [phone] for further assistan
ce in locating additional records.[facility]



Allergies





                                        Comments



                 Active Allergy  Reactions       Severity        Noted Date 

 

                                         



                 Penicillins     UNKNOWN         Low             2017 

 

                                        



Cellulitis type rash.



                 Pneumococcal Vaccine  RASH            Medium          08/10/201

8 







Medications





                          End Date                  Status



              Medication   Sig          Dispensed    Refills      Start  



                                         Date  

 

                                                    Active



                     atorvastatin (LIPITOR) 40  Take 40 mg by       0   



                           mg tablet                 mouth daily.     

 

                                                    Active



                     carisoprodol(+) (SOMA)  Take 350 mg         0   



                           350 mg tablet             by mouth at     



                                         bedtime     



                                         daily.     

 

                                                    Active



                     gabapentin (NEURONTIN)  Take 600 mg         0   



                           600 mg tablet             by mouth four     



                                         times daily.     

 

                                                    Active



                     gemfibrozil (LOPID) 600  Take 600 mg         0   



                           mg tablet                 by mouth     



                                         twice daily.     

 

                                                    Active



                     Omega-3 Acid Ethyl Esters  Take 1 g by         0   



                           1 gram cap                mouth three     



                                         times daily.     

 

                                                    Active



                     tiZANidine (ZANAFLEX) 4  Take 8 mg by        0   



                           mg tabletIndications:     mouth every 8     



                           muscle spasm              hours as     



                                         needed.     



                                         Indications:     



                                         MUSCLE SPASM     

 

                                                    Active



                     warfarin (COUMADIN) 5 mg  Take 5 mg by        0   



                           tabletIndications:        mouth as     



                           thrombotic disorder       directed.     



                                         Take 7.5 mg     



                                         by mouth on     



                                         Mon and Wed.     



                                         Take 5 mg by     



                                         mouth on     



                                         , Tues,     



                                         Thurs, Fri,     



                                         and Sat. Take     



                                         at bedtime.     



                                         Indications:     



                                         THROMBOTIC     



                                         DISORDER     

 

                                                    Active



                     omeprazole DR(+)    Take 20 mg by       0   



                           (PRILOSEC) 20 mg capsule  mouth daily     



                                         before     



                                         breakfast.     

 

                                                    Active



                     nitroglycerin (NITROSTAT)  Place 0.4 mg        0   



                           0.4 mg tablet             under tongue     



                                         every 5     



                                         minutes as     



                                         needed for     



                                         Chest Pain.     



                                         Max of 3     



                                         tablets, call     



                                         911.     

 

                                                    Active



                     clopiDOGrel (PLAVIX) 75  Take 75 mg by       0   



                           mg tablet                 mouth daily.     

 

                                                    Active



                     HYDROcodone/acetaminophen  Take 1 tablet       0   



                           (+) (NORCO) 10/325 mg     by mouth     



                           tablet                    every 6 hours     



                                         as needed for     



                                         Pain     

 

                                                    Active



                     aspirin 325 mg tablet  Take 325 mg         0   



                                         by mouth     



                                         daily. Take     



                                         with food.     

 

                                                    Active



                     LOSARTAN PO         Take  by            0   



                                         mouth.     

 

                                                    Active



                     oxycodone(+) (ROXICODONE,  Take 10 mg by       0   



                           OXY-IR) 10 mg tablet      mouth every 6     



                                         hours as     



                                         needed for     



                                         Pain     

 

                                                    Active



                     NIFEdipine (PROCARDIA;  Take 10 mg by       0   



                           ADALAT) 10 mg capsule     mouth three     



                                         times daily.     

 

                                                    Active



              pregabalin (LYRICA) 100  Take one     90 capsule   3            10

/11/201  



                     mg capsule          capsule by          8  



                                         mouth three     



                                         times daily.     



                                         Take 1 cap     



                                         qhs x5d, then     



                                         1 cap bid     



                                         x5d, then 1     



                                         cap tid     



                                         thereafter     







Active Problems





 



                           Problem                   Noted Date

 

 



                           CAD (coronary artery disease)  2017

 

 



                           Coronary artery disease involving native coronary art

chase of native heart  

2017



                                         with angina pectoris 

 

 



                                         Overview:



                                         17 - NSTEMI at [facility] in [place], [place]. Successful PCI



                                         with a Resolute Integrity 2.5 x 26 mm s

tent to the OM with Dr. Miguel.



                                         Unsuccessful PCI to the RCA . Pt ref

erred to Dr. Delcid for possible 



                                         procedure.





                                         16 - Nuclear stress test ([facility]): No ischemia or



                                         arrhythmia on EKG. Diaphragmatic attenu

ation with reversible ischemia



                                         involving the mid to apical inferior wa

ll and inferolateral wall. Normal



                                         left ventricular size with mild hypokin

esis at the lateral wall. EF 50%.





                                         Previous history of Promus stent placem

ent to  - date unknown.

 

 



                           NSTEMI (non-ST elevated myocardial infarction)  

 

 



                                         Overview:



                                         17 at [facility] in [place], [place].

 

 



                           Tobacco abuse             2017

 

 



                           Ischemic cardiomyopathy   2017

 

 



                                         Overview:



                                         17 - Echo ([facility])

: EF 30-35%. Left ventricular cavity



                                         size increased. Wall thickness normal. 

Regional wall motion abnormalities.



                                         Akinesis of the inferior myocardium. Ak

inesis of the lateral myocardium.





                                         17 - NSTEMI - EF 20%, severe left

 ventricular systolic function (per



                                         cath report). Life Vest applied for [facility] prevention of sudden cardiac



                                         death.

 

 



                           Mild mitral regurgitation  2017

 

 



                           Mild tricuspid regurgitation  2017

 

 



                           Protein S deficiency      2017

 

 



                                         Overview:



                                         On warfarin.







Family History





   



                 Relation        Name            Status          Comments

 

   



                           Father                    Alive 

 

   



                           Mother                     







Social History





                                        Date



                 Tobacco Use     Types           Packs/Day       Years Used 

 

                                         



                                         Current Every Day Smoker    

 

    



                                         Smokeless Tobacco: Never   



                                         Used   







                    Drinks/Week         oz/Week             Comments



                                         Alcohol Use   

 

                                                             



                                         No   







 



                           Sex Assigned at Birth     Date Recorded

 

 



                                         Not on file 







                                        Industry



                           Job Start Date            Occupation 

 

                                        Not on file



                           Not on file               Not on file 







                                        Travel End



                           Travel History            Travel Start 

 





                                         No recent travel history available.







Last Filed Vital Signs





                    Reading             Time Taken          Comments



                                         Vital Sign   

 

                    117/71              10/11/2018  9:14 AM CDT  



                                         Blood Pressure   

 

                    91                  10/11/2018  9:14 AM CDT  



                                         Pulse   

 

                    37.1 C (98.7 F) 2017  6:15 AM CST  



                                         Temperature   

 

                    17                  10/11/2018  9:14 AM CDT  



                                         Respiratory Rate   

 

                    94%                 10/11/2018  9:14 AM CDT  



                                         Oxygen Saturation   

 

                    -                   -                    



                                         Inhaled Oxygen   



                                         Concentration   

 

                    87.1 kg (192 lb)    10/11/2018  9:14 AM CDT  



                                         Weight   

 

                    162.6 cm (5' 4")    10/11/2018  9:14 AM CDT  



                                         Height   

 

                    32.96               10/11/2018  9:14 AM CDT  



                                         Body Mass Index   







Plan of Treatment





   



                 Health Maintenance  Due Date        Last Done       Comments

 

   



                           DTAP/TDAP VACCINES (1 -   1983  



                                         Tdap)   

 

   



                           HIV SCREENING             1987  

 

   



                           PHYSICAL (COMPREHENSIVE)  1990  



                                         EXAM   

 

   



                           INFLUENZA VACCINE         2020  







Results

Not on filefrom Last 3 Months



Insurance





                                        Type



            Payer      Benefit    Subscriber ID  Effective  Phone      Address 



                           Plan /                    Dates   



                                         Group     

 

                                        PPO



                 [employer]         [employer]         xxxxxxxxxxxxxx  2017-P   



                           BLUE OUT                  resent   



                                         OF STATE     







     



            Guarantor Name  Account    Relation to  Date of    Phone      Joseluis mosley Address



                     Type                Patient             Birth  

 

     



            Francisco Huber  Personal/F  Self       1970  [phone]

[address]  [address] [facility]               (Home)              [place] [address]







Advance Directives





                          Patient Representative    Explanation



                           Type                      Date Recorded  

 

                                                     



                           Advance                   2017  7:52 AM  



                                         Directive/DPOA   











                          Date Inactivated          Comments



                           Code Status               Date Activated  

 

                          2017 10:52 AM        



                           Full Code                 2017  4:32 PM  







  



                           Provider has discussed Code Status  No, more discussi

on 



                           w/Patient or Family?      needed

## 2020-04-09 NOTE — NUR
Attempted to call report to ARMIDA Smith at this time. RN will call back when ready 
for report d/t admitting another pt trasnfer at this time.

## 2020-04-09 NOTE — DIAGNOSTIC IMAGING REPORT
INDICATION: Shortness of breath.



COMPARISON: 10/21/2019.



EXAMINATION: Portable chest. 



FINDINGS: The lungs are well-aerated and clear. There is no

air-trapping. The heart is not enlarged. No pulmonary edema. No

hilar adenopathy. No pneumothorax or pleural effusion. No bony

abnormality.



IMPRESSION: Normal portable chest. 



Dictated by: 



  Dictated on workstation # [id number]

## 2020-04-10 VITALS — DIASTOLIC BLOOD PRESSURE: 66 MMHG | SYSTOLIC BLOOD PRESSURE: 120 MMHG

## 2020-04-10 VITALS — SYSTOLIC BLOOD PRESSURE: 123 MMHG | DIASTOLIC BLOOD PRESSURE: 73 MMHG

## 2020-04-10 VITALS — DIASTOLIC BLOOD PRESSURE: 72 MMHG | SYSTOLIC BLOOD PRESSURE: 115 MMHG

## 2020-04-10 VITALS — SYSTOLIC BLOOD PRESSURE: 101 MMHG | DIASTOLIC BLOOD PRESSURE: 66 MMHG

## 2020-04-10 VITALS — SYSTOLIC BLOOD PRESSURE: 111 MMHG | DIASTOLIC BLOOD PRESSURE: 68 MMHG

## 2020-04-10 VITALS — SYSTOLIC BLOOD PRESSURE: 96 MMHG | DIASTOLIC BLOOD PRESSURE: 64 MMHG

## 2020-04-10 VITALS — SYSTOLIC BLOOD PRESSURE: 134 MMHG | DIASTOLIC BLOOD PRESSURE: 72 MMHG

## 2020-04-10 VITALS — SYSTOLIC BLOOD PRESSURE: 97 MMHG | DIASTOLIC BLOOD PRESSURE: 53 MMHG

## 2020-04-10 VITALS — DIASTOLIC BLOOD PRESSURE: 75 MMHG | SYSTOLIC BLOOD PRESSURE: 110 MMHG

## 2020-04-10 VITALS — SYSTOLIC BLOOD PRESSURE: 91 MMHG | DIASTOLIC BLOOD PRESSURE: 56 MMHG

## 2020-04-10 VITALS — DIASTOLIC BLOOD PRESSURE: 59 MMHG | SYSTOLIC BLOOD PRESSURE: 90 MMHG

## 2020-04-10 VITALS — SYSTOLIC BLOOD PRESSURE: 98 MMHG | DIASTOLIC BLOOD PRESSURE: 55 MMHG

## 2020-04-10 VITALS — SYSTOLIC BLOOD PRESSURE: 108 MMHG | DIASTOLIC BLOOD PRESSURE: 58 MMHG

## 2020-04-10 VITALS — DIASTOLIC BLOOD PRESSURE: 76 MMHG | SYSTOLIC BLOOD PRESSURE: 132 MMHG

## 2020-04-10 VITALS — SYSTOLIC BLOOD PRESSURE: 110 MMHG | DIASTOLIC BLOOD PRESSURE: 64 MMHG

## 2020-04-10 VITALS — SYSTOLIC BLOOD PRESSURE: 96 MMHG | DIASTOLIC BLOOD PRESSURE: 56 MMHG

## 2020-04-10 VITALS — SYSTOLIC BLOOD PRESSURE: 92 MMHG | DIASTOLIC BLOOD PRESSURE: 62 MMHG

## 2020-04-10 VITALS — DIASTOLIC BLOOD PRESSURE: 53 MMHG | SYSTOLIC BLOOD PRESSURE: 97 MMHG

## 2020-04-10 VITALS — SYSTOLIC BLOOD PRESSURE: 119 MMHG | DIASTOLIC BLOOD PRESSURE: 70 MMHG

## 2020-04-10 VITALS — SYSTOLIC BLOOD PRESSURE: 97 MMHG | DIASTOLIC BLOOD PRESSURE: 56 MMHG

## 2020-04-10 LAB
ALBUMIN SERPL-MCNC: 3.8 GM/DL (ref 3.2–4.5)
ALP SERPL-CCNC: 97 U/L (ref 40–136)
ALT SERPL-CCNC: 12 U/L (ref 0–55)
ARTERIAL PATENCY WRIST A: POSITIVE
BASE EXCESS STD BLDA CALC-SCNC: 4.4 MMOL/L (ref -2.5–2.5)
BASOPHILS # BLD AUTO: 0 10^3/UL (ref 0–0.1)
BASOPHILS NFR BLD AUTO: 0 % (ref 0–10)
BDY SITE: (no result)
BILIRUB SERPL-MCNC: 0.4 MG/DL (ref 0.1–1)
BODY TEMPERATURE: 36
BUN/CREAT SERPL: 8
CALCIUM SERPL-MCNC: 9 MG/DL (ref 8.5–10.1)
CHLORIDE SERPL-SCNC: 102 MMOL/L (ref 98–107)
CHOLEST SERPL-MCNC: 149 MG/DL (ref ?–200)
CO2 BLDA CALC-SCNC: 32.4 MMOL/L (ref 21–31)
CO2 SERPL-SCNC: 26 MMOL/L (ref 21–32)
CREAT SERPL-MCNC: 0.8 MG/DL (ref 0.6–1.3)
EOSINOPHIL # BLD AUTO: 0 10^3/UL (ref 0–0.3)
EOSINOPHIL NFR BLD AUTO: 0 % (ref 0–10)
ERYTHROCYTE [DISTWIDTH] IN BLOOD BY AUTOMATED COUNT: 15.9 % (ref 10–14.5)
GFR SERPLBLD BASED ON 1.73 SQ M-ARVRAT: > 60 ML/MIN
GLUCOSE SERPL-MCNC: 160 MG/DL (ref 70–105)
HCT VFR BLD CALC: 47 % (ref 40–54)
HDLC SERPL-MCNC: 29 MG/DL (ref 40–60)
HGB BLD-MCNC: 14.9 G/DL (ref 13.3–17.7)
INHALED O2 FLOW RATE: 3 L/MIN
LYMPHOCYTES # BLD AUTO: 0.7 X 10^3 (ref 1–4)
LYMPHOCYTES NFR BLD AUTO: 8 % (ref 12–44)
MANUAL DIFFERENTIAL PERFORMED BLD QL: YES
MCH RBC QN AUTO: 29 PG (ref 25–34)
MCHC RBC AUTO-ENTMCNC: 32 G/DL (ref 32–36)
MCV RBC AUTO: 90 FL (ref 80–99)
MONOCYTES # BLD AUTO: 0.1 X 10^3 (ref 0–1)
MONOCYTES NFR BLD AUTO: 1 % (ref 0–12)
MONOCYTES NFR BLD: 1 %
NEUTROPHILS # BLD AUTO: 8.1 X 10^3 (ref 1.8–7.8)
NEUTROPHILS NFR BLD AUTO: 91 % (ref 42–75)
NEUTS BAND NFR BLD MANUAL: 91 %
NEUTS BAND NFR BLD: 1 %
PCO2 BLDA: 62 MMHG (ref 35–45)
PH BLDA: 7.31 [PH] (ref 7.37–7.43)
PLATELET # BLD: 229 10^3/UL (ref 130–400)
PMV BLD AUTO: 9.4 FL (ref 7.4–10.4)
PO2 BLDA: 64 MMHG (ref 79–93)
POTASSIUM SERPL-SCNC: 5 MMOL/L (ref 3.6–5)
PROT SERPL-MCNC: 6.7 GM/DL (ref 6.4–8.2)
RBC MORPH BLD: NORMAL
SAO2 % BLDA FROM PO2: 95 % (ref 94–100)
SODIUM SERPL-SCNC: 138 MMOL/L (ref 135–145)
TRIGL SERPL-MCNC: 72 MG/DL (ref ?–150)
VARIANT LYMPHS NFR BLD MANUAL: 7 %
VENTILATION MODE VENT: NO
VLDLC SERPL CALC-MCNC: 14 MG/DL (ref 5–40)
WBC # BLD AUTO: 8.9 10^3/UL (ref 4.3–11)

## 2020-04-10 RX ADMIN — METHYLPREDNISOLONE SODIUM SUCCINATE SCH MG: 40 INJECTION, POWDER, FOR SOLUTION INTRAMUSCULAR; INTRAVENOUS at 17:42

## 2020-04-10 RX ADMIN — IPRATROPIUM BROMIDE AND ALBUTEROL SULFATE SCH ML: .5; 3 SOLUTION RESPIRATORY (INHALATION) at 22:04

## 2020-04-10 RX ADMIN — GABAPENTIN SCH MG: 600 TABLET, FILM COATED ORAL at 17:42

## 2020-04-10 RX ADMIN — PANTOPRAZOLE SODIUM SCH MG: 40 TABLET, DELAYED RELEASE ORAL at 08:19

## 2020-04-10 RX ADMIN — IPRATROPIUM BROMIDE AND ALBUTEROL SULFATE SCH ML: .5; 3 SOLUTION RESPIRATORY (INHALATION) at 18:35

## 2020-04-10 RX ADMIN — ISOSORBIDE MONONITRATE SCH MG: 30 TABLET, EXTENDED RELEASE ORAL at 06:29

## 2020-04-10 RX ADMIN — IPRATROPIUM BROMIDE AND ALBUTEROL SULFATE SCH ML: .5; 3 SOLUTION RESPIRATORY (INHALATION) at 10:40

## 2020-04-10 RX ADMIN — METOPROLOL TARTRATE SCH MG: 50 TABLET, FILM COATED ORAL at 08:18

## 2020-04-10 RX ADMIN — IPRATROPIUM BROMIDE AND ALBUTEROL SULFATE SCH ML: .5; 3 SOLUTION RESPIRATORY (INHALATION) at 07:46

## 2020-04-10 RX ADMIN — GABAPENTIN SCH MG: 600 TABLET, FILM COATED ORAL at 14:48

## 2020-04-10 RX ADMIN — METOPROLOL TARTRATE SCH MG: 50 TABLET, FILM COATED ORAL at 20:32

## 2020-04-10 RX ADMIN — METHYLPREDNISOLONE SODIUM SUCCINATE SCH MG: 40 INJECTION, POWDER, FOR SOLUTION INTRAMUSCULAR; INTRAVENOUS at 06:28

## 2020-04-10 RX ADMIN — ENOXAPARIN SODIUM SCH MG: 100 INJECTION SUBCUTANEOUS at 08:19

## 2020-04-10 RX ADMIN — CLOPIDOGREL BISULFATE SCH MG: 75 TABLET, FILM COATED ORAL at 08:19

## 2020-04-10 RX ADMIN — IPRATROPIUM BROMIDE AND ALBUTEROL SULFATE SCH ML: .5; 3 SOLUTION RESPIRATORY (INHALATION) at 14:18

## 2020-04-10 RX ADMIN — OMEGA-3 FATTY ACIDS CAP 1000 MG SCH MG: 1000 CAP at 20:32

## 2020-04-10 RX ADMIN — GABAPENTIN SCH MG: 600 TABLET, FILM COATED ORAL at 20:32

## 2020-04-10 RX ADMIN — METHYLPREDNISOLONE SODIUM SUCCINATE SCH MG: 40 INJECTION, POWDER, FOR SOLUTION INTRAMUSCULAR; INTRAVENOUS at 11:26

## 2020-04-10 RX ADMIN — ACETAMINOPHEN PRN MG: 500 TABLET ORAL at 17:42

## 2020-04-10 RX ADMIN — GABAPENTIN SCH MG: 600 TABLET, FILM COATED ORAL at 08:18

## 2020-04-10 RX ADMIN — NICOTINE PRN MG: 21 PATCH, EXTENDED RELEASE TRANSDERMAL at 01:33

## 2020-04-10 RX ADMIN — ACETAMINOPHEN PRN MG: 500 TABLET ORAL at 11:27

## 2020-04-10 RX ADMIN — ASPIRIN 81 MG CHEWABLE TABLET SCH MG: 81 TABLET CHEWABLE at 08:19

## 2020-04-10 RX ADMIN — FLUTICASONE PROPIONATE AND SALMETEROL XINAFOATE SCH PUFF: 115; 21 AEROSOL, METERED RESPIRATORY (INHALATION) at 22:03

## 2020-04-10 NOTE — HISTORY & PHYSICAL-HOSPITALIST
History of Present Illness


HPI/Chief Complaint


CC: Resp insufficiency





HPI: This is a 49yoWM patient of [facility] who is known to me from prior IRF stay 

after [facility] 2019 [facility] due to severe PVD unable to salvage the leg who 

presents to the ER with dyspnea and found to have hypercapnia and placed on 

biPAP. Patient wants to DC biPAP and go home but I try to explain that is not an

option currently. Patient feels ok otherwise and has not had any significant 

illness since last seen by this examiner 1+ year ago. I reviewed meds and labs 

and CT scan and will restart home meds. Patient continues to smoke and cessation

was counseled.


Source:  patient


Exam Limitations:  no limitations


Date Seen


4/10/20


Time Seen by a Provider:  10:45


Attending Physician


Tish Pryor MD


PCP


No,Local Physician


Referring Physician





Date of Admission


2020 at 22:20





Home Medications & Allergies


Home Medications


Reviewed patient Home Medication Reconciliation performed by pharmacy medication

reconciliations technician and/or nursing.


Patients Allergies have been reviewed.





Allergies





Allergies


Coded Allergies


  Penicillins (Unverified Allergy, Mild, 09)


  pneumococcal vaccine (Verified Allergy, Unknown, 4/10/20)








Past Medical-Social-Family Hx


Past Med/Social Hx:  Reviewed Nursing Past Med/Soc Hx, Reviewed and Corrections 

made


Patient Social History


Marrital Status:  


Employed/Student:  unemployed


Alcohol Use:  Denies Use


Recreational Drug Use:  No


Smoking Status:  Current Everyday Smoker


Type Used:  Cigarettes (3 pack per day)


2nd Hand Smoke Exposure:  No


Recent Foreign Travel:  No


Contact w/other who traveled:  No


Recent Hopitalizations:  No


Recent Infectious Disease Expo:  No





Immunizations Up To Date


Pediatric:  Yes


Date of Pneumonia Vaccine:  Dec 3, 2012


Date of Influenza Vaccine:  2019





Seasonal Allergies


Seasonal Allergies:  No





Past Medical History


Surgeries:  Amputation, Cardiac, Coronary Stent, Orthopedic


Respiratory:  COPD, Pulmonary Embolism


Currently Using CPAP:  No


Currently Using BIPAP:  No


Cardiac:  Coronary Artery Disease, Deep Vein Thrombosis, Heart Attack, High 

Cholesterol, Hypertension


Reproductive:  No


Gastrointestinal:  Gastroesophageal Reflux


Musculoskeletal:  Amputee, Chronic Back Pain, Gout


History of Blood Disorders:  Yes (PROTEIN S DEFICIENCY--DVT'S AND P.E.'S AND MI 

X 2)





Family History





Arthritis


  G8 BROTHER


Blood clots


  19 MOTHER ( of blood clot)


Cardiac disorder


  19 FATHER


Diabetes mellitus


  G8 BROTHER


FH: cancer


  paternal grandmother


FHx: inflammatory bowel disease


  G8 BROTHER





No Family History of:


  FH: sudden cardiac death (SCD)


Heart Disease, Vascular Disease





Review of Systems


Constitutional:  see HPI


Respiratory:  dyspnea on exertion, short of breath, wheezing





Physical Exam


Physical Exam


Vital Signs





Vital Signs - First Documented








 4/9/20 4/10/20





 20:19 08:45


 


Temp 37.2 


 


Pulse 101 


 


Resp 20 


 


B/P (MAP) 152/92 (112) 


 


Pulse Ox 96 


 


O2 Delivery Nasal Cannula 


 


O2 Flow Rate 2.00 


 


FiO2  30





Capillary Refill : Less Than 3 Seconds


Height, Weight, BMI


Height: 5'4.00"


Weight: 190lbs. 1.6oz. 86.174134dp; 37.49 BMI


Method:Stated


General Appearance:  No Apparent Distress


Eyes:  Right Eye Normal Inspection, Right Eye PERRL


HEENT:  PERRL/EOMI, Normal ENT Inspection, Pharynx Normal, Moist Mucous 

Membranes


Neck:  Full Range of Motion, Normal Inspection, Non Tender


Respiratory:  Chest Non Tender, No Respiratory Distress, Accessory Muscle Use, 

Crackles, Decreased Breath Sounds, Wheezing


Cardiovascular:  Regular Rate, Rhythm, No Edema, No Gallop, No JVD, No Murmur, 

Normal Peripheral Pulses


Gastrointestinal:  Normal Bowel Sounds, No Organomegaly, No Pulsatile Mass, Non 

Tender, Soft


Back:  Normal Inspection, No CVA Tenderness, No Vertebral Tenderness


Extremity:  Normal Capillary Refill, Normal Inspection, Normal Range of Motion, 

Non Tender, No Calf Tenderness, No Pedal Edema, Other (left AKA)


Neurologic/Psychiatric:  Alert, Oriented x3, No Motor/Sensory Deficits, Normal 

Mood/Affect


Skin:  Normal Color, Warm/Dry


Lymphatic:  No Adenopathy





Results


Results/Procedures


Labs


Laboratory Tests


20 20:25








4/10/20 03:00








Patient resulted labs reviewed.





Assessment/Plan


Admission Diagnosis


Assessment:


AECOPD


Hypercapnia


Respiratory insufficiency requiring biPAP


h/o uneventful left knee amputation by Dr. Funes and [facility] 2019


Recent MI 8 weeks ago


Smoker


Coarse breath sounds


PVD





Plan:


Initiate CSD protocol


Work on wean biPAP


Monitor labs closely


Chest x-ray


Nebulized treatments





(1A) AECOPD respiratory insufficiency


(1) h/o Above knee amputation of left lower extremity


(2) CAD (coronary artery disease)


(3) Myocardial infarct, old


(4) Smoker


(5) PVD (peripheral vascular disease)


(6) COPD (chronic obstructive pulmonary disease)


Admission Status:  Inpatient Order (span 2 midnights)


Reason for Inpatient Admission:  


resp insuff





Clinical Quality Measures


DVT/VTE Risk/Contraindication:


Risk Factor Score Per Nursin


RFS Level Per Nursing on Admit:  4+=Very High











FELISHA MORENO DO                Apr 10, 2020 12:12

## 2020-04-10 NOTE — NUR
SPOKE WITH THE PT-AND CALLED HIS WIFE, WENT THRU THE EXT MED HISTORY CALLED [facility], 
[facility], AND [facility] TO COMPELTE THE MED REC



PATIENT HAD BEEN FILLING AT [facility] BUT IS TRANSITIONING TO [facility] AND [facility]. 



THE FOLLOWING MEDICATIONS MARÍA SAYS WERE GIVEN FROM THE REPOSITORY :

PROTONIX 40MG

ASPIRIN 81MG

ATORVASTATIN 40MG

ALLOPURINOL 100MG



[facility] IN [place] AND [place] HAS NO RECORD OF THESE MEDICATIONS-BUT FROM MY UNDERSTANDING WHEN A PT 
IS JUST GETTING SET UP WITH [facility] THEY MAY NOT DOCUMENT EVERYTHING ESPECIALLY REPOSITORY 
MEDICATIONS. THE PATIENTS WIFE WILL CALL ME BACK WHEN SHE HAS A CHANCE TO GO HOME AND LOOK 
AT THE BOTTLES TO GIVE ME MORE INFORMATION. 



THE PT ALSO SAID HE TAKES GABAPENTIN 600MG HOWEVER [facility] HAS NOT FILLED THIS SINCE 2019 
AND [facility] OR [facility] HAD NOT DISPENSED- FOR THIS REASON I LEFT IT OFF THE MED REC



OTC MEDS:

FISH OIL

VIT D

## 2020-04-10 NOTE — NUR
Report from Idalia HUFFMAN, patient to room 414, patient oriented to room and call light. Will 
assume care of patient at this time.

## 2020-04-10 NOTE — CONSULTATION-CARDIOLOGY
HPI-Cardiology


Cardiology Consultation:


Date of Consultation


4/10/20


Time Seen by a Provider:  13:45


Date of Admission





Attending Physician


Tish Pryor MD


Admitting Physician


No,Local Physician


Consulting Physician


KANDACE REILLY MD, ,MA, FACP, FACC, FSCAI, CCDS





Primary cardiologist: Dr Miguel





HPI:


Chief Complaint:


CC: Shortness of breath and nausea








HPI


48 yo man with chronic tobacco use and chronic shortness of breath who felt 

yesterday that he was more short of breath than usual and also had nausea. Came 

to ER. Found to be hypercapnic. Admitted to Dr Slater for management. Has been 

treated with BiPAP. Now feels back to his usual baseline. Nausea has not 

recurred. No vomiting or diarrhea. No palp or syncope or cp. No swelling. No 

fever or chills





Review of Systems-Cardiology


Review of Systems


Constitutional:  malaise, tiredness; No weight loss, No weight gain


Eyes:  No vision change


Ears/Nose/Throat:  No ear discharge, No nasal drainage, No recent hearing loss


Respiratory:  As described under HPI


Cardiovascular:  As described under HPI


Gastrointestinal:  As described under HPI


Genitourinary:  No dysuria, No hematuria


Musculoskeletal:  back pain (chronic)


Skin:  No rash, No ulcerations


Psychiatric/Neurological:  No focal weakness, No syncope


Hematologic:  No bleeding abnormalities





All Other Systems Reviewed


Negative Unless Noted:  Yes





PMH-Social-Family Hx


Patient Social History


Marrital Status:  


Employed/Student:  unemployed


Alcohol Use:  Denies Use


Recreational Drug Use:  No


Smoking Status:  Current Everyday Smoker


Type Used:  Cigarettes (3 pack per day)


2nd Hand Smoke Exposure:  No


Recent Foreign Travel:  No


Recent Infectious Disease Expo:  No





Immunizations Up To Date


Date of Pneumonia Vaccine:  Dec 3, 2012


Date of Influenza Vaccine:  2019





Past Medical History


PMH


As described under Assessment.





Family Medical History


Family History:  


Arthritis


  G8 BROTHER


Blood clots


  19 MOTHER ( of blood clot)


Cardiac disorder


  19 FATHER


Diabetes mellitus


  G8 BROTHER


FH: cancer


  paternal grandmother


FHx: inflammatory bowel disease


  G8 BROTHER





No Family History of:


  FH: sudden cardiac death (SCD)





Allergies and Home Medications


Allergies


Coded Allergies:  


     Penicillins (Unverified  Allergy, Mild, 09)


     pneumococcal vaccine (Verified  Allergy, Unknown, 4/10/20)





Home Medications


Allopurinol 100 Mg Tablet, 100 MG PO DAILY, (Reported)


Aspirin 81 Mg Tablet.dr, 81 MG PO HS, (Reported)


Atorvastatin Calcium 40 Mg Tablet, 40 MG PO HS, (Reported)


Cholecalciferol (Vitamin D3) 25 Mcg Tablet, 25 MCG PO DAILY, (Reported)


Clopidogrel Bisulfate 75 Mg Tablet, 75 MG PO DAILY, (Reported)


Isosorbide Mononitrate 30 Mg Tab.er.24h, 30 MG PO DAILY, (Reported)


Metoprolol Tartrate 50 Mg Tablet, 50 MG PO BID, (Reported)


Omega 3 Polyunsat Fatty Acids 1,000 Mg Cap, 1,000 MG PO BID, (Reported)


Pantoprazole Sodium 40 Mg Tablet.dr, 40 MG PO DAILY, (Reported)





Patient Home Medication List


Home Medication List Reviewed:  Yes





Physical Exam-Cardiology


Physical Exam


Vital Signs/I&O











 4/10/20 4/10/20 4/10/20 4/10/20





 02:30 03:00 04:00 04:00


 


Temp   36.2 


 


Pulse 72 81 72 


 


Resp 19 15  


 


B/P (MAP) 91/56 (68) 134/72 (92)  


 


Pulse Ox 91 97 91 


 


O2 Delivery Nasal Cannula Nasal Cannula  Nasal Cannula


 


O2 Flow Rate 3.00 3.00  3.00


 


    





 4/10/20 4/10/20 4/10/20 4/10/20





 04:00 05:00 05:25 05:31


 


Temp 36.0   


 


Pulse 82 75 65 


 


Resp 21 14 14 


 


B/P (MAP) 97/51 (66) 97/53 (68)  


 


Pulse Ox 97 96 93 


 


O2 Delivery Nasal Cannula Nasal Cannula  NIV Bilevel


 


O2 Flow Rate 3.00 3.00 30.00 30.00


 


    





 4/10/20 4/10/20 4/10/20 4/10/20





 06:00 07:00 07:00 07:34


 


Temp    36.6


 


Pulse 67 72 83 


 


Resp 15  11 


 


B/P (MAP) 92/62 (72)  111/68 (82) 


 


Pulse Ox 90  93 


 


O2 Delivery NIV Bilevel  NIV Bilevel 


 


O2 Flow Rate 30.00  30.00 


 


    





 4/10/20 4/10/20 4/10/20 4/10/20





 07:35 07:49 08:00 08:45


 


Pulse   87 


 


Resp   14 


 


B/P (MAP)   123/73 (90) 


 


Pulse Ox  92 92 94


 


O2 Delivery Nasal Cannula Nasal Cannula NIV Bilevel NIV Bilevel


 


O2 Flow Rate 3.00 3.00 30.00 


 


FiO2    30





 4/10/20 4/10/20 4/10/20 4/10/20





 09:00 10:00 10:43 11:00


 


Pulse 92 85 85 91


 


Resp 18 10 15 16


 


B/P (MAP) 98/55 (69) 101/66 (78)  110/75 (87)


 


Pulse Ox 91 91 92 92


 


O2 Delivery NIV Bilevel NIV Bilevel  NIV Bilevel


 


O2 Flow Rate 30.00 30.00 30.00 30.00





 4/10/20 4/10/20 4/10/20 4/10/20





 11:23 12:00 12:00 12:26


 


Temp 36.7   


 


Pulse   95 96


 


Resp   17 


 


B/P (MAP)   120/66 (84) 


 


Pulse Ox   90 


 


O2 Delivery  Nasal Cannula NIV Bilevel 


 


O2 Flow Rate  3.00 30.00 


 


    





 4/10/20   





 13:00   


 


Pulse 74   


 


Resp 19   


 


B/P (MAP) 110/64 (79)   


 


Pulse Ox 94   


 


O2 Delivery NIV Bilevel   


 


O2 Flow Rate 30.00   














 4/10/20





 00:00


 


Intake Total 2000 ml


 


Balance 2000 ml





Capillary Refill : Less Than 3 Seconds


Constitutional:  AAO x 3, well-developed, well-nourished


HEENT:  EOMI, hearing is well preserved; No xanthelasmas are seen


Neck:  carotid pulses are 2 + bilaterally, with good upstrokes


Respiratory:  No accessory muscle use; other (fair air entry, somewhat prolonged

exp phase)


Cardiovascular:  regular rate-rhythm, S1 and S2, systolic murmur (soft RUBY at 

card base)


Gastrointestinal:  No tender; soft; No guarding, No rebound, No audible bowel 

sounds


Extremities:  other (L AKA); No clubbing, No cyanosis, No significant edema


Neurologic/Psychiatric:  oriented x 3, other (moves all limbs equally)


Skin:  No rash on exposed areas, No ulcerations on exposed areas





Data Review


Labs


Laboratory Tests


20 20:25: 


White Blood Count 11.4H, Red Blood Count 5.60, Hemoglobin 15.9, Hematocrit 50, 

Mean Corpuscular Volume 89, Mean Corpuscular Hemoglobin 28, Mean Corpuscular 

Hemoglobin Concent 32, Red Cell Distribution Width 16.0H, Platelet Count 240, 

Mean Platelet Volume 9.3, Neutrophils (%) (Auto) 79H, Lymphocytes (%) (Auto) 13,

Monocytes (%) (Auto) 6, Eosinophils (%) (Auto) 2, Basophils (%) (Auto) 0, 

Neutrophils # (Auto) 9.0H, Lymphocytes # (Auto) 1.5, Monocytes # (Auto) 0.7, E

osinophils # (Auto) 0.2, Basophils # (Auto) 0.0, Prothrombin Time 12.7, INR 

Comment 0.9, Activated Partial Thromboplast Time 31, D-Dimer 0.50H, Sodium Level

138, Potassium Level 3.5L, Chloride Level 96L, Carbon Dioxide Level 30, Anion Ga

p 12, Blood Urea Nitrogen 5L, Creatinine 0.90, Estimat Glomerular Filtration Ra

te > 60, BUN/Creatinine Ratio 6, Glucose Level 139H, Lactic Acid Level 1.29, Ca

lcium Level 9.4, Corrected Calcium 9.2, Magnesium Level 1.9, Total Bilirubin 

0.4, Aspartate Amino Transf (AST/SGOT) 18, Alanine Aminotransferase (ALT/SGPT) 

12, Alkaline Phosphatase 111, Myoglobin 46.6, Troponin I < 0.028, B-Type 

Natriuretic Peptide 37.9, Total Protein 7.6, Albumin 4.3


20 20:43: 


Blood Gas Puncture Site LEFT RADIAL, Blood Gas Patient Temperature 37.2, 

Arterial Blood pH 7.35L, Arterial Blood Partial Pressure CO2 59H, Arterial Blood

Partial Pressure O2 77L, Arterial Blood HCO3 32H, Arterial Blood Total CO2 33.6H

, Arterial Blood Oxygen Saturation 96, Arterial Blood Base Excess 6.4H, Servando 

Test POSITIVE, Blood Gas Ventilator Setting NO, Blood Gas Inspired Oxygen 2


20 21:01: 


Urine Color YELLOW, Urine Clarity CLEAR, Urine pH 5.0, Urine Specific Gravity 

>=1.030, Urine Protein NEGATIVE, Urine Glucose (UA) NEGATIVE, Urine Ketones 

NEGATIVE, Urine Nitrite NEGATIVE, Urine Bilirubin NEGATIVE, Urine Urobilinogen 

0.2, Urine Leukocyte Esterase NEGATIVE, Urine RBC (Auto) NEGATIVE, Urine RBC 

NONE, Urine WBC NONE, Urine Crystals NONE, Urine Bacteria TRACE, Urine Casts 

NONE, Urine Mucus MODERATEH, Urine Culture Indicated CULTURE PENDING, Urine 

Opiates Screen NEGATIVE, Urine Oxycodone Screen NEGATIVE, Urine Methadone Screen

NEGATIVE, Urine Propoxyphene Screen NEGATIVE, Urine Barbiturates Screen 

NEGATIVE, Ur Tricyclic Antidepressants Screen NEGATIVE, Urine Phencyclidine 

Screen NEGATIVE, Urine Amphetamines Screen NEGATIVE, Urine Methamphetamines 

Screen NEGATIVE, Urine Benzodiazepines Screen NEGATIVE, Urine Cocaine Screen 

NEGATIVE, Urine Cannabinoids Screen NEGATIVE


4/10/20 03:00: 


White Blood Count 8.9, Red Blood Count 5.19, Hemoglobin 14.9, Hematocrit 47, Mi

n Corpuscular Volume 90, Mean Corpuscular Hemoglobin 29, Mean Corpuscular 

Hemoglobin Concent 32, Red Cell Distribution Width 15.9H, Platelet Count 229, 

Mean Platelet Volume 9.4, Neutrophils (%) (Auto) 91H, Lymphocytes (%) (Auto) 8L,

Monocytes (%) (Auto) 1, Eosinophils (%) (Auto) 0, Basophils (%) (Auto) 0, 

Neutrophils # (Auto) 8.1H, Lymphocytes # (Auto) 0.7L, Monocytes # (Auto) 0.1, 

Eosinophils # (Auto) 0.0, Basophils # (Auto) 0.0, Sodium Level 138, Potassium 

Level 5.0, Chloride Level 102, Carbon Dioxide Level 26, Anion Gap 10, Blood Urea

Nitrogen 6L, Creatinine 0.80, Estimat Glomerular Filtration Rate > 60, 

BUN/Creatinine Ratio 8, Glucose Level 160H, Calcium Level 9.0, Corrected Calcium

9.2, Total Bilirubin 0.4, Aspartate Amino Transf (AST/SGOT) 14, Alanine 

Aminotransferase (ALT/SGPT) 12, Alkaline Phosphatase 97, Troponin I < 0.028, 

Total Protein 6.7, Albumin 3.8, Neutrophils % (Manual) 91, Lymphocytes % 

(Manual) 7, Monocytes % (Manual) 1, Band Neutrophils 1, Blood Morphology Comment

NORMAL, Triglycerides Level 72, Cholesterol Level 149, LDL Cholesterol Direct 

124, VLDL Cholesterol 14, HDL Cholesterol 29L


4/10/20 04:21: 


Blood Gas Puncture Site LEFT RADIAL, Blood Gas Patient Temperature 36.0, 

Arterial Blood pH 7.31*L, Arterial Blood Partial Pressure CO2 62H, Arterial 

Blood Partial Pressure O2 64L, Arterial Blood HCO3 31H, Arterial Blood Total CO2

32.4H, Arterial Blood Oxygen Saturation 95, Arterial Blood Base Excess 4.4H, 

Servando Test POSITIVE, Blood Gas Ventilator Setting NO, Blood Gas Inspired Oxygen 

3





Laboratory Tests


20 20:25








4/10/20 03:00











A/P-Cardiology


Assessment/Admission Diagnosis





Shortness of breath due to ac exac of COPD. No evidence or ACS





Coronary artery disease, history of Promus drug-eluting stent placement using 

2.512 mm in the circumflex artery in 2012 by Dr. Reilly after having 

myocardial infarction.  Had non-ST elevation myocardial infarction on 2017 had total occlusion of the obtuse marginal branch successful the plan 

of Resolute integrity 2.526 mm with good results.  Total occlusion of the right

coronary artery with failed attempt for intervention, referred to Dr. Delcid, had

intervention on the right coronary artery with 3 drug-eluting stent overlapping 

in the right coronary artery.  Patient had non-ST elevation MI on 2018 and underwent cardiac catheterization with Dr. Bacon revealing stent 

thrombosis and occlusion of the distal/mid RCA stents treateed with PTCA.  Most 

recent cardiac catheterization done 2019 revealing patent stents 

with small vessel disease distally.  He is maintained on Plavix and aspirin





S/p L AKA, apparently for Buerger's disease





Chronic systolic CHF.  H/o left ventricular systolic dysfunction, improved with 

intervention and maximizing medical therapy. Echo of 19 (Dr Miguel): LVEF 

35-40%, grade 1 mueller dysfunction, mild enlargement of LA, PASP 20 mmHg





Chronic tobacco use (smokes cigarettes)





Hyperlipidemia, by history, managed by Dr Miguel





History of gastroesophageal reflux disease.





History of deep venous thrombosis in the remote past.





Discussion and Recomendations





* Continue previous cardiac regimen


* Advised to quit smoking immediately and completely


* I reviewed his CV issues with him and answered CV-related questions


* Outpt f/u advised with Dr Miguel





Clinical Quality Measures


DVT/VTE Risk/Contraindication:


Risk Factor Score Per Nursin


RFS Level Per Nursing on Admit:  4+=Very High











KANDACE REILLY MD FACP FAC CCDS   Apr 10, 2020 14:06

## 2020-04-10 NOTE — DIAGNOSTIC IMAGING REPORT
PROCEDURE: CT angiography of the chest with contrast.



TECHNIQUE: Multiple contiguous axial images were obtained through

the chest after uneventful bolus administration of intravenous

contrast. 3D reconstructed CTA MIP acquisitions were also

performed.

Auto Exposure Controls were utilized during the CT exam to meet

ALARA standards for radiation dose reduction. 

 

INDICATION:  Vomiting, diaphoresis, headache, pulmonary embolism.



COMPARISON: None available



FINDINGS: No significant adenopathy within the chest. No

aneurysmal dilatation or dissection of the thoracic aorta. The

heart is within normal limits in size. No pericardial effusion.

No pleural effusion. No pneumothorax. Mild dependent atelectasis.

Mild scattered bronchial wall thickening without focal

consolidation. The trachea is patent. No significant filling

defects within the pulmonary arteries. The visualized upper

abdomen is unremarkable. No acute osseous abnormality.



IMPRESSION: No significant pulmonary embolus or aortic

aneurysm/dissection.



Mild scattered bronchial wall thickening which may relate to

underlying bronchitis.



Scattered vascular calcifications, particularly within the

coronary arteries.



Additional findings as above.



Agree with preliminary interpretation.



Dictated by: 



  Dictated on workstation # [id number]

## 2020-04-10 NOTE — DIAGNOSTIC IMAGING REPORT
EXAMINATION: Chest 1 view



HISTORY: COPD



COMPARISON: 04/09/2020



FINDINGS: 



The lungs are clear without edema or pneumonia. No pleural

effusion or pneumothorax. Heart size is normal.



IMPRESSION: 



1. Clear lungs.



Dictated by: 



  Dictated on workstation # [id number]

## 2020-04-11 VITALS — SYSTOLIC BLOOD PRESSURE: 118 MMHG | DIASTOLIC BLOOD PRESSURE: 57 MMHG

## 2020-04-11 VITALS — SYSTOLIC BLOOD PRESSURE: 120 MMHG | DIASTOLIC BLOOD PRESSURE: 68 MMHG

## 2020-04-11 VITALS — SYSTOLIC BLOOD PRESSURE: 106 MMHG | DIASTOLIC BLOOD PRESSURE: 59 MMHG

## 2020-04-11 VITALS — SYSTOLIC BLOOD PRESSURE: 110 MMHG | DIASTOLIC BLOOD PRESSURE: 60 MMHG

## 2020-04-11 LAB
ARTERIAL PATENCY WRIST A: (no result)
BASE EXCESS STD BLDA CALC-SCNC: 6.2 MMOL/L (ref -2.5–2.5)
BDY SITE: (no result)
BODY TEMPERATURE: 36.8
CO2 BLDA CALC-SCNC: 57.4 MMOL/L (ref 21–31)
INHALED O2 FLOW RATE: (no result) L/MIN
PCO2 BLDA: 60 MMHG (ref 35–45)
PH BLDA: 7.34 [PH] (ref 7.37–7.43)
PO2 BLDA: 61 MMHG (ref 79–93)
SAO2 % BLDA FROM PO2: 92 % (ref 94–100)
VENTILATION MODE VENT: YES

## 2020-04-11 RX ADMIN — METHYLPREDNISOLONE SODIUM SUCCINATE SCH MG: 40 INJECTION, POWDER, FOR SOLUTION INTRAMUSCULAR; INTRAVENOUS at 06:13

## 2020-04-11 RX ADMIN — CLOPIDOGREL BISULFATE SCH MG: 75 TABLET, FILM COATED ORAL at 08:03

## 2020-04-11 RX ADMIN — ENOXAPARIN SODIUM SCH MG: 100 INJECTION SUBCUTANEOUS at 08:02

## 2020-04-11 RX ADMIN — ISOSORBIDE MONONITRATE SCH MG: 30 TABLET, EXTENDED RELEASE ORAL at 06:12

## 2020-04-11 RX ADMIN — ASPIRIN 81 MG CHEWABLE TABLET SCH MG: 81 TABLET CHEWABLE at 08:03

## 2020-04-11 RX ADMIN — NICOTINE PRN MG: 21 PATCH, EXTENDED RELEASE TRANSDERMAL at 00:29

## 2020-04-11 RX ADMIN — GABAPENTIN SCH MG: 600 TABLET, FILM COATED ORAL at 08:02

## 2020-04-11 RX ADMIN — METHYLPREDNISOLONE SODIUM SUCCINATE SCH MG: 40 INJECTION, POWDER, FOR SOLUTION INTRAMUSCULAR; INTRAVENOUS at 00:29

## 2020-04-11 RX ADMIN — IPRATROPIUM BROMIDE AND ALBUTEROL SULFATE SCH ML: .5; 3 SOLUTION RESPIRATORY (INHALATION) at 15:09

## 2020-04-11 RX ADMIN — IPRATROPIUM BROMIDE AND ALBUTEROL SULFATE SCH ML: .5; 3 SOLUTION RESPIRATORY (INHALATION) at 11:37

## 2020-04-11 RX ADMIN — OMEGA-3 FATTY ACIDS CAP 1000 MG SCH MG: 1000 CAP at 08:02

## 2020-04-11 RX ADMIN — PANTOPRAZOLE SODIUM SCH MG: 40 TABLET, DELAYED RELEASE ORAL at 08:03

## 2020-04-11 RX ADMIN — IPRATROPIUM BROMIDE AND ALBUTEROL SULFATE SCH ML: .5; 3 SOLUTION RESPIRATORY (INHALATION) at 02:13

## 2020-04-11 RX ADMIN — GABAPENTIN SCH MG: 600 TABLET, FILM COATED ORAL at 12:09

## 2020-04-11 RX ADMIN — FLUTICASONE PROPIONATE AND SALMETEROL XINAFOATE SCH PUFF: 115; 21 AEROSOL, METERED RESPIRATORY (INHALATION) at 11:34

## 2020-04-11 RX ADMIN — METOPROLOL TARTRATE SCH MG: 50 TABLET, FILM COATED ORAL at 08:03

## 2020-04-11 RX ADMIN — IPRATROPIUM BROMIDE AND ALBUTEROL SULFATE SCH ML: .5; 3 SOLUTION RESPIRATORY (INHALATION) at 07:38

## 2020-04-11 RX ADMIN — ACETAMINOPHEN PRN MG: 500 TABLET ORAL at 08:04

## 2020-04-11 RX ADMIN — METHYLPREDNISOLONE SODIUM SUCCINATE SCH MG: 40 INJECTION, POWDER, FOR SOLUTION INTRAMUSCULAR; INTRAVENOUS at 12:09

## 2020-04-11 NOTE — DISCHARGE SUMMARY
Discharge Summary


Hospital Course


Was the Problem List Reviewed?:  Yes


Problems/Dx:  


(1) COPD (chronic obstructive pulmonary disease)


Qualifiers:  


   Qualified Codes:  J44.9 - Chronic obstructive pulmonary disease, unspecified


(2) COPD with exacerbation


Status:  Acute


(3) Acute respiratory failure with hypoxia and hypercapnia


Status:  Acute


Hospital Course


Date of Admission: 2020 at 22:20 


Admission Diagnosis :  





Family Physician/Provider: No,Local Physician  





Date of Discharge: 20 


Discharge Diagnosis: [exacerbation of COPD


CAD


UMESH Holmandoris TINEO


tobaccoism ]








Hospital Course:


[ ]Pt was admitted with increased SOB and hypoxia of new onset. CXR was clear. 

Pt was placed on steroids and breathing treatments and improved some. He has 

became adament that he is going home and will leave AMA. He has been counseled 

by Nallely Hernandez , and myself that he must stop smoking. His o2 sat RA is 

87% and he can be set up for O2 continuous. Against my better Judgement he is 

d/c on home O2 and a steroid taper. 














Labs and Pending Lab Test:


Laboratory Tests


20 07:48: 


Blood Gas Puncture Site LEFT RADIAL, Blood Gas Patient Temperature 36.8, 

Arterial Blood pH 7.34*L, Arterial Blood Partial Pressure CO2 60H, Arterial 

Blood Partial Pressure O2 61L, Arterial Blood HCO3 32H, Arterial Blood Total CO2

57.4H, Arterial Blood Oxygen Saturation 92L, Arterial Blood Base Excess 6.2H, 

Servando Test P, Blood Gas Ventilator Setting YES, Blood Gas Inspired Oxygen RESP 

RATE 20





Microbiology


20 Urine Culture - Final, Complete


         NO GROWTH


20 Blood Culture - Preliminary, Resulted


         No growth





Home Meds


Active


Prednisone 10 Mg Tab.ds.pk 10 Mg PO DAILY


     Take 6 tabs(60mg)daily,decrease by 1 tab(10mg)every other day.


Iprat-Albut 0.5-3(2.5) mg/3 ml (Ipratropium/Albuterol Sulfate) 3 Ml Ampul.neb 3 

Ml INH Q2HR PRN 30 Days


Advair Hfa 115-21 Mcg Inhaler (Fluticasone/Salmeterol) 12 Gm Hfa.aer.ad 0 Puff 

IH RTBID 30 Days


Reported


Vitamin D3 (Cholecalciferol (Vitamin D3)) 25 Mcg Tablet 25 Mcg PO DAILY


Protonix (Pantoprazole Sodium) 40 Mg Tablet.dr 40 Mg PO DAILY


Metoprolol Tartrate 50 Mg Tablet 50 Mg PO BID


Plavix (Clopidogrel Bisulfate) 75 Mg Tablet 75 Mg PO DAILY


Aspirin EC (Aspirin) 81 Mg Tablet.dr 81 Mg PO HS


Isosorbide Mononitrate ER (Isosorbide Mononitrate) 30 Mg Tab.er.24h 30 Mg PO 

DAILY


Allopurinol 100 Mg Tablet 100 Mg PO DAILY


Fish Oil 1,000 mg Capsule (Omega 3 Polyunsat Fatty Acids) 1,000 Mg Cap 1,000 Mg 

PO BID


Atorvastatin Calcium 40 Mg Tablet 40 Mg PO HS


Assessment/Pt Instructions


Exacerbation of COPD with hypercapnic respiratory failure


CAD


AKA 


Delisa DZ


Tobaccoism


Discharge Planning:  <30 minutes discharge planning





Discharge Instructions


Discharge Diet:  Cardiac Diet


Activity as Tolerated:  Yes


Orders & Referrals


[facility] for Home O2


Consultations


Dr. Nallely Reilly





Discharge Physical Examination


Vital Signs





Vital Signs








  Date Time  Temp Pulse Resp B/P (MAP) Pulse Ox O2 Delivery O2 Flow Rate FiO2


 


20 15:10     94 Nasal Cannula 4.00 


 


20 11:17 37.0 74 20 106/59 (75)    


 


4/10/20 08:45        30








General Appearance:  No Apparent Distress


HEENT:  Normal ENT Inspection


Respiratory:  No Accessory Muscle Use, No Respiratory Distress, Inspiration, 

Wheezing


Cardiovascular:  Regular Rate, Rhythm, No Gallop


Gastrointestinal:  Normal Bowel Sounds, Non Tender


Extremity:  Pedal Edema


Skin:  Normal Color


Neurologic/Psychiatric:  Alert, Oriented x3


Allergies:  


Coded Allergies:  


     Penicillins (Unverified  Allergy, Mild, 09)


     pneumococcal vaccine (Verified  Allergy, Unknown, 4/10/20)





Discharge Summary


Date of Admission


2020 at 22:20


Date of Discharge





Discharge Date:  2020


Discharge Time:  1600


Admission Diagnosis


Assessment:


AECOPD


Hypercapnia


Respiratory insufficiency requiring biPAP


h/o uneventful left knee amputation by Dr. Funes and [facility] 2019


Recent MI 8 weeks ago


Smoker


Coarse breath sounds


PVD





Plan:


Initiate CSD protocol


Work on wean biPAP


Monitor labs closely


Chest x-ray


Nebulized treatments





(1A) AECOPD respiratory insufficiency


(1) h/o Above knee amputation of left lower extremity


(2) CAD (coronary artery disease)


(3) Myocardial infarct, old


(4) Smoker


(5) PVD (peripheral vascular disease)


(6) COPD (chronic obstructive pulmonary disease)


Discharge Diagnosis


AECOPD-Day number to steroids


Hypercapnia-respiratory failure requiring higher doses of O2


h/o uneventful left knee amputation by Dr. Funes and [facility] 2019


Recent MI 8 weeks ago


Smoker


Coarse breath sounds


PVD


Buerger's disease





Patient is encouraged to give time for the steroids to work and get him off the 

oxygen.  In the event that he decides to go AMA and trying to set up oxygen for 

him if he meets requirements.





Clinical Quality Measures


DVT/VTE Risk/Contraindication:


Risk Factor Score Per Nursin


RFS Level Per Nursing on Admit:  4+=Very High











KATY RASMUSSEN MD         2020 15:21

## 2020-04-11 NOTE — PROGRESS NOTE - HOSPITALIST
Subjective


HPI/CC On Admission


Date Seen by Provider:  2020


Time Seen by Provider:  09:45


CC: Resp insufficiency





HPI: This is a 49yoWM patient of [facility] who is known to me from prior IRF stay 

after AKA 2019 [facility] due to severe PVD unable to salvage the leg who 

presents to the ER with dyspnea and found to have hypercapnia and placed on 

biPAP. Patient wants to DC biPAP and go home but I try to explain that is not an

option currently. Patient feels ok otherwise and has not had any significant 

illness since last seen by this examiner 1+ year ago. I reviewed meds and labs 

and CT scan and will restart home meds. Patient continues to smoke and cessation

was counseled.


Subjective/Events-last exam


Patient is adamant that he wants to go home today.  He says he'll leave AMA.  He

has never been on oxygen before and is requiring 4 L by nasal cannula.  Chest x-

ray is clear.  I discussed with him that there would be a high likelihood of him

dying if he did not go home on oxygen.





Review of Systems


Pulmonary:  Dyspnea





Focused Exam


Lactate Level


20 20:25: Lactic Acid Level 1.29








Objective


Exam


Vital Signs





Vital Signs








  Date Time  Temp Pulse Resp B/P (MAP) Pulse Ox O2 Delivery O2 Flow Rate FiO2


 


20 15:28        


 


20 15:10     94 Nasal Cannula 4.00 


 


20 11:17 37.0 74 20     


 


4/10/20 08:45        30





Capillary Refill : Less Than 3 Seconds


General Appearance:  No Apparent Distress, WD/WN


HEENT:  Normal ENT Inspection


Neck:  Non Tender, Supple


Respiratory:  Decreased Breath Sounds, Wheezing


Cardiovascular:  Regular Rate, Rhythm, No Gallop, No Murmur


Gastrointestinal:  Normal Bowel Sounds, Non Tender, Soft


Rectal:  Deferred


Back:  Normal Inspection


Extremity:  Other (Left AKA)





Results/Procedures


Lab


Patient resulted labs reviewed.





Assessment/Plan


Assessment and Plan


Assess & Plan/Chief Complaint


AECOPD-on steroids and 4 L nasal cannula


Hypercapnia-respiratory failure requiring higher doses of O2-pH is improving


h/o uneventful left knee amputation by Dr. Funes and [facility] 2019


Recent MI 8 weeks ago


Tobaccoism-counseled


Coarse breath sounds


PVD


Buerger's disease





Plan to see if we can set up home oxygen since healed go AMA without it.





Clinical Quality Measures


DVT/VTE Risk/Contraindication:


Risk Factor Score Per Nursin


RFS Level Per Nursing on Admit:  4+=Very High











KATY RASMUSSEN MD         2020 11:08

## 2020-04-11 NOTE — PULMONARY PROGRESS NOTE
Subjective


Time Seen by a Provider:  06:45





Sepsis Event


Evaluation


Height, Weight, BMI


Height: 5'4.00"


Weight: 190lbs. 1.6oz. 86.480786it; 37.49 BMI


Method:Stated





Focused Exam


Lactate Level


4/9/20 20:25: Lactic Acid Level 1.29





Exam


Exam





Vital Signs








  Date Time  Temp Pulse Resp B/P (MAP) Pulse Ox O2 Delivery O2 Flow Rate FiO2


 


4/11/20 04:00 36.8 83 18 110/60 (77) 96 Nasal Cannula 4.00 


 


4/11/20 02:14     96 Nasal Cannula 4.00 


 


4/11/20 00:00 36.6 78 17 120/68 (85) 95 Nasal Cannula 4.00 


 


4/10/20 22:04     97 Nasal Cannula 4.00 


 


4/10/20 21:00      Nasal Cannula 4.00 


 


4/10/20 19:52 36.8 81 18 119/70 (86) 97 Nasal Cannula 4.00 


 


4/10/20 18:36     94 Nasal Cannula 4.00 


 


4/10/20 15:35 36.8 86 20 115/72 (86) 93 Nasal Cannula 4.00 


 


4/10/20 14:19     93 Nasal Cannula 3.00 


 


4/10/20 14:00  73 19 108/58 (75) 94 NIV Bilevel 30.00 


 


4/10/20 13:00  74 19 110/64 (79) 94 NIV Bilevel 30.00 


 


4/10/20 12:26  96      


 


4/10/20 12:00  95 17 120/66 (84) 90 NIV Bilevel 30.00 


 


4/10/20 12:00      Nasal Cannula 3.00 


 


4/10/20 11:23 36.7       


 


4/10/20 11:00  91 16 110/75 (87) 92 NIV Bilevel 30.00 


 


4/10/20 10:43  85 15  92  30.00 


 


4/10/20 10:00  85 10 101/66 (78) 91 NIV Bilevel 30.00 


 


4/10/20 09:00  92 18 98/55 (69) 91 NIV Bilevel 30.00 


 


4/10/20 08:45     94 NIV Bilevel  30


 


4/10/20 08:00  87 14 123/73 (90) 92 NIV Bilevel 30.00 


 


4/10/20 07:49     92 Nasal Cannula 3.00 


 


4/10/20 07:35      Nasal Cannula 3.00 


 


4/10/20 07:34 36.6       


 


4/10/20 07:00  83 11 111/68 (82) 93 NIV Bilevel 30.00 


 


4/10/20 07:00  72      














I & O 


 


 4/11/20





 07:00


 


Intake Total 2452 ml


 


Output Total 3000 ml


 


Balance -548 ml








Height & Weight


Height: 5'4.00"


Weight: 190lbs. 1.6oz. 86.988196vt; 37.49 BMI


Method:Stated


General Appearance:  No Apparent Distress


HEENT:  PERRL/EOMI, Normal ENT Inspection, Pharynx Normal, Moist Mucous 

Membranes


Neck:  Full Range of Motion, Normal Inspection, Non Tender


Respiratory:  Chest Non Tender, No Respiratory Distress, Accessory Muscle Use, 

Crackles, Decreased Breath Sounds, Wheezing


Cardiovascular:  Regular Rate, Rhythm, No Edema, No Gallop, No JVD, No Murmur, 

Normal Peripheral Pulses


Capillary Refill:  Less Than 3 Seconds


Gastrointestinal:  normal bowel sounds, non tender, soft


Extremity:  Normal Capillary Refill, Normal Inspection, Normal Range of Motion, 

Non Tender, No Calf Tenderness, No Pedal Edema, Other (left AKA)


Neurologic/Psychiatric:  Alert, Oriented x3, No Motor/Sensory Deficits, Normal 

Mood/Affect


Skin:  Normal Color, Warm/Dry


Lymphatic:  No Adenopathy





Results


Lab


Laboratory Tests


4/9/20 20:25








4/10/20 03:00











Assessment/Plan


Assessment/Plan


Acute on chronic respiratory failure 


   - bipap was started yesterday after ABG appeared worse


      -Repeat ABG 


   -Pt is a full code. 


   - duoneb to Q 4


   -Change prednisone to Solumedrol 


   -Pt was transferred to 4th floor from ICU yesterday afternoon. I was not 

aware of transfer. 


   -Repeat ABG 


COPDAE 


   -Oxygen 


Obesity 


Buerger's disease


PVD with stents


CAD











VIVIAN MIJARES DO              Apr 11, 2020 06:52

## 2020-04-11 NOTE — PROGRESS NOTE - CARDIOLOGY
Cardiology SOAP Progress Note


Subjective:


No cp or palp or syncope


Shortness of breath better compared to time of admission


No focal weakness


Gen malaise


No n/v/d





Objective:


I&O/Vital Signs











 4/11/20 4/11/20 4/11/20 4/11/20





 02:14 04:00 07:40 07:49


 


Temp  36.8  36.8


 


Pulse  83  83


 


Resp  18  20


 


B/P (MAP)  110/60 (77)  118/57 (77)


 


Pulse Ox 96 96 94 96


 


O2 Delivery Nasal Cannula Nasal Cannula Nasal Cannula Nasal Cannula


 


O2 Flow Rate 4.00 4.00 4.00 4.00


 


    





 4/11/20 4/11/20 4/11/20 4/11/20





 09:00 11:17 11:37 11:38


 


Temp  37.0  


 


Pulse  74  


 


Resp  20  


 


B/P (MAP)  106/59 (75)  


 


Pulse Ox 96 96 94 94


 


O2 Delivery Nasal Cannula Nasal Cannula Nasal Cannula Nasal Cannula


 


O2 Flow Rate 4.00 4.00 4.00 4.00














 4/11/20





 00:00


 


Intake Total 1528 ml


 


Output Total 2250 ml


 


Balance -722 ml








Weight (Pounds):  190


Weight (Ounces):  1.6


Weight (Calculated Kilograms):  86.419215


Constitutional:  AAO x 3, well-developed, well-nourished


Respiratory:  No accessory muscle use; other (fair air entry, somewhat prolonged

exp phase)


Cardiovascular:  regular rate-rhythm, S1 and S2, systolic murmur (soft RUBY at 

card base)


Gastrointestional:  No tender; soft; No guarding, No rebound, No audible bowel 

sounds


Extremities:  other (L AKA); No clubbing, No cyanosis, No significant edema


Neurologic/Psychiatric:  oriented x 3, other (moves all limbs equally)


Skin:  No rash on exposed areas, No ulcerations on exposed areas





Results/Procedures:


Labs


Laboratory Tests


4/11/20 07:48: 


Blood Gas Puncture Site LEFT RADIAL, Blood Gas Patient Temperature 36.8, 

Arterial Blood pH 7.34*L, Arterial Blood Partial Pressure CO2 60H, Arterial 

Blood Partial Pressure O2 61L, Arterial Blood HCO3 32H, Arterial Blood Total CO2

57.4H, Arterial Blood Oxygen Saturation 92L, Arterial Blood Base Excess 6.2H, 

Servando Test P, Blood Gas Ventilator Setting YES, Blood Gas Inspired Oxygen RESP 

RATE 20





Microbiology


4/9/20 Urine Culture - Final, Complete


         NO GROWTH


4/9/20 Blood Culture - Preliminary, Resulted


         No growth





Laboratory Tests


4/9/20 20:25








4/10/20 03:00











A/P:


Assessment:





Shortness of breath due to ac exac of COPD. No evidence or ACS





Coronary artery disease, history of Promus drug-eluting stent placement using 

2.512 mm in the circumflex artery in December 2012 by Dr. Reilly after having 

myocardial infarction.  Had non-ST elevation myocardial infarction on November 17, 2017 had total occlusion of the obtuse marginal branch successful the plan 

of Resolute integrity 2.526 mm with good results.  Total occlusion of the right

coronary artery with failed attempt for intervention, referred to Dr. Delcid, had

intervention on the right coronary artery with 3 drug-eluting stent overlapping 

in the right coronary artery.  Patient had non-ST elevation MI on November 17, 2018 and underwent cardiac catheterization with Dr. Bacon revealing stent 

thrombosis and occlusion of the distal/mid RCA stents treateed with PTCA.  Most 

recent cardiac catheterization done October 21, 2019 revealing patent stents 

with small vessel disease distally.  He is maintained on Plavix and aspirin





S/p L AKA, apparently for Buerger's disease





Chronic systolic CHF.  H/o left ventricular systolic dysfunction, improved with 

intervention and maximizing medical therapy. Echo of 11/27/19 (Dr Miguel): LVEF 

35-40%, grade 1 mueller dysfunction, mild enlargement of LA, PASP 20 mmHg





Chronic tobacco use (smokes cigarettes)





Hyperlipidemia, by history, managed by Dr Miguel





History of gastroesophageal reflux disease.





History of deep venous thrombosis in the remote past.


Plan:





* Focus of CV management is on risk factor mod and med compliance. We discussed 

  this today


* We again advised him to quit smoking immediately and completely


* I reviewed his CV issues with him and answered CV-related questions


* Outpt f/u advised with Dr Myriam REILLY,KANDACE GREEN FACP FAC CCDS   Apr 11, 2020 12:49

## 2020-07-31 NOTE — XMS REPORT
Encounter Summary

 Created on: 2018



Niko Huber

External Reference #: [id number]

: 1970

Sex: Male



Demographics







 Address  [address] [place]  [address]

 

 Home Phone  [phone]

 

 Preferred Language  English

 

 Marital Status  Unknown

 

 Advent Affiliation  NON

 

 Race  White

 

 Ethnic Group  Not  or 





Author







 Author  [facility]

 

 Organization  [facility]

 

 Address  Unknown

 

 Phone  Unavailable







Support







 Name  Relationship  Address  Phone

 

 Steffanie Huber  Unknown  [phone]







Care Team Providers







 Care Team Member Name  Role  Phone

 

 Neftali Bacon MD  21  [phone]

 

 Raiza Champagne MD  PCP  [phone]







Reason for Visit

* Auth/Cert (Routine)





     



  Status   Reason   Specialty   Diagnoses /   Referred By   Referred To



     Procedures   Contact   Contact

 

     











Encounter Details







    



  Date   Type   Department   Care Team   Description

 

    



  2017   Surgery   Cardiac Catheterization   Cande Delcid MD   
Percutaneous Coronary



    Laboratory   [address]   Intervention of Chronic



    [address] Occlusion Right



    [place] [address]   [place] [address]   Coronary Artery



    [phone]   (Retrograde Approach)



     [phone] (Fax) 







Social History







    



  Tobacco Use   Types   Packs/Day   Years Used   Date

 

    



  Current Every Day Smoker    









   



  Alcohol Use   Drinks/Week   oz/Week   Comments

 

   



  No   









 



  Sex Assigned at Birth   Date Recorded

 

 



  Not on file 



as of this encounter



Last Filed Vital Signs







  



  Vital Sign   Reading   Time Taken

 

  



  Blood Pressure   99/58   2017  7:35 AM CST

 

  



  Pulse   76   2017  7:35 AM CST

 

  



  Temperature   37.1   C (98.7   F)   2017  6:15 AM CST

 

  



  Respiratory Rate   -   -

 

  



  Oxygen Saturation   97%   2017  7:35 AM CST

 

  



  Inhaled Oxygen   -   -



  Concentration  

 

  



  Weight   87.5 kg (192 lb 14.4 oz)   2017  8:13 AM CST

 

  



  Height   162.6 cm (5' 4.02")   2017  8:13 AM CST

 

  



  Body Mass Index   33.1   2017  8:13 AM CST



in this encounter



Discharge Summaries

* Kimberlyn Tiwari PA-C - 2017  8:00 AM CST



Formatting of this note may be different from the original.



Physician Discharge Summary



Name: Niko Huber

Medical Record Number: [id number]        Account Number:  [id number]

YOB: 1970                         Age:  47 years 

Admit date:  2017                     Discharge date:  2017



Attending Physician:  Dr. Delcid               Service: Cardiology-Interventional



Physician Summary completed by: Kimberlyn Tiwari PA-C



Reason for hospitalization: Coronary artery disease



Significant PMH: 

Past Medical History: 

Diagnosis Date 

 Coronary artery disease involving native coronary artery of native heart 
with angina pectoris ([facility]) 2017 

 Coronary artery disease involving native coronary artery of native heart 
with angina pectoris (HCC) 2017 - NSTEMI at [facility] in [place]. Successful PCI 
with a Resolute Integrity 2.5 x 26 mm stent to the OM with Dr. Miguel. 
Unsuccessful PCI to the RCA . Pt referred to Dr. Delcid for possible  
procedure.   16 - Nuclear stress test ([facility]): No 
ischemia or arrhythmia on EKG. Diaphragmatic attenuation with reversible 
ischemia involving the mid to apic 

 Ischemic cardiomyopathy  

 Mild mitral regurgitation 2017 

 Mild tricuspid regurgitation 2017 

 NSTEMI (non-ST elevated myocardial infarction) ([facility]) 2017 

 Protein S deficiency ([facility]) 2017 

 Tobacco abuse 2017 

  

Allergies: Pcn [penicillins]



Physical Exam notable for:  

b/l Groin no hematoma, no bruit, soft, non-tender.

CV: RRR

Lungs: CTA B

Ext: no edema, + 2 b/l pedal pulses. 

ABD: Soft, non tender, + BS X 4 quads. 



Lab/Radiology studies notable for: 

Hematology:  

Lab Results 

Component Value Date 

 HGB 12.3 2017 

 HCT 36.3 2017 

 PLTCT 279 2017 

 WBC 9.6 2017 

 MCV 85.8 2017 

 MCHC 34.0 2017 

 MPV 7.5 2017 

 RDW 13.9 2017 

, General Chemistry:  

Lab Results 

Component Value Date 

  2017 

 K 3.9 2017 

  2017 

 GAP 8 2017 

 BUN 12 2017 

 CR 0.72 2017 

  2017 

 CA 9.2 2017 



Brief Hospital Course:  Mr. Huber is a 47 y.o male with a history of NSTMI in 
November. He underwent PCI of OM at [facility] in [place], [place]. He was also 
found to have  of RCA. PCI was attempted which was unsuccessful. He was 
referred to Dr. Delcid for Stage PCI. Echo done at OSH on 17 revealed LVEF 
30-35%. He was discharged home with LifeVest. He also has a history of mild MR 
and TR and protein S deficiency treated with warfarin. 



Patient was taken to the cardiac catheterization lab on 17 where 
successful revascularization of the proximal RCA was done, extending into the 
right PLV with 3 drug-eluting stents in overlapping fashion with excellent 
angiographic results. Patient tolerated the procedure well. He had vasovagal 
response to sheath pull with ~ 6 second pause. He required atropine and 
recovered promptly. No recurrent pause since then or overnight. Dr. Delcid 
recommends to continue ASA 81 mg daily for one week. He was instructed on the 
importance of Plavix 75 mg daily at least 6 months to 1 year w/o interruption 
to prevent stent thrombosis and possible myocardial infarction. PTA warfarin 
was resumed 17. No bridging is recommended per staff to minimize bleeding 
complications. PT/INR on Wednesday. PT/INR managed by Primary cardiologist. PT/
INR goal 2.0-3.0. Lipid profile as above.  Patient is currently tolerating 
Atorvastatin 40 mg po daily. Weight loss, Cardiac Healthy diet, and exercise 
when patient can tolerate.  Patient to continue current medical therapy with 
aggressive risk factors modification. Patient to weigh daily and report any wt. 
Gain of 2-3 # in 1-3 days.  BP usually marginal at home. He is asymptomatics. 
Maximize treatment for LVD with GDMT as pt. Can tolerate, renal function allows 
and BP permits. F/u w/ . Primary cardiologist as already scheduled or sooner 
if needed. He will continue to wear LifeVest. Currently his primary 
cardiologist is planning to repeat Echo in January. EKG NSR 72 bpm, PVC's. Non 
specific ST-T changes unchanged from prior. 



Condition at Discharge: Stable



Discharge Diagnoses:  



Hospital Problems  

 

 Active Problems 

 * (Principal)Coronary artery disease involving native coronary artery of 
native heart with angina pectoris (HCC) 

 NSTEMI (non-ST elevated myocardial infarction) (HCC) 

 Tobacco abuse 

 Ischemic cardiomyopathy 

 Protein S deficiency (HCC) 

 CAD (coronary artery disease) 

 



Surgical Procedures: 



Significant Diagnostic Studies and Procedures: 

1. Selective right and left coronary angiograms with dual arterial injections.

2. Bilateral groin accesses, both 7-French common femoral arterial.

3. Dual coronary injections.

4.  PCI of the proximal right RCA extending into the right PLV with 3 drug-
eluting stents in overlapping fashion with antegrade wire escalation technique.

5. Right common femoral angiogram, limited.



Consults:  None



Patient Disposition: Home   



Patient instructions/medications: 



Procedure Specific Activity 

*You may drive after 2 days.

*You may shower after discharge.

*NO tub baths, hot tubs, or swimming for 5 days.

*NO lifting greater than 15 pounds for 1 week.

*NO sexual or strenuous activity for 1 week. 



Report These Signs and Symptoms 

Please contact your doctor if you have any of the following symptoms: Chest pain
, shortness of breath, lightheadedness, dizziness, near fainting, palpitations, 
abd pain, back pain, or bleeding.

*Continue medicines as direct on your discharge medication list. Get an up to 
date list with every visit and take as instructed. Do NOT stop taking any 
medications without speaking with your doctor or nurse who knows you.



*Remember to weigh yourself first thing in the morning after using the restroom 
and write it down. Take this record to your doctor appointment.



*Chart symptoms such as fatigue, trouble breathing, or swelling. Call your 
doctor right away if these symptoms get worse.



*Remember, do not eat more than 2000 milligrams of sodium a day. Watch out for 
packaged, processed, canned and restaurant foods.



Call your doctor (your cardiologist, if you have one) if:

-you gain more than 2 pounds in 24 hours.

-you gain more than 5 pounds in 1 week.

-any of your symptoms get worse

Do NOT wait to let your doctor know about these changes. 



Questions About Your Stay 

For questions or concerns regarding your hospital stay:



- DURING BUSINESS HOURS (8:00 AM - 4:30 PM):  

Call [phone] and asked to be transferred to your discharge attending 
physician.



- AFTER BUSINESS HOURS (4:30 PM - 8:00 AM, on weekends, or holidays):

Call [phone] and ask the  to page the on-call doctor for the 
discharge attending physician. 

Discharging attending physician: CANDE DELCID [[id number]]  



Cardiac Diet 

Limiting unhealthy fats and cholesterol is the most important step you can take 
in reducing your risk for cardiovascular disease.  Unhealthy fats include 
saturated and trans fats.  Monitor your sodium and cholesterol intake.  
Restrict your sodium to 2g (grams) or 2000mg (milligrams) daily, and your 
cholesterol to 200mg daily.



If you have questions regarding your diet at home, you may contact a dietitian 
at [phone].

 



Incision Care 

*Call if there is an increase in pain, swelling, or redness.

*DO NOT soak incision in water.

*NO tub baths, hot tubs, or swimming.

*You may shower after discharge. 



Return Appointment 

F/u with your primary cardiologist as already scheduled with echo in January. 

[facility] Provider NORTH MIGUEL [[id number]]  



Heart Failure Information 

You are at risk for readmission to the hospital because of fluid overload. 
There are steps you can take to lower your risk:

*Continue your current heart medications. Changes made have been made during 
your hospital stay.

*Remember to weigh yourself every morning, first thing after you urinate, and 
write down your weigh. Take this record to your doctor's appointments.

*Chart your symptoms - fatigue, shortness of breath, swelling, etc. Immediately 
report any worsening.

*Remember to consume no more than 2,000mg (milligrams) of sodium daily. Watch 
out for packaged, processed, canned, and restaurant foods especially.

*If any of your symptoms worsen, or if you gain more than 2 pounds in 24 hours, 
or 5 pounds in a week, call your cardiologist immediately. EARLY REPORTING OF 
THESE CHANGES IS VERY IMPORTANT. 



[facility] Information 

[facility] is a gathering place for individuals, families, and friends 
living with serious or chronic physical illness.  They offer education and 
support programs that help you live your life to the fullest.  Unless otherwise 
noted, programs are offered at NO CHARGE.  However, REGISTRATION IS REQUIRED 48 
hours in advance.



To arrange for a tour, register for a class, or ask a questions please call [phone]
[phone].  You can also visit [email or web address] for more information. 



[facility] Education about Stroke 

It is important for you to recognize the signs of stroke and call 911 
immediately.



F.A.S.T. is an easy way to remember the sudden signs of a stroke.



F - face drooping

A - arm weakness

S - speech difficulty

T - TIME TO CALL 911



If you or anyone you know shows any of these signs, even if the signs go away, 
call - IMMEDIATELY. Check the time so you will know when the first sign 
started. 



 

Current Discharge Medication List 

 

 CONTINUE these medications which have been CHANGED or REFILLED 

 Details 

aspirin EC 81 mg tablet Take 1 tablet by mouth daily for 7 days. Take with food.

Qty: 90 tablet, Refills: 3 

 PRESCRIPTION TYPE:  No Print

Associated Diagnoses: Coronary artery disease involving native coronary artery 
of native heart with angina pectoris (HCC); Ischemic cardiomyopathy; NSTEMI (non
-ST elevated myocardial infarction) (HCC); Tobacco abuse 

 

 

 CONTINUE these medications which have NOT CHANGED 

 Details 

atorvastatin (LIPITOR) 40 mg tablet Take 40 mg by mouth daily. 

 PRESCRIPTION TYPE:  Historical Med 

 

carisoprodol(+) (SOMA) 350 mg tablet Take 350 mg by mouth at bedtime daily. 

 PRESCRIPTION TYPE:  Historical Med 

 

clopiDOGrel (PLAVIX) 75 mg tablet Take 75 mg by mouth daily. 

 PRESCRIPTION TYPE:  Historical Med 

 

gabapentin (NEURONTIN) 600 mg tablet Take 600 mg by mouth four times daily. 

 PRESCRIPTION TYPE:  Historical Med 

 

gemfibrozil (LOPID) 600 mg tablet Take 600 mg by mouth twice daily. 

 PRESCRIPTION TYPE:  Historical Med 

 

HYDROcodone/acetaminophen (NORCO) 7.5/325 mg tablet Take 1 tablet by mouth 
every 6 hours as needed for Pain 

 PRESCRIPTION TYPE:  Historical Med 

 

metoprolol XL (TOPROL XL) 25 mg extended release tablet Take 25 mg by mouth 
daily. 

 PRESCRIPTION TYPE:  Historical Med 

 

nitroglycerin (NITROSTAT) 0.4 mg tablet Place 0.4 mg under tongue every 5 
minutes as needed for Chest Pain. Max of 3 tablets, call 911. 

 PRESCRIPTION TYPE:  Historical Med 

 

Omega-3 Acid Ethyl Esters 1 gram cap Take 1 g by mouth three times daily. 

 PRESCRIPTION TYPE:  Historical Med 

 

omeprazole DR(+) (PRILOSEC) 20 mg capsule Take 20 mg by mouth daily before 
breakfast. 

 PRESCRIPTION TYPE:  Historical Med 

 

sacubitril/valsartan (ENTRESTO) 24/26 mg tablet Take 1 tablet by mouth twice 
daily. 

 PRESCRIPTION TYPE:  Historical Med 

 

tiZANidine (ZANAFLEX) 4 mg tablet Take 8 mg by mouth every 8 hours as needed. 
Indications: MUSCLE SPASM 

 PRESCRIPTION TYPE:  Historical Med 

 

warfarin (COUMADIN) 5 mg tablet Take 5 mg by mouth as directed. Take 7.5 mg by 
mouth on Mon and Wed. Take 5 mg by mouth on , Tues, Thurs, Fri, and Sat. 
Take at bedtime.  Indications: THROMBOTIC DISORDER 

 PRESCRIPTION TYPE:  Historical Med 

 

 

 



Pending items needing follow up: as above 



Signed:

Kimberlyn Tiwari PA-C

2017  



cc:

Primary Care Physician:  Raiza Champagne   Verified

Referring physicians:   Dr. North Miguel/Dr. Neftali Bacon ([place], [place])

Additional provider(s): 

 

in this encounter



Discharge Instructions

* Patient Instructions - Mary Fong RN - 2017  8:32 AM CST





Manual Sheath Removal From A Large Vein Or Artery-DARVIN

When you go home:

 You may shower 24 hours after your procedure.

 Do not sit in water for one week. (No bath tub, swimming pool/hot tub, etc.)

 Keep the area clean and dry for one week (except for daily showers).

 Be sure your hands are clean when touching near the site.

 If a band-aid or dressing is still in place remove it before showering.

 Wash and dry thoroughly but gently.

 If needed, for your comfort, you may place a clean band-aid over the 
puncture site after you are clean and dry. It is best to leave it open to air 
as soon as it is comfortable to do so.

 Do not use ointments, creams, or powders on puncture site.

 Inspect site daily.

Activity: (Unless otherwise instructed or unable to perform)

 Avoid any exertion for one week. Exertion is lifting over 15 lbs or pushing, 
pulling or straining.

 Avoid excessive bending, stooping, or stair climbing for 2 days. It is ok to 
go up stairs or bend over but take it slowly and keep it to a minimum.

 You may be up and about while relaxing at home as you recover.

 You may resume sexual activity in one week.

 You may begin driving 2 days after your procedure if you are otherwise able 
to drive.

---It is common to have mild soreness and/or a small, soft bruise around the 
site that can take up to two weeks to go away. A small (dime to quarter sized) 
lump is also normal. A small amount of blood (not more than a teaspoon) from 
the site is also common.

WHEN TO CALL THE DOCTOR: Complications are rare but can happen.

 If you have significant bleeding (more than a teaspoon) or a lump underneath 
the skin (bigger than a golf ball) at the site lie down, apply firm pressure at 
the site and call 911. Bleeding from a large vessel needs professional help.

 If you have signs of infection at the site such as: redness, warm to touch, 
drainage, increasing soreness, a fever (100 degrees or more) and/or chills.

 Soreness that continues more than a week or unusual pain at the puncture 
site.

 Numbness, tingling, weakness in the affected leg.

 If your leg becomes cold and pale.

 If you have changes of vision, slurred speech or one-sided weakness.

Who do I contact if I need to speak with someone?

During Business Hours:

 [facility] Office at the [facility]: [phone]
[phone] (Monday-Friday)

 [place]: [phone] (Monday-Friday)

 [place]: [phone] (Monday-Friday)

 [place]: [phone] (Tuesday and Thursday)

 [place]: [phone] (Monday-Friday)

 [facility]: [phone] (Monday-Friday)

 [place]: [phone] (Monday-Thursday)

 [facility]: [phone] (Tuesday, Wednesday and Friday)

 [place]: [phone] (Tuesday, Wednesday and Friday)

 [facility]): [phone] (Monday, Wednesday and Friday)

Nights and Weekends

 [facility] Office at the [facility]: [phone]
[phone]

This education is meant to serve as a resource to you and your family. It is 
not meant to be all inclusive. The members of the [facility] at [facility], [phone], will be glad to 
answer any questions you may have about this booklet or your procedure.





in this encounter



Medications at Time of Discharge







     



  Medication   Sig.   Disp.   Refills   Start Date   End Date

 

     



  atorvastatin (LIPITOR) 40   Take 40 mg by mouth    



  mg tablet   daily.    

 

     



  carisoprodol(+) (SOMA)   Take 350 mg by mouth at    



  350 mg tablet   bedtime daily.    

 

     



  clopiDOGrel (PLAVIX) 75   Take 75 mg by mouth    



  mg tablet   daily.    

 

     



  gabapentin (NEURONTIN)   Take 600 mg by mouth four    



  600 mg tablet   times daily.    

 

     



  gemfibrozil (LOPID) 600   Take 600 mg by mouth    



  mg tablet   twice daily.    

 

     



  HYDROcodone/acetaminophen   Take 1 tablet by mouth    



  (NORCO) 7.5/325 mg tablet   every 6 hours as needed    



   for Pain    

 

     



  metoprolol XL (TOPROL XL)   Take 25 mg by mouth    



  25 mg extended release   daily.    



  tablet     

 

     



  nitroglycerin (NITROSTAT)   Place 0.4 mg under tongue    



  0.4 mg tablet   every 5 minutes as needed    



   for Chest Pain. Max of 3    



   tablets, call 911.    

 

     



  Omega-3 Acid Ethyl Esters   Take 1 g by mouth three    



  1 gram cap   times daily.    

 

     



  omeprazole DR(+)   Take 20 mg by mouth daily    



  (PRILOSEC) 20 mg capsule   before breakfast.    

 

     



  sacubitril/valsartan   Take 1 tablet by mouth    



  (ENTRESTO) 24/26 mg   twice daily.    



  tablet     

 

     



  tiZANidine (ZANAFLEX) 4   Take 8 mg by mouth every    



  mg tabletIndications:   8 hours as needed.    



  MUSCLE SPASM   Indications: MUSCLE SPASM    

 

     



  warfarin (COUMADIN) 5 mg   Take 5 mg by mouth as    



  tabletIndications:   directed. Take 7.5 mg by    



  THROMBOTIC DISORDER   mouth on Mon and Wed.    



   Take 5 mg by mouth on    



   , Tues, Thurs, Fri,    



   and Sat. Take at bedtime.    



   Indications: THROMBOTIC    



   DISORDER    

 

     



  aspirin EC 81 mg   Take 1 tablet by mouth   90 tablet   3   2017



  tabletIndications:   daily for 7 days. Take    



  Coronary artery disease   with food.    



  involving native coronary     



  artery of native heart     



  with angina pectoris     



  (HCC), Ischemic     



  cardiomyopathy, NSTEMI     



  (non-ST elevated     



  myocardial infarction)     



  (HCC), Tobacco abuse     



as of this encounter



Progress Notes

* Higinio Valenzuela RN - 2017  8:52 AM CST



Cardiac Rehab Call Back Note:  Spoke with patient.  He will start [facility] in 
[place] this Monday.



Are you tolerating activity?Yes

Is pain controlled?Yes

Is appetite normal?Yes

Are you having symptoms of heart discomfort?No

Are you having signs of infection at your incision sites or groin site?No

Do you want outpt cardiac rehab?Yes





* Lachelle Russ RN - 2017  8:52 AM CST



I have reviewed the notes, assessment, and/or procedures performed by Mary Fong RN and concur with her/his documentation unless otherwise noted.

* Mary Fong RN - 2017  8:51 AM CST



Patient discharged to home with all belongings.  Discharge instructions, med 
reconciliation and home wound care instructions given and explained to patient 
and family both verbally and written.  Accompanied by family.  No complaints of 
pain or discomfort.   Bilateral groins  remains clean, dry, and intact with no 
evidence of a hematoma after ambulation.  Patient escorted to lobby via 
Transport.  Patient to follow up with [facility] ([facility]) or on-call 
physician with any additional questions or concerns.  All contact numbers 
provided.  Patient and family acceptant of DC instuctions and report 
understanding to all information.

* Higinio Valenzuela RN - 2017  6:46 AM CST



Formatting of this note may be different from the original.

CARDIOPULMONARY REHABILITATION

INPATIENT ASSESSMENT



Cardiac Rehabilitation Staff: Fermin Valenzuela RN Discharge Date: 



Demographics

Pre-admit Dx:   Date of Admission: 2017   

Room: [address] :  1970 

Insurance: Primary: BC/BS of   Secondary: . 

Address: [address] [facility] [address]   Patient Phone:  [phone] (home)  

Marital Status:   Occupation: Construction/Labor 

ED Contact: Steffanie Cecile  ED Phone #: [phone] 

CTS: GONZÁLEZ  Cardiologist: Bhavin 



Cardiac Procedures and Events

 

  

PCI: 17

 

 

 

 

 



Risk Factors

 

BP: 97/54

Height: 162.6 cm (64.02")

Weight: 87.5 kg (192 lb 14.4 oz)

BMI (Calculated): 33.09 

 

Medical History

 has a past medical history of Coronary artery disease involving native 
coronary artery of native heart with angina pectoris (HCC) (2017); 
Coronary artery disease involving native coronary artery of native heart with 
angina pectoris (HCC) (2017); Ischemic cardiomyopathy; Mild mitral 
regurgitation (2017); Mild tricuspid regurgitation (2017); NSTEMI (
non-ST elevated myocardial infarction) ([facility]) (2017); Protein S deficiency 
([facility]) (2017); and Tobacco abuse (2017).



Labs

No results found for: CHOL, TRIG, HDL, LDL, HGBA1C, A1C, TNI



Heart Resource Manual Given: 17 



Teaching Completed: 17

 

Outpatient Cardiopulmonary Rehabilitation



OPCR: Yes



Referral Faxed to:   [place]   Date Faxed: 17 (Currently enrolled.  
Update faxed to [facility])



Location: [place]



If [facility], Sent to Staff:   



 



Higinio Valenzuela RN

2017





* Higinio Valenzuela, RN - 2017  6:44 AM CST



Introduced self to patient and gave copy of the Heart Resource Manual 
pertaining to coronary interventions.  He is currently enrolled in the program 
at [facility] in [place], [place] and will continue when cleared by 
cardiologist.  I have faxed an update to [facility].

* Mary Fong, RN - 2017  5:57 PM CST



Formatting of this note may be different from the original.



 17 1720 17 1723 17 1724 

Vital Signs 

Pulse 67 (!) 30 (!) 0 

PVC / Minute 0 /min. 0 /min. 0 /min. 

Respirations 10 PER MINUTE 13 PER MINUTE 15 PER MINUTE 

SpO2 Pulse 67 (!) 42 (!) 43 

SpO2 96 % 96 % 98 % 

BP 98/56 (!) 69/32 --  

Mean NBP (Calculated) 67 MM HG 38 MM HG --  

 

 17 1725 17 1727 

Vital Signs 

Pulse 52 59 

PVC / Minute 1 /min. 0 /min. 

Respirations 14 PER MINUTE 13 PER MINUTE 

SpO2 Pulse (!) 52 60 

SpO2 98 % 96 % 

BP (!) 82/36 (!) 83/50 

Mean NBP (Calculated) 44 MM HG 58 MM HG 



During sheath pull patient vasovagaled. Dr. Levine assessed patient. Orders 
given for 250 bolus of NS. 0.5 mg atropine given by Anatoly HUFFMAN. Patient 
stabilized and vital signs returned to normal. Will continue to monitor and 
keep NS running at 75mL/hr per Dr. Levine. 

* Abner Trevizo MD - 2017  5:53 PM CST



Mr. Huber was seen and examined in [facility] after the  PCI with a full metal 
jacket stenting to the RCA extending into the right PLV.  During sheath pull, 
he was noted to have significant sinus pauses lasting 6 seconds.  He recovered 
promptly.  Hemodynamic stability is noted.  There was a transient episode of 
hypotension with a mean arterial pressure of 60 mmHg.  We are presently 
infusing normal saline at 75 cc/h for the next 6 hours to a total of 
approximately 500 cc.  His blood pressure has already improved to a mean 
arterial pressure of 70 mmHg.  He remains asymptomatic at this juncture.  
Bilateral sheaths 7 French have been removed with excellent hemostasis.  This 
appears to be a significant vagal response associated with sheath pull.  I do 
not suspect any bleeding at this point in time.  Please call me if his clinical 
status changes.



Abner Trevizo M.D.

Fellow in Interventional Cardiology

Beeper # [id number]



* Lachelle Russ RN - 2017  4:45 PM CST



I have reviewed the notes, assessment, and/or procedures performed by Mary Fong RN and concur with her/his documentation unless otherwise noted.

* Kimberlyn Tiwari PA-C - 2017  3:04 PM CST



S/p 3 TESFAYE to RCA. Recent NSTEMI in Nov s/p TESFAYE to OM. 

ASA 81 mg daily for 1 week. Plavix 75 mg daily for 1 year w/o interruption to 
prevent stent thrombosis and possible myocardial infarction. 

Resume warfarin tonight. No bridging is recommended per Dr. Delcid. 

He recently filled all his medications and does not wish refills at this time. 

He will continue to wear his LifeVest till his follow up appointment. Iam Miguel and Jordi are planning repeat Echo in January. 

DC home in am. 

F/u with Primary cardiologist as already scheduled or sooner if needed. 

Maximize treatment for LVD with GDMT as pt. Can tolerate, renal function allows 
and BP permits.  



Kimberlyn Tiwari PA-C (pgr [phone])





* Jo Rogers, RT - 2017 11:12 AM CST



Formatting of this note may be different from the original.

RESPIRATORY THERAPY

ADULT PROTOCOL EVALUATION



RESPIRATORY PROTOCOL PLAN



Medications

 



Note: If indicated by protocol, medication orders will be placed by therapist.



Procedures

IPPB: Place a nursing order for "IS Q1h While Awake" for any of Lung Expansion 
indicators

Oxygen/Humidity: O2 to keep SpO2 > 95%

Monitoring: Pulse oximetry BID & PRN



 

_____________________________________________________________



PATIENT EVALUATION RESULTS



Chart Review

* Pulmonary Hx: Smoker in home OR smoking cessation > 8 weeks (former smoker)



* Surgical Hx: General surgery (cough & sigh not affected)



* Chest X-Ray: Clear OR not available



* PFT/Oxygenation: FEV1, PEFR < 70% OR Pa02 < 70 RA OR Sp02 <92% RA OR Fi02 > 
0.21 to keep Sp02 > 92% OR < 24 hours post-op (02 & oxim) OR chronic C02 
retention (C02) (will be < 24 hours post op)



Patient Assessment

* Respiratory Pattern: Regular pattern and rate OR good chest excursion with 
deep breathing



* Breath Sounds: Clear apically, but diminished in bases (LE) OR CHF related 
crackles (02) (oximetry)



* Cough / Sputum: Strong, effective cough OR nonproductive



* Mental Status: Alert, oriented, cooperative



* Activity Level: Ambulatory with assistance



Priority Index

Total Points: 6 Points

* Priority Index: 1



PRIORITY INDEX GUIDELINES*

Priority Points 

1 0-9 points 

2 9-18 points 

3 > 18 points 

+ Pulm Dx or Home Rx 

*Higher points indicate higher acuity.



Therapist: Jo Rogers, RT

Date: 2017



Key

AC=Airway clearance

AM=Aerosolized medication

BA=[place] aerosol

DB&C=Deep breathe & cough

FEV1=Forced expiratory volume in first second)

IC=Inspiratory capacity

LE=Lung expansion

MDI=Metered dose inhaler

Neb=Nebulizer

O2=Oxygen

Oxim=Oximetry

PEFR=Peak expiratory flow rate

RRT=Rapid Response Team





* Mary Fong RN - 2017  8:01 AM CST



Patient arrived on unit via ambulation accompanied by RN. Patient transferred 
to the bed without assistance.  Assessment completed, refer to flowsheet for 
details. Orders released, reviewed, and implemented as appropriate. Oriented to 
surroundings, call light within reach. Plan of care reviewed.  Will continue to 
monitor and assess.

in this encounter



H&P Notes

* Kimberlyn Tiwari PA-C - 2017  9:37 AM CST



Formatting of this note may be different from the original.

The original H and P below was performed by Dr. Delcid.



Kimberlyn Tiwari PA-C (pgr [phone])

Cande Delcid MD 

Cardiology 

 

Hide copied text

Hover for attribution information

Date of Service: 2017



Niko Huber is a 47 y.o. male.   



HPI

 

Mr. Huber is a 47-year-old male with a history of coronary artery disease who 
had a non-ST elevation myocardial infarction back in November at that time he 
successfully underwent stenting with a drug-eluting stent to the obtuse 
marginal.  They utilized a 2.5 x 26 mm resolute integrity stent.  In addition, 
he has a chronic total occlusion of the right coronary artery which is occluded 
proximally.  He has good left-to-right collateralization to the posterior 
descending and posterior lateral branches.  Of concern, his ejection fraction 
is severely impaired estimated at around 30% and he currently has a LifeVest.  
He has a previous myocardial perfusion study suggesting viability in the 
inferior wall and given his age and LV impairment, it was discussed and elected 
to go ahead and proceed with percutaneous revascularization to give him every 
opportunity to recover.  Currently, he has been noting intermittent chest 
discomfort which occurs with activity and at rest.  He is on aspirin, Plavix 
and warfarin and is tolerated this without any active bleeding issues.  He is 
taking warfarin due to a history of a pulmonary embolus per his report with the 
addition of aspirin and Plavix given his recent coronary event.



 



  

Vitals: 

 17 0718 

BP: 116/68 

Pulse: 79 

Weight: 87.1 kg (192 lb) 

Height: 1.626 m (5' 4") 



Body mass index is 32.96 kg/(m^2). 



Past Medical History

    

Patient Active Problem List 

 Diagnosis Date Noted 

 Coronary artery disease involving native coronary artery of native heart 
with angina pectoris (HCC) 2017 - NSTEMI at [place], [place]. Successful 
PCI with a Resolute Integrity 2.5 x 26 mm stent to the OM with Dr. Miguel. 
Unsuccessful PCI to the RCA . Pt referred to Dr. Delcid for possible  
procedure. 



16 - Nuclear stress test ([facility]): No ischemia or 
arrhythmia on EKG. Diaphragmatic attenuation with reversible ischemia involving 
the mid to apical inferior wall and inferolateral wall. Normal left ventricular 
size with mild hypokinesis at the lateral wall. EF 50%. 



Previous history of Promus stent placement to OM - date unknown.  

 NSTEMI (non-ST elevated myocardial infarction) ([facility]) 2017 at [place], [place].  

 Tobacco abuse 2017 

 Ischemic cardiomyopathy 2017 - Echo ([facility]): EF 30-35%. Left ventricular 
cavity size increased. Wall thickness normal. Regional wall motion 
abnormalities. Akinesis of the inferior myocardium. Akinesis of the lateral 
myocardium. 



17 - NSTEMI - EF 20%, severe left ventricular systolic function (per cath 
report). Life Vest applied for primary prevention of sudden cardiac death.  

 Mild mitral regurgitation 2017 

 Mild tricuspid regurgitation 2017 

 Protein S deficiency ([facility]) 2017 

  On warfarin.  







Review of Systems 

Constitution: Negative. 

HENT: Negative.  

Eyes: Negative.  

Cardiovascular: Positive for chest pain. 

Respiratory: Negative.  

Endocrine: Negative.  

Hematologic/Lymphatic: Negative.  

Skin: Negative.  

Musculoskeletal: Negative.  

Gastrointestinal: Negative.  

Genitourinary: Negative.  

Neurological: Negative.  

Psychiatric/Behavioral: Negative.  

Allergic/Immunologic: Negative.  



Social History 



Social History 

 Marital status:  

  Spouse name: N/A 

 Number of children: N/A 

 Years of education: N/A 



Social History Main Topics 

 Smoking status: Current Every Day Smoker 

 Smokeless tobacco: None 

 Alcohol use No 

 Drug use: No 

 Sexual activity: Not Asked 



Other Topics Concern 

 None 



Social History Narrative 



History reviewed. No pertinent surgical history.



Physical Exam

Physical Exam 

General Appearance: alert and oriented, no acute distress

Skin: warm, moist, no ulcers

Head: normocephalic, symmetric

Eyes: EOMI, PERRL, sclera are clear and without icterus

ENT: unremarkable, nares patent

Neck Veins: neck veins are flat, neck veins are not distended

Carotid Arteries: normal carotid upstroke bilaterally, no bruits

Chest Inspection: chest is normal in appearance

Auscultation/Percussion: lungs clear to auscultation, no rales, rhonchi, 
wheezes or friction rub appreciated

Cardiac Rhythm: regular rhythm and normal rate

Cardiac Auscultation: Normal S1 & S2, no S3 or S4, no rub - normal pmi

Murmurs: no cardiac murmurs 

Extremities: no lower extremity edema bilaterally; 2+ symmetric distal pulses

Muskuloskeletal: no obvious deformity

Abdominal Exam: soft, non-tender, no masses, bowel sounds normal

Neurologic Exam: neurological assessment grossly intact

Mood and Affect: Appropriate





Cardiovascular Studies

ECG - NSR, PVC - no Q waves



Problems Addressed Today

   

Encounter Diagnoses 

Name Primary? 

 Coronary artery disease involving native coronary artery of native heart 
with angina pectoris (HCC) Yes 

 Ischemic cardiomyopathy  

 Mild mitral regurgitation  





Assessment and Plan

 

1. Chronic total occlusion of the proximal right coronary artery with prominent 
left to right collateralization to the posterior descending and posterior 
lateral branches -he is referred for complex percutaneous intervention.  I 
discussed the risks and benefits and will plan to go ahead and proceed.  We 
will obtain bilateral groin access and will likely require a retrograde 
approach given the anatomy noted on his angiograms.  We will plan to obtain an 
INR this morning to ensure that his INR is less than 1.9.  We will plan to keep 
you informed this results of his upcoming procedure. 



 Thank you for allowing us to participate in his care.

 





Current Medications (including today's revisions)

 aspirin EC 81 mg tablet Take 81 mg by mouth daily. Take with food. 

 atorvastatin (LIPITOR) 40 mg tablet Take 40 mg by mouth daily. 

 carisoprodol(+) (SOMA) 350 mg tablet Take 350 mg by mouth at bedtime daily. 

 clopiDOGrel (PLAVIX) 75 mg tablet Take 75 mg by mouth daily. 

 gabapentin (NEURONTIN) 600 mg tablet Take 600 mg by mouth four times daily. 

 gemfibrozil (LOPID) 600 mg tablet Take 600 mg by mouth twice daily. 

 metoprolol XL (TOPROL XL) 25 mg extended release tablet Take 25 mg by mouth 
daily. 

 nitroglycerin (NITROSTAT) 0.4 mg tablet Place 0.4 mg under tongue every 5 
minutes as needed for Chest Pain. Max of 3 tablets, call 911. 

 Omega-3 Acid Ethyl Esters 1 gram cap Take 1 g by mouth three times daily. 

 omeprazole DR(+) (PRILOSEC) 20 mg capsule Take 20 mg by mouth daily before 
breakfast. 

 tiZANidine (ZANAFLEX) 4 mg tablet Take 8 mg by mouth every 8 hours as 
needed. Indications: MUSCLE SPASM 

 warfarin (COUMADIN) 5 mg tablet Take 5 mg by mouth as directed. Take 7.5 mg 
by mouth on Mon and Wed. Take 5 mg by mouth on , Tues, Thurs, Fri, and 
Sat. Take at bedtime.  Indications: THROMBOTIC DISORDER 



 

 





in this encounter



Procedure Notes

* Abner Trevizo MD - 2017  3:12 PM CST



Associated Order(s): CARDIAC CATH REPORT



[facility]



CARDIAC CATHETERIZATION REPORT

Page 3

NIKO VENTURA :  1970

KU#: [id number]



 

 MR #/Billing ID #:  [id number] / [id number]

DATE:  2017

CARDIOLOGIST:  Cande Delcid MD

DICTATING PROVIDER: Abner Trevizo MD

REFERRING PHYSICIAN:  RAIZA CHAMPAGNE



INTERVENTIONAL CARDIOLOGY FELLOW:  Abner Trevizo MD.



PROCEDURES PERFORMED:  

1. Selective right and left coronary angiograms with dual arterial injections.

2. Bilateral groin accesses, both 7-French common femoral arterial.

3. Dual coronary injections.

4. Chronic Total Occlusion () PCI of the proximal right RCA extending into 
the right PLV with 3 drug-eluting stents in overlapping fashion with antegrade 
wire escalation technique.

5. Right common femoral angiogram, limited.



INDICATION FOR THE PROCEDURE:  Niko Huber is a pleasant, 47-year-old 
gentleman with a history of recent non-ST-elevation MI, status post PCI to his 
left circumflex extending into his obtuse marginal with a Resolute Integrity 
stent from an outside institution.  There was an attempted  antegrade PCI on 
his RCA , which was unsuccessful. We would like to perform an invasive 
evaluation of his coronary anatomy with an intent to revascularize with the 
retrograde approach, given the fact that the antegrade cap was very ambiguous, 
based on outside hospital films.



CONSENT:  Risks, benefits, and alternatives of the procedure were explained to 
the patient by both Dr. Delcid and myself.  Risks of access site complications, 
bleeding, arterial dissection, death, contrast nephropathy, and radiation 
hazards were explained to the patient, who verbalized understanding of these 
conditions and consented to the procedure.  A written, informed consent has 
been placed in the chart as well.



DETAILS OF THE PROCEDURE:  The patient was brought in the cath lab in the 
fasting, nonsedated state.  After giving the patient a total of 225 mcg of 
fentanyl and 5 mg of Versed, we achieved moderate conscious sedation, which was 
monitored and maintained throughout the procedure for a total of 126 minutes.  
Respiratory, hemodynamic, and neurological parameters were monitored throughout 
the procedure by the operators and by the cath lab staff. 



The patient was prepped and draped in sterile fashion.  We exposed bilateral 
groins and prepped with 1% chlorhexidine and instilled a total of 20 mL of 1% 
lidocaine for good local anesthesia in both groins. 



We then gained access into the right common femoral artery using a 
micropuncture needle and modified Seldinger technique to insert a 7-French 10 
cm arterial sheath with good blood flow return.  Similar technique was adopted 
to gain access to the left common femoral artery with the same 7-French 10 cm 
arterial sheath.  We went in with the intent to perform a retrograde  PCI, 
and hence, we obtained dual coronary injections.



CORONARY ANGIOGRAM:  

1. The left main arises normally from the left coronary cusp and is free from 
angiographic evidence of disease.  It bifurcates into a left anterior 
descending artery, as well as a left circumflex artery.

2. The left anterior descending artery arises normally from the left main.  Its 
proximal, mid, and distal portions are free from disease, except for 30% 
stenosis in the mid segment.  The left main is also free from disease.  It 
gives rise to 1 diagonal branch, the LAD, and it is also free from disease.

3. The left circumflex artery arises normally from the left main.  Its proximal
, mid, and distal portions are free from disease.  It has a previously placed 
stent extending from the proximal circumflex, extending into the OM, is widely 
patent without any thrombosis or in-stent restenosis.

4. The right coronary artery arises normally from the right coronary cusp, and 
its proximal portion has 40% to 50% diffuse disease, followed by a long chronic 
total occlusion segment extending from the mid RCA to the PLV.  There was 
collateral flow from the LAD to the RPLV territory, and also from the 
circumflex artery as well.  We noted that on outside hospital films there was 
an abrupt cutoff of the proximal RCA with a very ambiguous cap; however, on our 
films, we noted an extra RV marginal branch which was filling in, though we 
could not localize the true nature of the proximal cap.  Hence, we decided to 
adopt a retrograde approach initially.



DETAILS OF THE PERCUTANEOUS CORONARY INTERVENTION to the RCA (Full metal Jacket
) (CHRONIC TOTAL OCCLUSION):  

1. We used an EBU 3.75, 7-French guide catheter to engage the left main, while 
we used an AL1, 7-French guide catheter to engage the RCA for dual arterial 
injections.

2. We then introduced an 0.014 x 300 cm Fielder FC wire with a 150 cm Corsair 
microcatheter and tried to get across a couple of septals.  We were able to 
track through the septals pretty well; however, the anatomy for reentry into 
the PLV/PDA was not favorable.  After multiple attempts through different 
septals, we were not able to gain access into the right PLV or PDA segments, 
even despite tracking through the septals pretty well through both the Fielder 
FC, as well as the Corsair; hence, we switched to an antegrade approach.

3. We then switched the AL1, 7-French guide for a Hockey-Stick 1, 6-French guide
, given the fact that an AL1 was a little too big to engage the RCA, given the 
size of the aortic root.  The Hockey-Stick 1 gave us moderate seating and 
support throughout the entire procedure.  We then introduced an 0.014 x 300 cm 
Fielder FC wire over a 150 cm Corsair into the right coronary artery to switch 
to an antegrade wire escalation approach.  We were able to make very little 
progress across the proximal cap, which was very ambiguous with a Fielder FC 
wire.  We then switched for an 0.009 tapering into an 0.014 x 300 cm Fielder XT 
wire and tried to make some progress across the mid RCA, and we tracked the 
Corsair across it.  Though we were able to get into the distal RCA, we were 
unsure whether we were in the true lumen or not.  We then switched out for a 
 200, 0.014 x 300 cm wire and got across the mid to distal RCA with 
moderate amount of difficulty.  We were unsure again whether we were at the 
true lumen or not.  Hence, we took a dual injection on the LAD, which 
demonstrated that our wire was indeed in the right PLV.  After this, we tracked 
the Corsair down into the right PLV and switched out the  200 wire for an 
0.014 x 300 cm Luge wire.  We then tracked the Corsair out and introduced a 2.0 
x 30 mm Emerge RX balloon and performed balloon angioplasty for a total of 6 
different inflations, starting from the right PLV, extending into the proximal 
or ostial segment of the RCA, all of which were 8 atmospheres, lasting 20-30 
seconds.  We got moderate amount of expansion with this.  We were then easily 
able to deliver a 2.25 x 38 mm Synergy drug-eluting stent  extending into the 
distal RPLV and the proximal edge of the stent into the distal portion of the 
right coronary artery.  The stent was deployed at 8 atmospheres for a total of 
26 seconds.  We then overlapped this proximally with a 2.25 x 38 mm Syndergy 
TESFAYE (10 carloz for 8 seconds).  We then deployed a 2.5 x 32 mm Synergy TESFAYE at 10 
atmospheres, lasting 25 seconds in overlapping fashion in the ostium to the 
midportion of the RCA.  We got excellent angiographic results.  After this, we 
noticed that the distal edge of the distal stent in the right PLV was oversized
, compared to the rest of the RCA.  This could have been an element of spasm.  
Hence, we gave the patient a total of 200 mcg of Cardene and 300 mcg of 
nitroglycerin.  Even after vasodilatation, we noticed that the distal edge was 
a little pinched.  Hence, we decided to dilate this using a 2.0 x 15 mm MINI 
TREK RX balloon for 10 atmospheres for 24 seconds.  Excellent angiographic 
results were achieved at the distal edge of the stent with restoration of MARIA LUISA-
3 flow to the PLV.  We then removed the stent and postdilated both the stents 
using a 2.75 x 20 mm TREK NC balloon for a total of 6 different inflations, 
lasting 16 atmospheres for at least 16 seconds.  Excellent stent expansion and 
apposition were achieved.  There was no evidence of edge dissection or distal 
embolization.  MARIA LUISA-3 flow was restored to the PDA and the PLV territories.  
Excellent flow was noted across the stent.  Good stent expansion and apposition 
were achieved.  Wire-out frames confirmed the above findings as well. 

The patient tolerated the procedure very well without any evidence of 
hemodynamic complications, and was transferred out of the cath lab in stable 
condition without any chest pain. 



Dr. Delcid, my attending, was scrubbed and performed key portions of the 
procedure and supervised the rest of it as well.



TOTAL CONTRAST USED:  340 mL.



TOTAL AIR KERMA:  3528 mGy. 



Right common femoral angiogram demonstrated a high bifurcation of the right side
, and hence, we opted for manual compression method.



LESION CHARACTERISTICS:  

1. RCA  ACC/AHA type C lesion.

2. MARIA LUISA 0 flow was noted preprocedure, and MARIA LUISA-3 flow was restored after the 
procedure.



IMPRESSION:  

1. Successful chronic total occlusion and revascularization of the proximal RCA 
with antegrade wire escalation technique, extending into the right PLV with 3 
Celestine (all Synergy - distal 2.25 x 38 mm, mid 2.25 x 38 mm and proximal 2.5 x 32 
mm) in overlapping fashion with excellent angiographic results.

2. Aspirin, Plavix, and Coumadin therapy, given the fact that the patient had a 
recent pulmonary embolism and needs Coumadin therapy.  Once the INR reaches 
therapeutic level, we recommend continuing Plavix and Coumadin x 12 months.



Cande Delcid MD



PCG/MedQ

DD:  2017 15:12:35  DT:  2017 16:09:41

Job #:  [id number]



cc: 

  - RAIZA CHAMPAGNE 



in this encounter



Plan of Treatment





Not on fileas of this encounter



Procedures







    



  Procedure Name   Priority   Date/Time   Associated Diagnosis   Comments

 

    



  TELEMETRY STRIPS-SCAN    2017    Results for this



    2:42 PM CST    procedure are in the



      results section.

 

    



  PROCEDURE RECORD-SCAN    2017    Results for this



    1:47 PM CST    procedure are in the



      results section.

 

    



  ECG-SCAN    2017    Results for this



    10:46 AM CST    procedure are in the



      results section.

 

    



  ECG-SCAN    2017    Results for this



    7:30 AM CST    procedure are in the



      results section.

 

    



  ECG-SCAN    2017    Results for this



    9:40 PM CST    procedure are in the



      results section.



in this encounter



Results

* TELEMETRY STRIPS-SCAN (2017  2:42 PM)





 Narrative

 

 



Ordered by an unspecified provider.





* PROCEDURE RECORD-SCAN (2017  1:47 PM)





 Narrative

 

 



Ordered by an unspecified provider.





* ECG-SCAN (2017 10:46 AM)





 Narrative

 

 



Ordered by an unspecified provider.





* CARDIAC CATH REPORT (2017 10:36 AM)





 



  Specimen   Performing Laboratory

 

 



   OTHER OUTSIDE LAB









 Procedure Note

 

 



Abner Trevizo MD - 2017  3:12 PM CST



[facility]



CARDIAC CATHETERIZATION REPORT

Page 3

NIKO VENTURA :  1970

KU#: [id number]







 

[facility]U MR #/Billing ID #:  [id number] / [id number]

DATE:  2017

CARDIOLOGIST:  Cande Delcid MD

DICTATING PROVIDER: Abner Trevizo MD

REFERRING PHYSICIAN:  RAIZA CHAMPAGNE



INTERVENTIONAL CARDIOLOGY FELLOW:  Abner Trevizo MD.



PROCEDURES PERFORMED:  

 1. Selective right and left coronary angiograms with dual arterial injections.

 2. Bilateral groin accesses, both 7-French common femoral arterial.

 3. Dual coronary injections.

 4. Chronic Total Occlusion () PCI of the proximal right RCA extending into 
the right PLV with 3 drug-eluting stents in overlapping fashion with antegrade 
wire escalation technique.

 5. Right common femoral angiogram, limited.



INDICATION FOR THE PROCEDURE:  Niko Huber is a pleasant, 47-year-old 
gentleman with a history of recent non-ST-elevation MI, status post PCI to his 
left circumflex extending into his obtuse marginal with a Resolute Integrity 
stent from an outside institution.  There was an attempted  antegrade PCI on 
his RCA , which was unsuccessful. We would like to perform an invasive 
evaluation of his coronary anatomy with an intent to revascularize with the 
retrograde approach, given the fact that the antegrade cap was very ambiguous, 
based on outside hospital films.



CONSENT:  Risks, benefits, and alternatives of the procedure were explained to 
the patient by both Dr. Delcid and myself.  Risks of access site complications, 
bleeding, arterial dissection, death, contrast nephropathy, and radiation 
hazards were explained to the patient, who verbalized understanding of these 
conditions and consented to the procedure.  A written, informed consent has 
been placed in the chart as well.



DETAILS OF THE PROCEDURE:  The patient was brought in the cath lab in the 
fasting, nonsedated state.  After giving the patient a total of 225 mcg of 
fentanyl and 5 mg of Versed, we achieved moderate conscious sedation, which was 
monitored and maintained throughout the procedure for a total of 126 minutes.  
Respiratory, hemodynamic, and neurological parameters were monitored throughout 
the procedure by the operators and by the cath lab staff. 



The patient was prepped and draped in sterile fashion.  We exposed bilateral 
groins and prepped with 1% chlorhexidine and instilled a total of 20 mL of 1% 
lidocaine for good local anesthesia in both groins. 



We then gained access into the right common femoral artery using a 
micropuncture needle and modified Seldinger technique to insert a 7-French 10 
cm arterial sheath with good blood flow return.  Similar technique was adopted 
to gain access to the left common femoral artery with the same 7-French 10 cm 
arterial sheath.  We went in with the intent to perform a retrograde  PCI, 
and hence, we obtained dual coronary injections.



CORONARY ANGIOGRAM:  

 1. The left main arises normally from the left coronary cusp and is free from 
angiographic evidence of disease.  It bifurcates into a left anterior 
descending artery, as well as a left circumflex artery.

 2. The left anterior descending artery arises normally from the left main.  
Its proximal, mid, and distal portions are free from disease, except for 30% 
stenosis in the mid segment.  The left main is also free from disease.  It 
gives rise to 1 diagonal branch, the LAD, and it is also free from disease.

 3. The left circumflex artery arises normally from the left main.  Its proximal
, mid, and distal portions are free from disease.  It has a previously placed 
stent extending from the proximal circumflex, extending into the OM, is widely 
patent without any thrombosis or in-stent restenosis.

 4. The right coronary artery arises normally from the right coronary cusp, and 
its proximal portion has 40% to 50% diffuse disease, followed by a long chronic 
total occlusion segment extending from the mid RCA to the PLV.  There was 
collateral flow from the LAD to the RPLV territory, and also from the 
circumflex artery as well.  We noted that on outside hospital films there was 
an abrupt cutoff of the proximal RCA with a very ambiguous cap; however, on our 
films, we noted an extra RV marginal branch which was filling in, though we 
could not localize the true nature of the proximal cap.  Hence, we decided to 
adopt a retrograde approach initially.



DETAILS OF THE PERCUTANEOUS CORONARY INTERVENTION to the RCA (Full metal Jacket
) (CHRONIC TOTAL OCCLUSION):  

 1. We used an EBU 3.75, 7-French guide catheter to engage the left main, while 
we used an AL1, 7-French guide catheter to engage the RCA for dual arterial 
injections.

 2. We then introduced an 0.014 x 300 cm Fielder FC wire with a 150 cm Corsair 
microcatheter and tried to get across a couple of septals.  We were able to 
track through the septals pretty well; however, the anatomy for reentry into 
the PLV/PDA was not favorable.  After multiple attempts through different 
septals, we were not able to gain access into the right PLV or PDA segments, 
even despite tracking through the septals pretty well through both the Fielder 
FC, as well as the Corsair; hence, we switched to an antegrade approach.

 3. We then switched the AL1, 7-French guide for a Hockey-Stick 1, 6-French 
guide, given the fact that an AL1 was a little too big to engage the RCA, given 
the size of the aortic root.  The Hockey-Stick 1 gave us moderate seating and 
support throughout the entire procedure.  We then introduced an 0.014 x 300 cm 
Fielder FC wire over a 150 cm Corsair into the right coronary artery to switch 
to an antegrade wire escalation approach.  We were able to make very little 
progress across the proximal cap, which was very ambiguous with a Fielder FC 
wire.  We then switched for an 0.009 tapering into an 0.014 x 300 cm Fielder XT 
wire and tried to make some progress across the mid RCA, and we tracked the 
Corsair across it.  Though we were able to get into the distal RCA, we were 
unsure whether we were in the true lumen or not.  We then switched out for a 
 200, 0.014 x 300 cm wire and got across the mid to distal RCA with 
moderate amount of difficulty.  We were unsure again whether we were at the 
true lumen or not.  Hence, we took a dual injection on the LAD, which 
demonstrated that our wire was indeed in the right PLV.  After this, we tracked 
the Corsair down into the right PLV and switched out the  200 wire for an 
0.014 x 300 cm Luge wire.  We then tracked the Corsair out and introduced a 2.0 
x 30 mm Emerge RX balloon and performed balloon angioplasty for a total of 6 
different inflations, starting from the right PLV, extending into the proximal 
or ostial segment of the RCA, all of which were 8 atmospheres, lasting 20-30 
seconds.  We got moderate amount of expansion with this.  We were then easily 
able to deliver a 2.25 x 38 mm Synergy drug-eluting stent  extending into the 
distal RPLV and the proximal edge of the stent into the distal portion of the 
right coronary artery.  The stent was deployed at 8 atmospheres for a total of 
26 seconds.  We then overlapped this proximally with a 2.25 x 38 mm Syndergy 
TESFAYE (10 carloz for 8 seconds).  We then deployed a 2.5 x 32 mm Synergy TESFAYE at 10 
atmospheres, lasting 25 seconds in overlapping fashion in the ostium to the 
midportion of the RCA.  We got excellent angiographic results.  After this, we 
noticed that the distal edge of the distal stent in the right PLV was oversized
, compared to the rest of the RCA.  This could have been an element of spasm.  
Hence, we gave the patient a total of 200 mcg of Cardene and 300 mcg of 
nitroglycerin.  Even after vasodilatation, we noticed that the distal edge was 
a little pinched.  Hence, we decided to dilate this using a 2.0 x 15 mm MINI 
TREK RX balloon for 10 atmospheres for 24 seconds.  Excellent angiographic 
results were achieved at the distal edge of the stent with restoration of MARIA LUISA-
3 flow to the PLV.  We then removed the stent and postdilated both the stents 
using a 2.75 x 20 mm TREK NC balloon for a total of 6 different inflations, 
lasting 16 atmospheres for at least 16 seconds.  Excellent stent expansion and 
apposition were achieved.  There was no evidence of edge dissection or distal 
embolization.  MARIA LUISA-3 flow was restored to the PDA and the PLV territories.  
Excellent flow was noted across the stent.  Good stent expansion and apposition 
were achieved.  Wire-out frames confirmed the above findings as well. 

The patient tolerated the procedure very well without any evidence of 
hemodynamic complications, and was transferred out of the cath lab in stable 
condition without any chest pain. 



Dr. Delcid, my attending, was scrubbed and performed key portions of the 
procedure and supervised the rest of it as well.



TOTAL CONTRAST USED:  340 mL.



TOTAL AIR KERMA:  3528 mGy. 



Right common femoral angiogram demonstrated a high bifurcation of the right side
, and hence, we opted for manual compression method.



LESION CHARACTERISTICS:  

 1. RCA  ACC/AHA type C lesion.

 2. MARIA LUISA 0 flow was noted preprocedure, and MARIA LUISA-3 flow was restored after the 
procedure.



IMPRESSION:  

 1. Successful chronic total occlusion and revascularization of the proximal 
RCA with antegrade wire escalation technique, extending into the right PLV with 
3 Celestine (all Synergy - distal 2.25 x 38 mm, mid 2.25 x 38 mm and proximal 2.5 x 
32 mm) in overlapping fashion with excellent angiographic results.

 2. Aspirin, Plavix, and Coumadin therapy, given the fact that the patient had 
a recent pulmonary embolism and needs Coumadin therapy.  Once the INR reaches 
therapeutic level, we recommend continuing Plavix and Coumadin x 12 months.



Cande Delcid MD





PCG/MedQ

DD:  2017 15:12:35  DT:  2017 16:09:41

Job #:  [id number]



cc: 

  - RAIZA CHAMPAGNE 







* ECG-SCAN (2017  7:30 AM)





 Narrative

 

 



Ordered by an unspecified provider.





* CBC (2017  3:45 AM)





  



  Component   Value   Ref Range

 

  



  White Blood Cells   9.6   4.5 - 11.0 K/UL

 

  



  RBC   4.23 (L)   4.4 - 5.5 M/UL

 

  



  Hemoglobin   12.3 (L)   13.5 - 16.5 GM/DL

 

  



  Hematocrit   36.3 (L)   40 - 50 %

 

  



  MCV   85.8   80 - 100 FL

 

  



  MCH   29.1   26 - 34 PG

 

  



  MCHC   34.0   32.0 - 36.0 G/DL

 

  



  RDW   13.9   11 - 15 %

 

  



  Platelet Count   279   150 - 400 K/UL

 

  



  MPV   7.5   7 - 11 FL









 



  Specimen   Performing Laboratory

 

 



  Blood    MAIN LAB



   [address] [place] [address]





* BASIC METABOLIC PANEL (2017  3:45 AM)





  



  Component   Value   Ref Range

 

  



  Sodium   138   137 - 147 MMOL/L

 

  



  Potassium   3.9   3.5 - 5.1 MMOL/L

 

  



  Chloride   105   98 - 110 MMOL/L

 

  



  CO2   25   21 - 30 MMOL/L

 

  



  Anion Gap   8   3 - 12

 

  



  Glucose   107 (H)   70 - 100 MG/DL

 

  



  Blood Urea Nitrogen   12   7 - 25 MG/DL

 

  



  Creatinine   0.72   0.4 - 1.24 MG/DL

 

  



  Calcium   9.2   8.5 - 10.6 MG/DL

 

  



  eGFR Non African American   >60   >60 mL/min



   Comment: 



   The eGFR is not validated for use in drug dosing 



   adjustments.    Continue to use 



   estimated creatinine clearance per dosing 



   reference text.    Please contact the 



   Clinical Pharmacist for questions. 

 

  



  eGFR    >60   >60 mL/min



   Comment: 



   The eGFR is not validated for use in drug dosing 



   adjustments.    Continue to use 



   estimated creatinine clearance per dosing 



   reference text.    Please contact the 



   Clinical Pharmacist for questions. 









 



  Specimen   Performing Laboratory

 

 



  Blood    MAIN LAB



   [address] [place] [id number]





* PROTIME INR (PT) (2017  3:45 AM)





  



  Component   Value   Ref Range

 

  



  INR   1.1   0.8 - 1.2









 



  Specimen   Performing Laboratory

 

 



  Blood    MAIN LAB



   [address] [place] [id number]





* ECG-SCAN (2017  9:40 PM)





 Narrative

 

 



Ordered by an unspecified provider.





* POC ACTIVATED CLOTTING TIME (2017  4:49 PM)





  



  Component   Value   Ref Range

 

  



  Activated Clotting Time   162   s









 



  Specimen   Performing Laboratory

 

 



    MAIN LAB



   [address] [place] [address]





* POC ACTIVATED CLOTTING TIME (2017  4:18 PM)





  



  Component   Value   Ref Range

 

  



  Activated Clotting Time   186   s









 



  Specimen   Performing Laboratory

 

 



    MAIN LAB



   [address] [place] [address]





* POC ACTIVATED CLOTTING TIME (2017  4:00 PM)





  



  Component   Value   Ref Range

 

  



  Activated Clotting Time   183   s









 



  Specimen   Performing Laboratory

 

 



    MAIN LAB



   [address] [place] [id number]





* POC ACTIVATED CLOTTING TIME (2017  2:55 PM)





  



  Component   Value   Ref Range

 

  



  Activated Clotting Time   400   s









 



  Specimen   Performing Laboratory

 

 



    MAIN LAB



   [address] [place] [address]





* POC ACTIVATED CLOTTING TIME (2017  2:34 PM)





  



  Component   Value   Ref Range

 

  



  Activated Clotting Time   251   s









 



  Specimen   Performing Laboratory

 

 



    MAIN LAB



   [address] [place] [address]





* POC ACTIVATED CLOTTING TIME (2017  2:08 PM)





  



  Component   Value   Ref Range

 

  



  Activated Clotting Time   349   s









 



  Specimen   Performing Laboratory

 

 



    MAIN LAB



   [address] [place] [address]





* POC ACTIVATED CLOTTING TIME (2017  1:35 PM)





  



  Component   Value   Ref Range

 

  



  Activated Clotting Time   337   s









 



  Specimen   Performing Laboratory

 

 



    MAIN LAB



   [address] [place] [address]





* POC ACTIVATED CLOTTING TIME (2017  1:05 PM)





  



  Component   Value   Ref Range

 

  



  Activated Clotting Time   370   s









 



  Specimen   Performing Laboratory

 

 



   [facility]



   [address]



   [place] [address]





in this encounter



Visit Diagnoses

Not on filein this encounter



Admitting Diagnoses











  Diagnosis

 





  Chronic Total Occlusion

 





  CAD (coronary artery disease)







Administered Medications







     



  Medication Order   MAR Action   Action Date   Dose   Rate   Site

 

     



  aspirin chewable tablet 81 mg   Given   2017   81 mg  



  81 mg, Oral, DAILY, First dose on Mon    08:20 CST   



  17 at 1600, Until Discontinued     

 

  

 

     



  atorvastatin (LIPITOR) tablet 40 mg   Given   2017   40 mg  



  40 mg, Oral, DAILY, First dose on Mon    22:24 CST   



  17 at 1300, Until Discontinued,     



  Admission/Obs/Extended Recovery     









    



  Given   2017   40 mg  



   08:21 CST   









  

 

     



  carisoprodol(+) (SOMA) tablet 350 mg   Given   2017   350 mg  



  350 mg, Oral, AT BEDTIME DAILY, First    22:24 CST   



  dose on 17 at 2100, Until     



  Discontinued, Admission/Obs/Extended     



  Recovery     

 

  

 

     



  clopiDOGrel (PLAVIX) tablet 75 mg   Given   2017   75 mg  



  75 mg, Oral, DAILY, First dose on Tue    08:21 CST   



  17 at 0900, Until Discontinued,     



  Clopidogrel (PLAVIX) load given in cath     



  lab. This Medication can increase the     



  risk of bleeding and may need to be held     



  prior to surgery or invasive procedures.     



  Consult physician in advance.     

 

  

 

     



  gabapentin (NEURONTIN) capsule 600 mg   Given   2017   600 mg  



  600 mg, Oral, FOUR TIMES DAILY, First    15:47 CST   



  dose on 17 at 1300, Until     



  Discontinued, Admission/Obs/Extended     



  Recovery     









    



  Given   2017   600 mg  



   22:24 CST   

 

    



  Given   2017   600 mg  



   08:20 CST   









  

 

     



  HYDROcodone/acetaminophen (NORCO) 5/325   Given   2017   1 tablet  



  mg tablet 1 tablet    15:47 CST   



  1 tablet, Oral, EVERY  6 HOURS PRN,     



  Starting 17 at 1156, Until 17 at 1052, Pain PO, TOTAL     



  ACETAMINOPHEN DOSE NOT TO EXCEED 4GM     



  DAILY NOTE: This is a HIGH ALERT     



  Medication.     

 

  

 

     



  pantoprazole DR (PROTONIX) tablet 40 mg   Given   2017   40 mg  



  40 mg, Oral, DAILY, First dose on Mon    22:24 CST   



  17 at 2100, Until Discontinued, Do     



  not crush or chew tablet.     

 

  

 

     



  sacubitril/valsartan (ENTRESTO) 24/26 mg   Given   2017   1 tablet  



  tablet 1 tablet    08:21 CST   



  1 tablet, Oral, TWICE DAILY, First dose     



  on 17 at 0900, Until     



  Discontinued, Admission/Obs/Extended     



  Recovery     

 

  

 

     



  sodium chloride 0.9 %   infusion   Given - New   2017   1,000 mL   50 mL
/hr 



  1,000 mL, 1,000 mL, Intravenous, at 50   Bag   08:38 CST   



  mL/hr, CONTINUOUS, Starting 17     



  at 0815, Until 17 at 1458, If     



  EF is <40%, contact CCL Charge Nurse     



  [phone]) before initiating fluid.     

 

  

 

     



  sodium chloride 0.9 %   infusion   Dose/Rate   2017    75 mL/hr 



  1,000 mL, Intravenous, at 75 mL/hr,   Change   15:41 CST   



  CONTINUOUS, Starting 17 at     



  1500, Until 17 at 0059, Once     



  patient out of bed, then saline lock and     



  DC IV fluid.     









    



  Bolus from Infusion   2017   250 mL   999 mL/hr 



   17:30 CST   

 

    



  Dose/Rate Verify   2017    75 mL/hr 



   17:45 CST   









  

 

     



  warfarin (COUMADIN) tablet 7.5 mg   Given   2017   7.5 mg  



  7.5 mg, Oral, TWO TIMES WEEKLY (Once per    22:25 CST   



  day on ), First dose on 17 at 2100, Until Discontinued,     



  NURSING: Provide patient with warfarin     



  education leaflet and video. Do not give     



  with cranberry juice. NOTE: This is a     



  HIGH ALERT Medication.     

 

  



in this encounter [FreeTextEntry8] : Ms. ZACH DODSON is a 51 year old female here today for dizziness for one week. Has had vertigo in the past. \par No URI sxs or congestion noted. \par Says that she has noticed some numbness on the left side of her face more recently. No other weakness of extremities noted.\par Has a dull persisting headache and "just doesn't feel right" \par Hx of anxiety - on Cymbalta for about 6 weeks, Helping her. Has taken lorazepam as well.\par Says she feels better with the Cymbalta. She is not feeling particularly anxious now but expressed concerns that her sxs could be in some way related to her father's and brother's cardiovascular disease. \par \par

## 2020-08-03 NOTE — NUR
bedside report given to KAYLIN HUFFMAN Melolabial Interpolation Flap Division And Inset Text: Division and inset of the melolabial interpolation flap was performed to achieve optimal aesthetic result, restore normal anatomic appearance and avoid distortion of normal anatomy, expedite and facilitate wound healing, achieve optimal functional result and because linear closure either not possible or would produce suboptimal result. The patient was prepped and draped in the usual manner. The pedicle was infiltrated with local anesthesia. The pedicle was sectioned with a #15 blade. The pedicle was de-bulked and trimmed to match the shape of the defect. Hemostasis was achieved. The flap donor site and free margin of the flap were secured with deep buried sutures and the wound edges were re-approximated.

## 2020-08-07 ENCOUNTER — HOSPITAL ENCOUNTER (OUTPATIENT)
Dept: HOSPITAL 75 - CARD | Age: 50
End: 2020-08-07
Attending: INTERNAL MEDICINE
Payer: MEDICARE

## 2020-08-07 DIAGNOSIS — I82.409: ICD-10-CM

## 2020-08-07 DIAGNOSIS — I11.0: Primary | ICD-10-CM

## 2020-08-07 DIAGNOSIS — I50.9: ICD-10-CM

## 2020-08-07 DIAGNOSIS — I26.99: ICD-10-CM

## 2020-08-07 PROCEDURE — 93306 TTE W/DOPPLER COMPLETE: CPT

## 2020-12-18 ENCOUNTER — HOSPITAL ENCOUNTER (OUTPATIENT)
Dept: HOSPITAL 75 - RT | Age: 50
End: 2020-12-18
Attending: INTERNAL MEDICINE
Payer: MEDICARE

## 2020-12-18 DIAGNOSIS — J44.9: Primary | ICD-10-CM

## 2020-12-18 PROCEDURE — 94060 EVALUATION OF WHEEZING: CPT

## 2020-12-18 PROCEDURE — 94726 PLETHYSMOGRAPHY LUNG VOLUMES: CPT

## 2020-12-18 PROCEDURE — 94729 DIFFUSING CAPACITY: CPT

## 2021-02-04 ENCOUNTER — HOSPITAL ENCOUNTER (OUTPATIENT)
Dept: HOSPITAL 75 - LABNPT | Age: 51
End: 2021-02-04
Attending: INTERNAL MEDICINE
Payer: MEDICARE

## 2021-02-04 DIAGNOSIS — Z01.89: Primary | ICD-10-CM

## 2021-02-04 DIAGNOSIS — Z53.9: ICD-10-CM

## 2021-02-22 ENCOUNTER — HOSPITAL ENCOUNTER (OUTPATIENT)
Dept: HOSPITAL 75 - LABNPT | Age: 51
End: 2021-02-22
Attending: NURSE PRACTITIONER
Payer: MEDICARE

## 2021-02-22 DIAGNOSIS — Z20.822: Primary | ICD-10-CM

## 2021-02-22 PROCEDURE — 87635 SARS-COV-2 COVID-19 AMP PRB: CPT

## 2021-03-18 ENCOUNTER — HOSPITAL ENCOUNTER (OUTPATIENT)
Dept: HOSPITAL 75 - LABNPT | Age: 51
End: 2021-03-18
Attending: INTERNAL MEDICINE
Payer: MEDICARE

## 2021-03-18 DIAGNOSIS — Z20.822: ICD-10-CM

## 2021-03-18 DIAGNOSIS — Z01.89: Primary | ICD-10-CM

## 2021-03-18 PROCEDURE — 87635 SARS-COV-2 COVID-19 AMP PRB: CPT

## 2021-04-05 ENCOUNTER — HOSPITAL ENCOUNTER (OUTPATIENT)
Dept: HOSPITAL 75 - LABNPT | Age: 51
End: 2021-04-05
Attending: NURSE PRACTITIONER
Payer: MEDICARE

## 2021-04-05 DIAGNOSIS — Z20.822: Primary | ICD-10-CM

## 2021-04-05 PROCEDURE — 87635 SARS-COV-2 COVID-19 AMP PRB: CPT

## 2021-04-06 ENCOUNTER — HOSPITAL ENCOUNTER (OUTPATIENT)
Dept: HOSPITAL 75 - SLEEP | Age: 51
LOS: 1 days | Discharge: HOME | End: 2021-04-07
Attending: NURSE PRACTITIONER
Payer: MEDICARE

## 2021-04-06 DIAGNOSIS — Z01.89: Primary | ICD-10-CM

## 2021-04-06 DIAGNOSIS — G47.50: ICD-10-CM

## 2021-04-06 DIAGNOSIS — G47.30: ICD-10-CM

## 2021-04-06 DIAGNOSIS — G47.10: ICD-10-CM

## 2021-04-06 PROCEDURE — 95810 POLYSOM 6/> YRS 4/> PARAM: CPT

## 2021-05-22 ENCOUNTER — HOSPITAL ENCOUNTER (EMERGENCY)
Dept: HOSPITAL 75 - ER | Age: 51
LOS: 1 days | Discharge: LEFT BEFORE DISCHARGE | End: 2021-05-23
Payer: MEDICARE

## 2021-05-22 VITALS — SYSTOLIC BLOOD PRESSURE: 167 MMHG | DIASTOLIC BLOOD PRESSURE: 101 MMHG

## 2021-05-22 VITALS — WEIGHT: 219.8 LBS | HEIGHT: 64.02 IN | BODY MASS INDEX: 37.52 KG/M2

## 2021-05-22 DIAGNOSIS — R07.9: Primary | ICD-10-CM

## 2021-05-22 LAB
ALBUMIN SERPL-MCNC: 3.8 GM/DL (ref 3.2–4.5)
ALP SERPL-CCNC: 105 U/L (ref 40–136)
ALT SERPL-CCNC: 20 U/L (ref 0–55)
AMYLASE SERPL-CCNC: 25 U/L (ref 25–125)
APTT BLD: 32 SEC (ref 24–35)
APTT PPP: YELLOW S
BACTERIA #/AREA URNS HPF: NEGATIVE /HPF
BASOPHILS # BLD AUTO: 0.1 10^3/UL (ref 0–0.1)
BASOPHILS NFR BLD AUTO: 1 % (ref 0–10)
BILIRUB SERPL-MCNC: 0.6 MG/DL (ref 0.1–1)
BILIRUB UR QL STRIP: NEGATIVE
BUN/CREAT SERPL: 6
CALCIUM SERPL-MCNC: 9.7 MG/DL (ref 8.5–10.1)
CHLORIDE SERPL-SCNC: 99 MMOL/L (ref 98–107)
CO2 SERPL-SCNC: 31 MMOL/L (ref 21–32)
CREAT SERPL-MCNC: 0.81 MG/DL (ref 0.6–1.3)
EOSINOPHIL # BLD AUTO: 0.4 10^3/UL (ref 0–0.3)
EOSINOPHIL NFR BLD AUTO: 3 % (ref 0–10)
EOSINOPHIL NFR BLD MANUAL: 3 %
FIBRINOGEN PPP-MCNC: CLEAR MG/DL
GFR SERPLBLD BASED ON 1.73 SQ M-ARVRAT: > 60 ML/MIN
GLUCOSE SERPL-MCNC: 115 MG/DL (ref 70–105)
GLUCOSE UR STRIP-MCNC: NEGATIVE MG/DL
HCT VFR BLD CALC: 45 % (ref 40–54)
HGB BLD-MCNC: 14.7 G/DL (ref 13.3–17.7)
INR PPP: 0.9 (ref 0.8–1.4)
KETONES UR QL STRIP: NEGATIVE
LEUKOCYTE ESTERASE UR QL STRIP: NEGATIVE
LIPASE SERPL-CCNC: 12 U/L (ref 8–78)
LYMPHOCYTES # BLD AUTO: 3.2 10^3/UL (ref 1–4)
LYMPHOCYTES NFR BLD AUTO: 22 % (ref 12–44)
MAGNESIUM SERPL-MCNC: 2.1 MG/DL (ref 1.6–2.4)
MANUAL DIFFERENTIAL PERFORMED BLD QL: YES
MCH RBC QN AUTO: 30 PG (ref 25–34)
MCHC RBC AUTO-ENTMCNC: 33 G/DL (ref 32–36)
MCV RBC AUTO: 93 FL (ref 80–99)
MONOCYTES # BLD AUTO: 1.1 10^3/UL (ref 0–1)
MONOCYTES NFR BLD AUTO: 7 % (ref 0–12)
MONOCYTES NFR BLD: 6 %
NEUTROPHILS # BLD AUTO: 9.8 10^3/UL (ref 1.8–7.8)
NEUTROPHILS NFR BLD AUTO: 67 % (ref 42–75)
NEUTS BAND NFR BLD MANUAL: 70 %
NITRITE UR QL STRIP: NEGATIVE
PH UR STRIP: 6.5 [PH] (ref 5–9)
PLATELET # BLD: 322 10^3/UL (ref 130–400)
PMV BLD AUTO: 9.5 FL (ref 9–12.2)
POTASSIUM SERPL-SCNC: 4 MMOL/L (ref 3.6–5)
PROT SERPL-MCNC: 7.2 GM/DL (ref 6.4–8.2)
PROT UR QL STRIP: NEGATIVE
PROTHROMBIN TIME: 12.7 SEC (ref 12.2–14.7)
RBC #/AREA URNS HPF: (no result) /HPF
RBC MORPH BLD: NORMAL
SODIUM SERPL-SCNC: 140 MMOL/L (ref 135–145)
SP GR UR STRIP: <=1.005 (ref 1.02–1.02)
SQUAMOUS #/AREA URNS HPF: (no result) /HPF
VARIANT LYMPHS NFR BLD MANUAL: 21 %
WBC # BLD AUTO: 14.7 10^3/UL (ref 4.3–11)
WBC #/AREA URNS HPF: (no result) /HPF

## 2021-05-22 PROCEDURE — 93041 RHYTHM ECG TRACING: CPT

## 2021-05-22 PROCEDURE — 36415 COLL VENOUS BLD VENIPUNCTURE: CPT

## 2021-05-22 PROCEDURE — 85027 COMPLETE CBC AUTOMATED: CPT

## 2021-05-22 PROCEDURE — 83735 ASSAY OF MAGNESIUM: CPT

## 2021-05-22 PROCEDURE — 85730 THROMBOPLASTIN TIME PARTIAL: CPT

## 2021-05-22 PROCEDURE — 83874 ASSAY OF MYOGLOBIN: CPT

## 2021-05-22 PROCEDURE — 84484 ASSAY OF TROPONIN QUANT: CPT

## 2021-05-22 PROCEDURE — 80053 COMPREHEN METABOLIC PANEL: CPT

## 2021-05-22 PROCEDURE — 82150 ASSAY OF AMYLASE: CPT

## 2021-05-22 PROCEDURE — 71045 X-RAY EXAM CHEST 1 VIEW: CPT

## 2021-05-22 PROCEDURE — 83690 ASSAY OF LIPASE: CPT

## 2021-05-22 PROCEDURE — 85007 BL SMEAR W/DIFF WBC COUNT: CPT

## 2021-05-22 PROCEDURE — 81000 URINALYSIS NONAUTO W/SCOPE: CPT

## 2021-05-22 PROCEDURE — 85610 PROTHROMBIN TIME: CPT

## 2021-05-23 ENCOUNTER — HOSPITAL ENCOUNTER (EMERGENCY)
Dept: HOSPITAL 75 - ER | Age: 51
Discharge: HOME | End: 2021-05-23
Payer: MEDICARE

## 2021-05-23 VITALS — DIASTOLIC BLOOD PRESSURE: 90 MMHG | SYSTOLIC BLOOD PRESSURE: 132 MMHG

## 2021-05-23 VITALS — HEIGHT: 63.78 IN | WEIGHT: 209.88 LBS | BODY MASS INDEX: 36.27 KG/M2

## 2021-05-23 DIAGNOSIS — I25.2: ICD-10-CM

## 2021-05-23 DIAGNOSIS — Z79.82: ICD-10-CM

## 2021-05-23 DIAGNOSIS — M54.9: ICD-10-CM

## 2021-05-23 DIAGNOSIS — I25.10: ICD-10-CM

## 2021-05-23 DIAGNOSIS — Z89.612: ICD-10-CM

## 2021-05-23 DIAGNOSIS — K21.9: ICD-10-CM

## 2021-05-23 DIAGNOSIS — Z95.5: ICD-10-CM

## 2021-05-23 DIAGNOSIS — D72.829: ICD-10-CM

## 2021-05-23 DIAGNOSIS — R10.12: ICD-10-CM

## 2021-05-23 DIAGNOSIS — I11.0: ICD-10-CM

## 2021-05-23 DIAGNOSIS — G89.29: Primary | ICD-10-CM

## 2021-05-23 DIAGNOSIS — M10.9: ICD-10-CM

## 2021-05-23 DIAGNOSIS — E78.00: ICD-10-CM

## 2021-05-23 DIAGNOSIS — I50.9: ICD-10-CM

## 2021-05-23 DIAGNOSIS — Z79.899: ICD-10-CM

## 2021-05-23 DIAGNOSIS — Z79.891: ICD-10-CM

## 2021-05-23 DIAGNOSIS — F17.210: ICD-10-CM

## 2021-05-23 PROCEDURE — 74176 CT ABD & PELVIS W/O CONTRAST: CPT

## 2021-05-23 NOTE — DIAGNOSTIC IMAGING REPORT
PROCEDURE: CT abdomen and pelvis without contrast.



TECHNIQUE: Multiple contiguous axial images were obtained through

the abdomen and pelvis without the use of intravenous contrast.

Auto Exposure Controls were utilized during the CT exam to meet

ALARA standards for radiation dose reduction. 



INDICATION:  Severe left upper quadrant pain.



COMPARISON: 02/22/2018



FINDINGS:



The lung bases are clear. The heart is normal in size. There is

no pericardial effusion.



There is diffuse fatty infiltration of the liver. No focal

lesions are seen. A calcified granuloma is noted. The spleen

appears normal. The pancreas is unremarkable. The adrenal glands

appear normal. The kidneys demonstrate no hydronephrosis or

calculi.



The appendix is normal. The bowel loops are nondistended without

obstruction. No free fluid or free air is seen. There is mild

atherosclerosis of the bifurcation. Small surgical clips are

noted. No acute osseous abnormality is identified. There is mild

atrophy of the left gluteal musculature relative to the right.



IMPRESSION:

1. No acute abnormality is seen in the abdomen or pelvis.

2. Hepatic steatosis.

3. Left gluteal muscular atrophy.



Dictated by: 



  Dictated on workstation # [id number]

## 2021-05-23 NOTE — ED ABDOMINAL PAIN
General


Chief Complaint:  Abdominal/GI Problems


Stated Complaint:  L SIDE PAIN


Nursing Triage Note:  


PT PRESENTS TO ED VIA POV FROM HOME WITH COMPLAINTS OF LUQ PAIN X 1 MONTH. 


REPORTS HE WAS SEEN IN ED LAST NIGHT BUT LEFT AMA BECAUSE HE SAID IT WAS BUSY 


AND DIDNT WANT TO WAIT LONGER.


Sepsis Screen:  No Definite Risk


Source of Information:  Patient, Family


Exam Limitations:  No Limitations





History of Present Illness


Date Seen by Provider:  May 23, 2021


Time Seen by Provider:  09:50


Initial Comments


Patient is a 50-year-old male who presents to the emergency department today 

with a chief complaint of left lower rib/left upper quadrant abdominal pain 

ongoing for about a month.  Patient states nothing makes his pain any better, 

palpation of the area makes it worse.  Patient states that he has seen [facility] and was given some lidocaine patches but he ran out of them.  He 

also went to the clinic on Friday and was told that there was not much else that

they could do other than for him to come to the emergency department and 

possibly get a CAT scan.  Patient denies any problems with bowel or bladder.  He

states he did get nauseated and dry heaves this morning.  Patient did come to 

the emergency department last night at around 11 PM and had a work-up started 

but left AMA because he was tired of waiting for results.  Patient denies any 

fevers, chills, increasing or worsening shortness of breath or cough.  No 

rashes.  His primary care doctor is Dr. Padron at [facility].  He states 

he has not seen him for the pain.  Patient has a history of coronary artery 

disease and peripheral vascular disease.  He is status post cardiac stents as 

well as a left above-the-knee amputation secondary to "blood clots in his leg".


Patient states he takes oxycodone chronically.





All other review of systems reviewed and negative except as stated above.


Timing/Duration:  Constant (X1 month)


Severity/Quality:  Moderate


Location:  LUQ


Radiation:  No Radiation


Activities at Onset:  None


Modifying Factors:  Worsens With Lying down, Worsens With Palpation


Associated Symptoms:  Denies Symptoms





Allergies and Home Medications


Allergies


Coded Allergies:  


     Penicillins (Unverified  Allergy, Mild, 09)


     pneumococcal vaccine (Verified  Allergy, Unknown, 4/10/20)





Home Medications


Allopurinol 100 Mg Tablet, 100 MG PO DAILY, (Reported)


Aspirin 81 Mg Tablet., 81 MG PO HS, (Reported)


Atorvastatin Calcium 40 Mg Tablet, 40 MG PO HS, (Reported)


Cholecalciferol (Vitamin D3) 25 Mcg Tablet, 25 MCG PO DAILY, (Reported)


Clopidogrel Bisulfate 75 Mg Tablet, 75 MG PO DAILY, (Reported)


Fluticasone/Salmeterol 12 Gm Hfa.aer.ad, 0 PUFF IH RTBID


   Prescribed by: KATY RASMUSSEN on 20


Ipratropium/Albuterol Sulfate 3 Ml Ampul.neb, 3 ML INH Q2HR PRN for SOA


   Prescribed by: KATY RASMUSSEN on 20


Isosorbide Mononitrate 30 Mg Tab.er.24h, 30 MG PO DAILY, (Reported)


Metoprolol Tartrate 50 Mg Tablet, 50 MG PO BID, (Reported)


Omega 3 Polyunsat Fatty Acids 1,000 Mg Cap, 1,000 MG PO BID, (Reported)


Pantoprazole Sodium 40 Mg Tablet.dr, 40 MG PO DAILY, (Reported)


Prednisone 10 Mg Tab.ds.pk, 10 MG PO DAILY


   Take 6 tabs(60mg)daily,decrease by 1 tab(10mg)every other day. 


   Prescribed by: KATY RASMUSSEN on 20





Patient Home Medication List


Home Medication List Reviewed:  Yes





Review of Systems


Review of Systems


Constitutional:  see HPI


EENTM:  No Symptoms Reported


Respiratory:  Other (Left lower chest wall pain/left upper quadrant abdominal 

pain)


Cardiovascular:  No Symptoms Reported


Gastrointestinal:  Abdominal Pain


Genitourinary:  No Symptoms Reported


Musculoskeletal:  no symptoms reported


Skin:  no symptoms reported


Psychiatric/Neurological:  No Symptoms Reported





All Other Systems Reviewed


Negative Unless Noted:  Yes





Past Medical-Social-Family Hx


Patient Social History


Alcohol Use:  Denies Use


Smoking Status:  Current Everyday Smoker


Type Used:  Cigarettes


2nd Hand Smoke Exposure:  No


Recent Infectious Disease Expo:  No


Recent Hopitalizations:  No





Immunizations Up To Date


PED Vaccines UTD:  Yes


Date of Pneumonia Vaccine:  Dec 3, 2012


Date of Influenza Vaccine:  2019





Seasonal Allergies


Seasonal Allergies:  No





Past Medical History


Surgeries:  Yes (LEFT AKA)


Amputation, Cardiac, Coronary Stent, Orthopedic


Respiratory:  Yes


Pulmonary Embolism


Currently Using CPAP:  No


Currently Using BIPAP:  No


Cardiac:  Yes (MI X 2; STENTS X 4; -NSTEMI 17 WITH 1 STENT PLACED)


Coronary Artery Disease, Deep Vein Thrombosis, Heart Attack, High Cholesterol, 

Hypertension


Neurological:  No


Reproductive Disorders:  No


Genitourinary:  No


Gastrointestinal:  Yes


Gastroesophageal Reflux


Musculoskeletal:  Yes (HYDROCODONE + IBUPROFEN DAILY)


Amputee, Chronic Back Pain, Gout


Endocrine:  No


HEENT:  No


Cancer:  No


Psychosocial:  No


Integumentary:  No


Blood Disorders:  Yes (PROTEIN S DEFICIENCY--DVT'S AND P.E.'S AND MI X 2)





Family Medical History





Arthritis


  G8 BROTHER


Blood clots


  19 MOTHER ( of blood clot)


Cardiac disorder


  19 FATHER


Diabetes mellitus


  G8 BROTHER


FH: cancer


  paternal grandmother


FHx: inflammatory bowel disease


  G8 BROTHER





No Family History of:


  FH: sudden cardiac death (SCD)


Heart Disease, Vascular Disease





Physical Exam


Vital Signs





Vital Signs - First Documented








 21





 09:51


 


Temp 35.1


 


Pulse 75


 


Resp 18


 


B/P (MAP) 142/92 (109)


 


Pulse Ox 94





Capillary Refill : Less Than 3 Seconds


Height/Weight/BMI


Height: 5'4.00"


Weight: 190lbs. 1.6oz. 86.039346lp; 36.00 BMI


Method:Stated


General Appearance:  WD/WN, no apparent distress


HEENT:  PERRL/EOMI


Respiratory:  lungs clear, normal breath sounds, no respiratory distress, no 

accessory muscle use


Cardiovascular:  regular rate, rhythm, other (Left lower rib tenderness to 

palpation anterior laterally)


Gastrointestinal:  normal bowel sounds, soft, tenderness (Left upper quadrant)


Extremities:  non-tender, normal inspection, no pedal edema, no calf tenderness,

other (Left above-the-knee amputation)


Neurologic/Psychiatric:  alert, normal mood/affect, oriented x 3


Skin:  normal color, warm/dry





Progress/Results/Core Measures


Results/Orders


My Orders





Orders - RAEGAN CLEMENT MD


Ct Abdomen/Pelvis Wo (21 10:04)





Vital Signs/I&O











 21





 09:51


 


Temp 35.1


 


Pulse 75


 


Resp 18


 


B/P (MAP) 142/92 (109)


 


Pulse Ox 94














Blood Pressure Mean:                    109











Progress


Progress Note :  


   Time:  10:05


Progress Note


Patient was seen and examined last evening however I cannot find documentation 

related to his exam.  He had basic laboratory studies obtained such as a CBC, 

Chem-12 cardiac enzymes, urinalysis and coagulation profile.  Patient's labs are

reviewed by me this morning and he has a mild leukocytosis at 14,000.  No 

abnormalities to chemistry, urinalysis, cardiac enzymes or coagulation studies. 

Chest x-ray is reviewed from yesterday and shows no acute cardiopulmonary 

abnormalities.  I also looked at his EKG which was normal.  Apparently the plan 

was to obtain a CT scan of the abdomen and pelvis last evening, this will be 

ordered this morning to rule out any other serious pathology.  The patient's exa

m is relatively benign.  He has nice soft distended abdomen with bowel sounds 

present.  He is quite tender to palpation over the left lower ribs and left 

upper quadrant.  No rebound or involuntary guarding is appreciated.  He is 

afebrile, nontachycardic with a normal blood pressure.  Disposition pending CT 

scan.





1213


CT scan of the abdomen and pelvis is unremarkable for any acute intra-abdominal 

pathology.  Patient's vital signs are stable.  Again I looked at the labs taken 

approximately 12 hours prior to this visit and all of them are basically within 

normal limits.  He did have a mild leukocytosis at 14,000.  He does not have any

complaints right now of illness.  He rates his pain a "a 7.  He is taking 

oxycodone at home I am going to give him a dose here before he leaves.  I have 

strongly encouraged him to follow-up with his primary care physician.  He is 

comfortable with this plan of care.  All questions are sought and answered.  

Patient is stable for discharge.





Diagnostic Imaging





   Diagonstic Imaging:  CT


Comments


                 [facility] [place], [place]





NAME:   NIKO MARTE


[facility] REC#:   [id number]


ACCOUNT#:   [id number]


PT STATUS:   REG ER


:   1970


PHYSICIAN:   RAEGAN CLEMENT MD


ADMIT DATE:   21/ER


                                   ***Draft***


Date of Exam:21





CT ABDOMEN/PELVIS WO








PROCEDURE: CT abdomen and pelvis without contrast.





TECHNIQUE: Multiple contiguous axial images were obtained through


the abdomen and pelvis without the use of intravenous contrast.


Auto Exposure Controls were utilized during the CT exam to meet


ALARA standards for radiation dose reduction. 





INDICATION:  Severe left upper quadrant pain.





COMPARISON: 2018





FINDINGS:





The lung bases are clear. The heart is normal in size. There is


no pericardial effusion.





There is diffuse fatty infiltration of the liver. No focal


lesions are seen. A calcified granuloma is noted. The spleen


appears normal. The pancreas is unremarkable. The adrenal glands


appear normal. The kidneys demonstrate no hydronephrosis or


calculi.





The appendix is normal. The bowel loops are nondistended without


obstruction. No free fluid or free air is seen. There is mild


atherosclerosis of the bifurcation. Small surgical clips are


noted. No acute osseous abnormality is identified. There is mild


atrophy of the left gluteal musculature relative to the right.





IMPRESSION:


1. No acute abnormality is seen in the abdomen or pelvis.


2. Hepatic steatosis.


3. Left gluteal muscular atrophy.





  Dictated on workstation # [id number]








Dict:   21 1023


Trans:   21 1034


 [id number]





Interpreted by:     RAMON FRANKEL MD


Electronically signed by:





Departure


Impression





   Primary Impression:  


   Left flank pain, chronic


Disposition:  01 HOME, SELF-CARE


Condition:  Stable





Departure-Patient Inst.


Decision time for Depature:  12:14


Referrals:  


AMANDA PADRON MD (PCP/Family)


Primary Care Physician


Patient Instructions:  Abdominal Pain, Adult ED





Add. Discharge Instructions:  


Take your pain medications as directed at home.





Please call and follow-up with your primary care physician this week.





Return to the emergency room for any worsening pain, fever, vomiting or other 

emergent concerning symptoms











RAEGAN CLEMENT MD         May 23, 2021 10:01

## 2021-05-23 NOTE — DIAGNOSTIC IMAGING REPORT
HISTORY: Chest pain, left upper quadrant pain



COMPARISON: 04/10/2020



TECHNIQUE: Frontal view of the chest



FINDINGS:



Lung volumes are mildly large. The cardiac silhouette is normal

in size. There is no pleural effusion or pneumothorax. No focal

consolidation is seen.



IMPRESSION:

1. No acute pulmonary abnormality.



Dictated by: 



  Dictated on workstation # [id number]

## 2021-06-03 NOTE — XMS REPORT
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                       
           

 

 2015                         Ot            272.4                        
          

 

 2015                         Ot            401.9                        
          

 

 2015                         Ot            414.01                       
           

 

 2015                         Ot            401.9                        
          

 

 2015                         Ot            414.01                       
           

 

 2015                         Ot            428.0                        
          

 

 2015                         Ot            719.40                       
           

 

 2015                         Ot            782.3                        
          

 

 2015                         Ot            V58.61                       
           

 

 2015                         Ot            272.1                        
          

 

 2015                         Ot            414.00                       
           

 

 2015                         Ot            786.50                       
           

 

 2015                         Ot            397.0                        
          

 

 2015                         Ot            414.00                       
           

 

 2015                         Ot            424.0                        
          

 

 2015                         Ot            786.50                       
           

 

 2015                         Ot            272.4                        
          

 

 2015                         Ot            401.9                        
          

 

 2015                         Ot            414.01                       
           

 

 2015                         Ot            401.9                        
          

 

 2015                         Ot            414.01                       
           

 

 2015                         Ot            428.0                        
          

 

 2015                         Ot            719.40                       
           

 

 2015                         Ot            782.3                        
          

 

 2015                         Ot            V58.61                       
           

 

 2015            NORTH HERNANDEZ MD            Ot            V12.51      
                            

 

 2015            NORTH HERNANDEZ MD            Ot            V58.61      
                            

 

 2015            NORTH HERNANDEZ MD            Ot            V58.83      
                            

 

 2015            COLE CONTRERAS MD            Ot            274.9      
      GOUT NOS                     

 

 2015            COLE CONTRERAS MD            Ot            729.5      
      PAIN IN LIMB                     

 

 2015                         Ot            272.1                        
          

 

 2015                         Ot            414.00                       
           

 

 2015                         Ot            786.50                       
           

 

 2015                         Ot            397.0                        
          

 

 2015                         Ot            414.00                       
           

 

 2015                         Ot            424.0                        
          

 

 2015                         Ot            786.50                       
           

 

 2015                         Ot            272.4                        
          

 

 2015                         Ot            401.9                        
          

 

 2015                         Ot            414.01                       
           

 

 2015                         Ot            401.9                        
          

 

 2015                         Ot            414.01                       
           

 

 2015                         Ot            428.0                        
          

 

 2015                         Ot            719.40                       
           

 

 2015                         Ot            782.3                        
          

 

 2015                         Ot            V58.61                       
           

 

 2015            NORTH HERNANDEZ MD            Ot            V12.51      
                            

 

 2015            NORTH HERNANDEZ MD            Ot            V58.61      
                            

 

 2015            NORTH HERNANDEZ MD            Ot            V58.83      
                            

 

 08/10/2015            NORTH HERNANDEZ MD            Ot            V12.51      
                            

 

 08/10/2015            NORTH HERNANDEZ MD            Ot            V58.61      
                            

 

 08/10/2015            NORTH HERNANDEZ MD            Ot            V58.83      
                            

 

 2015            NORTH HERNANDEZ MD            Ot            V12.51      
                            

 

 2015            NORTH HERNANDEZ MD            Ot            V58.61      
                            

 

 2015            NORTH HERNANDEZ MD            Ot            V58.83      
                            

 

 2015            NORTH HERNANDEZ MD            Ot            V12.51      
      HX-VENOUS THROMBOSIS EMBOLISM                     

 

 2015            NORTH HERNANDEZ MD            Ot            V58.61      
      ANTICOAGULANTS,LT,CURRENT USE                     

 

 2015            NORTH HERNANDEZ MD            Ot            V58.83      
      ENCOUNTER FOR THERAPEUTIC DRUG MONITORIN                     

 

 2015                         Ot            270.4                        
          

 

 2015                         Ot            305.1                        
          

 

 2015                         Ot            536.8                        
          

 

 2015                         Ot            729.81                       
           

 

 2015                         Ot            V12.51                       
           

 

 2015                         Ot            V58.61                       
           

 

 2015                         Ot            V58.69                       
           

 

 2015                         Ot            270.4                        
          

 

 2015                         Ot            305.1                        
          

 

 2015                         Ot            536.8                        
          

 

 2015                         Ot            V12.51                       
           

 

 2015                         Ot            V58.61                       
           

 

 2015                         Ot            V58.69                       
           

 

 2015                         Ot            272.4                        
          

 

 2015                         Ot            729.5                        
          

 

 2015                         Ot            780.79                       
           

 

 2015                         Ot            270.4                        
          

 

 2015                         Ot            305.1                        
          

 

 2015                         Ot            536.8                        
          

 

 2015                         Ot            729.5                        
          

 

 2015                         Ot            V12.51                       
           

 

 2015                         Ot            V58.69                       
           

 

 2015                         Ot            270.4                        
          

 

 2015                         Ot            305.1                        
          

 

 2015                         Ot            536.8                        
          

 

 2015                         Ot            V12.51                       
           

 

 2015                         Ot            V58.69                       
           

 

 2015                         Ot            786.09                       
           

 

 2015                         Ot            786.50                       
           

 

 2015                         Ot            786.09                       
           

 

 2015                         Ot            786.50                       
           

 

 2015                         Ot            270.4                        
          

 

 2015                         Ot            305.1                        
          

 

 2015                         Ot            533.90                       
           

 

 2015                         Ot            V12.51                       
           

 

 2015                         Ot            272.4                        
          

 

 2015                         Ot            401.9                        
          

 

 2015                         Ot            414.00                       
           

 

 2015                         Ot            272.4                        
          

 

 2015                         Ot            356.9                        
          

 

 2015                         Ot            272.4                        
          

 

 2015                         Ot            729.5                        
          

 

 2015                         Ot            V58.61                       
           

 

 2015                         Ot            272.1                        
          

 

 2015                         Ot            414.00                       
           

 

 2015                         Ot            786.50                       
           

 

 2015                         Ot            397.0                        
          

 

 2015                         Ot            414.00                       
           

 

 2015                         Ot            424.0                        
          

 

 2015                         Ot            786.50                       
           

 

 2015                         Ot            272.4                        
          

 

 2015                         Ot            401.9                        
          

 

 2015                         Ot            414.01                       
           

 

 2015                         Ot            401.9                        
          

 

 2015                         Ot            414.01                       
           

 

 2015                         Ot            428.0                        
          

 

 2015                         Ot            719.40                       
           

 

 2015                         Ot            782.3                        
          

 

 2015                         Ot            V58.61                       
           

 

 2015                         Ot            272.1                        
          

 

 2015                         Ot            414.00                       
           

 

 2015                         Ot            786.50                       
           

 

 2015                         Ot            397.0                        
          

 

 2015                         Ot            414.00                       
           

 

 2015                         Ot            424.0                        
          

 

 2015                         Ot            786.50                       
           

 

 2015                         Ot            272.4                        
          

 

 2015                         Ot            401.9                        
          

 

 2015                         Ot            414.01                       
           

 

 2015                         Ot            401.9                        
          

 

 2015                         Ot            414.01                       
           

 

 2015                         Ot            428.0                        
          

 

 2015                         Ot            719.40                       
           

 

 2015                         Ot            782.3                        
          

 

 2015                         Ot            V58.61                       
           

 

 2016                         Ot            414.00            CORON 
ATHEROSCLER NOS TYPE VESSEL, NATIV                     

 

 2016                         Ot            786.50            CHEST PAIN 
NOS                     

 

 2016                         Ot            397.0            TRICUSPID 
VALVE DISEASE                     

 

 2016                         Ot            414.00            CORON 
ATHEROSCLER NOS TYPE VESSEL, NATIV                     

 

 2016                         Ot            424.0            MITRAL VALVE 
DISORDER                     

 

 2016                         Ot            786.50            CHEST PAIN 
NOS                     

 

 2016                         Ot            272.4            
HYPERLIPIDEMIA NEC/NOS                     

 

 2016                         Ot            401.9            HYPERTENSION 
NOS                     

 

 2016                         Ot            414.01            CORONARY 
ATHEROSCLEROSIS OF NATIVE CORON                     

 

 2016                         Ot            401.9            HYPERTENSION 
NOS                     

 

 2016                         Ot            414.01            CORONARY 
ATHEROSCLEROSIS OF NATIVE CORON                     

 

 2016                         Ot            428.0            CONGESTIVE 
HEART FAILURE NOS                     

 

 2016                         Ot            719.40            JOINT PAIN-
UNSPEC                     

 

 2016                         Ot            782.3            EDEMA       
              

 

 2016                         Ot            V58.61            
ANTICOAGULANTS,LT,CURRENT USE                     

 

 2016            RADHA ARELLANO APRN            Ot            F17.210    
        NICOTINE DEPENDENCE, CIGARETTES, UNCOMPL                     

 

 2016            RADHA ARELLANO APRN            Ot            M25.511    
        PAIN IN RIGHT SHOULDER                     

 

 2016            NORTH HERNANDEZ MD            Ot            I82.403     
       ACUTE EMBOLISM AND THOMBOS UNSP DEEP VEI                     

 

 2016            NORTH HERNANDEZ MD            Ot            Z79.01      
      LONG TERM (CURRENT) USE OF ANTICOAGULANT                     

 

 08/15/2016            NORTH HERNANDEZ MD            Ot            I82.403     
       ACUTE EMBOLISM AND THOMBOS UNSP DEEP VEI                     

 

 08/15/2016            NORTH HERNANDEZ MD            Ot            Z79.01      
      LONG TERM (CURRENT) USE OF ANTICOAGULANT                     

 

 2016            NORTH HERNANDEZ MD            Ot            I82.403     
       ACUTE EMBOLISM AND THOMBOS UNSP DEEP VEI                     

 

 2016            NORTH HERNANDEZ MD            Ot            Z79.01      
      LONG TERM (CURRENT) USE OF ANTICOAGULANT                     

 

 10/07/2016                         Ot            414.00            CORON 
ATHEROSCLER NOS TYPE VESSEL, NATIV                     

 

 10/07/2016                         Ot            786.50            CHEST PAIN 
NOS                     

 

 10/07/2016                         Ot            397.0            TRICUSPID 
VALVE DISEASE                     

 

 10/07/2016                         Ot            414.00            CORON 
ATHEROSCLER NOS TYPE VESSEL, NATIV                     

 

 10/07/2016                         Ot            424.0            MITRAL VALVE 
DISORDER                     

 

 10/07/2016                         Ot            786.50            CHEST PAIN 
NOS                     

 

 10/07/2016                         Ot            272.4            
HYPERLIPIDEMIA NEC/NOS                     

 

 10/07/2016                         Ot            401.9            HYPERTENSION 
NOS                     

 

 10/07/2016                         Ot            414.01            CORONARY 
ATHEROSCLEROSIS OF NATIVE CORON                     

 

 10/07/2016                         Ot            401.9            HYPERTENSION 
NOS                     

 

 10/07/2016                         Ot            414.01            CORONARY 
ATHEROSCLEROSIS OF NATIVE CORON                     

 

 10/07/2016                         Ot            428.0            CONGESTIVE 
HEART FAILURE NOS                     

 

 10/07/2016                         Ot            719.40            JOINT PAIN-
UNSPEC                     

 

 10/07/2016                         Ot            782.3            EDEMA       
              

 

 10/07/2016                         Ot            V58.61            
ANTICOAGULANTS,LT,CURRENT USE                     

 

 10/10/2016            NORTH HERNANDEZ MD            Ot            I82.403     
       ACUTE EMBOLISM AND THOMBOS UNSP DEEP VEI                     

 

 10/10/2016            NORTH HERNANDEZ MD            Ot            Z79.01      
      LONG TERM (CURRENT) USE OF ANTICOAGULANT                     

 

 2016            NORTH HERNANDEZ MD            Ot            I82.403     
       ACUTE EMBOLISM AND THOMBOS UNSP DEEP VEI                     

 

 2016            NORTH HERNANDEZ MD            Ot            Z79.01      
      LONG TERM (CURRENT) USE OF ANTICOAGULANT                     

 

 2016                         Ot            414.00            CORON 
ATHEROSCLER NOS TYPE VESSEL, NATIV                     

 

 2016                         Ot            786.50            CHEST PAIN 
NOS                     

 

 2016                         Ot            397.0            TRICUSPID 
VALVE DISEASE                     

 

 2016                         Ot            414.00            CORON 
ATHEROSCLER NOS TYPE VESSEL, NATIV                     

 

 2016                         Ot            424.0            MITRAL VALVE 
DISORDER                     

 

 2016                         Ot            786.50            CHEST PAIN 
NOS                     

 

 2016                         Ot            272.4            
HYPERLIPIDEMIA NEC/NOS                     

 

 2016                         Ot            401.9            HYPERTENSION 
NOS                     

 

 2016                         Ot            414.01            CORONARY 
ATHEROSCLEROSIS OF NATIVE CORON                     

 

 2016                         Ot            401.9            HYPERTENSION 
NOS                     

 

 2016                         Ot            414.01            CORONARY 
ATHEROSCLEROSIS OF NATIVE CORON                     

 

 2016                         Ot            428.0            CONGESTIVE 
HEART FAILURE NOS                     

 

 2016                         Ot            719.40            JOINT PAIN-
UNSPEC                     

 

 2016                         Ot            782.3            EDEMA       
              

 

 2016                         Ot            V58.61            
ANTICOAGULANTS,LT,CURRENT USE                     

 

 2016            NORTH HERNANDEZ MD            Ot            I82.403     
       ACUTE EMBOLISM AND THOMBOS UNSP DEEP VEI                     

 

 2016            NORTH HERNANDEZ MD            Ot            Z79.01      
      LONG TERM (CURRENT) USE OF ANTICOAGULANT                     

 

 2016            NORTH HERNANDEZ MD            Ot            I11.0       
     HYPERTENSIVE HEART DISEASE WITH HEART FA                     

 

 2016            NORTH HERNANDEZ MD            Ot            I25.10      
      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                      

 

 2016            NORTH HERNANDEZ MD            Ot            I26.99      
      OTHER PULMONARY EMBOLISM WITHOUT ACUTE C                     

 

 2016            NORTH HERNANDEZ MD            Ot            I50.9       
     HEART FAILURE, UNSPECIFIED                     

 

 2016            NORTH HERNANDEZ MD            Ot            I82.409     
       ACUTE EMBOLISM AND THOMBOS UNSP DEEP VN                      

 

 2016            NORTH HERNANDEZ MD            Ot            R07.9       
     CHEST PAIN, UNSPECIFIED                     

 

 2016                         Ot            414.00            CORON 
ATHEROSCLER NOS TYPE VESSEL, NATIV                     

 

 2016                         Ot            786.50            CHEST PAIN 
NOS                     

 

 2016                         Ot            397.0            TRICUSPID 
VALVE DISEASE                     

 

 2016                         Ot            414.00            CORON 
ATHEROSCLER NOS TYPE VESSEL, NATIV                     

 

 2016                         Ot            424.0            MITRAL VALVE 
DISORDER                     

 

 2016                         Ot            786.50            CHEST PAIN 
NOS                     

 

 2016                         Ot            272.4            
HYPERLIPIDEMIA NEC/NOS                     

 

 2016                         Ot            401.9            HYPERTENSION 
NOS                     

 

 2016                         Ot            414.01            CORONARY 
ATHEROSCLEROSIS OF NATIVE CORON                     

 

 2016                         Ot            401.9            HYPERTENSION 
NOS                     

 

 2016                         Ot            414.01            CORONARY 
ATHEROSCLEROSIS OF NATIVE CORON                     

 

 2016                         Ot            428.0            CONGESTIVE 
HEART FAILURE NOS                     

 

 2016                         Ot            719.40            JOINT PAIN-
UNSPEC                     

 

 2016                         Ot            782.3            EDEMA       
              

 

 2016                         Ot            V58.61            
ANTICOAGULANTS,LT,CURRENT USE                     

 

 2016            NORTH HERNANDEZ MD            Ot            I82.403     
       ACUTE EMBOLISM AND THOMBOS UNSP DEEP VEI                     

 

 2016            NORTH HERNANDEZ MD            Ot            Z79.01      
      LONG TERM (CURRENT) USE OF ANTICOAGULANT                     

 

 2016            NORTH HERNANDEZ MD            Ot            I11.0       
     HYPERTENSIVE HEART DISEASE WITH HEART FA                     

 

 2016            NORTH HERNANDEZ MD            Ot            I25.10      
      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                      

 

 2016            NORTH HERNANDEZ MD            Ot            I26.99      
      OTHER PULMONARY EMBOLISM WITHOUT ACUTE C                     

 

 2016            NORTH HERNANDEZ MD            Ot            I50.9       
     HEART FAILURE, UNSPECIFIED                     

 

 2016            NORTH HERNANDEZ MD            Ot            I82.409     
       ACUTE EMBOLISM AND THOMBOS UNSP DEEP VN                      

 

 2016            NORTH HERNANDEZ MD            Ot            R07.9       
     CHEST PAIN, UNSPECIFIED                     

 

 2016            NORTH HERNANDEZ MD            Ot            I25.10      
      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                      

 

 2016            NORTH HERNANDEZ MD            Ot            I50.9       
     HEART FAILURE, UNSPECIFIED                     

 

 2016            NORTH HERNANDEZ MD            Ot            R07.9       
     CHEST PAIN, UNSPECIFIED                     

 

 2016            NORTH HERNANDEZ MD            Ot            I25.10      
      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                      

 

 2016            NORTH HERNANDEZ MD            Ot            I50.9       
     HEART FAILURE, UNSPECIFIED                     

 

 2016            NORTH HERNANDEZ MD            Ot            R07.9       
     CHEST PAIN, UNSPECIFIED                     

 

 2016                         Ot            414.00            CORON 
ATHEROSCLER NOS TYPE VESSEL, NATIV                     

 

 2016                         Ot            786.50            CHEST PAIN 
NOS                     

 

 2016                         Ot            397.0            TRICUSPID 
VALVE DISEASE                     

 

 2016                         Ot            414.00            CORON 
ATHEROSCLER NOS TYPE VESSEL, NATIV                     

 

 2016                         Ot            424.0            MITRAL VALVE 
DISORDER                     

 

 2016                         Ot            786.50            CHEST PAIN 
NOS                     

 

 2016                         Ot            272.4            
HYPERLIPIDEMIA NEC/NOS                     

 

 2016                         Ot            401.9            HYPERTENSION 
NOS                     

 

 2016                         Ot            414.01            CORONARY 
ATHEROSCLEROSIS OF NATIVE CORON                     

 

 2016                         Ot            401.9            HYPERTENSION 
NOS                     

 

 2016                         Ot            414.01            CORONARY 
ATHEROSCLEROSIS OF NATIVE CORON                     

 

 2016                         Ot            428.0            CONGESTIVE 
HEART FAILURE NOS                     

 

 2016                         Ot            719.40            JOINT PAIN-
UNSPEC                     

 

 2016                         Ot            782.3            EDEMA       
              

 

 2016                         Ot            V58.61            
ANTICOAGULANTS,LT,CURRENT USE                     

 

 2016            NORTH HERNANDEZ MD            Ot            I82.403     
       ACUTE EMBOLISM AND THOMBOS UNSP DEEP VEI                     

 

 2016            NORTH HERNANDEZ MD            Ot            Z79.01      
      LONG TERM (CURRENT) USE OF ANTICOAGULANT                     

 

 2016            NORTH HERNANDEZ MD            Ot            I11.0       
     HYPERTENSIVE HEART DISEASE WITH HEART FA                     

 

 2016            NORTH HERNANDEZ MD            Ot            I25.10      
      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                      

 

 2016            NORTH HERNANDEZ MD            Ot            I26.99      
      OTHER PULMONARY EMBOLISM WITHOUT ACUTE C                     

 

 2016            NORTH HERNANDEZ MD            Ot            I50.9       
     HEART FAILURE, UNSPECIFIED                     

 

 2016            NORTH HERNANDEZ MD            Ot            I82.409     
       ACUTE EMBOLISM AND THOMBOS UNSP DEEP VN                      

 

 2016            NORTH HERNANDEZ MD            Ot            R07.9       
     CHEST PAIN, UNSPECIFIED                     

 

 2016            NORTH HERNANDEZ MD            Ot            I25.10      
      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                      

 

 2016            NORTH HERNANDEZ MD            Ot            I50.9       
     HEART FAILURE, UNSPECIFIED                     

 

 2016            NORTH HERNANDEZ MD            Ot            R07.9       
     CHEST PAIN, UNSPECIFIED                     

 

 2016            NORTH HERNANDEZ MD            Ot            I11.0       
     HYPERTENSIVE HEART DISEASE WITH HEART FA                     

 

 2016            NORTH HERNANDEZ MD            Ot            I25.10      
      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                      

 

 2016            NORTH HERNANDEZ MD            Ot            I26.99      
      OTHER PULMONARY EMBOLISM WITHOUT ACUTE C                     

 

 2016            NORTH HERNANDEZ MD            Ot            I50.9       
     HEART FAILURE, UNSPECIFIED                     

 

 2016            NORTH HERNANDEZ MD            Ot            I82.409     
       ACUTE EMBOLISM AND THOMBOS UNSP DEEP VN                      

 

 2016            NORTH HERNANDEZ MD            Ot            R07.9       
     CHEST PAIN, UNSPECIFIED                     

 

 2016            NORTH HERNANDEZ MD            Ot            E78.5       
     HYPERLIPIDEMIA, UNSPECIFIED                     

 

 2016            NORTH HERNANDEZ MD            Ot            F17.210     
       NICOTINE DEPENDENCE, CIGARETTES, UNCOMPL                     

 

 2016            NORTH HERNANDEZ MD            Ot            I10         
   ESSENTIAL (PRIMARY) HYPERTENSION                     

 

 2016            NORTH HERNANDEZ MD            Ot            I25.10      
      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                      

 

 2016            NORTH HERNANDEZ MD            Ot            R07.89      
      OTHER CHEST PAIN                     

 

 2016            NORTH HERNANDEZ MD            Ot            R94.39      
      ABNORMAL RESULT OF OTHER CARDIOVASCULAR                      

 

 2016            NROTH HERNANDEZ MD            Ot            Z79.01      
      LONG TERM (CURRENT) USE OF ANTICOAGULANT                     

 

 2016            NORTH HERNANDEZ MD            Ot            Z79.899     
       OTHER LONG TERM (CURRENT) DRUG THERAPY                     

 

 2016            NORTH HERNANDEZ MD            Ot            Z86.718     
       PERSONAL HISTORY OF OTHER VENOUS THROMBO                     

 

 2016            NORTH HERNANDEZ MD            Ot            Z95.5       
     PRESENCE OF CORONARY ANGIOPLASTY IMPLANT                     

 

 2016            NORTH HERNANDEZ MD            Ot            I25.10      
      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                      

 

 2016            NORTH HERNANDEZ MD            Ot            I50.9       
     HEART FAILURE, UNSPECIFIED                     

 

 2016            NORTH HERNANDEZ MD            Ot            R07.9       
     CHEST PAIN, UNSPECIFIED                     

 

 2016            NORTH HERNANDEZ MD            Ot            I25.10      
      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                      

 

 2016            NORTH HERNANDEZ MD            Ot            I50.9       
     HEART FAILURE, UNSPECIFIED                     

 

 2016            NORTH HERNANDEZ MD            Ot            R07.9       
     CHEST PAIN, UNSPECIFIED                     

 

 2017            NORTH HERNANDEZ MD            Ot            I82.403     
       ACUTE EMBOLISM AND THOMBOS UNSP DEEP VEI                     

 

 2017            NORTH HERNANDEZ MD            Ot            Z79.01      
      LONG TERM (CURRENT) USE OF ANTICOAGULANT                     

 

 2017            NORTH HERNANDEZ MD            Ot            I82.403     
       ACUTE EMBOLISM AND THOMBOS UNSP DEEP VEI                     

 

 2017            NORTH HERNANDEZ MD            Ot            Z79.01      
      LONG TERM (CURRENT) USE OF ANTICOAGULANT                     

 

 2017            NORTH HERNANDEZ MD            Ot            I82.403     
       ACUTE EMBOLISM AND THOMBOS UNSP DEEP VEI                     

 

 2017            NORTH HERNANDEZ MD            Ot            Z79.01      
      LONG TERM (CURRENT) USE OF ANTICOAGULANT                     

 

 2017            NORTH HERNANDEZ MD            Ot            I82.403     
       ACUTE EMBOLISM AND THOMBOS UNSP DEEP VEI                     

 

 2017            NORTH HERNANDEZ MD            Ot            Z79.01      
      LONG TERM (CURRENT) USE OF ANTICOAGULANT                     

 

 2017            NORTH HERNANDEZ MD            Ot            I82.403     
       ACUTE EMBOLISM AND THOMBOS UNSP DEEP VEI                     

 

 2017            NORTH HERNANDEZ MD            Ot            Z79.01      
      LONG TERM (CURRENT) USE OF ANTICOAGULANT                     

 

 02/15/2017            NORTH HERNANDEZ MD            Ot            I82.403     
       ACUTE EMBOLISM AND THOMBOS UNSP DEEP VEI                     

 

 02/15/2017            NORHT HERNANDEZ MD            Ot            Z79.01      
      LONG TERM (CURRENT) USE OF ANTICOAGULANT                     

 

 2017            NORTH HERNANDEZ MD            Ot            I82.403     
       ACUTE EMBOLISM AND THOMBOS UNSP DEEP VEI                     

 

 2017            NORTH HERNANDEZ MD            Ot            Z79.01      
      LONG TERM (CURRENT) USE OF ANTICOAGULANT                     

 

 2017            NORTH HERNANDEZ MD            Ot            I82.403     
       ACUTE EMBOLISM AND THOMBOS UNSP DEEP VEI                     

 

 2017            NORTH HERNANDEZ MD            Ot            Z79.01      
      LONG TERM (CURRENT) USE OF ANTICOAGULANT                     

 

 2017            NORTH HERNANDEZ MD            Ot            I82.403     
       ACUTE EMBOLISM AND THOMBOS UNSP DEEP VEI                     

 

 2017            NORTH HERNANDEZ MD            Ot            Z79.01      
      LONG TERM (CURRENT) USE OF ANTICOAGULANT                     

 

 2017                         Ot            414.00            CORON 
ATHEROSCLER NOS TYPE VESSEL, NATIV                     

 

 2017                         Ot            786.50            CHEST PAIN 
NOS                     

 

 2017                         Ot            397.0            TRICUSPID 
VALVE DISEASE                     

 

 2017                         Ot            414.00            CORON 
ATHEROSCLER NOS TYPE VESSEL, NATIV                     

 

 2017                         Ot            424.0            MITRAL VALVE 
DISORDER                     

 

 2017                         Ot            786.50            CHEST PAIN 
NOS                     

 

 2017                         Ot            272.4            
HYPERLIPIDEMIA NEC/NOS                     

 

 2017                         Ot            401.9            HYPERTENSION 
NOS                     

 

 2017                         Ot            414.01            CORONARY 
ATHEROSCLEROSIS OF NATIVE CORON                     

 

 2017                         Ot            401.9            HYPERTENSION 
NOS                     

 

 2017                         Ot            414.01            CORONARY 
ATHEROSCLEROSIS OF NATIVE CORON                     

 

 2017                         Ot            428.0            CONGESTIVE 
HEART FAILURE NOS                     

 

 2017                         Ot            719.40            JOINT PAIN-
UNSPEC                     

 

 2017                         Ot            782.3            EDEMA       
              

 

 2017                         Ot            V58.61            
ANTICOAGULANTS,LT,CURRENT USE                     

 

 2017            NORTH HERNANDEZ MD            Ot            I11.0       
     HYPERTENSIVE HEART DISEASE WITH HEART FA                     

 

 2017            NORTH HERNANDEZ MD            Ot            I25.10      
      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                      

 

 2017            NORTH HERNANDEZ MD            Ot            I26.99      
      OTHER PULMONARY EMBOLISM WITHOUT ACUTE C                     

 

 2017            NORTH HERNANDEZ MD            Ot            I50.9       
     HEART FAILURE, UNSPECIFIED                     

 

 2017            NORTH HERNANDEZ MD            Ot            I82.409     
       ACUTE EMBOLISM AND THOMBOS UNSP DEEP VN                      

 

 2017            NORTH HERNANDEZ MD            Ot            R07.9       
     CHEST PAIN, UNSPECIFIED                     

 

 2017            NORTH HERNANDEZ MD            Ot            I25.10      
      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                      

 

 2017            NORTH HERNANDEZ MD            Ot            I50.9       
     HEART FAILURE, UNSPECIFIED                     

 

 2017            NORTH HERNANDEZ MD            Ot            R07.9       
     CHEST PAIN, UNSPECIFIED                     

 

 2017            DAMARIS WYNN            Ot            F17.210  
          NICOTINE DEPENDENCE, CIGARETTES, UNCOMPL                     

 

 2017            ASHIA VILLA DAMARIS L            Ot            R10.10   
         UPPER ABDOMINAL PAIN, UNSPECIFIED                     

 

 2017            DAMARIS WYNN            Ot            R10.13   
         EPIGASTRIC PAIN                     

 

 2017            DAMARIS WYNN            Ot            Z79.01   
         LONG TERM (CURRENT) USE OF ANTICOAGULANT                     

 

 2017            DAMARIS WYNN            Ot            Z79.82   
         LONG TERM (CURRENT) USE OF ASPIRIN                     

 

 2017            DAMARIS WYNN            Ot            Z79.899  
          OTHER LONG TERM (CURRENT) DRUG THERAPY                     

 

 2017            DAMARIS WYNN            Ot            Z95.5    
        PRESENCE OF CORONARY ANGIOPLASTY IMPLANT                     

 

 2017            DAMARIS WYNN            Ot            F17.210  
          NICOTINE DEPENDENCE, CIGARETTES, UNCOMPL                     

 

 2017            DAMARIS WYNN            Ot            R10.10   
         UPPER ABDOMINAL PAIN, UNSPECIFIED                     

 

 2017            DAMARIS WYNN            Ot            R10.13   
         EPIGASTRIC PAIN                     

 

 2017            DAMARIS WYNN            Ot            Z79.01   
         LONG TERM (CURRENT) USE OF ANTICOAGULANT                     

 

 2017            DAMARIS WYNN            Ot            Z79.82   
         LONG TERM (CURRENT) USE OF ASPIRIN                     

 

 2017            DAMARIS WYNN            Ot            Z79.899  
          OTHER LONG TERM (CURRENT) DRUG THERAPY                     

 

 2017            DAMARIS WYNN            Ot            Z95.5    
        PRESENCE OF CORONARY ANGIOPLASTY IMPLANT                     

 

 2017            MARY GREEN, NORTH WHITTAKER            Ot            I82.403     
       ACUTE EMBOLISM AND THOMBOS UNSP DEEP VEI                     

 

 2017            NORTH HERNANDEZ MD            Ot            Z79.01      
      LONG TERM (CURRENT) USE OF ANTICOAGULANT                     

 

 2017                         Ot            414.00            CORON 
ATHEROSCLER NOS TYPE VESSEL, NATIV                     

 

 2017                         Ot            786.50            CHEST PAIN 
NOS                     

 

 2017                         Ot            397.0            TRICUSPID 
VALVE DISEASE                     

 

 2017                         Ot            414.00            CORON 
ATHEROSCLER NOS TYPE VESSEL, NATIV                     

 

 2017                         Ot            424.0            MITRAL VALVE 
DISORDER                     

 

 2017                         Ot            786.50            CHEST PAIN 
NOS                     

 

 2017                         Ot            272.4            
HYPERLIPIDEMIA NEC/NOS                     

 

 2017                         Ot            401.9            HYPERTENSION 
NOS                     

 

 2017                         Ot            414.01            CORONARY 
ATHEROSCLEROSIS OF NATIVE CORON                     

 

 2017                         Ot            401.9            HYPERTENSION 
NOS                     

 

 2017                         Ot            414.01            CORONARY 
ATHEROSCLEROSIS OF NATIVE CORON                     

 

 2017                         Ot            428.0            CONGESTIVE 
HEART FAILURE NOS                     

 

 2017                         Ot            719.40            JOINT PAIN-
UNSPEC                     

 

 2017                         Ot            782.3            EDEMA       
              

 

 2017                         Ot            V58.61            
ANTICOAGULANTS,LT,CURRENT USE                     

 

 2017            NORTH HERNANDEZ MD            Ot            I11.0       
     HYPERTENSIVE HEART DISEASE WITH HEART FA                     

 

 2017            NORTH HERNANDEZ MD            Ot            I25.10      
      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                      

 

 2017            NORTH HERNANDEZ MD            Ot            I26.99      
      OTHER PULMONARY EMBOLISM WITHOUT ACUTE C                     

 

 2017            NORTH HERNANDEZ MD            Ot            I50.9       
     HEART FAILURE, UNSPECIFIED                     

 

 2017            NORTH HERNANDEZ MD            Ot            R07.9       
     CHEST PAIN, UNSPECIFIED                     

 

 2017            NORTH HERNANDEZ MD            Ot            I25.10      
      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                      

 

 2017            NORTH HERNANDEZ MD            Ot            I50.9       
     HEART FAILURE, UNSPECIFIED                     

 

 2017            NORTH HERNANDEZ MD            Ot            R07.9       
     CHEST PAIN, UNSPECIFIED                     

 

 2017            NORTH HERNANDEZ MD            Ot            I82.403     
       ACUTE EMBOLISM AND THOMBOS UNSP DEEP VEI                     

 

 2017            NORTH HERNANDEZ MD            Ot            Z79.01      
      LONG TERM (CURRENT) USE OF ANTICOAGULANT                     

 

 2017            NORTH HERNANDEZ MD            Ot            I82.403     
       ACUTE EMBOLISM AND THOMBOS UNSP DEEP VEI                     

 

 2017            NORTH HERNANDEZ MD            Ot            Z79.01      
      LONG TERM (CURRENT) USE OF ANTICOAGULANT                     

 

 2017            NORTH HERNANDEZ MD            Ot            I82.403     
       ACUTE EMBOLISM AND THOMBOS UNSP DEEP VEI                     

 

 2017            NORTH HERNANDEZ MD            Ot            Z79.01      
      LONG TERM (CURRENT) USE OF ANTICOAGULANT                     

 

 2017            NORTH EHRNANDEZ MD            Ot            I26.99      
      OTHER PULMONARY EMBOLISM WITHOUT ACUTE C                     

 

 2017            NORTH HERNANDEZ MD            Ot            Z51.81      
      ENCOUNTER FOR THERAPEUTIC DRUG LEVEL MON                     

 

 2017            DAMARIS WYNN            Ot            F17.200  
          NICOTINE DEPENDENCE, UNSPECIFIED, UNCOMP                     

 

 2017            DAMARIS WYNN            Ot            I25.2    
        OLD MYOCARDIAL INFARCTION                     

 

 2017            DAMARIS WYNN            Ot            K21.9    
        GASTRO-ESOPHAGEAL REFLUX DISEASE WITHOUT                     

 

 2017            DAMARIS WYNN            Ot            L50.9    
        URTICARIA, UNSPECIFIED                     

 

 2017            DAMARIS WYNN            Ot            M79.601  
          PAIN IN RIGHT ARM                     

 

 2017            DAMARIS WYNN            Ot            Z79.01   
         LONG TERM (CURRENT) USE OF ANTICOAGULANT                     

 

 2017            DAMARIS WYNN            Ot            Z79.82   
         LONG TERM (CURRENT) USE OF ASPIRIN                     

 

 2017            DAMARIS WYNN            Ot            Z82.49   
         FAMILY HX OF ISCHEM HEART DIS AND OTH DI                     

 

 2017            DAMARIS WYNN            Ot            Z95.5    
        PRESENCE OF CORONARY ANGIOPLASTY IMPLANT                     

 

 2017            NORTH HERNANDEZ MD            Ot            I26.99      
      OTHER PULMONARY EMBOLISM WITHOUT ACUTE C                     

 

 2017            NORTH HERNANDEZ MD            Ot            Z51.81      
      ENCOUNTER FOR THERAPEUTIC DRUG LEVEL 2017            NORTH HERNANDEZ MD            Ot            I26.99      
      OTHER PULMONARY EMBOLISM WITHOUT ACUTE C                     

 

 2017            NORTH HERNANDEZ MD            Ot            Z51.81      
      ENCOUNTER FOR THERAPEUTIC DRUG LEVEL MON                     

 

 09/15/2017            NORTH HERNANDEZ MD            Ot            I26.99      
      OTHER PULMONARY EMBOLISM WITHOUT ACUTE C                     

 

 09/15/2017            NORTH HERNANDEZ MD            Ot            Z51.81      
      ENCOUNTER FOR THERAPEUTIC DRUG LEVEL MON                     

 

 09/15/2017            NORTH HERNANDEZ MD            Ot            Z79.899     
       OTHER LONG TERM (CURRENT) DRUG THERAPY                     

 

 2017            NORTH HERNANDEZ MD            Ot            I26.99      
      OTHER PULMONARY EMBOLISM WITHOUT ACUTE C                     

 

 2017            NORTH HERNANDEZ MD            Ot            Z51.81      
      ENCOUNTER FOR THERAPEUTIC DRUG LEVEL 2017            NORTH HERNANDEZ MD            Ot            Z79.899     
       OTHER LONG TERM (CURRENT) DRUG THERAPY                     

 

 10/06/2017            NORTH HERNANDEZ MD            Ot            I26.99      
      OTHER PULMONARY EMBOLISM WITHOUT ACUTE C                     

 

 10/06/2017            NORTH HERNANDEZ MD            Ot            Z51.81      
      ENCOUNTER FOR THERAPEUTIC DRUG LEVEL MON                     

 

 10/06/2017            NORTH HERNANDEZ MD            Ot            Z79.899     
       OTHER LONG TERM (CURRENT) DRUG THERAPY                     

 

 2017            FELISHA MORENO DO            Ot            D68.59        
    OTHER PRIMARY THROMBOPHILIA                     

 

 2017            MORENO DO, FELISHA            Ot            E78.1         
   PURE HYPERGLYCERIDEMIA                     

 

 2017            MICHAEL MORENO DOI            Ot            E78.5         
   HYPERLIPIDEMIA, UNSPECIFIED                     

 

 2017            MICHAEL MORENO DOI            Ot            F17.210       
     NICOTINE DEPENDENCE, CIGARETTES, UNCOMPL                     

 

 2017            TIFFANIE ALFARO FELISHA            Ot            I10            
ESSENTIAL (PRIMARY) HYPERTENSION                     

 

 2017            TIFFANIE ALFARO FELISHA            Ot            I21.4         
   NON-ST ELEVATION (NSTEMI) MYOCARDIAL INF                     

 

 2017            MICHAEL MORENO DOI            Ot            I25.10        
    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                      

 

 2017            TIFFANIE ALFARO FELISHA            Ot            I25.82        
    CHRONIC TOTAL OCCLUSION OF CORONARY LORI                     

 

 2017            MICHAEL MORENO DOI            Ot            I50.20        
    UNSPECIFIED SYSTOLIC (CONGESTIVE) HEART                      

 

 2017            TIFFANIE ALFARO FELISHA            Ot            K21.9         
   GASTRO-ESOPHAGEAL REFLUX DISEASE WITHOUT                     

 

 2017            MICHAEL MORENO DOI            Ot            R06.2         
   WHEEZING                     

 

 2017            MICHAEL MORENO DOI            Ot            Z79.01        
    LONG TERM (CURRENT) USE OF ANTICOAGULANT                     

 

 2017            MICHAEL MORENO DOI            Ot            Z82.49        
    FAMILY HX OF ISCHEM HEART DIS AND OTH DI                     

 

 2017            FELISHA MORENO DO            Ot            Z95.5         
   PRESENCE OF CORONARY ANGIOPLASTY IMPLANT                     

 

 2017            FELISHA MORENO DO            Ot            D68.59        
    OTHER PRIMARY THROMBOPHILIA                     

 

 2017            MICHAEL MORENO DOI            Ot            E78.1         
   PURE HYPERGLYCERIDEMIA                     

 

 2017            MICHAEL MORENO DOI            Ot            E78.5         
   HYPERLIPIDEMIA, UNSPECIFIED                     

 

 2017            MICHAEL MORENO DOI            Ot            F17.210       
     NICOTINE DEPENDENCE, CIGARETTES, UNCOMPL                     

 

 2017            MICHAEL MORENO DOI            Ot            I10            
ESSENTIAL (PRIMARY) HYPERTENSION                     

 

 2017            TIFFANIE ALFARO FELISHA            Ot            I21.4         
   NON-ST ELEVATION (NSTEMI) MYOCARDIAL INF                     

 

 2017            MICHAEL MORENO DOI            Ot            I25.10        
    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                      

 

 2017            TIFFANIE ALFARO FELISHA            Ot            I25.82        
    CHRONIC TOTAL OCCLUSION OF CORONARY LORI                     

 

 2017            TIFFANIE ALFARO FELISHA            Ot            I50.20        
    UNSPECIFIED SYSTOLIC (CONGESTIVE) HEART                      

 

 2017            MICHAEL MORENO DOI            Ot            K21.9         
   GASTRO-ESOPHAGEAL REFLUX DISEASE WITHOUT                     

 

 2017            FELISHA MORENO DO            Ot            R06.2         
   WHEEZING                     

 

 2017            FELISHA MORENO DO            Ot            Z79.01        
    LONG TERM (CURRENT) USE OF ANTICOAGULANT                     

 

 2017            FELISHA MORENO DO            Ot            Z82.49        
    FAMILY HX OF ISCHEM HEART DIS AND OTH DI                     

 

 2017            FELISHA MORENO DO            Ot            Z95.5         
   PRESENCE OF CORONARY ANGIOPLASTY IMPLANT                     

 

 2017            FELISHA MORENO DO            Ot            D68.59        
    OTHER PRIMARY THROMBOPHILIA                     

 

 2017            FELISHA MORENO DO            Ot            E78.1         
   PURE HYPERGLYCERIDEMIA                     

 

 2017            FELISHA MORENO DO            Ot            E78.5         
   HYPERLIPIDEMIA, UNSPECIFIED                     

 

 2017            FELISHA MORENO DO            Ot            F17.210       
     NICOTINE DEPENDENCE, CIGARETTES, UNCOMPL                     

 

 2017            FELISHA MORENO DO            Ot            I10            
ESSENTIAL (PRIMARY) HYPERTENSION                     

 

 2017            FELISHA MORENO DO            Ot            I21.4         
   NON-ST ELEVATION (NSTEMI) MYOCARDIAL INF                     

 

 2017            FELISHA MORENO DO            Ot            I25.10        
    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                      

 

 2017            FELISHA MORENO DO            Ot            I25.82        
    CHRONIC TOTAL OCCLUSION OF CORONARY LORI                     

 

 2017            FELISHA MORENO DO            Ot            I50.20        
    UNSPECIFIED SYSTOLIC (CONGESTIVE) HEART                      

 

 2017            FELISHA MORENO DO            Ot            K21.9         
   GASTRO-ESOPHAGEAL REFLUX DISEASE WITHOUT                     

 

 2017            FELISHA MORENO DO            Ot            R06.2         
   WHEEZING                     

 

 2017            FELISHA MORENO DO            Ot            Z79.01        
    LONG TERM (CURRENT) USE OF ANTICOAGULANT                     

 

 2017            FELISHA MORENO DO            Ot            Z82.49        
    FAMILY HX OF ISCHEM HEART DIS AND OTH DI                     

 

 2017            FELISHA MORENO DO            Ot            Z95.5         
   PRESENCE OF CORONARY ANGIOPLASTY IMPLANT                     

 

 2017                         Ot            414.00            CORON 
ATHEROSCLER NOS TYPE VESSEL, NATIV                     

 

 2017                         Ot            786.50            CHEST PAIN 
NOS                     

 

 2017                         Ot            397.0            TRICUSPID 
VALVE DISEASE                     

 

 2017                         Ot            414.00            CORON 
ATHEROSCLER NOS TYPE VESSEL, NATIV                     

 

 2017                         Ot            424.0            MITRAL VALVE 
DISORDER                     

 

 2017                         Ot            786.50            CHEST PAIN 
NOS                     

 

 2017                         Ot            272.4            
HYPERLIPIDEMIA NEC/NOS                     

 

 2017                         Ot            401.9            HYPERTENSION 
NOS                     

 

 2017                         Ot            414.01            CORONARY 
ATHEROSCLEROSIS OF NATIVE CORON                     

 

 2017                         Ot            401.9            HYPERTENSION 
NOS                     

 

 2017                         Ot            414.01            CORONARY 
ATHEROSCLEROSIS OF NATIVE CORON                     

 

 2017                         Ot            428.0            CONGESTIVE 
HEART FAILURE NOS                     

 

 2017                         Ot            719.40            JOINT PAIN-
UNSPEC                     

 

 2017                         Ot            782.3            EDEMA       
              

 

 2017                         Ot            V58.61            
ANTICOAGULANTS,LT,CURRENT USE                     

 

 2017            NORTH HERNANDEZ MD            Ot            I11.0       
     HYPERTENSIVE HEART DISEASE WITH HEART FA                     

 

 2017            NORTH HERNANDEZ MD            Ot            I25.10      
      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                      

 

 2017            NORTH HERNANDEZ MD            Ot            I26.99      
      OTHER PULMONARY EMBOLISM WITHOUT ACUTE C                     

 

 2017            NORTH HERNANDEZ MD            Ot            I50.9       
     HEART FAILURE, UNSPECIFIED                     

 

 2017            NORTH HERNANDEZ MD            Ot            R07.9       
     CHEST PAIN, UNSPECIFIED                     

 

 2017            NORTH HERNANDEZ MD            Ot            I25.10      
      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                      

 

 2017            NORTH HERNANDEZ MD            Ot            I50.9       
     HEART FAILURE, UNSPECIFIED                     

 

 2017            NORTH HERNANDEZ MD            Ot            R07.9       
     CHEST PAIN, UNSPECIFIED                     

 

 2017            NORTH HERNANDEZ MD            Ot            I82.403     
       ACUTE EMBOLISM AND THOMBOS UNSP DEEP VEI                     

 

 2017            NORTH HERNANDEZ MD            Ot            Z79.01      
      LONG TERM (CURRENT) USE OF ANTICOAGULANT                     

 

 2017            NORTH HERNANDEZ MD            Ot            I26.99      
      OTHER PULMONARY EMBOLISM WITHOUT ACUTE C                     

 

 2017            NORTH HERNANDEZ MD            Ot            Z51.81      
      ENCOUNTER FOR THERAPEUTIC DRUG LEVEL MON                     

 

 2017            NORTH HERNANDEZ MD            Ot            I26.99      
      OTHER PULMONARY EMBOLISM WITHOUT ACUTE C                     

 

 2017            NORTH HERNANDEZ MD            Ot            Z51.81      
      ENCOUNTER FOR THERAPEUTIC DRUG LEVEL MON                     

 

 2017            NORTH HERNANDEZ MD            Ot            Z79.899     
       OTHER LONG TERM (CURRENT) DRUG THERAPY                     

 

 2017            DAMARIS WYNN            Ot            E78.00   
         PURE HYPERCHOLESTEROLEMIA, UNSPECIFIED                     

 

 2017            DAMARIS WYNN            Ot            I10      
      ESSENTIAL (PRIMARY) HYPERTENSION                     

 

 2017            DAMARIS WYNN            Ot            I25.2    
        OLD MYOCARDIAL INFARCTION                     

 

 2017            DAMARIS WYNN            Ot            K21.9    
        GASTRO-ESOPHAGEAL REFLUX DISEASE WITHOUT                     

 

 2017            ADMARIS WYNN            Ot            R22.2    
        LOCALIZED SWELLING, MASS AND LUMP, TRUNK                     

 

 2017            DAMARIS WYNN            Ot            S20.212A 
           CONTUSION OF LEFT FRONT WALL OF THORAX,                      

 

 2017            DAMARIS WYNN            Ot            X58.XXXA 
           EXPOSURE TO OTHER SPECIFIED FACTORS, INI                     

 

 2017            DAMARIS WYNN            Ot            Z79.01   
         LONG TERM (CURRENT) USE OF ANTICOAGULANT                     

 

 2017            DAMARIS WYNN            Ot            Z79.82   
         LONG TERM (CURRENT) USE OF ASPIRIN                     

 

 2017            DAMARIS WYNN            Ot            Z82.49   
         FAMILY HX OF ISCHEM HEART DIS AND OTH DI                     

 

 2017            DAMARIS WYNN            Ot            Z95.5    
        PRESENCE OF CORONARY ANGIOPLASTY IMPLANT                     

 

 2017            NORTH HERNANDEZ MD            Ot            I26.99      
      OTHER PULMONARY EMBOLISM WITHOUT ACUTE C                     

 

 2017            NORTH HERNANDEZ MD            Ot            Z79.01      
      LONG TERM (CURRENT) USE OF ANTICOAGULANT                     

 

 2017            NORTH HERNANDEZ MD            Ot            I50.9       
     HEART FAILURE, UNSPECIFIED                     

 

 12/15/2017                         Ot            414.00            CORON 
ATHEROSCLER NOS TYPE VESSEL, NATIV                     

 

 12/15/2017                         Ot            786.50            CHEST PAIN 
NOS                     

 

 12/15/2017                         Ot            397.0            TRICUSPID 
VALVE DISEASE                     

 

 12/15/2017                         Ot            414.00            CORON 
ATHEROSCLER NOS TYPE VESSEL, NATIV                     

 

 12/15/2017                         Ot            424.0            MITRAL VALVE 
DISORDER                     

 

 12/15/2017                         Ot            786.50            CHEST PAIN 
NOS                     

 

 12/15/2017                         Ot            272.4            
HYPERLIPIDEMIA NEC/NOS                     

 

 12/15/2017                         Ot            401.9            HYPERTENSION 
NOS                     

 

 12/15/2017                         Ot            414.01            CORONARY 
ATHEROSCLEROSIS OF NATIVE CORON                     

 

 12/15/2017                         Ot            401.9            HYPERTENSION 
NOS                     

 

 12/15/2017                         Ot            414.01            CORONARY 
ATHEROSCLEROSIS OF NATIVE CORON                     

 

 12/15/2017                         Ot            428.0            CONGESTIVE 
HEART FAILURE NOS                     

 

 12/15/2017                         Ot            719.40            JOINT PAIN-
UNSPEC                     

 

 12/15/2017                         Ot            782.3            EDEMA       
              

 

 12/15/2017                         Ot            V58.61            
ANTICOAGULANTS,LT,CURRENT USE                     

 

 12/15/2017            NORTH HERNANDEZ MD            Ot            I11.0       
     HYPERTENSIVE HEART DISEASE WITH HEART FA                     

 

 12/15/2017            NORTH HERNANDEZ MD            Ot            I25.10      
      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                      

 

 12/15/2017            NORTH HERNANDEZ MD            Ot            I26.99      
      OTHER PULMONARY EMBOLISM WITHOUT ACUTE C                     

 

 12/15/2017            NORTH HERNANDEZ MD            Ot            I50.9       
     HEART FAILURE, UNSPECIFIED                     

 

 12/15/2017            NORTH HERNANDEZ MD            Ot            R07.9       
     CHEST PAIN, UNSPECIFIED                     

 

 12/15/2017            NORTH HERNANDEZ MD            Ot            I25.10      
      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                      

 

 12/15/2017            NORTH HERNANDEZ MD            Ot            I50.9       
     HEART FAILURE, UNSPECIFIED                     

 

 12/15/2017            NORTH HERNANDEZ MD            Ot            R07.9       
     CHEST PAIN, UNSPECIFIED                     

 

 12/15/2017            NORTH HERNANDEZ MD            Ot            I82.403     
       ACUTE EMBOLISM AND THOMBOS UNSP DEEP VEI                     

 

 12/15/2017            NORTH HERNANDEZ MD            Ot            Z79.01      
      LONG TERM (CURRENT) USE OF ANTICOAGULANT                     

 

 12/15/2017            NORTH HERNANDEZ MD            Ot            I26.99      
      OTHER PULMONARY EMBOLISM WITHOUT ACUTE C                     

 

 12/15/2017            NORTH HERNANDEZ MD            Ot            Z51.81      
      ENCOUNTER FOR THERAPEUTIC DRUG LEVEL MON                     

 

 12/15/2017            NORTH HERNANDEZ MD            Ot            I26.99      
      OTHER PULMONARY EMBOLISM WITHOUT ACUTE C                     

 

 12/15/2017            NORTH HERNANDEZ MD            Ot            Z79.01      
      LONG TERM (CURRENT) USE OF ANTICOAGULANT                     

 

 12/15/2017            NORTH HERNANDEZ MD            Ot            I50.9       
     HEART FAILURE, UNSPECIFIED                     

 

 2017            NORTH HERNANDEZ MD            Ot            I26.99      
      OTHER PULMONARY EMBOLISM WITHOUT ACUTE C                     

 

 2017            NORTH HERNANDEZ MD            Ot            Z79.01      
      LONG TERM (CURRENT) USE OF ANTICOAGULANT                     

 

 2017            CANDE PIKE MD            Ot            I25.119       
     ATHSCL HEART DISEASE OF NATIVE COR ART W                     

 

 2017            CANDE PIKE MD            Ot            Z01.812       
     ENCOUNTER FOR PREPROCEDURAL LABORATORY E                     

 

 2017            NORTH HERNANDEZ MD            Ot            I26.99      
      OTHER PULMONARY EMBOLISM WITHOUT ACUTE C                     

 

 2017            NORTH HERNANDEZ MD            Ot            Z79.01      
      LONG TERM (CURRENT) USE OF ANTICOAGULANT                     

 

 2018            NORTH HERNANDEZ MD            Ot            I26.99      
      OTHER PULMONARY EMBOLISM WITHOUT ACUTE C                     

 

 2018            NORTH HERNANDEZ MD            Ot            Z79.01      
      LONG TERM (CURRENT) USE OF ANTICOAGULANT                     

 

 2018            NORTH HERNANDEZ MD            Ot            I26.99      
      OTHER PULMONARY EMBOLISM WITHOUT ACUTE C                     

 

 2018            NORTH HERNANDEZ MD            Ot            Z79.01      
      LONG TERM (CURRENT) USE OF ANTICOAGULANT                     

 

 2018            NORTH HERNANDEZ MD            Ot            I10         
   ESSENTIAL (PRIMARY) HYPERTENSION                     

 

 2018            NORTH HERNANDEZ MD            Ot            I25.10      
      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                      

 

 2018            NORTH HERNANDEZ MD            Ot            R06.00      
      DYSPNEA, UNSPECIFIED                     

 

 2018            NORTH HERNANDEZ MD            Ot            R07.89      
      OTHER CHEST PAIN                     

 

 2018            NORTH HERNANDEZ MD            Ot            I50.9       
     HEART FAILURE, UNSPECIFIED                     

 

 2018            RADHA ARELLANO            Ot            E78.00     
       PURE HYPERCHOLESTEROLEMIA, UNSPECIFIED                     

 

 2018            RADHA ARELLANO            Ot            I10        
    ESSENTIAL (PRIMARY) HYPERTENSION                     

 

 2018            RADHA ARELLANO            Ot            I20.9      
      ANGINA PECTORIS, UNSPECIFIED                     

 

 2018            RADHA ARELLANO            Ot            I25.2      
      OLD MYOCARDIAL INFARCTION                     

 

 2018            RADHA ARELLANO            Ot            K21.9      
      GASTRO-ESOPHAGEAL REFLUX DISEASE WITHOUT                     

 

 2018            RADHA ARELLANO            Ot            M10.9      
      GOUT, UNSPECIFIED                     

 

 2018            RADHA ARELLANO            Ot            M79.672    
        PAIN IN LEFT FOOT                     

 

 2018            RADHA ARELLANO            Ot            Z79.01     
       LONG TERM (CURRENT) USE OF ANTICOAGULANT                     

 

 2018            RADHA ARELLANO            Ot            Z79.02     
       LONG TERM (CURRENT) USE OF ANTITHROMBOTI                     

 

 2018            RADHA ARELLANO            Ot            Z79.82     
       LONG TERM (CURRENT) USE OF ASPIRIN                     

 

 2018            RADHA ARELLANO            Ot            Z82.49     
       FAMILY HX OF ISCHEM HEART DIS AND OTH DI                     

 

 2018            RADHA ARELLANO            Ot            Z86.718    
        PERSONAL HISTORY OF OTHER VENOUS THROMBO                     

 

 2018            RADHA ARELLANO            Ot            Z95.5      
      PRESENCE OF CORONARY ANGIOPLASTY IMPLANT                     

 

 2018            NORTH HERNANDEZ MD, Ot            I10         
   ESSENTIAL (PRIMARY) HYPERTENSION                     

 

 2018            NORTH HERNANDEZ MD, Ot            I25.10      
      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                      

 

 2018            NORTH HERNANDEZ MD, Ot            R06.00      
      DYSPNEA, UNSPECIFIED                     

 

 2018            NORTH HERNANDEZ MD, Ot            R07.89      
      OTHER CHEST PAIN                     



                                                                               
                                                                               
                                                                               
                                                                               
                                                                               
                                                                               
                                                                               
                                                                               
                                                                               
                                                                               
                                                                               
                                                                               
                                                                               
                                                                               
                                                                               
                                                                               
                                                                               
                                                                               
                                                                               
   



Procedures

      





 Code            Description            Performed By            Performed On   
     

 

             CARDIOLOG                                  NOR[facility] HERNANDEZ          
                         2014        

 

             [id number]                                  DILATION OF 1 COR ART 
WITH DRUG-ELUT INT                                   2017        

 

             [id number]                                  MEASURE OF CARDIAC SAMPL 
  PRESSURE, L H                                   2017        

 

             [id number]                                  FLUOROSCOPY OF MULT COR 
ART USING L OSM                                    2017        

 

             [id number]                                  FLUOROSCOPY OF LEFT HEART 
USING LOW OSMO                                   2017        



                          



Results

      





 Test            Result            Range        









 Complete urinalysis with reflex to culture - 16 10:52         









 Urine color determination            YELLOW             NRG        

 

 Urine clarity determination            CLEAR             NRG        

 

 Urine pH measurement by test strip            8             5-9        

 

 Specific gravity of urine by test strip            1.010             1.016-
1.022        

 

 Urine protein assay by test strip, semi-quantitative            NEGATIVE      
       NEGATIVE        

 

 Urine glucose detection by automated test strip            NEGATIVE           
  NEGATIVE        

 

 Erythrocytes detection in urine sediment by light microscopy            
NEGATIVE             NEGATIVE        

 

 Urine ketones detection by automated test strip            NEGATIVE           
  NEGATIVE        

 

 Urine nitrite detection by test strip            NEGATIVE             NEGATIVE
        

 

 Urine total bilirubin detection by test strip            NEGATIVE             
NEGATIVE        

 

 Urine urobilinogen measurement by automated test strip (mass/volume)          
  NORMAL             NORMAL        

 

 Urine leukocyte esterase detection by dipstick            NEGATIVE             
NEGATIVE        

 

 Automated urine sediment erythrocyte count by microscopy (number/high power 
field)            NONE             NRG        

 

 Automated urine sediment leukocyte count by microscopy (number/high power field
)            NONE             NRG        

 

 Bacteria detection in urine sediment by light microscopy            NEGATIVE  
           NRG        

 

 Crystals detection in urine sediment by light microscopy            NONE      
       NRG        

 

 Casts detection in urine sediment by light microscopy            NONE         
    NRG        

 

 Mucus detection in urine sediment by light microscopy            NEGATIVE     
        NRG        

 

 Complete urinalysis with reflex to culture            NO             NRG      
  









 Automated blood complete blood count (hemogram) panel - 16 11:00         









 Blood leukocytes automated count (number/volume)            9.9 10*3/uL       
     4.3-11.0        

 

 Blood erythrocytes automated count (number/volume)            5.46 10*6/uL    
        4.35-5.85        

 

 Venous blood hemoglobin measurement (mass/volume)            15.7 g/dL        
    13.3-17.7        

 

 Blood hematocrit (volume fraction)            47 %            40-54        

 

 Automated erythrocyte mean corpuscular volume            86 [foz_us]          
  80-99        

 

 Automated erythrocyte mean corpuscular hemoglobin (mass per erythrocyte)      
      29 pg            25-34        

 

 Automated erythrocyte mean corpuscular hemoglobin concentration measurement (
mass/volume)            33 g/dL            32-36        

 

 Automated erythrocyte distribution width ratio            13.9 %            
10.0-14.5        

 

 Automated blood platelet count (count/volume)            282 10*3/uL          
  130-400        

 

 Automated blood platelet mean volume measurement            9.0 [foz_us]      
      7.4-10.4        









 PT panel in platelet poor plasma by coagulation assay - 16 11:00         









 Prothrombin time (PT) in platelet poor plasma by coagulation assay            
22.5 s            12.2-14.7        

 

 INR in platelet poor plasma or blood by coagulation assay            2.0      
       0.8-1.4        









 Activated partial thromboplastin time (aPTT) in platelet poor plasma 
bycoagulation assay - 16 11:00         









 Activated partial thromboplastin time (aPTT) in platelet poor plasma 
bycoagulation assay            45 s            24-35        









 Comprehensive metabolic panel - 16 11:00         









 Serum or plasma sodium measurement (moles/volume)            140 mmol/L       
     135-145        

 

 Serum or plasma potassium measurement (moles/volume)            4.1 mmol/L    
        3.6-5.0        

 

 Serum or plasma chloride measurement (moles/volume)            103 mmol/L     
               

 

 Carbon dioxide            27 mmol/L            21-32        

 

 Serum or plasma anion gap determination (moles/volume)            10 mmol/L   
         5-14        

 

 Serum or plasma urea nitrogen measurement (mass/volume)            9 mg/dL    
        7-18        

 

 Serum or plasma creatinine measurement (mass/volume)            0.79 mg/dL    
        0.60-1.30        

 

 Serum or plasma urea nitrogen/creatinine mass ratio            11             
NRG        

 

 Serum or plasma creatinine measurement with calculation of estimated 
glomerular filtration rate            >             NRG        

 

 Serum or plasma glucose measurement (mass/volume)            96 mg/dL         
           

 

 Serum or plasma calcium measurement (mass/volume)            9.5 mg/dL        
    8.5-10.1        

 

 Serum or plasma total bilirubin measurement (mass/volume)            0.8 mg/dL
            0.1-1.0        

 

 Serum or plasma alkaline phosphatase measurement (enzymatic activity/volume)  
          79 U/L                    

 

 Serum or plasma aspartate aminotransferase measurement (enzymatic activity/
volume)            26 U/L            5-34        

 

 Serum or plasma alanine aminotransferase measurement (enzymatic activity/volume
)            27 U/L            0-55        

 

 Serum or plasma protein measurement (mass/volume)            7.3 g/dL         
   6.4-8.2        

 

 Serum or plasma albumin measurement (mass/volume)            4.4 g/dL         
   3.2-4.5        









 Lipid 1996 panel - 16 11:00         









 Serum or plasma triglyceride measurement (mass/volume)            278 mg/dL   
         <150        

 

 Serum or plasma cholesterol measurement (mass/volume)            241 mg/dL    
        < 200        

 

 Serum or plasma cholesterol in HDL measurement (mass/volume)            34 mg/
dL            40-60        

 

 Cholesterol in LDL [mass/volume] in serum or plasma by direct assay            
165 mg/dL            1-129        

 

 Serum or plasma cholesterol in VLDL measurement (mass/volume)            56 mg/
dL            5-40        









 Methicillin resistant Staphylococcus aureus (MRSA) screening culture -  11:00         









 Methicillin resistant Staphylococcus aureus (MRSA) screening culture          
  NEG             NRG        









 PT panel in platelet poor plasma by coagulation assay - 17 14:21         









 Prothrombin time (PT) in platelet poor plasma by coagulation assay            
27.0 s            12.2-14.7        

 

 INR in platelet poor plasma or blood by coagulation assay            2.5      
       0.8-1.4        









 Complete blood count (CBC) with automated white blood cell (WBC) differential 
- 17 11:03         









 Blood leukocytes automated count (number/volume)            8.9 10*3/uL       
     4.3-11.0        

 

 Blood erythrocytes automated count (number/volume)            4.97 10*6/uL    
        4.35-5.85        

 

 Venous blood hemoglobin measurement (mass/volume)            14.6 g/dL        
    13.3-17.7        

 

 Blood hematocrit (volume fraction)            43 %            40-54        

 

 Automated erythrocyte mean corpuscular volume            86 [foz_us]          
  80-99        

 

 Automated erythrocyte mean corpuscular hemoglobin (mass per erythrocyte)      
      29 pg            25-34        

 

 Automated erythrocyte mean corpuscular hemoglobin concentration measurement (
mass/volume)            34 g/dL            32-36        

 

 Automated erythrocyte distribution width ratio            13.7 %            
10.0-14.5        

 

 Automated blood platelet count (count/volume)            285 10*3/uL          
  130-400        

 

 Automated blood platelet mean volume measurement            9.7 [foz_us]      
      7.4-10.4        

 

 Automated blood neutrophils/100 leukocytes            44 %            42-75   
     

 

 Automated blood lymphocytes/100 leukocytes            40 %            12-44   
     

 

 Blood monocytes/100 leukocytes            11 %            0-12        

 

 Automated blood eosinophils/100 leukocytes            4 %            0-10     
   

 

 Automated blood basophils/100 leukocytes            1 %            0-10        

 

 Blood neutrophils automated count (number/volume)            3.9 10*3         
   1.8-7.8        

 

 Blood lymphocytes automated count (number/volume)            3.5 10*3         
   1.0-4.0        

 

 Blood monocytes automated count (number/volume)            1.0 10*3            
0.0-1.0        

 

 Automated eosinophil count            0.4 10*3/uL            0.0-0.3        

 

 Automated blood basophil count (count/volume)            0.1 10*3/uL          
  0.0-0.1        









 Comprehensive metabolic panel - 17 11:03         









 Serum or plasma sodium measurement (moles/volume)            140 mmol/L       
     135-145        

 

 Serum or plasma potassium measurement (moles/volume)            4.5 mmol/L    
        3.6-5.0        

 

 Serum or plasma chloride measurement (moles/volume)            105 mmol/L     
               

 

 Carbon dioxide            27 mmol/L            21-32        

 

 Serum or plasma anion gap determination (moles/volume)            8 mmol/L    
        5-14        

 

 Serum or plasma urea nitrogen measurement (mass/volume)            8 mg/dL    
        7-18        

 

 Serum or plasma creatinine measurement (mass/volume)            0.76 mg/dL    
        0.60-1.30        

 

 Serum or plasma urea nitrogen/creatinine mass ratio            11             
NRG        

 

 Serum or plasma creatinine measurement with calculation of estimated 
glomerular filtration rate            >             NRG        

 

 Serum or plasma glucose measurement (mass/volume)            91 mg/dL         
           

 

 Serum or plasma calcium measurement (mass/volume)            8.9 mg/dL        
    8.5-10.1        

 

 Serum or plasma total bilirubin measurement (mass/volume)            0.3 mg/dL
            0.1-1.0        

 

 Serum or plasma alkaline phosphatase measurement (enzymatic activity/volume)  
          61 U/L                    

 

 Serum or plasma aspartate aminotransferase measurement (enzymatic activity/
volume)            24 U/L            5-34        

 

 Serum or plasma alanine aminotransferase measurement (enzymatic activity/volume
)            18 U/L            0-55        

 

 Serum or plasma protein measurement (mass/volume)            6.4 g/dL         
   6.4-8.2        

 

 Serum or plasma albumin measurement (mass/volume)            3.9 g/dL         
   3.2-4.5        









 Lipase - 17 11:03         









 Lipase            14 U/L            8-78        









 PT panel in platelet poor plasma by coagulation assay - 08/15/17 11:03         









 Prothrombin time (PT) in platelet poor plasma by coagulation assay            
25.0 s            12.2-14.7        

 

 INR in platelet poor plasma or blood by coagulation assay            2.3      
       0.8-1.4        









 PT panel in platelet poor plasma by coagulation assay - 17 11:00         









 Prothrombin time (PT) in platelet poor plasma by coagulation assay            
20.4 s            12.2-14.7        

 

 INR in platelet poor plasma or blood by coagulation assay            1.7      
       0.8-1.4        









 PT panel in platelet poor plasma by coagulation assay - 17 08:15         









 Prothrombin time (PT) in platelet poor plasma by coagulation assay            
28.3 s            12.2-14.7        

 

 INR in platelet poor plasma or blood by coagulation assay            2.7      
       0.8-1.4        









 Complete blood count (CBC) with automated white blood cell (WBC) differential 
- 17 15:15         









 Blood leukocytes automated count (number/volume)            13.4 10*3/uL      
      4.3-11.0        

 

 Blood erythrocytes automated count (number/volume)            5.18 10*6/uL    
        4.35-5.85        

 

 Venous blood hemoglobin measurement (mass/volume)            15.5 g/dL        
    13.3-17.7        

 

 Blood hematocrit (volume fraction)            44 %            40-54        

 

 Automated erythrocyte mean corpuscular volume            86 [foz_us]          
  80-99        

 

 Automated erythrocyte mean corpuscular hemoglobin (mass per erythrocyte)      
      30 pg            25-34        

 

 Automated erythrocyte mean corpuscular hemoglobin concentration measurement (
mass/volume)            35 g/dL            32-36        

 

 Automated erythrocyte distribution width ratio            13.7 %            
10.0-14.5        

 

 Automated blood platelet count (count/volume)            336 10*3/uL          
  130-400        

 

 Automated blood platelet mean volume measurement            9.0 [foz_us]      
      7.4-10.4        

 

 Automated blood neutrophils/100 leukocytes            46 %            42-75   
     

 

 Automated blood lymphocytes/100 leukocytes            39 %            12-44   
     

 

 Blood monocytes/100 leukocytes            11 %            0-12        

 

 Automated blood eosinophils/100 leukocytes            3 %            0-10     
   

 

 Automated blood basophils/100 leukocytes            1 %            0-10        

 

 Blood neutrophils automated count (number/volume)            6.1 10*3         
   1.8-7.8        

 

 Blood lymphocytes automated count (number/volume)            5.2 10*3         
   1.0-4.0        

 

 Blood monocytes automated count (number/volume)            1.5 10*3            
0.0-1.0        

 

 Automated eosinophil count            0.4 10*3/uL            0.0-0.3        

 

 Automated blood basophil count (count/volume)            0.1 10*3/uL          
  0.0-0.1        









 Comprehensive metabolic panel - 17 15:15         









 Serum or plasma sodium measurement (moles/volume)            140 mmol/L       
     135-145        

 

 Serum or plasma potassium measurement (moles/volume)            3.5 mmol/L    
        3.6-5.0        

 

 Serum or plasma chloride measurement (moles/volume)            102 mmol/L     
               

 

 Carbon dioxide            27 mmol/L            21-32        

 

 Serum or plasma anion gap determination (moles/volume)            11 mmol/L   
         5-14        

 

 Serum or plasma urea nitrogen measurement (mass/volume)            9 mg/dL    
        7-18        

 

 Serum or plasma creatinine measurement (mass/volume)            0.82 mg/dL    
        0.60-1.30        

 

 Serum or plasma urea nitrogen/creatinine mass ratio            11             
NRG        

 

 Serum or plasma creatinine measurement with calculation of estimated 
glomerular filtration rate            >             NRG        

 

 Serum or plasma glucose measurement (mass/volume)            102 mg/dL        
            

 

 Serum or plasma calcium measurement (mass/volume)            9.4 mg/dL        
    8.5-10.1        

 

 Serum or plasma total bilirubin measurement (mass/volume)            0.4 mg/dL
            0.1-1.0        

 

 Serum or plasma alkaline phosphatase measurement (enzymatic activity/volume)  
          78 U/L                    

 

 Serum or plasma aspartate aminotransferase measurement (enzymatic activity/
volume)            17 U/L            5-34        

 

 Serum or plasma alanine aminotransferase measurement (enzymatic activity/volume
)            17 U/L            0-55        

 

 Serum or plasma protein measurement (mass/volume)            7.6 g/dL         
   6.4-8.2        

 

 Serum or plasma albumin measurement (mass/volume)            4.3 g/dL         
   3.2-4.5        









 Magnesium - 17 15:15         









 Magnesium            2.1 mg/dL            1.8-2.4        









 PT panel in platelet poor plasma by coagulation assay - 17 15:15         









 Prothrombin time (PT) in platelet poor plasma by coagulation assay            
15.7 s            12.2-14.7        

 

 INR in platelet poor plasma or blood by coagulation assay            1.2      
       0.8-1.4        









 Activated partial thromboplastin time (aPTT) in platelet poor plasma 
bycoagulation assay - 17 15:15         









 Activated partial thromboplastin time (aPTT) in platelet poor plasma 
bycoagulation assay            28 s            24-35        









 Serum or plasma troponin i.cardiac measurement (mass/volume) - 17 15:15 
        









 Serum or plasma troponin i.cardiac measurement (mass/volume)            < ng/
mL            <0.30        









 Fibrin D-dimer FEU measurement in platelet poor plasma (mass/volume) -  15:15         









 Fibrin D-dimer FEU measurement in platelet poor plasma (mass/volume)          
  0.28 ug/mL            0.00-0.49        









 Myoglobin, serum - 17 15:15         









 Myoglobin, serum            31.5 ng/mL            10.0-92.0        









 Serum or plasma troponin i.cardiac measurement (mass/volume) - 17 21:45 
        









 Serum or plasma troponin i.cardiac measurement (mass/volume)            0.57 ng
/mL            <0.30        









 Complete blood count (CBC) with automated white blood cell (WBC) differential 
- 17 05:24         









 Blood leukocytes automated count (number/volume)            15.1 10*3/uL      
      4.3-11.0        

 

 Blood erythrocytes automated count (number/volume)            4.61 10*6/uL    
        4.35-5.85        

 

 Venous blood hemoglobin measurement (mass/volume)            13.3 g/dL        
    13.3-17.7        

 

 Blood hematocrit (volume fraction)            40 %            40-54        

 

 Automated erythrocyte mean corpuscular volume            87 [foz_us]          
  80-99        

 

 Automated erythrocyte mean corpuscular hemoglobin (mass per erythrocyte)      
      29 pg            25-34        

 

 Automated erythrocyte mean corpuscular hemoglobin concentration measurement (
mass/volume)            33 g/dL            32-36        

 

 Automated erythrocyte distribution width ratio            13.8 %            
10.0-14.5        

 

 Automated blood platelet count (count/volume)            278 10*3/uL          
  130-400        

 

 Automated blood platelet mean volume measurement            9.5 [foz_us]      
      7.4-10.4        

 

 Automated blood neutrophils/100 leukocytes            77 %            42-75   
     

 

 Automated blood lymphocytes/100 leukocytes            14 %            12-44   
     

 

 Blood monocytes/100 leukocytes            8 %            0-12        

 

 Automated blood eosinophils/100 leukocytes            0 %            0-10     
   

 

 Automated blood basophils/100 leukocytes            0 %            0-10        

 

 Blood neutrophils automated count (number/volume)            11.7 10*3        
    1.8-7.8        

 

 Blood lymphocytes automated count (number/volume)            2.1 10*3         
   1.0-4.0        

 

 Blood monocytes automated count (number/volume)            1.2 10*3            
0.0-1.0        

 

 Automated eosinophil count            0.0 10*3/uL            0.0-0.3        

 

 Automated blood basophil count (count/volume)            0.0 10*3/uL          
  0.0-0.1        









 Comprehensive metabolic panel - 17 05:24         









 Serum or plasma sodium measurement (moles/volume)            137 mmol/L       
     135-145        

 

 Serum or plasma potassium measurement (moles/volume)            3.8 mmol/L    
        3.6-5.0        

 

 Serum or plasma chloride measurement (moles/volume)            103 mmol/L     
               

 

 Carbon dioxide            25 mmol/L            21-32        

 

 Serum or plasma anion gap determination (moles/volume)            9 mmol/L    
        5-14        

 

 Serum or plasma urea nitrogen measurement (mass/volume)            8 mg/dL    
        7-18        

 

 Serum or plasma creatinine measurement (mass/volume)            0.67 mg/dL    
        0.60-1.30        

 

 Serum or plasma urea nitrogen/creatinine mass ratio            12             
NRG        

 

 Serum or plasma creatinine measurement with calculation of estimated 
glomerular filtration rate            >             NRG        

 

 Serum or plasma glucose measurement (mass/volume)            123 mg/dL        
            

 

 Serum or plasma calcium measurement (mass/volume)            8.7 mg/dL        
    8.5-10.1        

 

 Serum or plasma total bilirubin measurement (mass/volume)            0.7 mg/dL
            0.1-1.0        

 

 Serum or plasma alkaline phosphatase measurement (enzymatic activity/volume)  
          64 U/L                    

 

 Serum or plasma aspartate aminotransferase measurement (enzymatic activity/
volume)            95 U/L            5-34        

 

 Serum or plasma alanine aminotransferase measurement (enzymatic activity/volume
)            26 U/L            0-55        

 

 Serum or plasma protein measurement (mass/volume)            6.3 g/dL         
   6.4-8.2        

 

 Serum or plasma albumin measurement (mass/volume)            3.7 g/dL         
   3.2-4.5        









 Lipid 1996 panel - 17 05:24         









 Serum or plasma triglyceride measurement (mass/volume)            185 mg/dL   
         <150        

 

 Serum or plasma cholesterol measurement (mass/volume)            192 mg/dL    
        < 200        

 

 Serum or plasma cholesterol in HDL measurement (mass/volume)            34 mg/
dL            40-60        

 

 Cholesterol in LDL [mass/volume] in serum or plasma by direct assay            
143 mg/dL            1-129        

 

 Serum or plasma cholesterol in VLDL measurement (mass/volume)            37 mg/
dL            5-40        









 Blood manual differential performed detection - 17 05:24         









 Blood monocytes/100 leukocytes            14 %            NRG        

 

 Manual blood segmented neutrophils/100 leukocytes            73 %            
NRG        

 

 Blood band neutrophils/100 leukocytes            0 %            NRG        

 

 Manual blood lymphocytes/100 leukocytes            13 %            NRG        

 

 Blood erythrocyte morphology finding identification            NORMAL         
    NRG        









 Automated blood complete blood count (hemogram) panel - 17 03:08         









 Blood leukocytes automated count (number/volume)            11.1 10*3/uL      
      4.3-11.0        

 

 Blood erythrocytes automated count (number/volume)            4.39 10*6/uL    
        4.35-5.85        

 

 Venous blood hemoglobin measurement (mass/volume)            12.8 g/dL        
    13.3-17.7        

 

 Blood hematocrit (volume fraction)            39 %            40-54        

 

 Automated erythrocyte mean corpuscular volume            88 [foz_us]          
  80-99        

 

 Automated erythrocyte mean corpuscular hemoglobin (mass per erythrocyte)      
      29 pg            25-34        

 

 Automated erythrocyte mean corpuscular hemoglobin concentration measurement (
mass/volume)            33 g/dL            32-36        

 

 Automated erythrocyte distribution width ratio            14.0 %            
10.0-14.5        

 

 Automated blood platelet count (count/volume)            252 10*3/uL          
  130-400        

 

 Automated blood platelet mean volume measurement            9.6 [foz_us]      
      7.4-10.4        









 Whole blood basic metabolic panel - 17 03:08         









 Serum or plasma sodium measurement (moles/volume)            139 mmol/L       
     135-145        

 

 Serum or plasma potassium measurement (moles/volume)            4.0 mmol/L    
        3.6-5.0        

 

 Serum or plasma chloride measurement (moles/volume)            104 mmol/L     
               

 

 Carbon dioxide            27 mmol/L            21-32        

 

 Serum or plasma anion gap determination (moles/volume)            8 mmol/L    
        5-14        

 

 Serum or plasma urea nitrogen measurement (mass/volume)            9 mg/dL    
        7-18        

 

 Serum or plasma creatinine measurement (mass/volume)            0.74 mg/dL    
        0.60-1.30        

 

 Serum or plasma urea nitrogen/creatinine mass ratio            12             
NRG        

 

 Serum or plasma creatinine measurement with calculation of estimated 
glomerular filtration rate            >             NRG        

 

 Serum or plasma glucose measurement (mass/volume)            101 mg/dL        
            

 

 Serum or plasma calcium measurement (mass/volume)            8.8 mg/dL        
    8.5-10.1        









 PT panel in platelet poor plasma by coagulation assay - 17 10:30         









 Prothrombin time (PT) in platelet poor plasma by coagulation assay            
14.2 s            12.2-14.7        

 

 INR in platelet poor plasma or blood by coagulation assay            1.1      
       0.8-1.4        









 PT panel in platelet poor plasma by coagulation assay - 17 04:45         









 Prothrombin time (PT) in platelet poor plasma by coagulation assay            
15.5 s            12.2-14.7        

 

 INR in platelet poor plasma or blood by coagulation assay            1.2      
       0.8-1.4        









 Automated blood complete blood count (hemogram) panel - 12/15/17 09:29         









 Blood leukocytes automated count (number/volume)            7.4 10*3/uL       
     4.3-11.0        

 

 Blood erythrocytes automated count (number/volume)            4.80 10*6/uL    
        4.35-5.85        

 

 Venous blood hemoglobin measurement (mass/volume)            13.7 g/dL        
    13.3-17.7        

 

 Blood hematocrit (volume fraction)            42 %            40-54        

 

 Automated erythrocyte mean corpuscular volume            87 [foz_us]          
  80-99        

 

 Automated erythrocyte mean corpuscular hemoglobin (mass per erythrocyte)      
      29 pg            25-34        

 

 Automated erythrocyte mean corpuscular hemoglobin concentration measurement (
mass/volume)            33 g/dL            32-36        

 

 Automated erythrocyte distribution width ratio            13.9 %            
10.0-14.5        

 

 Automated blood platelet count (count/volume)            291 10*3/uL          
  130-400        

 

 Automated blood platelet mean volume measurement            9.0 [foz_us]      
      7.4-10.4        









 Whole blood basic metabolic panel - 12/15/17 09:29         









 Serum or plasma sodium measurement (moles/volume)            142 mmol/L       
     135-145        

 

 Serum or plasma potassium measurement (moles/volume)            4.5 mmol/L    
        3.6-5.0        

 

 Serum or plasma chloride measurement (moles/volume)            105 mmol/L     
               

 

 Carbon dioxide            27 mmol/L            21-32        

 

 Serum or plasma anion gap determination (moles/volume)            10 mmol/L   
         5-14        

 

 Serum or plasma urea nitrogen measurement (mass/volume)            16 mg/dL   
         7-18        

 

 Serum or plasma creatinine measurement (mass/volume)            0.74 mg/dL    
        0.60-1.30        

 

 Serum or plasma urea nitrogen/creatinine mass ratio            22             
NRG        

 

 Serum or plasma creatinine measurement with calculation of estimated 
glomerular filtration rate            >             NRG        

 

 Serum or plasma glucose measurement (mass/volume)            100 mg/dL        
            

 

 Serum or plasma calcium measurement (mass/volume)            10.0 mg/dL       
     8.5-10.1        









 Complete blood count (CBC) with automated white blood cell (WBC) differential 
- 18 12:45         









 Blood leukocytes automated count (number/volume)            18.3 10*3/uL      
      4.3-11.0        

 

 Blood erythrocytes automated count (number/volume)            4.57 10*6/uL    
        4.35-5.85        

 

 Venous blood hemoglobin measurement (mass/volume)            13.5 g/dL        
    13.3-17.7        

 

 Blood hematocrit (volume fraction)            40 %            40-54        

 

 Automated erythrocyte mean corpuscular volume            88 [foz_us]          
  80-99        

 

 Automated erythrocyte mean corpuscular hemoglobin (mass per erythrocyte)      
      30 pg            25-34        

 

 Automated erythrocyte mean corpuscular hemoglobin concentration measurement (
mass/volume)            34 g/dL            32-36        

 

 Automated erythrocyte distribution width ratio            14.5 %            
10.0-14.5        

 

 Automated blood platelet count (count/volume)            357 10*3/uL          
  130-400        

 

 Automated blood platelet mean volume measurement            8.8 [[id number]]      
      7.4-10.4        

 

 Automated blood neutrophils/100 leukocytes            86 %            42-75   
     

 

 Automated blood lymphocytes/100 leukocytes            8 %            12-44    
    

 

 Blood monocytes/100 leukocytes            6 %            0-12        

 

 Automated blood eosinophils/100 leukocytes            1 %            0-10     
   

 

 Automated blood basophils/100 leukocytes            0 %            0-10        

 

 Blood neutrophils automated count (number/volume)            15.7 10*3        
    1.8-7.8        

 

 Blood lymphocytes automated count (number/volume)            1.4 10*3         
   1.0-4.0        

 

 Blood monocytes automated count (number/volume)            1.1 10*3            
0.0-1.0        

 

 Automated eosinophil count            0.2 10*3/uL            0.0-0.3        

 

 Automated blood basophil count (count/volume)            0.0 10*3/uL          
  0.0-0.1        









 PT panel in platelet poor plasma by coagulation assay - 18 12:45         









 Prothrombin time (PT) in platelet poor plasma by coagulation assay            
32.8 s            12.2-14.7        

 

 INR in platelet poor plasma or blood by coagulation assay            3.2      
       0.8-1.4        









 Whole blood basic metabolic panel - 18 12:45         









 Serum or plasma sodium measurement (moles/volume)            142 mmol/L       
     135-145        

 

 Serum or plasma potassium measurement (moles/volume)            3.6 mmol/L    
        3.6-5.0        

 

 Serum or plasma chloride measurement (moles/volume)            99 mmol/L      
              

 

 Carbon dioxide            28 mmol/L            21-32        

 

 Serum or plasma anion gap determination (moles/volume)            15 mmol/L   
         5-14        

 

 Serum or plasma urea nitrogen measurement (mass/volume)            10 mg/dL   
         7-18        

 

 Serum or plasma creatinine measurement (mass/volume)            0.89 mg/dL    
        0.60-1.30        

 

 Serum or plasma urea nitrogen/creatinine mass ratio            11             
NRG        

 

 Serum or plasma creatinine measurement with calculation of estimated 
glomerular filtration rate            >             NRG        

 

 Serum or plasma glucose measurement (mass/volume)            150 mg/dL        
            

 

 Serum or plasma calcium measurement (mass/volume)            9.6 mg/dL        
    8.5-10.1        









 Serum or plasma uric acid measurement (mass/volume) - 18 12:45         









 Serum or plasma uric acid measurement (mass/volume)            5.3 mg/dL      
      2.6-7.2        









 Blood manual differential performed detection - 18 12:45         









 Blood monocytes/100 leukocytes            3 %            NRG        

 

 Manual blood segmented neutrophils/100 leukocytes            88 %            
NRG        

 

 Blood band neutrophils/100 leukocytes            4 %            NRG        

 

 Manual blood lymphocytes/100 leukocytes            4 %            NRG        

 

 Manual eosinophils/100 leukocytes in nose            1 %            NRG        

 

 Manual blood basophils/100 leukocytes            0 %            NRG        

 

 Blood erythrocyte morphology finding identification            NORMAL         
    NRG        









 Serum or plasma troponin i.cardiac measurement (mass/volume) - 18 12:45 
        









 Serum or plasma troponin i.cardiac measurement (mass/volume)            < ng/
mL            <0.30        









 Serum or plasma C reactive protein measurement (mass/volume) - 18 12:45 
        









 Serum or plasma C reactive protein measurement (mass/volume)            0.80 mg
/dL            0.00-0.50        









 Erythrocyte sedimentation rate by westergren method - 18 12:45         









 Erythrocyte sedimentation rate by westergren method            13 mm          
  0-15        



                                                                               
                 



Encounters

      





 ACCT No.            Visit Date/Time            Discharge            Status    
        Pt. Type            Provider            Facility            Loc./Unit  
          Complaint        

 

 [id number]            2015 09:47:00            2015 23:59:59          
  CLS            Outpatient            MARIXA APRNCHARLY                    
                           

 

 [id number]            2014 12:54:00            2014 23:59:59          
  CLS            Outpatient            BALAJI APRKIPTODDNEENA R                   
                            

 

 [id number]            2013 09:24:00            2013 23:59:59          
  CLS            Outpatient            LILLIAN LANDRY DO                        
                       

 

 [id number]            2018 09:08:00            2018 23:59:59    
        CLS            Outpatient            NORTH HERNANDEZ MD            [facility]            RAD            LEFT LEG PAIN        

 

 [id number]            2018 11:43:00            2018 15:31:00    
        DIS            Outpatient            RADHA ARELLANO APRN            [facility]            ER            L LEG PAIN/SWELLING/HX 
BLOOD CLOTS        

 

 [id number]            2018 10:30:00            2018 23:59:59    
        CLS            Outpatient            NORTH HERNANDEZ MD            [facility]            CARD            CAD, CHEST PAIN 
SYNDROME        

 

 [id number]            01/10/2018 11:37:00            01/10/2018 23:59:59    
        CLS            Outpatient            NORTH HERNANDEZ MD            [facility]            CR            CHF        

 

 [id number]            2018 11:00:00            2018 23:59:59    
        CLS            Outpatient            NORTH HERNANDEZ MD            [facility]            LAB            I26.99        

 

 [id number]            10/01/2017 07:00:00            2017 00:01:00    
        DIS            Outpatient            NORTH HERNANDEZ MD            [facility]            LAB            I26.99        

 

 [id number]            12/15/2017 09:16:00            12/15/2017 23:59:59    
        CLS            Outpatient            CANDE PIKE MD            [facility]            LAB            Z01.812 I25.119        

 

 [id number]            2017 11:24:00            2017 11:53:00    
        DIS            Emergency            DAMARIS WYNN            
[facility]            ER            LUMP ON CHEST        

 

 [id number]            2017 17:12:00            2017 14:45:00    
        DIS            Inpatient            FELISHA MORENO DO            [facility]            ICU            CHEST PAIN,N/V        

 

 [id number]            2017 10:49:00            2017 00:01:00    
        DIS            Outpatient            NORTH HERNANDEZ MD            [facility]            LAB            I26.99        

 

 [id number]            2017 15:07:00            2017 17:28:00    
        DIS            Emergency            DAMARIS WYNN            
[facility]            ER            SWELLING IN R ARM 
AFTER FLU SHOT        

 

 [id number]            08/15/2017 10:55:00            08/15/2017 23:59:59    
        CLS            Outpatient            NORTH HERNANDEZ MD            [facility]            LAB            I26.99,Z51.81        

 

 [id number]            2017 00:08:00            2017 23:59:59    
        CLS            Preadmit            NORTH HERNANDEZ MD            [facility]            LAB            DVT        

 

 [id number]            2017 11:24:00            2017 00:01:00    
        DIS            Outpatient            NORTH HERNANDEZ MD            [facility]            LAB            DVT        

 

 [id number]            2017 17:21:00            2017 19:18:00    
        DIS            Emergency            DAMARIS WYNN            
[facility]            ER            LOWER CHEST PAIN     
   

 

 [id number]            2017 08:17:00            2017 00:01:00    
        DIS            Outpatient            NORTH HERNANDEZ MD            [facility]            LAB            DVT        

 

 [id number]            2016 13:09:00            2017 00:01:00    
        DIS            Outpatient            NORTH HERNANDEZ MD            [facility]            LAB            DVT        

 

 [id number]            2016 10:25:00            2016 19:55:00    
        DIS            Outpatient            NORTH HERNANDEZ MD            [facility]            CATH            ABN STRESS,CP,CAD      
  

 

 [id number]            2016 07:31:00            2016 23:59:59    
        CLS            Outpatient            NORTH HERNANDEZ MD            [facility]            CARD            CAD, CHF, CHEST PAIN 
SYNDROME, DVT, HTN, PE        

 

 [id number]            2016 11:12:00            2016 23:59:59    
        CLS            Outpatient            NORTH HERNANDEZ MD            [facility]            CARD            CAD, CHF, CHEST PAIN 
SYNDROME, DVT, HTN, PE        

 

 [id number]            2016 13:12:00            2016 00:01:00    
        DIS            Outpatient            NORTH HERNANDEZ MD            [facility]            LAB            DVT        

 

 [id number]            2016 14:55:00            2016 17:15:00    
        DIS            Emergency            RADHA ARELLANO            [facility]            ER            R ARM PAIN        

 

 [id number]            2015 09:29:00            2015 00:01:00    
        DIS            Outpatient            NORTH HERNANDEZ MD            [facility]            LAB            ANTICOAG THERAPY,HX PE  
      

 

 [id number]            2015 08:34:00            2015 10:54:00    
        DIS            Emergency            COLE CONTRERAS MD            [facility]            ER            RIGHT FOOT PAIN/COUGH    
    

 

 [id number]            2015 11:05:00            2015 00:01:00    
        DIS            Outpatient            NORTH HERNANDEZ MD            [facility]            LAB            ANTICOAG THERAPY,HX PE  
      

 

 [id number]            10/07/2014 14:51:00            2014 00:01:00    
        DIS            Outpatient            NORTH HERNANDEZ MD            [facility]            LAB            ANTICOAG THERAPY,HX PE  
      

 

 [id number]            2014 18:10:00            2014 00:01:00    
        DIS            Outpatient            NORTH HERNANDEZ MD            [facility]            LAB            ANTICOAG THERAPY,HX PE  
      

 

 [id number]            2013 15:59:00            2013 00:01:00    
        DIS            Outpatient            NORTH HERNANDEZ MD            [facility]            LAB            ANTICOAG THERAPY,HX PE  
      

 

 [id number]            2013 18:23:00            2013 19:22:00    
        DIS            Emergency            CAROL FERREIRA MD            
[facility]            ER            LEFT ANKLE PAIN      
  

 

 [id number]            2015 09:36:00                                   
   Document Registration                                                       
     

 

 [id number]            2015 09:36:00                                   
   Document Registration                                                       
     

 

 [id number]            2015 09:36:00                                   
   Document Registration                                                       
     

 

 [id number]            2015 09:36:00                                   
   Document Registration                                                       
     

 

 [id number]            2015 09:36:00                                   
   Document Registration                                                       
     

 

 [id number]            2015 09:36:00                                   
   Document Registration                                                       
     

 

 [id number]            2015 09:36:00                                   
   Document Registration                                                       
     

 

 [id number]            2015 09:36:00                                   
   Document Registration                                                       
     

 

 [id number]            2015 09:36:00                                   
   Document Registration                                                       
     

 

 [id number]            2015 09:36:00                                   
   Document Registration                                                       
     

 

 [id number]            2015 09:36:00                                   
   Document Registration                                                       
     

 

 [id number]            2015 09:36:00                                   
   Document Registration                                                       
     

 

 [id number]            2015 10:38:00                                   
   Document Registration                                                       
     

 

 [id number]            2015 10:38:00                                   
   Document Registration                                                       
     

 

 [id number]            2015 10:37:00                                   
   Document Registration                                                       
     

 

 [id number]            2015 10:37:00                                   
   Document Registration                                                       
     

 

 [id number]            2015 21:35:00                                   
   Document Registration                                                       
     

 

 [id number]            2013 16:55:00                                   
   Document Registration                                                       
     

 

 [id number]            2013 08:29:00                                   
   Document Registration                                                       
     

 

 [id number]            2013 10:36:00                                   
   Document Registration                                                       
     

 

 [id number]            2013 07:36:00                                   
   Document Registration                                                       
     

 

 [id number]            2013 10:00:00                                   
   Document Registration                                                       
     

 

 [id number]            2013 13:04:00                                   
   Document Registration                                                       
     

 

 [id number]            2012 16:16:00                                   
   Document Registration                                                       
     

 

 [id number]            2012 21:41:00                                   
   Document Registration                                                       
     

 

 [id number]            2012 12:15:00                                   
   Document Registration                                                       
     

 

 [id number]            2012 13:43:00                                   
   Document Registration                                                       
     

 

 [id number]            10/10/2011 10:15:00                                   
   Document Registration                                                       
     

 

 [id number]            2011 06:51:00                                   
   Document Registration                                                       
     

 

 [id number]            2011 08:46:00                                   
   Document Registration                                                       
     

 

 [id number]            2011 10:21:00                                   
   Document Registration                                                       
     

 

 [id number]            2011 14:37:00                                   
   Document Registration                                                       
     

 

 [id number]            2010 09:40:00                                   
   Document Registration                                                       
     

 

 [id number]            2010 17:02:00                                   
   Document Registration                                                       
     

 

 [id number]            2010 09:19:00                                   
   Document Registration                                                       
     

 

 [id number]            12/10/2009 08:28:00                                   
   Document Registration                                                       
     

 

 [id number]            2009 13:16:00                                   
   Document Registration                                                       
     

 

 [id number]            10/15/2008 08:46:00                                   
   Document Registration                                                       
     

 

 [id number]            09/15/2008 12:11:00                                   
   Document Registration                                                       
     

 

 [id number]            2008 14:03:00                                   
   Document Registration                                                       
     

 

 [id number]            05/10/2008 08:26:00                                   
   Document Registration                                                       
     

 

 [id number]            2008 09:23:00                                   
   Document Registration                                                       
     

 

 [id number]            2008 11:12:00                                   
   Document Registration                                                       
     

 

 [id number]            2008 12:14:00                                   
   Document Registration                                                       
     

 

 [id number]            2007 10:33:00                                   
   Document Registration                                                       
     

 

 [id number]            05/15/2007 14:25:00                                   
   Document Registration                                                       
     

 

 [id number]            2007 08:46:00                                   
   Document Registration                                                       
     

 

 [id number]            2007 14:49:00                                   
   Document Registration                                                       
     

 

 [id number]            2007 07:21:00                                   
   Document Registration                                                       
     

 

 [id number]            2006 08:00:00                                   
   Document Registration                                                       
     

 

 [id number]            2006 08:52:00                                   
   Document Registration                                                       
     

 

 [id number]            10/25/2006 16:48:00                                   
   Document Registration                                                       
     

 

 [id number]            2006 11:44:00                                   
   Document Registration                                                       
     

 

 [id number]            07/10/2006 09:32:00                                   
   Document Registration                                                       
     

 

 [id number]            2006 08:15:00                                   
   Document Registration                                                       
     

 

 [id number]            2006 11:33:00                                   
   Document Registration                                                       
     

 

 [id number]            2006 11:41:00                                   
   Document Registration Resident

## 2021-06-04 ENCOUNTER — HOSPITAL ENCOUNTER (OUTPATIENT)
Dept: HOSPITAL 75 - CARD | Age: 51
End: 2021-06-04
Attending: INTERNAL MEDICINE
Payer: MEDICARE

## 2021-06-04 DIAGNOSIS — I10: ICD-10-CM

## 2021-06-04 DIAGNOSIS — I25.10: Primary | ICD-10-CM

## 2021-06-04 PROCEDURE — 93306 TTE W/DOPPLER COMPLETE: CPT

## 2021-09-13 ENCOUNTER — HOSPITAL ENCOUNTER (OUTPATIENT)
Dept: HOSPITAL 75 - RT | Age: 51
End: 2021-09-13
Attending: FAMILY MEDICINE
Payer: MEDICARE

## 2021-09-13 DIAGNOSIS — Z02.71: Primary | ICD-10-CM

## 2021-09-13 PROCEDURE — 94729 DIFFUSING CAPACITY: CPT

## 2021-09-13 PROCEDURE — 94060 EVALUATION OF WHEEZING: CPT

## 2021-12-13 ENCOUNTER — HOSPITAL ENCOUNTER (EMERGENCY)
Dept: HOSPITAL 75 - ER | Age: 51
Discharge: HOME | End: 2021-12-13
Payer: MEDICARE

## 2021-12-13 VITALS — WEIGHT: 199.96 LBS | BODY MASS INDEX: 34.14 KG/M2 | HEIGHT: 64.02 IN

## 2021-12-13 VITALS — DIASTOLIC BLOOD PRESSURE: 90 MMHG | SYSTOLIC BLOOD PRESSURE: 173 MMHG

## 2021-12-13 DIAGNOSIS — I10: ICD-10-CM

## 2021-12-13 DIAGNOSIS — Z79.82: ICD-10-CM

## 2021-12-13 DIAGNOSIS — I25.2: ICD-10-CM

## 2021-12-13 DIAGNOSIS — Z86.718: ICD-10-CM

## 2021-12-13 DIAGNOSIS — E66.9: ICD-10-CM

## 2021-12-13 DIAGNOSIS — I25.10: ICD-10-CM

## 2021-12-13 DIAGNOSIS — Z79.899: ICD-10-CM

## 2021-12-13 DIAGNOSIS — Z79.01: ICD-10-CM

## 2021-12-13 DIAGNOSIS — Z86.711: ICD-10-CM

## 2021-12-13 DIAGNOSIS — F17.210: ICD-10-CM

## 2021-12-13 DIAGNOSIS — J44.9: ICD-10-CM

## 2021-12-13 DIAGNOSIS — E78.00: ICD-10-CM

## 2021-12-13 DIAGNOSIS — Z20.822: ICD-10-CM

## 2021-12-13 DIAGNOSIS — K21.9: ICD-10-CM

## 2021-12-13 DIAGNOSIS — R10.12: ICD-10-CM

## 2021-12-13 DIAGNOSIS — G89.29: Primary | ICD-10-CM

## 2021-12-13 DIAGNOSIS — M10.9: ICD-10-CM

## 2021-12-13 LAB
ALBUMIN SERPL-MCNC: 3.8 GM/DL (ref 3.2–4.5)
ALP SERPL-CCNC: 96 U/L (ref 40–136)
ALT SERPL-CCNC: 11 U/L (ref 0–55)
AMYLASE SERPL-CCNC: 27 U/L (ref 25–125)
APTT PPP: YELLOW S
BACTERIA #/AREA URNS HPF: NEGATIVE /HPF
BARBITURATES UR QL: NEGATIVE
BASOPHILS # BLD AUTO: 0.1 10^3/UL (ref 0–0.1)
BASOPHILS NFR BLD AUTO: 1 % (ref 0–10)
BENZODIAZ UR QL SCN: NEGATIVE
BILIRUB SERPL-MCNC: 0.3 MG/DL (ref 0.1–1)
BILIRUB UR QL STRIP: NEGATIVE
BUN/CREAT SERPL: 6
CALCIUM SERPL-MCNC: 9.5 MG/DL (ref 8.5–10.1)
CHLORIDE SERPL-SCNC: 97 MMOL/L (ref 98–107)
CO2 SERPL-SCNC: 34 MMOL/L (ref 21–32)
COCAINE UR QL: NEGATIVE
CREAT SERPL-MCNC: 0.79 MG/DL (ref 0.6–1.3)
EOSINOPHIL # BLD AUTO: 0.4 10^3/UL (ref 0–0.3)
EOSINOPHIL NFR BLD AUTO: 3 % (ref 0–10)
FIBRINOGEN PPP-MCNC: CLEAR MG/DL
GFR SERPLBLD BASED ON 1.73 SQ M-ARVRAT: 104 ML/MIN
GLUCOSE SERPL-MCNC: 124 MG/DL (ref 70–105)
GLUCOSE UR STRIP-MCNC: NEGATIVE MG/DL
HCT VFR BLD CALC: 48 % (ref 40–54)
HGB BLD-MCNC: 14.9 G/DL (ref 13.3–17.7)
KETONES UR QL STRIP: NEGATIVE
LEUKOCYTE ESTERASE UR QL STRIP: NEGATIVE
LIPASE SERPL-CCNC: 14 U/L (ref 8–78)
LYMPHOCYTES # BLD AUTO: 2.6 10^3/UL (ref 1–4)
LYMPHOCYTES NFR BLD AUTO: 24 % (ref 12–44)
MANUAL DIFFERENTIAL PERFORMED BLD QL: NO
MCH RBC QN AUTO: 29 PG (ref 25–34)
MCHC RBC AUTO-ENTMCNC: 31 G/DL (ref 32–36)
MCV RBC AUTO: 93 FL (ref 80–99)
METHADONE UR QL SCN: NEGATIVE
METHAMPHETAMINE SCREEN URINE S: NEGATIVE
MONOCYTES # BLD AUTO: 0.8 10^3/UL (ref 0–1)
MONOCYTES NFR BLD AUTO: 7 % (ref 0–12)
NEUTROPHILS # BLD AUTO: 6.9 10^3/UL (ref 1.8–7.8)
NEUTROPHILS NFR BLD AUTO: 64 % (ref 42–75)
NITRITE UR QL STRIP: NEGATIVE
OPIATES UR QL SCN: NEGATIVE
OXYCODONE UR QL: POSITIVE
PH UR STRIP: 7.5 [PH] (ref 5–9)
PLATELET # BLD: 264 10^3/UL (ref 130–400)
PMV BLD AUTO: 8.5 FL (ref 9–12.2)
POTASSIUM SERPL-SCNC: 3.8 MMOL/L (ref 3.6–5)
PROPOXYPH UR QL: NEGATIVE
PROT SERPL-MCNC: 7.3 GM/DL (ref 6.4–8.2)
PROT UR QL STRIP: NEGATIVE
RBC #/AREA URNS HPF: (no result) /HPF
SODIUM SERPL-SCNC: 142 MMOL/L (ref 135–145)
SP GR UR STRIP: 1.01 (ref 1.02–1.02)
SQUAMOUS #/AREA URNS HPF: (no result) /HPF
TRICYCLICS UR QL SCN: NEGATIVE
WBC # BLD AUTO: 10.7 10^3/UL (ref 4.3–11)
WBC #/AREA URNS HPF: (no result) /HPF

## 2021-12-13 PROCEDURE — 85025 COMPLETE CBC W/AUTO DIFF WBC: CPT

## 2021-12-13 PROCEDURE — 71045 X-RAY EXAM CHEST 1 VIEW: CPT

## 2021-12-13 PROCEDURE — 74177 CT ABD & PELVIS W/CONTRAST: CPT

## 2021-12-13 PROCEDURE — 36415 COLL VENOUS BLD VENIPUNCTURE: CPT

## 2021-12-13 PROCEDURE — 83690 ASSAY OF LIPASE: CPT

## 2021-12-13 PROCEDURE — 82150 ASSAY OF AMYLASE: CPT

## 2021-12-13 PROCEDURE — 87636 SARSCOV2 & INF A&B AMP PRB: CPT

## 2021-12-13 PROCEDURE — 93041 RHYTHM ECG TRACING: CPT

## 2021-12-13 PROCEDURE — 80306 DRUG TEST PRSMV INSTRMNT: CPT

## 2021-12-13 PROCEDURE — 81000 URINALYSIS NONAUTO W/SCOPE: CPT

## 2021-12-13 PROCEDURE — 80053 COMPREHEN METABOLIC PANEL: CPT

## 2021-12-13 NOTE — ED ABDOMINAL PAIN
General


Chief Complaint:  Abdominal/GI Problems


Stated Complaint:  L SIDE ABD PAIN


Nursing Triage Note:  


PT TO RM 6 VIA PERSONAL WC W REPORTS OF LUQ PAIN X3-4 MONTHS. PT A&OX4.


Source of Information:  Patient, Spouse


Exam Limitations:  Other (PT AND WIFE BOTH LIMITED HISTORIANS)





History of Present Illness


Date Seen by Provider:  Dec 13, 2021


Time Seen by Provider:  20:18


Initial Comments


PT ARRIVES VIA POV FROM HOME WITH WIFE


C/O LEFT UPPER QUADRANT/FLANK PAIN "FOR OVER 3-4 MONTHS" 


NO DIFFERENT TODAY IN ANY WAY


NO NAUSEA/VOMITING/DIARRHEA/CONSTIPATION--HAD NORMAL BM TODAY, NO 

BLACK/BLOODY/TARRY STOOLS


NOTHING WORSENS OR IMPROVES PAIN, BUT DOES STATE HE GETS REALLY FULL WHEN HE 

EATS AND IS REALLY GASSY AND BELCHES ALOT AS WELL. 


ATE JUST PRIOR TO ARRIVAL, BUT IT DID NOT INCREASE THE PAIN


NO URINARY SYMPTOMS, VOIDED JUST PRIOR TO ARRIVAL


NO FEVER


NO RADIATION OF PAIN


NOTHING WORSENS OR IMPROVES PAIN 





PT TAKES OXYCODONE EVERY 4-6 HOURS FOR CHRONIC PAIN 


PT ALSO TAKES GABAPENTIN FOR CHRONIC PAIN





STATES HE HAS NOT SPECIFICALLY SEEN ANYONE FOR THIS PROBLEM ( ON REVIEW OF OLD 

RECORDS, PT WAS SEEN IN THIS ER IN MAY OF THIS YEAR FOR THIS SAME PROBLEM AND 

HAD REPORTED AT THAT TIME THAT THIS PAIN WAS CHRONIC ) 


HAS HAD ROUTINE APPOINTMENTS AT [facility], AND JUST SAW DR. PADRON LAST WEEK FOR 

ROUTINE EXAM. WAS PRESCRIBED A LIDOCAINE PATCH, BUT PT IS NOT WEARING AT THIS 

TIME ( OLD RECORDS FROM MAY HAD ALSO REPORTED THAT HE HAD PREVIOUSLY BEEN GIVEN 

LIDOCAINE PATCHES FOR THIS PROBLEM)


ALSO HAD ROUTINE APPOINTMENT WITH DR. HERNANDEZ, CARDIOLOGIST, LAST WEEK.





ONLY ABDOMINAL SURGERY, WAS TO LEFT MID ABDOMEN FOR "NERVE PAIN" AFTER HE HAD 

LEFT ABOVE THE KNEE AMPUTATION. 





NO RECENT MEDICATION CHANGES





PCP: DR PADRON/[facility]


CARDIOLOGIST: DR. HERNANDEZ





Allergies and Home Medications


Allergies


Coded Allergies:  


     Penicillins (Unverified  Allergy, Mild, 09)


     pneumococcal vaccine (Verified  Allergy, Unknown, 4/10/20)





Patient Home Medication List


Home Medication List Reviewed:  Yes


Allopurinol (Allopurinol) 100 Mg Tablet, 100 MG PO DAILY, (Reported)


   Entered as Reported by: DAVID GARZON on 18 0845


Aspirin (Aspirin EC) 81 Mg Tablet., 81 MG PO HS, (Reported)


   Entered as Reported by: RAVINDER RODRIGUEZ on 19 1028


Atorvastatin Calcium (Atorvastatin Calcium) 40 Mg Tablet, 40 MG PO HS, 

(Reported)


   Entered as Reported by: RAVINDER RODRIGUEZ on 17 0832


Cholecalciferol (Vitamin D3) (Vitamin D3) 25 Mcg Tablet, 25 MCG PO DAILY, 

(Reported)


   Entered as Reported by: ANTHONY CAMPBELL on 4/10/20 0851


Clopidogrel Bisulfate (Plavix) 75 Mg Tablet, 75 MG PO DAILY, (Reported)


   Entered as Reported by: RAVINDER RODRIGUEZ on 19 1040


Fluticasone/Salmeterol (Advair Hfa 115-21 Mcg Inhaler) 12 Gm Hfa.aer.ad, 0 PUFF 

IH RTBID


   Prescribed by: KATY RASMUSSEN on 20 1511


Hyoscyamine Sulfate (Levsin-Sl) 0.125 Mg Tab.subl, 0.25 MG SL Q4H


   Prescribed by: VLADIMIR ESPINO on 21 2201


Ipratropium/Albuterol Sulfate (Iprat-Albut 0.5-3(2.5) mg/3 ml) 3 Ml Ampul.neb, 3

ML INH Q2HR PRN for SOA


   Prescribed by: KATY RASMUSSEN on 20 1511


Isosorbide Mononitrate (Isosorbide Mononitrate ER) 30 Mg Tab.er.24h, 30 MG PO 

DAILY, (Reported)


   Entered as Reported by: DAVID GARZON on 18 0845


Metoprolol Tartrate (Metoprolol Tartrate) 50 Mg Tablet, 50 MG PO BID, (Reported)


   Entered as Reported by: RAVINDER RODRIGUEZ on 10/21/19 1023


Omega 3 Polyunsat Fatty Acids (Fish Oil 1,000 mg Capsule) 1,000 Mg Cap, 1,000 MG

PO BID, (Reported)


   Entered as Reported by: RAVINDER RODRIGUEZ on 17 0832


Pantoprazole Sodium (Protonix) 40 Mg Tablet.dr, 40 MG PO DAILY, (Reported)


   Entered as Reported by: ANTHONY CAMPBELL on 4/10/20 0850


Prednisone (Prednisone) 10 Mg Tab.ds.pk, 10 MG PO DAILY


   Prescribed by: KATY RASMUSSEN on 20 1511





Review of Systems


Review of Systems


Constitutional:  no symptoms reported


Respiratory:  No Symptoms Reported


Cardiovascular:  No Symptoms Reported


Gastrointestinal:  See HPI, Abdominal Pain; Denies Constipated, Denies Diarrhea,

Denies Nausea, Denies Poor Appetite, Denies Vomiting


Genitourinary:  No Symptoms Reported


Musculoskeletal:  see HPI (CHRONIC GENERALIZED PAIN)


Skin:  no symptoms reported


Psychiatric/Neurological:  No Symptoms Reported


Endocrine:  No Symptoms Reported


Hematologic/Lymphatic:  No Symptoms Reported





Past Medical-Social-Family Hx


Patient Social History


Tobacco Use?:  Yes


Tobacco type used:  Cigarettes


Smoking Status:  Current Everyday Smoker


Use of E-Cig and/or Vaping dev:  No


Substance use?:  No


Alcohol Use?:  No





Immunizations Up To Date


PED Vaccines UTD:  Yes


Influenza Vaccine Up-to-Date:  No; Not Current


First/Initial COVID19 Vaccinat:  


Second COVID19 Vaccination Alfredo:  2021


COVID19 Vaccine :  ZORAIDA AND ZORAIDA





Seasonal Allergies


Seasonal Allergies:  No





Past Medical History


Surgery/Hospitalization HX:  


LEFT ABOVE KNEE AMPUTATION, COPD


Surgeries:  Yes (LEFT AKA)


Amputation, Cardiac, Coronary Stent, Orthopedic


Respiratory:  Yes (O2 DEPENDENT)


Pulmonary Embolism, COPD


Currently Using CPAP:  No


Currently Using BIPAP:  No


Cardiac:  Yes (MI X 2; STENTS X 4; -NSTEMI 17 WITH 1 STENT PLACED)


Coronary Artery Disease, Deep Vein Thrombosis, Heart Attack, High Cholesterol, 

Hypertension


Neurological:  No


Reproductive Disorders:  No


Genitourinary:  No


Gastrointestinal:  Yes


Gastroesophageal Reflux


Musculoskeletal:  Yes (OXYCODONE, GABAPENTIN + IBUPROFEN DAILY;CHRONIC BACK/L 

LEG PHANTOM PAIN)


Amputee, Chronic Back Pain, Gout


Endocrine:  No (OBESITY)


HEENT:  No


Cancer:  No


Psychosocial:  No


Integumentary:  No


Blood Disorders:  Yes (PROTEIN S DEFICIENCY--DVT'S AND P.E.'S AND MI X 2)





Family Medical History





Arthritis


  G8 BROTHER


Blood clots


  19 MOTHER ( of blood clot)


Cardiac disorder


  19 FATHER


Diabetes mellitus


  G8 BROTHER


FH: cancer


  paternal grandmother


FHx: inflammatory bowel disease


  G8 BROTHER





No Family History of:


  FH: sudden cardiac death (SCD)


Heart Disease, Vascular Disease








SOCIAL HISTORY:


-SMOKES 1 PPD


-ETOH-DENIES USE


-DRUGS-DENIES USE





PAST SURGICAL HISTORY:


-LEFT ABOVE THE KNEE AMPUTATION


-SURGERY TO LEFT MID ABDOMEN FOR "NERVE PAIN" FROM LEFT LEG AMPUTATION


-CARDIAC CATHS WITH STENTS X 4


LAST CARDIAC CATH DONE HERE 10/21/2019 BY DR. HERNANDEZ:


CONCLUSION:


1.  Patent stent in the obtuse marginal branch and multiple stents in the


right coronary artery with small vessel disease nonobstructive disease


2.  Prominent left ventricle with mild diffuse left ventricular


hypokinesia estimated ejection fraction 45 percent, normal left


ventricular end-diastolic pressure


3.  Normal aortic arch and great vessels of the neck





DISCUSSION AND RECOMMENDATION:


Chest pain is probably due to small vessel disease, mild troponin leak,


not considered myocardial infarction.  Patent arteries.  Chronic


compensated left ventricular systolic dysfunction.  Medical therapy is


recommended, educated on smoking cessation





Physical Exam


Vital Signs





Vital Signs - First Documented








 21





 20:06


 


Temp 36.6


 


Pulse 91


 


Resp 22


 


B/P (MAP) 173/90 (117)


 


Pulse Ox 95


 


O2 Delivery Nasal Cannula


 


O2 Flow Rate 2.00





Capillary Refill : Less Than 3 Seconds


Height/Weight/BMI


Height: 5'4.00"


Weight: 190lbs. 1.6oz. 86.056854tu; 34.00 BMI


Method:Stated


General Appearance:  no apparent distress, obese


Respiratory:  normal breath sounds, no respiratory distress, no accessory muscle

use


Cardiovascular:  regular rate, rhythm, no murmur


Gastrointestinal:  soft, tenderness (LEFT UPPER QUADRANT TENDERNESS), other 

(SKIN MACERATION TO LEFT UPPER QUADRANT FROM PREVIOUS APPLICATIONS OF LIDOCAINE 

PATCH)


Extremities:  normal inspection, other (LEFT AKA)


Neurologic/Psychiatric:  CNs II-XII nml as tested, no motor/sensory deficits, 

alert, oriented x 3, other (FLAT AFFECT)


Skin:  normal color, warm/dry





Progress/Results/Core Measures


Results/Orders


Lab Results





Laboratory Tests








Test


 21


20:15 21


20:30 21


20:42 Range/Units


 


 


SARS-CoV-2 RNA (RT-PCR) Not Detected    Not Detecte  


 


White Blood Count


 


 10.7 


 


 4.3-11.0


10^3/uL


 


Red Blood Count


 


 5.17 


 


 4.30-5.52


10^6/uL


 


Hemoglobin  14.9   13.3-17.7  g/dL


 


Hematocrit  48   40-54  %


 


Mean Corpuscular Volume  93   80-99  fL


 


Mean Corpuscular Hemoglobin  29   25-34  pg


 


Mean Corpuscular Hemoglobin


Concent 


 31 L


 


 32-36  g/dL





 


Red Cell Distribution Width  15.2 H  10.0-14.5  %


 


Platelet Count


 


 264 


 


 130-400


10^3/uL


 


Mean Platelet Volume  8.5 L  9.0-12.2  fL


 


Immature Granulocyte % (Auto)  1    %


 


Neutrophils (%) (Auto)  64   42-75  %


 


Lymphocytes (%) (Auto)  24   12-44  %


 


Monocytes (%) (Auto)  7   0-12  %


 


Eosinophils (%) (Auto)  3   0-10  %


 


Basophils (%) (Auto)  1   0-10  %


 


Neutrophils # (Auto)


 


 6.9 


 


 1.8-7.8


10^3/uL


 


Lymphocytes # (Auto)


 


 2.6 


 


 1.0-4.0


10^3/uL


 


Monocytes # (Auto)


 


 0.8 


 


 0.0-1.0


10^3/uL


 


Eosinophils # (Auto)


 


 0.4 H


 


 0.0-0.3


10^3/uL


 


Basophils # (Auto)


 


 0.1 


 


 0.0-0.1


10^3/uL


 


Immature Granulocyte # (Auto)


 


 0.1 


 


 0.0-0.1


10^3/uL


 


Sodium Level  142   135-145  MMOL/L


 


Potassium Level  3.8   3.6-5.0  MMOL/L


 


Chloride Level  97 L    MMOL/L


 


Carbon Dioxide Level  34 H  21-32  MMOL/L


 


Anion Gap  11   5-14  MMOL/L


 


Blood Urea Nitrogen  5 L  7-18  MG/DL


 


Creatinine


 


 0.79 


 


 0.60-1.30


MG/DL


 


Estimat Glomerular Filtration


Rate 


 104 


 


  





 


BUN/Creatinine Ratio  6    


 


Glucose Level  124 H    MG/DL


 


Calcium Level  9.5   8.5-10.1  MG/DL


 


Corrected Calcium  9.7   8.5-10.1  MG/DL


 


Total Bilirubin  0.3   0.1-1.0  MG/DL


 


Aspartate Amino Transf


(AST/SGOT) 


 11 


 


 5-34  U/L





 


Alanine Aminotransferase


(ALT/SGPT) 


 11 


 


 0-55  U/L





 


Alkaline Phosphatase  96     U/L


 


Total Protein  7.3   6.4-8.2  GM/DL


 


Albumin  3.8   3.2-4.5  GM/DL


 


Amylase Level  27     U/L


 


Lipase  14   8-78  U/L


 


Urine Color   YELLOW   


 


Urine Clarity   CLEAR   


 


Urine pH   7.5  5-9  


 


Urine Specific Gravity   1.010 L 1.016-1.022  


 


Urine Protein   NEGATIVE  NEGATIVE  


 


Urine Glucose (UA)   NEGATIVE  NEGATIVE  


 


Urine Ketones   NEGATIVE  NEGATIVE  


 


Urine Nitrite   NEGATIVE  NEGATIVE  


 


Urine Bilirubin   NEGATIVE  NEGATIVE  


 


Urine Urobilinogen   0.2  < = 1.0  MG/DL


 


Urine Leukocyte Esterase   NEGATIVE  NEGATIVE  


 


Urine RBC (Auto)   NEGATIVE  NEGATIVE  


 


Urine RBC   NONE   /HPF


 


Urine WBC   NONE   /HPF


 


Urine Squamous Epithelial


Cells 


 


 NONE 


  /HPF





 


Urine Crystals   NONE   /LPF


 


Urine Bacteria   NEGATIVE   /HPF


 


Urine Casts   NONE   /LPF


 


Urine Mucus   NEGATIVE   /LPF


 


Urine Culture Indicated   NO   


 


Urine Opiates Screen   NEGATIVE  NEGATIVE  


 


Urine Oxycodone Screen   POSITIVE H NEGATIVE  


 


Urine Methadone Screen   NEGATIVE  NEGATIVE  


 


Urine Propoxyphene Screen   NEGATIVE  NEGATIVE  


 


Urine Barbiturates Screen   NEGATIVE  NEGATIVE  


 


Ur Tricyclic Antidepressants


Screen 


 


 NEGATIVE 


 NEGATIVE  





 


Urine Phencyclidine Screen   NEGATIVE  NEGATIVE  


 


Urine Amphetamines Screen   NEGATIVE  NEGATIVE  


 


Urine Methamphetamines Screen   NEGATIVE  NEGATIVE  


 


Urine Benzodiazepines Screen   NEGATIVE  NEGATIVE  


 


Urine Cocaine Screen   NEGATIVE  NEGATIVE  


 


Urine Cannabinoids Screen   NEGATIVE  NEGATIVE  








My Orders





Orders - VLADIMIR ESPINO DO


Ua Culture If Indicated (21 20:11)


Ed Iv/Invasive Line Start (21 20:13)


O2 (21 20:13)


Monitor-Rhythm Ecg Trace Only (21 20:13)


Amylase (21 20:13)


Cbc With Automated Diff (21 20:13)


Comprehensive Metabolic Panel (21 20:13)


Drug Screen Stat (Urine) (21 20:13)


Lipase (21 20:13)


Chest 1 View, Ap/Pa Only (21 20:13)


Covid 19 Inhouse Test (21 20:13)


Ct Abdomen/Pelvis W (21 21:09)


Iohexol Injection (Omnipaque 350 Mg/Ml 1 (21 21:45)


Received Contrast (Hold Metformin- Contr (21 21:45)


Ns (Ivpb) (Sodium Chloride 0.9% Ivpb Bag (21 21:45)





Medications Given in ED





Current Medications








 Medications  Dose


 Ordered  Sig/Joey


 Route  Start Time


 Stop Time Status Last Admin


Dose Admin


 


 Iohexol  100 ml  ONCE  ONCE


 IV  21 21:45


 21 21:46 DC 21 21:42


100 ML


 


 Sodium Chloride  100 ml  ONCE  ONCE


 IV  21 21:45


 21 21:46 DC 21 21:42


80 ML








Vital Signs/I&O











 21





 20:06


 


Temp 36.6


 


Pulse 91


 


Resp 22


 


B/P (MAP) 173/90 (117)


 


Pulse Ox 95


 


O2 Delivery Nasal Cannula


 


O2 Flow Rate 2.00














Blood Pressure Mean:                    117











Progress


Progress Note :  


Progress Note


UNEVENTFUL ER STAY





AT DISMISSAL, PT AND WIFE NOW STATE THAT PT USES CRUTCHES ALOT, AND LEFT SIDE IS

SIDE OF HIS AMPUTATION--POSSIBLY RELATED TO CRUTCH USE?


PT  HAS A WHEELCHAIR ALSO, AD ADVISED TO USE IT FOR THE NEXT FEW DAYS AND SEE IF

THAT HELPS THIS DISCOMFORT





Diagnostic Imaging





Comments


CXR--PER RADIOLOGIST REPORT AT 





Heart and mediastinal silhouette are normal in appearance. Lungs


appear clear. There is no pneumothorax or pleural fluid.





IMPRESSION:





No acute process in the chest.





CT ABDOMEN/PELVIS--PER RADIOLOGIST REPORT AT 


COMPARISON: 2021.





The visualized portions of the lung bases are clear. There were


no pleural fluid collections. There is no free intraperitoneal


air.





The liver shows mild low-density change compatible with fatty


infiltration. There is no focal liver lesion. Gallbladder appears


contracted but otherwise unremarkable. The spleen is not enlarged


and showed no focal lesion. Adrenals and pancreas appear normal.


The kidneys bilaterally are unremarkable. There is no right


retroperitoneal mass or adenopathy. There is no ascites or


abnormal fluid collection. The visualized bowel loops show no


sign of obstruction or focal bowel wall thickening. Incidental


note is made of asymmetric atrophy of the left gluteal muscles.





IMPRESSION:





No acute abnormality is seen in the abdomen or pelvis. There is


mild fatty infiltration of the liver. Findings appear similar to


2021.


   Reviewed:  Reviewed by Me





Departure


Impression





   Primary Impression:  


   CHRONIC LEFT SIDED ABDOMINAL PAIN


Disposition:  01 HOME, SELF-CARE


Condition:  Stable





Departure-Patient Inst.


Decision time for Depature:  21:49


Referrals:  


AMANDA PADRON MD (PCP/Family)


Primary Care Physician


Patient Instructions:  Abdominal Pain, Adult ED, CHRONIC PAIN





Add. Discharge Instructions:  


CONTINUE YOUR CURRENT MEDICATIONS AS PRESCRIBED





FOLLOW UP WITH DR. PADRON THIS WEEK FOR FURTHER CARE





All discharge instructions reviewed with patient and/or family. Voiced understan

ding.


Scripts


Hyoscyamine Sulfate (Levsin-Sl) 0.125 Mg Tab.subl


0.25 MG SL Q4H, #10 TAB


   Prov: VLADIMIR ESPINO DO         21











VLADIMIR ESPINO DO                 Dec 13, 2021 20:39

## 2021-12-13 NOTE — DIAGNOSTIC IMAGING REPORT
INDICATION: Left-sided abdominal pain. 



TECHNIQUE: Multiple contiguous axial images were obtained through

the abdomen and pelvis after administration of intravenous

contrast. Auto Exposure Controls were utilized during the CT exam

to meet ALARA standards for radiation dose reduction. All CT

scans use one or more of the following dose optimizing

techniques: automated exposure control, MA and/or KvP adjustment

based on patient size and exam type or iterative reconstruction.



COMPARISON: 05/23/2021.



The visualized portions of the lung bases are clear. There were

no pleural fluid collections. There is no free intraperitoneal

air.



The liver shows mild low-density change compatible with fatty

infiltration. There is no focal liver lesion. Gallbladder appears

contracted but otherwise unremarkable. The spleen is not enlarged

and showed no focal lesion. Adrenals and pancreas appear normal.

The kidneys bilaterally are unremarkable. There is no right

retroperitoneal mass or adenopathy. There is no ascites or

abnormal fluid collection. The visualized bowel loops show no

sign of obstruction or focal bowel wall thickening. Incidental

note is made of asymmetric atrophy of the left gluteal muscles.



IMPRESSION:



No acute abnormality is seen in the abdomen or pelvis. There is

mild fatty infiltration of the liver. Findings appear similar to

05/23/2021.



Dictated by: 



  Dictated on workstation # [id number]

## 2021-12-13 NOTE — DIAGNOSTIC IMAGING REPORT
INDICATION: Hypoxia



Frontal chest obtained at 0844 p.m.



Heart and mediastinal silhouette are normal in appearance. Lungs

appear clear. There is no pneumothorax or pleural fluid.



IMPRESSION:



No acute process in the chest.



Dictated by: 



  Dictated on workstation # [id number]

## 2023-05-07 ENCOUNTER — HOSPITAL ENCOUNTER (EMERGENCY)
Dept: HOSPITAL 75 - ER | Age: 53
Discharge: HOME | End: 2023-05-07
Payer: MEDICARE

## 2023-05-07 VITALS — WEIGHT: 199.96 LBS | HEIGHT: 62.01 IN | BODY MASS INDEX: 36.33 KG/M2

## 2023-05-07 VITALS — SYSTOLIC BLOOD PRESSURE: 104 MMHG | DIASTOLIC BLOOD PRESSURE: 52 MMHG

## 2023-05-07 DIAGNOSIS — Z99.81: ICD-10-CM

## 2023-05-07 DIAGNOSIS — Z79.02: ICD-10-CM

## 2023-05-07 DIAGNOSIS — I25.10: Primary | ICD-10-CM

## 2023-05-07 DIAGNOSIS — F17.210: ICD-10-CM

## 2023-05-07 DIAGNOSIS — Z95.5: ICD-10-CM

## 2023-05-07 DIAGNOSIS — Z79.82: ICD-10-CM

## 2023-05-07 DIAGNOSIS — I25.2: ICD-10-CM

## 2023-05-07 DIAGNOSIS — J44.9: ICD-10-CM

## 2023-05-07 LAB
ALBUMIN SERPL-MCNC: 3.5 GM/DL (ref 3.2–4.5)
ALP SERPL-CCNC: 98 U/L (ref 40–136)
ALT SERPL-CCNC: < 6 U/L (ref 0–55)
AMORPH SED URNS QL MICRO: (no result) /LPF
APTT BLD: 34 SEC (ref 24–35)
APTT PPP: YELLOW S
BACTERIA #/AREA URNS HPF: NEGATIVE /HPF
BASOPHILS # BLD AUTO: 0.1 10^3/UL (ref 0–0.1)
BASOPHILS NFR BLD AUTO: 0 % (ref 0–10)
BILIRUB SERPL-MCNC: 0.9 MG/DL (ref 0.1–1)
BILIRUB UR QL STRIP: NEGATIVE
BUN/CREAT SERPL: 6
CALCIUM SERPL-MCNC: 9 MG/DL (ref 8.5–10.1)
CHLORIDE SERPL-SCNC: 96 MMOL/L (ref 98–107)
CO2 SERPL-SCNC: 30 MMOL/L (ref 21–32)
CREAT SERPL-MCNC: 0.78 MG/DL (ref 0.6–1.3)
EOSINOPHIL # BLD AUTO: 0.3 10^3/UL (ref 0–0.3)
EOSINOPHIL NFR BLD AUTO: 2 % (ref 0–10)
FIBRINOGEN PPP-MCNC: CLEAR MG/DL
GFR SERPLBLD BASED ON 1.73 SQ M-ARVRAT: 109 ML/MIN
GLUCOSE SERPL-MCNC: 110 MG/DL (ref 70–105)
GLUCOSE UR STRIP-MCNC: NEGATIVE MG/DL
HCT VFR BLD CALC: 45 % (ref 40–54)
HGB BLD-MCNC: 14.5 G/DL (ref 13.3–17.7)
INR PPP: 1 (ref 0.8–1.4)
KETONES UR QL STRIP: NEGATIVE
LEUKOCYTE ESTERASE UR QL STRIP: NEGATIVE
LYMPHOCYTES # BLD AUTO: 1.6 10^3/UL (ref 1–4)
LYMPHOCYTES NFR BLD AUTO: 12 % (ref 12–44)
MAGNESIUM SERPL-MCNC: 1.9 MG/DL (ref 1.6–2.4)
MANUAL DIFFERENTIAL PERFORMED BLD QL: NO
MCH RBC QN AUTO: 31 PG (ref 25–34)
MCHC RBC AUTO-ENTMCNC: 33 G/DL (ref 32–36)
MCV RBC AUTO: 94 FL (ref 80–99)
MONOCYTES # BLD AUTO: 1.1 10^3/UL (ref 0–1)
MONOCYTES NFR BLD AUTO: 8 % (ref 0–12)
NEUTROPHILS # BLD AUTO: 10.3 10^3/UL (ref 1.8–7.8)
NEUTROPHILS NFR BLD AUTO: 77 % (ref 42–75)
NITRITE UR QL STRIP: NEGATIVE
PH UR STRIP: 6.5 [PH] (ref 5–9)
PLATELET # BLD: 248 10^3/UL (ref 130–400)
PMV BLD AUTO: 8.9 FL (ref 9–12.2)
POTASSIUM SERPL-SCNC: 3.8 MMOL/L (ref 3.6–5)
PROT SERPL-MCNC: 7.3 GM/DL (ref 6.4–8.2)
PROT UR QL STRIP: NEGATIVE
PROTHROMBIN TIME: 13.4 SEC (ref 12.2–14.7)
RBC #/AREA URNS HPF: (no result) /HPF
SODIUM SERPL-SCNC: 137 MMOL/L (ref 135–145)
SP GR UR STRIP: <=1.005 (ref 1.02–1.02)
WBC # BLD AUTO: 13.4 10^3/UL (ref 4.3–11)
WBC #/AREA URNS HPF: (no result) /HPF

## 2023-05-07 PROCEDURE — 85610 PROTHROMBIN TIME: CPT

## 2023-05-07 PROCEDURE — 93041 RHYTHM ECG TRACING: CPT

## 2023-05-07 PROCEDURE — 81000 URINALYSIS NONAUTO W/SCOPE: CPT

## 2023-05-07 PROCEDURE — 93005 ELECTROCARDIOGRAM TRACING: CPT

## 2023-05-07 PROCEDURE — 71045 X-RAY EXAM CHEST 1 VIEW: CPT

## 2023-05-07 PROCEDURE — 83874 ASSAY OF MYOGLOBIN: CPT

## 2023-05-07 PROCEDURE — 80053 COMPREHEN METABOLIC PANEL: CPT

## 2023-05-07 PROCEDURE — 36415 COLL VENOUS BLD VENIPUNCTURE: CPT

## 2023-05-07 PROCEDURE — 85730 THROMBOPLASTIN TIME PARTIAL: CPT

## 2023-05-07 PROCEDURE — 83735 ASSAY OF MAGNESIUM: CPT

## 2023-05-07 PROCEDURE — 84484 ASSAY OF TROPONIN QUANT: CPT

## 2023-05-07 PROCEDURE — 85025 COMPLETE CBC W/AUTO DIFF WBC: CPT

## 2023-05-07 NOTE — ED CHEST PAIN
General


Chief Complaint:  Chest Pain


Stated Complaint:  CHEST PAIN





History of Present Illness


Date Seen by Provider:  May 7, 2023


Time Seen by Provider:  19:15


Initial Comments


52 year old male came by private vehicle for chest pain for 30 min. Reports pain

improved at time of presentation. History of CAD, left AKA and multiple heart 

caths/stents. Has nitro at home, did not take PTA. Takes ASA 81 mg and Plavix 

daily. Hasn't seen Dr. Miguel in 1 year. Dr. Padron is PCP and saw him 2 weeks 

ago with no concerns. Denies, N/V/D or diaphoresis. Only complaint at this time,

left rib pain and has lidocaine patch at that site for pain from using crutches.







Last echocardiogram 2021 which showed a 45 to 50% ejection fraction.  Last 

cardiac catheterization 10/20/2019. Chronic Tobacco use. Patient is not 

diabetic.


Timing/Duration:  1/2 hour


Severity/Quality:  mild


Location:  substernal


Radiation:  no radiation


Prior CP/Workup:  cardiac cath, echocardiography, stress test


ASA po PTA:  No


NTG SL PTA:  No


Associated Symptoms:  denies symptoms; No abdominal pain, No back pain, No 

diaphoresis, No dizziness, No edema, No fatigue, No fever/chills, No headache, 

No heartburn, No nausea/vomiting, No rash, No shortness of breath, No swelli

ng/lump in chest, No syncope, No weakness (IRENE GUERRA)





Allergies and Home Medications


Allergies


Coded Allergies:  


     Penicillins (Unverified  Allergy, Mild, 09)


     pneumococcal vaccine (Verified  Allergy, Unknown, 4/10/20)





Patient Home Medication List


Home Medication List Reviewed:  Yes


 (IRENE GUERRA)


Allopurinol (Allopurinol) 100 Mg Tablet, 100 MG PO DAILY, (Reported)


   Entered as Reported by: DAVID GARZON on 18 0845


   Last Action: Last Taken Edited


Aspirin (Aspirin EC) 81 Mg Tablet.dr 81 MG PO HS, (Reported)


   Entered as Reported by: RAVINDER RODRIGUEZ on 19 1028


   Last Action: Last Taken Edited


Atorvastatin Calcium (Atorvastatin Calcium) 40 Mg Tablet, 40 MG PO HS, 

(Reported)


   Entered as Reported by: RAVINDER RODRIGUEZ on 17 0832


   Last Action: Last Taken Edited


Celecoxib (Celecoxib) 200 Mg Capsule, (Reported)


   Entered as Reported by: SIMON APARICIO on 23 191


   Last Action: New Order


Cholecalciferol (Vitamin D3) (Vitamin D3) 25 Mcg Tablet, 25 MCG PO DAILY, (

Reported)


   Entered as Reported by: ANTHONY CAMPBELL on 4/10/20 0851


   Last Action: Last Taken Edited


Clopidogrel Bisulfate (Plavix) 75 Mg Tablet, 75 MG PO DAILY, (Reported)


   Entered as Reported by: RAVINDER RODRIGUEZ on 19 1040


   Last Action: Last Taken Edited


Fluticasone/Salmeterol (Advair Hfa 115-21 Mcg Inhaler) 12 Gm Hfa.aer.ad, 0 PUFF 

IH RTBID


   Prescribed by: KATY RASMUSSEN on 20 1511


   Last Action: Last Taken Edited


Hyoscyamine Sulfate (Levsin-Sl) 0.125 Mg Tab.subl, 0.25 MG SL Q4H


   Prescribed by: VLADIMIR ESPINO on 21 220


   Last Action: Last Taken Edited


Ipratropium/Albuterol Sulfate (Iprat-Albut 0.5-3(2.5) mg/3 ml) 3 Ml Ampul.neb, 3

ML INH Q2HR PRN for SOA


   Prescribed by: KATY RASMUSSEN on 20 1511


   Last Action: Last Taken Edited


Isosorbide Mononitrate (Isosorbide Mononitrate ER) 30 Mg Tab.er.24h, 30 MG PO 

DAILY, (Reported)


   Entered as Reported by: DAVID GARZON on 18 0845


   Last Action: Last Taken Edited


Metoprolol Tartrate (Metoprolol Tartrate) 50 Mg Tablet, 50 MG PO BID, (Reported)


   Entered as Reported by: RAVINDER RODRIGUEZ on 10/21/19 1023


   Last Action: Last Taken Edited


Omega 3 Polyunsat Fatty Acids (Fish Oil 1,000 mg Capsule) 1,000 Mg Cap, 1,000 MG

PO BID, (Reported)


   Entered as Reported by: RAVINDER RODRIGUEZ on 17 0832


   Last Action: Last Taken Edited


Pantoprazole Sodium (Protonix) 40 Mg Tablet.dr, 40 MG PO DAILY, (Reported)


   Entered as Reported by: ANTHONY CAMPBELL on 4/10/20 0850


   Last Action: Last Taken Edited


Discontinued Medications


Prednisone (Prednisone) 10 Mg Tab.ds.pk, 10 MG PO DAILY


   Discontinued Reason: No Longer Taking


   Prescribed by: KATY RASMUSSEN on 20 1511


   Last Action: Discontinued





Review of Systems


Review of Systems


Constitutional:  no symptoms reported, see HPI


Respiratory:  No Symptoms Reported, See HPI


Cardiovascular:  See HPI, Chest Pain (resolved)


Gastrointestinal:  No Symptoms Reported, See HPI (IRENE GUERRA)





All Other Systems Reviewed


Negative Unless Noted:  Yes


 (IRENE GUERRA)





Past Medical-Social-Family Hx


Immunizations Up To Date


PED Vaccines UTD:  Yes


First/Initial COVID19 Vaccinat:  


Second COVID19 Vaccination Alfredo:  2021


 (IRENE GUERRA)





Seasonal Allergies


Seasonal Allergies:  No


 (IRENE GUERRA)





Past Medical History


Surgery/Hospitalization HX:  


LEFT ABOVE KNEE AMPUTATION, COPD


Surgeries:  Yes (LEFT AKA)


Amputation, Cardiac, Coronary Stent, Orthopedic


Respiratory:  Yes (O2 DEPENDENT)


Pulmonary Embolism, COPD


Currently Using CPAP:  No


Currently Using BIPAP:  No


Cardiac:  Yes (MI X 2; STENTS X 4; -NSTEMI 17 WITH 1 STENT PLACED)


Coronary Artery Disease, Deep Vein Thrombosis, Heart Attack, High Cholesterol, 

Hypertension


Neurological:  No


Reproductive Disorders:  No


Genitourinary:  No


Gastrointestinal:  Yes


Gastroesophageal Reflux


Musculoskeletal:  Yes (OXYCODONE, GABAPENTIN + IBUPROFEN DAILY;CHRONIC BACK/L 

LEG PHANTOM PAIN)


Amputee, Chronic Back Pain, Gout


Endocrine:  No (OBESITY)


HEENT:  No


Cancer:  No


Psychosocial:  No


Integumentary:  No


Blood Disorders:  Yes (PROTEIN S DEFICIENCY--DVT'S AND P.E.'S AND MI X 2)


 (IRENE GUERRA)





Family Medical History


Reviewed Nursing Family Hx


 (IRENE GUERRA)





Arthritis


  G8 BROTHER


Blood clots


  19 MOTHER ( of blood clot)


Cardiac disorder


  19 FATHER


Diabetes mellitus


  G8 BROTHER


FH: cancer


  paternal grandmother


FHx: inflammatory bowel disease


  G8 BROTHER





No Family History of:


  FH: sudden cardiac death (SCD)


Heart Disease, Vascular Disease








SOCIAL HISTORY:


-SMOKES 1 PPD


-ETOH-DENIES USE


-DRUGS-DENIES USE





PAST SURGICAL HISTORY:


-LEFT ABOVE THE KNEE AMPUTATION


-SURGERY TO LEFT MID ABDOMEN FOR "NERVE PAIN" FROM LEFT LEG AMPUTATION


-CARDIAC CATHS WITH STENTS X 4


LAST CARDIAC CATH DONE HERE 10/21/2019 BY DR. MIGUEL:


CONCLUSION:


1.  Patent stent in the obtuse marginal branch and multiple stents in the


right coronary artery with small vessel disease nonobstructive disease


2.  Prominent left ventricle with mild diffuse left ventricular


hypokinesia estimated ejection fraction 45 percent, normal left


ventricular end-diastolic pressure


3.  Normal aortic arch and great vessels of the neck





DISCUSSION AND RECOMMENDATION:


Chest pain is probably due to small vessel disease, mild troponin leak,


not considered myocardial infarction.  Patent arteries.  Chronic


compensated left ventricular systolic dysfunction.  Medical therapy is


recommended, educated on smoking cessation











 (IRENE GUERRA)





Physical Exam


Vital Signs





Vital Signs - First Documented








 23





 19:07


 


Temp 37.8


 


Pulse 114


 


Resp 20


 


B/P (MAP) 107/60 (76)


 


Pulse Ox 93


 


O2 Delivery Nasal Cannula


 


O2 Flow Rate 3.00








 (CAROL FERREIRA MD)


Vital Signs


Capillary Refill :  


 (IRENE GUERRA)


Height, Weight, BMI


Height: 5'4.00"


Weight: 190lbs. 1.6oz. 86.472658qh; 34.00 BMI


Method:Stated


General Appearance:  No Apparent Distress, WD/WN


HEENT:  TMs Normal, Normal ENT Inspection, Pharynx Normal


Neck:  Full Range of Motion, Normal Inspection, Non Tender, Supple


Respiratory:  Chest Non Tender, Lungs Clear, Normal Breath Sounds


Cardiovascular:  No Edema, No Murmur, Normal Peripheral Pulses (rt LE), 

Tachycardia


Gastrointestinal:  Normal Bowel Sounds, Non Tender, Soft


Extremity:  Normal Capillary Refill, Normal Inspection, Normal Range of Motion, 

Non Tender


Neurologic/Psychiatric:  Alert, Oriented x3, No Motor/Sensory Deficits, Normal 

Mood/Affect


Skin:  Normal Color, Warm/Dry (IRENE GUERRA)





Progress/Results/Core Measures


Results/Orders


Lab Results





Laboratory Tests








Test


 23


19:17 23


20:34 23


21:39 Range/Units


 


 


White Blood Count


 13.4 H


 


 


 4.3-11.0


10^3/uL


 


Red Blood Count


 4.73 


 


 


 4.30-5.52


10^6/uL


 


Hemoglobin 14.5    13.3-17.7  g/dL


 


Hematocrit 45    40-54  %


 


Mean Corpuscular Volume 94    80-99  fL


 


Mean Corpuscular Hemoglobin 31    25-34  pg


 


Mean Corpuscular Hemoglobin


Concent 33 


 


 


 32-36  g/dL





 


Red Cell Distribution Width 15.0 H   10.0-14.5  %


 


Platelet Count


 248 


 


 


 130-400


10^3/uL


 


Mean Platelet Volume 8.9 L   9.0-12.2  fL


 


Immature Granulocyte % (Auto) 0     %


 


Neutrophils (%) (Auto) 77 H   42-75  %


 


Lymphocytes (%) (Auto) 12    12-44  %


 


Monocytes (%) (Auto) 8    0-12  %


 


Eosinophils (%) (Auto) 2    0-10  %


 


Basophils (%) (Auto) 0    0-10  %


 


Neutrophils # (Auto)


 10.3 H


 


 


 1.8-7.8


10^3/uL


 


Lymphocytes # (Auto)


 1.6 


 


 


 1.0-4.0


10^3/uL


 


Monocytes # (Auto)


 1.1 H


 


 


 0.0-1.0


10^3/uL


 


Eosinophils # (Auto)


 0.3 


 


 


 0.0-0.3


10^3/uL


 


Basophils # (Auto)


 0.1 


 


 


 0.0-0.1


10^3/uL


 


Immature Granulocyte # (Auto)


 0.1 


 


 


 0.0-0.1


10^3/uL


 


Prothrombin Time 13.4    12.2-14.7  SEC


 


INR Comment 1.0    0.8-1.4  


 


Activated Partial


Thromboplast Time 34 


 


 


 24-35  SEC





 


Sodium Level 137    135-145  MMOL/L


 


Potassium Level 3.8    3.6-5.0  MMOL/L


 


Chloride Level 96 L     MMOL/L


 


Carbon Dioxide Level 30    21-32  MMOL/L


 


Anion Gap 11    5-14  MMOL/L


 


Blood Urea Nitrogen 5 L   7-18  MG/DL


 


Creatinine


 0.78 


 


 


 0.60-1.30


MG/DL


 


Estimat Glomerular Filtration


Rate 109 


 


 


  





 


BUN/Creatinine Ratio 6     


 


Glucose Level 110 H     MG/DL


 


Calcium Level 9.0    8.5-10.1  MG/DL


 


Corrected Calcium 9.4    8.5-10.1  MG/DL


 


Magnesium Level 1.9    1.6-2.4  MG/DL


 


Total Bilirubin 0.9    0.1-1.0  MG/DL


 


Aspartate Amino Transf


(AST/SGOT) 10 


 


 


 5-34  U/L





 


Alanine Aminotransferase


(ALT/SGPT) < 6 


 


 


 0-55  U/L





 


Alkaline Phosphatase 98      U/L


 


Myoglobin


 34.2 


 


 


 10.0-92.0


NG/ML


 


Troponin I < 0.028   < 0.028  <0.028  NG/ML


 


Total Protein 7.3    6.4-8.2  GM/DL


 


Albumin 3.5    3.2-4.5  GM/DL


 


Urine Color  YELLOW    


 


Urine Clarity  CLEAR    


 


Urine pH  6.5   5-9  


 


Urine Specific Gravity  <=1.005   1.016-1.022  


 


Urine Protein  NEGATIVE   NEGATIVE  


 


Urine Glucose (UA)  NEGATIVE   NEGATIVE  


 


Urine Ketones  NEGATIVE   NEGATIVE  


 


Urine Nitrite  NEGATIVE   NEGATIVE  


 


Urine Bilirubin  NEGATIVE   NEGATIVE  


 


Urine Urobilinogen  1.0   < = 1.0  MG/DL


 


Urine Leukocyte Esterase  NEGATIVE   NEGATIVE  


 


Urine RBC (Auto)  NEGATIVE   NEGATIVE  


 


Urine RBC  NONE    /HPF


 


Urine WBC  NONE    /HPF


 


Urine Crystals  PRESENT H   /LPF


 


Urine Amorphous Sediment


 


 FEW CIPRIANO


URATES H 


  /LPF





 


Urine Bacteria  NEGATIVE    /HPF


 


Urine Casts  NONE    /LPF


 


Urine Mucus  NEGATIVE    /LPF


 


Urine Culture Indicated  NO    





 (CAROL FERREIRA MD)


Medications Given in ED





Current Medications








 Medications  Dose


 Ordered  Sig/Joey


 Route  Start Time


 Stop Time Status Last Admin


Dose Admin


 


 Aspirin  324 mg  ONCE  ONCE


 PO  23 19:15


 23 19:16 DC 23 19:35


324 MG





 (CAROL FERREIRA MD)


Vital Signs/I&O











 23





 19:07 19:07 22:13


 


Temp  37.8 37.4


 


Pulse  114 100


 


Resp  20 14


 


B/P (MAP)  107/60 (76) 104/52


 


Pulse Ox  93 93


 


O2 Delivery Nasal Cannula Nasal Cannula Room Air


 


O2 Flow Rate 3.00 3.00 





 (CAROL FERREIRA MD)





Progress


Progress Note :  


   Time:  19:15


Progress Note


Patient assessed, will give aspirin 324 mg orally, EKG, labs and chest x-ray.


 patient continues to deny chest pain.  Labs and EKG normal no acute 

findings on chest x-ray.  We will continue to monitor.  Requiring oxygen at 2 to

3 L per nasal cannula to maintain greater than 90%.


2100 no chest pain. Spoke to Dr. Reilly, recommended repeat troponin at 2200. If

negative, discharge to home. 


2200 second troponin negative.  Patient continues to have no chest pain.  

Discharge instructions and return precautions reviewed with the patient and his 

wife.  All questions answered.


 (IRENE GUERRA)


Initial ECG Impression Date:  May 7, 2023


Initial ECG Impression Time:  19:15


Initial ECG Rate:  112


Initial ECG Rhythm:  Normal Sinus, S.Tach


Initial ECG Intervals:  Normal


Initial ECG Intervals


, QRS D 137, , QTc 380.  Axis P73, RR -25, T52


Initial ECG Impression:  Nonspecific Changes


Initial ECG Comparisson:  Unchanged


 (IRENE GUERRA)





Diagnostic Imaging





   Diagonstic Imaging:  Xray


   Plain Films/CT/US/NM/MRI:  chest


Comments


NAME:   NIKO MARTE


[facility] REC#:   [id number]


ACCOUNT#:   [id number]


PT STATUS:   REG ER


:   1970


PHYSICIAN:   IRENE GUERRA


ADMIT DATE:   23/ER


                                  ***Signed***


Date of Exam:23





CHEST 1 VIEW, AP/PA ONLY








INDICATION: Chest pain.





COMPARISON: 2021.





FINDINGS:


The lungs appear clear without focal airspace opacities or


consolidation. There are no findings of an effusion. There is no


evidence of a pneumothorax. Heart size and mediastinal contours


appear appropriate. Pulmonary vascularity appears within normal


limits. There is no acute or suspicious osseous abnormality


demonstrated.





IMPRESSION:


No radiographic evidence of an acute cardiopulmonary process.





Dictated by: 





  Dictated on workstation # [id number]








Dict:   23


Trans:   23


[facility] [id number]





Interpreted by:     SUPRIYA CHAUDHRY MD


Electronically signed by: SUPRIYA CHAUDHRY MD 23


   Reviewed:  Reviewed by Me


 (IRENE GUERRA)





Departure


Impression





   Primary Impression:  


   Chest pain


   Qualified Codes:  R07.9 - Chest pain, unspecified


   Additional Impression:  


   CAD (coronary artery disease)


   Qualified Codes:  I25.118 - Atherosclerotic heart disease of native coronary 

   artery with other forms of angina pectoris


Disposition:   HOME, SELF-CARE


Condition:  Stable





Departure-Patient Inst.


Decision time for Depature:  22:00


 (IRENE GUERRA)


Referrals:  


AMANDA PADRON MD (PCP/Family)


Primary Care Physician


Patient Instructions:  Chest Pain (DC)





Add. Discharge Instructions:  


Continue all home medications as prescribed.


Call for follow-up appointment with Dr. Miguel. 


Return to the emergency department for chest pain or other urgent healthcare 

needs. 





All discharge instructions reviewed with patient and/or family. Voiced 

understanding.








ATTENDING PHYSICIAN NOTE:


I was physically present as attending physician in the emergency department 

during the care of this patient, but I was not directly involved in the decision

making or delivery of care for this patient. 


 (CAROL FERREIRA MD)





Copy


Copies To 1:   AMANDA PADRON MD; NOR[facility] MIGUEL MD, [facility]                   May 7, 2023 19:26


CAROL FERREIRA MD         May 7, 2023 22:38

## 2023-05-07 NOTE — DIAGNOSTIC IMAGING REPORT
INDICATION: Chest pain.



COMPARISON: 12/13/2021.



FINDINGS:

The lungs appear clear without focal airspace opacities or

consolidation. There are no findings of an effusion. There is no

evidence of a pneumothorax. Heart size and mediastinal contours

appear appropriate. Pulmonary vascularity appears within normal

limits. There is no acute or suspicious osseous abnormality

demonstrated.



IMPRESSION:

No radiographic evidence of an acute cardiopulmonary process.



Dictated by: 



  Dictated on workstation # [id number]

## 2023-07-02 ENCOUNTER — HOSPITAL ENCOUNTER (INPATIENT)
Dept: HOSPITAL 75 - ER | Age: 53
LOS: 8 days | Discharge: HOME | DRG: 871 | End: 2023-07-10
Attending: FAMILY MEDICINE | Admitting: INTERNAL MEDICINE
Payer: MEDICARE

## 2023-07-02 VITALS — BODY MASS INDEX: 33.69 KG/M2 | WEIGHT: 197.31 LBS | HEIGHT: 64.02 IN

## 2023-07-02 VITALS — SYSTOLIC BLOOD PRESSURE: 111 MMHG | DIASTOLIC BLOOD PRESSURE: 71 MMHG

## 2023-07-02 VITALS — SYSTOLIC BLOOD PRESSURE: 103 MMHG | DIASTOLIC BLOOD PRESSURE: 50 MMHG

## 2023-07-02 VITALS — DIASTOLIC BLOOD PRESSURE: 68 MMHG | SYSTOLIC BLOOD PRESSURE: 114 MMHG

## 2023-07-02 DIAGNOSIS — Z86.718: ICD-10-CM

## 2023-07-02 DIAGNOSIS — F17.210: ICD-10-CM

## 2023-07-02 DIAGNOSIS — Z99.81: ICD-10-CM

## 2023-07-02 DIAGNOSIS — N02.8: ICD-10-CM

## 2023-07-02 DIAGNOSIS — Z79.899: ICD-10-CM

## 2023-07-02 DIAGNOSIS — R91.8: ICD-10-CM

## 2023-07-02 DIAGNOSIS — A41.9: Primary | ICD-10-CM

## 2023-07-02 DIAGNOSIS — K21.9: ICD-10-CM

## 2023-07-02 DIAGNOSIS — Z89.612: ICD-10-CM

## 2023-07-02 DIAGNOSIS — Z79.82: ICD-10-CM

## 2023-07-02 DIAGNOSIS — I25.2: ICD-10-CM

## 2023-07-02 DIAGNOSIS — G89.29: ICD-10-CM

## 2023-07-02 DIAGNOSIS — J44.0: ICD-10-CM

## 2023-07-02 DIAGNOSIS — I25.10: ICD-10-CM

## 2023-07-02 DIAGNOSIS — J18.9: ICD-10-CM

## 2023-07-02 DIAGNOSIS — M10.9: ICD-10-CM

## 2023-07-02 DIAGNOSIS — J44.1: ICD-10-CM

## 2023-07-02 DIAGNOSIS — I26.92: ICD-10-CM

## 2023-07-02 DIAGNOSIS — I50.9: ICD-10-CM

## 2023-07-02 DIAGNOSIS — I11.0: ICD-10-CM

## 2023-07-02 DIAGNOSIS — E66.9: ICD-10-CM

## 2023-07-02 DIAGNOSIS — Z95.5: ICD-10-CM

## 2023-07-02 DIAGNOSIS — E78.00: ICD-10-CM

## 2023-07-02 DIAGNOSIS — M54.9: ICD-10-CM

## 2023-07-02 DIAGNOSIS — J96.01: ICD-10-CM

## 2023-07-02 LAB
ALBUMIN SERPL-MCNC: 3.3 GM/DL (ref 3.2–4.5)
ALP SERPL-CCNC: 83 U/L (ref 40–136)
ALT SERPL-CCNC: < 6 U/L (ref 0–55)
APTT BLD: 42 SEC (ref 24–35)
BASOPHILS # BLD AUTO: 0 10^3/UL (ref 0–0.1)
BASOPHILS NFR BLD AUTO: 0 % (ref 0–10)
BILIRUB SERPL-MCNC: 0.7 MG/DL (ref 0.1–1)
BUN/CREAT SERPL: 11
CALCIUM SERPL-MCNC: 9.5 MG/DL (ref 8.5–10.1)
CHLORIDE SERPL-SCNC: 93 MMOL/L (ref 98–107)
CO2 SERPL-SCNC: 28 MMOL/L (ref 21–32)
CREAT SERPL-MCNC: 0.95 MG/DL (ref 0.6–1.3)
EOSINOPHIL # BLD AUTO: 0.1 10^3/UL (ref 0–0.3)
EOSINOPHIL NFR BLD AUTO: 1 % (ref 0–10)
GFR SERPLBLD BASED ON 1.73 SQ M-ARVRAT: 98 ML/MIN
GLUCOSE SERPL-MCNC: 93 MG/DL (ref 70–105)
HCT VFR BLD CALC: 42 % (ref 40–54)
HGB BLD-MCNC: 13.8 G/DL (ref 13.3–17.7)
INR PPP: 1 (ref 0.8–1.4)
LYMPHOCYTES # BLD AUTO: 2 10^3/UL (ref 1–4)
LYMPHOCYTES NFR BLD AUTO: 11 % (ref 12–44)
MANUAL DIFFERENTIAL PERFORMED BLD QL: YES
MCH RBC QN AUTO: 31 PG (ref 25–34)
MCHC RBC AUTO-ENTMCNC: 33 G/DL (ref 32–36)
MCV RBC AUTO: 94 FL (ref 80–99)
MONOCYTES # BLD AUTO: 1.2 10^3/UL (ref 0–1)
MONOCYTES NFR BLD AUTO: 7 % (ref 0–12)
MONOCYTES NFR BLD: 3 %
NEUTROPHILS # BLD AUTO: 14.6 10^3/UL (ref 1.8–7.8)
NEUTROPHILS NFR BLD AUTO: 81 % (ref 42–75)
NEUTS BAND NFR BLD MANUAL: 84 %
PLATELET # BLD: 248 10^3/UL (ref 130–400)
PMV BLD AUTO: 9.6 FL (ref 9–12.2)
POTASSIUM SERPL-SCNC: 3.2 MMOL/L (ref 3.6–5)
PROT SERPL-MCNC: 7.4 GM/DL (ref 6.4–8.2)
PROTHROMBIN TIME: 13.4 SEC (ref 12.2–14.7)
RBC MORPH BLD: NORMAL
SODIUM SERPL-SCNC: 135 MMOL/L (ref 135–145)
VARIANT LYMPHS NFR BLD MANUAL: 11 %
VARIANT LYMPHS NFR BLD MANUAL: 2 %
WBC # BLD AUTO: 18.1 10^3/UL (ref 4.3–11)

## 2023-07-02 PROCEDURE — 85027 COMPLETE CBC AUTOMATED: CPT

## 2023-07-02 PROCEDURE — 94640 AIRWAY INHALATION TREATMENT: CPT

## 2023-07-02 PROCEDURE — 71275 CT ANGIOGRAPHY CHEST: CPT

## 2023-07-02 PROCEDURE — 71045 X-RAY EXAM CHEST 1 VIEW: CPT

## 2023-07-02 PROCEDURE — 85025 COMPLETE CBC W/AUTO DIFF WBC: CPT

## 2023-07-02 PROCEDURE — 85007 BL SMEAR W/DIFF WBC COUNT: CPT

## 2023-07-02 PROCEDURE — 94761 N-INVAS EAR/PLS OXIMETRY MLT: CPT

## 2023-07-02 PROCEDURE — 87040 BLOOD CULTURE FOR BACTERIA: CPT

## 2023-07-02 PROCEDURE — 94760 N-INVAS EAR/PLS OXIMETRY 1: CPT

## 2023-07-02 PROCEDURE — 83605 ASSAY OF LACTIC ACID: CPT

## 2023-07-02 PROCEDURE — 83880 ASSAY OF NATRIURETIC PEPTIDE: CPT

## 2023-07-02 PROCEDURE — 84484 ASSAY OF TROPONIN QUANT: CPT

## 2023-07-02 PROCEDURE — 80202 ASSAY OF VANCOMYCIN: CPT

## 2023-07-02 PROCEDURE — 36415 COLL VENOUS BLD VENIPUNCTURE: CPT

## 2023-07-02 PROCEDURE — 85610 PROTHROMBIN TIME: CPT

## 2023-07-02 PROCEDURE — 93005 ELECTROCARDIOGRAM TRACING: CPT

## 2023-07-02 PROCEDURE — 80053 COMPREHEN METABOLIC PANEL: CPT

## 2023-07-02 PROCEDURE — 85730 THROMBOPLASTIN TIME PARTIAL: CPT

## 2023-07-02 RX ADMIN — PANTOPRAZOLE SODIUM SCH MG: 40 TABLET, DELAYED RELEASE ORAL at 21:34

## 2023-07-02 RX ADMIN — OXYCODONE HYDROCHLORIDE AND ACETAMINOPHEN PRN TAB: 10; 325 TABLET ORAL at 19:58

## 2023-07-02 RX ADMIN — IPRATROPIUM BROMIDE AND ALBUTEROL SULFATE SCH ML: .5; 3 SOLUTION RESPIRATORY (INHALATION) at 22:04

## 2023-07-02 RX ADMIN — METOPROLOL TARTRATE SCH MG: 50 TABLET, FILM COATED ORAL at 21:41

## 2023-07-02 RX ADMIN — SODIUM CHLORIDE SCH MLS/HR: 900 INJECTION, SOLUTION INTRAVENOUS at 21:35

## 2023-07-02 RX ADMIN — IPRATROPIUM BROMIDE AND ALBUTEROL SULFATE SCH ML: .5; 3 SOLUTION RESPIRATORY (INHALATION) at 20:05

## 2023-07-02 RX ADMIN — DOCUSATE SODIUM SCH MG: 100 CAPSULE ORAL at 21:34

## 2023-07-02 RX ADMIN — GABAPENTIN SCH MG: 600 TABLET, FILM COATED ORAL at 21:34

## 2023-07-02 RX ADMIN — ASPIRIN SCH MG: 81 TABLET ORAL at 21:34

## 2023-07-02 RX ADMIN — SENNOSIDES SCH MG: 8.6 TABLET, FILM COATED ORAL at 21:34

## 2023-07-02 RX ADMIN — SODIUM CHLORIDE SCH MLS/HR: 900 INJECTION, SOLUTION INTRAVENOUS at 17:44

## 2023-07-02 RX ADMIN — SODIUM CHLORIDE SCH MLS/HR: 900 INJECTION INTRAVENOUS at 21:30

## 2023-07-02 RX ADMIN — GABAPENTIN SCH MG: 600 TABLET, FILM COATED ORAL at 21:42

## 2023-07-02 NOTE — ED GENERAL
General


Chief Complaint:  Respiratory Problems


Stated Complaint:  FEVER/COUGHING BLOOD


Nursing Triage Note:  


PT STATES DX WITH RT PNEUMONIA A COUPLE DAYS AGO, ON DOXY ABX, COUGHING UP BLOOD


Source of Information:  Patient


Exam Limitations:  No Limitations





History of Present Illness


Date Seen by Provider:  2023


Time Seen by Provider:  14:18


Initial Comments


Here with report of recent history of pneumonia diagnosed a few days ago at the 

clinic.  He was started on doxycycline.  He states that since in the right lower

lobe.  Comes in today with fever, shortness of breath, cough and coughing up 

blood.  Patient is on blood thinners.  Does have long history of COPD.  He has 

been vaccinated for COVID and influenza.  Did have left lower extremity 

above-the-knee amputation secondary to blood clot disorder/vascular disease and 

infection.  Patient does have inhalers at home and he has been taking the 

doxycycline as prescribed and has been getting worse.  Denies nausea or 

vomiting.  Denies diarrhea.  Urinating okay.  Denies diabetes history.  Does 

smoke 1 pack/day but has only been able to smoke 1/2 pack/day for the last few 

days due to the illness.


Timing/Duration:  3-4 Days, Getting Worse


Severity:  Moderate


Associated Systoms:  No Chest Pain; Cough, Fever/Chills; No Nausea/Vomiting; 

Shortness of Air; No Weakness





Allergies and Home Medications


Allergies


Coded Allergies:  


     Penicillins (Unverified  Allergy, Mild, 09)


     pneumococcal vaccine (Verified  Allergy, Unknown, 4/10/20)





Patient Home Medication List


Home Medication List Reviewed:  Yes


Allopurinol (Allopurinol) 100 Mg Tablet, 100 MG PO DAILY, (Reported)


   Entered as Reported by: DAVID GARZON on 18 0845


Aspirin (Aspirin EC) 81 Mg Tablet.dr, 81 MG PO HS, (Reported)


   Entered as Reported by: RAVINDER RODRIGUEZ on 19 1028


Atorvastatin Calcium (Atorvastatin Calcium) 40 Mg Tablet, 40 MG PO HS, 

(Reported)


   Entered as Reported by: RAVINDER RODRIGUEZ on 17 0832


Celecoxib (Celecoxib) 200 Mg Capsule, (Reported)


   Entered as Reported by: SIMON APARICIO on 23 1917


Cholecalciferol (Vitamin D3) (Vitamin D3) 25 Mcg Tablet, 25 MCG PO DAILY, 

(Reported)


   Entered as Reported by: ANTHONY CAMPBELL on 4/10/20 0851


Clopidogrel Bisulfate (Plavix) 75 Mg Tablet, 75 MG PO DAILY, (Reported)


   Entered as Reported by: RAVINDER RODRIGUEZ on 19 1040


Fluticasone/Salmeterol (Advair Hfa 115-21 Mcg Inhaler) 12 Gm Hfa.aer.ad, 0 PUFF 

IH RTBID


   Prescribed by: KATY RASMUSSEN on 20 1511


Hyoscyamine Sulfate (Levsin-Sl) 0.125 Mg Tab.subl, 0.25 MG SL Q4H


   Prescribed by: VLADIMIR ESPINO on 21 2201


Ipratropium/Albuterol Sulfate (Iprat-Albut 0.5-3(2.5) mg/3 ml) 3 Ml Ampul.neb, 3

ML INH Q2HR PRN for SOA


   Prescribed by: KATY RASMUSSEN on 20 1511


Isosorbide Mononitrate (Isosorbide Mononitrate ER) 30 Mg Tab.er.24h, 30 MG PO 

DAILY, (Reported)


   Entered as Reported by: DAVID GARZON on 18 0845


Metoprolol Tartrate (Metoprolol Tartrate) 50 Mg Tablet, 50 MG PO BID, (Reported)


   Entered as Reported by: RAVINDER RODRIGUEZ on 10/21/19 1023


Omega 3 Polyunsat Fatty Acids (Fish Oil 1,000 mg Capsule) 1,000 Mg Cap, 1,000 MG

PO BID, (Reported)


   Entered as Reported by: RAVINDER RODRIGUEZ on 17 0832


Pantoprazole Sodium (Protonix) 40 Mg Tablet.dr, 40 MG PO DAILY, (Reported)


   Entered as Reported by: ANTHONY CAMPBELL on 4/10/20 0850





Review of Systems


Review of Systems


Constitutional:  see HPI; No chills, No fever


EENTM:  no symptoms reported


Respiratory:  cough, short of breath, wheezing


Cardiovascular:  No chest pain, No palpitations


Gastrointestinal:  No nausea, No vomiting


Genitourinary:  no symptoms reported


Musculoskeletal:  no symptoms reported


Skin:  no symptoms reported


Psychiatric/Neurological:  Denies Anxiety, Denies Headache





Past Medical-Social-Family Hx


Patient Social History


Tobacco Use?:  Yes


Tobacco type used:  Cigarettes


Smoking Status:  Current Everyday Smoker


Substance use?:  No


Alcohol Use?:  No





Immunizations Up To Date


PED Vaccines UTD:  Yes


First/Initial COVID19 Vaccinat:  X3


Second COVID19 Vaccination Alfredo:  X3


Third COVID19 Vaccination Date:  X3





Seasonal Allergies


Seasonal Allergies:  No





Past Medical History


Surgery/Hospitalization HX:  


LEFT ABOVE KNEE AMPUTATION, HEART CATH X5 WITH STENTS





HTN, HLD, GOUT, COPD, CHF


Surgeries:  Yes (LEFT AKA)


Amputation, Cardiac, Coronary Stent, Orthopedic


Respiratory:  Yes (O2 DEPENDENT)


Pulmonary Embolism, COPD


Currently Using CPAP:  No


Currently Using BIPAP:  No


Cardiac:  Yes (MI X 2; STENTS X 4; -NSTEMI 17 WITH 1 STENT PLACED)


Coronary Artery Disease, Deep Vein Thrombosis, Heart Attack, High Cholesterol, 

Hypertension


Neurological:  No


Reproductive Disorders:  No


Genitourinary:  No


Gastrointestinal:  Yes


Gastroesophageal Reflux


Musculoskeletal:  Yes (OXYCODONE, GABAPENTIN + IBUPROFEN DAILY;CHRONIC BACK/L 

LEG PHANTOM PAIN)


Amputee, Chronic Back Pain, Gout


Endocrine:  No (OBESITY)


HEENT:  No


Cancer:  No


Psychosocial:  No


Integumentary:  No


Blood Disorders:  Yes (PROTEIN S DEFICIENCY--DVT'S AND P.E.'S AND MI X 2)





Family Medical History


Reviewed Nursing Family Hx





Arthritis


  G8 BROTHER


Blood clots


  19 MOTHER ( of blood clot)


Cardiac disorder


  19 FATHER


Diabetes mellitus


  G8 BROTHER


FH: cancer


  paternal grandmother


FHx: inflammatory bowel disease


  G8 BROTHER





No Family History of:


  FH: sudden cardiac death (SCD)


Heart Disease, Vascular Disease








SOCIAL HISTORY:


-SMOKES 1 PPD


-ETOH-DENIES USE


-DRUGS-DENIES USE





PAST SURGICAL HISTORY:


-LEFT ABOVE THE KNEE AMPUTATION


-SURGERY TO LEFT MID ABDOMEN FOR "NERVE PAIN" FROM LEFT LEG AMPUTATION


-CARDIAC CATHS WITH STENTS X 4


LAST CARDIAC CATH DONE HERE 10/21/2019 BY DR. HERNANDEZ:


CONCLUSION:


1.  Patent stent in the obtuse marginal branch and multiple stents in the


right coronary artery with small vessel disease nonobstructive disease


2.  Prominent left ventricle with mild diffuse left ventricular


hypokinesia estimated ejection fraction 45 percent, normal left


ventricular end-diastolic pressure


3.  Normal aortic arch and great vessels of the neck





DISCUSSION AND RECOMMENDATION:


Chest pain is probably due to small vessel disease, mild troponin leak,


not considered myocardial infarction.  Patent arteries.  Chronic


compensated left ventricular systolic dysfunction.  Medical therapy is


recommended, educated on smoking cessation





Physical Exam-Suspected Sepsis


Physical Exam


Vital Signs





Vital Signs - First Documented








 23





 14:15


 


Temp 38.4


 


B/P (MAP) 123/60 (81)





Capillary Refill :








Blood Pressure Mean:                    81








Height, Weight, BMI


Height: 5'4.00"


Weight: 190lbs. 1.6oz. 86.247635bs; 33.00 BMI


Method:Stated


General Appearance:  No Apparent Distress, WD/WN


HEENT:  PERRL/EOMI, Pharynx Normal


Neck:  Non Tender, Supple


Respiratory:  No Respiratory Distress, Expiration, Wheezing


Cardiovascular:  No Murmur, Tachycardia


Gastrointestinal:  Non Tender, Soft


Back:  Normal Inspection, No CVA Tenderness, No Vertebral Tenderness


Extremity:  Normal Range of Motion, Other (Left AKA)


Neurologic/Psychiatric:  Alert, Oriented x3


Skin:  normal color, warm/dry





Focused Exam


Lactate Level


23 14:15: Lactic Acid Level 2.21*H





Lactic Acid Level





Laboratory Tests








Test


 23


14:15


 


Lactic Acid Level


 2.21 MMOL/L


(0.50-2.00)  *H











Progress/Results/Core Measures


Suspected Sepsis


SIRS


Temperature: 


Pulse:  


Respiratory Rate: 


 


Laboratory Tests


23 14:15: White Blood Count 18.1H


Blood Pressure 123 /60 


Mean: 81


 


23 14:15: Lactic Acid Level 2.21*H


Laboratory Tests


23 14:15: 


Creatinine 0.95, INR Comment 1.0, Platelet Count 248, Total Bilirubin 0.7








Results/Orders


Lab Results





Laboratory Tests








Test


 23


14:15 Range/Units


 


 


White Blood Count


 18.1 H


 4.3-11.0


10^3/uL


 


Red Blood Count


 4.49 


 4.30-5.52


10^6/uL


 


Hemoglobin 13.8  13.3-17.7  g/dL


 


Hematocrit 42  40-54  %


 


Mean Corpuscular Volume 94  80-99  fL


 


Mean Corpuscular Hemoglobin 31  25-34  pg


 


Mean Corpuscular Hemoglobin


Concent 33 


 32-36  g/dL





 


Red Cell Distribution Width 16.9 H 10.0-14.5  %


 


Platelet Count


 248 


 130-400


10^3/uL


 


Mean Platelet Volume 9.6  9.0-12.2  fL


 


Immature Granulocyte % (Auto) 1   %


 


Neutrophils (%) (Auto) 81 H 42-75  %


 


Lymphocytes (%) (Auto) 11 L 12-44  %


 


Monocytes (%) (Auto) 7  0-12  %


 


Eosinophils (%) (Auto) 1  0-10  %


 


Basophils (%) (Auto) 0  0-10  %


 


Neutrophils # (Auto)


 14.6 H


 1.8-7.8


10^3/uL


 


Lymphocytes # (Auto)


 2.0 


 1.0-4.0


10^3/uL


 


Monocytes # (Auto)


 1.2 H


 0.0-1.0


10^3/uL


 


Eosinophils # (Auto)


 0.1 


 0.0-0.3


10^3/uL


 


Basophils # (Auto)


 0.0 


 0.0-0.1


10^3/uL


 


Immature Granulocyte # (Auto)


 0.2 H


 0.0-0.1


10^3/uL


 


Neutrophils % (Manual) 84   %


 


Lymphocytes % (Manual) 11   %


 


Monocytes % (Manual) 3   %


 


Reactive Lymphocytes 2   %


 


Blood Morphology Comment NORMAL   


 


Prothrombin Time 13.4  12.2-14.7  SEC


 


INR Comment 1.0  0.8-1.4  


 


Activated Partial


Thromboplast Time 42 H


 24-35  SEC





 


Sodium Level 135  135-145  MMOL/L


 


Potassium Level 3.2 L 3.6-5.0  MMOL/L


 


Chloride Level 93 L   MMOL/L


 


Carbon Dioxide Level 28  21-32  MMOL/L


 


Anion Gap 14  5-14  MMOL/L


 


Blood Urea Nitrogen 10  7-18  MG/DL


 


Creatinine


 0.95 


 0.60-1.30


MG/DL


 


Estimat Glomerular Filtration


Rate 98 


  





 


BUN/Creatinine Ratio 11   


 


Glucose Level 93    MG/DL


 


Lactic Acid Level


 2.21 *H


 0.50-2.00


MMOL/L


 


Calcium Level 9.5  8.5-10.1  MG/DL


 


Corrected Calcium 10.1  8.5-10.1  MG/DL


 


Total Bilirubin 0.7  0.1-1.0  MG/DL


 


Aspartate Amino Transf


(AST/SGOT) 15 


 5-34  U/L





 


Alanine Aminotransferase


(ALT/SGPT) < 6 


 0-55  U/L





 


Alkaline Phosphatase 83    U/L


 


Total Protein 7.4  6.4-8.2  GM/DL


 


Albumin 3.3  3.2-4.5  GM/DL








My Orders





Orders - BRODY CONTRERAS MD


Cbc With Automated Diff (23 14:23)


Comprehensive Metabolic Panel (23 14:23)


Blood Culture (23 14:23)


Sputum Culture (23 14:23)


Protime With Inr (23 14:23)


Partial Thromboplastin Time (23 14:23)


Chest 1 View, Ap/Pa Only (23 14:23)


Acetaminophen  Tablet (Tylenol  Tablet) (23 14:30)


Ed Iv/Invasive Line Start (23 14:23)


Vital Signs Adult Sepsis Patie Q15M (23 14:23)


O2 (23 14:23)


Remove Rings In Anticipation O (23 14:23)


Lactic Acid Analyzer (23 14:23)


Albuterol/Ipra Inhalation Soln (Duoneb I (23 14:30)


Ns Iv 1000 Ml (Sodium Chloride 0.9%) (23 14:23)


Svn Small Volume Nebulizer (23 14:23)


Manual Differential (23 14:15)


Cefepime Injection (Maxipime Injection) (23 15:45)


Code/Resuscitation (23 15:46)


Ed Admission (Communication) (23 15:46)





Medications Given in ED





Current Medications








 Medications  Dose


 Ordered  Sig/Joey


 Route  Start Time


 Stop Time Status Last Admin


Dose Admin


 


 Acetaminophen  1,000 mg  ONCE  PRN


 PO  23 14:30


 23 14:30 DC 23 14:29


1,000 MG


 


 Albuterol/


 Ipratropium  3 ml  ONCE  ONCE


 INH  23 14:30


 23 14:31 DC 23 14:29


3 ML


 


 Cefepime HCl 1000


 mg/Sodium Chloride  50 ml @ 


 100 mls/hr  ONCE  ONCE


 IV  23 15:45


 23 16:14  23 15:53


100 MLS/HR








Vital Signs/I&O











 23





 14:15 14:29


 


Temp 38.4 38.4


 


B/P (MAP) 123/60 (81) 





Capillary Refill :








Blood Pressure Mean:                    81








Progress Note :  


Progress Note


Seen and evaluated.  Patient has fever on arrival and is tachycardic with cough 

and history of pneumonia.  We will go ahead and initiate sepsis protocol inc

luding IV, labs including CBC, CMP, blood cultures and lactic acid.  We will get

chest x-ray.  No indication for UA as he has had not having urinary tract 

symptoms.  Monitor patient.





Differential diagnosis includes pneumonia, sepsis, severe sepsis, electrolyte 

abnormality





1500: Patient has obvious right lower lobe infiltrate on chest x-ray on my 

interpretation.  Sputum culture has been ordered and pending.  Patient has 

failed outpatient therapy and will require admission.  1542: Labs reviewed and 

CBC does show a white count of 18,000 with left shift.  CMP grossly normal with 

slightly elevated blood sugar and lactic acid noted to be elevated at 2.21.  I 

did discuss all of this with the on-call hospitalist, Dr. Slater for [facility], who accepts patient for admission, observation status to medical 

surgical unit.  Cefepime 1 g IV initiated.  No history of significant MRSA 

infection.  All findings concerns discussed with patient and family who agree 

with plan.  He is doing a little better after DuoNeb therapy ordered earlier.





Diagnostic Imaging





   Diagonstic Imaging:  Xray


   Plain Films/CT/US/NM/MRI:  chest


Comments


                 [facility], [facility].


                                [place], [place]





NAME:   NIKO MARTE


[facility] REC#:   [id number]


ACCOUNT#:   [id number]


PT STATUS:   REG ER


:   1970


PHYSICIAN:   BRODY CONTRERAS MD


ADMIT DATE:   23/ER


                                   ***Draft***


Date of Exam:23





CHEST 1 VIEW, AP/PA ONLY








EXAM: Chest 1 view, AP/PA only.





INDICATION: Cough. Fever.





COMPARISON: Chest radiograph 2023.





FINDINGS: New airspace opacity in the right lung base suspicious


for pneumonitis. No pleural effusion or pneumothorax. Normal


heart size and central pulmonary vascularity. No acute osseous


finding.





IMPRESSION: New airspace opacity in the right lung base


suspicious for pneumonitis. Recommend follow-up to resolution. 





  Dictated on workstation # [id number]








Dict:   23 1456


Trans:   23 1459


[facility] [id number]





Interpreted by:     JANEL BETANCUR MD


Electronically signed by:


   Reviewed:  Reviewed by Me





Departure


Communication (Admissions)


Time/Spoke to Admitting Phy:  15:42





Impression





   Primary Impression:  


   Right lower lobe pneumonia


   Qualified Codes:  J18.9 - Pneumonia, unspecified organism


Disposition:   ADMITTED AS INPATIENT


Condition:  Stable





Admissions


Decision to Admit Reason:  Admit from ER (General)


Decision to Admit/Date:  2023


Time/Decision to Admit Time:  15:42





Departure-Patient Inst.


Referrals:  


[facility] (PCP/Family)


Primary Care Physician











BRODY CONTRERAS MD           2023 14:54

## 2023-07-02 NOTE — DIAGNOSTIC IMAGING REPORT
EXAM: Chest 1 view, AP/PA only.



INDICATION: Cough. Fever.



COMPARISON: Chest radiograph 05/07/2023.



FINDINGS: New airspace opacity in the right lung base suspicious

for pneumonitis. No pleural effusion or pneumothorax. Normal

heart size and central pulmonary vascularity. No acute osseous

finding.



IMPRESSION: New airspace opacity in the right lung base

suspicious for pneumonitis. Recommend follow-up to resolution. 



Dictated by: 



  Dictated on workstation # [id number]

## 2023-07-03 VITALS — SYSTOLIC BLOOD PRESSURE: 110 MMHG | DIASTOLIC BLOOD PRESSURE: 57 MMHG

## 2023-07-03 VITALS — DIASTOLIC BLOOD PRESSURE: 73 MMHG | SYSTOLIC BLOOD PRESSURE: 121 MMHG

## 2023-07-03 VITALS — DIASTOLIC BLOOD PRESSURE: 65 MMHG | SYSTOLIC BLOOD PRESSURE: 113 MMHG

## 2023-07-03 VITALS — SYSTOLIC BLOOD PRESSURE: 110 MMHG | DIASTOLIC BLOOD PRESSURE: 67 MMHG

## 2023-07-03 VITALS — SYSTOLIC BLOOD PRESSURE: 105 MMHG | DIASTOLIC BLOOD PRESSURE: 55 MMHG

## 2023-07-03 LAB
ALBUMIN SERPL-MCNC: 2.8 GM/DL (ref 3.2–4.5)
ALP SERPL-CCNC: 83 U/L (ref 40–136)
ALT SERPL-CCNC: 7 U/L (ref 0–55)
BASOPHILS # BLD AUTO: 0 10^3/UL (ref 0–0.1)
BASOPHILS NFR BLD AUTO: 0 % (ref 0–10)
BILIRUB SERPL-MCNC: 0.7 MG/DL (ref 0.1–1)
BUN/CREAT SERPL: 12
CALCIUM SERPL-MCNC: 8.8 MG/DL (ref 8.5–10.1)
CHLORIDE SERPL-SCNC: 101 MMOL/L (ref 98–107)
CO2 SERPL-SCNC: 25 MMOL/L (ref 21–32)
CREAT SERPL-MCNC: 0.77 MG/DL (ref 0.6–1.3)
EOSINOPHIL # BLD AUTO: 0.2 10^3/UL (ref 0–0.3)
EOSINOPHIL NFR BLD AUTO: 2 % (ref 0–10)
GFR SERPLBLD BASED ON 1.73 SQ M-ARVRAT: 109 ML/MIN
GLUCOSE SERPL-MCNC: 106 MG/DL (ref 70–105)
HCT VFR BLD CALC: 36 % (ref 40–54)
HGB BLD-MCNC: 11.7 G/DL (ref 13.3–17.7)
LYMPHOCYTES # BLD AUTO: 1 10^3/UL (ref 1–4)
LYMPHOCYTES NFR BLD AUTO: 8 % (ref 12–44)
MANUAL DIFFERENTIAL PERFORMED BLD QL: NO
MCH RBC QN AUTO: 30 PG (ref 25–34)
MCHC RBC AUTO-ENTMCNC: 32 G/DL (ref 32–36)
MCV RBC AUTO: 93 FL (ref 80–99)
MONOCYTES # BLD AUTO: 0.9 10^3/UL (ref 0–1)
MONOCYTES NFR BLD AUTO: 7 % (ref 0–12)
NEUTROPHILS # BLD AUTO: 10.8 10^3/UL (ref 1.8–7.8)
NEUTROPHILS NFR BLD AUTO: 83 % (ref 42–75)
PLATELET # BLD: 215 10^3/UL (ref 130–400)
PMV BLD AUTO: 9.6 FL (ref 9–12.2)
POTASSIUM SERPL-SCNC: 3 MMOL/L (ref 3.6–5)
PROT SERPL-MCNC: 6.2 GM/DL (ref 6.4–8.2)
SODIUM SERPL-SCNC: 135 MMOL/L (ref 135–145)
WBC # BLD AUTO: 13.1 10^3/UL (ref 4.3–11)

## 2023-07-03 PROCEDURE — 5A0935A ASSISTANCE WITH RESPIRATORY VENTILATION, LESS THAN 24 CONSECUTIVE HOURS, HIGH NASAL FLOW/VELOCITY: ICD-10-PCS | Performed by: INTERNAL MEDICINE

## 2023-07-03 RX ADMIN — SODIUM CHLORIDE SCH MLS/HR: 900 INJECTION INTRAVENOUS at 09:03

## 2023-07-03 RX ADMIN — SODIUM CHLORIDE SCH MLS/HR: 900 INJECTION, SOLUTION INTRAVENOUS at 09:00

## 2023-07-03 RX ADMIN — ISOSORBIDE MONONITRATE SCH MG: 30 TABLET, EXTENDED RELEASE ORAL at 08:58

## 2023-07-03 RX ADMIN — PANTOPRAZOLE SODIUM SCH MG: 40 TABLET, DELAYED RELEASE ORAL at 08:56

## 2023-07-03 RX ADMIN — OXYCODONE HYDROCHLORIDE AND ACETAMINOPHEN PRN TAB: 10; 325 TABLET ORAL at 16:45

## 2023-07-03 RX ADMIN — DOCUSATE SODIUM SCH MG: 100 CAPSULE ORAL at 08:58

## 2023-07-03 RX ADMIN — IPRATROPIUM BROMIDE AND ALBUTEROL SULFATE SCH ML: .5; 3 SOLUTION RESPIRATORY (INHALATION) at 14:34

## 2023-07-03 RX ADMIN — SODIUM CHLORIDE SCH MLS/HR: 900 INJECTION, SOLUTION INTRAVENOUS at 00:09

## 2023-07-03 RX ADMIN — SODIUM CHLORIDE SCH MLS/HR: 900 INJECTION INTRAVENOUS at 16:36

## 2023-07-03 RX ADMIN — IPRATROPIUM BROMIDE AND ALBUTEROL SULFATE SCH ML: .5; 3 SOLUTION RESPIRATORY (INHALATION) at 10:40

## 2023-07-03 RX ADMIN — ASPIRIN SCH MG: 81 TABLET ORAL at 21:05

## 2023-07-03 RX ADMIN — SODIUM CHLORIDE SCH MLS/HR: 900 INJECTION, SOLUTION INTRAVENOUS at 21:21

## 2023-07-03 RX ADMIN — IPRATROPIUM BROMIDE AND ALBUTEROL SULFATE SCH ML: .5; 3 SOLUTION RESPIRATORY (INHALATION) at 19:16

## 2023-07-03 RX ADMIN — VANCOMYCIN SCH MLS/HR: 1.75 INJECTION, SOLUTION INTRAVENOUS at 09:01

## 2023-07-03 RX ADMIN — METOPROLOL TARTRATE SCH MG: 50 TABLET, FILM COATED ORAL at 21:06

## 2023-07-03 RX ADMIN — NICOTINE SCH MG: 21 PATCH, EXTENDED RELEASE TRANSDERMAL at 08:59

## 2023-07-03 RX ADMIN — GABAPENTIN SCH MG: 600 TABLET, FILM COATED ORAL at 08:57

## 2023-07-03 RX ADMIN — CELECOXIB SCH MG: 100 CAPSULE ORAL at 21:06

## 2023-07-03 RX ADMIN — IPRATROPIUM BROMIDE AND ALBUTEROL SULFATE SCH ML: .5; 3 SOLUTION RESPIRATORY (INHALATION) at 23:18

## 2023-07-03 RX ADMIN — CLOPIDOGREL BISULFATE SCH MG: 75 TABLET, FILM COATED ORAL at 08:57

## 2023-07-03 RX ADMIN — OXYCODONE HYDROCHLORIDE AND ACETAMINOPHEN PRN TAB: 10; 325 TABLET ORAL at 21:21

## 2023-07-03 RX ADMIN — SODIUM CHLORIDE SCH MLS/HR: 900 INJECTION, SOLUTION INTRAVENOUS at 05:05

## 2023-07-03 RX ADMIN — GABAPENTIN SCH MG: 600 TABLET, FILM COATED ORAL at 13:07

## 2023-07-03 RX ADMIN — FLUTICASONE FUROATE AND VILANTEROL TRIFENATATE SCH EACH: 100; 25 POWDER RESPIRATORY (INHALATION) at 10:41

## 2023-07-03 RX ADMIN — GABAPENTIN SCH MG: 600 TABLET, FILM COATED ORAL at 16:35

## 2023-07-03 RX ADMIN — OXYCODONE HYDROCHLORIDE AND ACETAMINOPHEN PRN TAB: 10; 325 TABLET ORAL at 09:07

## 2023-07-03 RX ADMIN — ALLOPURINOL SCH MG: 100 TABLET ORAL at 08:57

## 2023-07-03 RX ADMIN — GABAPENTIN SCH MG: 600 TABLET, FILM COATED ORAL at 21:06

## 2023-07-03 RX ADMIN — SODIUM CHLORIDE SCH MLS/HR: 900 INJECTION INTRAVENOUS at 21:07

## 2023-07-03 RX ADMIN — SENNOSIDES SCH MG: 8.6 TABLET, FILM COATED ORAL at 08:59

## 2023-07-03 RX ADMIN — POTASSIUM CHLORIDE SCH MLS/HR: 200 INJECTION, SOLUTION INTRAVENOUS at 13:08

## 2023-07-03 RX ADMIN — ENOXAPARIN SODIUM SCH MG: 100 INJECTION SUBCUTANEOUS at 16:36

## 2023-07-03 RX ADMIN — LIDOCAINE SCH EA: 50 PATCH CUTANEOUS at 13:07

## 2023-07-03 RX ADMIN — POTASSIUM CHLORIDE SCH MLS/HR: 200 INJECTION, SOLUTION INTRAVENOUS at 11:07

## 2023-07-03 RX ADMIN — IPRATROPIUM BROMIDE AND ALBUTEROL SULFATE SCH ML: .5; 3 SOLUTION RESPIRATORY (INHALATION) at 03:11

## 2023-07-03 RX ADMIN — OXYCODONE HYDROCHLORIDE AND ACETAMINOPHEN PRN TAB: 10; 325 TABLET ORAL at 03:46

## 2023-07-03 RX ADMIN — TIOTROPIUM BROMIDE INHALATION SPRAY SCH INH: 3.12 SPRAY, METERED RESPIRATORY (INHALATION) at 14:34

## 2023-07-03 RX ADMIN — DOCUSATE SODIUM SCH MG: 100 CAPSULE ORAL at 21:06

## 2023-07-03 RX ADMIN — VANCOMYCIN SCH MLS/HR: 1.75 INJECTION, SOLUTION INTRAVENOUS at 21:22

## 2023-07-03 RX ADMIN — SENNOSIDES SCH MG: 8.6 TABLET, FILM COATED ORAL at 21:07

## 2023-07-03 RX ADMIN — PANTOPRAZOLE SODIUM SCH MG: 40 TABLET, DELAYED RELEASE ORAL at 16:35

## 2023-07-03 RX ADMIN — CELECOXIB SCH MG: 100 CAPSULE ORAL at 08:59

## 2023-07-03 RX ADMIN — IPRATROPIUM BROMIDE AND ALBUTEROL SULFATE SCH ML: .5; 3 SOLUTION RESPIRATORY (INHALATION) at 07:06

## 2023-07-03 RX ADMIN — SODIUM CHLORIDE SCH MLS/HR: 900 INJECTION INTRAVENOUS at 03:52

## 2023-07-03 RX ADMIN — LISINOPRIL SCH MG: 10 TABLET ORAL at 08:59

## 2023-07-03 RX ADMIN — METOPROLOL TARTRATE SCH MG: 50 TABLET, FILM COATED ORAL at 08:57

## 2023-07-03 NOTE — DIAGNOSTIC IMAGING REPORT
EXAMINATION: CT angiogram chest



TECHNIQUE: Postcontrast CTA of the chest obtained utilizing PE

protocol. Coronal and sagittal reformats reconstructed.. Auto

Exposure Controls were utilized during the CT exam to meet ALARA

standards for radiation dose reduction.  



HISTORY: Shortness of breath



COMPARISON: CT chest on 04/09/2020..



FINDINGS:



Pulmonary emboli seen with saddle embolus between the left upper

and left interlobar pulmonary artery and complete occlusion of

the right truncus anterior and scattered of pulmonary emboli

within the right lower lobe bronchi.



No evidence of heart strain. No pericardial effusion.



Organized mass mass within the right lower lobe measuring 6.6 x

6.0 cm (image 96 series 2). Small right pleural effusion.



The heart is enlarged. There is no pericardial effusion. No

lymphadenopathy within the chest.



Included views of the abdomen demonstrates no significant

abnormality.



The osseous structures demonstrate no lytic or sclerotic bone

lesion.



IMPRESSION:



Multifocal pulmonary emboli. No evidence of heart strain.



Airspace opacity within the right lower lobe may represent an

infectious process or infarction. Superimposed 6 cm mass in the

right lower lobe may represent organizing pneumonia or primary

lung cancer. Recommend close observation and follow-up.



No apparent lymphadenopathy within the chest.



Cardiomegaly.



Dictated by: 



  Dictated on workstation # [id number]

## 2023-07-03 NOTE — HISTORY & PHYSICAL
HPI


History of Present Illness:


Pt states he came to the hospital because he had pneumonia and started coughing 

up blood. He initially was seen at walk-in at [facility] on Wed or Thursday and had x-

ray and was diagnosed with pneumonia and started on antibiotic (doxycycline) but

he got worse. Started having a lot of coughing up blood yesterday so they came 

to ER. He first started getting sick with cough, fever and he suspected he had 

pneumonia. He states he has had pneumonia before a long time ago. Has pain with 

breathing in and out on the right side, so he "knew it wasn't a heart attack", 

reports he has had several of those.


Source:  patient, family


Date seen by provider:  Jul 3, 2023


Time Seen by Provider:  11:15


Attending Physician


[facility]


PCP


Admitting Physician:


Angelica Slater DO 








Attending Physician:


Whitney Woo MD


Consult





Date of Admission


Jul 2, 2023 at 16:17





Home Medications


Home Medications


Reviewed patient Home Medication Reconciliation performed by pharmacy medication

reconciliations technician and/or nursing.


Patients Allergies have been reviewed.





Allergies


Coded Allergies:  


     Penicillins (Unverified  Allergy, Mild, 6/4/09)


     pneumococcal vaccine (Verified  Allergy, Unknown, 4/10/20)





PMH-Social-Family Hx


Patient Social History


Smoking Status:  Current Everyday Smoker


2nd Hand Smoke Exposure:  No


Recent Hopitalizations:  No


Alcohol Use?:  No


Tobacco type used:  Cigarettes





Immunizations Up To Date


Influenza Vaccine Up-to-Date:  Yes; Up-to-Date


First/Initial COVID19 Vaccinat:  X3


Second COVID19 Vaccination Alfredo:  X3


Third COVID19 Vaccination Date:  X3





Past Medical History





PMHx:


Bailey's disease


Coronary artery disease with stenting x 5


PE


HTN


HLD


Chronic pain


COPD on nocturnal oxygen


CHF





SurgHx:


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


Coronary artery stenting


Exploratory laparotomy





Family Medical History


Significant Family History:  Heart Disease, Vascular Disease





Review of Systems ([facility])


Constitutional:  No fever


EENTM:  No nose congestion, No throat pain


Respiratory:  cough; No short of breath


Cardiovascular:  chest pain


Gastrointestinal:  No abdominal pain, No constipation, No diarrhea, No nausea, 

No vomiting


Genitourinary:  No dysuria


Musculoskeletal:  back pain (chronic), joint pain (chronic)


Skin:  No rash


Psychiatric/Neurological:  Denies Anxiety, Denies Depressed





Reviewed Test Results


Reviewed Test Results


Lab





Laboratory Tests








Test


 7/2/23


14:15 7/2/23


16:12 7/3/23


05:36 Range/Units


 


 


White Blood Count


 18.1 H


 


 13.1 H


 4.3-11.0


10^3/uL


 


Red Blood Count


 4.49 


 


 3.90 L


 4.30-5.52


10^6/uL


 


Hemoglobin 13.8   11.7 L 13.3-17.7  g/dL


 


Hematocrit 42   36 L 40-54  %


 


Mean Corpuscular Volume 94   93  80-99  fL


 


Mean Corpuscular Hemoglobin 31   30  25-34  pg


 


Mean Corpuscular Hemoglobin


Concent 33 


 


 32 


 32-36  g/dL





 


Red Cell Distribution Width 16.9 H  16.8 H 10.0-14.5  %


 


Platelet Count


 248 


 


 215 


 130-400


10^3/uL


 


Mean Platelet Volume 9.6   9.6  9.0-12.2  fL


 


Immature Granulocyte % (Auto) 1   1   %


 


Neutrophils (%) (Auto) 81 H  83 H 42-75  %


 


Lymphocytes (%) (Auto) 11 L  8 L 12-44  %


 


Monocytes (%) (Auto) 7   7  0-12  %


 


Eosinophils (%) (Auto) 1   2  0-10  %


 


Basophils (%) (Auto) 0   0  0-10  %


 


Neutrophils # (Auto)


 14.6 H


 


 10.8 H


 1.8-7.8


10^3/uL


 


Lymphocytes # (Auto)


 2.0 


 


 1.0 


 1.0-4.0


10^3/uL


 


Monocytes # (Auto)


 1.2 H


 


 0.9 


 0.0-1.0


10^3/uL


 


Eosinophils # (Auto)


 0.1 


 


 0.2 


 0.0-0.3


10^3/uL


 


Basophils # (Auto)


 0.0 


 


 0.0 


 0.0-0.1


10^3/uL


 


Immature Granulocyte # (Auto)


 0.2 H


 


 0.1 


 0.0-0.1


10^3/uL


 


Neutrophils % (Manual) 84     %


 


Lymphocytes % (Manual) 11     %


 


Monocytes % (Manual) 3     %


 


Reactive Lymphocytes 2     %


 


Blood Morphology Comment NORMAL     


 


Prothrombin Time 13.4    12.2-14.7  SEC


 


INR Comment 1.0    0.8-1.4  


 


Activated Partial


Thromboplast Time 42 H


 


 


 24-35  SEC





 


Sodium Level 135   135  135-145  MMOL/L


 


Potassium Level 3.2 L  3.0 L 3.6-5.0  MMOL/L


 


Chloride Level 93 L  101    MMOL/L


 


Carbon Dioxide Level 28   25  21-32  MMOL/L


 


Anion Gap 14   9  5-14  MMOL/L


 


Blood Urea Nitrogen 10   9  7-18  MG/DL


 


Creatinine


 0.95 


 


 0.77 


 0.60-1.30


MG/DL


 


Estimat Glomerular Filtration


Rate 98 


 


 109 


  





 


BUN/Creatinine Ratio 11   12   


 


Glucose Level 93   106 H   MG/DL


 


Lactic Acid Level


 2.21 *H


 1.03 


 


 0.50-2.00


MMOL/L


 


Calcium Level 9.5   8.8  8.5-10.1  MG/DL


 


Corrected Calcium 10.1   9.8  8.5-10.1  MG/DL


 


Total Bilirubin 0.7   0.7  0.1-1.0  MG/DL


 


Aspartate Amino Transf


(AST/SGOT) 15 


 


 14 


 5-34  U/L





 


Alanine Aminotransferase


(ALT/SGPT) < 6 


 


 7 


 0-55  U/L





 


Alkaline Phosphatase 83   83    U/L


 


Total Protein 7.4   6.2 L 6.4-8.2  GM/DL


 


Albumin 3.3   2.8 L 3.2-4.5  GM/DL








Radiology


CXR 7/3/23: IMPRESSION: New airspace opacity in the right lung base suspicious 

for pneumonitis. Recommend follow-up to resolution.





Physical Exam-([facility])


Physical Exam


Vital Signs





                          VS - Last 72 Hours, by Label








 7/2/23 7/2/23 7/2/23 7/2/23





 14:15 14:29 16:00 16:15


 


Temp 38.4 38.4  37.5


 


Pulse    106


 


Resp    20


 


B/P (MAP) 123/60 (81)   102/48


 


Pulse Ox   90 96


 


O2 Delivery   Nasal Cannula Nasal Cannula


 


O2 Flow Rate   2.00 2.00





    2.00


 


    





 7/2/23 7/2/23 7/2/23 7/2/23





 16:30 16:45 17:20 17:22


 


Temp  36.8 36.8 


 


Pulse  98 98 


 


Resp  18  


 


B/P (MAP)  111/71 (84)  


 


Pulse Ox 98 98 98 98


 


O2 Delivery Nasal Cannula Nasal Cannula  Nasal Cannula


 


O2 Flow Rate 2.00 2.00  2.00


 


FiO2   28 


 


    





 7/2/23 7/2/23 7/2/23 7/2/23





 17:53 17:59 19:00 20:00


 


Temp  36.8  


 


Pulse 88  81 


 


Pulse Ox    94


 


O2 Delivery    Nasal Cannula


 


O2 Flow Rate    5.00


 


    





 7/2/23 7/2/23 7/2/23 7/2/23





 20:06 20:14 22:05 23:46


 


Temp  37.0  35.8


 


Pulse  92  87


 


Resp  18  18


 


B/P (MAP)  114/68 (83)  103/50 (67)


 


Pulse Ox 89 93 92 94


 


O2 Delivery Nasal Cannula Nasal Cannula Nasal Cannula Nasal Cannula


 


O2 Flow Rate 4.00 2.00 5.00 5.00





    5.00


 


    





 7/3/23 7/3/23 7/3/23 7/3/23





 01:00 03:33 07:01 07:06


 


Temp  36.6  


 


Pulse 80 108 87 


 


Resp  18  


 


B/P (MAP)  110/67 (81)  


 


Pulse Ox  93  90


 


O2 Delivery  Nasal Cannula  Nasal Cannula


 


O2 Flow Rate  5.00  6.00





  5.00  


 


    





 7/3/23 7/3/23 7/3/23 7/3/23





 07:43 08:00 11:30 13:51


 


Temp 36.2  36.3 


 


Pulse 99  92 74


 


Resp 18  19 


 


B/P (MAP) 105/55 (72)  110/57 (74) 


 


Pulse Ox 93 93 94 


 


O2 Delivery Nasal Cannula Nasal Cannula Nasal Cannula 


 


O2 Flow Rate 2.00 2.00 2.00 


 


    





 7/3/23   





 14:34   


 


Pulse Ox 93   


 


O2 Delivery Nasal Cannula   


 


O2 Flow Rate 8.00   





Capillary Refill :


General Appearance:  no apparent distress


Respiratory:  other (expiratory wheezing, decreased breath sounds at right base)


Cardiovascular:  regular rate, rhythm, no murmur


Gastrointestinal:  normal bowel sounds, non tender, soft


Extremities:  no pedal edema, other (Left AKA)


Neurologic/Psychiatric:  normal mood/affect


Skin:  warm/dry





Assessment/Plan


Assessment/Plan


Admission Status:  Inpatient Order (span 2 midnights)


Reason for Inpatient Admission:  


Sepsis with pneumonia





(1) Sepsis


Status:  Acute


Assessment & Plan:  Suspected to be secondary to pneumonia, see below.





(2) Right lower lobe pneumonia


Status:  Acute


Assessment & Plan:  Failed outpatient treatment with doxycycline, worsening. 

Started on cefepime and vancomycin.


Qualifiers:  


   Qualified Codes:  J18.9 - Pneumonia, unspecified organism


(3) Hemoptysis


Status:  Acute


Assessment & Plan:  Given history of PE and persistent hypoxia not on 

anticoagulation, will check CTA chest.





(4) CAD (coronary artery disease)


Status:  Chronic


Assessment & Plan:  Resume home meds





(5) Phantom limb pain


Status:  Chronic


Assessment & Plan:  Resume home meds





(6) History of pulmonary embolism


Status:  Chronic


Assessment & Plan:  Reports he used to be on warfarin, uncertain why 

discontinued.





(7) Hypertension


Status:  Chronic


(8) Hyperlipidemia


Status:  Chronic


(9) Bailey disease


Status:  Chronic


(10) DVT prophylaxis


Status:  Acute


Assessment & Plan:  Enoxaparin














WHITNEY WOO MD              Jul 3, 2023 11:19

## 2023-07-04 VITALS — SYSTOLIC BLOOD PRESSURE: 113 MMHG | DIASTOLIC BLOOD PRESSURE: 59 MMHG

## 2023-07-04 VITALS — SYSTOLIC BLOOD PRESSURE: 136 MMHG | DIASTOLIC BLOOD PRESSURE: 60 MMHG

## 2023-07-04 VITALS — DIASTOLIC BLOOD PRESSURE: 58 MMHG | SYSTOLIC BLOOD PRESSURE: 125 MMHG

## 2023-07-04 VITALS — SYSTOLIC BLOOD PRESSURE: 134 MMHG | DIASTOLIC BLOOD PRESSURE: 62 MMHG

## 2023-07-04 VITALS — DIASTOLIC BLOOD PRESSURE: 59 MMHG | SYSTOLIC BLOOD PRESSURE: 127 MMHG

## 2023-07-04 VITALS — DIASTOLIC BLOOD PRESSURE: 56 MMHG | SYSTOLIC BLOOD PRESSURE: 117 MMHG

## 2023-07-04 LAB
ALBUMIN SERPL-MCNC: 2.6 GM/DL (ref 3.2–4.5)
ALP SERPL-CCNC: 62 U/L (ref 40–136)
ALT SERPL-CCNC: < 6 U/L (ref 0–55)
BASOPHILS # BLD AUTO: 0 10^3/UL (ref 0–0.1)
BASOPHILS NFR BLD AUTO: 0 % (ref 0–10)
BILIRUB SERPL-MCNC: 0.2 MG/DL (ref 0.1–1)
BUN/CREAT SERPL: 12
CALCIUM SERPL-MCNC: 9 MG/DL (ref 8.5–10.1)
CHLORIDE SERPL-SCNC: 107 MMOL/L (ref 98–107)
CO2 SERPL-SCNC: 23 MMOL/L (ref 21–32)
CREAT SERPL-MCNC: 0.78 MG/DL (ref 0.6–1.3)
EOSINOPHIL # BLD AUTO: 0 10^3/UL (ref 0–0.3)
EOSINOPHIL NFR BLD AUTO: 0 % (ref 0–10)
GFR SERPLBLD BASED ON 1.73 SQ M-ARVRAT: 109 ML/MIN
GLUCOSE SERPL-MCNC: 167 MG/DL (ref 70–105)
HCT VFR BLD CALC: 34 % (ref 40–54)
HGB BLD-MCNC: 11.2 G/DL (ref 13.3–17.7)
LYMPHOCYTES # BLD AUTO: 1.1 10^3/UL (ref 1–4)
LYMPHOCYTES NFR BLD AUTO: 8 % (ref 12–44)
MANUAL DIFFERENTIAL PERFORMED BLD QL: NO
MCH RBC QN AUTO: 31 PG (ref 25–34)
MCHC RBC AUTO-ENTMCNC: 33 G/DL (ref 32–36)
MCV RBC AUTO: 95 FL (ref 80–99)
MONOCYTES # BLD AUTO: 0.7 10^3/UL (ref 0–1)
MONOCYTES NFR BLD AUTO: 5 % (ref 0–12)
NEUTROPHILS # BLD AUTO: 11.8 10^3/UL (ref 1.8–7.8)
NEUTROPHILS NFR BLD AUTO: 86 % (ref 42–75)
PLATELET # BLD: 237 10^3/UL (ref 130–400)
PMV BLD AUTO: 10 FL (ref 9–12.2)
POTASSIUM SERPL-SCNC: 3.4 MMOL/L (ref 3.6–5)
PROT SERPL-MCNC: 5.9 GM/DL (ref 6.4–8.2)
SODIUM SERPL-SCNC: 138 MMOL/L (ref 135–145)
WBC # BLD AUTO: 13.7 10^3/UL (ref 4.3–11)

## 2023-07-04 RX ADMIN — GABAPENTIN SCH MG: 600 TABLET, FILM COATED ORAL at 20:33

## 2023-07-04 RX ADMIN — NICOTINE SCH MG: 21 PATCH, EXTENDED RELEASE TRANSDERMAL at 08:10

## 2023-07-04 RX ADMIN — CELECOXIB SCH MG: 100 CAPSULE ORAL at 08:10

## 2023-07-04 RX ADMIN — SODIUM CHLORIDE SCH MLS/HR: 900 INJECTION INTRAVENOUS at 03:38

## 2023-07-04 RX ADMIN — SODIUM CHLORIDE SCH MLS/HR: 900 INJECTION, SOLUTION INTRAVENOUS at 22:38

## 2023-07-04 RX ADMIN — GABAPENTIN SCH MG: 600 TABLET, FILM COATED ORAL at 12:05

## 2023-07-04 RX ADMIN — METOPROLOL TARTRATE SCH MG: 50 TABLET, FILM COATED ORAL at 20:34

## 2023-07-04 RX ADMIN — SENNOSIDES SCH MG: 8.6 TABLET, FILM COATED ORAL at 08:10

## 2023-07-04 RX ADMIN — SODIUM CHLORIDE SCH MLS/HR: 900 INJECTION, SOLUTION INTRAVENOUS at 09:50

## 2023-07-04 RX ADMIN — ASPIRIN SCH MG: 81 TABLET ORAL at 20:33

## 2023-07-04 RX ADMIN — SODIUM CHLORIDE SCH MLS/HR: 900 INJECTION INTRAVENOUS at 21:19

## 2023-07-04 RX ADMIN — DOCUSATE SODIUM SCH MG: 100 CAPSULE ORAL at 08:10

## 2023-07-04 RX ADMIN — CELECOXIB SCH MG: 100 CAPSULE ORAL at 20:33

## 2023-07-04 RX ADMIN — IPRATROPIUM BROMIDE AND ALBUTEROL SULFATE SCH ML: .5; 3 SOLUTION RESPIRATORY (INHALATION) at 02:35

## 2023-07-04 RX ADMIN — ISOSORBIDE MONONITRATE SCH MG: 30 TABLET, EXTENDED RELEASE ORAL at 08:09

## 2023-07-04 RX ADMIN — LISINOPRIL SCH MG: 10 TABLET ORAL at 08:09

## 2023-07-04 RX ADMIN — IPRATROPIUM BROMIDE AND ALBUTEROL SULFATE SCH ML: .5; 3 SOLUTION RESPIRATORY (INHALATION) at 07:13

## 2023-07-04 RX ADMIN — SODIUM CHLORIDE SCH MLS/HR: 900 INJECTION INTRAVENOUS at 15:38

## 2023-07-04 RX ADMIN — METOPROLOL TARTRATE SCH MG: 50 TABLET, FILM COATED ORAL at 08:09

## 2023-07-04 RX ADMIN — PANTOPRAZOLE SODIUM SCH MG: 40 TABLET, DELAYED RELEASE ORAL at 05:32

## 2023-07-04 RX ADMIN — CLOPIDOGREL BISULFATE SCH MG: 75 TABLET, FILM COATED ORAL at 08:09

## 2023-07-04 RX ADMIN — PANTOPRAZOLE SODIUM SCH MG: 40 TABLET, DELAYED RELEASE ORAL at 15:38

## 2023-07-04 RX ADMIN — DOCUSATE SODIUM SCH MG: 100 CAPSULE ORAL at 20:33

## 2023-07-04 RX ADMIN — LIDOCAINE SCH EA: 50 PATCH CUTANEOUS at 08:13

## 2023-07-04 RX ADMIN — GABAPENTIN SCH MG: 600 TABLET, FILM COATED ORAL at 08:09

## 2023-07-04 RX ADMIN — ENOXAPARIN SODIUM SCH MG: 100 INJECTION SUBCUTANEOUS at 03:38

## 2023-07-04 RX ADMIN — SODIUM CHLORIDE SCH MLS/HR: 900 INJECTION INTRAVENOUS at 09:50

## 2023-07-04 RX ADMIN — VANCOMYCIN SCH MLS/HR: 1.75 INJECTION, SOLUTION INTRAVENOUS at 08:48

## 2023-07-04 RX ADMIN — GABAPENTIN SCH MG: 600 TABLET, FILM COATED ORAL at 17:03

## 2023-07-04 RX ADMIN — FLUTICASONE FUROATE AND VILANTEROL TRIFENATATE SCH EACH: 100; 25 POWDER RESPIRATORY (INHALATION) at 11:11

## 2023-07-04 RX ADMIN — OXYCODONE HYDROCHLORIDE AND ACETAMINOPHEN PRN TAB: 10; 325 TABLET ORAL at 20:38

## 2023-07-04 RX ADMIN — IPRATROPIUM BROMIDE AND ALBUTEROL SULFATE SCH ML: .5; 3 SOLUTION RESPIRATORY (INHALATION) at 11:11

## 2023-07-04 RX ADMIN — ENOXAPARIN SODIUM SCH MG: 100 INJECTION SUBCUTANEOUS at 15:38

## 2023-07-04 RX ADMIN — IPRATROPIUM BROMIDE AND ALBUTEROL SULFATE SCH ML: .5; 3 SOLUTION RESPIRATORY (INHALATION) at 19:05

## 2023-07-04 RX ADMIN — IPRATROPIUM BROMIDE AND ALBUTEROL SULFATE SCH ML: .5; 3 SOLUTION RESPIRATORY (INHALATION) at 22:15

## 2023-07-04 RX ADMIN — OXYCODONE HYDROCHLORIDE AND ACETAMINOPHEN PRN TAB: 10; 325 TABLET ORAL at 03:38

## 2023-07-04 RX ADMIN — TIOTROPIUM BROMIDE INHALATION SPRAY SCH INH: 3.12 SPRAY, METERED RESPIRATORY (INHALATION) at 07:14

## 2023-07-04 RX ADMIN — ALLOPURINOL SCH MG: 100 TABLET ORAL at 08:12

## 2023-07-04 RX ADMIN — IPRATROPIUM BROMIDE AND ALBUTEROL SULFATE SCH ML: .5; 3 SOLUTION RESPIRATORY (INHALATION) at 15:10

## 2023-07-04 RX ADMIN — SENNOSIDES SCH MG: 8.6 TABLET, FILM COATED ORAL at 20:34

## 2023-07-04 NOTE — PROGRESS NOTE
Subjective


Subjective/Events-last exam


Pt states he is feeling much better breathing wise than when he came in and is 

not coughing as much as he was, is still coughing up some blood but not as much.





Focused Exam


Lactate Level


7/2/23 14:15: Lactic Acid Level 2.21*H


7/2/23 16:12: Lactic Acid Level 1.03





Objective


Exam


Last Set of Vital Signs





Vital Signs








  Date Time  Temp Pulse Resp B/P (MAP) Pulse Ox O2 Delivery O2 Flow Rate FiO2


 


7/4/23 11:35 36.6 82 18 125/58 (80) 93 Nasal Cannula 2.00 


 


7/2/23 17:20        28





Capillary Refill :


I&O











Intake and Output 


 


 7/4/23





 00:00


 


Intake Total 3110 ml


 


Output Total 550 ml


 


Balance 2560 ml


 


 


 


Intake Oral 1810 ml


 


IV Total 1300 ml


 


Output Urine Total 550 ml


 


# Voids 7


 


# Bowel Movements 2








General:  Alert


Lungs:  Other (expiratory wheezing throughout)


Heart:  Regular Rate, No Murmurs


Psych/Mental Status:  Mood NL





Results/Procedures


Lab


Laboratory Tests


7/4/23 05:35: 


White Blood Count 13.7H, Red Blood Count 3.61L, Hemoglobin 11.2L, Hematocrit 34L

, Mean Corpuscular Volume 95, Mean Corpuscular Hemoglobin 31, Mean Corpuscular 

Hemoglobin Concent 33, Red Cell Distribution Width 17.4H, Platelet Count 237, 

Mean Platelet Volume 10.0, Immature Granulocyte % (Auto) 0, Neutrophils (%) 

(Auto) 86H, Lymphocytes (%) (Auto) 8L, Monocytes (%) (Auto) 5, Eosinophils (%) 

(Auto) 0, Basophils (%) (Auto) 0, Neutrophils # (Auto) 11.8H, Lymphocytes # 

(Auto) 1.1, Monocytes # (Auto) 0.7, Eosinophils # (Auto) 0.0, Basophils # (Auto)

0.0, Immature Granulocyte # (Auto) 0.1, Sodium Level 138, Potassium Level 3.4L, 

Chloride Level 107, Carbon Dioxide Level 23, Anion Gap 8, Blood Urea Nitrogen 9,

Creatinine 0.78, Estimat Glomerular Filtration Rate 109, BUN/Creatinine Ratio 

12, Glucose Level 167H, Calcium Level 9.0, Corrected Calcium 10.1, Total 

Bilirubin 0.2, Aspartate Amino Transf (AST/SGOT) 8, Alanine Aminotransferase 

(ALT/SGPT) < 6, Alkaline Phosphatase 62, Total Protein 5.9L, Albumin 2.6L


7/4/23 08:15: Vancomycin Level Trough 22.2H





Microbiology


7/2/23 Blood Culture - Preliminary, Resulted


         No growth


Radiology


CXR 7/3/23: IMPRESSION: New airspace opacity in the right lung base suspicious 

for pneumonitis. Recommend follow-up to resolution.





Assessment/Plan


Assessment/Plan





(1) Sepsis


Status:  Acute


Assessment & Plan:  Suspected to be secondary to pneumonia, see below.





(2) Right lower lobe pneumonia


Status:  Acute


Assessment & Plan:  Failed outpatient treatment with doxycycline, worsening. 

Started on cefepime and vancomycin and having clinical improvement but 

persistent hypoxia.


Qualifiers:  


   Qualified Codes:  J18.9 - Pneumonia, unspecified organism


(3) Hemoptysis


Status:  Acute


Assessment & Plan:  Given history of PE and persistent hypoxia not on 

anticoagulation, will check CTA chest.


CTA chest positive for PE, see below.





(4) CAD (coronary artery disease)


Status:  Chronic


Assessment & Plan:  Resume home meds





(5) Phantom limb pain


Status:  Chronic


Assessment & Plan:  Resume home meds





(6) History of pulmonary embolism


Status:  Chronic


Assessment & Plan:  Reports he used to be on warfarin, uncertain why 

discontinued.





(7) Hypertension


Status:  Chronic


(8) Hyperlipidemia


Status:  Chronic


(9) Bailey disease


Status:  Chronic


(10) Lung mass


Status:  Acute


Assessment & Plan:  Possible mass underlying pneumonia vs infarction. Will 

follow up after treatment of pneumonia and PE.





(11) Pulmonary emboli


Status:  Acute


Assessment & Plan:  No cardiovascular compromise, but persistent marked hypoxia,

started on treatment dose enoxaparin, wean supplemental O2 as tolerated.


Qualifiers:  


   Qualified Codes:  I26.92 - Saddle embolus of pulmonary artery without acute 

cor pulmonale


(12) DVT prophylaxis


Status:  Acute


Assessment & Plan:  Enoxaparin treatment dose














WHITNEY GUERRERO MD              Jul 4, 2023 11:53

## 2023-07-05 VITALS — DIASTOLIC BLOOD PRESSURE: 69 MMHG | SYSTOLIC BLOOD PRESSURE: 126 MMHG

## 2023-07-05 VITALS — SYSTOLIC BLOOD PRESSURE: 127 MMHG | DIASTOLIC BLOOD PRESSURE: 65 MMHG

## 2023-07-05 VITALS — SYSTOLIC BLOOD PRESSURE: 126 MMHG | DIASTOLIC BLOOD PRESSURE: 69 MMHG

## 2023-07-05 VITALS — SYSTOLIC BLOOD PRESSURE: 145 MMHG | DIASTOLIC BLOOD PRESSURE: 66 MMHG

## 2023-07-05 VITALS — DIASTOLIC BLOOD PRESSURE: 76 MMHG | SYSTOLIC BLOOD PRESSURE: 147 MMHG

## 2023-07-05 VITALS — DIASTOLIC BLOOD PRESSURE: 66 MMHG | SYSTOLIC BLOOD PRESSURE: 117 MMHG

## 2023-07-05 LAB
ALBUMIN SERPL-MCNC: 2.4 GM/DL (ref 3.2–4.5)
ALP SERPL-CCNC: 58 U/L (ref 40–136)
ALT SERPL-CCNC: < 6 U/L (ref 0–55)
BASOPHILS # BLD AUTO: 0 10^3/UL (ref 0–0.1)
BASOPHILS NFR BLD AUTO: 0 % (ref 0–10)
BILIRUB SERPL-MCNC: 0.2 MG/DL (ref 0.1–1)
BUN/CREAT SERPL: 10
CALCIUM SERPL-MCNC: 8.6 MG/DL (ref 8.5–10.1)
CHLORIDE SERPL-SCNC: 110 MMOL/L (ref 98–107)
CO2 SERPL-SCNC: 23 MMOL/L (ref 21–32)
CREAT SERPL-MCNC: 0.73 MG/DL (ref 0.6–1.3)
EOSINOPHIL # BLD AUTO: 0.1 10^3/UL (ref 0–0.3)
EOSINOPHIL NFR BLD AUTO: 0 % (ref 0–10)
GFR SERPLBLD BASED ON 1.73 SQ M-ARVRAT: 111 ML/MIN
GLUCOSE SERPL-MCNC: 110 MG/DL (ref 70–105)
HCT VFR BLD CALC: 32 % (ref 40–54)
HGB BLD-MCNC: 10.4 G/DL (ref 13.3–17.7)
LYMPHOCYTES # BLD AUTO: 2.2 10^3/UL (ref 1–4)
LYMPHOCYTES NFR BLD AUTO: 19 % (ref 12–44)
MANUAL DIFFERENTIAL PERFORMED BLD QL: NO
MCH RBC QN AUTO: 30 PG (ref 25–34)
MCHC RBC AUTO-ENTMCNC: 32 G/DL (ref 32–36)
MCV RBC AUTO: 94 FL (ref 80–99)
MONOCYTES # BLD AUTO: 0.8 10^3/UL (ref 0–1)
MONOCYTES NFR BLD AUTO: 7 % (ref 0–12)
NEUTROPHILS # BLD AUTO: 8.4 10^3/UL (ref 1.8–7.8)
NEUTROPHILS NFR BLD AUTO: 73 % (ref 42–75)
PLATELET # BLD: 237 10^3/UL (ref 130–400)
PMV BLD AUTO: 9.6 FL (ref 9–12.2)
POTASSIUM SERPL-SCNC: 3.2 MMOL/L (ref 3.6–5)
PROT SERPL-MCNC: 5.4 GM/DL (ref 6.4–8.2)
SODIUM SERPL-SCNC: 142 MMOL/L (ref 135–145)
WBC # BLD AUTO: 11.5 10^3/UL (ref 4.3–11)

## 2023-07-05 RX ADMIN — IPRATROPIUM BROMIDE AND ALBUTEROL SULFATE SCH ML: .5; 3 SOLUTION RESPIRATORY (INHALATION) at 06:44

## 2023-07-05 RX ADMIN — SENNOSIDES SCH MG: 8.6 TABLET, FILM COATED ORAL at 21:10

## 2023-07-05 RX ADMIN — OXYCODONE HYDROCHLORIDE AND ACETAMINOPHEN PRN TAB: 10; 325 TABLET ORAL at 03:30

## 2023-07-05 RX ADMIN — PANTOPRAZOLE SODIUM SCH MG: 40 TABLET, DELAYED RELEASE ORAL at 06:11

## 2023-07-05 RX ADMIN — TIOTROPIUM BROMIDE INHALATION SPRAY SCH INH: 3.12 SPRAY, METERED RESPIRATORY (INHALATION) at 06:45

## 2023-07-05 RX ADMIN — METOPROLOL TARTRATE SCH MG: 50 TABLET, FILM COATED ORAL at 19:42

## 2023-07-05 RX ADMIN — IPRATROPIUM BROMIDE AND ALBUTEROL SULFATE SCH ML: .5; 3 SOLUTION RESPIRATORY (INHALATION) at 15:30

## 2023-07-05 RX ADMIN — OXYCODONE HYDROCHLORIDE AND ACETAMINOPHEN PRN TAB: 10; 325 TABLET ORAL at 20:18

## 2023-07-05 RX ADMIN — GABAPENTIN SCH MG: 600 TABLET, FILM COATED ORAL at 12:09

## 2023-07-05 RX ADMIN — IPRATROPIUM BROMIDE AND ALBUTEROL SULFATE SCH ML: .5; 3 SOLUTION RESPIRATORY (INHALATION) at 03:07

## 2023-07-05 RX ADMIN — ISOSORBIDE MONONITRATE SCH MG: 30 TABLET, EXTENDED RELEASE ORAL at 08:10

## 2023-07-05 RX ADMIN — METOPROLOL TARTRATE SCH MG: 50 TABLET, FILM COATED ORAL at 08:11

## 2023-07-05 RX ADMIN — IPRATROPIUM BROMIDE AND ALBUTEROL SULFATE SCH ML: .5; 3 SOLUTION RESPIRATORY (INHALATION) at 21:17

## 2023-07-05 RX ADMIN — OXYCODONE HYDROCHLORIDE AND ACETAMINOPHEN PRN TAB: 10; 325 TABLET ORAL at 08:19

## 2023-07-05 RX ADMIN — ENOXAPARIN SODIUM SCH MG: 100 INJECTION SUBCUTANEOUS at 16:21

## 2023-07-05 RX ADMIN — ENOXAPARIN SODIUM SCH MG: 100 INJECTION SUBCUTANEOUS at 03:28

## 2023-07-05 RX ADMIN — DOCUSATE SODIUM SCH MG: 100 CAPSULE ORAL at 08:11

## 2023-07-05 RX ADMIN — PANTOPRAZOLE SODIUM SCH MG: 40 TABLET, DELAYED RELEASE ORAL at 16:21

## 2023-07-05 RX ADMIN — NICOTINE SCH MG: 21 PATCH, EXTENDED RELEASE TRANSDERMAL at 08:10

## 2023-07-05 RX ADMIN — SODIUM CHLORIDE SCH MLS/HR: 900 INJECTION, SOLUTION INTRAVENOUS at 08:11

## 2023-07-05 RX ADMIN — SODIUM CHLORIDE SCH MLS/HR: 900 INJECTION INTRAVENOUS at 16:21

## 2023-07-05 RX ADMIN — GABAPENTIN SCH MG: 600 TABLET, FILM COATED ORAL at 19:41

## 2023-07-05 RX ADMIN — GABAPENTIN SCH MG: 600 TABLET, FILM COATED ORAL at 16:23

## 2023-07-05 RX ADMIN — LIDOCAINE SCH EA: 50 PATCH CUTANEOUS at 08:10

## 2023-07-05 RX ADMIN — IPRATROPIUM BROMIDE AND ALBUTEROL SULFATE PRN ML: .5; 3 SOLUTION RESPIRATORY (INHALATION) at 04:38

## 2023-07-05 RX ADMIN — SODIUM CHLORIDE SCH MLS/HR: 900 INJECTION INTRAVENOUS at 21:39

## 2023-07-05 RX ADMIN — SODIUM CHLORIDE SCH MLS/HR: 900 INJECTION, SOLUTION INTRAVENOUS at 16:23

## 2023-07-05 RX ADMIN — DOCUSATE SODIUM SCH MG: 100 CAPSULE ORAL at 21:09

## 2023-07-05 RX ADMIN — CLOPIDOGREL BISULFATE SCH MG: 75 TABLET, FILM COATED ORAL at 08:11

## 2023-07-05 RX ADMIN — SODIUM CHLORIDE SCH MLS/HR: 900 INJECTION INTRAVENOUS at 03:28

## 2023-07-05 RX ADMIN — ASPIRIN SCH MG: 81 TABLET ORAL at 19:42

## 2023-07-05 RX ADMIN — ALLOPURINOL SCH MG: 100 TABLET ORAL at 08:10

## 2023-07-05 RX ADMIN — CELECOXIB SCH MG: 100 CAPSULE ORAL at 19:41

## 2023-07-05 RX ADMIN — GABAPENTIN SCH MG: 600 TABLET, FILM COATED ORAL at 08:11

## 2023-07-05 RX ADMIN — OXYCODONE HYDROCHLORIDE AND ACETAMINOPHEN PRN TAB: 10; 325 TABLET ORAL at 16:21

## 2023-07-05 RX ADMIN — LISINOPRIL SCH MG: 10 TABLET ORAL at 08:10

## 2023-07-05 RX ADMIN — SODIUM CHLORIDE SCH MLS/HR: 900 INJECTION, SOLUTION INTRAVENOUS at 03:28

## 2023-07-05 RX ADMIN — CELECOXIB SCH MG: 100 CAPSULE ORAL at 08:10

## 2023-07-05 RX ADMIN — SODIUM CHLORIDE SCH MLS/HR: 900 INJECTION INTRAVENOUS at 09:27

## 2023-07-05 RX ADMIN — SENNOSIDES SCH MG: 8.6 TABLET, FILM COATED ORAL at 08:11

## 2023-07-05 RX ADMIN — FLUTICASONE FUROATE AND VILANTEROL TRIFENATATE SCH EACH: 100; 25 POWDER RESPIRATORY (INHALATION) at 06:46

## 2023-07-05 RX ADMIN — SODIUM CHLORIDE SCH MLS/HR: 900 INJECTION, SOLUTION INTRAVENOUS at 19:41

## 2023-07-05 NOTE — PROGRESS NOTE
Subjective


Subjective/Events-last exam


Patient feeling better. Still coughing up blood but reports that it is less then

it has been. Shortness of breath improved. Has not been up out of bed. 

Tolerating PO diet.


Review of Systems


General:  Fatigue


Pulmonary:  Dyspnea, Cough


Cardiovascular:  No: Chest Pain, Palpitations, Edema


Gastrointestinal:  No: Nausea, Vomiting, Abdominal Pain, Diarrhea, Constipation


Neurological:  Weakness





Objective


Exam


Last Set of Vital Signs





Vital Signs








  Date Time  Temp Pulse Resp B/P (MAP) Pulse Ox O2 Delivery O2 Flow Rate FiO2


 


7/5/23 16:15 36.6 78 18 117/66 (83) 94 High Flow N/C 2.00 


 


7/2/23 17:20        28





Capillary Refill :


I&O











Intake and Output 


 


 7/5/23





 00:00


 


Intake Total 2400 ml


 


Output Total 1900 ml


 


Balance 500 ml


 


 


 


Intake Oral 2050 ml


 


IV Total 350 ml


 


Output Urine Total 1900 ml


 


# Bowel Movements 2








General:  Alert, Oriented X3, No Acute Distress


Lungs:  Normal Air Movement, Other (basilar wheezing, normal work of breathing)


Heart:  Regular Rate, No Murmurs


Abdomen:  Normal Bowel Sounds, Soft, No Tenderness, No Masses


Extremities:  No Edema, No Tenderness/Swelling


Neuro:  Normal Speech





Results/Procedures


Lab


Laboratory Tests


7/4/23 20:30: Vancomycin Level Trough 9.8L


7/5/23 04:19: 


White Blood Count 11.5H, Red Blood Count 3.44L, Hemoglobin 10.4L, Hematocrit 32L

, Mean Corpuscular Volume 94, Mean Corpuscular Hemoglobin 30, Mean Corpuscular 

Hemoglobin Concent 32, Red Cell Distribution Width 17.4H, Platelet Count 237, 

Mean Platelet Volume 9.6, Immature Granulocyte % (Auto) 1, Neutrophils (%) 

(Auto) 73, Lymphocytes (%) (Auto) 19, Monocytes (%) (Auto) 7, Eosinophils (%) 

(Auto) 0, Basophils (%) (Auto) 0, Neutrophils # (Auto) 8.4H, Lymphocytes # 

(Auto) 2.2, Monocytes # (Auto) 0.8, Eosinophils # (Auto) 0.1, Basophils # (Auto)

0.0, Immature Granulocyte # (Auto) 0.1, Sodium Level 142, Potassium Level 3.2L, 

Chloride Level 110H, Carbon Dioxide Level 23, Anion Gap 9, Blood Urea Nitrogen 

7, Creatinine 0.73, Estimat Glomerular Filtration Rate 111, BUN/Creatinine Ratio

10, Glucose Level 110H, Calcium Level 8.6, Corrected Calcium 9.9, Total 

Bilirubin 0.2, Aspartate Amino Transf (AST/SGOT) 10, Alanine Aminotransferase 

(ALT/SGPT) < 6, Alkaline Phosphatase 58, Total Protein 5.4L, Albumin 2.4L





Microbiology


7/2/23 Blood Culture - Preliminary, Resulted


         No growth


Radiology


CXR 7/3/23: IMPRESSION: New airspace opacity in the right lung base suspicious 

for pneumonitis. Recommend follow-up to resolution.





Assessment/Plan


Assessment/Plan





(1) Sepsis


Status:  Resolved


Assessment & Plan:  Suspected to be secondary to pneumonia, see below.


Qualifiers:  


   Qualified Codes:  A41.9 - Sepsis, unspecified organism


(2) Right lower lobe pneumonia


Status:  Acute


Assessment & Plan:  Failed outpatient treatment with doxycycline, worsening. 

Started on cefepime and vancomycin and having clinical improvement but 

persistent hypoxia.


7/5: improving, will titrate oxygen as tolerated, may need oxygen at discharge


Qualifiers:  


   Qualified Codes:  J18.9 - Pneumonia, unspecified organism


(3) Hemoptysis


Status:  Acute


Assessment & Plan:  Given history of PE and persistent hypoxia not on 

anticoagulation, will check CTA chest.


CTA chest positive for PE, see below.





(4) Pulmonary emboli


Status:  Acute


Assessment & Plan:  No cardiovascular compromise, but persistent marked hypoxia,

started on treatment dose enoxaparin, wean supplemental O2 as tolerated.


7/5: Continue Enoxaparin treatment, will transition to PO soon


Qualifiers:  


   Qualified Codes:  I26.92 - Saddle embolus of pulmonary artery without acute 

cor pulmonale


(5) CAD (coronary artery disease)


Status:  Chronic


Assessment & Plan:  Resume home meds





(6) Phantom limb pain


Status:  Chronic


Assessment & Plan:  Resume home meds





(7) History of pulmonary embolism


Status:  Chronic


Assessment & Plan:  Reports he used to be on warfarin, uncertain why 

discontinued.





(8) Hypertension


Status:  Chronic


(9) Hyperlipidemia


Status:  Chronic


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


Status:  Chronic


(11) Lung mass


Status:  Acute


Assessment & Plan:  Possible mass underlying pneumonia vs infarction. Will 

follow up after treatment of pneumonia and PE.





(12) DVT prophylaxis


Status:  Acute


Assessment & Plan:  Enoxaparin treatment dose














RA WARD MD                Jul 5, 2023 18:20

## 2023-07-06 VITALS — DIASTOLIC BLOOD PRESSURE: 79 MMHG | SYSTOLIC BLOOD PRESSURE: 138 MMHG

## 2023-07-06 VITALS — DIASTOLIC BLOOD PRESSURE: 73 MMHG | SYSTOLIC BLOOD PRESSURE: 136 MMHG

## 2023-07-06 VITALS — DIASTOLIC BLOOD PRESSURE: 64 MMHG | SYSTOLIC BLOOD PRESSURE: 128 MMHG

## 2023-07-06 VITALS — SYSTOLIC BLOOD PRESSURE: 158 MMHG | DIASTOLIC BLOOD PRESSURE: 72 MMHG

## 2023-07-06 VITALS — DIASTOLIC BLOOD PRESSURE: 69 MMHG | SYSTOLIC BLOOD PRESSURE: 135 MMHG

## 2023-07-06 VITALS — DIASTOLIC BLOOD PRESSURE: 68 MMHG | SYSTOLIC BLOOD PRESSURE: 124 MMHG

## 2023-07-06 VITALS — DIASTOLIC BLOOD PRESSURE: 71 MMHG | SYSTOLIC BLOOD PRESSURE: 132 MMHG

## 2023-07-06 LAB
ALBUMIN SERPL-MCNC: 2.6 GM/DL (ref 3.2–4.5)
ALP SERPL-CCNC: 73 U/L (ref 40–136)
ALT SERPL-CCNC: 8 U/L (ref 0–55)
BASOPHILS # BLD AUTO: 0 10^3/UL (ref 0–0.1)
BASOPHILS NFR BLD AUTO: 0 % (ref 0–10)
BILIRUB SERPL-MCNC: 0.2 MG/DL (ref 0.1–1)
BUN/CREAT SERPL: 10
CALCIUM SERPL-MCNC: 9 MG/DL (ref 8.5–10.1)
CHLORIDE SERPL-SCNC: 109 MMOL/L (ref 98–107)
CO2 SERPL-SCNC: 25 MMOL/L (ref 21–32)
CREAT SERPL-MCNC: 0.77 MG/DL (ref 0.6–1.3)
EOSINOPHIL # BLD AUTO: 0.1 10^3/UL (ref 0–0.3)
EOSINOPHIL NFR BLD AUTO: 1 % (ref 0–10)
GFR SERPLBLD BASED ON 1.73 SQ M-ARVRAT: 109 ML/MIN
GLUCOSE SERPL-MCNC: 112 MG/DL (ref 70–105)
HCT VFR BLD CALC: 35 % (ref 40–54)
HGB BLD-MCNC: 11.3 G/DL (ref 13.3–17.7)
LYMPHOCYTES # BLD AUTO: 2 10^3/UL (ref 1–4)
LYMPHOCYTES NFR BLD AUTO: 22 % (ref 12–44)
MANUAL DIFFERENTIAL PERFORMED BLD QL: NO
MCH RBC QN AUTO: 31 PG (ref 25–34)
MCHC RBC AUTO-ENTMCNC: 32 G/DL (ref 32–36)
MCV RBC AUTO: 95 FL (ref 80–99)
MONOCYTES # BLD AUTO: 0.7 10^3/UL (ref 0–1)
MONOCYTES NFR BLD AUTO: 8 % (ref 0–12)
NEUTROPHILS # BLD AUTO: 6.5 10^3/UL (ref 1.8–7.8)
NEUTROPHILS NFR BLD AUTO: 69 % (ref 42–75)
PLATELET # BLD: 370 10^3/UL (ref 130–400)
PMV BLD AUTO: 10.5 FL (ref 9–12.2)
POTASSIUM SERPL-SCNC: 3.8 MMOL/L (ref 3.6–5)
PROT SERPL-MCNC: 6.2 GM/DL (ref 6.4–8.2)
SODIUM SERPL-SCNC: 142 MMOL/L (ref 135–145)
WBC # BLD AUTO: 9.4 10^3/UL (ref 4.3–11)

## 2023-07-06 RX ADMIN — CLOPIDOGREL BISULFATE SCH MG: 75 TABLET, FILM COATED ORAL at 08:15

## 2023-07-06 RX ADMIN — ENOXAPARIN SODIUM SCH MG: 100 INJECTION SUBCUTANEOUS at 16:33

## 2023-07-06 RX ADMIN — IPRATROPIUM BROMIDE AND ALBUTEROL SULFATE SCH ML: .5; 3 SOLUTION RESPIRATORY (INHALATION) at 09:21

## 2023-07-06 RX ADMIN — DOCUSATE SODIUM SCH MG: 100 CAPSULE ORAL at 21:40

## 2023-07-06 RX ADMIN — DOCUSATE SODIUM SCH MG: 100 CAPSULE ORAL at 08:16

## 2023-07-06 RX ADMIN — SODIUM CHLORIDE SCH MLS/HR: 900 INJECTION INTRAVENOUS at 21:58

## 2023-07-06 RX ADMIN — IPRATROPIUM BROMIDE AND ALBUTEROL SULFATE SCH ML: .5; 3 SOLUTION RESPIRATORY (INHALATION) at 14:43

## 2023-07-06 RX ADMIN — CELECOXIB SCH MG: 100 CAPSULE ORAL at 20:31

## 2023-07-06 RX ADMIN — SODIUM CHLORIDE SCH MLS/HR: 900 INJECTION INTRAVENOUS at 03:49

## 2023-07-06 RX ADMIN — ISOSORBIDE MONONITRATE SCH MG: 30 TABLET, EXTENDED RELEASE ORAL at 08:29

## 2023-07-06 RX ADMIN — SODIUM CHLORIDE SCH MLS/HR: 900 INJECTION INTRAVENOUS at 08:20

## 2023-07-06 RX ADMIN — FLUTICASONE FUROATE AND VILANTEROL TRIFENATATE SCH EACH: 100; 25 POWDER RESPIRATORY (INHALATION) at 14:43

## 2023-07-06 RX ADMIN — SODIUM CHLORIDE SCH MLS/HR: 900 INJECTION, SOLUTION INTRAVENOUS at 06:32

## 2023-07-06 RX ADMIN — LIDOCAINE SCH EA: 50 PATCH CUTANEOUS at 08:30

## 2023-07-06 RX ADMIN — ENOXAPARIN SODIUM SCH MG: 100 INJECTION SUBCUTANEOUS at 03:49

## 2023-07-06 RX ADMIN — OXYCODONE HYDROCHLORIDE AND ACETAMINOPHEN PRN TAB: 10; 325 TABLET ORAL at 18:10

## 2023-07-06 RX ADMIN — PANTOPRAZOLE SODIUM SCH MG: 40 TABLET, DELAYED RELEASE ORAL at 16:31

## 2023-07-06 RX ADMIN — TIOTROPIUM BROMIDE INHALATION SPRAY SCH INH: 3.12 SPRAY, METERED RESPIRATORY (INHALATION) at 09:23

## 2023-07-06 RX ADMIN — NICOTINE SCH MG: 21 PATCH, EXTENDED RELEASE TRANSDERMAL at 08:17

## 2023-07-06 RX ADMIN — IPRATROPIUM BROMIDE AND ALBUTEROL SULFATE SCH ML: .5; 3 SOLUTION RESPIRATORY (INHALATION) at 21:50

## 2023-07-06 RX ADMIN — OXYCODONE HYDROCHLORIDE AND ACETAMINOPHEN PRN TAB: 10; 325 TABLET ORAL at 13:09

## 2023-07-06 RX ADMIN — ASPIRIN SCH MG: 81 TABLET ORAL at 20:32

## 2023-07-06 RX ADMIN — OXYCODONE HYDROCHLORIDE AND ACETAMINOPHEN PRN TAB: 10; 325 TABLET ORAL at 03:49

## 2023-07-06 RX ADMIN — GABAPENTIN SCH MG: 600 TABLET, FILM COATED ORAL at 16:31

## 2023-07-06 RX ADMIN — SODIUM CHLORIDE SCH MLS/HR: 900 INJECTION, SOLUTION INTRAVENOUS at 08:18

## 2023-07-06 RX ADMIN — SENNOSIDES SCH MG: 8.6 TABLET, FILM COATED ORAL at 21:40

## 2023-07-06 RX ADMIN — GABAPENTIN SCH MG: 600 TABLET, FILM COATED ORAL at 20:32

## 2023-07-06 RX ADMIN — PANTOPRAZOLE SODIUM SCH MG: 40 TABLET, DELAYED RELEASE ORAL at 06:32

## 2023-07-06 RX ADMIN — METOPROLOL TARTRATE SCH MG: 50 TABLET, FILM COATED ORAL at 08:16

## 2023-07-06 RX ADMIN — LISINOPRIL SCH MG: 10 TABLET ORAL at 08:16

## 2023-07-06 RX ADMIN — ALLOPURINOL SCH MG: 100 TABLET ORAL at 08:15

## 2023-07-06 RX ADMIN — GABAPENTIN SCH MG: 600 TABLET, FILM COATED ORAL at 08:15

## 2023-07-06 RX ADMIN — IPRATROPIUM BROMIDE AND ALBUTEROL SULFATE SCH ML: .5; 3 SOLUTION RESPIRATORY (INHALATION) at 02:40

## 2023-07-06 RX ADMIN — SODIUM CHLORIDE SCH MLS/HR: 900 INJECTION, SOLUTION INTRAVENOUS at 20:31

## 2023-07-06 RX ADMIN — SENNOSIDES SCH MG: 8.6 TABLET, FILM COATED ORAL at 08:15

## 2023-07-06 RX ADMIN — OXYCODONE HYDROCHLORIDE AND ACETAMINOPHEN PRN TAB: 10; 325 TABLET ORAL at 08:29

## 2023-07-06 RX ADMIN — GABAPENTIN SCH MG: 600 TABLET, FILM COATED ORAL at 13:09

## 2023-07-06 RX ADMIN — SODIUM CHLORIDE SCH MLS/HR: 900 INJECTION INTRAVENOUS at 16:33

## 2023-07-06 RX ADMIN — METOPROLOL TARTRATE SCH MG: 50 TABLET, FILM COATED ORAL at 20:31

## 2023-07-06 RX ADMIN — CELECOXIB SCH MG: 100 CAPSULE ORAL at 08:15

## 2023-07-06 NOTE — PROGRESS NOTE
Subjective


Subjective/Events-last exam


Patient states that he is feeling much better this AM. Still coughing up blood. 

Shortness of breath improving. tolerating PO diet. BM this AM


Review of Systems


General:  Malaise


Pulmonary:  Dyspnea, Cough


Cardiovascular:  No: Chest Pain, Palpitations, Edema


Gastrointestinal:  No: Nausea, Vomiting, Abdominal Pain, Diarrhea, Constipation


Neurological:  Weakness, Incoordination





Objective


Exam


Last Set of Vital Signs





Vital Signs








  Date Time  Temp Pulse Resp B/P (MAP) Pulse Ox O2 Delivery O2 Flow Rate FiO2


 


7/6/23 15:32 36.8 79 18 132/71 (91) 95 High Flow N/C 2.00 


 


7/2/23 17:20        28





Capillary Refill :


I&O











Intake and Output 


 


 7/6/23





 00:00


 


Intake Total 6320 ml


 


Output Total 1950 ml


 


Balance 4370 ml


 


 


 


Intake Oral 4970 ml


 


IV Total 1350 ml


 


Output Urine Total 1950 ml


 


# Voids 5


 


# Bowel Movements 4








General:  Alert, Oriented X3, No Acute Distress


Lungs:  Clear to Auscultation, Normal Air Movement


Heart:  Regular Rate, No Murmurs


Abdomen:  Normal Bowel Sounds, Soft, No Tenderness


Extremities:  No Edema, No Tenderness/Swelling


Neuro:  Normal Speech, Cranial Nerves 3-12 NL





Results/Procedures


Lab


Laboratory Tests


7/6/23 04:58: 


White Blood Count 9.4, Red Blood Count 3.67L, Hemoglobin 11.3L, Hematocrit 35L, 

Mean Corpuscular Volume 95, Mean Corpuscular Hemoglobin 31, Mean Corpuscular 

Hemoglobin Concent 32, Red Cell Distribution Width 17.8H, Platelet Count 370, 

Mean Platelet Volume 10.5, Immature Granulocyte % (Auto) 1, Neutrophils (%) 

(Auto) 69, Lymphocytes (%) (Auto) 22, Monocytes (%) (Auto) 8, Eosinophils (%) 

(Auto) 1, Basophils (%) (Auto) 0, Neutrophils # (Auto) 6.5, Lymphocytes # (Auto)

2.0, Monocytes # (Auto) 0.7, Eosinophils # (Auto) 0.1, Basophils # (Auto) 0.0, 

Immature Granulocyte # (Auto) 0.1, Sodium Level 142, Potassium Level 3.8, 

Chloride Level 109H, Carbon Dioxide Level 25, Anion Gap 8, Blood Urea Nitrogen 

8, Creatinine 0.77, Estimat Glomerular Filtration Rate 109, BUN/Creatinine Ratio

10, Glucose Level 112H, Calcium Level 9.0, Corrected Calcium 10.1, Total 

Bilirubin 0.2, Aspartate Amino Transf (AST/SGOT) 19, Alanine Aminotransferase 

(ALT/SGPT) 8, Alkaline Phosphatase 73, Total Protein 6.2L, Albumin 2.6L





Microbiology


7/2/23 Blood Culture - Preliminary, Resulted


         No growth


Radiology


CXR 7/3/23: IMPRESSION: New airspace opacity in the right lung base suspicious 

for pneumonitis. Recommend follow-up to resolution.





Assessment/Plan


Assessment/Plan





(1) Sepsis


Status:  Resolved


Assessment & Plan:  Suspected to be secondary to pneumonia, see below.


Qualifiers:  


   Qualified Codes:  A41.9 - Sepsis, unspecified organism


(2) Right lower lobe pneumonia


Status:  Acute


Assessment & Plan:  Failed outpatient treatment with doxycycline, worsening. 

Started on cefepime and vancomycin and having clinical improvement but 

persistent hypoxia.


7/5: improving, will titrate oxygen as tolerated, may need oxygen at discharge


7/6: Transition to PO antibiotics tomorrow


Qualifiers:  


   Qualified Codes:  J18.9 - Pneumonia, unspecified organism


(3) Hemoptysis


Status:  Acute


Assessment & Plan:  Given history of PE and persistent hypoxia not on 

anticoagulation, will check CTA chest.


CTA chest positive for PE, see below.





(4) Pulmonary emboli


Status:  Acute


Assessment & Plan:  No cardiovascular compromise, but persistent marked hypoxia,

started on treatment dose enoxaparin, wean supplemental O2 as tolerated.


7/5: Continue Enoxaparin treatment, will transition to PO soon


Qualifiers:  


   Qualified Codes:  I26.92 - Saddle embolus of pulmonary artery without acute 

cor pulmonale


(5) CAD (coronary artery disease)


Status:  Chronic


Assessment & Plan:  Resume home meds





(6) Phantom limb pain


Status:  Chronic


Assessment & Plan:  Resume home meds





(7) History of pulmonary embolism


Status:  Chronic


Assessment & Plan:  Reports he used to be on warfarin, uncertain why discontinu

ed.





(8) Hypertension


Status:  Chronic


(9) Hyperlipidemia


Status:  Chronic


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


Status:  Chronic


(11) Lung mass


Status:  Acute


Assessment & Plan:  Possible mass underlying pneumonia vs infarction. Will 

follow up after treatment of pneumonia and PE.





(12) DVT prophylaxis


Status:  Acute


Assessment & Plan:  Enoxaparin treatment dose














RA WARD MD                Jul 6, 2023 16:46

## 2023-07-06 NOTE — PHYSICIAN QUERY-FINAL DX
PAGE CHAN 7/6/23 1808:


Final Diagnosis


Give Final Diagnosis


Please give Final Diagnosis


The medical record reflects the following clinical evidence:





Clinical Indicators: RR 20 on admission has been mostly 18-20, started on O2 at 

2 L has been as high as 8 L at times was initially 90% on 2L (P/F=214), has been

as low as 89% on 4 L (P/F=158) and 90% on 6 L, (P/F=136) documentation of 

shortness of air at rest and with exertion on admission


Risk Factor(s): Sepsis with pneumonia, hemoptysis and saddle embolus


Treatment: Supplemental oxygen up to 8 L respiratory monitoring, IV antibiotics





Acute respiratory failure with hypoxia, present on admission, improving


Other explanation of clinical findings


Unable to determine (no explanation for clinical findings)





Please clarify and document your clinical opinion in the progress notes and 

discharge summary including the definitive and/or presumptive diagnosis, 

(suspected or probable), related to the above clinical findings. Please include 

clinical findings supporting your diagnosis.





Page hCan, MSN, RN


Clinical 


[phone]


[email or web address]





RA WARD MD 7/6/23 1947:


Final Diagnosis


Give Final Diagnosis


ARF with hypoxia present on admission from Saddle PE











PAGE CHAN                    Jul 6, 2023 18:08


RA WARD MD                Jul 6, 2023 19:47

## 2023-07-07 VITALS — SYSTOLIC BLOOD PRESSURE: 120 MMHG | DIASTOLIC BLOOD PRESSURE: 61 MMHG

## 2023-07-07 VITALS — DIASTOLIC BLOOD PRESSURE: 63 MMHG | SYSTOLIC BLOOD PRESSURE: 132 MMHG

## 2023-07-07 VITALS — SYSTOLIC BLOOD PRESSURE: 131 MMHG | DIASTOLIC BLOOD PRESSURE: 73 MMHG

## 2023-07-07 VITALS — DIASTOLIC BLOOD PRESSURE: 77 MMHG | SYSTOLIC BLOOD PRESSURE: 159 MMHG

## 2023-07-07 VITALS — SYSTOLIC BLOOD PRESSURE: 124 MMHG | DIASTOLIC BLOOD PRESSURE: 73 MMHG

## 2023-07-07 VITALS — DIASTOLIC BLOOD PRESSURE: 68 MMHG | SYSTOLIC BLOOD PRESSURE: 135 MMHG

## 2023-07-07 LAB
ALBUMIN SERPL-MCNC: 2.3 GM/DL (ref 3.2–4.5)
ALP SERPL-CCNC: 64 U/L (ref 40–136)
ALT SERPL-CCNC: 8 U/L (ref 0–55)
BASOPHILS # BLD AUTO: 0 10^3/UL (ref 0–0.1)
BASOPHILS NFR BLD AUTO: 0 % (ref 0–10)
BILIRUB SERPL-MCNC: 0.3 MG/DL (ref 0.1–1)
BUN/CREAT SERPL: 9
CALCIUM SERPL-MCNC: 8.8 MG/DL (ref 8.5–10.1)
CHLORIDE SERPL-SCNC: 110 MMOL/L (ref 98–107)
CO2 SERPL-SCNC: 26 MMOL/L (ref 21–32)
CREAT SERPL-MCNC: 0.74 MG/DL (ref 0.6–1.3)
EOSINOPHIL # BLD AUTO: 0.2 10^3/UL (ref 0–0.3)
EOSINOPHIL NFR BLD AUTO: 2 % (ref 0–10)
GFR SERPLBLD BASED ON 1.73 SQ M-ARVRAT: 110 ML/MIN
GLUCOSE SERPL-MCNC: 101 MG/DL (ref 70–105)
HCT VFR BLD CALC: 33 % (ref 40–54)
HGB BLD-MCNC: 10.6 G/DL (ref 13.3–17.7)
LYMPHOCYTES # BLD AUTO: 2 10^3/UL (ref 1–4)
LYMPHOCYTES NFR BLD AUTO: 23 % (ref 12–44)
MANUAL DIFFERENTIAL PERFORMED BLD QL: NO
MCH RBC QN AUTO: 30 PG (ref 25–34)
MCHC RBC AUTO-ENTMCNC: 32 G/DL (ref 32–36)
MCV RBC AUTO: 95 FL (ref 80–99)
MONOCYTES # BLD AUTO: 0.8 10^3/UL (ref 0–1)
MONOCYTES NFR BLD AUTO: 9 % (ref 0–12)
NEUTROPHILS # BLD AUTO: 5.8 10^3/UL (ref 1.8–7.8)
NEUTROPHILS NFR BLD AUTO: 66 % (ref 42–75)
PLATELET # BLD: 307 10^3/UL (ref 130–400)
PMV BLD AUTO: 9.3 FL (ref 9–12.2)
POTASSIUM SERPL-SCNC: 3.8 MMOL/L (ref 3.6–5)
PROT SERPL-MCNC: 5.3 GM/DL (ref 6.4–8.2)
SODIUM SERPL-SCNC: 145 MMOL/L (ref 135–145)
WBC # BLD AUTO: 8.8 10^3/UL (ref 4.3–11)

## 2023-07-07 RX ADMIN — GABAPENTIN SCH MG: 600 TABLET, FILM COATED ORAL at 16:36

## 2023-07-07 RX ADMIN — CELECOXIB SCH MG: 100 CAPSULE ORAL at 09:39

## 2023-07-07 RX ADMIN — OXYCODONE HYDROCHLORIDE AND ACETAMINOPHEN PRN TAB: 10; 325 TABLET ORAL at 19:33

## 2023-07-07 RX ADMIN — FLUTICASONE FUROATE AND VILANTEROL TRIFENATATE SCH EACH: 100; 25 POWDER RESPIRATORY (INHALATION) at 08:00

## 2023-07-07 RX ADMIN — METOPROLOL TARTRATE SCH MG: 50 TABLET, FILM COATED ORAL at 21:22

## 2023-07-07 RX ADMIN — METOPROLOL TARTRATE SCH MG: 50 TABLET, FILM COATED ORAL at 09:38

## 2023-07-07 RX ADMIN — LISINOPRIL SCH MG: 10 TABLET ORAL at 09:39

## 2023-07-07 RX ADMIN — TIOTROPIUM BROMIDE INHALATION SPRAY SCH INH: 3.12 SPRAY, METERED RESPIRATORY (INHALATION) at 07:36

## 2023-07-07 RX ADMIN — LIDOCAINE SCH EA: 50 PATCH CUTANEOUS at 09:38

## 2023-07-07 RX ADMIN — GABAPENTIN SCH MG: 600 TABLET, FILM COATED ORAL at 21:22

## 2023-07-07 RX ADMIN — SODIUM CHLORIDE SCH MLS/HR: 900 INJECTION INTRAVENOUS at 09:40

## 2023-07-07 RX ADMIN — ASPIRIN SCH MG: 81 TABLET ORAL at 21:22

## 2023-07-07 RX ADMIN — IPRATROPIUM BROMIDE AND ALBUTEROL SULFATE SCH ML: .5; 3 SOLUTION RESPIRATORY (INHALATION) at 03:43

## 2023-07-07 RX ADMIN — CLOPIDOGREL BISULFATE SCH MG: 75 TABLET, FILM COATED ORAL at 09:38

## 2023-07-07 RX ADMIN — SENNOSIDES SCH MG: 8.6 TABLET, FILM COATED ORAL at 09:53

## 2023-07-07 RX ADMIN — SODIUM CHLORIDE SCH MLS/HR: 900 INJECTION, SOLUTION INTRAVENOUS at 21:22

## 2023-07-07 RX ADMIN — ISOSORBIDE MONONITRATE SCH MG: 30 TABLET, EXTENDED RELEASE ORAL at 09:39

## 2023-07-07 RX ADMIN — DOCUSATE SODIUM SCH MG: 100 CAPSULE ORAL at 21:22

## 2023-07-07 RX ADMIN — SODIUM CHLORIDE SCH MLS/HR: 900 INJECTION INTRAVENOUS at 16:37

## 2023-07-07 RX ADMIN — APIXABAN SCH MG: 5 TABLET, FILM COATED ORAL at 21:22

## 2023-07-07 RX ADMIN — ENOXAPARIN SODIUM SCH MG: 100 INJECTION SUBCUTANEOUS at 04:19

## 2023-07-07 RX ADMIN — IPRATROPIUM BROMIDE AND ALBUTEROL SULFATE SCH ML: .5; 3 SOLUTION RESPIRATORY (INHALATION) at 15:35

## 2023-07-07 RX ADMIN — SODIUM CHLORIDE SCH MLS/HR: 900 INJECTION, SOLUTION INTRAVENOUS at 09:40

## 2023-07-07 RX ADMIN — GABAPENTIN SCH MG: 600 TABLET, FILM COATED ORAL at 12:10

## 2023-07-07 RX ADMIN — DOCUSATE SODIUM SCH MG: 100 CAPSULE ORAL at 09:53

## 2023-07-07 RX ADMIN — SODIUM CHLORIDE SCH MLS/HR: 900 INJECTION INTRAVENOUS at 04:19

## 2023-07-07 RX ADMIN — IPRATROPIUM BROMIDE AND ALBUTEROL SULFATE SCH ML: .5; 3 SOLUTION RESPIRATORY (INHALATION) at 21:04

## 2023-07-07 RX ADMIN — ALLOPURINOL SCH MG: 100 TABLET ORAL at 09:38

## 2023-07-07 RX ADMIN — SENNOSIDES SCH MG: 8.6 TABLET, FILM COATED ORAL at 21:22

## 2023-07-07 RX ADMIN — OXYCODONE HYDROCHLORIDE AND ACETAMINOPHEN PRN TAB: 10; 325 TABLET ORAL at 04:35

## 2023-07-07 RX ADMIN — IPRATROPIUM BROMIDE AND ALBUTEROL SULFATE SCH ML: .5; 3 SOLUTION RESPIRATORY (INHALATION) at 07:33

## 2023-07-07 RX ADMIN — CELECOXIB SCH MG: 100 CAPSULE ORAL at 21:22

## 2023-07-07 RX ADMIN — GABAPENTIN SCH MG: 600 TABLET, FILM COATED ORAL at 09:38

## 2023-07-07 RX ADMIN — NICOTINE SCH MG: 21 PATCH, EXTENDED RELEASE TRANSDERMAL at 09:39

## 2023-07-07 RX ADMIN — PANTOPRAZOLE SODIUM SCH MG: 40 TABLET, DELAYED RELEASE ORAL at 16:36

## 2023-07-07 RX ADMIN — PANTOPRAZOLE SODIUM SCH MG: 40 TABLET, DELAYED RELEASE ORAL at 06:27

## 2023-07-07 NOTE — PROGRESS NOTE - HOSPITALIST
Patient continues to be evaluated for SI, PCC still needed at this time and remains at bedside.    Subjective


HPI/CC On Admission


Date Seen by Provider:  Jul 7, 2023


Time Seen by Provider:  11:00


Subjective/Events-last exam


Much improved


Less hemoptysis


No pain reported


Reviewed meds and labs





Review of Systems


General:  Fatigue, Malaise





Objective


Exam


Vital Signs





Vital Signs








  Date Time  Temp Pulse Resp B/P (MAP) Pulse Ox O2 Delivery O2 Flow Rate FiO2


 


7/8/23 04:18 36.6 78 18 125/66 (85) 93 Nasal Cannula 2.00 


 


7/2/23 17:20        28





Capillary Refill :


General Appearance:  No Apparent Distress, WD/WN, Chronically ill


Respiratory:  Lungs Clear, Normal Breath Sounds


Cardiovascular:  Regular Rate, Rhythm


Neurologic/Psychiatric:  Alert, Oriented x3





Results/Procedures


Lab


Laboratory Tests


7/8/23 05:14








Patient resulted labs reviewed.





Assessment/Plan


Assessment and Plan


Assess & Plan/Chief Complaint


(1) Sepsis


Status:  Resolved


Assessment & Plan:  Suspected to be secondary to pneumonia, see below.


Qualifiers:  


   Qualified Codes:  A41.9 - Sepsis, unspecified organism


(2) Right lower lobe pneumonia


Status:  Acute


Assessment & Plan:  Failed outpatient treatment with doxycycline, worsening. 

Started on cefepime and vancomycin and having clinical improvement but 

persistent hypoxia.


7/5: improving, will titrate oxygen as tolerated, may need oxygen at discharge


7/6: Transition to PO antibiotics tomorrow


Qualifiers:  


   Qualified Codes:  J18.9 - Pneumonia, unspecified organism


(3) Hemoptysis


Status:  Acute


Assessment & Plan:  Given history of PE and persistent hypoxia not on 

anticoagulation, will check CTA chest.


CTA chest positive for PE, see below.





(4) Pulmonary emboli


Status:  Acute


Assessment & Plan:  No cardiovascular compromise, but persistent marked hypoxia,

started on treatment dose enoxaparin, wean supplemental O2 as tolerated.


7/5: Continue Enoxaparin treatment, will transition to PO soon


Qualifiers:  


   Qualified Codes:  I26.92 - Saddle embolus of pulmonary artery without acute 

cor pulmonale


(5) CAD (coronary artery disease)


Status:  Chronic


Assessment & Plan:  Resume home meds





(6) Phantom limb pain


Status:  Chronic


Assessment & Plan:  Resume home meds





(7) History of pulmonary embolism


Status:  Chronic


Assessment & Plan:  Reports he used to be on warfarin, uncertain why 

discontinued.





(8) Hypertension


Status:  Chronic


(9) Hyperlipidemia


Status:  Chronic


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


Status:  Chronic


(11) Lung mass


Status:  Acute


Assessment & Plan:  Possible mass underlying pneumonia vs infarction. Will 

follow up after treatment of pneumonia and PE.





(12) DVT prophylaxis


Status:  Acute


Assessment & Plan:  Enoxaparin treatment dose











FELISHA MORENO DO                 Jul 7, 2023 08:59

## 2023-07-08 VITALS — DIASTOLIC BLOOD PRESSURE: 70 MMHG | SYSTOLIC BLOOD PRESSURE: 147 MMHG

## 2023-07-08 VITALS — DIASTOLIC BLOOD PRESSURE: 69 MMHG | SYSTOLIC BLOOD PRESSURE: 119 MMHG

## 2023-07-08 VITALS — SYSTOLIC BLOOD PRESSURE: 119 MMHG | DIASTOLIC BLOOD PRESSURE: 69 MMHG

## 2023-07-08 VITALS — SYSTOLIC BLOOD PRESSURE: 136 MMHG | DIASTOLIC BLOOD PRESSURE: 70 MMHG

## 2023-07-08 VITALS — SYSTOLIC BLOOD PRESSURE: 133 MMHG | DIASTOLIC BLOOD PRESSURE: 71 MMHG

## 2023-07-08 VITALS — DIASTOLIC BLOOD PRESSURE: 71 MMHG | SYSTOLIC BLOOD PRESSURE: 138 MMHG

## 2023-07-08 VITALS — DIASTOLIC BLOOD PRESSURE: 66 MMHG | SYSTOLIC BLOOD PRESSURE: 125 MMHG

## 2023-07-08 LAB
ALBUMIN SERPL-MCNC: 2.4 GM/DL (ref 3.2–4.5)
ALP SERPL-CCNC: 67 U/L (ref 40–136)
ALT SERPL-CCNC: 9 U/L (ref 0–55)
BASOPHILS # BLD AUTO: 0 10^3/UL (ref 0–0.1)
BASOPHILS NFR BLD AUTO: 0 % (ref 0–10)
BILIRUB SERPL-MCNC: 0.4 MG/DL (ref 0.1–1)
BUN/CREAT SERPL: 10
CALCIUM SERPL-MCNC: 8.8 MG/DL (ref 8.5–10.1)
CHLORIDE SERPL-SCNC: 106 MMOL/L (ref 98–107)
CO2 SERPL-SCNC: 27 MMOL/L (ref 21–32)
CREAT SERPL-MCNC: 0.73 MG/DL (ref 0.6–1.3)
EOSINOPHIL # BLD AUTO: 0.2 10^3/UL (ref 0–0.3)
EOSINOPHIL NFR BLD AUTO: 2 % (ref 0–10)
GFR SERPLBLD BASED ON 1.73 SQ M-ARVRAT: 111 ML/MIN
GLUCOSE SERPL-MCNC: 108 MG/DL (ref 70–105)
HCT VFR BLD CALC: 35 % (ref 40–54)
HGB BLD-MCNC: 11.2 G/DL (ref 13.3–17.7)
LYMPHOCYTES # BLD AUTO: 2 10^3/UL (ref 1–4)
LYMPHOCYTES NFR BLD AUTO: 22 % (ref 12–44)
MANUAL DIFFERENTIAL PERFORMED BLD QL: NO
MCH RBC QN AUTO: 30 PG (ref 25–34)
MCHC RBC AUTO-ENTMCNC: 32 G/DL (ref 32–36)
MCV RBC AUTO: 94 FL (ref 80–99)
MONOCYTES # BLD AUTO: 0.7 10^3/UL (ref 0–1)
MONOCYTES NFR BLD AUTO: 8 % (ref 0–12)
NEUTROPHILS # BLD AUTO: 6.2 10^3/UL (ref 1.8–7.8)
NEUTROPHILS NFR BLD AUTO: 67 % (ref 42–75)
PLATELET # BLD: 322 10^3/UL (ref 130–400)
PMV BLD AUTO: 9.3 FL (ref 9–12.2)
POTASSIUM SERPL-SCNC: 3.1 MMOL/L (ref 3.6–5)
PROT SERPL-MCNC: 5.5 GM/DL (ref 6.4–8.2)
SODIUM SERPL-SCNC: 143 MMOL/L (ref 135–145)
WBC # BLD AUTO: 9.2 10^3/UL (ref 4.3–11)

## 2023-07-08 RX ADMIN — ASPIRIN SCH MG: 81 TABLET ORAL at 20:36

## 2023-07-08 RX ADMIN — ALLOPURINOL SCH MG: 100 TABLET ORAL at 08:03

## 2023-07-08 RX ADMIN — CELECOXIB SCH MG: 100 CAPSULE ORAL at 20:36

## 2023-07-08 RX ADMIN — GABAPENTIN SCH MG: 600 TABLET, FILM COATED ORAL at 20:36

## 2023-07-08 RX ADMIN — METOPROLOL TARTRATE SCH MG: 50 TABLET, FILM COATED ORAL at 08:04

## 2023-07-08 RX ADMIN — GABAPENTIN SCH MG: 600 TABLET, FILM COATED ORAL at 16:59

## 2023-07-08 RX ADMIN — DOCUSATE SODIUM SCH MG: 100 CAPSULE ORAL at 20:38

## 2023-07-08 RX ADMIN — TIOTROPIUM BROMIDE INHALATION SPRAY SCH INH: 3.12 SPRAY, METERED RESPIRATORY (INHALATION) at 08:48

## 2023-07-08 RX ADMIN — OXYCODONE HYDROCHLORIDE AND ACETAMINOPHEN PRN TAB: 10; 325 TABLET ORAL at 20:36

## 2023-07-08 RX ADMIN — GABAPENTIN SCH MG: 600 TABLET, FILM COATED ORAL at 12:57

## 2023-07-08 RX ADMIN — LISINOPRIL SCH MG: 10 TABLET ORAL at 08:04

## 2023-07-08 RX ADMIN — PANTOPRAZOLE SODIUM SCH MG: 40 TABLET, DELAYED RELEASE ORAL at 05:15

## 2023-07-08 RX ADMIN — IPRATROPIUM BROMIDE AND ALBUTEROL SULFATE SCH ML: .5; 3 SOLUTION RESPIRATORY (INHALATION) at 02:42

## 2023-07-08 RX ADMIN — IPRATROPIUM BROMIDE AND ALBUTEROL SULFATE SCH ML: .5; 3 SOLUTION RESPIRATORY (INHALATION) at 08:48

## 2023-07-08 RX ADMIN — APIXABAN SCH MG: 5 TABLET, FILM COATED ORAL at 20:36

## 2023-07-08 RX ADMIN — LIDOCAINE SCH EA: 50 PATCH CUTANEOUS at 08:04

## 2023-07-08 RX ADMIN — DOCUSATE SODIUM SCH MG: 100 CAPSULE ORAL at 11:06

## 2023-07-08 RX ADMIN — NICOTINE SCH MG: 21 PATCH, EXTENDED RELEASE TRANSDERMAL at 08:04

## 2023-07-08 RX ADMIN — PANTOPRAZOLE SODIUM SCH MG: 40 TABLET, DELAYED RELEASE ORAL at 16:59

## 2023-07-08 RX ADMIN — FLUTICASONE FUROATE AND VILANTEROL TRIFENATATE SCH EACH: 100; 25 POWDER RESPIRATORY (INHALATION) at 09:07

## 2023-07-08 RX ADMIN — IPRATROPIUM BROMIDE AND ALBUTEROL SULFATE SCH ML: .5; 3 SOLUTION RESPIRATORY (INHALATION) at 19:23

## 2023-07-08 RX ADMIN — METOPROLOL TARTRATE SCH MG: 50 TABLET, FILM COATED ORAL at 20:36

## 2023-07-08 RX ADMIN — SODIUM CHLORIDE SCH MLS/HR: 900 INJECTION, SOLUTION INTRAVENOUS at 08:04

## 2023-07-08 RX ADMIN — SENNOSIDES SCH MG: 8.6 TABLET, FILM COATED ORAL at 20:38

## 2023-07-08 RX ADMIN — ISOSORBIDE MONONITRATE SCH MG: 30 TABLET, EXTENDED RELEASE ORAL at 08:04

## 2023-07-08 RX ADMIN — SODIUM CHLORIDE SCH MLS/HR: 900 INJECTION, SOLUTION INTRAVENOUS at 20:37

## 2023-07-08 RX ADMIN — CELECOXIB SCH MG: 100 CAPSULE ORAL at 08:04

## 2023-07-08 RX ADMIN — APIXABAN SCH MG: 5 TABLET, FILM COATED ORAL at 08:04

## 2023-07-08 RX ADMIN — IPRATROPIUM BROMIDE AND ALBUTEROL SULFATE SCH ML: .5; 3 SOLUTION RESPIRATORY (INHALATION) at 15:07

## 2023-07-08 RX ADMIN — CLOPIDOGREL BISULFATE SCH MG: 75 TABLET, FILM COATED ORAL at 08:04

## 2023-07-08 RX ADMIN — SENNOSIDES SCH MG: 8.6 TABLET, FILM COATED ORAL at 11:06

## 2023-07-08 RX ADMIN — GABAPENTIN SCH MG: 600 TABLET, FILM COATED ORAL at 08:04

## 2023-07-08 NOTE — PROGRESS NOTE - HOSPITALIST
Subjective


HPI/CC On Admission


Date Seen by Provider:  Jul 8, 2023


Time Seen by Provider:  11:00


Subjective/Events-last exam


Patient doing a lot better


Less hemoptysis


Maintain on anticoagulation


Getting up and around


Discharge plan for Monday





Review of Systems


General:  Fatigue, Malaise


Pulmonary:  Dyspnea, Cough





Objective


Exam


Vital Signs





Vital Signs








  Date Time  Temp Pulse Resp B/P (MAP) Pulse Ox O2 Delivery O2 Flow Rate FiO2


 


7/8/23 20:13 37.2 86 18 147/70 (95) 91 High Flow N/C 2.00 


 


7/8/23 13:58        28





Capillary Refill :


General Appearance:  No Apparent Distress, WD/WN, Chronically ill


Respiratory:  Lungs Clear, Normal Breath Sounds, Decreased Breath Sounds


Cardiovascular:  Regular Rate, Rhythm


Neurologic/Psychiatric:  Alert, Oriented x3





Results/Procedures


Lab


Laboratory Tests


7/8/23 05:14








Patient resulted labs reviewed.





Assessment/Plan


Assessment and Plan


Assess & Plan/Chief Complaint


(1) Sepsis


Status:  Resolved


Assessment & Plan:  Suspected to be secondary to pneumonia, see below.


Qualifiers:  


   Qualified Codes:  A41.9 - Sepsis, unspecified organism


(2) Right lower lobe pneumonia


Status:  Acute


Assessment & Plan:  Failed outpatient treatment with doxycycline, worsening. Sta

rted on cefepime and vancomycin and having clinical improvement but persistent 

hypoxia.


7/5: improving, will titrate oxygen as tolerated, may need oxygen at discharge


7/6: Transition to PO antibiotics tomorrow


Qualifiers:  


   Qualified Codes:  J18.9 - Pneumonia, unspecified organism


(3) Hemoptysis


Status:  Acute


Assessment & Plan:  Given history of PE and persistent hypoxia not on 

anticoagulation, will check CTA chest.


CTA chest positive for PE, see below.





(4) Pulmonary emboli


Status:  Acute


Assessment & Plan:  No cardiovascular compromise, but persistent marked hypoxia,

started on treatment dose enoxaparin, wean supplemental O2 as tolerated.


7/5: Continue Enoxaparin treatment, will transition to PO soon


Qualifiers:  


   Qualified Codes:  I26.92 - Saddle embolus of pulmonary artery without acute 

cor pulmonale


(5) CAD (coronary artery disease)


Status:  Chronic


Assessment & Plan:  Resume home meds





(6) Phantom limb pain


Status:  Chronic


Assessment & Plan:  Resume home meds





(7) History of pulmonary embolism


Status:  Chronic


Assessment & Plan:  Reports he used to be on warfarin, uncertain why 

discontinued.





(8) Hypertension


Status:  Chronic


(9) Hyperlipidemia


Status:  Chronic


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


Status:  Chronic


(11) Lung mass


Status:  Acute


Assessment & Plan:  Possible mass underlying pneumonia vs infarction. Will 

follow up after treatment of pneumonia and PE.





(12) DVT prophylaxis


Status:  Acute


Assessment & Plan:  Enoxaparin treatment dose











FELISHA MORENO DO                 Jul 8, 2023 07:20

## 2023-07-09 VITALS — SYSTOLIC BLOOD PRESSURE: 132 MMHG | DIASTOLIC BLOOD PRESSURE: 63 MMHG

## 2023-07-09 VITALS — DIASTOLIC BLOOD PRESSURE: 71 MMHG | SYSTOLIC BLOOD PRESSURE: 129 MMHG

## 2023-07-09 VITALS — DIASTOLIC BLOOD PRESSURE: 74 MMHG | SYSTOLIC BLOOD PRESSURE: 144 MMHG

## 2023-07-09 VITALS — SYSTOLIC BLOOD PRESSURE: 109 MMHG | DIASTOLIC BLOOD PRESSURE: 66 MMHG

## 2023-07-09 VITALS — DIASTOLIC BLOOD PRESSURE: 70 MMHG | SYSTOLIC BLOOD PRESSURE: 130 MMHG

## 2023-07-09 VITALS — SYSTOLIC BLOOD PRESSURE: 131 MMHG | DIASTOLIC BLOOD PRESSURE: 64 MMHG

## 2023-07-09 LAB
ALBUMIN SERPL-MCNC: 2.4 GM/DL (ref 3.2–4.5)
ALP SERPL-CCNC: 61 U/L (ref 40–136)
ALT SERPL-CCNC: 10 U/L (ref 0–55)
BASOPHILS # BLD AUTO: 0 10^3/UL (ref 0–0.1)
BASOPHILS NFR BLD AUTO: 0 % (ref 0–10)
BILIRUB SERPL-MCNC: 0.4 MG/DL (ref 0.1–1)
BUN/CREAT SERPL: 7
CALCIUM SERPL-MCNC: 8.6 MG/DL (ref 8.5–10.1)
CHLORIDE SERPL-SCNC: 102 MMOL/L (ref 98–107)
CO2 SERPL-SCNC: 30 MMOL/L (ref 21–32)
CREAT SERPL-MCNC: 0.69 MG/DL (ref 0.6–1.3)
EOSINOPHIL # BLD AUTO: 0.2 10^3/UL (ref 0–0.3)
EOSINOPHIL NFR BLD AUTO: 2 % (ref 0–10)
GFR SERPLBLD BASED ON 1.73 SQ M-ARVRAT: 113 ML/MIN
GLUCOSE SERPL-MCNC: 95 MG/DL (ref 70–105)
HCT VFR BLD CALC: 33 % (ref 40–54)
HGB BLD-MCNC: 10.7 G/DL (ref 13.3–17.7)
LYMPHOCYTES # BLD AUTO: 2 10^3/UL (ref 1–4)
LYMPHOCYTES NFR BLD AUTO: 19 % (ref 12–44)
MANUAL DIFFERENTIAL PERFORMED BLD QL: NO
MCH RBC QN AUTO: 30 PG (ref 25–34)
MCHC RBC AUTO-ENTMCNC: 32 G/DL (ref 32–36)
MCV RBC AUTO: 93 FL (ref 80–99)
MONOCYTES # BLD AUTO: 0.8 10^3/UL (ref 0–1)
MONOCYTES NFR BLD AUTO: 8 % (ref 0–12)
NEUTROPHILS # BLD AUTO: 7.5 10^3/UL (ref 1.8–7.8)
NEUTROPHILS NFR BLD AUTO: 71 % (ref 42–75)
PLATELET # BLD: 366 10^3/UL (ref 130–400)
PMV BLD AUTO: 9.6 FL (ref 9–12.2)
POTASSIUM SERPL-SCNC: 3.2 MMOL/L (ref 3.6–5)
PROT SERPL-MCNC: 5.4 GM/DL (ref 6.4–8.2)
SODIUM SERPL-SCNC: 140 MMOL/L (ref 135–145)
WBC # BLD AUTO: 10.6 10^3/UL (ref 4.3–11)

## 2023-07-09 RX ADMIN — GABAPENTIN SCH MG: 600 TABLET, FILM COATED ORAL at 17:05

## 2023-07-09 RX ADMIN — IPRATROPIUM BROMIDE AND ALBUTEROL SULFATE SCH ML: .5; 3 SOLUTION RESPIRATORY (INHALATION) at 21:33

## 2023-07-09 RX ADMIN — GABAPENTIN SCH MG: 600 TABLET, FILM COATED ORAL at 20:16

## 2023-07-09 RX ADMIN — LISINOPRIL SCH MG: 10 TABLET ORAL at 08:46

## 2023-07-09 RX ADMIN — TIOTROPIUM BROMIDE INHALATION SPRAY SCH INH: 3.12 SPRAY, METERED RESPIRATORY (INHALATION) at 08:47

## 2023-07-09 RX ADMIN — APIXABAN SCH MG: 5 TABLET, FILM COATED ORAL at 08:45

## 2023-07-09 RX ADMIN — SENNOSIDES SCH MG: 8.6 TABLET, FILM COATED ORAL at 19:24

## 2023-07-09 RX ADMIN — IPRATROPIUM BROMIDE AND ALBUTEROL SULFATE SCH ML: .5; 3 SOLUTION RESPIRATORY (INHALATION) at 08:47

## 2023-07-09 RX ADMIN — NICOTINE SCH MG: 21 PATCH, EXTENDED RELEASE TRANSDERMAL at 08:45

## 2023-07-09 RX ADMIN — DOCUSATE SODIUM SCH MG: 100 CAPSULE ORAL at 08:46

## 2023-07-09 RX ADMIN — ASPIRIN SCH MG: 81 TABLET ORAL at 20:16

## 2023-07-09 RX ADMIN — GABAPENTIN SCH MG: 600 TABLET, FILM COATED ORAL at 08:46

## 2023-07-09 RX ADMIN — DOCUSATE SODIUM SCH MG: 100 CAPSULE ORAL at 19:24

## 2023-07-09 RX ADMIN — CLOPIDOGREL BISULFATE SCH MG: 75 TABLET, FILM COATED ORAL at 08:46

## 2023-07-09 RX ADMIN — GABAPENTIN SCH MG: 600 TABLET, FILM COATED ORAL at 13:35

## 2023-07-09 RX ADMIN — APIXABAN SCH MG: 5 TABLET, FILM COATED ORAL at 20:16

## 2023-07-09 RX ADMIN — METOPROLOL TARTRATE SCH MG: 50 TABLET, FILM COATED ORAL at 08:46

## 2023-07-09 RX ADMIN — PANTOPRAZOLE SODIUM SCH MG: 40 TABLET, DELAYED RELEASE ORAL at 17:05

## 2023-07-09 RX ADMIN — PANTOPRAZOLE SODIUM SCH MG: 40 TABLET, DELAYED RELEASE ORAL at 05:42

## 2023-07-09 RX ADMIN — CELECOXIB SCH MG: 100 CAPSULE ORAL at 08:45

## 2023-07-09 RX ADMIN — ISOSORBIDE MONONITRATE SCH MG: 30 TABLET, EXTENDED RELEASE ORAL at 08:45

## 2023-07-09 RX ADMIN — CELECOXIB SCH MG: 100 CAPSULE ORAL at 20:16

## 2023-07-09 RX ADMIN — IPRATROPIUM BROMIDE AND ALBUTEROL SULFATE SCH ML: .5; 3 SOLUTION RESPIRATORY (INHALATION) at 02:56

## 2023-07-09 RX ADMIN — POTASSIUM CHLORIDE SCH MEQ: 750 TABLET, FILM COATED, EXTENDED RELEASE ORAL at 17:05

## 2023-07-09 RX ADMIN — OXYCODONE HYDROCHLORIDE AND ACETAMINOPHEN PRN TAB: 10; 325 TABLET ORAL at 19:30

## 2023-07-09 RX ADMIN — IPRATROPIUM BROMIDE AND ALBUTEROL SULFATE SCH ML: .5; 3 SOLUTION RESPIRATORY (INHALATION) at 15:13

## 2023-07-09 RX ADMIN — LIDOCAINE SCH EA: 50 PATCH CUTANEOUS at 08:47

## 2023-07-09 RX ADMIN — METOPROLOL TARTRATE SCH MG: 50 TABLET, FILM COATED ORAL at 20:16

## 2023-07-09 RX ADMIN — SENNOSIDES SCH MG: 8.6 TABLET, FILM COATED ORAL at 08:46

## 2023-07-09 RX ADMIN — POTASSIUM CHLORIDE SCH MEQ: 750 TABLET, FILM COATED, EXTENDED RELEASE ORAL at 13:35

## 2023-07-09 RX ADMIN — ALLOPURINOL SCH MG: 100 TABLET ORAL at 08:45

## 2023-07-09 RX ADMIN — FLUTICASONE FUROATE AND VILANTEROL TRIFENATATE SCH EACH: 100; 25 POWDER RESPIRATORY (INHALATION) at 08:46

## 2023-07-09 NOTE — PROGRESS NOTE - HOSPITALIST
Subjective


HPI/CC On Admission


Date Seen by Provider:  Jul 9, 2023


Time Seen by Provider:  11:00


Subjective/Events-last exam


Patient reported chest pain with cough on the right side


Troponin negative BNP minimally elevated


Chest x-ray shows infiltrate of the right  where the presumptive lung masses


We will need to arrange close follow-up with repeat imaging at discharge





Review of Systems


General:  Malaise


Pulmonary:  Dyspnea, Cough





Objective


Exam


Vital Signs





Vital Signs








  Date Time  Temp Pulse Resp B/P (MAP) Pulse Ox O2 Delivery O2 Flow Rate FiO2


 


7/9/23 16:14 37.4 92 18 131/64 (86) 91 High Flow N/C 3.00 


 


7/8/23 13:58        28





Capillary Refill :


General Appearance:  No Apparent Distress, WD/WN, Chronically ill


Respiratory:  Lungs Clear, Decreased Breath Sounds


Cardiovascular:  Regular Rate, Rhythm


Neurologic/Psychiatric:  Alert, Oriented x3, No Motor/Sensory Deficits, Normal 

Mood/Affect





Results/Procedures


Lab


Laboratory Tests


7/9/23 05:15








Patient resulted labs reviewed.





Assessment/Plan


Assessment and Plan


Assess & Plan/Chief Complaint


(1) Sepsis


Status:  Resolved


Assessment & Plan:  Suspected to be secondary to pneumonia, see below.


Qualifiers:  


   Qualified Codes:  A41.9 - Sepsis, unspecified organism


(2) Right lower lobe pneumonia


Status:  Acute


Assessment & Plan:  Failed outpatient treatment with doxycycline, worsening. 

Started on cefepime and vancomycin and having clinical improvement but 

persistent hypoxia.


7/5: improving, will titrate oxygen as tolerated, may need oxygen at discharge


7/6: Transition to PO antibiotics tomorrow


Qualifiers:  


   Qualified Codes:  J18.9 - Pneumonia, unspecified organism


(3) Hemoptysis


Status:  Acute


Assessment & Plan:  Given history of PE and persistent hypoxia not on 

anticoagulation, will check CTA chest.


CTA chest positive for PE, see below.





(4) Pulmonary emboli


Status:  Acute


Assessment & Plan:  No cardiovascular compromise, but persistent marked hypoxia,

started on treatment dose enoxaparin, wean supplemental O2 as tolerated.


7/5: Continue Enoxaparin treatment, will transition to PO soon


Qualifiers:  


   Qualified Codes:  I26.92 - Saddle embolus of pulmonary artery without acute 

cor pulmonale


(5) CAD (coronary artery disease)


Status:  Chronic


Assessment & Plan:  Resume home meds





(6) Phantom limb pain


Status:  Chronic


Assessment & Plan:  Resume home meds





(7) History of pulmonary embolism


Status:  Chronic


Assessment & Plan:  Reports he used to be on warfarin, uncertain why 

discontinued.





(8) Hypertension


Status:  Chronic


(9) Hyperlipidemia


Status:  Chronic


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


Status:  Chronic


(11) Lung mass


Status:  Acute


Assessment & Plan:  Possible mass underlying pneumonia vs infarction. Will 

follow up after treatment of pneumonia and PE.





(12) DVT prophylaxis


Status:  Acute


Assessment & Plan:  Enoxaparin treatment dose











FELISHA MORENO DO                 Jul 9, 2023 07:17

## 2023-07-09 NOTE — DIAGNOSTIC IMAGING REPORT
EXAM: CHEST 1 VIEW, AP/PA ONLY



INDICATION: Dyspnea.



COMPARISON: 07/02/2023.



FINDINGS: Increasing airspace opacities throughout the right

lung, greatest in the right lung base. Small right pleural

effusion. No pneumothorax. Normal heart size and central

pulmonary vascularity.



IMPRESSION: Increasing airspace opacities on the right, greatest

in the right lung base. Small right pleural effusion.



Dictated by: 



  Dictated on workstation # [id number]

## 2023-07-10 VITALS — SYSTOLIC BLOOD PRESSURE: 120 MMHG | DIASTOLIC BLOOD PRESSURE: 64 MMHG

## 2023-07-10 VITALS — SYSTOLIC BLOOD PRESSURE: 131 MMHG | DIASTOLIC BLOOD PRESSURE: 60 MMHG

## 2023-07-10 VITALS — SYSTOLIC BLOOD PRESSURE: 105 MMHG | DIASTOLIC BLOOD PRESSURE: 65 MMHG

## 2023-07-10 LAB
ALBUMIN SERPL-MCNC: 2.4 GM/DL (ref 3.2–4.5)
ALP SERPL-CCNC: 61 U/L (ref 40–136)
ALT SERPL-CCNC: 9 U/L (ref 0–55)
BASOPHILS # BLD AUTO: 0 10^3/UL (ref 0–0.1)
BASOPHILS NFR BLD AUTO: 0 % (ref 0–10)
BILIRUB SERPL-MCNC: 0.6 MG/DL (ref 0.1–1)
BUN/CREAT SERPL: 5
CALCIUM SERPL-MCNC: 8.7 MG/DL (ref 8.5–10.1)
CHLORIDE SERPL-SCNC: 96 MMOL/L (ref 98–107)
CO2 SERPL-SCNC: 32 MMOL/L (ref 21–32)
CREAT SERPL-MCNC: 0.76 MG/DL (ref 0.6–1.3)
EOSINOPHIL # BLD AUTO: 0.2 10^3/UL (ref 0–0.3)
EOSINOPHIL NFR BLD AUTO: 1 % (ref 0–10)
GFR SERPLBLD BASED ON 1.73 SQ M-ARVRAT: 110 ML/MIN
GLUCOSE SERPL-MCNC: 146 MG/DL (ref 70–105)
HCT VFR BLD CALC: 33 % (ref 40–54)
HGB BLD-MCNC: 10.8 G/DL (ref 13.3–17.7)
LYMPHOCYTES # BLD AUTO: 1.3 10^3/UL (ref 1–4)
LYMPHOCYTES NFR BLD AUTO: 11 % (ref 12–44)
MANUAL DIFFERENTIAL PERFORMED BLD QL: NO
MCH RBC QN AUTO: 30 PG (ref 25–34)
MCHC RBC AUTO-ENTMCNC: 33 G/DL (ref 32–36)
MCV RBC AUTO: 93 FL (ref 80–99)
MONOCYTES # BLD AUTO: 0.9 10^3/UL (ref 0–1)
MONOCYTES NFR BLD AUTO: 8 % (ref 0–12)
NEUTROPHILS # BLD AUTO: 9.1 10^3/UL (ref 1.8–7.8)
NEUTROPHILS NFR BLD AUTO: 78 % (ref 42–75)
PLATELET # BLD: 313 10^3/UL (ref 130–400)
PMV BLD AUTO: 9.3 FL (ref 9–12.2)
POTASSIUM SERPL-SCNC: 3.2 MMOL/L (ref 3.6–5)
PROT SERPL-MCNC: 5.6 GM/DL (ref 6.4–8.2)
SODIUM SERPL-SCNC: 136 MMOL/L (ref 135–145)
WBC # BLD AUTO: 11.6 10^3/UL (ref 4.3–11)

## 2023-07-10 RX ADMIN — CELECOXIB SCH MG: 100 CAPSULE ORAL at 08:40

## 2023-07-10 RX ADMIN — IPRATROPIUM BROMIDE AND ALBUTEROL SULFATE PRN ML: .5; 3 SOLUTION RESPIRATORY (INHALATION) at 04:44

## 2023-07-10 RX ADMIN — APIXABAN SCH MG: 5 TABLET, FILM COATED ORAL at 08:40

## 2023-07-10 RX ADMIN — NICOTINE SCH MG: 21 PATCH, EXTENDED RELEASE TRANSDERMAL at 08:41

## 2023-07-10 RX ADMIN — CLOPIDOGREL BISULFATE SCH MG: 75 TABLET, FILM COATED ORAL at 08:40

## 2023-07-10 RX ADMIN — GABAPENTIN SCH MG: 600 TABLET, FILM COATED ORAL at 12:16

## 2023-07-10 RX ADMIN — ALLOPURINOL SCH MG: 100 TABLET ORAL at 08:41

## 2023-07-10 RX ADMIN — ISOSORBIDE MONONITRATE SCH MG: 30 TABLET, EXTENDED RELEASE ORAL at 08:40

## 2023-07-10 RX ADMIN — POTASSIUM CHLORIDE SCH MEQ: 750 TABLET, FILM COATED, EXTENDED RELEASE ORAL at 08:39

## 2023-07-10 RX ADMIN — IPRATROPIUM BROMIDE AND ALBUTEROL SULFATE SCH ML: .5; 3 SOLUTION RESPIRATORY (INHALATION) at 07:25

## 2023-07-10 RX ADMIN — LIDOCAINE SCH EA: 50 PATCH CUTANEOUS at 08:41

## 2023-07-10 RX ADMIN — LISINOPRIL SCH MG: 10 TABLET ORAL at 08:41

## 2023-07-10 RX ADMIN — TIOTROPIUM BROMIDE INHALATION SPRAY SCH INH: 3.12 SPRAY, METERED RESPIRATORY (INHALATION) at 07:25

## 2023-07-10 RX ADMIN — METOPROLOL TARTRATE SCH MG: 50 TABLET, FILM COATED ORAL at 08:41

## 2023-07-10 RX ADMIN — IPRATROPIUM BROMIDE AND ALBUTEROL SULFATE SCH ML: .5; 3 SOLUTION RESPIRATORY (INHALATION) at 02:37

## 2023-07-10 RX ADMIN — OXYCODONE HYDROCHLORIDE AND ACETAMINOPHEN PRN TAB: 10; 325 TABLET ORAL at 09:01

## 2023-07-10 RX ADMIN — SENNOSIDES SCH MG: 8.6 TABLET, FILM COATED ORAL at 09:01

## 2023-07-10 RX ADMIN — DOCUSATE SODIUM SCH MG: 100 CAPSULE ORAL at 09:02

## 2023-07-10 RX ADMIN — OXYCODONE HYDROCHLORIDE AND ACETAMINOPHEN PRN TAB: 10; 325 TABLET ORAL at 04:46

## 2023-07-10 RX ADMIN — PANTOPRAZOLE SODIUM SCH MG: 40 TABLET, DELAYED RELEASE ORAL at 05:23

## 2023-07-10 RX ADMIN — GABAPENTIN SCH MG: 600 TABLET, FILM COATED ORAL at 08:40

## 2023-07-10 RX ADMIN — POTASSIUM CHLORIDE SCH MEQ: 750 TABLET, FILM COATED, EXTENDED RELEASE ORAL at 12:16

## 2023-07-10 NOTE — DISCHARGE SUMMARY
Discharge Summary


Hospital Course


Was the Problem List Reviewed?:  Yes


Problems/Dx:  


(1) Pulmonary emboli


Status:  Acute


Qualifiers:  


   Qualified Codes:  I26.92 - Saddle embolus of pulmonary artery without acute 

cor pulmonale


(2) Bailey disease


Status:  Chronic


(3) Lung mass


Status:  Acute


(4) CAD (coronary artery disease)


Status:  Chronic


(5) Right lower lobe pneumonia


Status:  Acute


Qualifiers:  


   Qualified Codes:  J18.9 - Pneumonia, unspecified organism


(6) DVT prophylaxis


Status:  Acute


Hospital Course


Date of Admission: Jul 3, 2023 at 11:52 


Admission Diagnosis :  





Family Physician/Provider: [facility]  





Date of Discharge: 7/10/23 


Discharge Diagnosis: [ ]








Hospital Course:


Patient had an uneventful but lengthy hospital course after he was admitted for 

right lower lobe pneumonia and exacerbation of COPD with hypoxia found to have 

pulmonary embolism.  Patient was placed on anticoagulation he did continue to 

have hemoptysis which was  resolved at time of discharge.  Antibiotics will be 

completed with oral Omnicef and he will have close follow-up with his primary 

care provider to evaluate right lower lobe mass which is either neoplastic or 

pneumonia.














Labs and Pending Lab Test:


Laboratory Tests


7/10/23 05:38: 


White Blood Count 11.6H, Red Blood Count 3.57L, Hemoglobin 10.8L, Hematocrit 33L

, Mean Corpuscular Volume 93, Mean Corpuscular Hemoglobin 30, Mean Corpuscular 

Hemoglobin Concent 33, Red Cell Distribution Width 16.5H, Platelet Count 313, 

Mean Platelet Volume 9.3, Immature Granulocyte % (Auto) 1, Neutrophils (%) 

(Auto) 78H, Lymphocytes (%) (Auto) 11L, Monocytes (%) (Auto) 8, Eosinophils (%) 

(Auto) 1, Basophils (%) (Auto) 0, Neutrophils # (Auto) 9.1H, Lymphocytes # 

(Auto) 1.3, Monocytes # (Auto) 0.9, Eosinophils # (Auto) 0.2, Basophils # (Auto)

0.0, Immature Granulocyte # (Auto) 0.1, Sodium Level 136, Potassium Level 3.2L, 

Chloride Level 96L, Carbon Dioxide Level 32, Anion Gap 8, Blood Urea Nitrogen 4L

, Creatinine 0.76, Estimat Glomerular Filtration Rate 110, BUN/Creatinine Ratio 

5, Glucose Level 146H, Calcium Level 8.7, Corrected Calcium 10.0, Total 

Bilirubin 0.6, Aspartate Amino Transf (AST/SGOT) 12, Alanine Aminotransferase 

(ALT/SGPT) 9, Alkaline Phosphatase 61, Total Protein 5.6L, Albumin 2.4L





Microbiology


7/2/23 Blood Culture - Final, Complete


         No growth





Home Meds


Active


Cefdinir 300 Mg Capsule 300 Mg PO BID


Eliquis (Apixaban) 5 Mg Tablet 5 Mg PO BID


Nicoderm Cq (Nicotine) 21 Mg/24 Hour Patch.td24 21 Mg TD DAILY


Reported


Docusate Sodium 100 Mg Tablet 100 Mg PO DAILY PRN


Ventolin Hfa (Albuterol Sulfate) 90 Mcg Hfa.aer.ad 1 Puff PO Q6H PRN


     1 PUFF = 90 MCG


Albuterol Sulfate 2.5 Mg/3 Ml (0.083 %) Vial.neb 1 Vial PO BID


Atorvastatin Calcium 20 Mg Tablet 20 Mg PO HS


Lidocaine 5% Patch (Lidocaine) Unknown Strength Adh..patch Unknown Dose TP Q12H 

MDD 2


     2 patches max for 12 hours, then 12 hours patch-free period.


     


     PUTS 1 PATCH ON HIS SIDE AND 1 PATCH ON HIS LEG


Lisinopril 10 Mg Tablet 10 Mg PO DAILY


Oxycodone-Acetaminophen  (Oxycodone HCl/Acetaminophen) 10 Mg-325 Mg Tablet

1 Tab PO Q4H PRN


Gabapentin 600 Mg Tablet 600 Mg PO QID


Breztri Aerosphere Inhaler (Budesonide/Glycopyr/Formoterol) 160 Mcg-9 Mcg-4.8 

Mcg/Actuation Hfa.aer.ad 1 Inhaler IDT BID


Celecoxib 200 Mg Capsule 200 Mg PO BID


Protonix (Pantoprazole Sodium) 40 Mg Tablet.dr 40 Mg PO BID


Metoprolol Tartrate 50 Mg Tablet 50 Mg PO BID


Plavix (Clopidogrel Bisulfate) 75 Mg Tablet 75 Mg PO DAILY


Aspirin EC (Aspirin) 81 Mg Tablet.dr 81 Mg PO HS


Isosorbide Mononitrate ER (Isosorbide Mononitrate) 30 Mg Tab.er.24h 30 Mg PO 

DAILY


Allopurinol 100 Mg Tablet 200 Mg PO DAILY


     TAKES 2 (100MG) TABS


Assessment/Pt Instructions


PCP in 1 week


Discharge Planning:  <30 minutes discharge planning





Discharge Physical Examination


Vital Signs





Vital Signs








  Date Time  Temp Pulse Resp B/P (MAP) Pulse Ox O2 Delivery O2 Flow Rate FiO2


 


7/10/23 11:39 37.0 86 18 120/64 (82) 96 High Flow N/C 6.00 


 


7/8/23 13:58        28








General Appearance:  No Apparent Distress, WD/WN, Chronically ill


Allergies:  


Coded Allergies:  


     Penicillins (Unverified  Allergy, Mild, 6/4/09)


     pneumococcal vaccine (Verified  Allergy, Unknown, 4/10/20)





Discharge Summary


Date of Admission


Jul 3, 2023 at 11:52


Date of Discharge





Discharge Date:  Jul 10, 2023


Discharge Diagnosis


(1) Sepsis


Status:  Resolved


Assessment & Plan:  Suspected to be secondary to pneumonia, see below.


Qualifiers:  


   Qualified Codes:  A41.9 - Sepsis, unspecified organism


(2) Right lower lobe pneumonia


Status:  Acute


Assessment & Plan:  Failed outpatient treatment with doxycycline, worsening. 

Started on cefepime and vancomycin and having clinical improvement but 

persistent hypoxia.


7/5: improving, will titrate oxygen as tolerated, may need oxygen at discharge


7/6: Transition to PO antibiotics tomorrow


Qualifiers:  


   Qualified Codes:  J18.9 - Pneumonia, unspecified organism


(3) Hemoptysis


Status:  Acute


Assessment & Plan:  Given history of PE and persistent hypoxia not on 

anticoagulation, will check CTA chest.


CTA chest positive for PE, see below.





(4) Pulmonary emboli


Status:  Acute


Assessment & Plan:  No cardiovascular compromise, but persistent marked hypoxia,

started on treatment dose enoxaparin, wean supplemental O2 as tolerated.


7/5: Continue Enoxaparin treatment, will transition to PO soon


Qualifiers:  


   Qualified Codes:  I26.92 - Saddle embolus of pulmonary artery without acute 

cor pulmonale


(5) CAD (coronary artery disease)


Status:  Chronic


Assessment & Plan:  Resume home meds





(6) Phantom limb pain


Status:  Chronic


Assessment & Plan:  Resume home meds





(7) History of pulmonary embolism


Status:  Chronic


Assessment & Plan:  Reports he used to be on warfarin, uncertain why 

discontinued.





(8) Hypertension


Status:  Chronic


(9) Hyperlipidemia


Status:  Chronic


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


Status:  Chronic


(11) Lung mass


Status:  Acute


Assessment & Plan:  Possible mass underlying pneumonia vs infarction. Will 

follow up after treatment of pneumonia and PE.





(12) DVT prophylaxis


Status:  Acute


Assessment & Plan:  Enoxaparin treatment dose











FELISHA MORENO DO                Jul 10, 2023 12:30

## 2023-07-13 ENCOUNTER — HOSPITAL ENCOUNTER (EMERGENCY)
Dept: HOSPITAL 75 - ER | Age: 53
Discharge: HOME | End: 2023-07-13
Payer: MEDICARE

## 2023-07-13 VITALS — SYSTOLIC BLOOD PRESSURE: 107 MMHG | DIASTOLIC BLOOD PRESSURE: 66 MMHG

## 2023-07-13 VITALS — WEIGHT: 198.2 LBS | HEIGHT: 63.98 IN | BODY MASS INDEX: 33.84 KG/M2

## 2023-07-13 DIAGNOSIS — M54.9: ICD-10-CM

## 2023-07-13 DIAGNOSIS — E66.9: ICD-10-CM

## 2023-07-13 DIAGNOSIS — G89.29: ICD-10-CM

## 2023-07-13 DIAGNOSIS — J18.9: Primary | ICD-10-CM

## 2023-07-13 DIAGNOSIS — Z79.891: ICD-10-CM

## 2023-07-13 DIAGNOSIS — J44.9: ICD-10-CM

## 2023-07-13 DIAGNOSIS — Z88.0: ICD-10-CM

## 2023-07-13 DIAGNOSIS — Z79.899: ICD-10-CM

## 2023-07-13 DIAGNOSIS — Z79.1: ICD-10-CM

## 2023-07-13 DIAGNOSIS — Z99.81: ICD-10-CM

## 2023-07-13 PROCEDURE — 71045 X-RAY EXAM CHEST 1 VIEW: CPT

## 2023-07-13 NOTE — ED RESPIRATORY
General


Chief Complaint:  Respiratory Problems


Stated Complaint:  CHEST CONGESTION | COUGH |


Nursing Triage Note:  


PT ARRIVED POV WITH CC OF SOB, CONGESTION, AND COUGHING UP BLOOD. PT WAS 


ADMITTED ON  AND DISCHARGED ON THE  FOR PNEUMONIA AND BLOOD CLOTS.


Source:  patient


Exam Limitations:  no limitations





History of Present Illness


Date Seen by Provider:  2023


Time Seen by Provider:  08:15


Initial Comments


Niko is a 51 yo male who presents to the ER with a concern for increased SOB 

and some hemoptysis in the last day.  Recent week long admission for pneumonia, 

PE and lung mass found in the RLL.  DIscharged on blood thinners and about to 

finish his last day of out patient antibiotics tomorrow.  He became a little 

more short of breath with cough this morning and states "I just got scared".  

His symptoms are currently 100% resolved.  He is on his normal level of oxygen 

per nasal canula. Has no increased pain.  No recent fevers.  Tolerating a fairly

normal appetite.  Taking his medications as prescribed.  He states he has not 

been taking anything for the cough or congestion because he did not know that he

could.  We talked about over the counter cough medications that would be safe 

for him.


Timing/Duration:  just prior to arrival


Severity:  mild


Prior Episodes/Possible Cause:  frequent episodes


Modifying Factors:  Improves With Albuterol Inhaler


Associated Symptoms:  cough, nasal congestion, shortness of breath, wheezing





Allergies and Home Medications


Allergies


Coded Allergies:  


     Penicillins (Unverified  Allergy, Mild, 09)


     pneumococcal vaccine (Verified  Allergy, Unknown, 4/10/20)





Patient Home Medication List


Home Medication List Reviewed:  Yes


Albuterol Sulfate (Albuterol Sulfate) 2.5 Mg/3 Ml (0.083 %) Vial.neb, 1 VIAL PO 

BID, (Reported)


   Entered as Reported by: EVARISTO JENNINGS on 7/3/23 1350


Albuterol Sulfate (Ventolin Hfa) 90 Mcg Hfa.aer.ad, 1 PUFF PO Q6H PRN for 

SHORTNESS OF BREATH, (Reported)


   Entered as Reported by: EVARISTO JENNINGS on 7/3/23 1350


Allopurinol (Allopurinol) 100 Mg Tablet, 200 MG PO DAILY, (Reported)


   Entered as Reported by: DAVID GARZON on 18 0845


Apixaban (Eliquis) 5 Mg Tablet, 5 MG PO BID


   Prescribed by: FELISHA MORENO on 7/10/23 1106


Aspirin (Aspirin EC) 81 Mg Tablet.dr, 81 MG PO HS, (Reported)


   Entered as Reported by: RAVINDER RODRIGUEZ on 19 1028


Atorvastatin Calcium (Atorvastatin Calcium) 20 Mg Tablet, 20 MG PO HS, 

(Reported)


   Entered as Reported by: EVARISTO JENNINGS on 7/3/23 1348


Budesonide/Glycopyr/Formoterol (Breztri Aerosphere Inhaler) 160 Mcg-9 Mcg-4.8 

Mcg/Actuation Hfa.aer.ad, 1 INHALER IDT BID, (Reported)


   Entered as Reported by: DANETTE CLAYTON on 23 1641


Cefdinir (Cefdinir) 300 Mg Capsule, 300 MG PO BID


   Prescribed by: FELISHA MORENO on 7/10/23 1106


Cefdinir (Cefdinir) 300 Mg Capsule, 300 MG PO BID


   Prescribed by: RAEGAN CLEMENT on 23 1041


Celecoxib (Celecoxib) 200 Mg Capsule, 200 MG PO BID, (Reported)


   Entered as Reported by: SIMON APARICIO on 23 1917


Clopidogrel Bisulfate (Plavix) 75 Mg Tablet, 75 MG PO DAILY, (Reported)


   Entered as Reported by: RAVINDER RODRIGUEZ on 19 1040


Docusate Sodium (Docusate Sodium) 100 Mg Tablet, 100 MG PO DAILY PRN for 

CONSTIPATION-1ST LINE, (Reported)


   Entered as Reported by: EVARISTO JENNINGS on 7/3/23 1350


Gabapentin (Gabapentin) 600 Mg Tablet, 600 MG PO QID, (Reported)


   Entered as Reported by: DANETTE CLAYTON on 23 1641


Isosorbide Mononitrate (Isosorbide Mononitrate ER) 30 Mg Tab.er.24h, 30 MG PO 

DAILY, (Reported)


   Entered as Reported by: DAVID GARZON on 18 0845


Lidocaine (Lidocaine 5% Patch) Unknown Strength Adh..patch, Unknown Dose TP 

Q12H, (Reported)


   Entered as Reported by: DANETTE CLAYTON on 23 1659


Lisinopril (Lisinopril) 10 Mg Tablet, 10 MG PO DAILY, (Reported)


   Entered as Reported by: DANETTE CLAYTON on 23 1641


Metoprolol Tartrate (Metoprolol Tartrate) 50 Mg Tablet, 50 MG PO BID, (Reported)


   Entered as Reported by: RAVINDER RODRIGUEZ on 10/21/19 1023


Nicotine (Nicoderm Cq) 21 Mg/24 Hour Patch.td24, 21 MG TD DAILY


   Prescribed by: FELISHA MORENO on 7/10/23 1106


Oxycodone HCl/Acetaminophen (Oxycodone-Acetaminophen ) 10 Mg-325 Mg 

Tablet, 1 TAB PO Q4H PRN for PAIN, (Reported)


   Entered as Reported by: DANETTE CLAYTON on 23 1641


Pantoprazole Sodium (Protonix) 40 Mg Tablet.dr, 40 MG PO BID, (Reported)


   Entered as Reported by: ANTHONY CAMPBELL on 4/10/20 0850





Review of Systems


Review of Systems


Constitutional:  see HPI


EENTM:  nose congestion


Respiratory:  cough, hemoptysis, phlegm, short of breath


Cardiovascular:  no symptoms reported


Gastrointestinal:  no symptoms reported


Genitourinary:  no symptoms reported


Musculoskeletal:  no symptoms reported


Skin:  no symptoms reported


Psychiatric/Neurological:  Anxiety





All Other Systems Reviewed


Negative Unless Noted:  Yes





Past Medical-Social-Family Hx


Patient Social History


Tobacco Use?:  No


Substance use?:  No


Alcohol Use?:  No





Immunizations Up To Date


PED Vaccines UTD:  Yes


First/Initial COVID19 Vaccinat:  X3


Second COVID19 Vaccination Alfredo:  X3


Third COVID19 Vaccination Date:  X3





Seasonal Allergies


Seasonal Allergies:  No





Past Medical History


Surgery/Hospitalization HX:  


LEFT ABOVE KNEE AMPUTATION, HEART CATH X5 WITH STENTS





HTN, HLD, GOUT, COPD, CHF


Surgeries:  Yes (LEFT AKA)


Amputation, Cardiac, Coronary Stent, Orthopedic


Respiratory:  Yes (O2 DEPENDENT)


Pulmonary Embolism, COPD


Currently Using CPAP:  No


Currently Using BIPAP:  No


Cardiac:  Yes (MI X 2; STENTS X 4; -NSTEMI 17 WITH 1 STENT PLACED)


Coronary Artery Disease, Deep Vein Thrombosis, Heart Attack, High Cholesterol, 

Hypertension


Neurological:  No


Reproductive Disorders:  No


Genitourinary:  No


Gastrointestinal:  Yes


Gastroesophageal Reflux


Musculoskeletal:  Yes (OXYCODONE, GABAPENTIN + IBUPROFEN DAILY;CHRONIC BACK/L 

LEG PHANTOM PAIN)


Amputee, Chronic Back Pain, Gout


Endocrine:  No (OBESITY)


HEENT:  No


Cancer:  No


Psychosocial:  No


Integumentary:  No


Blood Disorders:  Yes (PROTEIN S DEFICIENCY--DVT'S AND P.E.'S AND MI X 2)





Family Medical History





Arthritis


  G8 BROTHER


Blood clots


  19 MOTHER ( of blood clot)


Cardiac disorder


  19 FATHER


Diabetes mellitus


  G8 BROTHER


FH: cancer


  paternal grandmother


FHx: inflammatory bowel disease


  G8 BROTHER





No Family History of:


  FH: sudden cardiac death (SCD)


Heart Disease, Vascular Disease





Physical Exam





Vital Signs - First Documented




















Capillary Refill :


Height: 5'4.00"


Weight: 190lbs. 1.6oz. 86.269389tz; 34.00 BMI


Method:Stated


General Appearance:  WD/WN, no apparent distress, obese


Eyes:  Bilateral Eye Normal Inspection, Bilateral Eye PERRL, Bilateral Eye EOMI


HEENT:  PERRL/EOMI


Respiratory:  no respiratory distress, no accessory muscle use, wheezing 

(scattered expiratory wheezes R>L)


Cardiovascular:  regular rate, rhythm


Extremities:  normal inspection (s/p left AKA)


Neurologic/Psychiatric:  alert, normal mood/affect, oriented x 3


Skin:  normal color, warm/dry, other (ecchymoses/petechial rash to ant abdominal

wall (prior heparin injections))





Progress/Results/Core Measures


Suspected Sepsis


SIRS


Temperature: 


Pulse: 94 


Respiratory Rate: 


 


Blood Pressure 109 /71 


Mean: 84





Results/Orders


My Orders





Orders - RAEGAN CLEMENT MD


Chest 1 View, Ap/Pa Only (23 08:12)





Vital Signs/I&O











 23





 08:05 08:05 08:05 11:21


 


Pulse 94   85


 


B/P (MAP) 109/71 (84)   107/66


 


Pulse Ox 90   95


 


O2 Delivery Nasal Cannula Nasal Cannula Nasal Cannula Nasal Cannula


 


O2 Flow Rate 6.00 6.00 6.00 6.00





Capillary Refill :








Blood Pressure Mean:                    84








Progress Note :  


   Time:  10:20


Progress Note


Patient seen and examined by me.  Evaluation today includes review of recent 

hospitalization, physical exam and CXR.  Pertinent physical exam findings - WDWN

obese male in NAD.  VSS.  Appears adequately hydrated.  Occ ronchi Rt lung.  

Left is clear. No increased work of breathing or resp distress.





DDx based on H&P includes mucous plugging, COPD exacerbation.





CXR independently reviewed and interpreted by me.  He seems to have worsening 

consolidation on CXR right lung.  He clinically looks very well and is 

completely asymptomatic.  Had a breathing treatment just PTA.  I discussed case 

and presentation today with Dr Moreno who was his admitting provider throughout 

his hospital stay.  She was agreeable that a rational course of action would be 

to lengthen out his antibiotic coverage. She anticipates a very complicated 

course for him with respect to PE and mass.  His wife states that he ahs close 

follow up arranged with [facility].  They have pending appointments already scheduled. 

Niko feels much better now - with no complaints. I advised them of Dr Moreno's 

recs and they are happy with that plan of care.  I gave them strict return 

precautions and they verbalize understanding. No clinical indications for repeat

lab work at this time.





Diagnostic Imaging





   Diagonstic Imaging:  Xray


   Plain Films/CT/US/NM/MRI:  chest


Comments


                 [facility].


                                [place], [place]





NAME:   NIKO MARTE


[facility] REC#:   [id number]


ACCOUNT#:   [id number]


PT STATUS:   REG ER


:   1970


PHYSICIAN:   RAEGAN CLEMENT MD


ADMIT DATE:   23/ER


                                   ***Draft***


Date of Exam:23





CHEST 1 VIEW, AP/PA ONLY








INDICATION: Dyspnea and hemoptysis.





Single AP view of chest is obtained with comparison made study of


2023





FINDINGS: There has been further increase in opacification of the


lower half of right lung with mild left perihilar atelectasis


and/or pneumonitis. No pneumothorax is seen. Left costophrenic


sulcus appears sharp.





IMPRESSION: Increasing right basilar infiltrate and associated


pleural fluid is likely due to pneumonia or infarct and continued


radiographic follow-up would be of use.





  Dictated on workstation # [id number]








Dict:   23 [id number]


Trans:   23 [id number]


 [id number]





Interpreted by:     YAAKOV PENA MD


Electronically signed by:





Departure


Communication (Admissions)


Time/Spoke to Consulting Phy:  10:40


discussed with Dr Moreno - will extend antibiotics 1 week





Impression





   Primary Impression:  


   Pneumonia


   Qualified Codes:  J18.9 - Pneumonia, unspecified organism


Disposition:  01 HOME, SELF-CARE


Condition:  Stable





Departure-Patient Inst.


Decision time for Depature:  10:26


Referrals:  


[facility] (PCP/Family)


Primary Care Physician


Patient Instructions:  Pneumonia, Adult ED





Add. Discharge Instructions:  


We would like for you to continue your cefdinir antibiotics for 1 more week, 7 

days.





You can use over-the-counter Mucinex as needed for congestion.  Please follow 

packaging instructions.





You can also use "Coricidin HBP" for congestion and cough.  This is safe to use 

with a history of high blood pressure.





Monitor your cough for increasing signs of bleeding.  If you notice that you are

coughing up more blood please come back to the emergency department for 

reevaluation.





You need to keep very close follow-up with your primary care physician.  Return 

to the emergency department at any point if you become concerned.


Scripts


Cefdinir (Cefdinir) 300 Mg Capsule


300 MG PO BID for 7 Days, #14 CAP


   Prov: RAEGAN CLEMENT MD         23





Copy


Copies To 1:   LILLIAN LANDRY KATHRYN M MD         2023 08:29

## 2023-07-13 NOTE — DIAGNOSTIC IMAGING REPORT
INDICATION: Dyspnea and hemoptysis.



Single AP view of chest is obtained with comparison made study of

07/09/2023



FINDINGS: There has been further increase in opacification of the

lower half of right lung with mild left perihilar atelectasis

and/or pneumonitis. No pneumothorax is seen. Left costophrenic

sulcus appears sharp.



IMPRESSION: Increasing right basilar infiltrate and associated

pleural fluid is likely due to pneumonia or infarct and continued

radiographic follow-up would be of use.



Dictated by: 



  Dictated on workstation # [id number]

## 2023-07-15 ENCOUNTER — HOSPITAL ENCOUNTER (INPATIENT)
Dept: HOSPITAL 75 - ER | Age: 53
Discharge: ANOTHER HOSPITAL | DRG: 177 | End: 2023-07-15
Attending: INTERNAL MEDICINE | Admitting: INTERNAL MEDICINE
Payer: MEDICARE

## 2023-07-15 VITALS — WEIGHT: 198.2 LBS | BODY MASS INDEX: 34.26 KG/M2 | HEIGHT: 63.78 IN

## 2023-07-15 VITALS — DIASTOLIC BLOOD PRESSURE: 75 MMHG | SYSTOLIC BLOOD PRESSURE: 145 MMHG

## 2023-07-15 DIAGNOSIS — R09.02: ICD-10-CM

## 2023-07-15 DIAGNOSIS — K21.9: ICD-10-CM

## 2023-07-15 DIAGNOSIS — M54.9: ICD-10-CM

## 2023-07-15 DIAGNOSIS — J86.9: Primary | ICD-10-CM

## 2023-07-15 DIAGNOSIS — I25.10: ICD-10-CM

## 2023-07-15 DIAGNOSIS — I26.99: ICD-10-CM

## 2023-07-15 DIAGNOSIS — Z89.612: ICD-10-CM

## 2023-07-15 DIAGNOSIS — Z95.5: ICD-10-CM

## 2023-07-15 DIAGNOSIS — J44.0: ICD-10-CM

## 2023-07-15 DIAGNOSIS — Z87.891: ICD-10-CM

## 2023-07-15 DIAGNOSIS — I50.9: ICD-10-CM

## 2023-07-15 DIAGNOSIS — Z86.718: ICD-10-CM

## 2023-07-15 DIAGNOSIS — Z79.899: ICD-10-CM

## 2023-07-15 DIAGNOSIS — E78.00: ICD-10-CM

## 2023-07-15 DIAGNOSIS — I73.9: ICD-10-CM

## 2023-07-15 DIAGNOSIS — I11.0: ICD-10-CM

## 2023-07-15 DIAGNOSIS — Z79.82: ICD-10-CM

## 2023-07-15 DIAGNOSIS — I25.2: ICD-10-CM

## 2023-07-15 DIAGNOSIS — G89.29: ICD-10-CM

## 2023-07-15 DIAGNOSIS — J18.9: ICD-10-CM

## 2023-07-15 DIAGNOSIS — M10.9: ICD-10-CM

## 2023-07-15 LAB
ALBUMIN SERPL-MCNC: 2.5 GM/DL (ref 3.2–4.5)
ALP SERPL-CCNC: 125 U/L (ref 40–136)
ALT SERPL-CCNC: 14 U/L (ref 0–55)
ANISOCYTOSIS BLD QL SMEAR: SLIGHT
APTT BLD: 54 SEC (ref 24–35)
ARTERIAL PATENCY WRIST A: (no result)
BASE EXCESS STD BLDA CALC-SCNC: 17.1 MMOL/L (ref -2.5–2.5)
BASOPHILS # BLD AUTO: 0 10^3/UL (ref 0–0.1)
BASOPHILS NFR BLD AUTO: 0 % (ref 0–10)
BASOPHILS NFR BLD MANUAL: 0 %
BDY SITE: (no result)
BILIRUB SERPL-MCNC: 0.7 MG/DL (ref 0.1–1)
BODY TEMPERATURE: 35.7
BUN/CREAT SERPL: 13
CALCIUM SERPL-MCNC: 9.1 MG/DL (ref 8.5–10.1)
CHLORIDE SERPL-SCNC: 85 MMOL/L (ref 98–107)
CO2 BLDA CALC-SCNC: 45.5 MMOL/L (ref 21–31)
CO2 SERPL-SCNC: 36 MMOL/L (ref 21–32)
CREAT SERPL-MCNC: 0.7 MG/DL (ref 0.6–1.3)
EOSINOPHIL # BLD AUTO: 0 10^3/UL (ref 0–0.3)
EOSINOPHIL NFR BLD AUTO: 0 % (ref 0–10)
EOSINOPHIL NFR BLD MANUAL: 0 %
GFR SERPLBLD BASED ON 1.73 SQ M-ARVRAT: 112 ML/MIN
GLUCOSE SERPL-MCNC: 189 MG/DL (ref 70–105)
HCT VFR BLD CALC: 35 % (ref 40–54)
HGB BLD-MCNC: 10.8 G/DL (ref 13.3–17.7)
INHALED O2 FLOW RATE: 10 L/MIN
INR PPP: 2.1 (ref 0.8–1.4)
LYMPHOCYTES # BLD AUTO: 0.5 10^3/UL (ref 1–4)
LYMPHOCYTES NFR BLD AUTO: 3 % (ref 12–44)
MANUAL DIFFERENTIAL PERFORMED BLD QL: YES
MCH RBC QN AUTO: 30 PG (ref 25–34)
MCHC RBC AUTO-ENTMCNC: 31 G/DL (ref 32–36)
MCV RBC AUTO: 96 FL (ref 80–99)
MONOCYTES # BLD AUTO: 1.1 10^3/UL (ref 0–1)
MONOCYTES NFR BLD AUTO: 6 % (ref 0–12)
MONOCYTES NFR BLD: 4 %
NEUTROPHILS # BLD AUTO: 17.3 10^3/UL (ref 1.8–7.8)
NEUTROPHILS NFR BLD AUTO: 90 % (ref 42–75)
NEUTS BAND NFR BLD MANUAL: 92 %
NEUTS BAND NFR BLD: 0 %
PCO2 BLDA: 70 MMHG (ref 35–45)
PH BLDA: 7.4 [PH] (ref 7.37–7.43)
PLATELET # BLD: 304 10^3/UL (ref 130–400)
PMV BLD AUTO: 9.7 FL (ref 9–12.2)
PO2 BLDA: 66 MMHG (ref 79–93)
POTASSIUM SERPL-SCNC: 3.5 MMOL/L (ref 3.6–5)
PROT SERPL-MCNC: 6.3 GM/DL (ref 6.4–8.2)
PROTHROMBIN TIME: 23.2 SEC (ref 12.2–14.7)
SAO2 % BLDA FROM PO2: 94 % (ref 94–100)
SODIUM SERPL-SCNC: 132 MMOL/L (ref 135–145)
VARIANT LYMPHS NFR BLD MANUAL: 4 %
VENTILATION MODE VENT: NO
WBC # BLD AUTO: 19.2 10^3/UL (ref 4.3–11)

## 2023-07-15 PROCEDURE — 36600 WITHDRAWAL OF ARTERIAL BLOOD: CPT

## 2023-07-15 PROCEDURE — 82805 BLOOD GASES W/O2 SATURATION: CPT

## 2023-07-15 PROCEDURE — 85007 BL SMEAR W/DIFF WBC COUNT: CPT

## 2023-07-15 PROCEDURE — 36415 COLL VENOUS BLD VENIPUNCTURE: CPT

## 2023-07-15 PROCEDURE — 5A0935A ASSISTANCE WITH RESPIRATORY VENTILATION, LESS THAN 24 CONSECUTIVE HOURS, HIGH NASAL FLOW/VELOCITY: ICD-10-PCS | Performed by: INTERNAL MEDICINE

## 2023-07-15 PROCEDURE — 71275 CT ANGIOGRAPHY CHEST: CPT

## 2023-07-15 PROCEDURE — 80053 COMPREHEN METABOLIC PANEL: CPT

## 2023-07-15 PROCEDURE — 87040 BLOOD CULTURE FOR BACTERIA: CPT

## 2023-07-15 PROCEDURE — 83605 ASSAY OF LACTIC ACID: CPT

## 2023-07-15 PROCEDURE — 87081 CULTURE SCREEN ONLY: CPT

## 2023-07-15 PROCEDURE — 85610 PROTHROMBIN TIME: CPT

## 2023-07-15 PROCEDURE — 85027 COMPLETE CBC AUTOMATED: CPT

## 2023-07-15 PROCEDURE — 85730 THROMBOPLASTIN TIME PARTIAL: CPT

## 2023-07-15 PROCEDURE — 71045 X-RAY EXAM CHEST 1 VIEW: CPT

## 2023-07-15 NOTE — ED RESPIRATORY
General


Chief Complaint:  Respiratory Problems


Stated Complaint:  CONFUSION/SLURRED WORDS


Source:  patient


Exam Limitations:  no limitations





History of Present Illness


Date Seen by Provider:  Jul 15, 2023


Time Seen by Provider:  10:54


Initial Comments


52-year-old male with history of coronary artery disease, recent diagnosis of 

pulmonary emboli and pneumonia presents to the emergency department for 

shortness of breath.  He was discharged from the hospital about a week ago on 

oxygen.  A couple days ago he was seen here and diagnosed with pneumonia, 

ultimately sent home.  He has been on 6 L via nasal cannula at home and is still

feeling significantly short of breath.  On arrival he is on 6 L of oxygen via 

nasal cannula and his oxygen saturations 82 to 85%.  He complains of severe 

shortness of breath but has no chest pain.  No abdominal pain or changes in 

bowel or bladder habits.  He has been taking his antibiotics.





All other systems reviewed and negative except documented per HPI.





Voice recognition software was used to help create this chart





Allergies and Home Medications


Allergies


Coded Allergies:  


     Penicillins (Unverified  Allergy, Mild, 09)


     pneumococcal vaccine (Verified  Allergy, Unknown, 4/10/20)





Patient Home Medication List


Home Medication List Reviewed:  Yes


Albuterol Sulfate (Albuterol Sulfate) 2.5 Mg/3 Ml (0.083 %) Vial.neb, 1 VIAL PO 

BID, (Reported)


   Entered as Reported by: EVARISTO JENNINGS on 7/3/23 1350


   Last Action: Reviewed


Albuterol Sulfate (Ventolin Hfa) 90 Mcg Hfa.aer.ad, 1 PUFF PO Q6H PRN for 

SHORTNESS OF BREATH, (Reported)


   Entered as Reported by: EVARISTO JENNINGS on 7/3/23 1350


   Last Action: Reviewed


Allopurinol (Allopurinol) 100 Mg Tablet, 200 MG PO DAILY, (Reported)


   Entered as Reported by: DAVID GARZON on 18 0845


   Last Action: Reviewed


Apixaban (Eliquis) 5 Mg Tablet, 5 MG PO BID


   Prescribed by: FELISHA MORENO on 7/10/23 1106


   Last Action: Reviewed


Aspirin (Aspirin EC) 81 Mg Tablet.dr, 81 MG PO HS, (Reported)


   Entered as Reported by: RAVINDER RODRIGUEZ on 19 1028


   Last Action: Reviewed


Atorvastatin Calcium (Atorvastatin Calcium) 20 Mg Tablet, 20 MG PO HS, 

(Reported)


   Entered as Reported by: EVARISTO JENNINGS on 7/3/23 1348


   Last Action: Reviewed


Budesonide/Glycopyr/Formoterol (Breztri Aerosphere Inhaler) 160 Mcg-9 Mcg-4.8 

Mcg/Actuation Hfa.aer.ad, 1 INHALER IDT BID, (Reported)


   Entered as Reported by: DANETTE CLAYTON on 23 164


   Last Action: Reviewed


Cefdinir (Cefdinir) 300 Mg Capsule, 300 MG PO BID


   Prescribed by: RAEGAN CLEMENT on 23 1041


   Last Action: Reviewed


Celecoxib (Celecoxib) 200 Mg Capsule, 200 MG PO BID, (Reported)


   Entered as Reported by: SIMON APARICIO on 23 1917


   Last Action: Reviewed


Clopidogrel Bisulfate (Plavix) 75 Mg Tablet, 75 MG PO DAILY, (Reported)


   Entered as Reported by: RAVINDER RODRIGUEZ on 19 1040


   Last Action: Reviewed


Docusate Sodium (Docusate Sodium) 100 Mg Tablet, 100 MG PO DAILY PRN for 

CONSTIPATION-1ST LINE, (Reported)


   Entered as Reported by: EVARISTO JENNINGS on 7/3/23 1350


   Last Action: Reviewed


Gabapentin (Gabapentin) 600 Mg Tablet, 600 MG PO QID, (Reported)


   Entered as Reported by: DANETTE CLAYTON on 23 164


   Last Action: Reviewed


Isosorbide Mononitrate (Isosorbide Mononitrate ER) 30 Mg Tab.er.24h, 30 MG PO 

DAILY, (Reported)


   Entered as Reported by: DAVID GARZON on 18 0845


   Last Action: Reviewed


Lidocaine (Lidocaine 5% Patch) Unknown Strength Adh..patch, Unknown Dose TP 

Q12H, (Reported)


   Entered as Reported by: DANETTE CLAYTON on 23 1659


   Last Action: Reviewed


Lisinopril (Lisinopril) 10 Mg Tablet, 10 MG PO DAILY, (Reported)


   Entered as Reported by: DANETTE CLAYTON on 23 164


   Last Action: Reviewed


Metoprolol Tartrate (Metoprolol Tartrate) 50 Mg Tablet, 50 MG PO BID, (Reported)


   Entered as Reported by: RAVINDER RODRIGUEZ on 10/21/19 1023


   Last Action: Reviewed


Nicotine (Nicoderm Cq) 21 Mg/24 Hour Patch.td24, 21 MG TD DAILY


   Prescribed by: FELISHA MORENO on 7/10/23 1106


   Last Action: Reviewed


Oxycodone HCl/Acetaminophen (Oxycodone-Acetaminophen ) 10 Mg-325 Mg 

Tablet, 1 TAB PO Q4H PRN for PAIN, (Reported)


   Entered as Reported by: DANETTE CLAYTON on 23 1641


   Last Action: Reviewed


Pantoprazole Sodium (Protonix) 40 Mg Tablet.dr, 40 MG PO BID, (Reported)


   Entered as Reported by: ANTHONY CAMPBELL on 4/10/20 0850


   Last Action: Reviewed


Discontinued Medications


Cefdinir (Cefdinir) 300 Mg Capsule, 300 MG PO BID


   Discontinued Reason: Provider Change


   Prescribed by: FELISHA MORENO on 7/10/23 1106


   Last Action: Discontinued





Review of Systems


Review of Systems


Constitutional:  see HPI





Past Medical-Social-Family Hx


Patient Social History


Tobacco Use?:  No


Smoking Status:  Former Smoker


Substance use?:  No


Alcohol Use?:  No


Pt feels they are or have been:  No





Immunizations Up To Date


PED Vaccines UTD:  Yes


First/Initial COVID19 Vaccinat:  X3


Second COVID19 Vaccination Alfredo:  X3


Third COVID19 Vaccination Date:  X3





Seasonal Allergies


Seasonal Allergies:  No





Past Medical History


Surgery/Hospitalization HX:  


LEFT ABOVE KNEE AMPUTATION, HEART CATH X5 WITH STENTS





HTN, HLD, GOUT, COPD, CHF


Surgeries:  Yes (LEFT AKA)


Amputation, Cardiac, Coronary Stent, Orthopedic


Respiratory:  Yes (O2 DEPENDENT)


Pulmonary Embolism, COPD


Currently Using CPAP:  No


Currently Using BIPAP:  No


Cardiac:  Yes (MI X 2; STENTS X 4; -NSTEMI 17 WITH 1 STENT PLACED)


Coronary Artery Disease, Deep Vein Thrombosis, Heart Attack, High Cholesterol, 

Hypertension


Neurological:  No


Reproductive Disorders:  No


Genitourinary:  No


Gastrointestinal:  Yes


Gastroesophageal Reflux


Musculoskeletal:  Yes (OXYCODONE, GABAPENTIN + IBUPROFEN DAILY;CHRONIC BACK/L 

LEG PHANTOM PAIN)


Amputee, Chronic Back Pain, Gout


Endocrine:  No (OBESITY)


HEENT:  No


Cancer:  No


Psychosocial:  No


Integumentary:  No


Blood Disorders:  Yes (PROTEIN S DEFICIENCY--DVT'S AND P.E.'S AND MI X 2)





Family Medical History





Arthritis


  G8 BROTHER


Blood clots


  19 MOTHER ( of blood clot)


Cardiac disorder


  19 FATHER


Diabetes mellitus


  G8 BROTHER


FH: cancer


  paternal grandmother


FHx: inflammatory bowel disease


  G8 BROTHER





No Family History of:


  FH: sudden cardiac death (SCD)


Heart Disease, Vascular Disease





Physical Exam





Vital Signs - First Documented








 7/15/23





 10:56


 


Temp 35.7


 


Pulse 100


 


Resp 30


 


B/P (MAP) 158/71 (100)


 


Pulse Ox 93


 


O2 Delivery OxyMask


 


O2 Flow Rate 10.00





Capillary Refill :


Height: 5'4.00"


Weight: 190lbs. 1.6oz. 86.468963cp; 34.00 BMI


Method:Stated


General Appearance:  WD/WN, no apparent distress


Eyes:  Bilateral Eye Normal Inspection, Bilateral Eye PERRL, Bilateral Eye EOMI


HEENT:  PERRL/EOMI, normal ENT inspection, TMs normal, pharynx normal


Neck:  non-tender, full range of motion, supple, normal inspection


Respiratory:  chest non-tender, other (Mild respiratory distress with use of 

accessory muscles.  There are rales and crackles in the right base with 

dramatically decreased lung sounds on the right side.)


Cardiovascular:  regular rate, rhythm, no murmur


Gastrointestinal:  normal bowel sounds, non tender, soft, no organomegaly


Extremities:  other (Above-the-knee amputation on the left side)


Neurologic/Psychiatric:  alert, normal mood/affect, oriented x 3


Skin:  normal color, warm/dry





Focused Exam


Lactate Level


7/15/23 11:12: Lactic Acid Level 1.61





Lactic Acid Level





Laboratory Tests








Test


 7/15/23


11:12


 


Lactic Acid Level


 1.61 MMOL/L


(0.50-2.00)











Progress/Results/Core Measures


Suspected Sepsis


SIRS


Temperature: 


Pulse:  


Respiratory Rate: 


 


Laboratory Tests


7/15/23 11:12: White Blood Count 19.2H


Blood Pressure  / 


Mean: 


 





7/15/23 11:12: Lactic Acid Level 1.61


Laboratory Tests


7/15/23 11:12: 


Creatinine 0.70, INR Comment 2.1H, Platelet Count 304, Total Bilirubin 0.7








Results/Orders


Lab Results





Laboratory Tests








Test


 7/15/23


11:12 Range/Units


 


 


White Blood Count


 19.2 H


 4.3-11.0


10^3/uL


 


Red Blood Count


 3.60 L


 4.30-5.52


10^6/uL


 


Hemoglobin 10.8 L 13.3-17.7  g/dL


 


Hematocrit 35 L 40-54  %


 


Mean Corpuscular Volume 96  80-99  fL


 


Mean Corpuscular Hemoglobin 30  25-34  pg


 


Mean Corpuscular Hemoglobin


Concent 31 L


 32-36  g/dL





 


Red Cell Distribution Width 15.9 H 10.0-14.5  %


 


Platelet Count


 304 


 130-400


10^3/uL


 


Mean Platelet Volume 9.7  9.0-12.2  fL


 


Immature Granulocyte % (Auto) 1   %


 


Neutrophils (%) (Auto) 90 H 42-75  %


 


Lymphocytes (%) (Auto) 3 L 12-44  %


 


Monocytes (%) (Auto) 6  0-12  %


 


Eosinophils (%) (Auto) 0  0-10  %


 


Basophils (%) (Auto) 0  0-10  %


 


Neutrophils # (Auto)


 17.3 H


 1.8-7.8


10^3/uL


 


Lymphocytes # (Auto)


 0.5 L


 1.0-4.0


10^3/uL


 


Monocytes # (Auto)


 1.1 H


 0.0-1.0


10^3/uL


 


Eosinophils # (Auto)


 0.0 


 0.0-0.3


10^3/uL


 


Basophils # (Auto)


 0.0 


 0.0-0.1


10^3/uL


 


Immature Granulocyte # (Auto)


 0.3 H


 0.0-0.1


10^3/uL


 


Neutrophils % (Manual) 92   %


 


Lymphocytes % (Manual) 4   %


 


Monocytes % (Manual) 4   %


 


Eosinophils % (Manual) 0   %


 


Basophils % (Manual) 0   %


 


Band Neutrophils 0   %


 


Anisocytosis SLIGHT   


 


Prothrombin Time 23.2 H 12.2-14.7  SEC


 


INR Comment 2.1 H 0.8-1.4  


 


Activated Partial


Thromboplast Time 54 H


 24-35  SEC





 


Sodium Level 132 L 135-145  MMOL/L


 


Potassium Level 3.5 L 3.6-5.0  MMOL/L


 


Chloride Level 85 L   MMOL/L


 


Carbon Dioxide Level 36 H 21-32  MMOL/L


 


Anion Gap 11  5-14  MMOL/L


 


Blood Urea Nitrogen 9  7-18  MG/DL


 


Creatinine


 0.70 


 0.60-1.30


MG/DL


 


Estimat Glomerular Filtration


Rate 112 


  





 


BUN/Creatinine Ratio 13   


 


Glucose Level 189 H   MG/DL


 


Lactic Acid Level


 1.61 


 0.50-2.00


MMOL/L


 


Calcium Level 9.1  8.5-10.1  MG/DL


 


Corrected Calcium 10.3 H 8.5-10.1  MG/DL


 


Total Bilirubin 0.7  0.1-1.0  MG/DL


 


Aspartate Amino Transf


(AST/SGOT) 30 


 5-34  U/L





 


Alanine Aminotransferase


(ALT/SGPT) 14 


 0-55  U/L





 


Alkaline Phosphatase 125    U/L


 


Total Protein 6.3 L 6.4-8.2  GM/DL


 


Albumin 2.5 L 3.2-4.5  GM/DL








My Orders





Orders - ALEXANDR HERRERA DO


Cbc With Automated Diff (7/15/23 11:04)


Comprehensive Metabolic Panel (7/15/23 11:04)


Blood Culture (7/15/23 11:04)


Sputum Culture (7/15/23 11:04)


Protime With Inr (7/15/23 11:04)


Partial Thromboplastin Time (7/15/23 11:04)


Chest 1 View, Ap/Pa Only (7/15/23 11:04)


Ed Iv/Invasive Line Start (7/15/23 11:04)


Ekg Tracing (7/15/23 11:04)


Vital Signs Adult Sepsis Patie Q15M (7/15/23 11:04)


O2 (7/15/23 11:04)


Remove Rings In Anticipation O (7/15/23 11:04)


Lactic Acid Analyzer (7/15/23 11:04)


Manual Differential (7/15/23 11:12)


Piperacillin Sodium/Tazobactam (Zosyn Vi (7/15/23 12:00)


Ct Angio Chest W (7/15/23 11:59)


Iohexol Injection (Omnipaque 350 Mg/Ml 1 (7/15/23 12:30)


Received Contrast (Hold Metformin- Contr (7/15/23 12:30)


Ns (Ivpb) (Sodium Chloride 0.9% Ivpb Bag (7/15/23 12:30)


Ed Admission (Communication) (7/15/23 12:34)





Medications Given in ED





Vital Signs/I&O











 7/15/23 7/15/23 7/15/23





 10:56 11:07 13:04


 


Temp 35.7  35.7


 


Pulse 100  97


 


Resp 30  30


 


B/P (MAP) 158/71 (100)  134/79


 


Pulse Ox 93  96


 


O2 Delivery OxyMask Nasal Cannula OxyMask


 


O2 Flow Rate 10.00  10.00





   10.00














 23





 00:00


 


Intake Total 100 ml


 


Balance 100 ml





Capillary Refill :





ECG


Comment


Sinus rhythm with a rate of 91 bpm.  Normal intervals.  Normal axis.  No ST or T

wave abnormalities.  No ectopy.  Incomplete right bundle branch block.  No 

STEMI.





Critical Care Note


Critical Care


Total Time (minutes)


60





Departure


Communication (Admissions)


Patient is significantly hypoxic.  We had to put him on a facemask at 10 L to 

maintain oxygen saturations greater than 90 to 92%.  He is subjectively short of

breath but is not in any respiratory distress.  His chest x-ray shows worsening 

right-sided lung findings.  This may be effusion so I ordered a CT scan to 

further evaluate to see if he is a candidate for diagnostic, therapeutic 

thoracentesis.  His labs are reassuring outside of significant leukocytosis.  

Have gone ahead and ordered him some antibiotics.  I spoke Dr. Callahan who ac

cepts the patient to the ICU.





Impression





   Primary Impression:  


   Pneumonia


   Qualified Codes:  J18.9 - Pneumonia, unspecified organism


   Additional Impression:  


   Hypoxia


Disposition:   ADMITTED AS INPATIENT


Condition:  Stable





Admissions


Decision to Admit Reason:  Admit from ER (General)





Departure-Patient Inst.


Referrals:  


[facility] (PCP/Family)


Primary Care Physician











ALEXANDR HERRERA DO            Jul 15, 2023 11:09

## 2023-07-15 NOTE — SHORT STAY SUMMARY-HOSPITALIST
History of Present Illness


HPI/Chief Complaint


52-year-old maWho was recently admitted to our facility on 3 July discharged on 

the  with a diagnosis of pulmonary embolus with right lower lobe infiltrate 

treated as a pneumonia but for which the differential diagnosis included 

malignancy or infection.  He has a longstanding history of smoking as well as 

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  He had 

undergone left AKA amputation for ischemic reasons and has had cardiovascular 

stenting in the past with reported preserved LV function.  Last stent was 

reportedly in 2017.  Prior to his initial admission he was only on aspirin and 

no other anticoagulant therapy.  He was discharged on antibiotic therapy as well

as Eliquis 5 mg twice daily reporting that initially he felt significantly 

better than on discharge.  His condition deteriorated with increased shortness 

of breath purulent sputum production reportedly green without hemoptysis 

although he had had some hemoptysis during his previous hospital stay.  He 

reported night sweats.  He denied headache his wife reports that he had been 

sleeping a lot and a little more confused.  Increasing shortness of breath With 

weakness and poor appetite was the reason that he return to the emergency room. 

There his CT scan revealed bilateral pulmonary emboli they reported that his 

clot burden appeared to be slightly diminished compared to previous CT scan but 

he had significant increase in right pleural effusion only the apices were 

visualized there is gas in the pleural effusion and there are findings 

suspicious for underlying pulmonary abscess on the right side only.  The patient

reported he been told he had a penicillin allergy he was given Zosyn in the 

emergency room prior to my involvement currently has no evidence for rash with 

no worsening of shortness of breath on 10 L high flow oxygen with saturations in

the low 90s and a respiratory rate currently of 20 and nonlabored at rest.


Date Seen


7/15/23


Time Seen by a Provider:  15:45


Attending Physician


[place]/[facility]


PCP


Admitting Physician:


Tomasz Hernandez MD 








Attending Physician:


Tomasz Hernandez MD


Referring Physician





Date of Admission


Jul 15, 2023 at 13:16





Home Medications & Allergies


Home Medications


Reviewed patient Home Medication Reconciliation performed by pharmacy medication

reconciliations technician and/or nursing.


Patients Allergies have been reviewed.





Allergies





Allergies


Coded Allergies


  Penicillins (Unverified Allergy, Mild, 09)


  pneumococcal vaccine (Verified Allergy, Unknown, 4/10/20)








Past Medical-Social-Family Hx


Patient Social History


Tobacco Use?:  Yes


Tobacco type used:  Cigarettes


Smoking Status:  Former Smoker


Use of E-Cig and/or Vaping dev:  No


Substance use?:  No


Alcohol Use?:  No


Pt feels they are or have been:  No





Immunizations Up To Date


Date of Influenza Vaccine:  2019


First/Initial COVID19 Vaccinat:  X3


Second COVID19 Vaccination Alfredo:  X3


Tetanus Booster (TDap):  Unknown


Hepatitis A:  No


Hepatitis B:  No


PED Vaccines UTD:  Yes


Date of Pneumonia Vaccine:  Dec 3, 2012





Seasonal Allergies


Seasonal Allergies:  No





Current Status


Communicates:  Verbally


Primary Language:  English


Preferred Spoken Language:  English


Is interpretation needed?:  No


Sensory deficits:  Vision impairment


Implanted or Applied Medical D:  Stents





Past Medical History


Surgeries:  Amputation, Cardiac, Coronary Stent, Orthopedic


Pulmonary Embolism, COPD


Currently Using CPAP:  No


Currently Using BIPAP:  No


Coronary Artery Disease, Deep Vein Thrombosis, Heart Attack, High Cholesterol, 

Hypertension


Gastroesophageal Reflux


Amputee, Chronic Back Pain, Gout


Blood Disorders:  Yes (PROTEIN S DEFICIENCY--DVT'S AND P.E.'S AND MI X 2)





PMHx:


Bailey's disease


Coronary artery disease with stenting x 5


PE


HTN


HLD


Chronic pain


COPD on nocturnal oxygen


CHF





SurgHx:


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


Coronary artery stenting


Exploratory laparotomy





Family Medical History





Arthritis


  G8 BROTHER


Blood clots


  19 MOTHER ( of blood clot)


Cardiac disorder


  19 FATHER


Diabetes mellitus


  G8 BROTHER


FH: cancer


  paternal grandmother


FHx: inflammatory bowel disease


  G8 BROTHER





No Family History of:


  FH: sudden cardiac death (SCD)


Heart Disease, Vascular Disease





Review of Systems


Constitutional:  see HPI





Physical Exam


Physical Exam


Vital Signs





Vital Signs - First Documented








 7/15/23





 10:56


 


Temp 35.7


 


Pulse 100


 


Resp 30


 


B/P (MAP) 158/71 (100)


 


Pulse Ox 93


 


O2 Delivery OxyMask


 


O2 Flow Rate 10.00





Capillary Refill :


Height, Weight, BMI


Height: 5'4.00"


Weight: 190lbs. 1.6oz. 86.320777ma; 34.25 BMI


Method:Stated


General Appearance:  Chronically ill, Mild Distress


Eyes:  Bilateral Eye Normal Inspection, Bilateral Eye PERRL, Bilateral Eye EOMI


HEENT:  PERRL/EOMI, Pale Conjunctivae (L), Pale Conjunctivae (R)


Neck:  Full Range of Motion, Non Tender, Supple


Respiratory:  No Accessory Muscle Use, No Respiratory Distress, Other (Few 

rhonchi on left no wheezing absent breath sounds to the upper lung fields on the

right)


Cardiovascular:  Regular Rate, Rhythm, No Edema, No Gallop, Tachycardia (Sinus)


Gastrointestinal:  Normal Bowel Sounds, No Organomegaly, No Pulsatile Mass, Non 

Tender, Soft


Extremity:  Normal Capillary Refill, Normal Inspection, Normal Range of Motion, 

Non Tender, No Calf Tenderness, Other (1+ lower extremity edema.)


Neurologic/Psychiatric:  Oriented x3, No Motor/Sensory Deficits, Depressed 

Affect


Skin:  Pallor





Results


Results/Procedures


Labs


Laboratory Tests


7/15/23 11:12








Patient resulted labs reviewed.





Short Stay Diagnosis


Discharge Diagnosis-Short Stay


Admission Diagnosis


1.  Respiratory distress secondary to right-sided pulmonary abscess and empyema 

cannot rule out the possibility of pulmonary infarction considering the patient 

also has pulmonary embolism.  As he will likely require chest tube placement 

will hold Eliquis his last dose was 8 or 9:00 this morning and will initiate 

full dose heparin drip now. A neoplastic process is also in the differential 

diagnosis in addition underlying infectious disease will continue broad-spectrum

antibiotic coverage in the form of Zosyn and vancomycin monitoring for any 

evidence for allergic reaction with questionable diagnosis of penicillin 

allergy.


2.  History of Buerger's disease with past arterial and venous disease including

coronary artery disease and peripheral vascular disease.  Last reported stent 

placement and cardiac intervention was in 2017


Final Discharge Diagnosis


Same as admission diagnosis





Conclusion


Plan


As the patient isIn need of higher level of care than what we can offer we are 

in the process of trying get the patient transferred to .  We do not have 

ambulance capability will likely need to go by air transport.











TOMASZ HERNANDEZ MD              Jul 15, 2023 16:21

## 2023-07-15 NOTE — CONSULTATION REPORT
ATTENDING PRIMARY CLINICIAN:

[facility].



ATTENDING PHYSICIAN:

Dr. Tomasz Callahan.



HISTORY OF PRESENT ILLNESS:

The patient is a 52-year-old male who presented to the Emergency Department for 

worsening shortness of breath.  This patient does have a significant past 

medical history.  All is coming from smoking including a previous pneumonia, 

coronary artery disease, peripheral vascular disease and Buerger's disease and 

is status post left below-knee amputation and has had multiple cardiac 

catheterizations and a total of 5 stents placed.  The patient was recently 

discharged home on 07/02 for a pneumonia.  He was discharged home with home O2 

at 6 liters nasal cannula and states that he had developed worsening shortness 

of breath at home and his oxygen saturations were anywhere from 82-85%.  Another

CT scan was performed, which did show a significant amount of consolidation and 

fluid at the base of the right pleural space, likely consistent with an empyema.

 There is also possibility of a mass, which may represent a neoplasm.  At this 

time, the effusion appears to be in the exudative or fibrinopurulent stage of 

empyema and we will proceed with placement of a large bore chest tube to augment

drainage and help with resolution of the infection as well as hopefully help 

with reexpansion of the right lung.



PAST MEDICAL HISTORY:

Buerger's disease, coronary artery disease, peripheral vascular disease, 

hypertension, hyperlipidemia, gout, COPD, CHF, history of deep vein thrombosis, 

history of myocardial infarction x2, gastroesophageal reflux disease, morbid 

obesity.



PAST SURGICAL HISTORY:

Left above-knee amputation, multiple cardiac catheterizations and total of 5 

stents placed with the last one done approximately in 2017.



Recent history of pulmonary embolism on last admission; however, this has been 

stable and has decreased in size on repeat CT scan today.  He is currently on 

anticoagulation with Plavix and Eliquis.



ALLERGIES:

PENICILLIN, PNEUMOCOCCAL VACCINE.



MEDICATIONS:

Albuterol nebulizer 2.5 mg b.i.d., albuterol MDI 90 mcg every 6 hours p.r.n., 

allopurinol 100 mg daily, apixaban 5 mg b.i.d., aspirin 81 mg daily, 

atorvastatin 20 mg daily, budesonide/formoterol 160/4.8 mcg inhaler b.i.d., 

cefdinir 300 mg b.i.d., celecoxib 200 mg b.i.d., Plavix 75 mg daily, Colace 

p.r.n., gabapentin 600 mg q.i.d., isosorbide mononitrate 30 mg daily, lisinopril

10 mg daily, metoprolol 50 mg b.i.d., nicotine patch 21 mg daily, oxycodone 10 

mg q. 4 hours p.r.n., Protonix 40 mg b.i.d.



SOCIAL HISTORY:

Longstanding history of smoking greater than 50 pack years, negative alcohol.



FAMILY HISTORY:

Brother, diabetes.  Mother with thromboembolic event.  Paternal grandmother with

some form of cancer.  Brother, inflammatory bowel disease.



VITAL SIGNS:  Temperature 35.7, blood pressure 168/91, pulse 105, respirations 

18, pulse ox 94% on 8 liters high flow nasal cannula.



REVIEW OF SYSTEMS:

Well-nourished male, currently in no acute distress.  At this time, he is not 

experiencing any shortness of breath or using any accessory muscles of 

respiration.  No chest pain, palpitations, diaphoresis.  No nausea, vomiting.  

No diarrhea or constipation.  No known fevers or chills, no recent inadvertent 

weight loss.  All other review of systems negative.



PHYSICAL EXAMINATION:

CHEST:  Minimal breath sounds right lung.  Scattered wheezes left lung.

HEART: Normal heart sounds, no murmurs.

EXTREMITIES:  +1/3 bilateral lower extremity edema.  Negative Homans sign.

HEENT:  No scleral icterus.  No cervical lymphadenopathy.

ABDOMEN:  Soft, nontender, nondistended.

SKIN:  Warm, dry.



LABORATORY DATA:

WBC 19.2, hemoglobin 10.8, hematocrit 35, platelets 304, BUN 9, creatinine 0.70.

 INR 2.1.



ASSESSMENT AND PLAN:

A 52-year-old male with a recurrent right-sided pneumonia and empyema either in 

the exudative or fibrinopurulent face, which may allow for some drainage of the 

fluid and allow for infection control as well as reexpansion of the right lung 

and we will proceed with placement of a large bore chest tube in place onto a 

waterseal and suction to identify any potential air leaks.  We will also 

recommend continue anticoagulation for the previous pulmonary embolism as well 

as continued IV antibiotics and breathing treatments.





Job ID: [id number]

DocumentID: [id number]

Dictated Date: 07/15/2023 18:04:53

Transcription Date: 07/15/2023 19:02:00

Dictated By: ZEFERINO YUEN MD

## 2023-07-15 NOTE — DIAGNOSTIC IMAGING REPORT
CLINICAL INDICATION: Patient with shortness of breath and AMS

since 0400 this morning. Patient recently discharged with

pneumonia.



EXAM: Portable chest x-ray upright view.



COMPARISON: Chest x-ray dated 07/13/2023.



FINDINGS: There is interval progression of consolidation of the

right lung which is near completely opacified. There is slight

increased lung markings involving the upper lung field on the

left. Otherwise, lungs clear. Pulmonary vasculature is obscured

on the right, but appears grossly within normal limits on the

left. There is cardiomegaly which is partially obscured.



The remainder of this exam is unremarkable.



IMPRESSION:

1.: There is interval progression of consolidation in the right

lung.



2: There is mild atelectasis or infiltrates involving the left

upper lobe.



Dictated by: 



  Dictated on workstation # [id number]

## 2023-07-15 NOTE — DIAGNOSTIC IMAGING REPORT
PROCEDURE: CT angiography of the chest with contrast.



TECHNIQUE: Multiple contiguous axial images were obtained through

the chest after uneventful bolus administration of intravenous

contrast. 3D reconstructed CTA MIP acquisitions were also

performed.

Auto Exposure Controls were utilized during the CT exam to meet

ALARA standards for radiation dose reduction.



INDICATION: Shortness of breath and altered mental status earlier

today. Slurring words. Recently diagnosed with pneumonia.



CORRELATION STUDY: 

07/03/2023.



FINDINGS:  

Recent imaging demonstrated findings positive for pulmonary

embolism. At follow-up, this does persist but the overall

severity of thrombus burden may be slightly diminished. Clot

remains most pronounced involving the proximal left main

pulmonary artery but also involving the left upper lobe and right

lower lobe pulmonary artery branches. Heart size is enlarged but

without disproportionate right heart strain. Coronary artery

calcification. Thoracic aorta is normal in contour. There are

prominent mediastinal lymph nodes.



There has been significant adverse change within the right

hemithorax with development of a rather sizable, complex

multiseptated and gas-containing pleural effusion occupying

majority of the right hemithorax. This results in significantly

collapsed right lung which is consolidated. Some low attenuation

within the right lung, concerning for potential developing

multifocal intraparenchymal pulmonary abscess. Mild shift of the

mediastinal structures towards the left hemithorax. There is

groundglass opacity of the left upper lobe, may be reflective of

infiltrate versus edema. Left lower lobe is clear.



Visualized portions of the upper abdomen are unremarkable.

Osseous structures demonstrate no acute findings.



IMPRESSION:

1. Findings remain positive for pulmonary embolism. However, the

overall severity of thrombus appears slightly diminished from

prior. Cardiac enlargement without disproportionate right heart

strain.

2. There has been significant adverse change in the right

hemithorax which includes complex multiloculated and

gas-containing right pleural fluid collections, concerning for

empyema. Significant consolidated right lung which is essentially

completely collapsed. Concern for potential developing multifocal

areas of intraparenchymal abscess.



Dictated by: 



  Dictated on workstation # [id number]

## 2023-07-21 NOTE — TELE-ICU CONSULT
Progress Note


53 y/o male recently discharged with PNA.


Has hx of CAD and PE on apixaban


Presents today with SOB and hypoxemic


Pulse ox in ED 85%





CT chest:











INDINGS:  


Recent imaging demonstrated findings positive for pulmonary


embolism. At follow-up, this does persist but the overall


severity of thrombus burden may be slightly diminished. Clot


remains most pronounced involving the proximal left main


pulmonary artery but also involving the left upper lobe and right


lower lobe pulmonary artery branches. Heart size is enlarged but


without disproportionate right heart strain. Coronary artery


calcification. Thoracic aorta is normal in contour. There are


prominent mediastinal lymph nodes.





There has been significant adverse change within the right


hemithorax with development of a rather sizable, complex


multiseptated and gas-containing pleural effusion occupying


majority of the right hemithorax. This results in significantly


collapsed right lung which is consolidated. Some low attenuation


within the right lung, concerning for potential developing


multifocal intraparenchymal pulmonary abscess. Mild shift of the


mediastinal structures towards the left hemithorax. There is


groundglass opacity of the left upper lobe, may be reflective of


infiltrate versus edema. Left lower lobe is clear.





Visualized portions of the upper abdomen are unremarkable.


Osseous structures demonstrate no acute findings.





IMPRESSION:


1. Findings remain positive for pulmonary embolism. However, the


overall severity of thrombus appears slightly diminished from


prior. Cardiac enlargement without disproportionate right heart


strain.


2. There has been significant adverse change in the right


hemithorax which includes complex multiloculated and


gas-containing right pleural fluid collections, concerning for


empyema. Significant consolidated right lung which is essentially


completely collapsed. Concern for potential developing multifocal


areas of intraparenchymal abscess.





IMP: right chest effusion with possible abscess





PLAN: cultures


IV antibiotics


May need thoracoscopy





Focused Exam


Lactate Level


7/15/23 11:12: Lactic Acid Level 1.61


Height, Weight, BMI


Height: 5'4.00"


Weight: 190lbs. 1.6oz. [id number]; 34.00 BMI


Method:Stated


Lactic Acid Level





Laboratory Tests








Test


 7/15/23


11:12


 


Lactic Acid Level


 1.61 MMOL/L


(0.50-2.00)











Labs


Laboratory Tests


7/15/23 11:12











Results


Results/Procedures


Labs


Laboratory Tests


7/15/23 11:12








Patient resulted labs reviewed.





Results


Labs


Labs


Laboratory Tests


7/15/23 11:12: 


White Blood Count 19.2H, Red Blood Count 3.60L, Hemoglobin 10.8L, Hematocrit 35L

, Mean Corpuscular Volume 96, Mean Corpuscular Hemoglobin 30, Mean Corpuscular 

Hemoglobin Concent 31L, Red Cell Distribution Width 15.9H, Platelet Count 304, 

Mean Platelet Volume 9.7, Immature Granulocyte % (Auto) 1, Neutrophils (%) 

(Auto) 90H, Lymphocytes (%) (Auto) 3L, Monocytes (%) (Auto) 6, Eosinophils (%) 

(Auto) 0, Basophils (%) (Auto) 0, Neutrophils # (Auto) 17.3H, Lymphocytes # 

(Auto) 0.5L, Monocytes # (Auto) 1.1H, Eosinophils # (Auto) 0.0, Basophils # 

(Auto) 0.0, Immature Granulocyte # (Auto) 0.3H, Neutrophils % (Manual) 92, 

Lymphocytes % (Manual) 4, Monocytes % (Manual) 4, Eosinophils % (Manual) 0, 

Basophils % (Manual) 0, Band Neutrophils 0, Anisocytosis SLIGHT, Prothrombin 

Time 23.2H, INR Comment 2.1H, Activated Partial Thromboplast Time 54H, Sodium 

Level 132L, Potassium Level 3.5L, Chloride Level 85L, Carbon Dioxide Level 36H, 

Anion Gap 11, Blood Urea Nitrogen 9, Creatinine 0.70, Estimat Glomerular 

Filtration Rate 112, BUN/Creatinine Ratio 13, Glucose Level 189H, Lactic Acid 

Level 1.61, Calcium Level 9.1, Corrected Calcium 10.3H, Total Bilirubin 0.7, 

Aspartate Amino Transf (AST/SGOT) 30, Alanine Aminotransferase (ALT/SGPT) 14, 

Alkaline Phosphatase 125, Total Protein 6.3L, Albumin 2.5L











SRIDEVI ESQUEDA MD            Jul 15, 2023 13:54
29 year old male present to ED for b/l ear pain. Pt reports two weeks of ear pain, first left then right, one week of increased nasal congestion. pt reports sinus pressure. Denies fever, chills, cough, SOB, chest pain, abd pain.  benign PE  Meds, out patient follow up    Pt reassessed, pt feeling better at this time, vss, pt able to walk, talk and vocalized plan of action. Discussed in depth and explained to pt in depth the next steps that need to be taken including proper follow up with PCP or specialists. All incidental findings were discussed with pt as well. Pt verbalized their concerns and all questions were answered. Pt understands dispo and wants discharge. Given good instructions when to return to ED, strict return precautions and importance of f/u.

## 2024-08-26 NOTE — XMS REPORT
Encounter Summary

 Created on: 2018



Niko Huber

External Reference #: [id number]

: 1970

Sex: Male



Demographics







 Address  [address] [place]  [address]

 

 Home Phone  [phone]

 

 Preferred Language  English

 

 Marital Status  Unknown

 

 Gnosticist Affiliation  NON

 

 Race  White

 

 Ethnic Group  Not  or 





Author







 Author  [facility]

 

 Organization  [facility]

 

 Address  Unknown

 

 Phone  Unavailable







Support







 Name  Relationship  Address  Phone

 

 Steffanie Huber  Unknown  [phone]







Care Team Providers







 Care Team Member Name  Role  Phone

 

 Neftali Bacon MD  21  [phone]

 

 Raiza Champagne MD  PCP  [phone]







Reason for Visit

* Auth/Cert (Routine)





     



  Status   Reason   Specialty   Diagnoses /   Referred By   Referred To



     Procedures   Contact   Contact

 

     











Encounter Details







    



  Date   Type   Department   Care Team   Description

 

    



  2017   Hospital   Cardiac Catheterization   Cande Delcid MD   
Coronary artery disease



  -   Encounter   Laboratory   [address]   involving native coronary



  2017    [address]   [address]   artery of native heart



    [place] [address]   [place] [address]   with angina pectoris



    [phone]   (HCC)



     [phone] (Fax) 







Social History







    



  Tobacco Use   Types   Packs/Day   Years Used   Date

 

    



  Current Every Day Smoker    









   



  Alcohol Use   Drinks/Week   oz/Week   Comments

 

   



  No   









 



  Sex Assigned at Birth   Date Recorded

 

 



  Not on file 



as of this encounter



Last Filed Vital Signs







  



  Vital Sign   Reading   Time Taken

 

  



  Blood Pressure   99/58   2017  7:35 AM CST

 

  



  Pulse   76   2017  7:35 AM CST

 

  



  Temperature   37.1   C (98.7   F)   2017  6:15 AM CST

 

  



  Respiratory Rate   -   -

 

  



  Oxygen Saturation   97%   2017  7:35 AM CST

 

  



  Inhaled Oxygen   -   -



  Concentration  

 

  



  Weight   87.5 kg (192 lb 14.4 oz)   2017  8:13 AM CST

 

  



  Height   162.6 cm (5' 4.02")   2017  8:13 AM CST

 

  



  Body Mass Index   33.1   2017  8:13 AM CST



in this encounter



Discharge Summaries

* Kimbelryn Tiwari PA-C - 2017  8:00 AM CST



Formatting of this note may be different from the original.



Physician Discharge Summary



Name: Niko Huber

Medical Record Number: [id number]        Account Number:  [id number]

YOB: 1970                         Age:  47 years 

Admit date:  2017                     Discharge date:  2017



Attending Physician:  Dr. Delcid               Service: Cardiology-Interventional



Physician Summary completed by: Kimberlyn Tiwari PA-C



Reason for hospitalization: Coronary artery disease



Significant PMH: 

Past Medical History: 

Diagnosis Date 

 Coronary artery disease involving native coronary artery of native heart 
with angina pectoris ([facility]) 2017 

 Coronary artery disease involving native coronary artery of native heart 
with angina pectoris ([facility]) 2017 - NSTEMI at [facility] in [place], [place]. Successful PCI 
with a Resolute Integrity 2.5 x 26 mm stent to the OM with Dr. Miguel. 
Unsuccessful PCI to the RCA . Pt referred to Dr. Delcid for possible  
procedure.   16 - Nuclear stress test ([facility]): No 
ischemia or arrhythmia on EKG. Diaphragmatic attenuation with reversible 
ischemia involving the mid to apic 

 Ischemic cardiomyopathy  

 Mild mitral regurgitation 2017 

 Mild tricuspid regurgitation 2017 

 NSTEMI (non-ST elevated myocardial infarction) ([facility]) 2017 

 Protein S deficiency ([facility]) 2017 

 Tobacco abuse 2017 

  

Allergies: Pcn [penicillins]



Physical Exam notable for:  

b/l Groin no hematoma, no bruit, soft, non-tender.

CV: RRR

Lungs: CTA B

Ext: no edema, + 2 b/l pedal pulses. 

ABD: Soft, non tender, + BS X 4 quads. 



Lab/Radiology studies notable for: 

Hematology:  

Lab Results 

Component Value Date 

 HGB 12.3 2017 

 HCT 36.3 2017 

 PLTCT 279 2017 

 WBC 9.6 2017 

 MCV 85.8 2017 

 MCHC 34.0 2017 

 MPV 7.5 2017 

 RDW 13.9 2017 

, General Chemistry:  

Lab Results 

Component Value Date 

  2017 

 K 3.9 2017 

  2017 

 GAP 8 2017 

 BUN 12 2017 

 CR 0.72 2017 

  2017 

 CA 9.2 2017 



Brief Hospital Course:  Mr. Huber is a 47 y.o male with a history of NSTMI in 
November. He underwent PCI of OM at [facility] in [place], [place]. He was also 
found to have  of RCA. PCI was attempted which was unsuccessful. He was 
referred to Dr. Delcid for Stage PCI. Echo done at OSH on 17 revealed LVEF 
30-35%. He was discharged home with LifeVest. He also has a history of mild MR 
and TR and protein S deficiency treated with warfarin. 



Patient was taken to the cardiac catheterization lab on 17 where 
successful revascularization of the proximal RCA was done, extending into the 
right PLV with 3 drug-eluting stents in overlapping fashion with excellent 
angiographic results. Patient tolerated the procedure well. He had vasovagal 
response to sheath pull with ~ 6 second pause. He required atropine and 
recovered promptly. No recurrent pause since then or overnight. Dr. Delcid 
recommends to continue ASA 81 mg daily for one week. He was instructed on the 
importance of Plavix 75 mg daily at least 6 months to 1 year w/o interruption 
to prevent stent thrombosis and possible myocardial infarction. PTA warfarin 
was resumed 17. No bridging is recommended per staff to minimize bleeding 
complications. PT/INR on Wednesday. PT/INR managed by Primary cardiologist. PT/
INR goal 2.0-3.0. Lipid profile as above.  Patient is currently tolerating 
Atorvastatin 40 mg po daily. Weight loss, Cardiac Healthy diet, and exercise 
when patient can tolerate.  Patient to continue current medical therapy with 
aggressive risk factors modification. Patient to weigh daily and report any wt. 
Gain of 2-3 # in 1-3 days.  BP usually marginal at home. He is asymptomatics. 
Maximize treatment for LVD with GDMT as pt. Can tolerate, renal function allows 
and BP permits. F/u w/  Primary cardiologist as already scheduled or sooner 
if needed. He will continue to wear LifeVest. Currently his primary 
cardiologist is planning to repeat Echo in January. EKG NSR 72 bpm, PVC's. Non 
specific ST-T changes unchanged from prior. 



Condition at Discharge: Stable



Discharge Diagnoses:  



Hospital Problems  

 

 Active Problems 

 * (Principal)Coronary artery disease involving native coronary artery of 
native heart with angina pectoris (HCC) 

 NSTEMI (non-ST elevated myocardial infarction) (HCC) 

 Tobacco abuse 

 Ischemic cardiomyopathy 

 Protein S deficiency (HCC) 

 CAD (coronary artery disease) 

 



Surgical Procedures: 



Significant Diagnostic Studies and Procedures: 

1. Selective right and left coronary angiograms with dual arterial injections.

2. Bilateral groin accesses, both 7-French common femoral arterial.

3. Dual coronary injections.

4.  PCI of the proximal right RCA extending into the right PLV with 3 drug-
eluting stents in overlapping fashion with antegrade wire escalation technique.

5. Right common femoral angiogram, limited.



Consults:  None



Patient Disposition: Home   



Patient instructions/medications: 



Procedure Specific Activity 

*You may drive after 2 days.

*You may shower after discharge.

*NO tub baths, hot tubs, or swimming for 5 days.

*NO lifting greater than 15 pounds for 1 week.

*NO sexual or strenuous activity for 1 week. 



Report These Signs and Symptoms 

Please contact your doctor if you have any of the following symptoms: Chest pain
, shortness of breath, lightheadedness, dizziness, near fainting, palpitations, 
abd pain, back pain, or bleeding.

*Continue medicines as direct on your discharge medication list. Get an up to 
date list with every visit and take as instructed. Do NOT stop taking any 
medications without speaking with your doctor or nurse who knows you.



*Remember to weigh yourself first thing in the morning after using the restroom 
and write it down. Take this record to your doctor appointment.



*Chart symptoms such as fatigue, trouble breathing, or swelling. Call your 
doctor right away if these symptoms get worse.



*Remember, do not eat more than 2000 milligrams of sodium a day. Watch out for 
packaged, processed, canned and restaurant foods.



Call your doctor (your cardiologist, if you have one) if:

-you gain more than 2 pounds in 24 hours.

-you gain more than 5 pounds in 1 week.

-any of your symptoms get worse

Do NOT wait to let your doctor know about these changes. 



Questions About Your Stay 

For questions or concerns regarding your hospital stay:



- DURING BUSINESS HOURS (8:00 AM - 4:30 PM):  

Call [phone] and asked to be transferred to your discharge attending 
physician.



- AFTER BUSINESS HOURS (4:30 PM - 8:00 AM, on weekends, or holidays):

Call [phone] and ask the  to page the on-call doctor for the 
discharge attending physician. 

Discharging attending physician: CANDE DELCID [[id number]]  



Cardiac Diet 

Limiting unhealthy fats and cholesterol is the most important step you can take 
in reducing your risk for cardiovascular disease.  Unhealthy fats include 
saturated and trans fats.  Monitor your sodium and cholesterol intake.  
Restrict your sodium to 2g (grams) or 2000mg (milligrams) daily, and your 
cholesterol to 200mg daily.



If you have questions regarding your diet at home, you may contact a dietitian 
at [phone].

 



Incision Care 

*Call if there is an increase in pain, swelling, or redness.

*DO NOT soak incision in water.

*NO tub baths, hot tubs, or swimming.

*You may shower after discharge. 



Return Appointment 

F/u with your primary cardiologist as already scheduled with echo in January. 

[facility] Provider NORTH MIGUEL [[id number]]  



Heart Failure Information 

You are at risk for readmission to the hospital because of fluid overload. 
There are steps you can take to lower your risk:

*Continue your current heart medications. Changes made have been made during 
your hospital stay.

*Remember to weigh yourself every morning, first thing after you urinate, and 
write down your weigh. Take this record to your doctor's appointments.

*Chart your symptoms - fatigue, shortness of breath, swelling, etc. Immediately 
report any worsening.

*Remember to consume no more than 2,000mg (milligrams) of sodium daily. Watch 
out for packaged, processed, canned, and restaurant foods especially.

*If any of your symptoms worsen, or if you gain more than 2 pounds in 24 hours, 
or 5 pounds in a week, call your cardiologist immediately. EARLY REPORTING OF 
THESE CHANGES IS VERY IMPORTANT. 



[facility] Information 

[facility] is a gathering place for individuals, families, and friends 
living with serious or chronic physical illness.  They offer education and 
support programs that help you live your life to the fullest.  Unless otherwise 
noted, programs are offered at NO CHARGE.  However, REGISTRATION IS REQUIRED 48 
hours in advance.



To arrange for a tour, register for a class, or ask a questions please call [phone]
[phone].  You can also visit [email or web address] for more information. 



[facility] Education about Stroke 

It is important for you to recognize the signs of stroke and call 911 
immediately.



F.A.S.T. is an easy way to remember the sudden signs of a stroke.



F - face drooping

A - arm weakness

S - speech difficulty

T - TIME TO CALL 911



If you or anyone you know shows any of these signs, even if the signs go away, 
call  IMMEDIATELY. Check the time so you will know when the first sign 
started. 



 

Current Discharge Medication List 

 

 CONTINUE these medications which have been CHANGED or REFILLED 

 Details 

aspirin EC 81 mg tablet Take 1 tablet by mouth daily for 7 days. Take with food.

Qty: 90 tablet, Refills: 3 

 PRESCRIPTION TYPE:  No Print

Associated Diagnoses: Coronary artery disease involving native coronary artery 
of native heart with angina pectoris (HCC); Ischemic cardiomyopathy; NSTEMI (non
-ST elevated myocardial infarction) (HCC); Tobacco abuse 

 

 

 CONTINUE these medications which have NOT CHANGED 

 Details 

atorvastatin (LIPITOR) 40 mg tablet Take 40 mg by mouth daily. 

 PRESCRIPTION TYPE:  Historical Med 

 

carisoprodol(+) (SOMA) 350 mg tablet Take 350 mg by mouth at bedtime daily. 

 PRESCRIPTION TYPE:  Historical Med 

 

clopiDOGrel (PLAVIX) 75 mg tablet Take 75 mg by mouth daily. 

 PRESCRIPTION TYPE:  Historical Med 

 

gabapentin (NEURONTIN) 600 mg tablet Take 600 mg by mouth four times daily. 

 PRESCRIPTION TYPE:  Historical Med 

 

gemfibrozil (LOPID) 600 mg tablet Take 600 mg by mouth twice daily. 

 PRESCRIPTION TYPE:  Historical Med 

 

HYDROcodone/acetaminophen (NORCO) 7.5/325 mg tablet Take 1 tablet by mouth 
every 6 hours as needed for Pain 

 PRESCRIPTION TYPE:  Historical Med 

 

metoprolol XL (TOPROL XL) 25 mg extended release tablet Take 25 mg by mouth 
daily. 

 PRESCRIPTION TYPE:  Historical Med 

 

nitroglycerin (NITROSTAT) 0.4 mg tablet Place 0.4 mg under tongue every 5 
minutes as needed for Chest Pain. Max of 3 tablets, call 911. 

 PRESCRIPTION TYPE:  Historical Med 

 

Omega-3 Acid Ethyl Esters 1 gram cap Take 1 g by mouth three times daily. 

 PRESCRIPTION TYPE:  Historical Med 

 

omeprazole DR(+) (PRILOSEC) 20 mg capsule Take 20 mg by mouth daily before 
breakfast. 

 PRESCRIPTION TYPE:  Historical Med 

 

sacubitril/valsartan (ENTRESTO) 24/26 mg tablet Take 1 tablet by mouth twice 
daily. 

 PRESCRIPTION TYPE:  Historical Med 

 

tiZANidine (ZANAFLEX) 4 mg tablet Take 8 mg by mouth every 8 hours as needed. 
Indications: MUSCLE SPASM 

 PRESCRIPTION TYPE:  Historical Med 

 

warfarin (COUMADIN) 5 mg tablet Take 5 mg by mouth as directed. Take 7.5 mg by 
mouth on Mon and Wed. Take 5 mg by mouth on , Tues, Thurs, Fri, and Sat. 
Take at bedtime.  Indications: THROMBOTIC DISORDER 

 PRESCRIPTION TYPE:  Historical Med 

 

 

 



Pending items needing follow up: as above 



Signed:

Kimberlyn Tiwari PA-C

2017  



cc:

Primary Care Physician:  Raiza Champagne   Verified

Referring physicians:   Dr. North Miguel/Dr. Neftali Bacon ([place], [place])

Additional provider(s): 

 

in this encounter



Discharge Instructions

* Patient Instructions - Mary Fong RN - 2017  8:32 AM CST





Manual Sheath Removal From A Large Vein Or Artery-DARVIN

When you go home:

 You may shower 24 hours after your procedure.

 Do not sit in water for one week. (No bath tub, swimming pool/hot tub, etc.)

 Keep the area clean and dry for one week (except for daily showers).

 Be sure your hands are clean when touching near the site.

 If a band-aid or dressing is still in place remove it before showering.

 Wash and dry thoroughly but gently.

 If needed, for your comfort, you may place a clean band-aid over the 
puncture site after you are clean and dry. It is best to leave it open to air 
as soon as it is comfortable to do so.

 Do not use ointments, creams, or powders on puncture site.

 Inspect site daily.

Activity: (Unless otherwise instructed or unable to perform)

 Avoid any exertion for one week. Exertion is lifting over 15 lbs or pushing, 
pulling or straining.

 Avoid excessive bending, stooping, or stair climbing for 2 days. It is ok to 
go up stairs or bend over but take it slowly and keep it to a minimum.

 You may be up and about while relaxing at home as you recover.

 You may resume sexual activity in one week.

 You may begin driving 2 days after your procedure if you are otherwise able 
to drive.

---It is common to have mild soreness and/or a small, soft bruise around the 
site that can take up to two weeks to go away. A small (dime to quarter sized) 
lump is also normal. A small amount of blood (not more than a teaspoon) from 
the site is also common.

WHEN TO CALL THE DOCTOR: Complications are rare but can happen.

 If you have significant bleeding (more than a teaspoon) or a lump underneath 
the skin (bigger than a golf ball) at the site lie down, apply firm pressure at 
the site and call 911. Bleeding from a large vessel needs professional help.

 If you have signs of infection at the site such as: redness, warm to touch, 
drainage, increasing soreness, a fever (100 degrees or more) and/or chills.

 Soreness that continues more than a week or unusual pain at the puncture 
site.

 Numbness, tingling, weakness in the affected leg.

 If your leg becomes cold and pale.

 If you have changes of vision, slurred speech or one-sided weakness.

Who do I contact if I need to speak with someone?

During Business Hours:

 [facility] Office at the [facility]: [phone]
[phone] (Monday-Friday)

 [place]: [phone] (Monday-Friday)

 [place]: [phone] (Monday-Friday)

 [place]: [phone] (Tuesday and Thursday)

 [place]: [phone] (Monday-Friday)

 [place]: [phone] (Monday-Friday)

 [place]: [phone] (Monday-Thursday)

 [facility]: [phone] (Tuesday, Wednesday and Friday)

 [place]: [phone] (Tuesday, Wednesday and Friday)

 [facility]): [phone] (Monday, Wednesday and Friday)

Nights and Weekends

 [facility] Cardiology Office at the [facility]: [phone]
[phone]

This education is meant to serve as a resource to you and your family. It is 
not meant to be all inclusive. The members of the [facility] at [facility], [phone], will be glad to 
answer any questions you may have about this booklet or your procedure.





in this encounter



Medications at Time of Discharge







     



  Medication   Sig.   Disp.   Refills   Start Date   End Date

 

     



  atorvastatin (LIPITOR) 40   Take 40 mg by mouth    



  mg tablet   daily.    

 

     



  carisoprodol(+) (SOMA)   Take 350 mg by mouth at    



  350 mg tablet   bedtime daily.    

 

     



  clopiDOGrel (PLAVIX) 75   Take 75 mg by mouth    



  mg tablet   daily.    

 

     



  gabapentin (NEURONTIN)   Take 600 mg by mouth four    



  600 mg tablet   times daily.    

 

     



  gemfibrozil (LOPID) 600   Take 600 mg by mouth    



  mg tablet   twice daily.    

 

     



  HYDROcodone/acetaminophen   Take 1 tablet by mouth    



  (NORCO) 7.5/325 mg tablet   every 6 hours as needed    



   for Pain    

 

     



  metoprolol XL (TOPROL XL)   Take 25 mg by mouth    



  25 mg extended release   daily.    



  tablet     

 

     



  nitroglycerin (NITROSTAT)   Place 0.4 mg under tongue    



  0.4 mg tablet   every 5 minutes as needed    



   for Chest Pain. Max of 3    



   tablets, call 911.    

 

     



  Omega-3 Acid Ethyl Esters   Take 1 g by mouth three    



  1 gram cap   times daily.    

 

     



  omeprazole DR(+)   Take 20 mg by mouth daily    



  (PRILOSEC) 20 mg capsule   before breakfast.    

 

     



  sacubitril/valsartan   Take 1 tablet by mouth    



  (ENTRESTO) 24/26 mg   twice daily.    



  tablet     

 

     



  tiZANidine (ZANAFLEX) 4   Take 8 mg by mouth every    



  mg tabletIndications:   8 hours as needed.    



  MUSCLE SPASM   Indications: MUSCLE SPASM    

 

     



  warfarin (COUMADIN) 5 mg   Take 5 mg by mouth as    



  tabletIndications:   directed. Take 7.5 mg by    



  THROMBOTIC DISORDER   mouth on Mon and Wed.    



   Take 5 mg by mouth on    



   , Tues, Thurs, Fri,    



   and Sat. Take at bedtime.    



   Indications: THROMBOTIC    



   DISORDER    

 

     



  aspirin EC 81 mg   Take 1 tablet by mouth   90 tablet   3   2017



  tabletIndications:   daily for 7 days. Take    



  Coronary artery disease   with food.    



  involving native coronary     



  artery of native heart     



  with angina pectoris     



  (HCC), Ischemic     



  cardiomyopathy, NSTEMI     



  (non-ST elevated     



  myocardial infarction)     



  ([facility]), Tobacco abuse     



as of this encounter



Progress Notes

* Higinio Valenzuela RN - 2017  8:52 AM CST



Cardiac Rehab Call Back Note:  Spoke with patient.  He will start [facility] in 
[place] this Monday.



Are you tolerating activity?Yes

Is pain controlled?Yes

Is appetite normal?Yes

Are you having symptoms of heart discomfort?No

Are you having signs of infection at your incision sites or groin site?No

Do you want outpt cardiac rehab?Yes





* Lachelle Russ RN - 2017  8:52 AM CST



I have reviewed the notes, assessment, and/or procedures performed by Mary Fong RN and concur with her/his documentation unless otherwise noted.

* Mary Fong RN - 2017  8:51 AM CST



Patient discharged to home with all belongings.  Discharge instructions, med 
reconciliation and home wound care instructions given and explained to patient 
and family both verbally and written.  Accompanied by family.  No complaints of 
pain or discomfort.   Bilateral groins  remains clean, dry, and intact with no 
evidence of a hematoma after ambulation.  Patient escorted to [facility] via 
Transport.  Patient to follow up with [facility] ([facility]) or on-call 
physician with any additional questions or concerns.  All contact numbers 
provided.  Patient and family acceptant of DC instuctions and report 
understanding to all information.

* Higinio Valenzuela RN - 2017  6:46 AM CST



Formatting of this note may be different from the original.

CARDIOPULMONARY REHABILITATION

INPATIENT ASSESSMENT



Cardiac Rehabilitation Staff: Fermin Valenzuela RN Discharge Date: 



Demographics

Pre-admit Dx:   Date of Admission: 2017   

Room: [address] :  1970 

Insurance: Primary: BC/BS of   Secondary: . 

Address: [address] [facility] [address]   Patient Phone:  [phone] (home)  

Marital Status:   Occupation: Construction/Labor 

ED Contact: Steffanie Cecile  ED Phone #: [phone] 

CTS: GONZÁLEZ  Cardiologist: Bhavin 



Cardiac Procedures and Events

 

  

PCI: 17

 

 

 

 

 



Risk Factors

 

BP: 97/54

Height: 162.6 cm (64.02")

Weight: 87.5 kg (192 lb 14.4 oz)

BMI (Calculated): 33.09 

 

Medical History

 has a past medical history of Coronary artery disease involving native 
coronary artery of native heart with angina pectoris (HCC) (2017); 
Coronary artery disease involving native coronary artery of native heart with 
angina pectoris (HCC) (2017); Ischemic cardiomyopathy; Mild mitral 
regurgitation (2017); Mild tricuspid regurgitation (2017); NSTEMI (
non-ST elevated myocardial infarction) ([facility]) (2017); Protein S deficiency 
([facility]) (2017); and Tobacco abuse (2017).



Labs

No results found for: CHOL, TRIG, HDL, LDL, HGBA1C, A1C, TNI



Heart Resource Manual Given: 17 



Teaching Completed: 17

 

Outpatient Cardiopulmonary Rehabilitation



OPCR: Yes



Referral Faxed to:   [place], [place]   Date Faxed: 17 (Currently enrolled.  
Update faxed to [facility])



Location: [place], [place]



If [facility], Sent to Staff:   



 



Higinio Valenzuela RN

2017





* Higinio Valenzuela, RN - 2017  6:44 AM CST



Introduced self to patient and gave copy of the Heart Resource Manual 
pertaining to coronary interventions.  He is currently enrolled in the program 
at [facility] in [place], [place] and will continue when cleared by 
cardiologist.  I have faxed an update to [facility].

* Mary Fong, RN - 2017  5:57 PM CST



Formatting of this note may be different from the original.



 17 1720 17 1723 17 1724 

Vital Signs 

Pulse 67 (!) 30 (!) 0 

PVC / Minute 0 /min. 0 /min. 0 /min. 

Respirations 10 PER MINUTE 13 PER MINUTE 15 PER MINUTE 

SpO2 Pulse 67 (!) 42 (!) 43 

SpO2 96 % 96 % 98 % 

BP 98/56 (!) 69/32 --  

Mean NBP (Calculated) 67 MM HG 38 MM HG --  

 

 17 1725 17 1727 

Vital Signs 

Pulse 52 59 

PVC / Minute 1 /min. 0 /min. 

Respirations 14 PER MINUTE 13 PER MINUTE 

SpO2 Pulse (!) 52 60 

SpO2 98 % 96 % 

BP (!) 82/36 (!) 83/50 

Mean NBP (Calculated) 44 MM HG 58 MM HG 



During sheath pull patient vasovagaled. Dr. Levine assessed patient. Orders 
given for 250 bolus of NS. 0.5 mg atropine given by Anatoly HUFFMAN. Patient 
stabilized and vital signs returned to normal. Will continue to monitor and 
keep NS running at 75mL/hr per Dr. Levine. 

* Abner Trevizo MD - 2017  5:53 PM CST



Mr. Huber was seen and examined in [facility] after the  PCI with a full metal 
jacket stenting to the RCA extending into the right PLV.  During sheath pull, 
he was noted to have significant sinus pauses lasting 6 seconds.  He recovered 
promptly.  Hemodynamic stability is noted.  There was a transient episode of 
hypotension with a mean arterial pressure of 60 mmHg.  We are presently 
infusing normal saline at 75 cc/h for the next 6 hours to a total of 
approximately 500 cc.  His blood pressure has already improved to a mean 
arterial pressure of 70 mmHg.  He remains asymptomatic at this juncture.  
Bilateral sheaths 7 French have been removed with excellent hemostasis.  This 
appears to be a significant vagal response associated with sheath pull.  I do 
not suspect any bleeding at this point in time.  Please call me if his clinical 
status changes.



Abner Trevizo M.D.

Fellow in Interventional Cardiology

Beeper # [id number]



* Lachelle Russ RN - 2017  4:45 PM CST



I have reviewed the notes, assessment, and/or procedures performed by Mary Fong RN and concur with her/his documentation unless otherwise noted.

* Kimberlyn Tiwari PA-C - 2017  3:04 PM CST



S/p 3 TESFAYE to RCA. Recent NSTEMI in Nov s/p TESFAYE to OM. 

ASA 81 mg daily for 1 week. Plavix 75 mg daily for 1 year w/o interruption to 
prevent stent thrombosis and possible myocardial infarction. 

Resume warfarin tonight. No bridging is recommended per Dr. Delcid. 

He recently filled all his medications and does not wish refills at this time. 

He will continue to wear his LifeVest till his follow up appointment. Iam Miguel and Jordi are planning repeat Echo in January. 

DC home in am. 

F/u with Primary cardiologist as already scheduled or sooner if needed. 

Maximize treatment for LVD with GDMT as pt. Can tolerate, renal function allows 
and BP permits.  



Kimberlyn Tiwari PA-C (pgr [phone])





* Jo Rogers, RT - 2017 11:12 AM CST



Formatting of this note may be different from the original.

RESPIRATORY THERAPY

ADULT PROTOCOL EVALUATION



RESPIRATORY PROTOCOL PLAN



Medications

 



Note: If indicated by protocol, medication orders will be placed by therapist.



Procedures

IPPB: Place a nursing order for "IS Q1h While Awake" for any of Lung Expansion 
indicators

Oxygen/Humidity: O2 to keep SpO2 > 95%

Monitoring: Pulse oximetry BID & PRN



 

_____________________________________________________________



PATIENT EVALUATION RESULTS



Chart Review

* Pulmonary Hx: Smoker in home OR smoking cessation > 8 weeks (former smoker)



* Surgical Hx: General surgery (cough & sigh not affected)



* Chest X-Ray: Clear OR not available



* PFT/Oxygenation: FEV1, PEFR < 70% OR Pa02 < 70 RA OR Sp02 <92% RA OR Fi02 > 
0.21 to keep Sp02 > 92% OR < 24 hours post-op (02 & oxim) OR chronic C02 
retention (C02) (will be < 24 hours post op)



Patient Assessment

* Respiratory Pattern: Regular pattern and rate OR good chest excursion with 
deep breathing



* Breath Sounds: Clear apically, but diminished in bases (LE) OR CHF related 
crackles (02) (oximetry)



* Cough / Sputum: Strong, effective cough OR nonproductive



* Mental Status: Alert, oriented, cooperative



* Activity Level: Ambulatory with assistance



Priority Index

Total Points: 6 Points

* Priority Index: 1



PRIORITY INDEX GUIDELINES*

Priority Points 

1 0-9 points 

2 9-18 points 

3 > 18 points 

+ Pulm Dx or Home Rx 

*Higher points indicate higher acuity.



Therapist: Jo Rogers, RT

Date: 2017



Key

AC=Airway clearance

AM=Aerosolized medication

BA=[place] aerosol

DB&C=Deep breathe & cough

FEV1=Forced expiratory volume in first second)

IC=Inspiratory capacity

LE=Lung expansion

MDI=Metered dose inhaler

Neb=Nebulizer

O2=Oxygen

Oxim=Oximetry

PEFR=Peak expiratory flow rate

RRT=Rapid Response Team





* Mary Fong, RN - 2017  8:01 AM CST



Patient arrived on unit via ambulation accompanied by RN. Patient transferred 
to the bed without assistance.  Assessment completed, refer to flowsheet for 
details. Orders released, reviewed, and implemented as appropriate. Oriented to 
surroundings, call light within reach. Plan of care reviewed.  Will continue to 
monitor and assess.

in this encounter



H&P Notes

* Kimberlyn Tiwari PA-C - 2017  9:37 AM CST



Formatting of this note may be different from the original.

The original H and P below was performed by Dr. Delcid.



Kimberlyn Tiwari PA-C (pgr [phone])

Cande Delcid MD 

Cardiology 

 

Hide copied text

Hover for attribution information

Date of Service: 2017



Niko Huber is a 47 y.o. male.   



HPI

 

Mr. Huber is a 47-year-old male with a history of coronary artery disease who 
had a non-ST elevation myocardial infarction back in November at that time he 
successfully underwent stenting with a drug-eluting stent to the obtuse 
marginal.  They utilized a 2.5 x 26 mm resolute integrity stent.  In addition, 
he has a chronic total occlusion of the right coronary artery which is occluded 
proximally.  He has good left-to-right collateralization to the posterior 
descending and posterior lateral branches.  Of concern, his ejection fraction 
is severely impaired estimated at around 30% and he currently has a LifeVest.  
He has a previous myocardial perfusion study suggesting viability in the 
inferior wall and given his age and LV impairment, it was discussed and elected 
to go ahead and proceed with percutaneous revascularization to give him every 
opportunity to recover.  Currently, he has been noting intermittent chest 
discomfort which occurs with activity and at rest.  He is on aspirin, Plavix 
and warfarin and is tolerated this without any active bleeding issues.  He is 
taking warfarin due to a history of a pulmonary embolus per his report with the 
addition of aspirin and Plavix given his recent coronary event.



 



  

Vitals: 

 17 0718 

BP: 116/68 

Pulse: 79 

Weight: 87.1 kg (192 lb) 

Height: 1.626 m (5' 4") 



Body mass index is 32.96 kg/(m^2). 



Past Medical History

    

Patient Active Problem List 

 Diagnosis Date Noted 

 Coronary artery disease involving native coronary artery of native heart 
with angina pectoris ([facility]) 2017 - NSTEMI at [facility] in [place], [place]. Successful 
PCI with a Resolute Integrity 2.5 x 26 mm stent to the OM with Dr. Miguel. 
Unsuccessful PCI to the RCA . Pt referred to Dr. Delcid for possible  
procedure. 



16 - Nuclear stress test ([facility]): No ischemia or 
arrhythmia on EKG. Diaphragmatic attenuation with reversible ischemia involving 
the mid to apical inferior wall and inferolateral wall. Normal left ventricular 
size with mild hypokinesis at the lateral wall. EF 50%. 



Previous history of Promus stent placement to  - date unknown.  

 NSTEMI (non-ST elevated myocardial infarction) ([facility]) 2017 at [facility] in [place], [place].  

 Tobacco abuse 2017 

 Ischemic cardiomyopathy 2017 - Echo ([facility]): EF 30-35%. Left ventricular 
cavity size increased. Wall thickness normal. Regional wall motion 
abnormalities. Akinesis of the inferior myocardium. Akinesis of the lateral 
myocardium. 



17 - NSTEMI - EF 20%, severe left ventricular systolic function (per cath 
report). Life Vest applied for primary prevention of sudden cardiac death.  

 Mild mitral regurgitation 2017 

 Mild tricuspid regurgitation 2017 

 Protein S deficiency ([facility]) 2017 

  On warfarin.  







Review of Systems 

Constitution: Negative. 

HENT: Negative.  

Eyes: Negative.  

Cardiovascular: Positive for chest pain. 

Respiratory: Negative.  

Endocrine: Negative.  

Hematologic/Lymphatic: Negative.  

Skin: Negative.  

Musculoskeletal: Negative.  

Gastrointestinal: Negative.  

Genitourinary: Negative.  

Neurological: Negative.  

Psychiatric/Behavioral: Negative.  

Allergic/Immunologic: Negative.  



Social History 



Social History 

 Marital status:  

  Spouse name: N/A 

 Number of children: N/A 

 Years of education: N/A 



Social History Main Topics 

 Smoking status: Current Every Day Smoker 

 Smokeless tobacco: None 

 Alcohol use No 

 Drug use: No 

 Sexual activity: Not Asked 



Other Topics Concern 

 None 



Social History Narrative 



History reviewed. No pertinent surgical history.



Physical Exam

Physical Exam 

General Appearance: alert and oriented, no acute distress

Skin: warm, moist, no ulcers

Head: normocephalic, symmetric

Eyes: EOMI, PERRL, sclera are clear and without icterus

ENT: unremarkable, nares patent

Neck Veins: neck veins are flat, neck veins are not distended

Carotid Arteries: normal carotid upstroke bilaterally, no bruits

Chest Inspection: chest is normal in appearance

Auscultation/Percussion: lungs clear to auscultation, no rales, rhonchi, 
wheezes or friction rub appreciated

Cardiac Rhythm: regular rhythm and normal rate

Cardiac Auscultation: Normal S1 & S2, no S3 or S4, no rub - normal pmi

Murmurs: no cardiac murmurs 

Extremities: no lower extremity edema bilaterally; 2+ symmetric distal pulses

Muskuloskeletal: no obvious deformity

Abdominal Exam: soft, non-tender, no masses, bowel sounds normal

Neurologic Exam: neurological assessment grossly intact

Mood and Affect: Appropriate





Cardiovascular Studies

ECG - NSR, PVC - no Q waves



Problems Addressed Today

   

Encounter Diagnoses 

Name Primary? 

 Coronary artery disease involving native coronary artery of native heart 
with angina pectoris (HCC) Yes 

 Ischemic cardiomyopathy  

 Mild mitral regurgitation  





Assessment and Plan

 

1. Chronic total occlusion of the proximal right coronary artery with prominent 
left to right collateralization to the posterior descending and posterior 
lateral branches -he is referred for complex percutaneous intervention.  I 
discussed the risks and benefits and will plan to go ahead and proceed.  We 
will obtain bilateral groin access and will likely require a retrograde 
approach given the anatomy noted on his angiograms.  We will plan to obtain an 
INR this morning to ensure that his INR is less than 1.9.  We will plan to keep 
you informed this results of his upcoming procedure. 



 Thank you for allowing us to participate in his care.

 





Current Medications (including today's revisions)

 aspirin EC 81 mg tablet Take 81 mg by mouth daily. Take with food. 

 atorvastatin (LIPITOR) 40 mg tablet Take 40 mg by mouth daily. 

 carisoprodol(+) (SOMA) 350 mg tablet Take 350 mg by mouth at bedtime daily. 

 clopiDOGrel (PLAVIX) 75 mg tablet Take 75 mg by mouth daily. 

 gabapentin (NEURONTIN) 600 mg tablet Take 600 mg by mouth four times daily. 

 gemfibrozil (LOPID) 600 mg tablet Take 600 mg by mouth twice daily. 

 metoprolol XL (TOPROL XL) 25 mg extended release tablet Take 25 mg by mouth 
daily. 

 nitroglycerin (NITROSTAT) 0.4 mg tablet Place 0.4 mg under tongue every 5 
minutes as needed for Chest Pain. Max of 3 tablets, call 911. 

 Omega-3 Acid Ethyl Esters 1 gram cap Take 1 g by mouth three times daily. 

 omeprazole DR(+) (PRILOSEC) 20 mg capsule Take 20 mg by mouth daily before 
breakfast. 

 tiZANidine (ZANAFLEX) 4 mg tablet Take 8 mg by mouth every 8 hours as 
needed. Indications: MUSCLE SPASM 

 warfarin (COUMADIN) 5 mg tablet Take 5 mg by mouth as directed. Take 7.5 mg 
by mouth on Mon and Wed. Take 5 mg by mouth on , Tues, Thurs, Fri, and 
Sat. Take at bedtime.  Indications: THROMBOTIC DISORDER 



 

 





in this encounter



Procedure Notes

* Abner Trevizo MD - 2017  3:12 PM CST



Associated Order(s): CARDIAC CATH REPORT



[facility]



CARDIAC CATHETERIZATION REPORT

Page 3

NIKO VENTURA :  1970

KU#: [id number]



 

KU MR #/Billing ID #:  [id number] / [id number]

DATE:  2017

CARDIOLOGIST:  Cande Delcid MD

DICTATING PROVIDER: Abner Trevizo MD

REFERRING PHYSICIAN:  RAIZA CHAMPAGNE



INTERVENTIONAL CARDIOLOGY FELLOW:  Abner Trevizo MD.



PROCEDURES PERFORMED:  

1. Selective right and left coronary angiograms with dual arterial injections.

2. Bilateral groin accesses, both 7-French common femoral arterial.

3. Dual coronary injections.

4. Chronic Total Occlusion () PCI of the proximal right RCA extending into 
the right PLV with 3 drug-eluting stents in overlapping fashion with antegrade 
wire escalation technique.

5. Right common femoral angiogram, limited.



INDICATION FOR THE PROCEDURE:  Niko Huber is a pleasant, 47-year-old 
gentleman with a history of recent non-ST-elevation MI, status post PCI to his 
left circumflex extending into his obtuse marginal with a Resolute Integrity 
stent from an outside institution.  There was an attempted  antegrade PCI on 
his RCA , which was unsuccessful. We would like to perform an invasive 
evaluation of his coronary anatomy with an intent to revascularize with the 
retrograde approach, given the fact that the antegrade cap was very ambiguous, 
based on outside hospital films.



CONSENT:  Risks, benefits, and alternatives of the procedure were explained to 
the patient by both Dr. Delcid and myself.  Risks of access site complications, 
bleeding, arterial dissection, death, contrast nephropathy, and radiation 
hazards were explained to the patient, who verbalized understanding of these 
conditions and consented to the procedure.  A written, informed consent has 
been placed in the chart as well.



DETAILS OF THE PROCEDURE:  The patient was brought in the cath lab in the 
fasting, nonsedated state.  After giving the patient a total of 225 mcg of 
fentanyl and 5 mg of Versed, we achieved moderate conscious sedation, which was 
monitored and maintained throughout the procedure for a total of 126 minutes.  
Respiratory, hemodynamic, and neurological parameters were monitored throughout 
the procedure by the operators and by the cath lab staff. 



The patient was prepped and draped in sterile fashion.  We exposed bilateral 
groins and prepped with 1% chlorhexidine and instilled a total of 20 mL of 1% 
lidocaine for good local anesthesia in both groins. 



We then gained access into the right common femoral artery using a 
micropuncture needle and modified Seldinger technique to insert a 7-French 10 
cm arterial sheath with good blood flow return.  Similar technique was adopted 
to gain access to the left common femoral artery with the same 7-French 10 cm 
arterial sheath.  We went in with the intent to perform a retrograde  PCI, 
and hence, we obtained dual coronary injections.



CORONARY ANGIOGRAM:  

1. The left main arises normally from the left coronary cusp and is free from 
angiographic evidence of disease.  It bifurcates into a left anterior 
descending artery, as well as a left circumflex artery.

2. The left anterior descending artery arises normally from the left main.  Its 
proximal, mid, and distal portions are free from disease, except for 30% 
stenosis in the mid segment.  The left main is also free from disease.  It 
gives rise to 1 diagonal branch, the LAD, and it is also free from disease.

3. The left circumflex artery arises normally from the left main.  Its proximal
, mid, and distal portions are free from disease.  It has a previously placed 
stent extending from the proximal circumflex, extending into the OM, is widely 
patent without any thrombosis or in-stent restenosis.

4. The right coronary artery arises normally from the right coronary cusp, and 
its proximal portion has 40% to 50% diffuse disease, followed by a long chronic 
total occlusion segment extending from the mid RCA to the PLV.  There was 
collateral flow from the LAD to the RPLV territory, and also from the 
circumflex artery as well.  We noted that on outside hospital films there was 
an abrupt cutoff of the proximal RCA with a very ambiguous cap; however, on our 
films, we noted an extra RV marginal branch which was filling in, though we 
could not localize the true nature of the proximal cap.  Hence, we decided to 
adopt a retrograde approach initially.



DETAILS OF THE PERCUTANEOUS CORONARY INTERVENTION to the RCA (Full metal Jacket
) (CHRONIC TOTAL OCCLUSION):  

1. We used an EBU 3.75, 7-French guide catheter to engage the left main, while 
we used an AL1, 7-French guide catheter to engage the RCA for dual arterial 
injections.

2. We then introduced an 0.014 x 300 cm Fielder FC wire with a 150 cm Corsair 
microcatheter and tried to get across a couple of septals.  We were able to 
track through the septals pretty well; however, the anatomy for reentry into 
the PLV/PDA was not favorable.  After multiple attempts through different 
septals, we were not able to gain access into the right PLV or PDA segments, 
even despite tracking through the septals pretty well through both the Fielder 
FC, as well as the Corsair; hence, we switched to an antegrade approach.

3. We then switched the AL1, 7-French guide for a Hockey-Stick 1, 6-French guide
, given the fact that an AL1 was a little too big to engage the RCA, given the 
size of the aortic root.  The Hockey-Stick 1 gave us moderate seating and 
support throughout the entire procedure.  We then introduced an 0.014 x 300 cm 
Fielder FC wire over a 150 cm Corsair into the right coronary artery to switch 
to an antegrade wire escalation approach.  We were able to make very little 
progress across the proximal cap, which was very ambiguous with a Fielder FC 
wire.  We then switched for an 0.009 tapering into an 0.014 x 300 cm Fielder XT 
wire and tried to make some progress across the mid RCA, and we tracked the 
Corsair across it.  Though we were able to get into the distal RCA, we were 
unsure whether we were in the true lumen or not.  We then switched out for a 
 200, 0.014 x 300 cm wire and got across the mid to distal RCA with 
moderate amount of difficulty.  We were unsure again whether we were at the 
true lumen or not.  Hence, we took a dual injection on the LAD, which 
demonstrated that our wire was indeed in the right PLV.  After this, we tracked 
the Corsair down into the right PLV and switched out the  200 wire for an 
0.014 x 300 cm Luge wire.  We then tracked the Corsair out and introduced a 2.0 
x 30 mm Emerge RX balloon and performed balloon angioplasty for a total of 6 
different inflations, starting from the right PLV, extending into the proximal 
or ostial segment of the RCA, all of which were 8 atmospheres, lasting 20-30 
seconds.  We got moderate amount of expansion with this.  We were then easily 
able to deliver a 2.25 x 38 mm Synergy drug-eluting stent  extending into the 
distal RPLV and the proximal edge of the stent into the distal portion of the 
right coronary artery.  The stent was deployed at 8 atmospheres for a total of 
26 seconds.  We then overlapped this proximally with a 2.25 x 38 mm Syndergy 
TESFAYE (10 carloz for 8 seconds).  We then deployed a 2.5 x 32 mm Synergy TESFAYE at 10 
atmospheres, lasting 25 seconds in overlapping fashion in the ostium to the 
midportion of the RCA.  We got excellent angiographic results.  After this, we 
noticed that the distal edge of the distal stent in the right PLV was oversized
, compared to the rest of the RCA.  This could have been an element of spasm.  
Hence, we gave the patient a total of 200 mcg of Cardene and 300 mcg of 
nitroglycerin.  Even after vasodilatation, we noticed that the distal edge was 
a little pinched.  Hence, we decided to dilate this using a 2.0 x 15 mm MINI 
TREK RX balloon for 10 atmospheres for 24 seconds.  Excellent angiographic 
results were achieved at the distal edge of the stent with restoration of MARIA LUISA-
3 flow to the PLV.  We then removed the stent and postdilated both the stents 
using a 2.75 x 20 mm TREK NC balloon for a total of 6 different inflations, 
lasting 16 atmospheres for at least 16 seconds.  Excellent stent expansion and 
apposition were achieved.  There was no evidence of edge dissection or distal 
embolization.  MARIA LUISA-3 flow was restored to the PDA and the PLV territories.  
Excellent flow was noted across the stent.  Good stent expansion and apposition 
were achieved.  Wire-out frames confirmed the above findings as well. 

The patient tolerated the procedure very well without any evidence of 
hemodynamic complications, and was transferred out of the cath lab in stable 
condition without any chest pain. 



Dr. Delcid, my attending, was scrubbed and performed key portions of the 
procedure and supervised the rest of it as well.



TOTAL CONTRAST USED:  340 mL.



TOTAL AIR KERMA:  3528 mGy. 



Right common femoral angiogram demonstrated a high bifurcation of the right side
, and hence, we opted for manual compression method.



LESION CHARACTERISTICS:  

1. RCA  ACC/AHA type C lesion.

2. MARIA LUISA 0 flow was noted preprocedure, and MARIA LUISA-3 flow was restored after the 
procedure.



IMPRESSION:  

1. Successful chronic total occlusion and revascularization of the proximal RCA 
with antegrade wire escalation technique, extending into the right PLV with 3 
Celestine (all Synergy - distal 2.25 x 38 mm, mid 2.25 x 38 mm and proximal 2.5 x 32 
mm) in overlapping fashion with excellent angiographic results.

2. Aspirin, Plavix, and Coumadin therapy, given the fact that the patient had a 
recent pulmonary embolism and needs Coumadin therapy.  Once the INR reaches 
therapeutic level, we recommend continuing Plavix and Coumadin x 12 months.



Cande Delcid MD



PCG/MedQ

DD:  2017 15:12:35  DT:  2017 16:09:41

Job #:  [id number]



cc: 

  - RAIZA CHAMPAGNE 



in this encounter



Plan of Treatment





Not on fileas of this encounter



Procedures







    



  Procedure Name   Priority   Date/Time   Associated Diagnosis   Comments

 

    



  TELEMETRY STRIPS-SCAN    2017    Results for this



    2:42 PM CST    procedure are in the



      results section.

 

    



  PROCEDURE RECORD-SCAN    2017    Results for this



    1:47 PM CST    procedure are in the



      results section.

 

    



  ECG-SCAN    2017    Results for this



    10:46 AM CST    procedure are in the



      results section.

 

    



  ECG-SCAN    2017    Results for this



    7:30 AM CST    procedure are in the



      results section.

 

    



  ECG-SCAN    2017    Results for this



    9:40 PM CST    procedure are in the



      results section.



in this encounter



Results

* TELEMETRY STRIPS-SCAN (2017  2:42 PM)





 Narrative

 

 



Ordered by an unspecified provider.





* PROCEDURE RECORD-SCAN (2017  1:47 PM)





 Narrative

 

 



Ordered by an unspecified provider.





* ECG-SCAN (2017 10:46 AM)





 Narrative

 

 



Ordered by an unspecified provider.





* CARDIAC CATH REPORT (2017 10:36 AM)





 



  Specimen   Performing Laboratory

 

 



   OTHER OUTSIDE LAB









 Procedure Note

 

 



Abner Trevizo MD - 2017  3:12 PM [facility]



CARDIAC CATHETERIZATION REPORT

Page 3

NIKO VENTURA :  1970

KU#: [id number]







 

 MR #/Billing ID #:  [id number] / [id number]

DATE:  2017

CARDIOLOGIST:  Cande Delcid MD

DICTATING PROVIDER: Abner Trevizo MD

REFERRING PHYSICIAN:  RAIZA CHAMPAGNE



INTERVENTIONAL CARDIOLOGY FELLOW:  Abner Trevizo MD.



PROCEDURES PERFORMED:  

 1. Selective right and left coronary angiograms with dual arterial injections.

 2. Bilateral groin accesses, both 7-French common femoral arterial.

 3. Dual coronary injections.

 4. Chronic Total Occlusion () PCI of the proximal right RCA extending into 
the right PLV with 3 drug-eluting stents in overlapping fashion with antegrade 
wire escalation technique.

 5. Right common femoral angiogram, limited.



INDICATION FOR THE PROCEDURE:  Niko Huber is a pleasant, 47-year-old 
gentleman with a history of recent non-ST-elevation MI, status post PCI to his 
left circumflex extending into his obtuse marginal with a Resolute Integrity 
stent from an outside institution.  There was an attempted  antegrade PCI on 
his RCA , which was unsuccessful. We would like to perform an invasive 
evaluation of his coronary anatomy with an intent to revascularize with the 
retrograde approach, given the fact that the antegrade cap was very ambiguous, 
based on outside hospital films.



CONSENT:  Risks, benefits, and alternatives of the procedure were explained to 
the patient by both Dr. Delcid and myself.  Risks of access site complications, 
bleeding, arterial dissection, death, contrast nephropathy, and radiation 
hazards were explained to the patient, who verbalized understanding of these 
conditions and consented to the procedure.  A written, informed consent has 
been placed in the chart as well.



DETAILS OF THE PROCEDURE:  The patient was brought in the cath lab in the 
fasting, nonsedated state.  After giving the patient a total of 225 mcg of 
fentanyl and 5 mg of Versed, we achieved moderate conscious sedation, which was 
monitored and maintained throughout the procedure for a total of 126 minutes.  
Respiratory, hemodynamic, and neurological parameters were monitored throughout 
the procedure by the operators and by the cath lab staff. 



The patient was prepped and draped in sterile fashion.  We exposed bilateral 
groins and prepped with 1% chlorhexidine and instilled a total of 20 mL of 1% 
lidocaine for good local anesthesia in both groins. 



We then gained access into the right common femoral artery using a 
micropuncture needle and modified Seldinger technique to insert a 7-French 10 
cm arterial sheath with good blood flow return.  Similar technique was adopted 
to gain access to the left common femoral artery with the same 7-French 10 cm 
arterial sheath.  We went in with the intent to perform a retrograde  PCI, 
and hence, we obtained dual coronary injections.



CORONARY ANGIOGRAM:  

 1. The left main arises normally from the left coronary cusp and is free from 
angiographic evidence of disease.  It bifurcates into a left anterior 
descending artery, as well as a left circumflex artery.

 2. The left anterior descending artery arises normally from the left main.  
Its proximal, mid, and distal portions are free from disease, except for 30% 
stenosis in the mid segment.  The left main is also free from disease.  It 
gives rise to 1 diagonal branch, the LAD, and it is also free from disease.

 3. The left circumflex artery arises normally from the left main.  Its proximal
, mid, and distal portions are free from disease.  It has a previously placed 
stent extending from the proximal circumflex, extending into the OM, is widely 
patent without any thrombosis or in-stent restenosis.

 4. The right coronary artery arises normally from the right coronary cusp, and 
its proximal portion has 40% to 50% diffuse disease, followed by a long chronic 
total occlusion segment extending from the mid RCA to the PLV.  There was 
collateral flow from the LAD to the RPLV territory, and also from the 
circumflex artery as well.  We noted that on outside hospital films there was 
an abrupt cutoff of the proximal RCA with a very ambiguous cap; however, on our 
films, we noted an extra RV marginal branch which was filling in, though we 
could not localize the true nature of the proximal cap.  Hence, we decided to 
adopt a retrograde approach initially.



DETAILS OF THE PERCUTANEOUS CORONARY INTERVENTION to the RCA (Full metal Jacket
) (CHRONIC TOTAL OCCLUSION):  

 1. We used an EBU 3.75, 7-French guide catheter to engage the left main, while 
we used an AL1, 7-French guide catheter to engage the RCA for dual arterial 
injections.

 2. We then introduced an 0.014 x 300 cm Fielder FC wire with a 150 cm Corsair 
microcatheter and tried to get across a couple of septals.  We were able to 
track through the septals pretty well; however, the anatomy for reentry into 
the PLV/PDA was not favorable.  After multiple attempts through different 
septals, we were not able to gain access into the right PLV or PDA segments, 
even despite tracking through the septals pretty well through both the Fielder 
FC, as well as the Corsair; hence, we switched to an antegrade approach.

 3. We then switched the AL1, 7-French guide for a Hockey-Stick 1, 6-French 
guide, given the fact that an AL1 was a little too big to engage the RCA, given 
the size of the aortic root.  The Hockey-Stick 1 gave us moderate seating and 
support throughout the entire procedure.  We then introduced an 0.014 x 300 cm 
Fielder FC wire over a 150 cm Corsair into the right coronary artery to switch 
to an antegrade wire escalation approach.  We were able to make very little 
progress across the proximal cap, which was very ambiguous with a Fielder FC 
wire.  We then switched for an 0.009 tapering into an 0.014 x 300 cm Fielder XT 
wire and tried to make some progress across the mid RCA, and we tracked the 
Corsair across it.  Though we were able to get into the distal RCA, we were 
unsure whether we were in the true lumen or not.  We then switched out for a 
 200, 0.014 x 300 cm wire and got across the mid to distal RCA with 
moderate amount of difficulty.  We were unsure again whether we were at the 
true lumen or not.  Hence, we took a dual injection on the LAD, which 
demonstrated that our wire was indeed in the right PLV.  After this, we tracked 
the Corsair down into the right PLV and switched out the  200 wire for an 
0.014 x 300 cm Luge wire.  We then tracked the Corsair out and introduced a 2.0 
x 30 mm Emerge RX balloon and performed balloon angioplasty for a total of 6 
different inflations, starting from the right PLV, extending into the proximal 
or ostial segment of the RCA, all of which were 8 atmospheres, lasting 20-30 
seconds.  We got moderate amount of expansion with this.  We were then easily 
able to deliver a 2.25 x 38 mm Synergy drug-eluting stent  extending into the 
distal RPLV and the proximal edge of the stent into the distal portion of the 
right coronary artery.  The stent was deployed at 8 atmospheres for a total of 
26 seconds.  We then overlapped this proximally with a 2.25 x 38 mm Syndergy 
TESFAYE (10 carloz for 8 seconds).  We then deployed a 2.5 x 32 mm Synergy TESFAYE at 10 
atmospheres, lasting 25 seconds in overlapping fashion in the ostium to the 
midportion of the RCA.  We got excellent angiographic results.  After this, we 
noticed that the distal edge of the distal stent in the right PLV was oversized
, compared to the rest of the RCA.  This could have been an element of spasm.  
Hence, we gave the patient a total of 200 mcg of Cardene and 300 mcg of 
nitroglycerin.  Even after vasodilatation, we noticed that the distal edge was 
a little pinched.  Hence, we decided to dilate this using a 2.0 x 15 mm MINI 
TREK RX balloon for 10 atmospheres for 24 seconds.  Excellent angiographic 
results were achieved at the distal edge of the stent with restoration of MARIA LUISA-
3 flow to the PLV.  We then removed the stent and postdilated both the stents 
using a 2.75 x 20 mm TREK NC balloon for a total of 6 different inflations, 
lasting 16 atmospheres for at least 16 seconds.  Excellent stent expansion and 
apposition were achieved.  There was no evidence of edge dissection or distal 
embolization.  MARIA LUISA-3 flow was restored to the PDA and the PLV territories.  
Excellent flow was noted across the stent.  Good stent expansion and apposition 
were achieved.  Wire-out frames confirmed the above findings as well. 

The patient tolerated the procedure very well without any evidence of 
hemodynamic complications, and was transferred out of the cath lab in stable 
condition without any chest pain. 



Dr. Delcid, my attending, was scrubbed and performed key portions of the 
procedure and supervised the rest of it as well.



TOTAL CONTRAST USED:  340 mL.



TOTAL AIR KERMA:  3528 mGy. 



Right common femoral angiogram demonstrated a high bifurcation of the right side
, and hence, we opted for manual compression method.



LESION CHARACTERISTICS:  

 1. RCA  ACC/AHA type C lesion.

 2. MARIA LUISA 0 flow was noted preprocedure, and MARIA LUISA-3 flow was restored after the 
procedure.



IMPRESSION:  

 1. Successful chronic total occlusion and revascularization of the proximal 
RCA with antegrade wire escalation technique, extending into the right PLV with 
3 Celestine (all Synergy - distal 2.25 x 38 mm, mid 2.25 x 38 mm and proximal 2.5 x 
32 mm) in overlapping fashion with excellent angiographic results.

 2. Aspirin, Plavix, and Coumadin therapy, given the fact that the patient had 
a recent pulmonary embolism and needs Coumadin therapy.  Once the INR reaches 
therapeutic level, we recommend continuing Plavix and Coumadin x 12 months.



Cande Delcid MD





[facility]

DD:  2017 15:12:35  DT:  2017 16:09:41

Job #:  [id number]



cc: 

  - RAIZA CHAMPAGNE 







* ECG-SCAN (2017  7:30 AM)





 Narrative

 

 



Ordered by an unspecified provider.





* CBC (2017  3:45 AM)





  



  Component   Value   Ref Range

 

  



  White Blood Cells   9.6   4.5 - 11.0 K/UL

 

  



  RBC   4.23 (L)   4.4 - 5.5 M/UL

 

  



  Hemoglobin   12.3 (L)   13.5 - 16.5 GM/DL

 

  



  Hematocrit   36.3 (L)   40 - 50 %

 

  



  MCV   85.8   80 - 100 FL

 

  



  MCH   29.1   26 - 34 PG

 

  



  MCHC   34.0   32.0 - 36.0 G/DL

 

  



  RDW   13.9   11 - 15 %

 

  



  Platelet Count   279   150 - 400 K/UL

 

  



  MPV   7.5   7 - 11 FL









 



  Specimen   Performing Laboratory

 

 



  Blood   [facility]



   [address] [place] [address]





* BASIC METABOLIC PANEL (2017  3:45 AM)





  



  Component   Value   Ref Range

 

  



  Sodium   138   137 - 147 MMOL/L

 

  



  Potassium   3.9   3.5 - 5.1 MMOL/L

 

  



  Chloride   105   98 - 110 MMOL/L

 

  



  CO2   25   21 - 30 MMOL/L

 

  



  Anion Gap   8   3 - 12

 

  



  Glucose   107 (H)   70 - 100 MG/DL

 

  



  Blood Urea Nitrogen   12   7 - 25 MG/DL

 

  



  Creatinine   0.72   0.4 - 1.24 MG/DL

 

  



  Calcium   9.2   8.5 - 10.6 MG/DL

 

  



  eGFR Non African American   >60   >60 mL/min



   Comment: 



   The eGFR is not validated for use in drug dosing 



   adjustments.    Continue to use 



   estimated creatinine clearance per dosing 



   reference text.    Please contact the 



   Clinical Pharmacist for questions. 

 

  



  eGFR    >60   >60 mL/min



   Comment: 



   The eGFR is not validated for use in drug dosing 



   adjustments.    Continue to use 



   estimated creatinine clearance per dosing 



   reference text.    Please contact the 



   Clinical Pharmacist for questions. 









 



  Specimen   Performing Laboratory

 

 



  Blood    MAIN [facility]



   [address] [place] [id number]





* PROTIME INR (PT) (2017  3:45 AM)





  



  Component   Value   Ref Range

 

  



  INR   1.1   0.8 - 1.2









 



  Specimen   Performing Laboratory

 

 



  Blood   [facility]



   [address] [place] [id number]





* ECG-SCAN (2017  9:40 PM)





 Narrative

 

 



Ordered by an unspecified provider.





* POC ACTIVATED CLOTTING TIME (2017  4:49 PM)





  



  Component   Value   Ref Range

 

  



  Activated Clotting Time   162   s









 



  Specimen   Performing Laboratory

 

 



    MAIN LAB



   [address] [place] [id number]





* POC ACTIVATED CLOTTING TIME (2017  4:18 PM)





  



  Component   Value   Ref Range

 

  



  Activated Clotting Time   186   s









 



  Specimen   Performing Laboratory

 

 



    MAIN LAB



   [address] [place] [id number]





* POC ACTIVATED CLOTTING TIME (2017  4:00 PM)





  



  Component   Value   Ref Range

 

  



  Activated Clotting Time   183   s









 



  Specimen   Performing Laboratory

 

 



    MAIN LAB



   [address] [place] [id number]





* POC ACTIVATED CLOTTING TIME (2017  2:55 PM)





  



  Component   Value   Ref Range

 

  



  Activated Clotting Time   400   s









 



  Specimen   Performing Laboratory

 

 



    MAIN LAB



   [address] [place] [id number]





* POC ACTIVATED CLOTTING TIME (2017  2:34 PM)





  



  Component   Value   Ref Range

 

  



  Activated Clotting Time   251   s









 



  Specimen   Performing Laboratory

 

 



    MAIN LAB



   [address] [place] [id number]





* POC ACTIVATED CLOTTING TIME (2017  2:08 PM)





  



  Component   Value   Ref Range

 

  



  Activated Clotting Time   349   s









 



  Specimen   Performing Laboratory

 

 



    MAIN [facility]



   [address] [place] [id number]





* POC ACTIVATED CLOTTING TIME (2017  1:35 PM)





  



  Component   Value   Ref Range

 

  



  Activated Clotting Time   337   s









 



  Specimen   Performing Laboratory

 

 



    MAIN LAB



   [address] [place] [address]





* POC ACTIVATED CLOTTING TIME (2017  1:05 PM)





  



  Component   Value   Ref Range

 

  



  Activated Clotting Time   370   s









 



  Specimen   Performing Laboratory

 

 



   [facility]



   [address] [place] [address]





in this encounter



Visit Diagnoses











  Diagnosis

 





  Coronary artery disease involving native coronary artery of native heart with 
angina pectoris (HCC)



  - Primary

 





  Ischemic cardiomyopathy

 





  Other specified forms of chronic ischemic heart disease

 





  NSTEMI (non-ST elevated myocardial infarction) (HCC)

 





  Acute myocardial infarction, subendocardial infarction, episode of care 
unspecified

 





  Tobacco abuse

 





  Tobacco use disorder







Admitting Diagnoses











  Diagnosis

 





  Chronic Total Occlusion

 





  CAD (coronary artery disease)







Administered Medications







     



  Medication Order   MAR Action   Action Date   Dose   Rate   Site

 

     



  aspirin chewable tablet 81 mg   Given   2017   81 mg  



  81 mg, Oral, DAILY, First dose on Mon    08:20 CST   



  17 at 1600, Until Discontinued     

 

  

 

     



  atorvastatin (LIPITOR) tablet 40 mg   Given   2017   40 mg  



  40 mg, Oral, DAILY, First dose on Mon    22:24 CST   



  17 at 1300, Until Discontinued,     



  Admission/Obs/Extended Recovery     









    



  Given   2017   40 mg  



   08:21 CST   









  

 

     



  carisoprodol(+) (SOMA) tablet 350 mg   Given   2017   350 mg  



  350 mg, Oral, AT BEDTIME DAILY, First    22:24 CST   



  dose on 17 at 2100, Until     



  Discontinued, Admission/Obs/Extended     



  Recovery     

 

  

 

     



  clopiDOGrel (PLAVIX) tablet 75 mg   Given   2017   75 mg  



  75 mg, Oral, DAILY, First dose on Tue    08:21 CST   



  17 at 0900, Until Discontinued,     



  Clopidogrel (PLAVIX) load given in cath     



  lab. This Medication can increase the     



  risk of bleeding and may need to be held     



  prior to surgery or invasive procedures.     



  Consult physician in advance.     

 

  

 

     



  gabapentin (NEURONTIN) capsule 600 mg   Given   2017   600 mg  



  600 mg, Oral, FOUR TIMES DAILY, First    15:47 CST   



  dose on 17 at 1300, Until     



  Discontinued, Admission/Obs/Extended     



  Recovery     









    



  Given   2017   600 mg  



   22:24 CST   

 

    



  Given   2017   600 mg  



   08:20 CST   









  

 

     



  HYDROcodone/acetaminophen (NORCO) 5/325   Given   2017   1 tablet  



  mg tablet 1 tablet    15:47 CST   



  1 tablet, Oral, EVERY  6 HOURS PRN,     



  Starting 17 at 1156, Until 17 at 1052, Pain PO, TOTAL     



  ACETAMINOPHEN DOSE NOT TO EXCEED 4GM     



  DAILY NOTE: This is a HIGH ALERT     



  Medication.     

 

  

 

     



  pantoprazole DR (PROTONIX) tablet 40 mg   Given   2017   40 mg  



  40 mg, Oral, DAILY, First dose on Mon    22:24 CST   



  17 at 2100, Until Discontinued, Do     



  not crush or chew tablet.     

 

  

 

     



  sacubitril/valsartan (ENTRESTO) 24/26 mg   Given   2017   1 tablet  



  tablet 1 tablet    08:21 CST   



  1 tablet, Oral, TWICE DAILY, First dose     



  on 17 at 0900, Until     



  Discontinued, Admission/Obs/Extended     



  Recovery     

 

  

 

     



  sodium chloride 0.9 %   infusion   Given - New   2017   1,000 mL   50 mL
/hr 



  1,000 mL, 1,000 mL, Intravenous, at 50   Bag   08:38 CST   



  mL/hr, CONTINUOUS, Starting 17     



  at 0815, Until 17 at 1458, If     



  EF is <40%, contact CCL Charge Nurse     



  [phone] before initiating fluid.     

 

  

 

     



  sodium chloride 0.9 %   infusion   Dose/Rate   2017    75 mL/hr 



  1,000 mL, Intravenous, at 75 mL/hr,   Change   15:41 CST   



  CONTINUOUS, Starting 17 at     



  1500, Until 17 at 0059, Once     



  patient out of bed, then saline lock and     



  DC IV fluid.     









    



  Bolus from Infusion   2017   250 mL   999 mL/hr 



   17:30 CST   

 

    



  Dose/Rate Verify   2017    75 mL/hr 



   17:45 CST   









  

 

     



  warfarin (COUMADIN) tablet 7.5 mg   Given   2017   7.5 mg  



  7.5 mg, Oral, TWO TIMES WEEKLY (Once per    22:25 CST   



  day on ), First dose on 17 at 2100, Until Discontinued,     



  NURSING: Provide patient with warfarin     



  education leaflet and video. Do not give     



  with cranberry juice. NOTE: This is a     



  HIGH ALERT Medication.     

 

  



in this encounter Left vm